# Patient Record
Sex: FEMALE | Race: WHITE | NOT HISPANIC OR LATINO | Employment: OTHER | URBAN - METROPOLITAN AREA
[De-identification: names, ages, dates, MRNs, and addresses within clinical notes are randomized per-mention and may not be internally consistent; named-entity substitution may affect disease eponyms.]

---

## 2017-01-19 ENCOUNTER — HOSPITAL ENCOUNTER (OUTPATIENT)
Dept: FAMILY MEDICINE CLINIC | Facility: CLINIC | Age: 82
Setting detail: SPECIMEN
Discharge: HOME | End: 2017-01-19
Attending: FAMILY MEDICINE | Admitting: FAMILY MEDICINE

## 2017-01-19 LAB
ALBUMIN SERPL-MCNC: 3.3 G/DL (ref 3.5–4.8)
ALBUMIN/GLOB SERPL: 0.9 {RATIO} (ref 1–1.7)
ALP SERPL-CCNC: 151 IU/L (ref 32–91)
ALT SERPL-CCNC: 33 IU/L (ref 14–54)
ANION GAP SERPL CALC-SCNC: 12.3 MMOL/L (ref 10–20)
AST SERPL-CCNC: 31 IU/L (ref 15–41)
BILIRUB SERPL-MCNC: 0.7 MG/DL (ref 0.3–1.2)
BILIRUB UR QL STRIP: NEGATIVE MG/DL
BUN SERPL-MCNC: 11 MG/DL (ref 8–20)
BUN/CREAT SERPL: 13.8 (ref 5.4–26.2)
CALCIUM SERPL-MCNC: 9.5 MG/DL (ref 8.9–10.3)
CASTS URNS QL MICRO: NORMAL /[LPF]
CHLORIDE SERPL-SCNC: 102 MMOL/L (ref 101–111)
COLOR UR: YELLOW
CONV BACTERIA IN URINE MICRO: NEGATIVE
CONV CLARITY OF URINE: CLEAR
CONV CO2: 30 MMOL/L (ref 22–32)
CONV HYALINE CASTS IN URINE MICRO: 0 /[LPF] (ref 0–5)
CONV PROTEIN IN URINE BY AUTOMATED TEST STRIP: NEGATIVE MG/DL
CONV SMALL ROUND CELLS: NORMAL /[HPF]
CONV TOTAL PROTEIN: 6.9 G/DL (ref 6.1–7.9)
CONV UROBILINOGEN IN URINE BY AUTOMATED TEST STRIP: 1 MG/DL
CREAT UR-MCNC: 0.8 MG/DL (ref 0.4–1)
CULTURE INDICATED?: NORMAL
ERYTHROCYTE [DISTWIDTH] IN BLOOD BY AUTOMATED COUNT: 14.6 % (ref 11.5–14.5)
GLOBULIN UR ELPH-MCNC: 3.6 G/DL (ref 2.5–3.8)
GLUCOSE SERPL-MCNC: 95 MG/DL (ref 65–99)
GLUCOSE UR QL: NEGATIVE MG/DL
HCT VFR BLD AUTO: 38.6 % (ref 35–49)
HGB BLD-MCNC: 12.9 G/DL (ref 12–15)
HGB UR QL STRIP: NEGATIVE
KETONES UR QL STRIP: NEGATIVE MG/DL
LEUKOCYTE ESTERASE UR QL STRIP: NEGATIVE
MCH RBC QN AUTO: 30 PG (ref 26–32)
MCHC RBC AUTO-ENTMCNC: 33.4 G/DL (ref 32–36)
MCV RBC AUTO: 89.7 FL (ref 80–94)
NITRITE UR QL STRIP: NEGATIVE
PH UR STRIP.AUTO: 7.5 [PH] (ref 4.5–8)
PLATELET # BLD AUTO: 274 10*3/UL (ref 150–450)
PMV BLD AUTO: 8.4 FL (ref 7.4–10.4)
POTASSIUM SERPL-SCNC: 5.3 MMOL/L (ref 3.6–5.1)
RBC # BLD AUTO: 4.3 10*6/UL (ref 4–5.4)
RBC #/AREA URNS HPF: 1 /[HPF] (ref 0–3)
SODIUM SERPL-SCNC: 139 MMOL/L (ref 136–144)
SP GR UR: 1.01 (ref 1–1.03)
SPERM URNS QL MICRO: NORMAL /[HPF]
SQUAMOUS SPT QL MICRO: 0 /[HPF] (ref 0–5)
UNIDENT CRYS URNS QL MICRO: NORMAL /[HPF]
WBC # BLD AUTO: 9.6 10*3/UL (ref 4.5–11.5)
WBC #/AREA URNS HPF: 0 /[HPF] (ref 0–5)
YEAST SPEC QL WET PREP: NORMAL /[HPF]

## 2017-07-12 ENCOUNTER — HOSPITAL ENCOUNTER (OUTPATIENT)
Dept: FAMILY MEDICINE CLINIC | Facility: CLINIC | Age: 82
Setting detail: SPECIMEN
Discharge: HOME | End: 2017-07-12
Attending: FAMILY MEDICINE | Admitting: FAMILY MEDICINE

## 2017-10-11 ENCOUNTER — HOSPITAL ENCOUNTER (OUTPATIENT)
Dept: FAMILY MEDICINE CLINIC | Facility: CLINIC | Age: 82
Setting detail: SPECIMEN
Discharge: HOME | End: 2017-10-11
Attending: FAMILY MEDICINE | Admitting: FAMILY MEDICINE

## 2017-10-11 LAB
ALBUMIN SERPL-MCNC: 3.7 G/DL (ref 3.5–4.8)
ALBUMIN/GLOB SERPL: 1.1 {RATIO} (ref 1–1.7)
ALP SERPL-CCNC: 91 IU/L (ref 32–91)
ALT SERPL-CCNC: 23 IU/L (ref 14–54)
ANION GAP SERPL CALC-SCNC: 11.6 MMOL/L (ref 10–20)
AST SERPL-CCNC: 30 IU/L (ref 15–41)
BILIRUB SERPL-MCNC: 0.8 MG/DL (ref 0.3–1.2)
BUN SERPL-MCNC: 14 MG/DL (ref 8–20)
BUN/CREAT SERPL: 17.5 (ref 5.4–26.2)
CALCIUM SERPL-MCNC: 9.7 MG/DL (ref 8.9–10.3)
CHLORIDE SERPL-SCNC: 101 MMOL/L (ref 101–111)
CHOLEST SERPL-MCNC: 141 MG/DL
CHOLEST/HDLC SERPL: 1.9 {RATIO}
CONV CO2: 28 MMOL/L (ref 22–32)
CONV LDL CHOLESTEROL DIRECT: 59 MG/DL (ref 0–100)
CONV TOTAL PROTEIN: 7.2 G/DL (ref 6.1–7.9)
CREAT UR-MCNC: 0.8 MG/DL (ref 0.4–1)
ERYTHROCYTE [DISTWIDTH] IN BLOOD BY AUTOMATED COUNT: 14.4 % (ref 11.5–14.5)
GLOBULIN UR ELPH-MCNC: 3.5 G/DL (ref 2.5–3.8)
GLUCOSE SERPL-MCNC: 91 MG/DL (ref 65–99)
HCT VFR BLD AUTO: 40.3 % (ref 35–49)
HDLC SERPL-MCNC: 74 MG/DL
HGB BLD-MCNC: 13.6 G/DL (ref 12–15)
LDLC/HDLC SERPL: 0.8 {RATIO}
LIPID INTERPRETATION: NORMAL
MCH RBC QN AUTO: 30.5 PG (ref 26–32)
MCHC RBC AUTO-ENTMCNC: 33.8 G/DL (ref 32–36)
MCV RBC AUTO: 90.3 FL (ref 80–94)
PLATELET # BLD AUTO: 210 10*3/UL (ref 150–450)
PMV BLD AUTO: 8.2 FL (ref 7.4–10.4)
POTASSIUM SERPL-SCNC: 4.6 MMOL/L (ref 3.6–5.1)
RBC # BLD AUTO: 4.47 10*6/UL (ref 4–5.4)
SODIUM SERPL-SCNC: 136 MMOL/L (ref 136–144)
TRIGL SERPL-MCNC: 58 MG/DL
TSH SERPL-ACNC: 0.62 UIU/ML (ref 0.34–5.6)
VLDLC SERPL CALC-MCNC: 8 MG/DL
WBC # BLD AUTO: 5.9 10*3/UL (ref 4.5–11.5)

## 2017-10-12 ENCOUNTER — HOSPITAL ENCOUNTER (OUTPATIENT)
Dept: LAB | Facility: HOSPITAL | Age: 82
Setting detail: SPECIMEN
Discharge: HOME | End: 2017-10-12
Attending: INTERNAL MEDICINE | Admitting: INTERNAL MEDICINE

## 2017-10-12 LAB
ALBUMIN SERPL-MCNC: 3.4 G/DL (ref 3.5–4.8)
ALBUMIN/GLOB SERPL: 1.1 {RATIO} (ref 1–1.7)
ALP SERPL-CCNC: 93 IU/L (ref 32–91)
ALT SERPL-CCNC: 24 IU/L (ref 14–54)
ANION GAP SERPL CALC-SCNC: 10.5 MMOL/L (ref 10–20)
AST SERPL-CCNC: 30 IU/L (ref 15–41)
BASOPHILS # BLD AUTO: 0 10*3/UL (ref 0–0.2)
BASOPHILS NFR BLD AUTO: 1 % (ref 0–2)
BILIRUB SERPL-MCNC: 0.5 MG/DL (ref 0.3–1.2)
BUN SERPL-MCNC: 19 MG/DL (ref 8–20)
BUN/CREAT SERPL: 23.8 (ref 5.4–26.2)
CALCIUM SERPL-MCNC: 9.2 MG/DL (ref 8.9–10.3)
CHLORIDE SERPL-SCNC: 102 MMOL/L (ref 101–111)
CONV CO2: 27 MMOL/L (ref 22–32)
CONV TOTAL PROTEIN: 6.5 G/DL (ref 6.1–7.9)
CREAT UR-MCNC: 0.8 MG/DL (ref 0.4–1)
DIFFERENTIAL METHOD BLD: (no result)
EOSINOPHIL # BLD AUTO: 0.1 10*3/UL (ref 0–0.3)
EOSINOPHIL # BLD AUTO: 2 % (ref 0–3)
ERYTHROCYTE [DISTWIDTH] IN BLOOD BY AUTOMATED COUNT: 14.2 % (ref 11.5–14.5)
GLOBULIN UR ELPH-MCNC: 3.1 G/DL (ref 2.5–3.8)
GLUCOSE SERPL-MCNC: 90 MG/DL (ref 65–99)
HCT VFR BLD AUTO: 37.1 % (ref 35–49)
HGB BLD-MCNC: 12.5 G/DL (ref 12–15)
LYMPHOCYTES # BLD AUTO: 1.4 10*3/UL (ref 0.8–4.8)
LYMPHOCYTES NFR BLD AUTO: 22 % (ref 18–42)
MCH RBC QN AUTO: 30.6 PG (ref 26–32)
MCHC RBC AUTO-ENTMCNC: 33.7 G/DL (ref 32–36)
MCV RBC AUTO: 90.7 FL (ref 80–94)
MONOCYTES # BLD AUTO: 0.5 10*3/UL (ref 0.1–1.3)
MONOCYTES NFR BLD AUTO: 8 % (ref 2–11)
NEUTROPHILS # BLD AUTO: 4.4 10*3/UL (ref 2.3–8.6)
NEUTROPHILS NFR BLD AUTO: 67 % (ref 50–75)
NRBC BLD AUTO-RTO: 0 /100{WBCS}
NRBC/RBC NFR BLD MANUAL: 0 10*3/UL
PLATELET # BLD AUTO: 196 10*3/UL (ref 150–450)
PMV BLD AUTO: 8.5 FL (ref 7.4–10.4)
POTASSIUM SERPL-SCNC: 4.5 MMOL/L (ref 3.6–5.1)
RBC # BLD AUTO: 4.1 10*6/UL (ref 4–5.4)
SODIUM SERPL-SCNC: 135 MMOL/L (ref 136–144)
T4 FREE SERPL-MCNC: 1.55 NG/DL (ref 0.58–1.64)
TSH SERPL-ACNC: 0.51 UIU/ML (ref 0.34–5.6)
WBC # BLD AUTO: 6.5 10*3/UL (ref 4.5–11.5)

## 2017-11-30 ENCOUNTER — HOSPITAL ENCOUNTER (OUTPATIENT)
Dept: LAB | Facility: HOSPITAL | Age: 82
Setting detail: SPECIMEN
Discharge: HOME | End: 2017-11-30
Attending: INTERNAL MEDICINE | Admitting: INTERNAL MEDICINE

## 2017-11-30 LAB
T4 FREE SERPL-MCNC: 1.45 NG/DL (ref 0.58–1.64)
TSH SERPL-ACNC: 0.79 UIU/ML (ref 0.34–5.6)

## 2018-01-09 ENCOUNTER — HOSPITAL ENCOUNTER (OUTPATIENT)
Dept: CARDIOLOGY | Facility: HOSPITAL | Age: 83
Discharge: HOME | End: 2018-01-09
Attending: INTERNAL MEDICINE | Admitting: INTERNAL MEDICINE

## 2018-10-04 ENCOUNTER — HOSPITAL ENCOUNTER (OUTPATIENT)
Dept: LAB | Facility: HOSPITAL | Age: 83
Setting detail: SPECIMEN
Discharge: HOME | End: 2018-10-04
Attending: INTERNAL MEDICINE | Admitting: INTERNAL MEDICINE

## 2018-10-04 LAB
T4 FREE SERPL-MCNC: 1.02 NG/DL (ref 0.58–1.64)
TSH SERPL-ACNC: 0.61 UIU/ML (ref 0.34–5.6)

## 2019-03-25 ENCOUNTER — OFFICE (OUTPATIENT)
Dept: URBAN - METROPOLITAN AREA CLINIC 64 | Facility: CLINIC | Age: 84
End: 2019-03-25

## 2019-03-25 VITALS
DIASTOLIC BLOOD PRESSURE: 73 MMHG | HEIGHT: 62 IN | SYSTOLIC BLOOD PRESSURE: 147 MMHG | HEART RATE: 70 BPM | WEIGHT: 141 LBS

## 2019-03-25 DIAGNOSIS — R10.11 RIGHT UPPER QUADRANT PAIN: ICD-10-CM

## 2019-03-25 DIAGNOSIS — K59.00 CONSTIPATION, UNSPECIFIED: ICD-10-CM

## 2019-03-25 PROCEDURE — 99213 OFFICE O/P EST LOW 20 MIN: CPT | Performed by: NURSE PRACTITIONER

## 2019-04-16 ENCOUNTER — OFFICE (OUTPATIENT)
Dept: URBAN - METROPOLITAN AREA CLINIC 64 | Facility: CLINIC | Age: 84
End: 2019-04-16

## 2019-04-16 ENCOUNTER — HOSPITAL ENCOUNTER (OUTPATIENT)
Dept: CARDIOLOGY | Facility: HOSPITAL | Age: 84
Discharge: HOME | End: 2019-04-16
Attending: INTERNAL MEDICINE | Admitting: INTERNAL MEDICINE

## 2019-04-16 VITALS
SYSTOLIC BLOOD PRESSURE: 141 MMHG | WEIGHT: 145 LBS | HEIGHT: 62 IN | DIASTOLIC BLOOD PRESSURE: 67 MMHG | HEART RATE: 70 BPM

## 2019-04-16 DIAGNOSIS — M54.9 DORSALGIA, UNSPECIFIED: ICD-10-CM

## 2019-04-16 DIAGNOSIS — R10.11 RIGHT UPPER QUADRANT PAIN: ICD-10-CM

## 2019-04-16 DIAGNOSIS — K59.00 CONSTIPATION, UNSPECIFIED: ICD-10-CM

## 2019-04-16 PROCEDURE — 99214 OFFICE O/P EST MOD 30 MIN: CPT | Performed by: NURSE PRACTITIONER

## 2019-05-14 ENCOUNTER — OFFICE (OUTPATIENT)
Dept: URBAN - METROPOLITAN AREA CLINIC 64 | Facility: CLINIC | Age: 84
End: 2019-05-14

## 2019-05-14 VITALS
WEIGHT: 145 LBS | HEIGHT: 62 IN | HEART RATE: 70 BPM | DIASTOLIC BLOOD PRESSURE: 74 MMHG | SYSTOLIC BLOOD PRESSURE: 145 MMHG

## 2019-05-14 DIAGNOSIS — M54.9 DORSALGIA, UNSPECIFIED: ICD-10-CM

## 2019-05-14 DIAGNOSIS — K59.00 CONSTIPATION, UNSPECIFIED: ICD-10-CM

## 2019-05-14 PROCEDURE — 99213 OFFICE O/P EST LOW 20 MIN: CPT | Performed by: NURSE PRACTITIONER

## 2019-06-18 ENCOUNTER — OFFICE (OUTPATIENT)
Dept: URBAN - METROPOLITAN AREA CLINIC 64 | Facility: CLINIC | Age: 84
End: 2019-06-18

## 2019-06-18 VITALS
HEART RATE: 70 BPM | WEIGHT: 137 LBS | SYSTOLIC BLOOD PRESSURE: 157 MMHG | HEIGHT: 62 IN | DIASTOLIC BLOOD PRESSURE: 77 MMHG

## 2019-06-18 DIAGNOSIS — R19.4 CHANGE IN BOWEL HABIT: ICD-10-CM

## 2019-06-18 PROCEDURE — 99213 OFFICE O/P EST LOW 20 MIN: CPT | Performed by: NURSE PRACTITIONER

## 2019-07-02 ENCOUNTER — ANTICOAGULATION VISIT (OUTPATIENT)
Dept: CARDIOLOGY | Facility: CLINIC | Age: 84
End: 2019-07-02

## 2019-07-02 DIAGNOSIS — I48.91 ATRIAL FIBRILLATION, UNSPECIFIED TYPE (HCC): Primary | ICD-10-CM

## 2019-07-02 LAB — INR PPP: 2.3

## 2019-07-03 RX ORDER — WARFARIN SODIUM 5 MG/1
5 TABLET ORAL
Qty: 60 TABLET | Refills: 0 | Status: SHIPPED | OUTPATIENT
Start: 2019-07-03 | End: 2019-08-29 | Stop reason: SDUPTHER

## 2019-07-16 ENCOUNTER — ANTICOAGULATION VISIT (OUTPATIENT)
Dept: CARDIOLOGY | Facility: CLINIC | Age: 84
End: 2019-07-16

## 2019-07-16 DIAGNOSIS — I48.91 ATRIAL FIBRILLATION, UNSPECIFIED TYPE (HCC): Primary | ICD-10-CM

## 2019-07-16 LAB — INR PPP: 1.6

## 2019-07-30 ENCOUNTER — ANTICOAGULATION VISIT (OUTPATIENT)
Dept: CARDIOLOGY | Facility: CLINIC | Age: 84
End: 2019-07-30

## 2019-07-30 DIAGNOSIS — I48.91 ATRIAL FIBRILLATION, UNSPECIFIED TYPE (HCC): Primary | ICD-10-CM

## 2019-07-30 LAB — INR PPP: 1.8

## 2019-08-09 RX ORDER — ROSUVASTATIN CALCIUM 20 MG/1
20 TABLET, COATED ORAL DAILY
Qty: 90 TABLET | Refills: 3 | Status: SHIPPED | OUTPATIENT
Start: 2019-08-09 | End: 2020-08-06 | Stop reason: SDUPTHER

## 2019-08-13 ENCOUNTER — ANTICOAGULATION VISIT (OUTPATIENT)
Dept: CARDIOLOGY | Facility: CLINIC | Age: 84
End: 2019-08-13

## 2019-08-13 DIAGNOSIS — I48.91 ATRIAL FIBRILLATION, UNSPECIFIED TYPE (HCC): Primary | ICD-10-CM

## 2019-08-13 LAB — INR PPP: 1.7

## 2019-08-21 RX ORDER — DILTIAZEM HYDROCHLORIDE 240 MG/1
CAPSULE, EXTENDED RELEASE ORAL
Qty: 90 CAPSULE | Refills: 2 | Status: SHIPPED | OUTPATIENT
Start: 2019-08-21 | End: 2020-05-26

## 2019-08-27 ENCOUNTER — ANTICOAGULATION VISIT (OUTPATIENT)
Dept: CARDIOLOGY | Facility: CLINIC | Age: 84
End: 2019-08-27

## 2019-08-27 DIAGNOSIS — I48.91 ATRIAL FIBRILLATION, UNSPECIFIED TYPE (HCC): Primary | ICD-10-CM

## 2019-08-27 LAB — INR PPP: 2.4

## 2019-08-29 RX ORDER — WARFARIN SODIUM 5 MG/1
5 TABLET ORAL
Qty: 60 TABLET | Refills: 0 | Status: SHIPPED | OUTPATIENT
Start: 2019-08-29 | End: 2019-10-07 | Stop reason: SDUPTHER

## 2019-09-03 ENCOUNTER — ANTICOAGULATION VISIT (OUTPATIENT)
Dept: CARDIOLOGY | Facility: CLINIC | Age: 84
End: 2019-09-03

## 2019-09-03 DIAGNOSIS — I48.91 ATRIAL FIBRILLATION, UNSPECIFIED TYPE (HCC): Primary | ICD-10-CM

## 2019-09-03 LAB — INR PPP: 2.8

## 2019-09-04 RX ORDER — DOXAZOSIN 2 MG/1
2 TABLET ORAL DAILY
Qty: 30 TABLET | Refills: 6 | Status: SHIPPED | OUTPATIENT
Start: 2019-09-04 | End: 2020-03-18

## 2019-09-17 LAB — INR PPP: 2

## 2019-09-18 ENCOUNTER — ANTICOAGULATION VISIT (OUTPATIENT)
Dept: CARDIOLOGY | Facility: CLINIC | Age: 84
End: 2019-09-18

## 2019-09-18 DIAGNOSIS — I48.91 ATRIAL FIBRILLATION, UNSPECIFIED TYPE (HCC): Primary | ICD-10-CM

## 2019-09-24 ENCOUNTER — ANTICOAGULATION VISIT (OUTPATIENT)
Dept: CARDIOLOGY | Facility: CLINIC | Age: 84
End: 2019-09-24

## 2019-09-24 DIAGNOSIS — I48.91 ATRIAL FIBRILLATION, UNSPECIFIED TYPE (HCC): Primary | ICD-10-CM

## 2019-09-24 LAB — INR PPP: 2.4

## 2019-09-25 ENCOUNTER — TRANSCRIBE ORDERS (OUTPATIENT)
Dept: ADMINISTRATIVE | Facility: HOSPITAL | Age: 84
End: 2019-09-25

## 2019-09-25 ENCOUNTER — LAB (OUTPATIENT)
Dept: LAB | Facility: HOSPITAL | Age: 84
End: 2019-09-25

## 2019-09-25 DIAGNOSIS — E83.50 CALCIUM DISORDER: Primary | ICD-10-CM

## 2019-09-25 DIAGNOSIS — E83.50 CALCIUM DISORDER: ICD-10-CM

## 2019-09-25 LAB
ALBUMIN SERPL-MCNC: 3.6 G/DL (ref 3.5–4.8)
ALBUMIN/GLOB SERPL: 1.1 G/DL (ref 1–1.7)
ALP SERPL-CCNC: 87 U/L (ref 32–91)
ALT SERPL W P-5'-P-CCNC: 21 U/L (ref 14–54)
ANION GAP SERPL CALCULATED.3IONS-SCNC: 12.3 MMOL/L (ref 5–15)
AST SERPL-CCNC: 25 U/L (ref 15–41)
BASOPHILS # BLD AUTO: 0 10*3/MM3 (ref 0–0.2)
BASOPHILS NFR BLD AUTO: 0.7 % (ref 0–1.5)
BILIRUB SERPL-MCNC: 0.9 MG/DL (ref 0.3–1.2)
BUN BLD-MCNC: 18 MG/DL (ref 8–20)
BUN/CREAT SERPL: 22.5 (ref 5.4–26.2)
CALCIUM SPEC-SCNC: 9.6 MG/DL (ref 8.9–10.3)
CHLORIDE SERPL-SCNC: 102 MMOL/L (ref 101–111)
CO2 SERPL-SCNC: 29 MMOL/L (ref 22–32)
CREAT BLD-MCNC: 0.8 MG/DL (ref 0.4–1)
CRP SERPL-MCNC: 0.58 MG/DL (ref 0–0.7)
DEPRECATED RDW RBC AUTO: 45.5 FL (ref 37–54)
EOSINOPHIL # BLD AUTO: 0.1 10*3/MM3 (ref 0–0.4)
EOSINOPHIL NFR BLD AUTO: 2.1 % (ref 0.3–6.2)
ERYTHROCYTE [DISTWIDTH] IN BLOOD BY AUTOMATED COUNT: 14.5 % (ref 12.3–15.4)
GFR SERPL CREATININE-BSD FRML MDRD: 68 ML/MIN/1.73
GLOBULIN UR ELPH-MCNC: 3.4 GM/DL (ref 2.5–3.8)
GLUCOSE BLD-MCNC: 99 MG/DL (ref 65–99)
HCT VFR BLD AUTO: 38 % (ref 34–46.6)
HGB BLD-MCNC: 12.8 G/DL (ref 12–15.9)
LYMPHOCYTES # BLD AUTO: 1.2 10*3/MM3 (ref 0.7–3.1)
LYMPHOCYTES NFR BLD AUTO: 24.5 % (ref 19.6–45.3)
MCH RBC QN AUTO: 30.3 PG (ref 26.6–33)
MCHC RBC AUTO-ENTMCNC: 33.7 G/DL (ref 31.5–35.7)
MCV RBC AUTO: 90 FL (ref 79–97)
MONOCYTES # BLD AUTO: 0.4 10*3/MM3 (ref 0.1–0.9)
MONOCYTES NFR BLD AUTO: 8.2 % (ref 5–12)
NEUTROPHILS # BLD AUTO: 3.3 10*3/MM3 (ref 1.7–7)
NEUTROPHILS NFR BLD AUTO: 64.5 % (ref 42.7–76)
NRBC BLD AUTO-RTO: 0 /100 WBC (ref 0–0.2)
PLATELET # BLD AUTO: 216 10*3/MM3 (ref 140–450)
PMV BLD AUTO: 7.5 FL (ref 6–12)
POTASSIUM BLD-SCNC: 4.3 MMOL/L (ref 3.6–5.1)
PROT SERPL-MCNC: 7 G/DL (ref 6.1–7.9)
RBC # BLD AUTO: 4.23 10*6/MM3 (ref 3.77–5.28)
SODIUM BLD-SCNC: 139 MMOL/L (ref 136–144)
WBC NRBC COR # BLD: 5.1 10*3/MM3 (ref 3.4–10.8)

## 2019-09-25 PROCEDURE — 86140 C-REACTIVE PROTEIN: CPT

## 2019-09-25 PROCEDURE — 80053 COMPREHEN METABOLIC PANEL: CPT

## 2019-09-25 PROCEDURE — 85025 COMPLETE CBC W/AUTO DIFF WBC: CPT

## 2019-09-25 PROCEDURE — 36415 COLL VENOUS BLD VENIPUNCTURE: CPT

## 2019-09-26 DIAGNOSIS — E03.9 HYPOTHYROIDISM, UNSPECIFIED TYPE: Primary | ICD-10-CM

## 2019-09-26 PROBLEM — J02.9 SORE THROAT: Status: ACTIVE | Noted: 2017-02-16

## 2019-09-26 PROBLEM — F41.9 ANXIETY DISORDER: Status: ACTIVE | Noted: 2019-09-26

## 2019-09-26 PROBLEM — K21.9 GASTROESOPHAGEAL REFLUX DISEASE: Status: ACTIVE | Noted: 2019-09-26

## 2019-09-26 PROBLEM — I10 HYPERTENSION: Status: ACTIVE | Noted: 2019-09-26

## 2019-09-26 PROBLEM — R53.83 FATIGUE: Status: ACTIVE | Noted: 2018-04-12

## 2019-09-26 PROBLEM — R60.0 EDEMA OF LOWER EXTREMITY: Status: ACTIVE | Noted: 2017-10-11

## 2019-09-26 PROBLEM — J45.909 ASTHMA: Status: ACTIVE | Noted: 2019-09-26

## 2019-09-26 PROBLEM — Z51.81 ENCOUNTER FOR THERAPEUTIC DRUG LEVEL MONITORING: Status: ACTIVE | Noted: 2017-01-27

## 2019-09-26 PROBLEM — R74.8 HIGH ALKALINE PHOSPHATASE: Status: ACTIVE | Noted: 2017-01-26

## 2019-09-26 PROBLEM — J11.1 INFLUENZA: Status: ACTIVE | Noted: 2017-02-16

## 2019-09-26 PROBLEM — F32.A DEPRESSION: Status: ACTIVE | Noted: 2019-09-26

## 2019-09-26 PROBLEM — R05.9 COUGH: Status: ACTIVE | Noted: 2017-02-16

## 2019-09-26 PROBLEM — R04.2 HEMOPTYSIS: Status: ACTIVE | Noted: 2017-02-16

## 2019-09-26 PROBLEM — IMO0002 EXCESSIVE ANTICOAGULATION: Status: ACTIVE | Noted: 2017-02-21

## 2019-10-07 RX ORDER — WARFARIN SODIUM 5 MG/1
TABLET ORAL
Qty: 60 TABLET | Refills: 0 | Status: SHIPPED | OUTPATIENT
Start: 2019-10-07 | End: 2019-10-10 | Stop reason: DRUGHIGH

## 2019-10-08 ENCOUNTER — LAB (OUTPATIENT)
Dept: LAB | Facility: HOSPITAL | Age: 84
End: 2019-10-08

## 2019-10-08 DIAGNOSIS — E03.9 HYPOTHYROIDISM, UNSPECIFIED TYPE: ICD-10-CM

## 2019-10-08 LAB
INR PPP: 2.5
T4 FREE SERPL-MCNC: 0.85 NG/DL (ref 0.58–1.64)
TSH SERPL DL<=0.05 MIU/L-ACNC: 1.09 UIU/ML (ref 0.34–5.6)

## 2019-10-08 PROCEDURE — 36415 COLL VENOUS BLD VENIPUNCTURE: CPT

## 2019-10-08 PROCEDURE — 84439 ASSAY OF FREE THYROXINE: CPT

## 2019-10-08 PROCEDURE — 84443 ASSAY THYROID STIM HORMONE: CPT

## 2019-10-09 ENCOUNTER — ANTICOAGULATION VISIT (OUTPATIENT)
Dept: CARDIOLOGY | Facility: CLINIC | Age: 84
End: 2019-10-09

## 2019-10-09 DIAGNOSIS — I48.91 ATRIAL FIBRILLATION, UNSPECIFIED TYPE (HCC): Primary | ICD-10-CM

## 2019-10-09 RX ORDER — SENNOSIDES 8.6 MG
650 CAPSULE ORAL EVERY 8 HOURS PRN
COMMUNITY

## 2019-10-09 RX ORDER — SOTALOL HYDROCHLORIDE 80 MG/1
TABLET ORAL
COMMUNITY
Start: 2019-07-25 | End: 2019-10-17

## 2019-10-09 RX ORDER — COLCHICINE 0.6 MG/1
0.6 TABLET ORAL 2 TIMES DAILY
COMMUNITY
Start: 2017-09-07

## 2019-10-09 RX ORDER — TRIAMTERENE AND HYDROCHLOROTHIAZIDE 75; 50 MG/1; MG/1
1 TABLET ORAL DAILY
COMMUNITY
End: 2019-10-17

## 2019-10-09 RX ORDER — FERROUS SULFATE 325(65) MG
TABLET ORAL EVERY 24 HOURS
COMMUNITY
Start: 2015-07-16 | End: 2019-10-17

## 2019-10-09 RX ORDER — FEXOFENADINE HCL 180 MG/1
TABLET ORAL AS NEEDED
COMMUNITY
End: 2020-06-26

## 2019-10-09 RX ORDER — POTASSIUM CHLORIDE 1500 MG/1
TABLET, FILM COATED, EXTENDED RELEASE ORAL
COMMUNITY
Start: 2019-09-19 | End: 2020-05-05

## 2019-10-09 RX ORDER — NITROGLYCERIN 0.4 MG/1
0.4 TABLET SUBLINGUAL
COMMUNITY
End: 2022-05-12 | Stop reason: SDUPTHER

## 2019-10-09 RX ORDER — METOPROLOL TARTRATE 50 MG/1
50 TABLET, FILM COATED ORAL
COMMUNITY
End: 2019-10-17

## 2019-10-09 RX ORDER — PANTOPRAZOLE SODIUM 40 MG/1
GRANULE, DELAYED RELEASE ORAL
COMMUNITY
End: 2019-10-17

## 2019-10-09 RX ORDER — LEVOTHYROXINE SODIUM 0.07 MG/1
875 TABLET ORAL
COMMUNITY
Start: 2019-09-06 | End: 2019-10-17 | Stop reason: SDUPTHER

## 2019-10-09 RX ORDER — HYDROXYCHLOROQUINE SULFATE 200 MG/1
TABLET, FILM COATED ORAL
COMMUNITY
Start: 2016-10-17 | End: 2019-10-17

## 2019-10-09 RX ORDER — IMIQUIMOD 12.5 MG/.25G
CREAM TOPICAL
COMMUNITY
Start: 2019-09-18 | End: 2019-10-17

## 2019-10-09 RX ORDER — ALPRAZOLAM 0.5 MG/1
0.5 TABLET ORAL
COMMUNITY
End: 2020-06-26

## 2019-10-09 RX ORDER — AMLODIPINE BESYLATE 10 MG/1
10 TABLET ORAL DAILY
COMMUNITY
End: 2020-06-26

## 2019-10-09 RX ORDER — LUBIPROSTONE 24 UG/1
CAPSULE ORAL
COMMUNITY
Start: 2019-05-20 | End: 2019-10-17

## 2019-10-09 RX ORDER — CHLORAL HYDRATE 500 MG
CAPSULE ORAL
COMMUNITY
Start: 2014-09-08

## 2019-10-09 RX ORDER — PREDNISONE 1 MG/1
TABLET ORAL
COMMUNITY
Start: 2019-09-09 | End: 2019-10-17

## 2019-10-09 RX ORDER — BUDESONIDE 0.25 MG/2ML
INHALANT ORAL
COMMUNITY
End: 2019-10-17

## 2019-10-09 RX ORDER — ISOSORBIDE MONONITRATE 30 MG/1
TABLET, EXTENDED RELEASE ORAL
COMMUNITY
Start: 2019-10-07 | End: 2019-10-17

## 2019-10-09 RX ORDER — MULTIVIT WITH MINERALS/LUTEIN
1000 TABLET ORAL DAILY
COMMUNITY
Start: 2015-05-27 | End: 2022-05-12

## 2019-10-09 RX ORDER — GLUCOSAM/MSM/CHOND/HYALURON AC 750-60-150
1 TABLET ORAL DAILY
COMMUNITY
Start: 2018-08-20

## 2019-10-09 RX ORDER — POLYETHYLENE GLYCOL 3350 17 G/17G
17 POWDER, FOR SOLUTION ORAL AS NEEDED
COMMUNITY

## 2019-10-09 RX ORDER — LOSARTAN POTASSIUM 50 MG/1
50 TABLET ORAL DAILY
COMMUNITY
End: 2019-10-17

## 2019-10-09 RX ORDER — SOTALOL HYDROCHLORIDE 80 MG/1
80 TABLET ORAL
COMMUNITY
End: 2019-10-17

## 2019-10-09 RX ORDER — ASPIRIN 81 MG/1
81 TABLET ORAL DAILY
COMMUNITY
Start: 2014-06-25

## 2019-10-09 RX ORDER — ISOSORBIDE DINITRATE 20 MG/1
30 TABLET ORAL 2 TIMES DAILY
COMMUNITY
End: 2020-06-26

## 2019-10-09 RX ORDER — FUROSEMIDE 40 MG/1
TABLET ORAL
COMMUNITY
Start: 2019-09-06 | End: 2019-11-19 | Stop reason: SDUPTHER

## 2019-10-09 RX ORDER — CHOLECALCIFEROL (VITAMIN D3) 125 MCG
5 CAPSULE ORAL NIGHTLY
COMMUNITY
Start: 2015-09-17

## 2019-10-09 RX ORDER — TRIAMCINOLONE ACETONIDE 1 MG/G
CREAM TOPICAL
COMMUNITY
Start: 2019-09-18 | End: 2019-10-17

## 2019-10-09 RX ORDER — MULTIVITAMIN
1 TABLET ORAL DAILY
COMMUNITY

## 2019-10-09 RX ORDER — GUAIFENESIN 200 MG/10ML
200 LIQUID ORAL
COMMUNITY
End: 2019-10-17

## 2019-10-09 RX ORDER — INFLUENZA A VIRUS A/MICHIGAN/45/2015 X-275 (H1N1) ANTIGEN (FORMALDEHYDE INACTIVATED), INFLUENZA A VIRUS A/SINGAPORE/INFIMH-16-0019/2016 IVR-186 (H3N2) ANTIGEN (FORMALDEHYDE INACTIVATED), AND INFLUENZA B VIRUS B/MARYLAND/15/2016 BX-69A (A B/COLORADO/6/2017-LIKE VIRUS) ANTIGEN (FORMALDEHYDE INACTIVATED) 60; 60; 60 UG/.5ML; UG/.5ML; UG/.5ML
INJECTION, SUSPENSION INTRAMUSCULAR
Refills: 0 | COMMUNITY
Start: 2019-09-23 | End: 2019-10-17

## 2019-10-10 RX ORDER — WARFARIN SODIUM 5 MG/1
TABLET ORAL
Qty: 120 TABLET | Refills: 1 | Status: SHIPPED | OUTPATIENT
Start: 2019-10-10 | End: 2020-04-17 | Stop reason: SDUPTHER

## 2019-10-15 ENCOUNTER — TELEPHONE (OUTPATIENT)
Dept: ENDOCRINOLOGY | Facility: CLINIC | Age: 84
End: 2019-10-15

## 2019-10-17 ENCOUNTER — OFFICE VISIT (OUTPATIENT)
Dept: ENDOCRINOLOGY | Facility: CLINIC | Age: 84
End: 2019-10-17

## 2019-10-17 VITALS
WEIGHT: 136 LBS | BODY MASS INDEX: 25.03 KG/M2 | OXYGEN SATURATION: 95 % | SYSTOLIC BLOOD PRESSURE: 128 MMHG | DIASTOLIC BLOOD PRESSURE: 68 MMHG | HEIGHT: 62 IN | HEART RATE: 70 BPM

## 2019-10-17 DIAGNOSIS — E03.9 ACQUIRED HYPOTHYROIDISM: Primary | ICD-10-CM

## 2019-10-17 PROCEDURE — 99212 OFFICE O/P EST SF 10 MIN: CPT | Performed by: INTERNAL MEDICINE

## 2019-10-17 RX ORDER — LEVOTHYROXINE SODIUM 0.07 MG/1
TABLET ORAL
Qty: 100 TABLET | Refills: 6 | Status: SHIPPED | OUTPATIENT
Start: 2019-10-17 | End: 2020-10-22 | Stop reason: SDUPTHER

## 2019-10-17 NOTE — PROGRESS NOTES
Endocrine Progress Note Outpatient     Patient Care Team:  Darcy Coates APRN as PCP - General  Paris Bower APRN as PCP - [facility]  Felipe Hansen MD as PCP - Family Medicine    Chief Complaint: Follow-up hypothyroidism    HPI: 84-year-old female with history of hypothyroidism is here for follow-up and she is currently taking levothyroxine 75 mcg 5 days a week.  She does have some fatigue and tiredness but she is taking her medications on regular basis.    Past Medical History:   Diagnosis Date   • Anxiety    • Asthma    • CAD (coronary artery disease)    • GERD (gastroesophageal reflux disease)    • Gout    • Hyperlipidemia    • Hypertension    • Hypothyroidism        Social History     Socioeconomic History   • Marital status:      Spouse name: Not on file   • Number of children: Not on file   • Years of education: Not on file   • Highest education level: Not on file   Tobacco Use   • Smoking status: Former Smoker   • Smokeless tobacco: Former User   Substance and Sexual Activity   • Alcohol use: No     Frequency: Never   • Drug use: No   • Sexual activity: Defer       History reviewed. No pertinent family history.    No Known Allergies    ROS:   Constitutional:  Admit fatigue, tiredness.    Eyes:  Denies change in visual acuity   HENT:  Denies nasal congestion or sore throat   Respiratory: denies cough, shortness of breath.   Cardiovascular:  denies chest pain, edema   GI:  Denies abdominal pain, nausea, vomiting.   Musculoskeletal:  Denies back pain or joint pain   Integument:  Denies dry skin and rash   Neurologic:  Denies headache, focal weakness or sensory changes   Endocrine:  Denies polyuria or polydipsia   Psychiatric:  Denies depression or anxiety      Vitals:    10/17/19 1056   BP: 128/68   Pulse: 70   SpO2: 95%       Physical Exam:  GEN: NAD, conversant  EYES: EOMI, PERRL, no conjunctival erythema  NECK: no thyromegaly, full ROM   CV: RRR, no murmurs/rubs/gallops, no  peripheral edema  LUNG: CTAB, no wheezes/rales/ronchi  SKIN: no rashes, no acanthosis  MSK: no deformities, full ROM of all extremities  NEURO: no tremors, DTR normal  PSYCH: AOX3, appropriate mood, affect normal      Results Review:     I reviewed the patient's new clinical results.      Lab Results   Component Value Date    GLUCOSE 99 09/25/2019    BUN 18 09/25/2019    CREATININE 0.80 09/25/2019    EGFRIFNONA 68 09/25/2019    EGFRIFAFRI >60 mL/min/1.73m2 09/02/2016    BCR 22.5 09/25/2019    K 4.3 09/25/2019    CO2 29.0 09/25/2019    CALCIUM 9.6 09/25/2019    ALBUMIN 3.60 09/25/2019    LABIL2 1.1 10/12/2017    AST 25 09/25/2019    ALT 21 09/25/2019    CHOL 141 10/11/2017    TRIG 58 10/11/2017    LDL 59 10/11/2017    HDL 74 10/11/2017     Lab Results   Component Value Date    TSH 1.090 10/08/2019    FREET4 0.85 10/08/2019         Medication Review: Reviewed.       Current Outpatient Medications:   •  acetaminophen (TYLENOL) 650 MG 8 hr tablet, Take 325 mg by mouth As Needed., Disp: , Rfl:   •  ALPRAZolam (XANAX) 0.5 MG tablet, Take 0.5 mg by mouth. 5 days a week .5mg, .7mg the other two days, Disp: , Rfl:   •  amLODIPine (NORVASC) 10 MG tablet, Take 10 mg by mouth Daily., Disp: , Rfl:   •  ascorbic acid (VITAMIN C) 1000 MG tablet, Take 1,000 mg by mouth Daily., Disp: , Rfl:   •  aspirin 81 MG EC tablet, Take 81 mg by mouth Daily., Disp: , Rfl:   •  Calcium Carb-Cholecalciferol (CALCIUM + D3) 600-200 MG-UNIT tablet, Daily., Disp: , Rfl:   •  CARTIA  MG 24 hr capsule, TAKE ONE CAPSULE BY MOUTH DAILY, Disp: 90 capsule, Rfl: 2  •  colchicine 0.6 MG tablet, daily, Disp: , Rfl:   •  doxazosin (CARDURA) 2 MG tablet, Take 1 tablet by mouth Daily., Disp: 30 tablet, Rfl: 6  •  fexofenadine (ALLEGRA) 180 MG tablet, Take  by mouth As Needed., Disp: , Rfl:   •  furosemide (LASIX) 40 MG tablet, , Disp: , Rfl:   •  Glucos-Chondroit-Hyaluron-MSM (GLUCOSAMINE CHONDROITIN JOINT) tablet, GLUCOSAMINE CHONDROITIN JOINT TABS, Disp:  , Rfl:   •  isosorbide dinitrate (ISORDIL) 20 MG tablet, Take 30 mg by mouth 2 (Two) Times a Day., Disp: , Rfl:   •  levothyroxine (SYNTHROID, LEVOTHROID) 75 MCG tablet, 875 mcg. One on weekdays only, Disp: , Rfl:   •  melatonin ([employer] MELATONIN) 5 MG tablet tablet, Daily., Disp: , Rfl:   •  Multiple Vitamin (MULTIVITAMIN) tablet, Take 1 tablet by mouth Daily., Disp: , Rfl:   •  nitroglycerin (NITROSTAT) 0.4 MG SL tablet, NITROSTAT 0.4 MG SUBL, Disp: , Rfl:   •  Omega-3 Fatty Acids (FISH OIL) 1000 MG capsule capsule, Take  by mouth., Disp: , Rfl:   •  polyethylene glycol (MIRALAX) powder, Take 17 g by mouth As Needed., Disp: , Rfl:   •  potassium chloride ER (K-TAB) 20 MEQ tablet controlled-release ER tablet, , Disp: , Rfl:   •  rosuvastatin (CRESTOR) 20 MG tablet, Take 1 tablet by mouth Daily., Disp: 90 tablet, Rfl: 3  •  warfarin (COUMADIN) 5 MG tablet, Take one & one-half tablets x 3 days/week (Tu/Th/Sat), take one tablet all other days or as directed, Disp: 120 tablet, Rfl: 1      Assessment/Plan   Hypothyroidism: Well-controlled with TSH 1.09 and free T4 0.85.  We will continue current dose of levothyroxine 75 mcg 5 days a week and will follow her back in 1 year with labs.            Chika Paula MD FACE.    Much of the above report is an electronic transcription/translation of the spoken language to printed text using Dragon Software. As such, the subtleties and finesse of the spoken language may permit erroneous, or at times, nonsensical words or phrases to be inadvertently transcribed; thus changes may be made at a later date to rectify these errors.

## 2019-10-25 ENCOUNTER — ANTICOAGULATION VISIT (OUTPATIENT)
Dept: CARDIOLOGY | Facility: CLINIC | Age: 84
End: 2019-10-25

## 2019-10-25 DIAGNOSIS — I48.91 ATRIAL FIBRILLATION, UNSPECIFIED TYPE (HCC): Primary | ICD-10-CM

## 2019-10-25 LAB — INR PPP: 3.2

## 2019-10-29 RX ORDER — SOTALOL HYDROCHLORIDE 80 MG/1
TABLET ORAL
Qty: 180 TABLET | Refills: 2 | Status: SHIPPED | OUTPATIENT
Start: 2019-10-29 | End: 2020-05-18 | Stop reason: SDUPTHER

## 2019-11-07 ENCOUNTER — ANTICOAGULATION VISIT (OUTPATIENT)
Dept: CARDIOLOGY | Facility: CLINIC | Age: 84
End: 2019-11-07

## 2019-11-07 DIAGNOSIS — I48.91 ATRIAL FIBRILLATION, UNSPECIFIED TYPE (HCC): Primary | ICD-10-CM

## 2019-11-07 LAB — INR PPP: 2.2

## 2019-11-17 NOTE — PROGRESS NOTES
Cardiology Office Visit      Encounter Date:  11/18/2019    Patient ID:   Tracy Jane is a 84 y.o. female.    Reason For Followup:  Paroxysmal atrial fibrillation  Coronary artery disease  Valvular heart disease  History of permanent pacemaker    Brief Clinical History:  Dear Darcy Meek APRN    I had the pleasure of seeing Tracy Jane today. As you are well aware, this is a 84 y.o. female with a history of valvular heart disease status post bioprosthetic aortic valve replacement in 2014. She is additional history that includes peripheral vascular disease, coronary artery disease status post coronary arterial bypass grafting, bilateral renal artery stenosis, carotid artery disease, dyslipidemia, and sick sinus syndrome status post permanent pacemaker placement. She presents today for followup on the above conditions.    Interval History:  She denies any chest pain pressure heaviness or tightness. She denies any shortness of breath out of character.  She denies any PND orthopnea.  She denies any syncope or near-syncope.    She is reporting worsening fatigue.    She continues to have breakthrough atrial fibrillation as evidenced by multiple episodes of palpitations.  A. fib episodes can last up to an hour.  Her blood pressure is elevated today however her prior visits have demonstrated it control.  We discussed her echocardiogram findings.  We did have a conversation regarding ablation.  Case was discussed with electrophysiology regarding appropriateness of ablation and EP appears reluctant to proceed given patient's advanced age and risk for complications.  The patient will nonetheless review some literature.    Assessment & Plan    Impressions:  SSS, s/p PPM 2016  paroxysmal atrial fibrillation on sotalol on chronic Coumadin therapy  Chronic stable angina  CAD s/p CABG most recent cath with no disease amenable to PCI 2016  Labile blood pressure  Valvular heart disease, s/p bioprosthetic AVR  CKD,  "renal artery stenosis  PAD , FELICITAS and carotid stenosis  Fatigue   Orthostasis    Recommendations:  Monitor blood pressure and notify if not sufficiently controlled.  Follow-up in 6 months time center should there be difficulties.  Follow-up with EP as indicated  Avoid rapid changes in position.      Objective:    Vitals:  Vitals:    11/18/19 1021   BP: 145/79   BP Location: Left arm   Pulse: 70   SpO2: 98%   Weight: 62.6 kg (138 lb)   Height: 157.5 cm (62\")       Physical Exam:    General: Alert, cooperative, no distress, appears stated age  Head:  Normocephalic, atraumatic, mucous membranes moist  Eyes:  Conjunctiva/corneas clear, EOM's intact     Neck:  Supple,  no adenopathy;      Lungs: Clear to auscultation bilaterally, no wheezes rhonchi rales are noted  Chest wall: No tenderness  Heart::  Regular rate and rhythm, S1 and S2 normal, 2/6 holosystolic murmur.  No rub or gallop  Abdomen: Soft, non-tender, nondistended bowel sounds active  Extremities: No cyanosis, clubbing, trace edema  Pulses: 2+ and symmetric all extremities  Skin:  No rashes or lesions  Neuro/psych: A&O x3. CN II through XII are grossly intact with appropriate affect      Allergies:  No Known Allergies    Medication Review:     Current Outpatient Medications:   •  acetaminophen (TYLENOL) 650 MG 8 hr tablet, Take 325 mg by mouth As Needed., Disp: , Rfl:   •  ALPRAZolam (XANAX) 0.5 MG tablet, Take 0.5 mg by mouth. 5 days a week .5mg, .7mg the other two days, Disp: , Rfl:   •  amLODIPine (NORVASC) 10 MG tablet, Take 10 mg by mouth Daily., Disp: , Rfl:   •  ascorbic acid (VITAMIN C) 1000 MG tablet, Take 1,000 mg by mouth Daily., Disp: , Rfl:   •  aspirin 81 MG EC tablet, Take 81 mg by mouth Daily., Disp: , Rfl:   •  Calcium Carb-Cholecalciferol (CALCIUM + D3) 600-200 MG-UNIT tablet, Daily., Disp: , Rfl:   •  CARTIA  MG 24 hr capsule, TAKE ONE CAPSULE BY MOUTH DAILY, Disp: 90 capsule, Rfl: 2  •  colchicine 0.6 MG tablet, daily, Disp: , Rfl:   • "  doxazosin (CARDURA) 2 MG tablet, Take 1 tablet by mouth Daily., Disp: 30 tablet, Rfl: 6  •  fexofenadine (ALLEGRA) 180 MG tablet, Take  by mouth As Needed., Disp: , Rfl:   •  furosemide (LASIX) 40 MG tablet, , Disp: , Rfl:   •  Glucos-Chondroit-Hyaluron-MSM (GLUCOSAMINE CHONDROITIN JOINT) tablet, GLUCOSAMINE CHONDROITIN JOINT TABS, Disp: , Rfl:   •  isosorbide dinitrate (ISORDIL) 20 MG tablet, Take 30 mg by mouth 2 (Two) Times a Day., Disp: , Rfl:   •  levothyroxine (SYNTHROID, LEVOTHROID) 75 MCG tablet, 1 tablet p.o. daily Monday through Friday., Disp: 100 tablet, Rfl: 6  •  melatonin ([employer] MELATONIN) 5 MG tablet tablet, Daily., Disp: , Rfl:   •  Multiple Vitamin (MULTIVITAMIN) tablet, Take 1 tablet by mouth Daily., Disp: , Rfl:   •  nitroglycerin (NITROSTAT) 0.4 MG SL tablet, NITROSTAT 0.4 MG SUBL, Disp: , Rfl:   •  Omega-3 Fatty Acids (FISH OIL) 1000 MG capsule capsule, Take  by mouth., Disp: , Rfl:   •  polyethylene glycol (MIRALAX) powder, Take 17 g by mouth As Needed., Disp: , Rfl:   •  potassium chloride ER (K-TAB) 20 MEQ tablet controlled-release ER tablet, , Disp: , Rfl:   •  rosuvastatin (CRESTOR) 20 MG tablet, Take 1 tablet by mouth Daily., Disp: 90 tablet, Rfl: 3  •  sotalol (BETAPACE) 80 MG tablet, TAKE ONE TABLET BY MOUTH TWICE A DAY, Disp: 180 tablet, Rfl: 2  •  warfarin (COUMADIN) 5 MG tablet, Take one & one-half tablets x 3 days/week (Tu/Th/Sat), take one tablet all other days or as directed, Disp: 120 tablet, Rfl: 1    Family History:  Family History   Problem Relation Age of Onset   • Hypertension Mother    • Heart disease Father    • Heart disease Sister    • Kidney cancer Sister    • Stroke Sister    • Heart disease Brother        Past Medical History:  Past Medical History:   Diagnosis Date   • Anxiety    • Asthma    • CAD (coronary artery disease)    • Carotid artery disease (CMS/HCC)    • GERD (gastroesophageal reflux disease)    • Gout    • Hyperlipidemia    • Hypertension    •  Hyperthyroidism    • Hypothyroidism        Past surgical History:  Past Surgical History:   Procedure Laterality Date   • CARDIAC CATHETERIZATION     • CAROTID STENT     • CATARACT EXTRACTION     • CHOLECYSTECTOMY     • CORONARY ARTERY BYPASS GRAFT     • CORONARY STENT PLACEMENT     • FINGER SURGERY     • KNEE SURGERY     • PACEMAKER IMPLANTATION     • SHOULDER SURGERY         Social History:  Social History     Socioeconomic History   • Marital status:      Spouse name: Not on file   • Number of children: Not on file   • Years of education: Not on file   • Highest education level: Not on file   Tobacco Use   • Smoking status: Former Smoker   • Smokeless tobacco: Former User   Substance and Sexual Activity   • Alcohol use: No     Frequency: Never   • Drug use: No   • Sexual activity: Defer       Review of Systems:  The following systems were reviewed as they relate to the cardiovascular system: Constitutional, Eyes, ENT, Cardiovascular, Respiratory, Gastrointestinal, Integumentary, Neurological, Psychiatric, Hematologic, Endocrine, Musculoskeletal, and Genitourinary. The pertinent cardiovascular findings are reported above with all other cardiovascular points within those systems being negative.    Diagnostic Study Review:     Current Electrocardiogram:  Procedures no new EKG noted.  EKG from EP visit earlier today demonstrates atrial paced rhythm with a rate of 70 bpm.  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  Normal QT and QTc intervals noted.      NOTE: The following portions of the patient's history were reviewed and updated this visit as appropriate: allergies, current medications, past family history, past medical history, past social history, past surgical history and problem list.

## 2019-11-18 ENCOUNTER — OFFICE VISIT (OUTPATIENT)
Dept: CARDIOLOGY | Facility: CLINIC | Age: 84
End: 2019-11-18

## 2019-11-18 ENCOUNTER — CLINICAL SUPPORT NO REQUIREMENTS (OUTPATIENT)
Dept: CARDIOLOGY | Facility: CLINIC | Age: 84
End: 2019-11-18

## 2019-11-18 VITALS
OXYGEN SATURATION: 98 % | HEIGHT: 62 IN | HEART RATE: 70 BPM | DIASTOLIC BLOOD PRESSURE: 79 MMHG | BODY MASS INDEX: 25.4 KG/M2 | SYSTOLIC BLOOD PRESSURE: 145 MMHG | WEIGHT: 138 LBS

## 2019-11-18 VITALS
DIASTOLIC BLOOD PRESSURE: 72 MMHG | BODY MASS INDEX: 25.24 KG/M2 | WEIGHT: 138 LBS | HEART RATE: 79 BPM | SYSTOLIC BLOOD PRESSURE: 127 MMHG

## 2019-11-18 DIAGNOSIS — I35.1 NONRHEUMATIC AORTIC VALVE INSUFFICIENCY: ICD-10-CM

## 2019-11-18 DIAGNOSIS — Z95.0 PRESENCE OF CARDIAC PACEMAKER: ICD-10-CM

## 2019-11-18 DIAGNOSIS — I48.0 PAROXYSMAL ATRIAL FIBRILLATION (HCC): ICD-10-CM

## 2019-11-18 DIAGNOSIS — I10 ESSENTIAL HYPERTENSION: ICD-10-CM

## 2019-11-18 DIAGNOSIS — I49.5 SICK SINUS SYNDROME (HCC): Primary | ICD-10-CM

## 2019-11-18 DIAGNOSIS — E78.2 MIXED HYPERLIPIDEMIA: ICD-10-CM

## 2019-11-18 DIAGNOSIS — N18.30 CHRONIC KIDNEY DISEASE, STAGE III (MODERATE) (HCC): ICD-10-CM

## 2019-11-18 DIAGNOSIS — I25.10 CHRONIC CORONARY ARTERY DISEASE: ICD-10-CM

## 2019-11-18 DIAGNOSIS — R94.31 ABNORMAL EKG: Primary | ICD-10-CM

## 2019-11-18 DIAGNOSIS — I48.0 PAROXYSMAL ATRIAL FIBRILLATION (HCC): Primary | ICD-10-CM

## 2019-11-18 DIAGNOSIS — I38 VALVULAR HEART DISEASE: ICD-10-CM

## 2019-11-18 PROCEDURE — 99213 OFFICE O/P EST LOW 20 MIN: CPT | Performed by: INTERNAL MEDICINE

## 2019-11-18 PROCEDURE — 93000 ELECTROCARDIOGRAM COMPLETE: CPT | Performed by: INTERNAL MEDICINE

## 2019-11-18 PROCEDURE — 99214 OFFICE O/P EST MOD 30 MIN: CPT | Performed by: INTERNAL MEDICINE

## 2019-11-18 NOTE — PROGRESS NOTES
EKG shows sinus rhythm with atrial pacing heart rate of 70 DC interval of 230 QRS of 115 QT of 425 and indication for EKG includes intermittent atrial arrhythmias and sick sinus syndrome and compared to prior EKG no significant changes noted        CC: Sick sinus syndrome with dual-chamber pacemaker in situ follow up .    Sub-84-year-old female patient with recurrent symptomatic atrial fibrillation was placed on sotalol In the past and comes in for follow-up. she has sick sinus syndrome with a dual-chamber pacemaker in situ which was placed several months ago. she is doing well with recurrent symptoms of arrhythmias-- she has history of valvular heart disease status post bioprosthetic aortic valve replacement in 2014. She is additional history that includes peripheral vascular disease, coronary artery disease status post coronary arterial bypass grafting, bilateral renal artery stenosis, carotid artery disease, dyslipidemia, and sick sinus syndrome status post permanent pacemaker placement.  He complains of intermittent palpitations and also fatigue with current intake of sotalol  Denies any leg edema or syncope or any  angina      Impressions  SSS, s/p PPM --pacemaker interrogated with appropriate function with intermittent atrial fibrillation and one episode of atrial fibrillation lasting up to 1 hour  paroxysmal atrial fibrillation on sotalol on chronic Coumadin therapy  Chronic stable angina  CAD s/p CABG most recent cath with no disease amenable to PCI 2016  Valvular heart disease, s/p bioprosthetic AVR  CKD, renal artery stenosis  PAD , FELICITAS and carotid stenosis  Alternate options for AF therapy to avoid antiarrhythmics also educated however patient is doing fairly well clinically and I would recommend continuing on ongoing medical treatment      Vital Signs: Blood pressure 1 45/79 pulse rate 70 patient afebrile respiration 12 times a minute        Current Allergies (reviewed today):  No known  allergies      Past Medical History:     Reviewed history from 06/13/2016 and no changes required:        AVR 2014-        Coronary artery disease: S/P CABG and PCI with stenting-        Carotid disease;left side 50-74%-Patrick Ocampo        Inferior myocardial infarction 12-06-        Asthma        Anxiety Disorder        Depression        G E R D        Hyperlipidemia        Hypertension-        Hyperthyroidism        fx thoracic ?        shoulder fx        Rotator cuff syndrome         Gout         Paroxysmal atrial fib        Arthritis        Hypothyroid        No Drug Allergies?         Eye exam:2014 &2015 Dr.Kelley Parekh in [place]         Rheumatoid Arthritis    Past Surgical History:     Reviewed history from 10/16/2018 and no changes required:        PCI/stent, LCX, 3-20-06, Cypher stent        PCI/stent x 2, LCX & priximal diagonal, 12-3-06, [employer] stents        cataract r eye 12/07  lt eye 01/08        CABG x 3  07-19-08        thoracentesis l lung 8/08        Right rotator cuff repair - Oct. 15, 2013        Cholecystectomy-2009        Left Knee Arthoplasty-2014        Left Shoulder Replacement 4/2014        Aortic Valve  Replacement 6/11/2014        Heart Catherization:2/18/2015        Pacemaker placed 6/6/16        Colonoscopy 2011         Surgery Finger 2018         Social History:     Reviewed history from 01/10/2018 and no changes required:        Patient is a former smoker.        Passive Smoke: N        Alcohol Use: N        Drug Use: N        HIV/High Risk: N        Regular Exercise: N        Hx Domestic Abuse: N        [employer] Affecting Care: N            Review of Systems   General: Positive for fatigue and tiredness    Eyes: No redness  Cardiovascular: No chest pain, no palpitations  Respiratory: No shortness of breath  Gastrointestinal: No nausea or vomiting, bleeding  Genitourinary: no hematuria or dysuria  Musculoskeletal: No arthralgia or myalgia  Skin:  No rash  Neurologic: No numbness, tingling, syncope  Hematologic/Lymphatic: No abnormal bleeding      Physical Exam    General:      well developed, well nourished, in no acute distress.    Head:      normocephalic and atraumatic.    Eyes:      PERRL/EOM intact, conjunctiva and sclera clear with out nystagmus.    Neck:      no masses, thyromegaly  Lungs:      clear bilaterally to auscultation.    Heart:      regular rhythm, no rubs, no gallops and Grade 2/6 YENI:.    Skin:      intact without lesions or rashes.    Psych:      alert and cooperative; normal mood and affect; normal attention span and concentration.

## 2019-11-18 NOTE — PATIENT INSTRUCTIONS
Cardiac Ablation    Cardiac ablation is a procedure to stop some heart tissue from causing problems. The heart has many electrical connections. Sometimes these connections make the heart beat very fast or irregularly. Removing some problem areas can improve the heart rhythm or make it normal.  What happens before the procedure?  · Follow instructions from your doctor about what you cannot eat or drink.  · Ask your doctor about:  ? Changing or stopping your normal medicines. This is important if you take diabetes medicines or blood thinners.  ? Taking medicines such as aspirin and ibuprofen. These medicines can thin your blood. Do not take these medicines before your procedure if your doctor tells you not to.  · Plan to have someone take you home.  · If you will be going home right after the procedure, plan to have someone with you for 24 hours.  What happens during the procedure?  · To lower your risk of infection:  ? Your health care team will wash or sanitize their hands.  ? Your skin will be washed with soap.  ? Hair may be removed from your neck or groin.  · An IV tube will be put into one of your veins.  · You will be given a medicine to help you relax (sedative).  · Skin on your neck or groin will be numbed.  · A cut (incision) will be made in your neck or groin.  · A needle will be put through your cut and into a vein in your neck or groin.  · A tube (catheter) will be put into the needle. The tube will be moved to your heart. X-rays (fluoroscopy) will be used to help guide the tube.  · Small devices (electrodes) on the tip of the tube will send out electrical currents.  · Dye may be put through the tube. This helps your surgeon see your heart.  · Electrical energy will be used to scar (ablate) some heart tissue. Your surgeon may use:  ? Heat (radiofrequency energy).  ? Laser energy.  ? Extreme cold (cryoablation).  · The tube will be taken out.  · Pressure will be held on your cut. This helps stop  bleeding.  · A bandage (dressing) will be put on your cut.  The procedure may vary.  What happens after the procedure?  · You will be monitored until your medicines have worn off.  · Your cut will be watched for bleeding. You will need to lie still for a few hours.  · Do not drive for 24 hours or as long as your doctor tells you.  Summary  · Cardiac ablation is a procedure to stop some heart tissue from causing problems.  · Electrical energy will be used to scar (ablate) some heart tissue.  This information is not intended to replace advice given to you by your health care provider. Make sure you discuss any questions you have with your health care provider.  Document Released: 08/20/2014 Document Revised: 11/06/2017 Document Reviewed: 11/06/2017  [employer] Interactive Patient Education © 2019 [employer]

## 2019-11-19 ENCOUNTER — ANTICOAGULATION VISIT (OUTPATIENT)
Dept: CARDIOLOGY | Facility: CLINIC | Age: 84
End: 2019-11-19

## 2019-11-19 DIAGNOSIS — I48.91 ATRIAL FIBRILLATION, UNSPECIFIED TYPE (HCC): Primary | ICD-10-CM

## 2019-11-19 LAB — INR PPP: 2.7

## 2019-11-19 RX ORDER — FUROSEMIDE 40 MG/1
TABLET ORAL
Qty: 30 TABLET | Refills: 5 | Status: SHIPPED | OUTPATIENT
Start: 2019-11-19 | End: 2020-06-03

## 2019-11-20 PROCEDURE — 93280 PM DEVICE PROGR EVAL DUAL: CPT | Performed by: INTERNAL MEDICINE

## 2019-12-04 ENCOUNTER — ANTICOAGULATION VISIT (OUTPATIENT)
Dept: CARDIOLOGY | Facility: CLINIC | Age: 84
End: 2019-12-04

## 2019-12-04 DIAGNOSIS — I48.91 ATRIAL FIBRILLATION, UNSPECIFIED TYPE (HCC): Primary | ICD-10-CM

## 2019-12-04 LAB — INR PPP: 2.5

## 2019-12-17 ENCOUNTER — ANTICOAGULATION VISIT (OUTPATIENT)
Dept: CARDIOLOGY | Facility: CLINIC | Age: 84
End: 2019-12-17

## 2019-12-17 DIAGNOSIS — I48.91 ATRIAL FIBRILLATION, UNSPECIFIED TYPE (HCC): Primary | ICD-10-CM

## 2019-12-17 LAB — INR PPP: 2.6

## 2020-01-03 ENCOUNTER — ANTICOAGULATION VISIT (OUTPATIENT)
Dept: CARDIOLOGY | Facility: CLINIC | Age: 85
End: 2020-01-03

## 2020-01-03 DIAGNOSIS — I48.91 ATRIAL FIBRILLATION, UNSPECIFIED TYPE (HCC): Primary | ICD-10-CM

## 2020-01-03 LAB — INR PPP: 2.1

## 2020-01-06 RX ORDER — ISOSORBIDE MONONITRATE 30 MG/1
TABLET, EXTENDED RELEASE ORAL
Qty: 180 TABLET | Refills: 2 | Status: SHIPPED | OUTPATIENT
Start: 2020-01-06 | End: 2020-08-06 | Stop reason: SDUPTHER

## 2020-01-17 ENCOUNTER — ANTICOAGULATION VISIT (OUTPATIENT)
Dept: CARDIOLOGY | Facility: CLINIC | Age: 85
End: 2020-01-17

## 2020-01-17 DIAGNOSIS — I48.91 ATRIAL FIBRILLATION, UNSPECIFIED TYPE (HCC): Primary | ICD-10-CM

## 2020-01-17 LAB — INR PPP: 3.1

## 2020-02-01 ENCOUNTER — ANTICOAGULATION VISIT (OUTPATIENT)
Dept: CARDIOLOGY | Facility: CLINIC | Age: 85
End: 2020-02-01

## 2020-02-01 DIAGNOSIS — I48.0 PAROXYSMAL ATRIAL FIBRILLATION (HCC): Primary | ICD-10-CM

## 2020-02-01 LAB — INR PPP: 4.3

## 2020-02-04 ENCOUNTER — ANTICOAGULATION VISIT (OUTPATIENT)
Dept: CARDIOLOGY | Facility: CLINIC | Age: 85
End: 2020-02-04

## 2020-02-04 LAB — INR PPP: 1.4 (ref 0.9–1.1)

## 2020-02-04 PROCEDURE — 36416 COLLJ CAPILLARY BLOOD SPEC: CPT | Performed by: INTERNAL MEDICINE

## 2020-02-04 PROCEDURE — 85610 PROTHROMBIN TIME: CPT | Performed by: INTERNAL MEDICINE

## 2020-02-11 ENCOUNTER — ANTICOAGULATION VISIT (OUTPATIENT)
Dept: CARDIOLOGY | Facility: CLINIC | Age: 85
End: 2020-02-11

## 2020-02-11 DIAGNOSIS — I48.0 PAROXYSMAL ATRIAL FIBRILLATION (HCC): Primary | ICD-10-CM

## 2020-02-11 LAB — INR PPP: 1.6

## 2020-02-18 ENCOUNTER — ANTICOAGULATION VISIT (OUTPATIENT)
Dept: CARDIOLOGY | Facility: CLINIC | Age: 85
End: 2020-02-18

## 2020-02-18 DIAGNOSIS — I48.0 PAROXYSMAL ATRIAL FIBRILLATION (HCC): Primary | ICD-10-CM

## 2020-02-18 LAB — INR PPP: 2.1

## 2020-02-26 ENCOUNTER — LAB REQUISITION (OUTPATIENT)
Dept: LAB | Facility: HOSPITAL | Age: 85
End: 2020-02-26

## 2020-02-26 DIAGNOSIS — Z00.00 ENCOUNTER FOR GENERAL ADULT MEDICAL EXAMINATION WITHOUT ABNORMAL FINDINGS: ICD-10-CM

## 2020-02-26 PROCEDURE — 88305 TISSUE EXAM BY PATHOLOGIST: CPT | Performed by: RADIOLOGY

## 2020-02-27 LAB
LAB AP CASE REPORT: NORMAL
PATH REPORT.FINAL DX SPEC: NORMAL
PATH REPORT.GROSS SPEC: NORMAL

## 2020-03-03 ENCOUNTER — ANTICOAGULATION VISIT (OUTPATIENT)
Dept: CARDIOLOGY | Facility: CLINIC | Age: 85
End: 2020-03-03

## 2020-03-03 DIAGNOSIS — I48.0 PAROXYSMAL ATRIAL FIBRILLATION (HCC): Primary | ICD-10-CM

## 2020-03-03 LAB — INR PPP: 1.3

## 2020-03-10 ENCOUNTER — ANTICOAGULATION VISIT (OUTPATIENT)
Dept: CARDIOLOGY | Facility: CLINIC | Age: 85
End: 2020-03-10

## 2020-03-10 DIAGNOSIS — I48.0 PAROXYSMAL ATRIAL FIBRILLATION (HCC): Primary | ICD-10-CM

## 2020-03-10 LAB — INR PPP: 2.2

## 2020-03-18 RX ORDER — DOXAZOSIN 2 MG/1
TABLET ORAL
Qty: 30 TABLET | Refills: 5 | Status: SHIPPED | OUTPATIENT
Start: 2020-03-18 | End: 2020-08-06 | Stop reason: SDUPTHER

## 2020-03-23 ENCOUNTER — ANTICOAGULATION VISIT (OUTPATIENT)
Dept: CARDIOLOGY | Facility: CLINIC | Age: 85
End: 2020-03-23

## 2020-03-23 DIAGNOSIS — I48.0 PAROXYSMAL ATRIAL FIBRILLATION (HCC): Primary | ICD-10-CM

## 2020-03-23 LAB — INR PPP: 2.4

## 2020-04-07 ENCOUNTER — ANTICOAGULATION VISIT (OUTPATIENT)
Dept: CARDIOLOGY | Facility: CLINIC | Age: 85
End: 2020-04-07

## 2020-04-07 DIAGNOSIS — I48.0 PAROXYSMAL ATRIAL FIBRILLATION (HCC): Primary | ICD-10-CM

## 2020-04-07 LAB — INR PPP: 3

## 2020-04-17 RX ORDER — WARFARIN SODIUM 5 MG/1
TABLET ORAL
Qty: 120 TABLET | Refills: 1 | Status: SHIPPED | OUTPATIENT
Start: 2020-04-17 | End: 2020-11-24

## 2020-04-21 ENCOUNTER — ANTICOAGULATION VISIT (OUTPATIENT)
Dept: CARDIOLOGY | Facility: CLINIC | Age: 85
End: 2020-04-21

## 2020-04-21 DIAGNOSIS — I48.0 PAROXYSMAL ATRIAL FIBRILLATION (HCC): Primary | ICD-10-CM

## 2020-04-21 LAB
INR PPP: 3
INR PPP: 3

## 2020-05-04 ENCOUNTER — ANTICOAGULATION VISIT (OUTPATIENT)
Dept: CARDIOLOGY | Facility: CLINIC | Age: 85
End: 2020-05-04

## 2020-05-04 DIAGNOSIS — I48.0 PAROXYSMAL ATRIAL FIBRILLATION (HCC): Primary | ICD-10-CM

## 2020-05-04 LAB — INR PPP: 2.2

## 2020-05-05 RX ORDER — POTASSIUM CHLORIDE 1500 MG/1
TABLET, FILM COATED, EXTENDED RELEASE ORAL
Qty: 90 TABLET | Refills: 2 | Status: SHIPPED | OUTPATIENT
Start: 2020-05-05 | End: 2020-08-06 | Stop reason: SDUPTHER

## 2020-05-18 ENCOUNTER — OFFICE VISIT (OUTPATIENT)
Dept: CARDIOLOGY | Facility: CLINIC | Age: 85
End: 2020-05-18

## 2020-05-18 ENCOUNTER — CLINICAL SUPPORT NO REQUIREMENTS (OUTPATIENT)
Dept: CARDIOLOGY | Facility: CLINIC | Age: 85
End: 2020-05-18

## 2020-05-18 VITALS
DIASTOLIC BLOOD PRESSURE: 74 MMHG | OXYGEN SATURATION: 95 % | SYSTOLIC BLOOD PRESSURE: 144 MMHG | BODY MASS INDEX: 24.14 KG/M2 | WEIGHT: 132 LBS | HEART RATE: 75 BPM

## 2020-05-18 DIAGNOSIS — I10 ESSENTIAL HYPERTENSION: ICD-10-CM

## 2020-05-18 DIAGNOSIS — I49.5 SICK SINUS SYNDROME (HCC): ICD-10-CM

## 2020-05-18 DIAGNOSIS — I25.10 CHRONIC CORONARY ARTERY DISEASE: ICD-10-CM

## 2020-05-18 DIAGNOSIS — I48.0 PAROXYSMAL ATRIAL FIBRILLATION (HCC): Primary | ICD-10-CM

## 2020-05-18 DIAGNOSIS — Z95.0 PRESENCE OF CARDIAC PACEMAKER: ICD-10-CM

## 2020-05-18 DIAGNOSIS — I35.1 NONRHEUMATIC AORTIC VALVE INSUFFICIENCY: ICD-10-CM

## 2020-05-18 PROCEDURE — 93280 PM DEVICE PROGR EVAL DUAL: CPT | Performed by: INTERNAL MEDICINE

## 2020-05-18 PROCEDURE — 99214 OFFICE O/P EST MOD 30 MIN: CPT | Performed by: INTERNAL MEDICINE

## 2020-05-18 PROCEDURE — 93000 ELECTROCARDIOGRAM COMPLETE: CPT | Performed by: INTERNAL MEDICINE

## 2020-05-18 RX ORDER — SOTALOL HYDROCHLORIDE 120 MG/1
120 TABLET ORAL 2 TIMES DAILY
Qty: 180 TABLET | Refills: 1 | Status: SHIPPED | OUTPATIENT
Start: 2020-05-18 | End: 2020-12-16

## 2020-05-18 NOTE — PROGRESS NOTES
EKG shows sinus rhythm with atrial pacing heart rate of 70 SC interval of 240 QRS of 115 QT of 415 and indication for EKG includes intermittent atrial arrhythmias and sick sinus syndrome and compared to prior EKG no significant changes noted        CC: Sick sinus syndrome with dual-chamber pacemaker in situ follow up .    Sub-85-year-old female patient with recurrent symptomatic atrial fibrillation was placed on sotalol In the past and comes in for follow-up. she has sick sinus syndrome with a dual-chamber pacemaker in situ which was placed several months ago. she is doing well with recurrent symptoms of arrhythmias-- she has history of valvular heart disease status post bioprosthetic aortic valve replacement in 2014. She is additional history that includes peripheral vascular disease, coronary artery disease status post coronary arterial bypass grafting, bilateral renal artery stenosis, carotid artery disease, dyslipidemia, and sick sinus syndrome status post permanent pacemaker placement.  She complains of intermittent palpitations and also fatigue with current intake of sotalol  Denies any leg edema or syncope or any  Angina  She denies any syncope and compliant with her medications      Impressions  SSS, s/p PPM --pacemaker interrogated with appropriate function with intermittent atrial fibrillation and few prolonged episodes noted --increase sotalol to 120 mg twice daily --check CMP and TSH --follow-up in 1 month --new prescription sent out for increased dose   paroxysmal atrial fibrillation on sotalol on chronic Coumadin therapy  Chronic stable angina  CAD s/p CABG most recent cath with no disease amenable to PCI 2016  Valvular heart disease, s/p bioprosthetic AVR  CKD, renal artery stenosis  PAD , FELICITAS and carotid stenosis        Vital Signs: Blood pressure  144/74  pulse rate 70 patient afebrile respiration 12 times a minute      Past medical history is reviewed below and verified    Past Medical History:      Reviewed history from 06/13/2016 and no changes required:        AVR 2014-        Coronary artery disease: S/P CABG and PCI with stenting-        Carotid disease;left side 50-74%-Patrick Ocampo        Inferior myocardial infarction 12-06-        Asthma        Anxiety Disorder        Depression        G E R D        Hyperlipidemia        Hypertension-        Hyperthyroidism        fx thoracic ?        shoulder fx        Rotator cuff syndrome         Gout         Paroxysmal atrial fib        Arthritis        Hypothyroid        No Drug Allergies?         Eye exam:2014 &2015 Dr.Kelley Parekh in [place]         Rheumatoid Arthritis    Past Surgical History:     Reviewed history from 10/16/2018 and no changes required:        PCI/stent, LCX, 3-20-06, Cypher stent        PCI/stent x 2, LCX & priximal diagonal, 12-3-06, [employer] stents        cataract r eye 12/07  lt eye 01/08        CABG x 3  07-19-08        thoracentesis l lung 8/08        Right rotator cuff repair - Oct. 15, 2013        Cholecystectomy-2009        Left Knee Arthoplasty-2014        Left Shoulder Replacement 4/2014        Aortic Valve  Replacement 6/11/2014        Heart Catherization:2/18/2015        Pacemaker placed 6/6/16        Colonoscopy 2011         Surgery Finger 2018       Review of Systems   General: Positive for fatigue and tiredness    Eyes: No redness  Cardiovascular: No chest pain, no palpitations  Respiratory: No shortness of breath  Gastrointestinal: No nausea or vomiting, bleeding  Genitourinary: no hematuria or dysuria  Musculoskeletal: No arthralgia or myalgia  Skin: No rash  Neurologic: No numbness, tingling, syncope  Hematologic/Lymphatic: No abnormal bleeding      Physical Exam    General:      well developed, well nourished, in no acute distress.    Head:      normocephalic and atraumatic.    Eyes:      PERRL/EOM intact, conjunctiva and sclera clear with out nystagmus.    Neck:      no masses,  thyromegaly  Lungs:      clear bilaterally to auscultation.    Heart:      regular rhythm, no rubs, no gallops and Grade 2/6 YENI:.    Skin:      intact without lesions or rashes.    Psych:      alert and cooperative; normal mood and affect; normal attention span and concentration.      Pacemaker incision is clean    Alert and oriented x3 without any focal deficits      Electronically signed by Pk Crabtree MD, 05/18/20, 12:29 PM.

## 2020-05-18 NOTE — PROGRESS NOTES
In clinic device interrogation. See scanned report. Several episodes of afib recently. 8.9 years left on battery.  Patient on warfarin. Patient was also ordered a home monitor this visit. Charges per Dr. Crabtree's clinic.

## 2020-05-19 ENCOUNTER — ANTICOAGULATION VISIT (OUTPATIENT)
Dept: CARDIOLOGY | Facility: CLINIC | Age: 85
End: 2020-05-19

## 2020-05-19 DIAGNOSIS — I48.0 PAROXYSMAL ATRIAL FIBRILLATION (HCC): Primary | ICD-10-CM

## 2020-05-19 LAB — INR PPP: 2

## 2020-05-21 ENCOUNTER — LAB (OUTPATIENT)
Dept: LAB | Facility: HOSPITAL | Age: 85
End: 2020-05-21

## 2020-05-21 DIAGNOSIS — I35.1 NONRHEUMATIC AORTIC VALVE INSUFFICIENCY: ICD-10-CM

## 2020-05-21 DIAGNOSIS — I49.5 SICK SINUS SYNDROME (HCC): ICD-10-CM

## 2020-05-21 DIAGNOSIS — I10 ESSENTIAL HYPERTENSION: ICD-10-CM

## 2020-05-21 DIAGNOSIS — I48.0 PAROXYSMAL ATRIAL FIBRILLATION (HCC): ICD-10-CM

## 2020-05-21 DIAGNOSIS — Z95.0 PRESENCE OF CARDIAC PACEMAKER: ICD-10-CM

## 2020-05-21 DIAGNOSIS — I25.10 CHRONIC CORONARY ARTERY DISEASE: ICD-10-CM

## 2020-05-21 LAB
ALBUMIN SERPL-MCNC: 4 G/DL (ref 3.5–5.2)
ALBUMIN/GLOB SERPL: 1.4 G/DL
ALP SERPL-CCNC: 75 U/L (ref 39–117)
ALT SERPL W P-5'-P-CCNC: 14 U/L (ref 1–33)
ANION GAP SERPL CALCULATED.3IONS-SCNC: 9.3 MMOL/L (ref 5–15)
AST SERPL-CCNC: 18 U/L (ref 1–32)
BILIRUB SERPL-MCNC: 0.5 MG/DL (ref 0.2–1.2)
BUN BLD-MCNC: 18 MG/DL (ref 8–23)
BUN/CREAT SERPL: 21.4 (ref 7–25)
CALCIUM SPEC-SCNC: 9.5 MG/DL (ref 8.6–10.5)
CHLORIDE SERPL-SCNC: 101 MMOL/L (ref 98–107)
CO2 SERPL-SCNC: 27.7 MMOL/L (ref 22–29)
CREAT BLD-MCNC: 0.84 MG/DL (ref 0.57–1)
GFR SERPL CREATININE-BSD FRML MDRD: 64 ML/MIN/1.73
GLOBULIN UR ELPH-MCNC: 2.8 GM/DL
GLUCOSE BLD-MCNC: 96 MG/DL (ref 65–99)
POTASSIUM BLD-SCNC: 4.4 MMOL/L (ref 3.5–5.2)
PROT SERPL-MCNC: 6.8 G/DL (ref 6–8.5)
SODIUM BLD-SCNC: 138 MMOL/L (ref 136–145)
T4 FREE SERPL-MCNC: 1.33 NG/DL (ref 0.93–1.7)
TSH SERPL DL<=0.05 MIU/L-ACNC: 1.42 UIU/ML (ref 0.27–4.2)

## 2020-05-21 PROCEDURE — 84439 ASSAY OF FREE THYROXINE: CPT

## 2020-05-21 PROCEDURE — 80053 COMPREHEN METABOLIC PANEL: CPT

## 2020-05-21 PROCEDURE — 84443 ASSAY THYROID STIM HORMONE: CPT

## 2020-05-21 PROCEDURE — 36415 COLL VENOUS BLD VENIPUNCTURE: CPT

## 2020-05-26 RX ORDER — DILTIAZEM HYDROCHLORIDE 240 MG/1
CAPSULE, EXTENDED RELEASE ORAL
Qty: 30 CAPSULE | Refills: 2 | Status: SHIPPED | OUTPATIENT
Start: 2020-05-26 | End: 2020-08-06 | Stop reason: SDUPTHER

## 2020-06-02 ENCOUNTER — ANTICOAGULATION VISIT (OUTPATIENT)
Dept: CARDIOLOGY | Facility: CLINIC | Age: 85
End: 2020-06-02

## 2020-06-02 DIAGNOSIS — I48.0 PAROXYSMAL ATRIAL FIBRILLATION (HCC): Primary | ICD-10-CM

## 2020-06-02 LAB — INR PPP: 2.3

## 2020-06-03 RX ORDER — FUROSEMIDE 40 MG/1
TABLET ORAL
Qty: 30 TABLET | Refills: 4 | Status: SHIPPED | OUTPATIENT
Start: 2020-06-03 | End: 2020-06-04 | Stop reason: SDUPTHER

## 2020-06-04 RX ORDER — FUROSEMIDE 40 MG/1
40 TABLET ORAL DAILY
Qty: 30 TABLET | Refills: 4 | Status: SHIPPED | OUTPATIENT
Start: 2020-06-04 | End: 2020-08-06 | Stop reason: SDUPTHER

## 2020-06-16 ENCOUNTER — ANTICOAGULATION VISIT (OUTPATIENT)
Dept: CARDIOLOGY | Facility: CLINIC | Age: 85
End: 2020-06-16

## 2020-06-16 DIAGNOSIS — I48.0 PAROXYSMAL ATRIAL FIBRILLATION (HCC): Primary | ICD-10-CM

## 2020-06-16 LAB — INR PPP: 2.3

## 2020-06-18 DIAGNOSIS — I48.0 PAROXYSMAL ATRIAL FIBRILLATION (HCC): Primary | ICD-10-CM

## 2020-06-22 ENCOUNTER — HOSPITAL ENCOUNTER (OUTPATIENT)
Dept: MAMMOGRAPHY | Facility: HOSPITAL | Age: 85
Discharge: HOME | End: 2020-06-22
Admitting: NURSE PRACTITIONER

## 2020-06-22 ENCOUNTER — LAB (OUTPATIENT)
Dept: LAB | Facility: HOSPITAL | Age: 85
End: 2020-06-22

## 2020-06-22 DIAGNOSIS — R92.1 BREAST CALCIFICATIONS: ICD-10-CM

## 2020-06-22 DIAGNOSIS — I48.0 PAROXYSMAL ATRIAL FIBRILLATION (HCC): ICD-10-CM

## 2020-06-22 LAB
INR PPP: 1.07 (ref 2–3)
PROTHROMBIN TIME: 11 SECONDS (ref 19.4–28.5)

## 2020-06-22 PROCEDURE — 85610 PROTHROMBIN TIME: CPT

## 2020-06-22 PROCEDURE — 36415 COLL VENOUS BLD VENIPUNCTURE: CPT

## 2020-06-22 PROCEDURE — A4648 IMPLANTABLE TISSUE MARKER: HCPCS

## 2020-06-22 PROCEDURE — 88305 TISSUE EXAM BY PATHOLOGIST: CPT | Performed by: NURSE PRACTITIONER

## 2020-06-22 PROCEDURE — 25010000003 LIDOCAINE 1 % SOLUTION: Performed by: NURSE PRACTITIONER

## 2020-06-22 RX ORDER — LIDOCAINE HYDROCHLORIDE 10 MG/ML
3 INJECTION, SOLUTION INFILTRATION; PERINEURAL ONCE
Status: COMPLETED | OUTPATIENT
Start: 2020-06-22 | End: 2020-06-22

## 2020-06-22 RX ORDER — LIDOCAINE HYDROCHLORIDE AND EPINEPHRINE 10; 10 MG/ML; UG/ML
10 INJECTION, SOLUTION INFILTRATION; PERINEURAL ONCE
Status: COMPLETED | OUTPATIENT
Start: 2020-06-22 | End: 2020-06-22

## 2020-06-22 RX ADMIN — LIDOCAINE HYDROCHLORIDE 1 ML: 10 INJECTION, SOLUTION INFILTRATION; PERINEURAL at 13:29

## 2020-06-22 RX ADMIN — LIDOCAINE HYDROCHLORIDE,EPINEPHRINE BITARTRATE 6 ML: 10; .01 INJECTION, SOLUTION INFILTRATION; PERINEURAL at 13:29

## 2020-06-23 LAB
LAB AP CASE REPORT: NORMAL
PATH REPORT.FINAL DX SPEC: NORMAL
PATH REPORT.GROSS SPEC: NORMAL

## 2020-06-26 ENCOUNTER — ANTICOAGULATION VISIT (OUTPATIENT)
Dept: CARDIOLOGY | Facility: CLINIC | Age: 85
End: 2020-06-26

## 2020-06-26 ENCOUNTER — OFFICE VISIT (OUTPATIENT)
Dept: CARDIOLOGY | Facility: CLINIC | Age: 85
End: 2020-06-26

## 2020-06-26 VITALS
DIASTOLIC BLOOD PRESSURE: 72 MMHG | SYSTOLIC BLOOD PRESSURE: 121 MMHG | HEART RATE: 70 BPM | BODY MASS INDEX: 25.06 KG/M2 | WEIGHT: 137 LBS | OXYGEN SATURATION: 96 %

## 2020-06-26 DIAGNOSIS — I10 ESSENTIAL HYPERTENSION: ICD-10-CM

## 2020-06-26 DIAGNOSIS — I48.0 PAROXYSMAL ATRIAL FIBRILLATION (HCC): Primary | ICD-10-CM

## 2020-06-26 DIAGNOSIS — I49.5 SICK SINUS SYNDROME (HCC): ICD-10-CM

## 2020-06-26 DIAGNOSIS — Z95.0 PRESENCE OF CARDIAC PACEMAKER: ICD-10-CM

## 2020-06-26 LAB — INR PPP: 1.3

## 2020-06-26 PROCEDURE — 93000 ELECTROCARDIOGRAM COMPLETE: CPT | Performed by: INTERNAL MEDICINE

## 2020-06-26 PROCEDURE — 99213 OFFICE O/P EST LOW 20 MIN: CPT | Performed by: INTERNAL MEDICINE

## 2020-06-26 NOTE — PROGRESS NOTES
EKG shows sinus rhythm with atrial pacing heart rate of 70 MS interval of 237 QRS of 115 QT of 455 and indication for EKG includes intermittent atrial arrhythmias and sick sinus syndrome and compared to prior EKG no significant changes noted except for mild prolongation of QT compared to previous EKG        CC: Sick sinus syndrome with dual-chamber pacemaker in situ follow up .    Sub-85-year-old female patient with recurrent symptomatic atrial fibrillation was placed on sotalol In the past and comes in for follow-up. she has sick sinus syndrome with a dual-chamber pacemaker in situ which was placed several months ago. she is doing well with recurrent symptoms of arrhythmias-- she has history of valvular heart disease status post bioprosthetic aortic valve replacement in 2014. She is additional history that includes peripheral vascular disease, coronary artery disease status post coronary arterial bypass grafting, bilateral renal artery stenosis, carotid artery disease, dyslipidemia, and sick sinus syndrome status post permanent pacemaker placement.  She complains of intermittent palpitations and also fatigue with current intake of sotalol  Denies any leg edema or syncope or any  Angina  She denies any syncope and compliant with her medications  She had increasing atrial arrhythmia and sotalol dose was increased with last clinic visit and comes in for follow-up and complaints of mild fatigue      Impressions  SSS, s/p PPM --pacemaker interrogated with appropriate function --1 prolonged episode of atrial fibrillation since sotalol was increased and patient was educated and reevaluate this patient in 3 months and continue current sotalol dosing , recent labs reviewed with her  paroxysmal atrial fibrillation on sotalol on chronic Coumadin therapy  Chronic stable angina  CAD s/p CABG most recent cath with no disease amenable to PCI 2016  Valvular heart disease, s/p bioprosthetic AVR  CKD, renal artery stenosis  PAD ,  FELICITAS and carotid stenosis        Vital Signs: Blood pressure  121/72  pulse rate 70 patient afebrile respiration 12 times a minute      Past medical history is reviewed below and verified    Past Medical History:     Reviewed history from 06/13/2016 and no changes required:        AVR 2014-        Coronary artery disease: S/P CABG and PCI with stenting-        Carotid disease;left side 50-74%-Patrick Ocampo        Inferior myocardial infarction 12-06-        Asthma        Anxiety Disorder        Depression        G E R D        Hyperlipidemia        Hypertension-        Hyperthyroidism        fx thoracic ?        shoulder fx        Rotator cuff syndrome         Gout         Paroxysmal atrial fib        Arthritis        Hypothyroid        No Drug Allergies?         Eye exam:2014 &2015 Dr.Kelley Parekh in [place]         Rheumatoid Arthritis    Past Surgical History:     Reviewed history from 10/16/2018 and no changes required:        PCI/stent, LCX, 3-20-06, Cypher stent        PCI/stent x 2, LCX & priximal diagonal, 12-3-06, [employer] stents        cataract r eye 12/07  lt eye 01/08        CABG x 3  07-19-08        thoracentesis l lung 8/08        Right rotator cuff repair - Oct. 15, 2013        Cholecystectomy-2009        Left Knee Arthoplasty-2014        Left Shoulder Replacement 4/2014        Aortic Valve  Replacement 6/11/2014        Heart Catherization:2/18/2015        Pacemaker placed 6/6/16        Colonoscopy 2011         Surgery Finger 2018       Review of Systems   General: Positive for fatigue and tiredness    Eyes: No redness  Cardiovascular: No chest pain, no palpitations  Respiratory: No shortness of breath  Gastrointestinal: No nausea or vomiting, bleeding  Genitourinary: no hematuria or dysuria  Musculoskeletal: No arthralgia or myalgia  Skin: No rash  Neurologic: No numbness, tingling, syncope  Hematologic/Lymphatic: No abnormal bleeding      Physical Exam    General:       well developed, well nourished, in no acute distress.    Head:      normocephalic and atraumatic.    Eyes:      PERRL/EOM intact, conjunctiva and sclera clear with out nystagmus.    Neck:      no masses, thyromegaly  Lungs:      clear bilaterally to auscultation.    Heart:      regular rhythm, no rubs, no gallops and Grade 2/6 YENI:.    Skin:      intact without lesions or rashes.    Psych:      alert and cooperative; normal mood and affect; normal attention span and concentration.      Pacemaker incision is clean    Alert and oriented x3 without any focal deficits      Electronically signed by Pk Crabtree MD, 06/26/20, 1:27 PM.

## 2020-06-30 ENCOUNTER — ANTICOAGULATION VISIT (OUTPATIENT)
Dept: CARDIOLOGY | Facility: CLINIC | Age: 85
End: 2020-06-30

## 2020-06-30 DIAGNOSIS — I48.0 PAROXYSMAL ATRIAL FIBRILLATION (HCC): Primary | ICD-10-CM

## 2020-06-30 LAB — INR PPP: 1.9

## 2020-07-14 ENCOUNTER — ANTICOAGULATION VISIT (OUTPATIENT)
Dept: CARDIOLOGY | Facility: CLINIC | Age: 85
End: 2020-07-14

## 2020-07-14 DIAGNOSIS — I48.0 PAROXYSMAL ATRIAL FIBRILLATION (HCC): Primary | ICD-10-CM

## 2020-07-14 LAB — INR PPP: 2

## 2020-07-28 ENCOUNTER — ANTICOAGULATION VISIT (OUTPATIENT)
Dept: CARDIOLOGY | Facility: CLINIC | Age: 85
End: 2020-07-28

## 2020-07-28 DIAGNOSIS — I48.0 PAROXYSMAL ATRIAL FIBRILLATION (HCC): Primary | ICD-10-CM

## 2020-07-28 LAB — INR PPP: 2.7

## 2020-08-06 ENCOUNTER — OFFICE VISIT (OUTPATIENT)
Dept: CARDIOLOGY | Facility: CLINIC | Age: 85
End: 2020-08-06

## 2020-08-06 VITALS
HEIGHT: 61 IN | DIASTOLIC BLOOD PRESSURE: 79 MMHG | RESPIRATION RATE: 18 BRPM | WEIGHT: 132 LBS | OXYGEN SATURATION: 98 % | SYSTOLIC BLOOD PRESSURE: 147 MMHG | BODY MASS INDEX: 24.92 KG/M2 | HEART RATE: 69 BPM

## 2020-08-06 DIAGNOSIS — I10 ESSENTIAL HYPERTENSION: ICD-10-CM

## 2020-08-06 DIAGNOSIS — I48.0 PAROXYSMAL ATRIAL FIBRILLATION (HCC): ICD-10-CM

## 2020-08-06 DIAGNOSIS — I25.10 CHRONIC CORONARY ARTERY DISEASE: ICD-10-CM

## 2020-08-06 DIAGNOSIS — I35.1 NONRHEUMATIC AORTIC VALVE INSUFFICIENCY: ICD-10-CM

## 2020-08-06 DIAGNOSIS — E78.2 MIXED HYPERLIPIDEMIA: ICD-10-CM

## 2020-08-06 DIAGNOSIS — Z95.0 PRESENCE OF CARDIAC PACEMAKER: ICD-10-CM

## 2020-08-06 DIAGNOSIS — N18.30 CHRONIC KIDNEY DISEASE, STAGE III (MODERATE) (HCC): ICD-10-CM

## 2020-08-06 DIAGNOSIS — R94.31 ABNORMAL EKG: Primary | ICD-10-CM

## 2020-08-06 PROCEDURE — 99214 OFFICE O/P EST MOD 30 MIN: CPT | Performed by: INTERNAL MEDICINE

## 2020-08-06 RX ORDER — DOXAZOSIN 2 MG/1
2 TABLET ORAL DAILY
Qty: 90 TABLET | Refills: 3 | Status: SHIPPED | OUTPATIENT
Start: 2020-08-06 | End: 2021-08-04

## 2020-08-06 RX ORDER — DILTIAZEM HYDROCHLORIDE 240 MG/1
240 CAPSULE, COATED, EXTENDED RELEASE ORAL DAILY
Qty: 90 CAPSULE | Refills: 3 | Status: SHIPPED | OUTPATIENT
Start: 2020-08-06 | End: 2021-08-11

## 2020-08-06 RX ORDER — ISOSORBIDE MONONITRATE 30 MG/1
30 TABLET, EXTENDED RELEASE ORAL 2 TIMES DAILY
Qty: 180 TABLET | Refills: 3 | Status: SHIPPED | OUTPATIENT
Start: 2020-08-06 | End: 2021-10-13

## 2020-08-06 RX ORDER — POTASSIUM CHLORIDE 1500 MG/1
20 TABLET, FILM COATED, EXTENDED RELEASE ORAL DAILY
Qty: 90 TABLET | Refills: 3 | Status: SHIPPED | OUTPATIENT
Start: 2020-08-06 | End: 2021-09-08

## 2020-08-06 RX ORDER — ROSUVASTATIN CALCIUM 20 MG/1
20 TABLET, COATED ORAL DAILY
Qty: 90 TABLET | Refills: 3 | Status: SHIPPED | OUTPATIENT
Start: 2020-08-06 | End: 2021-08-04

## 2020-08-06 RX ORDER — FUROSEMIDE 40 MG/1
40 TABLET ORAL DAILY
Qty: 90 TABLET | Refills: 3 | Status: SHIPPED | OUTPATIENT
Start: 2020-08-06 | End: 2021-09-15

## 2020-08-06 NOTE — PROGRESS NOTES
Cardiology Office Visit      Encounter Date:  08/06/2020    Patient ID:   Tracy Jane is a 85 y.o. female.    Reason For Followup:  Paroxysmal atrial fibrillation  Coronary artery disease  Valvular heart disease  History of permanent pacemaker    Brief Clinical History:  Dear Dr. Sam, YEYO Perez    I had the pleasure of seeing Tracy Jane today. As you are well aware, this is a 85 y.o. female with a history of valvular heart disease status post bioprosthetic aortic valve replacement in 2014. She is additional history that includes peripheral vascular disease, coronary artery disease status post coronary arterial bypass grafting, bilateral renal artery stenosis, carotid artery disease, dyslipidemia, and sick sinus syndrome status post permanent pacemaker placement. She presents today for followup on the above conditions.    Interval History:  She denies any chest pain pressure heaviness or tightness. She denies any shortness of breath out of character.  She denies any PND orthopnea.  She denies any syncope or near-syncope.    She reports being in her usual state of health.    She continues to have breakthrough atrial fibrillation as evidenced by occasional episodes of palpitations.  She saw electrophysiology and had her sotalol dose titrated up.  She reports that this is helped reduce the incidence and duration of her episodes of atrial fibrillation.  She is also reporting some skipped beats from time to time.  She reports using a walker more than usual.  She will follow-up with electrophysiology next month.    Assessment & Plan    Impressions:  SSS, s/p PPM 2016  paroxysmal atrial fibrillation on sotalol on chronic Coumadin therapy  Chronic stable angina  CAD s/p CABG most recent cath with no disease amenable to PCI 2016  Labile blood pressure  Valvular heart disease, s/p bioprosthetic AVR  CKD, renal artery stenosis  PAD , FELICITAS and carotid stenosis  Fatigue  "  Orthostasis    Recommendations:  Monitor blood pressure and notify if not sufficiently controlled.  Follow-up in 6 months time center should there be difficulties.  Follow-up with EP as indicated  Avoid rapid changes in position.    Objective:    Vitals:  Vitals:    08/06/20 1146   BP: 147/79   BP Location: Left arm   Patient Position: Sitting   Cuff Size: Large Adult   Pulse: 69   Resp: 18   SpO2: 98%   Weight: 59.9 kg (132 lb)   Height: 154.9 cm (61\")       Physical Exam:    General: Alert, cooperative, no distress, appears stated age  Head:  Normocephalic, atraumatic, mucous membranes moist  Eyes:  Conjunctiva/corneas clear, EOM's intact     Neck:  Supple,  no bruit  Lungs: Clear to auscultation bilaterally, no wheezes rhonchi rales are noted  Chest wall: No tenderness  Heart::  Regular rate and rhythm, S1 and S2 normal, 1/6 holosystolic murmur.  No rub or gallop  Abdomen: Soft, non-tender, nondistended bowel sounds active  Extremities: No cyanosis, clubbing, or edema  Pulses: 2+ and symmetric all extremities  Skin:  No rashes or lesions  Neuro/psych: A&O x3. CN II through XII are grossly intact with appropriate affect      Allergies:  No Known Allergies    Medication Review:     Current Outpatient Medications:   •  acetaminophen (TYLENOL) 650 MG 8 hr tablet, Take 325 mg by mouth As Needed., Disp: , Rfl:   •  ascorbic acid (VITAMIN C) 1000 MG tablet, Take 1,000 mg by mouth Daily., Disp: , Rfl:   •  aspirin 81 MG EC tablet, Take 81 mg by mouth Daily., Disp: , Rfl:   •  Calcium Carb-Cholecalciferol (CALCIUM + D3) 600-200 MG-UNIT tablet, Daily., Disp: , Rfl:   •  CARTIA  MG 24 hr capsule, TAKE ONE CAPSULE BY MOUTH DAILY, Disp: 30 capsule, Rfl: 2  •  colchicine 0.6 MG tablet, daily, Disp: , Rfl:   •  doxazosin (CARDURA) 2 MG tablet, TAKE ONE TABLET BY MOUTH DAILY, Disp: 30 tablet, Rfl: 5  •  furosemide (LASIX) 40 MG tablet, Take 1 tablet by mouth Daily., Disp: 30 tablet, Rfl: 4  •  " Glucos-Chondroit-Hyaluron-MSM (GLUCOSAMINE CHONDROITIN JOINT) tablet, GLUCOSAMINE CHONDROITIN JOINT TABS, Disp: , Rfl:   •  isosorbide mononitrate (IMDUR) 30 MG 24 hr tablet, TAKE ONE TABLET BY MOUTH TWICE A DAY, Disp: 180 tablet, Rfl: 2  •  levothyroxine (SYNTHROID, LEVOTHROID) 75 MCG tablet, 1 tablet p.o. daily Monday through Friday., Disp: 100 tablet, Rfl: 6  •  melatonin ([facility] MELATONIN) 5 MG tablet tablet, Daily., Disp: , Rfl:   •  Multiple Vitamin (MULTIVITAMIN) tablet, Take 1 tablet by mouth Daily., Disp: , Rfl:   •  nitroglycerin (NITROSTAT) 0.4 MG SL tablet, NITROSTAT 0.4 MG SUBL, Disp: , Rfl:   •  Omega-3 Fatty Acids (FISH OIL) 1000 MG capsule capsule, Take  by mouth., Disp: , Rfl:   •  polyethylene glycol (MIRALAX) powder, Take 17 g by mouth As Needed., Disp: , Rfl:   •  potassium chloride ER (K-TAB) 20 MEQ tablet controlled-release ER tablet, TAKE ONE TABLET BY MOUTH DAILY, Disp: 90 tablet, Rfl: 2  •  rosuvastatin (CRESTOR) 20 MG tablet, Take 1 tablet by mouth Daily., Disp: 90 tablet, Rfl: 3  •  sertraline (ZOLOFT) 50 MG tablet, Take 50 mg by mouth Daily., Disp: , Rfl:   •  sotalol (BETAPACE) 120 MG tablet, Take 1 tablet by mouth 2 (Two) Times a Day., Disp: 180 tablet, Rfl: 1  •  warfarin (Coumadin) 5 MG tablet, Take one & one-half tablets x 3 days/week (Tu/Th/Sat), take one tablet all other days or as directed, Disp: 120 tablet, Rfl: 1    Family History:  Family History   Problem Relation Age of Onset   • Hypertension Mother    • Heart disease Father    • Heart disease Sister    • Kidney cancer Sister    • Stroke Sister    • Heart disease Brother        Past Medical History:  Past Medical History:   Diagnosis Date   • Anxiety    • Asthma    • CAD (coronary artery disease)    • Carotid artery disease (CMS/HCC)    • GERD (gastroesophageal reflux disease)    • Gout    • Hyperlipidemia    • Hypertension    • Hyperthyroidism    • Hypothyroidism        Past surgical History:  Past Surgical History:   Procedure  Laterality Date   • BREAST BIOPSY     • CARDIAC CATHETERIZATION     • CAROTID STENT     • CATARACT EXTRACTION     • CHOLECYSTECTOMY     • CORONARY ARTERY BYPASS GRAFT     • CORONARY STENT PLACEMENT     • FINGER SURGERY     • KNEE SURGERY     • PACEMAKER IMPLANTATION     • SHOULDER SURGERY         Social History:  Social History     Socioeconomic History   • Marital status:      Spouse name: Not on file   • Number of children: Not on file   • Years of education: Not on file   • Highest education level: Not on file   Tobacco Use   • Smoking status: Former Smoker   • Smokeless tobacco: Former User   Substance and Sexual Activity   • Alcohol use: No     Frequency: Never   • Drug use: No   • Sexual activity: Defer       Review of Systems:  The following systems were reviewed as they relate to the cardiovascular system: Constitutional, Eyes, ENT, Cardiovascular, Respiratory, Gastrointestinal, Integumentary, Neurological, Psychiatric, Hematologic, Endocrine, Musculoskeletal, and Genitourinary. The pertinent cardiovascular findings are reported above with all other cardiovascular points within those systems being negative.    Diagnostic Study Review:     Current Electrocardiogram:  Procedures no new EKG.  EKG dated 6/26/2020 demonstrates atrial paced rhythm with a ventricular rate of 70 bpm.  Nonspecific interventricular conduction delay.  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      NOTE: The following portions of the patient's history were reviewed and updated this visit as appropriate: allergies, current medications, past family history, past medical history, past social history, past surgical history and problem list.

## 2020-08-10 ENCOUNTER — APPOINTMENT (OUTPATIENT)
Dept: GENERAL RADIOLOGY | Facility: HOSPITAL | Age: 85
End: 2020-08-10

## 2020-08-10 ENCOUNTER — APPOINTMENT (OUTPATIENT)
Dept: CT IMAGING | Facility: HOSPITAL | Age: 85
End: 2020-08-10

## 2020-08-10 ENCOUNTER — HOSPITAL ENCOUNTER (EMERGENCY)
Facility: HOSPITAL | Age: 85
Discharge: HOME | End: 2020-08-10
Admitting: EMERGENCY MEDICINE

## 2020-08-10 VITALS
HEART RATE: 70 BPM | BODY MASS INDEX: 25.76 KG/M2 | SYSTOLIC BLOOD PRESSURE: 160 MMHG | RESPIRATION RATE: 13 BRPM | OXYGEN SATURATION: 97 % | WEIGHT: 136.47 LBS | DIASTOLIC BLOOD PRESSURE: 132 MMHG | TEMPERATURE: 97.6 F | HEIGHT: 61 IN

## 2020-08-10 DIAGNOSIS — R91.1 PULMONARY NODULE: Primary | ICD-10-CM

## 2020-08-10 DIAGNOSIS — R07.89 CHEST WALL PAIN: ICD-10-CM

## 2020-08-10 DIAGNOSIS — B02.9 HERPES ZOSTER WITHOUT COMPLICATION: ICD-10-CM

## 2020-08-10 LAB
ALBUMIN SERPL-MCNC: 4.2 G/DL (ref 3.5–5.2)
ALBUMIN/GLOB SERPL: 1.4 G/DL
ALP SERPL-CCNC: 77 U/L (ref 39–117)
ALT SERPL W P-5'-P-CCNC: 15 U/L (ref 1–33)
ANION GAP SERPL CALCULATED.3IONS-SCNC: 12 MMOL/L (ref 5–15)
AST SERPL-CCNC: 23 U/L (ref 1–32)
BASOPHILS # BLD AUTO: 0.1 10*3/MM3 (ref 0–0.2)
BASOPHILS NFR BLD AUTO: 1 % (ref 0–1.5)
BILIRUB SERPL-MCNC: 0.4 MG/DL (ref 0–1.2)
BUN SERPL-MCNC: 20 MG/DL (ref 8–23)
BUN SERPL-MCNC: NORMAL MG/DL
BUN/CREAT SERPL: NORMAL
CALCIUM SPEC-SCNC: 9.8 MG/DL (ref 8.6–10.5)
CHLORIDE SERPL-SCNC: 102 MMOL/L (ref 98–107)
CO2 SERPL-SCNC: 27 MMOL/L (ref 22–29)
CREAT SERPL-MCNC: 0.83 MG/DL (ref 0.57–1)
DEPRECATED RDW RBC AUTO: 45.5 FL (ref 37–54)
EOSINOPHIL # BLD AUTO: 0.1 10*3/MM3 (ref 0–0.4)
EOSINOPHIL NFR BLD AUTO: 2.3 % (ref 0.3–6.2)
ERYTHROCYTE [DISTWIDTH] IN BLOOD BY AUTOMATED COUNT: 14.4 % (ref 12.3–15.4)
GFR SERPL CREATININE-BSD FRML MDRD: 65 ML/MIN/1.73
GLOBULIN UR ELPH-MCNC: 2.9 GM/DL
GLUCOSE SERPL-MCNC: 94 MG/DL (ref 65–99)
HCT VFR BLD AUTO: 39.6 % (ref 34–46.6)
HGB BLD-MCNC: 13.3 G/DL (ref 12–15.9)
INR PPP: 2.6 (ref 2–3)
LYMPHOCYTES # BLD AUTO: 1.2 10*3/MM3 (ref 0.7–3.1)
LYMPHOCYTES NFR BLD AUTO: 21.9 % (ref 19.6–45.3)
MCH RBC QN AUTO: 30.3 PG (ref 26.6–33)
MCHC RBC AUTO-ENTMCNC: 33.7 G/DL (ref 31.5–35.7)
MCV RBC AUTO: 90 FL (ref 79–97)
MONOCYTES # BLD AUTO: 0.5 10*3/MM3 (ref 0.1–0.9)
MONOCYTES NFR BLD AUTO: 8.5 % (ref 5–12)
NEUTROPHILS NFR BLD AUTO: 3.6 10*3/MM3 (ref 1.7–7)
NEUTROPHILS NFR BLD AUTO: 66.3 % (ref 42.7–76)
NRBC BLD AUTO-RTO: 0 /100 WBC (ref 0–0.2)
PLATELET # BLD AUTO: 207 10*3/MM3 (ref 140–450)
PMV BLD AUTO: 7.6 FL (ref 6–12)
POTASSIUM SERPL-SCNC: 3.9 MMOL/L (ref 3.5–5.2)
PROT SERPL-MCNC: 7.1 G/DL (ref 6–8.5)
PROTHROMBIN TIME: 26.6 SECONDS (ref 19.4–28.5)
RBC # BLD AUTO: 4.39 10*6/MM3 (ref 3.77–5.28)
SODIUM SERPL-SCNC: 141 MMOL/L (ref 136–145)
TROPONIN T SERPL-MCNC: <0.01 NG/ML (ref 0–0.03)
WBC # BLD AUTO: 5.4 10*3/MM3 (ref 3.4–10.8)

## 2020-08-10 PROCEDURE — 71275 CT ANGIOGRAPHY CHEST: CPT

## 2020-08-10 PROCEDURE — 71045 X-RAY EXAM CHEST 1 VIEW: CPT

## 2020-08-10 PROCEDURE — 0 IOPAMIDOL PER 1 ML: Performed by: PHYSICIAN ASSISTANT

## 2020-08-10 PROCEDURE — 85610 PROTHROMBIN TIME: CPT | Performed by: PHYSICIAN ASSISTANT

## 2020-08-10 PROCEDURE — 85025 COMPLETE CBC W/AUTO DIFF WBC: CPT | Performed by: PHYSICIAN ASSISTANT

## 2020-08-10 PROCEDURE — 80053 COMPREHEN METABOLIC PANEL: CPT | Performed by: PHYSICIAN ASSISTANT

## 2020-08-10 PROCEDURE — 99283 EMERGENCY DEPT VISIT LOW MDM: CPT

## 2020-08-10 PROCEDURE — 93005 ELECTROCARDIOGRAM TRACING: CPT

## 2020-08-10 PROCEDURE — 84484 ASSAY OF TROPONIN QUANT: CPT | Performed by: PHYSICIAN ASSISTANT

## 2020-08-10 PROCEDURE — 93005 ELECTROCARDIOGRAM TRACING: CPT | Performed by: PHYSICIAN ASSISTANT

## 2020-08-10 RX ORDER — ASPIRIN 81 MG/1
324 TABLET, CHEWABLE ORAL ONCE
Status: COMPLETED | OUTPATIENT
Start: 2020-08-10 | End: 2020-08-10

## 2020-08-10 RX ORDER — VALACYCLOVIR HYDROCHLORIDE 1 G/1
1000 TABLET, FILM COATED ORAL 3 TIMES DAILY
Qty: 21 TABLET | Refills: 0 | Status: SHIPPED | OUTPATIENT
Start: 2020-08-10 | End: 2020-08-17

## 2020-08-10 RX ORDER — SODIUM CHLORIDE 0.9 % (FLUSH) 0.9 %
10 SYRINGE (ML) INJECTION AS NEEDED
Status: DISCONTINUED | OUTPATIENT
Start: 2020-08-10 | End: 2020-08-10 | Stop reason: HOSPADM

## 2020-08-10 RX ORDER — HYDROCODONE BITARTRATE AND ACETAMINOPHEN 5; 325 MG/1; MG/1
1 TABLET ORAL EVERY 6 HOURS PRN
Qty: 12 TABLET | Refills: 0 | Status: SHIPPED | OUTPATIENT
Start: 2020-08-10 | End: 2020-09-28

## 2020-08-10 RX ADMIN — SODIUM CHLORIDE 1000 ML: 900 INJECTION, SOLUTION INTRAVENOUS at 16:38

## 2020-08-10 RX ADMIN — ASPIRIN 81 MG 324 MG: 81 TABLET ORAL at 15:21

## 2020-08-10 RX ADMIN — IOPAMIDOL 100 ML: 755 INJECTION, SOLUTION INTRAVENOUS at 16:58

## 2020-08-10 NOTE — DISCHARGE INSTRUCTIONS
Take valacyclovir as prescribed.  Be sure to take full course.  Try not to touch or scratch your rash if you do be sure to wash your hands.     Take Norco as needed for pain.  Do not operate heavy machinery or drink alcohol while taking this medication.    Follow-up with your primary care provider in 3-5 days.  If you do not have a primary care provider call [phone] for help in finding one, or you may follow up with [facility] at [phone].    Follow-up with your primary care provider to have a repeat chest CT or PET scan in regards to your pulmonary nodule in the next 3 months.    Return to ED for any new or worsening symptoms

## 2020-08-10 NOTE — ED PROVIDER NOTES
Subjective   Patient is an 85-year-old female presents with complaints of left-sided chest pain for the past 4 days.  Patient describes as a constant achy type pain with intermittent sharp shooting pains currently rates a 5/10 severity patient denies any radiation of the pain into her arm or jaw.  Patient states she also noticed a rash on the left side of her back today when she was taking a bath.  Patient denies any alleviating or exacerbating factors of her symptoms.  Patient states she did call her primary care provider advised to come to the ED for further evaluation and management.  Patient denies any shortness of breath nausea vomiting abdominal pain urinary symptoms including dysuria hematuria headache or dizziness recent travel or known sick contacts.  Patient states she did see her cardiologist Dr. Henley last week but was asymptomatic at that time.          Review of Systems   Constitutional: Negative.    Respiratory: Negative.    Cardiovascular: Positive for chest pain. Negative for palpitations and leg swelling.   Gastrointestinal: Negative.    Musculoskeletal: Negative for neck pain and neck stiffness.   Skin: Positive for rash. Negative for color change, pallor and wound.   Neurological: Negative.        Past Medical History:   Diagnosis Date   • Anxiety    • Asthma    • CAD (coronary artery disease)    • Carotid artery disease (CMS/HCC)    • GERD (gastroesophageal reflux disease)    • Gout    • Hyperlipidemia    • Hypertension    • Hyperthyroidism    • Hypothyroidism        No Known Allergies    Past Surgical History:   Procedure Laterality Date   • BREAST BIOPSY     • CARDIAC CATHETERIZATION     • CAROTID STENT     • CATARACT EXTRACTION     • CHOLECYSTECTOMY     • CORONARY ARTERY BYPASS GRAFT     • CORONARY STENT PLACEMENT     • FINGER SURGERY     • KNEE SURGERY     • PACEMAKER IMPLANTATION     • SHOULDER SURGERY         Family History   Problem Relation Age of Onset   • Hypertension Mother    •  "Heart disease Father    • Heart disease Sister    • Kidney cancer Sister    • Stroke Sister    • Heart disease Brother        Social History     Socioeconomic History   • Marital status:      Spouse name: Not on file   • Number of children: Not on file   • Years of education: Not on file   • Highest education level: Not on file   Tobacco Use   • Smoking status: Former Smoker   • Smokeless tobacco: Former User   Substance and Sexual Activity   • Alcohol use: No     Frequency: Never   • Drug use: No   • Sexual activity: Defer           Objective   Physical Exam   Constitutional: She is oriented to person, place, and time. She appears well-developed and well-nourished.  Non-toxic appearance. She does not appear ill. No distress.   HENT:   Head: Normocephalic and atraumatic.   Eyes: Pupils are equal, round, and reactive to light. EOM are normal.   Cardiovascular: Normal rate, regular rhythm, normal heart sounds and intact distal pulses. Exam reveals no gallop and no friction rub.   No murmur heard.  Pulmonary/Chest: Effort normal. No accessory muscle usage or stridor. No respiratory distress. She has no decreased breath sounds. She has no wheezes. She has no rhonchi. She has no rales.   Musculoskeletal:        Right lower leg: Normal.        Left lower leg: Normal.   Neurological: She is alert and oriented to person, place, and time. She is not disoriented. No cranial nerve deficit.   Skin: Skin is warm. Capillary refill takes less than 2 seconds. Rash noted. Rash is vesicular. She is not diaphoretic.   Vesicular rash with erythematous base noted along the left thoracic region and extends to her left breast along the T4 dermatome.  Skin is intact.  No excoriations noted   Psychiatric: She has a normal mood and affect. Her behavior is normal.   Nursing note and vitals reviewed.      Procedures           ED Course    /55   Pulse 70   Temp 98.2 °F (36.8 °C) (Oral)   Resp 12   Ht 154.9 cm (61\")   Wt 61.9 " kg (136 lb 7.4 oz)   SpO2 98%   BMI 25.78 kg/m²   Medications   sodium chloride 0.9 % flush 10 mL (has no administration in time range)   aspirin chewable tablet 324 mg (324 mg Oral Given 8/10/20 1521)   sodium chloride 0.9 % bolus 1,000 mL (1,000 mL Intravenous New Bag 8/10/20 1638)   iopamidol (ISOVUE-370) 76 % injection 100 mL (100 mL Intravenous Given 8/10/20 1658)     Labs Reviewed   TROPONIN (IN-HOUSE) - Normal    Narrative:     Troponin T Reference Range:  <= 0.03 ng/mL-   Negative for AMI  >0.03 ng/mL-     Abnormal for myocardial necrosis.  Clinicians would have to utilize clinical acumen, EKG, Troponin and serial changes to determine if it is an Acute Myocardial Infarction or myocardial injury due to an underlying chronic condition.       Results may be falsely decreased if patient taking Biotin.     PROTIME-INR - Normal   CBC WITH AUTO DIFFERENTIAL - Normal   BUN - Normal   COMPREHENSIVE METABOLIC PANEL    Narrative:     GFR Normal >60  Chronic Kidney Disease <60  Kidney Failure <15     RAINBOW DRAW    Narrative:     The following orders were created for panel order [place] Draw.  Procedure                               Abnormality         Status                     ---------                               -----------         ------                     Light Blue Top[[id number]]                                                              Green Top (Gel)[[id number]]                                                             Lavender Top[[id number]]                                                                Gold Top - SST[[id number]]                                                                Please view results for these tests on the individual orders.   TROPONIN (IN-HOUSE)   CBC AND DIFFERENTIAL    Narrative:     The following orders were created for panel order CBC & Differential.  Procedure                               Abnormality         Status                     ---------                                -----------         ------                     CBC Auto Differential[[id number]]        Normal              Final result                 Please view results for these tests on the individual orders.   LIGHT BLUE TOP   GREEN TOP   LAVENDER TOP   GOLD TOP - SST     Xr Chest 1 View    Result Date: 8/10/2020   1. No acute chest findings.  Electronically Signed By-Dr. Yary Deal MD On:8/10/2020 3:37 PM This report was finalized on [id number] by Dr. Yary Deal MD.    Ct Chest Pulmonary Embolism    Result Date: 8/10/2020   1.  No evidence pulmonary bolus. 2.  Noncalcified 9 mm pulmonary nodule within the right middle lobe. PET CT scan is recommended for further evaluation.  PET CT scan is not performed, follow-up noncontrast CT scan of the chest should be performed in 3 months. 3.  Stable right adrenal adenoma.  Electronically Signed By-Fred Mcdonnell On:8/10/2020 5:07 PM This report was finalized on [id number] by  Fred Mcdonnell, .                                           [facility]  Number of Diagnoses or Management Options  Chest wall pain:   Herpes zoster without complication:   Pulmonary nodule:   Diagnosis management comments: Chart Review:  Comorbidity: Anxiety asthma CAD GERD gout hypertension hyperlipidemia hypothyroidism  ECG: Interpreted by myself and Dr. Joy shows atrial paced rhythm incomplete left bundle branch block LVH with secondary repolarization abnormality floods T wave.  Previous EKG on 6/15/2014 LVH with secondary repolarization abnormality anterior Q waves possibly due to LVH  Labs: Troponin within normal limits.  Pro time INR CBC and CMP unremarkable as above  Imaging: Was interpreted by physician and reviewed by myself:  Xr Chest 1 View  Result Date: 8/10/2020   1. No acute chest findings.  Electronically Signed By-Dr. Yary Deal MD On:8/10/2020 3:37 PM This report was finalized on [id number] by Dr. Yary Deal MD.    Ct Chest Pulmonary Embolism  Result Date: 8/10/2020    1.  No evidence pulmonary bolus. 2.  Noncalcified 9 mm pulmonary nodule within the right middle lobe. PET CT scan is recommended for further evaluation.  PET CT scan is not performed, follow-up noncontrast CT scan of the chest should be performed in 3 months. 3.  Stable right adrenal adenoma.  Electronically Signed By-Fred Mcdonnell On:8/10/2020 5:07 PM This report was finalized on [id number] by  Fred Mcdonnell, .    Disposition/Treatment:  Appropriate PPE was worn during exam and throughout all encounters with the patient.  While in the ED IV was placed and labs were obtained patient was given aspirin.  Physical exam was significant for shingles right above where her chest pain is her chest pain is reproducible.  Results were fairly unremarkable as above troponin was within normal limits.  EKG showed no acute ST elevation.. chest x-ray showed no acute abnormalities chest PE protocol showed no signs of a PE did show a pulmonary nodule as above.  Lab results and findings were discussed with the patient who voiced understanding of discharge instructions along with signs and symptoms requiring return to the ED.  Upon discharged patient was in stable condition with followup for a revaluation.  Inspect was queried patient's last fill of Norco was 2/24/2020 for a 2-day supply.  Patient will be given small dose of Norco and valacyclovir for home advised follow-up with primary care provider.  Patient was in agreement with plan       Amount and/or Complexity of Data Reviewed  Clinical lab tests: reviewed  Tests in the radiology section of CPT®: reviewed  Tests in the medicine section of CPT®: reviewed        Final diagnoses:   Pulmonary nodule   Herpes zoster without complication   Chest wall pain            Sosa Ahn PA  08/10/20 1723

## 2020-08-11 ENCOUNTER — ANTICOAGULATION VISIT (OUTPATIENT)
Dept: CARDIOLOGY | Facility: CLINIC | Age: 85
End: 2020-08-11

## 2020-08-11 DIAGNOSIS — I48.0 PAROXYSMAL ATRIAL FIBRILLATION (HCC): Primary | ICD-10-CM

## 2020-08-11 LAB — INR PPP: 2.8

## 2020-08-14 ENCOUNTER — ANTICOAGULATION VISIT (OUTPATIENT)
Dept: CARDIOLOGY | Facility: CLINIC | Age: 85
End: 2020-08-14

## 2020-08-14 DIAGNOSIS — I48.0 PAROXYSMAL ATRIAL FIBRILLATION (HCC): Primary | ICD-10-CM

## 2020-08-14 LAB — INR PPP: 2.1

## 2020-08-21 ENCOUNTER — ANTICOAGULATION VISIT (OUTPATIENT)
Dept: CARDIOLOGY | Facility: CLINIC | Age: 85
End: 2020-08-21

## 2020-08-21 DIAGNOSIS — I48.0 PAROXYSMAL ATRIAL FIBRILLATION (HCC): Primary | ICD-10-CM

## 2020-08-21 LAB — INR PPP: 2.3

## 2020-09-04 ENCOUNTER — ANTICOAGULATION VISIT (OUTPATIENT)
Dept: CARDIOLOGY | Facility: CLINIC | Age: 85
End: 2020-09-04

## 2020-09-04 DIAGNOSIS — I48.0 PAROXYSMAL ATRIAL FIBRILLATION (HCC): Primary | ICD-10-CM

## 2020-09-04 LAB — INR PPP: 2.7

## 2020-09-18 ENCOUNTER — ANTICOAGULATION VISIT (OUTPATIENT)
Dept: CARDIOLOGY | Facility: CLINIC | Age: 85
End: 2020-09-18

## 2020-09-18 DIAGNOSIS — I48.0 PAROXYSMAL ATRIAL FIBRILLATION (HCC): Primary | ICD-10-CM

## 2020-09-18 LAB — INR PPP: 2.2

## 2020-09-28 ENCOUNTER — OFFICE VISIT (OUTPATIENT)
Dept: CARDIOLOGY | Facility: CLINIC | Age: 85
End: 2020-09-28

## 2020-09-28 ENCOUNTER — CLINICAL SUPPORT NO REQUIREMENTS (OUTPATIENT)
Dept: CARDIOLOGY | Facility: CLINIC | Age: 85
End: 2020-09-28

## 2020-09-28 VITALS
DIASTOLIC BLOOD PRESSURE: 76 MMHG | WEIGHT: 135.8 LBS | BODY MASS INDEX: 25.66 KG/M2 | HEART RATE: 75 BPM | SYSTOLIC BLOOD PRESSURE: 136 MMHG | OXYGEN SATURATION: 96 %

## 2020-09-28 DIAGNOSIS — I48.0 PAROXYSMAL ATRIAL FIBRILLATION (HCC): Primary | ICD-10-CM

## 2020-09-28 DIAGNOSIS — I48.0 PAROXYSMAL ATRIAL FIBRILLATION (HCC): ICD-10-CM

## 2020-09-28 DIAGNOSIS — I49.5 SICK SINUS SYNDROME (HCC): ICD-10-CM

## 2020-09-28 DIAGNOSIS — E78.2 MIXED HYPERLIPIDEMIA: ICD-10-CM

## 2020-09-28 DIAGNOSIS — I10 ESSENTIAL HYPERTENSION: ICD-10-CM

## 2020-09-28 DIAGNOSIS — Z95.0 PRESENCE OF CARDIAC PACEMAKER: Primary | ICD-10-CM

## 2020-09-28 PROCEDURE — 99213 OFFICE O/P EST LOW 20 MIN: CPT | Performed by: INTERNAL MEDICINE

## 2020-09-28 PROCEDURE — 93000 ELECTROCARDIOGRAM COMPLETE: CPT | Performed by: INTERNAL MEDICINE

## 2020-09-28 PROCEDURE — 93280 PM DEVICE PROGR EVAL DUAL: CPT | Performed by: INTERNAL MEDICINE

## 2020-09-28 NOTE — PROGRESS NOTES
CC: Sick sinus syndrome with dual-chamber pacemaker in situ follow up .    Sub-85-year-old female patient with recurrent symptomatic atrial fibrillation was placed on sotalol In the past and comes in for follow-up. she has sick sinus syndrome with a dual-chamber pacemaker in situ which was placed several months ago. she is doing well with recurrent symptoms of arrhythmias-- she has history of valvular heart disease status post bioprosthetic aortic valve replacement in 2014. She is additional history that includes peripheral vascular disease, coronary artery disease status post coronary arterial bypass grafting, bilateral renal artery stenosis, carotid artery disease, dyslipidemia, and sick sinus syndrome status post permanent pacemaker placement.  She complains of intermittent palpitations and also fatigue with current intake of sotalol  Denies any leg edema or syncope or any  Angina  She denies any syncope and compliant with her medications  She had increasing atrial arrhythmia and sotalol dose was increased  and comes in for follow-up and complaints of mild fatigue  Patient is recently recovering from herpes zoster      Impressions  SSS, s/p PPM --pacemaker interrogated with appropriate function --1 prolonged episode of atrial fibrillation since sotalol was increased and patient was educated and reevaluate this patient in few months and continue current sotalol dosing   paroxysmal atrial fibrillation on sotalol on chronic Coumadin therapy  Chronic stable angina  CAD s/p CABG most recent cath with no disease amenable to PCI 2016  Valvular heart disease, s/p bioprosthetic AVR  CKD, renal artery stenosis  PAD , FELICITAS and carotid stenosis        Vital Signs: Blood pressure  136/76 pulse rate 75 patient afebrile respiration 12 times a minute      Past medical history is reviewed below and verified    Past Medical History:     Reviewed history from 06/13/2016 and no changes required:        AVR 2014-         Coronary artery disease: S/P CABG and PCI with stenting-        Carotid disease;left side 50-74%-Patrick Ocampo        Inferior myocardial infarction 12-06-        Asthma        Anxiety Disorder        Depression        G E R D        Hyperlipidemia        Hypertension-        Hyperthyroidism        fx thoracic ?        shoulder fx        Rotator cuff syndrome         Gout         Paroxysmal atrial fib        Arthritis        Hypothyroid        No Drug Allergies?         Eye exam:2014 &2015 Dr.Kelley Parekh in [place]         Rheumatoid Arthritis    Past Surgical History:     Reviewed history from 10/16/2018 and no changes required:        PCI/stent, LCX, 3-20-06, Cypher stent        PCI/stent x 2, LCX & priximal diagonal, 12-3-06, [employer] stents        cataract r eye 12/07  lt eye 01/08        CABG x 3  07-19-08        thoracentesis l lung 8/08        Right rotator cuff repair - Oct. 15, 2013        Cholecystectomy-2009        Left Knee Arthoplasty-2014        Left Shoulder Replacement 4/2014        Aortic Valve  Replacement 6/11/2014        Heart Catherization:2/18/2015        Pacemaker placed 6/6/16        Colonoscopy 2011         Surgery Finger 2018       Review of Systems   General: Positive for fatigue and tiredness    Eyes: No redness  Cardiovascular: No chest pain, no palpitations  Respiratory: No shortness of breath  Gastrointestinal: No nausea or vomiting, bleeding  Genitourinary: no hematuria or dysuria  Musculoskeletal: No arthralgia or myalgia  Skin: No rash  Neurologic: No numbness, tingling, syncope  Hematologic/Lymphatic: No abnormal bleeding      Physical Exam    General:      well developed, well nourished, in no acute distress.    Head:      normocephalic and atraumatic.    Eyes:      PERRL/EOM intact, conjunctiva and sclera clear with out nystagmus.    Neck:      no masses, thyromegaly  Lungs:      clear bilaterally to auscultation.    Heart:      regular rhythm, no rubs,  no gallops and Grade 2/6 YENI:.    Skin:      intact without lesions or rashes.    Psych:      alert and cooperative; normal mood and affect; normal attention span and concentration.      Pacemaker incision is clean    Alert and oriented x3 without any focal deficits        ECG 12 Lead    Date/Time: 9/28/2020 6:16 PM  Performed by: Pk Crabtree MD  Authorized by: Pk Crabtree MD   Comparison: compared with previous ECG   Similar to previous ECG  Rhythm: sinus rhythm and paced  Rate: normal  Other findings: non-specific ST-T wave changes  Comments: EKG shows sinus rhythm with atrial pacing QTc interval 428 ms and nonspecific ST-T wave changes and no significant changes compared to prior EKG and EKG indication includes atrial arrhythmias and sotalol therapy            Electronically signed by Pk Crabtree MD, 09/28/20, 6:15 PM EDT.

## 2020-09-28 NOTE — PROGRESS NOTES
In clinic device interrogation. See scanned report. Several episodes of atrial fib the longest around 7 hours on 9/16/2020. Charges per Dr. Crabtree's clinic.

## 2020-10-02 ENCOUNTER — ANTICOAGULATION VISIT (OUTPATIENT)
Dept: CARDIOLOGY | Facility: CLINIC | Age: 85
End: 2020-10-02

## 2020-10-02 DIAGNOSIS — I48.0 PAROXYSMAL ATRIAL FIBRILLATION (HCC): Primary | ICD-10-CM

## 2020-10-02 LAB — INR PPP: 2.3

## 2020-10-13 ENCOUNTER — TELEPHONE (OUTPATIENT)
Dept: ENDOCRINOLOGY | Facility: CLINIC | Age: 85
End: 2020-10-13

## 2020-10-13 DIAGNOSIS — E03.9 ACQUIRED HYPOTHYROIDISM: Primary | ICD-10-CM

## 2020-10-15 ENCOUNTER — LAB (OUTPATIENT)
Dept: LAB | Facility: HOSPITAL | Age: 85
End: 2020-10-15

## 2020-10-15 DIAGNOSIS — E03.9 ACQUIRED HYPOTHYROIDISM: ICD-10-CM

## 2020-10-15 LAB
T4 FREE SERPL-MCNC: 1.49 NG/DL (ref 0.93–1.7)
TSH SERPL DL<=0.05 MIU/L-ACNC: 0.84 UIU/ML (ref 0.27–4.2)

## 2020-10-15 PROCEDURE — 36415 COLL VENOUS BLD VENIPUNCTURE: CPT

## 2020-10-15 PROCEDURE — 84443 ASSAY THYROID STIM HORMONE: CPT

## 2020-10-15 PROCEDURE — 84439 ASSAY OF FREE THYROXINE: CPT

## 2020-10-16 ENCOUNTER — ANTICOAGULATION VISIT (OUTPATIENT)
Dept: CARDIOLOGY | Facility: CLINIC | Age: 85
End: 2020-10-16

## 2020-10-16 DIAGNOSIS — I48.0 PAROXYSMAL ATRIAL FIBRILLATION (HCC): Primary | ICD-10-CM

## 2020-10-16 LAB — INR PPP: 1.9

## 2020-10-22 ENCOUNTER — OFFICE VISIT (OUTPATIENT)
Dept: ENDOCRINOLOGY | Facility: CLINIC | Age: 85
End: 2020-10-22

## 2020-10-22 VITALS
HEART RATE: 69 BPM | SYSTOLIC BLOOD PRESSURE: 136 MMHG | BODY MASS INDEX: 25.03 KG/M2 | OXYGEN SATURATION: 97 % | TEMPERATURE: 97.3 F | DIASTOLIC BLOOD PRESSURE: 68 MMHG | HEIGHT: 62 IN | WEIGHT: 136 LBS

## 2020-10-22 DIAGNOSIS — I10 ESSENTIAL HYPERTENSION: ICD-10-CM

## 2020-10-22 DIAGNOSIS — E03.9 ACQUIRED HYPOTHYROIDISM: Primary | ICD-10-CM

## 2020-10-22 PROCEDURE — 99213 OFFICE O/P EST LOW 20 MIN: CPT | Performed by: INTERNAL MEDICINE

## 2020-10-22 RX ORDER — LEVOTHYROXINE SODIUM 0.07 MG/1
TABLET ORAL
Qty: 100 TABLET | Refills: 6 | Status: ON HOLD | OUTPATIENT
Start: 2020-10-22 | End: 2021-12-22

## 2020-10-22 NOTE — PROGRESS NOTES
Endocrine Progress Note Outpatient     Patient Care Team:  Monae Sam APRN as PCP - General (Family Medicine)  Darcy Coates APRN as PCP - Claims Attributed    Chief Complaint: Follow-up hypothyroidism    HPI: 85 year-old female with history of hypothyroidism is here for follow-up and she is currently taking levothyroxine 75 mcg 5 days a week.  She does have some fatigue and tiredness but she is taking her medications on regular basis.    Past Medical History:   Diagnosis Date   • Anxiety    • Asthma    • CAD (coronary artery disease)    • Carotid artery disease (CMS/HCC)    • GERD (gastroesophageal reflux disease)    • Gout    • Hyperlipidemia    • Hypertension    • Hyperthyroidism    • Hypothyroidism        Social History     Socioeconomic History   • Marital status:      Spouse name: Not on file   • Number of children: Not on file   • Years of education: Not on file   • Highest education level: Not on file   Tobacco Use   • Smoking status: Former Smoker   • Smokeless tobacco: Former User   Substance and Sexual Activity   • Alcohol use: No     Frequency: Never   • Drug use: No   • Sexual activity: Defer       Family History   Problem Relation Age of Onset   • Hypertension Mother    • Heart disease Father    • Heart disease Sister    • Kidney cancer Sister    • Stroke Sister    • Heart disease Brother        No Known Allergies    ROS:   Constitutional:  Admit fatigue, tiredness.    Eyes:  Denies change in visual acuity   HENT:  Denies nasal congestion or sore throat   Respiratory: denies cough, shortness of breath.   Cardiovascular:  denies chest pain, edema   GI:  Denies abdominal pain, nausea, vomiting.   Musculoskeletal:  Denies back pain or joint pain   Integument:  Denies dry skin and rash   Neurologic:  Denies headache, focal weakness or sensory changes   Endocrine:  Denies polyuria or polydipsia   Psychiatric:  Denies depression or anxiety      Vitals:    10/22/20 1055   BP: 136/68    Pulse: 69   Temp: 97.3 °F (36.3 °C)   SpO2: 97%       Physical Exam:  GEN: NAD, conversant  EYES: EOMI, PERRL, no conjunctival erythema  NECK: no thyromegaly, full ROM   CV: RRR, no murmurs/rubs/gallops, no peripheral edema  LUNG: CTAB, no wheezes/rales/ronchi  SKIN: no rashes, no acanthosis  MSK: no deformities, full ROM of all extremities  NEURO: no tremors, DTR normal  PSYCH: AOX3, appropriate mood, affect normal      Results Review:     I reviewed the patient's new clinical results.      Lab Results   Component Value Date    GLUCOSE 94 08/10/2020    BUN  08/10/2020      Comment:      Testing performed by alternate method    BUN 20 08/10/2020    CREATININE 0.83 08/10/2020    EGFRIFNONA 65 08/10/2020    EGFRIFAFRI >60 mL/min/1.73m2 09/02/2016    BCR  08/10/2020      Comment:      Testing not performed    K 3.9 08/10/2020    CO2 27.0 08/10/2020    CALCIUM 9.8 08/10/2020    ALBUMIN 4.20 08/10/2020    LABIL2 1.1 10/12/2017    AST 23 08/10/2020    ALT 15 08/10/2020    CHOL 141 10/11/2017    TRIG 58 10/11/2017    LDL 59 10/11/2017    HDL 74 10/11/2017     Lab Results   Component Value Date    TSH 0.841 10/15/2020    FREET4 1.49 10/15/2020         Medication Review: Reviewed.       Current Outpatient Medications:   •  acetaminophen (TYLENOL) 650 MG 8 hr tablet, Take 325 mg by mouth As Needed., Disp: , Rfl:   •  ascorbic acid (VITAMIN C) 1000 MG tablet, Take 1,000 mg by mouth Daily., Disp: , Rfl:   •  aspirin 81 MG EC tablet, Take 81 mg by mouth Daily., Disp: , Rfl:   •  colchicine 0.6 MG tablet, daily, Disp: , Rfl:   •  dilTIAZem CD (Cartia XT) 240 MG 24 hr capsule, Take 1 capsule by mouth Daily., Disp: 90 capsule, Rfl: 3  •  doxazosin (CARDURA) 2 MG tablet, Take 1 tablet by mouth Daily., Disp: 90 tablet, Rfl: 3  •  furosemide (LASIX) 40 MG tablet, Take 1 tablet by mouth Daily., Disp: 90 tablet, Rfl: 3  •  Glucos-Chondroit-Hyaluron-MSM (GLUCOSAMINE CHONDROITIN JOINT) tablet, GLUCOSAMINE CHONDROITIN JOINT TABS, Disp: ,  Rfl:   •  isosorbide mononitrate (IMDUR) 30 MG 24 hr tablet, Take 1 tablet by mouth 2 (Two) Times a Day., Disp: 180 tablet, Rfl: 3  •  levothyroxine (SYNTHROID, LEVOTHROID) 75 MCG tablet, 1 tablet p.o. daily Monday through Friday., Disp: 100 tablet, Rfl: 6  •  melatonin ([employer] MELATONIN) 5 MG tablet tablet, Daily., Disp: , Rfl:   •  Multiple Vitamin (MULTIVITAMIN) tablet, Take 1 tablet by mouth Daily., Disp: , Rfl:   •  nitroglycerin (NITROSTAT) 0.4 MG SL tablet, NITROSTAT 0.4 MG SUBL, Disp: , Rfl:   •  Omega-3 Fatty Acids (FISH OIL) 1000 MG capsule capsule, Take  by mouth., Disp: , Rfl:   •  polyethylene glycol (MIRALAX) powder, Take 17 g by mouth As Needed., Disp: , Rfl:   •  potassium chloride ER (K-TAB) 20 MEQ tablet controlled-release ER tablet, Take 1 tablet by mouth Daily., Disp: 90 tablet, Rfl: 3  •  rosuvastatin (CRESTOR) 20 MG tablet, Take 1 tablet by mouth Daily., Disp: 90 tablet, Rfl: 3  •  sertraline (ZOLOFT) 50 MG tablet, Take 50 mg by mouth Daily., Disp: , Rfl:   •  sotalol (BETAPACE) 120 MG tablet, Take 1 tablet by mouth 2 (Two) Times a Day., Disp: 180 tablet, Rfl: 1  •  warfarin (Coumadin) 5 MG tablet, Take one & one-half tablets x 3 days/week (Tu/Th/Sat), take one tablet all other days or as directed, Disp: 120 tablet, Rfl: 1      Assessment/Plan   1.  Hypothyroidism: Well-controlled with TSH 3.84 with free T4 1.49.  We will continue current dose of levothyroxine 75 mcg 5 days a week and will follow her back in 1 year with labs.    2.  Hypertension: Well-controlled.            Chika Paula MD FACE.    Much of the above report is an electronic transcription/translation of the spoken language to printed text using Dragon Software. As such, the subtleties and finesse of the spoken language may permit erroneous, or at times, nonsensical words or phrases to be inadvertently transcribed; thus changes may be made at a later date to rectify these errors.

## 2020-11-03 ENCOUNTER — ANTICOAGULATION VISIT (OUTPATIENT)
Dept: CARDIOLOGY | Facility: CLINIC | Age: 85
End: 2020-11-03

## 2020-11-03 DIAGNOSIS — I48.0 PAROXYSMAL ATRIAL FIBRILLATION (HCC): Primary | ICD-10-CM

## 2020-11-03 LAB — INR PPP: 2

## 2020-11-13 ENCOUNTER — ANTICOAGULATION VISIT (OUTPATIENT)
Dept: CARDIOLOGY | Facility: CLINIC | Age: 85
End: 2020-11-13

## 2020-11-13 DIAGNOSIS — I48.0 PAROXYSMAL ATRIAL FIBRILLATION (HCC): Primary | ICD-10-CM

## 2020-11-13 LAB — INR PPP: 1.9

## 2020-11-24 RX ORDER — WARFARIN SODIUM 5 MG/1
TABLET ORAL
Qty: 120 TABLET | Refills: 0 | Status: SHIPPED | OUTPATIENT
Start: 2020-11-24 | End: 2021-04-21

## 2020-12-04 ENCOUNTER — ANTICOAGULATION VISIT (OUTPATIENT)
Dept: CARDIOLOGY | Facility: CLINIC | Age: 85
End: 2020-12-04

## 2020-12-04 DIAGNOSIS — I48.0 PAROXYSMAL ATRIAL FIBRILLATION (HCC): Primary | ICD-10-CM

## 2020-12-04 LAB — INR PPP: 3.5 (ref 0.9–1.1)

## 2020-12-04 PROCEDURE — 36416 COLLJ CAPILLARY BLOOD SPEC: CPT | Performed by: INTERNAL MEDICINE

## 2020-12-04 PROCEDURE — 85610 PROTHROMBIN TIME: CPT | Performed by: INTERNAL MEDICINE

## 2020-12-09 ENCOUNTER — TELEPHONE (OUTPATIENT)
Dept: CARDIOLOGY | Facility: CLINIC | Age: 85
End: 2020-12-09

## 2020-12-09 NOTE — TELEPHONE ENCOUNTER
Patient daughter (Yenny) called states patient tested positive for covid, wants to know if it is ok to start zinc, vit c, and vit d3. Will it be ok to take with her heart.    Per Dr Henley as a cardiologist and from the cardiology stand point there is no reason not to be able to take these vitamins. He is not a covid expert and she can also follow up with her PCP.     Patient daughter (Yenny) verbalized understanding.

## 2020-12-15 ENCOUNTER — ANTICOAGULATION VISIT (OUTPATIENT)
Dept: CARDIOLOGY | Facility: CLINIC | Age: 85
End: 2020-12-15

## 2020-12-15 DIAGNOSIS — I48.0 PAROXYSMAL ATRIAL FIBRILLATION (HCC): Primary | ICD-10-CM

## 2020-12-15 LAB — INR PPP: 5.1

## 2020-12-16 RX ORDER — SOTALOL HYDROCHLORIDE 120 MG/1
TABLET ORAL
Qty: 180 TABLET | Refills: 0 | Status: SHIPPED | OUTPATIENT
Start: 2020-12-16 | End: 2021-03-16

## 2020-12-18 ENCOUNTER — ANTICOAGULATION VISIT (OUTPATIENT)
Dept: CARDIOLOGY | Facility: CLINIC | Age: 85
End: 2020-12-18

## 2020-12-18 DIAGNOSIS — I48.0 PAROXYSMAL ATRIAL FIBRILLATION (HCC): Primary | ICD-10-CM

## 2020-12-18 LAB — INR PPP: 1.6

## 2020-12-22 ENCOUNTER — ANTICOAGULATION VISIT (OUTPATIENT)
Dept: CARDIOLOGY | Facility: CLINIC | Age: 85
End: 2020-12-22

## 2020-12-22 DIAGNOSIS — I48.0 PAROXYSMAL ATRIAL FIBRILLATION (HCC): Primary | ICD-10-CM

## 2020-12-22 LAB — INR PPP: 2.3

## 2020-12-29 ENCOUNTER — ANTICOAGULATION VISIT (OUTPATIENT)
Dept: CARDIOLOGY | Facility: CLINIC | Age: 85
End: 2020-12-29

## 2020-12-29 DIAGNOSIS — I48.0 PAROXYSMAL ATRIAL FIBRILLATION (HCC): Primary | ICD-10-CM

## 2020-12-29 LAB — INR PPP: 3.1

## 2021-01-02 ENCOUNTER — LAB (OUTPATIENT)
Dept: LAB | Facility: HOSPITAL | Age: 86
End: 2021-01-02

## 2021-01-02 ENCOUNTER — TRANSCRIBE ORDERS (OUTPATIENT)
Dept: LAB | Facility: HOSPITAL | Age: 86
End: 2021-01-02

## 2021-01-02 DIAGNOSIS — Z00.00 REGULAR CHECK-UP: Primary | ICD-10-CM

## 2021-01-02 DIAGNOSIS — Z00.00 REGULAR CHECK-UP: ICD-10-CM

## 2021-01-02 LAB
ALBUMIN SERPL-MCNC: 4 G/DL (ref 3.5–5.2)
ALBUMIN/GLOB SERPL: 1.2 G/DL
ALP SERPL-CCNC: 98 U/L (ref 39–117)
ALT SERPL W P-5'-P-CCNC: 16 U/L (ref 1–33)
ANION GAP SERPL CALCULATED.3IONS-SCNC: 8.8 MMOL/L (ref 5–15)
AST SERPL-CCNC: 22 U/L (ref 1–32)
BASOPHILS # BLD AUTO: 0.04 10*3/MM3 (ref 0–0.2)
BASOPHILS NFR BLD AUTO: 0.7 % (ref 0–1.5)
BILIRUB SERPL-MCNC: 0.4 MG/DL (ref 0–1.2)
BUN SERPL-MCNC: 21 MG/DL (ref 8–23)
BUN/CREAT SERPL: 22.8 (ref 7–25)
CALCIUM SPEC-SCNC: 9.6 MG/DL (ref 8.6–10.5)
CHLORIDE SERPL-SCNC: 100 MMOL/L (ref 98–107)
CHROMATIN AB SERPL-ACNC: 22 IU/ML (ref 0–14)
CO2 SERPL-SCNC: 31.2 MMOL/L (ref 22–29)
CREAT SERPL-MCNC: 0.92 MG/DL (ref 0.57–1)
CRP SERPL-MCNC: 2.04 MG/DL (ref 0–0.5)
DEPRECATED RDW RBC AUTO: 43 FL (ref 37–54)
EOSINOPHIL # BLD AUTO: 0.17 10*3/MM3 (ref 0–0.4)
EOSINOPHIL NFR BLD AUTO: 2.9 % (ref 0.3–6.2)
ERYTHROCYTE [DISTWIDTH] IN BLOOD BY AUTOMATED COUNT: 12.9 % (ref 12.3–15.4)
GFR SERPL CREATININE-BSD FRML MDRD: 58 ML/MIN/1.73
GLOBULIN UR ELPH-MCNC: 3.4 GM/DL
GLUCOSE SERPL-MCNC: 107 MG/DL (ref 65–99)
HCT VFR BLD AUTO: 35.3 % (ref 34–46.6)
HGB BLD-MCNC: 11.9 G/DL (ref 12–15.9)
IMM GRANULOCYTES # BLD AUTO: 0.02 10*3/MM3 (ref 0–0.05)
IMM GRANULOCYTES NFR BLD AUTO: 0.3 % (ref 0–0.5)
LYMPHOCYTES # BLD AUTO: 1.57 10*3/MM3 (ref 0.7–3.1)
LYMPHOCYTES NFR BLD AUTO: 26.5 % (ref 19.6–45.3)
MCH RBC QN AUTO: 31.1 PG (ref 26.6–33)
MCHC RBC AUTO-ENTMCNC: 33.7 G/DL (ref 31.5–35.7)
MCV RBC AUTO: 92.2 FL (ref 79–97)
MONOCYTES # BLD AUTO: 0.51 10*3/MM3 (ref 0.1–0.9)
MONOCYTES NFR BLD AUTO: 8.6 % (ref 5–12)
NEUTROPHILS NFR BLD AUTO: 3.62 10*3/MM3 (ref 1.7–7)
NEUTROPHILS NFR BLD AUTO: 61 % (ref 42.7–76)
NRBC BLD AUTO-RTO: 0 /100 WBC (ref 0–0.2)
PLATELET # BLD AUTO: 228 10*3/MM3 (ref 140–450)
PMV BLD AUTO: 9.9 FL (ref 6–12)
POTASSIUM SERPL-SCNC: 4.3 MMOL/L (ref 3.5–5.2)
PROT SERPL-MCNC: 7.4 G/DL (ref 6–8.5)
RBC # BLD AUTO: 3.83 10*6/MM3 (ref 3.77–5.28)
SODIUM SERPL-SCNC: 140 MMOL/L (ref 136–145)
URATE SERPL-MCNC: 6.3 MG/DL (ref 2.4–5.7)
WBC # BLD AUTO: 5.93 10*3/MM3 (ref 3.4–10.8)

## 2021-01-02 PROCEDURE — 84550 ASSAY OF BLOOD/URIC ACID: CPT

## 2021-01-02 PROCEDURE — 86140 C-REACTIVE PROTEIN: CPT

## 2021-01-02 PROCEDURE — 36415 COLL VENOUS BLD VENIPUNCTURE: CPT

## 2021-01-02 PROCEDURE — 80053 COMPREHEN METABOLIC PANEL: CPT

## 2021-01-02 PROCEDURE — 86431 RHEUMATOID FACTOR QUANT: CPT

## 2021-01-02 PROCEDURE — 86200 CCP ANTIBODY: CPT

## 2021-01-02 PROCEDURE — 85025 COMPLETE CBC W/AUTO DIFF WBC: CPT

## 2021-01-05 ENCOUNTER — ANTICOAGULATION VISIT (OUTPATIENT)
Dept: CARDIOLOGY | Facility: CLINIC | Age: 86
End: 2021-01-05

## 2021-01-05 DIAGNOSIS — I48.0 PAROXYSMAL ATRIAL FIBRILLATION (HCC): Primary | ICD-10-CM

## 2021-01-05 LAB
CCP IGA+IGG SERPL IA-ACNC: 7 UNITS (ref 0–19)
INR PPP: 2.6

## 2021-01-12 ENCOUNTER — ANTICOAGULATION VISIT (OUTPATIENT)
Dept: CARDIOLOGY | Facility: CLINIC | Age: 86
End: 2021-01-12

## 2021-01-12 DIAGNOSIS — I48.0 PAROXYSMAL ATRIAL FIBRILLATION (HCC): Primary | ICD-10-CM

## 2021-01-12 LAB — INR PPP: 3.5

## 2021-01-19 ENCOUNTER — ANTICOAGULATION VISIT (OUTPATIENT)
Dept: CARDIOLOGY | Facility: CLINIC | Age: 86
End: 2021-01-19

## 2021-01-19 DIAGNOSIS — I48.0 PAROXYSMAL ATRIAL FIBRILLATION (HCC): Primary | ICD-10-CM

## 2021-01-19 LAB — INR PPP: 3.2

## 2021-01-29 ENCOUNTER — ANTICOAGULATION VISIT (OUTPATIENT)
Dept: CARDIOLOGY | Facility: CLINIC | Age: 86
End: 2021-01-29

## 2021-01-29 DIAGNOSIS — I48.0 PAROXYSMAL ATRIAL FIBRILLATION (HCC): Primary | ICD-10-CM

## 2021-01-29 LAB — INR PPP: 1.8

## 2021-02-09 ENCOUNTER — ANTICOAGULATION VISIT (OUTPATIENT)
Dept: CARDIOLOGY | Facility: CLINIC | Age: 86
End: 2021-02-09

## 2021-02-09 DIAGNOSIS — I48.0 PAROXYSMAL ATRIAL FIBRILLATION (HCC): Primary | ICD-10-CM

## 2021-02-09 LAB — INR PPP: 2.1

## 2021-02-23 ENCOUNTER — ANTICOAGULATION VISIT (OUTPATIENT)
Dept: CARDIOLOGY | Facility: CLINIC | Age: 86
End: 2021-02-23

## 2021-02-23 DIAGNOSIS — I48.0 PAROXYSMAL ATRIAL FIBRILLATION (HCC): Primary | ICD-10-CM

## 2021-02-23 LAB — INR PPP: 2.2

## 2021-02-24 ENCOUNTER — TELEPHONE (OUTPATIENT)
Dept: ENDOCRINOLOGY | Facility: CLINIC | Age: 86
End: 2021-02-24

## 2021-02-24 NOTE — TELEPHONE ENCOUNTER
"Patient's daughter left message stating her mom had her feel around on her throat and felt a small lump in the front of the throat. I spoke with patient and she states sometimes when she eats she \"get strangled\". Her next appt is in October.   "

## 2021-02-25 DIAGNOSIS — E03.9 ACQUIRED HYPOTHYROIDISM: Primary | ICD-10-CM

## 2021-02-25 NOTE — TELEPHONE ENCOUNTER
I spoke with patient and gave her this information. She verbalized understanding. She states he wants to go to [facility] for the ultrasound. She will call her PCP to be seen.

## 2021-03-09 ENCOUNTER — ANTICOAGULATION VISIT (OUTPATIENT)
Dept: CARDIOLOGY | Facility: CLINIC | Age: 86
End: 2021-03-09

## 2021-03-09 DIAGNOSIS — I48.0 PAROXYSMAL ATRIAL FIBRILLATION (HCC): Primary | ICD-10-CM

## 2021-03-09 LAB — INR PPP: 1.9

## 2021-03-10 ENCOUNTER — HOSPITAL ENCOUNTER (OUTPATIENT)
Dept: ULTRASOUND IMAGING | Facility: HOSPITAL | Age: 86
Discharge: HOME | End: 2021-03-10
Admitting: INTERNAL MEDICINE

## 2021-03-10 DIAGNOSIS — E03.9 ACQUIRED HYPOTHYROIDISM: ICD-10-CM

## 2021-03-10 PROCEDURE — 76536 US EXAM OF HEAD AND NECK: CPT

## 2021-03-16 RX ORDER — SOTALOL HYDROCHLORIDE 120 MG/1
TABLET ORAL
Qty: 180 TABLET | Refills: 0 | Status: SHIPPED | OUTPATIENT
Start: 2021-03-16 | End: 2021-06-17

## 2021-03-23 ENCOUNTER — ANTICOAGULATION VISIT (OUTPATIENT)
Dept: CARDIOLOGY | Facility: CLINIC | Age: 86
End: 2021-03-23

## 2021-03-23 DIAGNOSIS — I48.0 PAROXYSMAL ATRIAL FIBRILLATION (HCC): Primary | ICD-10-CM

## 2021-03-23 LAB — INR PPP: 2.1

## 2021-03-24 PROCEDURE — 93296 REM INTERROG EVL PM/IDS: CPT | Performed by: INTERNAL MEDICINE

## 2021-03-24 PROCEDURE — 93294 REM INTERROG EVL PM/LDLS PM: CPT | Performed by: INTERNAL MEDICINE

## 2021-03-29 ENCOUNTER — OFFICE VISIT (OUTPATIENT)
Dept: CARDIOLOGY | Facility: CLINIC | Age: 86
End: 2021-03-29

## 2021-03-29 VITALS
BODY MASS INDEX: 24.69 KG/M2 | DIASTOLIC BLOOD PRESSURE: 70 MMHG | SYSTOLIC BLOOD PRESSURE: 152 MMHG | HEART RATE: 69 BPM | OXYGEN SATURATION: 98 % | WEIGHT: 135 LBS

## 2021-03-29 DIAGNOSIS — I73.9 PERIPHERAL VASCULAR DISEASE (HCC): ICD-10-CM

## 2021-03-29 DIAGNOSIS — I35.0 NONRHEUMATIC AORTIC VALVE STENOSIS: ICD-10-CM

## 2021-03-29 DIAGNOSIS — Z95.0 PRESENCE OF CARDIAC PACEMAKER: ICD-10-CM

## 2021-03-29 DIAGNOSIS — I49.5 SICK SINUS SYNDROME (HCC): Primary | ICD-10-CM

## 2021-03-29 DIAGNOSIS — Z95.2 HX OF AORTIC VALVE REPLACEMENT: ICD-10-CM

## 2021-03-29 DIAGNOSIS — I48.0 PAROXYSMAL ATRIAL FIBRILLATION (HCC): ICD-10-CM

## 2021-03-29 DIAGNOSIS — I10 ESSENTIAL HYPERTENSION: ICD-10-CM

## 2021-03-29 DIAGNOSIS — E78.2 MIXED HYPERLIPIDEMIA: ICD-10-CM

## 2021-03-29 DIAGNOSIS — E03.9 ACQUIRED HYPOTHYROIDISM: ICD-10-CM

## 2021-03-29 DIAGNOSIS — I35.1 NONRHEUMATIC AORTIC VALVE INSUFFICIENCY: ICD-10-CM

## 2021-03-29 DIAGNOSIS — Z95.1 PRESENCE OF AORTOCORONARY BYPASS GRAFT: ICD-10-CM

## 2021-03-29 PROCEDURE — 99213 OFFICE O/P EST LOW 20 MIN: CPT | Performed by: NURSE PRACTITIONER

## 2021-03-29 PROCEDURE — 93000 ELECTROCARDIOGRAM COMPLETE: CPT | Performed by: NURSE PRACTITIONER

## 2021-03-29 RX ORDER — FERROUS SULFATE TAB EC 324 MG (65 MG FE EQUIVALENT) 324 (65 FE) MG
324 TABLET DELAYED RESPONSE ORAL
COMMUNITY

## 2021-03-29 NOTE — PROGRESS NOTES
[facility] CARDIOLOGY      REASON FOR FOLLOW-UP:  Paroxysmal atrial fibrillation  Coronary artery disease  Valvular heart disease  History of permanent pacemaker          Chief Complaint   Patient presents with   • Coronary Artery Disease     f/u pt has some SOA   • Hypertension   • Hyperlipidemia   • Shortness of Breath         Monae Ruvalcaba APRN        History of Present Illness     I had the pleasure of seeing Tracy Jane today. As you are well aware, this is a 86 y.o. female with a history of valvular heart disease status post bioprosthetic aortic valve replacement in 2014. She is additional history that includes peripheral vascular disease, coronary artery disease status post coronary arterial bypass grafting, bilateral renal artery stenosis, carotid artery disease, dyslipidemia, and sick sinus syndrome status post permanent pacemaker placement. She presents today for followup on the above conditions.    Today, patient reports that she feels well.  She specifically denies any complaints of chest pains, pressure, tightness or palpitations.  She denies any shortness of breath at rest, dyspnea with exertion, orthopnea or PND.  She denies any lower extremity edema.  Patient reports she had COVID-19 viral infection December 2020 and has had a little bit of trouble regaining her strength back.  Lipids reviewed from 1/18/2021 were quite good.  Blood pressure in the office today is elevated at 152/70, however she has been taking over-the-counter antihistamines for sinus drainage.    Last device interrogation in clinic shows several episodes of atrial fibrillation with longest episode around 7 hours.    Assessment:  SSS, s/p PPM 2016  paroxysmal atrial fibrillation on sotalol on chronic Coumadin therapy  Chronic stable angina  CAD s/p CABG most recent cath with no disease amenable to PCI 2016  Labile blood pressure  Valvular heart disease, s/p bioprosthetic AVR  CKD, renal  artery stenosis  PAD , FELICITAS and carotid stenosis  Fatigue   Orthostasis    Plan:  Continue current medical plan of care  Caution with antihistamines and effects on high blood pressure  Lipids per your office  Follow-up in 6 months or sooner if needed        The following portions of the patient's history were reviewed and updated as appropriate: allergies, current medications, past family history, past medical history, past social history, past surgical history and problem list.    REVIEW OF SYSTEMS:    Review of Systems   HENT: Positive for congestion.    All other systems reviewed and are negative.      Vitals:    03/29/21 1422   BP: 152/70   Pulse: 69   SpO2: 98%         PHYSICAL EXAM:    General: Alert, cooperative, no distress, appears stated age  Head:  Normocephalic, atraumatic, mucous membranes moist  Eyes:  Conjunctiva/corneas clear, EOM's intact     Neck:  Supple,  no JVD or bruit     Lungs: Clear to auscultation bilaterally, no wheezes rhonchi rales are noted  Chest wall: No tenderness  Musculoskeletal:   Ambulates with assistance of a cane  Heart::  Regular rate and rhythm, S1 and S2 normal, 2/6 holosystolic murmur to the right sternal border  Abdomen: Soft, non-tender, nondistended, bowel sounds active, no abdominal bruit  Extremities: No cyanosis, clubbing, or edema   Pulses: 2+ and symmetric all extremities  Skin:  No rashes or lesions  Neuro/psych: A&O x3. CN II through XII are grossly intact with appropriate affect        Past Medical History:   Diagnosis Date   • Anxiety    • Asthma    • CAD (coronary artery disease)    • Carotid artery disease (CMS/HCC)    • GERD (gastroesophageal reflux disease)    • Gout    • Hyperlipidemia    • Hypertension    • Hyperthyroidism    • Hypothyroidism        Past Surgical History:   Procedure Laterality Date   • BASAL CELL CARCINOMA EXCISION      spine, nose and arm, leg 2020   • BREAST BIOPSY     • CARDIAC CATHETERIZATION     • CAROTID STENT     • CATARACT  EXTRACTION     • CHOLECYSTECTOMY     • CORONARY ARTERY BYPASS GRAFT     • CORONARY STENT PLACEMENT     • FINGER SURGERY     • KNEE SURGERY     • PACEMAKER IMPLANTATION     • SHOULDER SURGERY           Current Outpatient Medications:   •  acetaminophen (TYLENOL) 650 MG 8 hr tablet, Take 325 mg by mouth As Needed., Disp: , Rfl:   •  ascorbic acid (VITAMIN C) 1000 MG tablet, Take 1,000 mg by mouth Daily., Disp: , Rfl:   •  aspirin 81 MG EC tablet, Take 81 mg by mouth Daily., Disp: , Rfl:   •  colchicine 0.6 MG tablet, daily, Disp: , Rfl:   •  dilTIAZem CD (Cartia XT) 240 MG 24 hr capsule, Take 1 capsule by mouth Daily., Disp: 90 capsule, Rfl: 3  •  doxazosin (CARDURA) 2 MG tablet, Take 1 tablet by mouth Daily., Disp: 90 tablet, Rfl: 3  •  ferrous sulfate 324 (65 Fe) MG tablet delayed-release EC tablet, Take 324 mg by mouth Daily With Breakfast., Disp: , Rfl:   •  furosemide (LASIX) 40 MG tablet, Take 1 tablet by mouth Daily., Disp: 90 tablet, Rfl: 3  •  Glucos-Chondroit-Hyaluron-MSM (GLUCOSAMINE CHONDROITIN JOINT) tablet, GLUCOSAMINE CHONDROITIN JOINT TABS, Disp: , Rfl:   •  isosorbide mononitrate (IMDUR) 30 MG 24 hr tablet, Take 1 tablet by mouth 2 (Two) Times a Day., Disp: 180 tablet, Rfl: 3  •  levothyroxine (SYNTHROID, LEVOTHROID) 75 MCG tablet, 1 tablet p.o. daily Monday through Friday., Disp: 100 tablet, Rfl: 6  •  melatonin ([employer] MELATONIN) 5 MG tablet tablet, Daily., Disp: , Rfl:   •  Multiple Vitamin (MULTIVITAMIN) tablet, Take 1 tablet by mouth Daily., Disp: , Rfl:   •  nitroglycerin (NITROSTAT) 0.4 MG SL tablet, NITROSTAT 0.4 MG SUBL, Disp: , Rfl:   •  Omega-3 Fatty Acids (FISH OIL) 1000 MG capsule capsule, Take  by mouth., Disp: , Rfl:   •  polyethylene glycol (MIRALAX) powder, Take 17 g by mouth As Needed., Disp: , Rfl:   •  potassium chloride ER (K-TAB) 20 MEQ tablet controlled-release ER tablet, Take 1 tablet by mouth Daily., Disp: 90 tablet, Rfl: 3  •  rosuvastatin (CRESTOR) 20 MG tablet, Take 1 tablet  "by mouth Daily., Disp: 90 tablet, Rfl: 3  •  sertraline (ZOLOFT) 50 MG tablet, Take 50 mg by mouth Daily., Disp: , Rfl:   •  sotalol (BETAPACE) 120 MG tablet, TAKE ONE TABLET BY MOUTH TWICE A DAY, Disp: 180 tablet, Rfl: 0  •  warfarin (COUMADIN) 5 MG tablet, TAKE ONE AND ONE-HALF (1 & 1/2) TABLET BY MOUTH ON TUESDAY , THURSDAY AND SATURDAY . TAKE ONE TABLET DAILY ON ALL OTHER DAYS, Disp: 120 tablet, Rfl: 0    No Known Allergies    Family History   Problem Relation Age of Onset   • Hypertension Mother    • Heart disease Father    • Heart disease Sister    • Kidney cancer Sister    • Stroke Sister    • Heart disease Brother        Social History     Tobacco Use   • Smoking status: Former Smoker   • Smokeless tobacco: Former User   Substance Use Topics   • Alcohol use: No           Current Electrocardiogram:    ECG 12 Lead    Date/Time: 3/29/2021 4:02 PM  Performed by: Amparo Mckeon APRN  Authorized by: Amparo Mckeon APRN   Comparison: not compared with previous ECG   Rhythm: paced  Rhythm comments: Atrial paced  BPM: 69  Q waves: V1 and V2                    EMR Dragon/Transcription:   \"Dictated utilizing Dragon dictation\".       "

## 2021-04-06 ENCOUNTER — ANTICOAGULATION VISIT (OUTPATIENT)
Dept: CARDIOLOGY | Facility: CLINIC | Age: 86
End: 2021-04-06

## 2021-04-06 DIAGNOSIS — I48.0 PAROXYSMAL ATRIAL FIBRILLATION (HCC): Primary | ICD-10-CM

## 2021-04-06 LAB — INR PPP: 1.8

## 2021-04-20 ENCOUNTER — ANTICOAGULATION VISIT (OUTPATIENT)
Dept: CARDIOLOGY | Facility: CLINIC | Age: 86
End: 2021-04-20

## 2021-04-20 DIAGNOSIS — I48.0 PAROXYSMAL ATRIAL FIBRILLATION (HCC): Primary | ICD-10-CM

## 2021-04-20 LAB — INR PPP: 2.2

## 2021-04-21 ENCOUNTER — TRANSCRIBE ORDERS (OUTPATIENT)
Dept: ADMINISTRATIVE | Facility: HOSPITAL | Age: 86
End: 2021-04-21

## 2021-04-21 DIAGNOSIS — R13.13 PHARYNGEAL DYSPHAGIA: Primary | ICD-10-CM

## 2021-04-21 RX ORDER — WARFARIN SODIUM 5 MG/1
TABLET ORAL
Qty: 100 TABLET | Refills: 1 | Status: SHIPPED | OUTPATIENT
Start: 2021-04-21 | End: 2021-10-06

## 2021-04-27 DIAGNOSIS — R13.13 PHARYNGEAL DYSPHAGIA: Primary | ICD-10-CM

## 2021-05-04 ENCOUNTER — LAB (OUTPATIENT)
Dept: LAB | Facility: HOSPITAL | Age: 86
End: 2021-05-04

## 2021-05-04 ENCOUNTER — ANTICOAGULATION VISIT (OUTPATIENT)
Dept: CARDIOLOGY | Facility: CLINIC | Age: 86
End: 2021-05-04

## 2021-05-04 DIAGNOSIS — I48.0 PAROXYSMAL ATRIAL FIBRILLATION (HCC): Primary | ICD-10-CM

## 2021-05-04 DIAGNOSIS — R13.13 PHARYNGEAL DYSPHAGIA: ICD-10-CM

## 2021-05-04 LAB
INR PPP: 2
SARS-COV-2 ORF1AB RESP QL NAA+PROBE: NOT DETECTED

## 2021-05-04 PROCEDURE — U0005 INFEC AGEN DETEC AMPLI PROBE: HCPCS

## 2021-05-04 PROCEDURE — U0004 COV-19 TEST NON-CDC HGH THRU: HCPCS

## 2021-05-05 ENCOUNTER — HOSPITAL ENCOUNTER (OUTPATIENT)
Dept: GENERAL RADIOLOGY | Facility: HOSPITAL | Age: 86
Discharge: HOME | End: 2021-05-05
Admitting: OTOLARYNGOLOGY

## 2021-05-05 DIAGNOSIS — R13.13 PHARYNGEAL DYSPHAGIA: ICD-10-CM

## 2021-05-05 PROCEDURE — 74230 X-RAY XM SWLNG FUNCJ C+: CPT

## 2021-05-05 PROCEDURE — A9270 NON-COVERED ITEM OR SERVICE: HCPCS | Performed by: OTOLARYNGOLOGY

## 2021-05-05 PROCEDURE — 92611 MOTION FLUOROSCOPY/SWALLOW: CPT

## 2021-05-05 PROCEDURE — 63710000001 BARIUM SULFATE 40 % SUSPENSION: Performed by: OTOLARYNGOLOGY

## 2021-05-05 RX ADMIN — BARIUM SULFATE 50 ML: 400 SUSPENSION ORAL at 10:17

## 2021-05-05 NOTE — THERAPY EVALUATION
Outpatient Speech Language Pathology   Adult Swallow Initial Evaluation   Isacc     Patient Name: rTacy Jane  : 1934  MRN: [id number]  Today's Date: 2021         Visit Date: 2021   Patient Active Problem List   Diagnosis   • Abnormal cardiovascular stress test   • Abnormal EKG   • Abnormal levels of other serum enzymes   • Anemia   • Anxiety disorder   • Aortic insufficiency   • Aortic stenosis   • Arthritis, rheumatoid (CMS/HCC)   • Asthma   • Chronic coronary artery disease   • Chronic kidney disease, stage III (moderate) (CMS/HCC)   • Depression   • Cough   • Edema of lower extremity   • Encounter for therapeutic drug level monitoring   • Excessive anticoagulation   • Fatigue   • Former smoker   • Gastroesophageal reflux disease   • Hemoptysis   • High alkaline phosphatase   • Hx of aortic valve replacement   • Hyperglycemia   • Hyperlipidemia   • Essential hypertension   • Hyponatremia   • Acquired hypothyroidism   • Influenza   • Nonspecific abnormal results of function study of liver   • Osteoarthritis of knee   • Other peripheral vertigo, unspecified ear   • Paroxysmal atrial fibrillation (CMS/HCC)   • Peripheral vascular disease (CMS/HCC)   • Presence of aortocoronary bypass graft   • Presence of cardiac pacemaker   • Renal insufficiency   • Shortness of breath   • Sick sinus syndrome (CMS/HCC)   • Sore throat   • Valvular heart disease        Past Medical History:   Diagnosis Date   • Anxiety    • Asthma    • CAD (coronary artery disease)    • Carotid artery disease (CMS/HCC)    • GERD (gastroesophageal reflux disease)    • Gout    • Hyperlipidemia    • Hypertension    • Hyperthyroidism    • Hypothyroidism         Past Surgical History:   Procedure Laterality Date   • BASAL CELL CARCINOMA EXCISION      spine, nose and arm, leg    • BREAST BIOPSY     • CARDIAC CATHETERIZATION     • CAROTID STENT     • CATARACT EXTRACTION     • CHOLECYSTECTOMY     • CORONARY ARTERY BYPASS GRAFT      • CORONARY STENT PLACEMENT     • FINGER SURGERY     • KNEE SURGERY     • PACEMAKER IMPLANTATION     • SHOULDER SURGERY           Visit Dx:     ICD-10-CM ICD-9-CM   1. Pharyngeal dysphagia  R13.13 787.23           SLP Adult Swallow Evaluation     Row Name 05/05/21 1300       Rehab Evaluation    Document Type  evaluation  -MM    Subjective Information  no complaints  -MM    Patient Observations  alert;cooperative;agree to therapy  -MM    Patient Effort  good  -MM       General Information    Patient Profile Reviewed  yes  -MM    Pertinent History Of Current Problem  Patient presents today for a VFSS to evaluation current complaints of feeling like she has to swallow all the time.  Patient states that she feels there is a knot on the life side of her neck.  She reports certain things scratch her throat such as corn bread and that they will get stuck.  She reports she usually has to take several drinks to get them to go down.  Patient has no hx of esophageal dilation or stroke and no recent dx of pna.  -MM    Current Method of Nutrition  regular textures;thin liquids  -MM    Prior Level of Function-Communication  WFL  -MM    Prior Level of Function-Swallowing  no diet consistency restrictions  -MM    Plans/Goals Discussed with  patient and family daughter  -MM    Barriers to Rehab  none identified  -MM       Oral Motor Structure and Function    Dentition Assessment  natural, present and adequate;upper dentures/partial in place  -MM    Secretion Management  WNL/WFL  -MM    Mucosal Quality  moist, healthy  -MM    Volitional Swallow  WFL  -MM    Volitional Cough  WFL  -MM       Oral Musculature and Cranial Nerve Assessment    Oral Motor General Assessment  WFL  -MM       General Eating/Swallowing Observations    Respiratory Support Currently in Use  room air  -MM    Eating/Swallowing Skills  self-fed  -MM    Positioning During Eating  upright in chair  -MM       Respiratory    Respiratory Status  WFL  -MM    MBS/VFSS     Utensils Used  spoon;cup  -MM    Consistencies Trialed  regular textures;chopped;pureed;thin liquids  -MM       MBS/VFSS Interpretation    Oral Prep Phase  WFL  -MM    Oral Transit Phase  WFL  -MM    Oral Residue  WFL  -MM    VFSS Summary  Patient independent in given herself all trials.  Video fluoroscopic swallow study conducted in the lateral position with pt standing/seated upright. Pt self fed all trials as provided by SLP respectively: thin liquid by cup x 3, puree (applesauce) x 2, mechanical soft/mixed consistency (peaches) x 2, regular solid (cracker) x 1, & esophageal scan.  Bolus formation, cohesion, & transit are WFL for all trials. Laryngeal elevation, epiglottic deflection, and forward hyolaryngeal movement are WFL. There is no laryngeal vestibule penetration, aspiration, or significant hypopharyngeal residue. 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      Pt presents with oropharyngeal swallow which is judged to be within functional limits under fluoroscopy with all trials assessed.  It is recommended that patient follow up with a GI doctor to further assess esophageal component and possible presence of Zenkers.    -MM       Initiation of Pharyngeal Swallow    Initiation of Pharyngeal Swallow  WFL  -MM    Pharyngeal Phase  functional pharyngeal phase of swallowing  -MM       Esophageal Phase    Esophageal Phase  esophageal retention;see radiology report for further details  -MM    Esophageal Phase, Comment  possible Zenkers with barium residue following all consistencies given at the cricopharyngeus muscle.  -MM       Clinical Impression    SLP Swallowing Diagnosis  functional oral phase;functional pharyngeal phase;esophageal dysphagia  -MM    Functional Impact  risk of aspiration/pneumonia  -MM       Recommendations    SLP Diet Recommendation  regular textures;thin liquids  -MM     Recommended Precautions and Strategies  upright posture during/after eating;small bites of food and sips of liquid;multiple swallows per bite of food;multiple swallows per sip of liquid  -MM    Oral Care Recommendations  Oral Care BID/PRN  -MM    SLP Rec. for Method of Medication Administration  meds whole;with thin liquids;with pudding or applesauce  -MM    Demonstrates Need for Referral to Another Service  gastroenterology;dedicated esophageal assessment  -MM      User Key  (r) = Recorded By, (t) = Taken By, (c) = Cosigned By    Initials Name Provider Type    Kasandra Redman SLP Speech and Language Pathologist                Fig. 1 During the swallow of thin liquids        Fig 2. After the swallow of thin liquids.  Small amount of barium present at C6.        Fig 3.  After the swallow of applesauce.  Small amount of barium residue at C6.          VFSS review and education was conducted this date. Individuals educated included:  self and child(aria) Education methods/means included VFSS education means: verbal review face to face and playback of VFSS.  Education barriers: none identified Further follow up warranted with referring MD.    Patient was not wearing a face mask during this therapy encounter. Therapist used appropriate personal protective equipment including mask, eye protection and gloves.  Mask used was standard procedure mask. Appropriate PPE was worn during the entire therapy session. Hand hygiene was completed before and after therapy session. Patient is not in enhanced droplet precautions.                  Time Calculation:                     AZALIA Gomes  5/5/2021

## 2021-05-06 ENCOUNTER — HOSPITAL ENCOUNTER (OUTPATIENT)
Dept: GENERAL RADIOLOGY | Facility: HOSPITAL | Age: 86
Discharge: HOME | End: 2021-05-06
Admitting: OTOLARYNGOLOGY

## 2021-05-06 DIAGNOSIS — R13.13 PHARYNGEAL DYSPHAGIA: ICD-10-CM

## 2021-05-06 PROCEDURE — 63710000001 BARIUM SULFATE 700 MG TABLET: Performed by: OTOLARYNGOLOGY

## 2021-05-06 PROCEDURE — 74220 X-RAY XM ESOPHAGUS 1CNTRST: CPT

## 2021-05-06 PROCEDURE — A9270 NON-COVERED ITEM OR SERVICE: HCPCS | Performed by: OTOLARYNGOLOGY

## 2021-05-06 RX ADMIN — BARIUM SULFATE 700 MG: 700 TABLET ORAL at 09:08

## 2021-05-18 ENCOUNTER — ANTICOAGULATION VISIT (OUTPATIENT)
Dept: CARDIOLOGY | Facility: CLINIC | Age: 86
End: 2021-05-18

## 2021-05-18 DIAGNOSIS — I48.0 PAROXYSMAL ATRIAL FIBRILLATION (HCC): Primary | ICD-10-CM

## 2021-05-18 LAB
INR PPP: 2.1
INR PPP: 2.1

## 2021-06-01 ENCOUNTER — ANTICOAGULATION VISIT (OUTPATIENT)
Dept: CARDIOLOGY | Facility: CLINIC | Age: 86
End: 2021-06-01

## 2021-06-01 DIAGNOSIS — I48.0 PAROXYSMAL ATRIAL FIBRILLATION (HCC): Primary | ICD-10-CM

## 2021-06-01 LAB — INR PPP: 1.9

## 2021-06-03 ENCOUNTER — OFFICE (OUTPATIENT)
Dept: URBAN - METROPOLITAN AREA CLINIC 64 | Facility: CLINIC | Age: 86
End: 2021-06-03

## 2021-06-03 VITALS
HEART RATE: 70 BPM | HEIGHT: 62 IN | WEIGHT: 134 LBS | DIASTOLIC BLOOD PRESSURE: 77 MMHG | SYSTOLIC BLOOD PRESSURE: 148 MMHG

## 2021-06-03 DIAGNOSIS — J02.9 ACUTE PHARYNGITIS, UNSPECIFIED: ICD-10-CM

## 2021-06-03 DIAGNOSIS — R14.2 ERUCTATION: ICD-10-CM

## 2021-06-03 DIAGNOSIS — K22.5 DIVERTICULUM OF ESOPHAGUS, ACQUIRED: ICD-10-CM

## 2021-06-03 DIAGNOSIS — R13.10 DYSPHAGIA, UNSPECIFIED: ICD-10-CM

## 2021-06-03 PROCEDURE — 99213 OFFICE O/P EST LOW 20 MIN: CPT | Performed by: NURSE PRACTITIONER

## 2021-06-03 RX ORDER — ESOMEPRAZOLE MAGNESIUM 40 MG/1
40 CAPSULE, DELAYED RELEASE ORAL
Qty: 30 | Refills: 12 | Status: COMPLETED
Start: 2021-06-03 | End: 2021-11-17

## 2021-06-09 ENCOUNTER — TELEPHONE (OUTPATIENT)
Dept: CARDIOLOGY | Facility: CLINIC | Age: 86
End: 2021-06-09

## 2021-06-09 NOTE — TELEPHONE ENCOUNTER
Mrs. Jane's daughter called regarding her mothers heart rate being elevated on Monday.  Wanted to know if their were any episodes on her home monitor.  Reviewed home monitoring from 6/7 . Pt appeared to be in Afib during this time.  Pt's daughter stated she was instructed by Dr Henley to take an extra Cartia XL that day.  Pt feels better. No further episodes noted on home monitoring. Pt's daughter would like to be called from now on regarding anything with her mother instead of calling the patient.  Dr Crabtree made aware.

## 2021-06-11 ENCOUNTER — ANTICOAGULATION VISIT (OUTPATIENT)
Dept: CARDIOLOGY | Facility: CLINIC | Age: 86
End: 2021-06-11

## 2021-06-11 DIAGNOSIS — I48.0 PAROXYSMAL ATRIAL FIBRILLATION (HCC): Primary | ICD-10-CM

## 2021-06-11 LAB — INR PPP: 2.1

## 2021-06-17 RX ORDER — SOTALOL HYDROCHLORIDE 120 MG/1
TABLET ORAL
Qty: 180 TABLET | Refills: 0 | Status: SHIPPED | OUTPATIENT
Start: 2021-06-17 | End: 2021-09-15

## 2021-06-23 PROCEDURE — 93294 REM INTERROG EVL PM/LDLS PM: CPT | Performed by: INTERNAL MEDICINE

## 2021-06-23 PROCEDURE — 93296 REM INTERROG EVL PM/IDS: CPT | Performed by: INTERNAL MEDICINE

## 2021-06-25 ENCOUNTER — ANTICOAGULATION VISIT (OUTPATIENT)
Dept: CARDIOLOGY | Facility: CLINIC | Age: 86
End: 2021-06-25

## 2021-06-25 DIAGNOSIS — I48.0 PAROXYSMAL ATRIAL FIBRILLATION (HCC): Primary | ICD-10-CM

## 2021-06-25 LAB — INR PPP: 2.8

## 2021-06-28 ENCOUNTER — OFFICE VISIT (OUTPATIENT)
Dept: CARDIOLOGY | Facility: CLINIC | Age: 86
End: 2021-06-28

## 2021-06-28 VITALS
WEIGHT: 133 LBS | HEART RATE: 70 BPM | BODY MASS INDEX: 24.33 KG/M2 | OXYGEN SATURATION: 97 % | DIASTOLIC BLOOD PRESSURE: 69 MMHG | SYSTOLIC BLOOD PRESSURE: 119 MMHG

## 2021-06-28 DIAGNOSIS — I48.0 PAROXYSMAL ATRIAL FIBRILLATION (HCC): Primary | ICD-10-CM

## 2021-06-28 DIAGNOSIS — E78.2 MIXED HYPERLIPIDEMIA: ICD-10-CM

## 2021-06-28 DIAGNOSIS — I49.5 SICK SINUS SYNDROME (HCC): ICD-10-CM

## 2021-06-28 DIAGNOSIS — Z95.2 HX OF AORTIC VALVE REPLACEMENT: ICD-10-CM

## 2021-06-28 DIAGNOSIS — Z95.0 PRESENCE OF CARDIAC PACEMAKER: ICD-10-CM

## 2021-06-28 DIAGNOSIS — I73.9 PERIPHERAL VASCULAR DISEASE (HCC): ICD-10-CM

## 2021-06-28 PROCEDURE — 99214 OFFICE O/P EST MOD 30 MIN: CPT | Performed by: INTERNAL MEDICINE

## 2021-06-28 PROCEDURE — 93000 ELECTROCARDIOGRAM COMPLETE: CPT | Performed by: INTERNAL MEDICINE

## 2021-06-28 NOTE — PROGRESS NOTES
CC: Sick sinus syndrome with dual-chamber pacemaker in situ follow up .    Sub-86-year-old female patient with recurrent symptomatic atrial fibrillation was placed on sotalol In the past and comes in for follow-up. she has sick sinus syndrome with a dual-chamber pacemaker in situ which was placed several months ago. she is doing well with recurrent symptoms of arrhythmias-- she has history of valvular heart disease status post bioprosthetic aortic valve replacement in 2014. She is additional history that includes peripheral vascular disease, coronary artery disease status post coronary arterial bypass grafting, bilateral renal artery stenosis, carotid artery disease, dyslipidemia, and sick sinus syndrome status post permanent pacemaker placement.  She complains of intermittent palpitations and also fatigue with current intake of sotalol  Denies any leg edema or syncope or any  Angina  She denies any syncope and compliant with her medications  She had increasing atrial arrhythmia and sotalol dose was increased  and comes started having breakthrough arrhythmia and started on Cardizem and comes in for evaluation           Past Medical History:     Reviewed history from 06/13/2016 and no changes required:        AVR 2014-        Coronary artery disease: S/P CABG and PCI with stenting-        Carotid disease;left side 50-74%-Patrick Ocampo        Inferior myocardial infarction 12-06-        Asthma        Anxiety Disorder        Depression        G E R D        Hyperlipidemia        Hypertension-        Hyperthyroidism        fx thoracic ?        shoulder fx        Rotator cuff syndrome         Gout         Paroxysmal atrial fib        Arthritis        Hypothyroid        No Drug Allergies?         Eye exam:2014 &2015 Dr.Kelley Parekh in [place]         Rheumatoid Arthritis    Past Surgical History:     Reviewed history from 10/16/2018 and no changes required:        PCI/stent, DARIN, 3-20-06,  Cypher stent        PCI/stent x 2, LCX & priximal diagonal, 12-3-06, [employer] stents        cataract r eye 12/07  lt eye 01/08        CABG x 3  07-19-08        thoracentesis l lung 8/08        Right rotator cuff repair - Oct. 15, 2013        Cholecystectomy-2009        Left Knee Arthoplasty-2014        Left Shoulder Replacement 4/2014        Aortic Valve  Replacement 6/11/2014        Heart Catherization:2/18/2015        Pacemaker placed 6/6/16        Colonoscopy 2011         Surgery Finger 2018       Review of Systems   General: Positive for fatigue and tiredness    Eyes: No redness  Cardiovascular: No chest pain, no palpitations          Physical Exam    General:      well developed, well nourished, in no acute distress.    Head:      normocephalic and atraumatic.    Eyes:      PERRL/EOM intact, conjunctiva and sclera clear with out nystagmus.    Neck:      no masses, thyromegaly  Lungs:      clear bilaterally to auscultation.    Heart:      regular rhythm, no rubs, no gallops and Grade 2/6 YENI:.            Impressions  SSS, s/p PPM --pacemaker interrogated with appropriate function --2 prolonged episode of atrial fibrillation since Cardizem was added to sotalol which was discussed with the patient   Reevaluate this patient in few months and decide on titration of medical treatment depending on burden of arrhythmia  paroxysmal atrial fibrillation on sotalol on chronic Coumadin therapy  Chronic stable angina  CAD s/p CABG most recent cath with no disease amenable to PCI 2016  Valvular heart disease, s/p bioprosthetic AVR  CKD, renal artery stenosis  PAD , FELICITAS and carotid stenosis  Chads vasc score---4        ECG 12 Lead    Date/Time: 6/28/2021 1:36 PM  Performed by: Pk Crabtree MD  Authorized by: Pk Crabtree MD   Comparison: compared with previous ECG   Similar to previous ECG  Rhythm: sinus rhythm and paced  Rate: normal            Electronically signed by Pk Crabtree MD, 06/28/21, 1:36  PM EDT.

## 2021-07-09 ENCOUNTER — ANTICOAGULATION VISIT (OUTPATIENT)
Dept: CARDIOLOGY | Facility: CLINIC | Age: 86
End: 2021-07-09

## 2021-07-09 DIAGNOSIS — I48.0 PAROXYSMAL ATRIAL FIBRILLATION (HCC): Primary | ICD-10-CM

## 2021-07-09 LAB — INR PPP: 2.5

## 2021-07-23 ENCOUNTER — ANTICOAGULATION VISIT (OUTPATIENT)
Dept: CARDIOLOGY | Facility: CLINIC | Age: 86
End: 2021-07-23

## 2021-07-23 DIAGNOSIS — I48.0 PAROXYSMAL ATRIAL FIBRILLATION (HCC): Primary | ICD-10-CM

## 2021-07-23 LAB — INR PPP: 3

## 2021-08-04 RX ORDER — ROSUVASTATIN CALCIUM 20 MG/1
TABLET, COATED ORAL
Qty: 90 TABLET | Refills: 3 | Status: SHIPPED | OUTPATIENT
Start: 2021-08-04 | End: 2022-08-02

## 2021-08-04 RX ORDER — DOXAZOSIN 2 MG/1
TABLET ORAL
Qty: 90 TABLET | Refills: 3 | Status: SHIPPED | OUTPATIENT
Start: 2021-08-04 | End: 2022-08-02

## 2021-08-06 ENCOUNTER — ANTICOAGULATION VISIT (OUTPATIENT)
Dept: CARDIOLOGY | Facility: CLINIC | Age: 86
End: 2021-08-06

## 2021-08-06 DIAGNOSIS — I48.0 PAROXYSMAL ATRIAL FIBRILLATION (HCC): Primary | ICD-10-CM

## 2021-08-06 LAB — INR PPP: 2.5

## 2021-08-11 RX ORDER — DILTIAZEM HYDROCHLORIDE 240 MG/1
CAPSULE, COATED, EXTENDED RELEASE ORAL
Qty: 90 CAPSULE | Refills: 3 | Status: SHIPPED | OUTPATIENT
Start: 2021-08-11 | End: 2022-08-10

## 2021-08-20 ENCOUNTER — ANTICOAGULATION VISIT (OUTPATIENT)
Dept: CARDIOLOGY | Facility: CLINIC | Age: 86
End: 2021-08-20

## 2021-08-20 DIAGNOSIS — I48.0 PAROXYSMAL ATRIAL FIBRILLATION (HCC): Primary | ICD-10-CM

## 2021-08-20 LAB — INR PPP: 2.6

## 2021-09-03 ENCOUNTER — ANTICOAGULATION VISIT (OUTPATIENT)
Dept: CARDIOLOGY | Facility: CLINIC | Age: 86
End: 2021-09-03

## 2021-09-03 DIAGNOSIS — I48.0 PAROXYSMAL ATRIAL FIBRILLATION (HCC): Primary | ICD-10-CM

## 2021-09-03 LAB
INR PPP: 2.5
INR PPP: 2.5

## 2021-09-08 RX ORDER — POTASSIUM CHLORIDE 1500 MG/1
TABLET, FILM COATED, EXTENDED RELEASE ORAL
Qty: 90 TABLET | Refills: 3 | Status: SHIPPED | OUTPATIENT
Start: 2021-09-08 | End: 2022-09-07

## 2021-09-15 ENCOUNTER — HOSPITAL ENCOUNTER (EMERGENCY)
Facility: HOSPITAL | Age: 86
Discharge: HOME | End: 2021-09-15
Attending: EMERGENCY MEDICINE | Admitting: EMERGENCY MEDICINE

## 2021-09-15 ENCOUNTER — APPOINTMENT (OUTPATIENT)
Dept: CT IMAGING | Facility: HOSPITAL | Age: 86
End: 2021-09-15

## 2021-09-15 VITALS
TEMPERATURE: 98.2 F | BODY MASS INDEX: 24.11 KG/M2 | SYSTOLIC BLOOD PRESSURE: 116 MMHG | RESPIRATION RATE: 18 BRPM | WEIGHT: 131 LBS | HEART RATE: 73 BPM | HEIGHT: 62 IN | DIASTOLIC BLOOD PRESSURE: 55 MMHG | OXYGEN SATURATION: 96 %

## 2021-09-15 DIAGNOSIS — R10.84 GENERALIZED ABDOMINAL PAIN: Primary | ICD-10-CM

## 2021-09-15 LAB
ALBUMIN SERPL-MCNC: 3.8 G/DL (ref 3.5–5.2)
ALBUMIN/GLOB SERPL: 1.2 G/DL
ALP SERPL-CCNC: 141 U/L (ref 39–117)
ALT SERPL W P-5'-P-CCNC: 29 U/L (ref 1–33)
ANION GAP SERPL CALCULATED.3IONS-SCNC: 10 MMOL/L (ref 5–15)
AST SERPL-CCNC: 47 U/L (ref 1–32)
BASOPHILS # BLD AUTO: 0 10*3/MM3 (ref 0–0.2)
BASOPHILS NFR BLD AUTO: 0.6 % (ref 0–1.5)
BILIRUB SERPL-MCNC: 0.5 MG/DL (ref 0–1.2)
BILIRUB UR QL STRIP: NEGATIVE
BUN SERPL-MCNC: 21 MG/DL (ref 8–23)
BUN/CREAT SERPL: 21.4 (ref 7–25)
CALCIUM SPEC-SCNC: 9.2 MG/DL (ref 8.6–10.5)
CHLORIDE SERPL-SCNC: 99 MMOL/L (ref 98–107)
CLARITY UR: CLEAR
CO2 SERPL-SCNC: 27 MMOL/L (ref 22–29)
COLOR UR: YELLOW
CREAT SERPL-MCNC: 0.98 MG/DL (ref 0.57–1)
DEPRECATED RDW RBC AUTO: 44.6 FL (ref 37–54)
EOSINOPHIL # BLD AUTO: 0.1 10*3/MM3 (ref 0–0.4)
EOSINOPHIL NFR BLD AUTO: 1.7 % (ref 0.3–6.2)
ERYTHROCYTE [DISTWIDTH] IN BLOOD BY AUTOMATED COUNT: 14 % (ref 12.3–15.4)
GFR SERPL CREATININE-BSD FRML MDRD: 54 ML/MIN/1.73
GLOBULIN UR ELPH-MCNC: 3.3 GM/DL
GLUCOSE SERPL-MCNC: 124 MG/DL (ref 65–99)
GLUCOSE UR STRIP-MCNC: NEGATIVE MG/DL
HCT VFR BLD AUTO: 38.4 % (ref 34–46.6)
HGB BLD-MCNC: 12.9 G/DL (ref 12–15.9)
HGB UR QL STRIP.AUTO: NEGATIVE
INR PPP: 2.76 (ref 0.93–1.1)
KETONES UR QL STRIP: NEGATIVE
LEUKOCYTE ESTERASE UR QL STRIP.AUTO: NEGATIVE
LIPASE SERPL-CCNC: 25 U/L (ref 13–60)
LYMPHOCYTES # BLD AUTO: 1.4 10*3/MM3 (ref 0.7–3.1)
LYMPHOCYTES NFR BLD AUTO: 18.3 % (ref 19.6–45.3)
MCH RBC QN AUTO: 30.5 PG (ref 26.6–33)
MCHC RBC AUTO-ENTMCNC: 33.6 G/DL (ref 31.5–35.7)
MCV RBC AUTO: 90.7 FL (ref 79–97)
MONOCYTES # BLD AUTO: 0.5 10*3/MM3 (ref 0.1–0.9)
MONOCYTES NFR BLD AUTO: 6.8 % (ref 5–12)
NEUTROPHILS NFR BLD AUTO: 5.6 10*3/MM3 (ref 1.7–7)
NEUTROPHILS NFR BLD AUTO: 72.6 % (ref 42.7–76)
NITRITE UR QL STRIP: NEGATIVE
NRBC BLD AUTO-RTO: 0.1 /100 WBC (ref 0–0.2)
PH UR STRIP.AUTO: 6 [PH] (ref 5–8)
PLATELET # BLD AUTO: 230 10*3/MM3 (ref 140–450)
PMV BLD AUTO: 7.5 FL (ref 6–12)
POTASSIUM SERPL-SCNC: 4.4 MMOL/L (ref 3.5–5.2)
PROT SERPL-MCNC: 7.1 G/DL (ref 6–8.5)
PROT UR QL STRIP: NEGATIVE
PROTHROMBIN TIME: 28.4 SECONDS (ref 9.6–11.7)
RBC # BLD AUTO: 4.24 10*6/MM3 (ref 3.77–5.28)
SODIUM SERPL-SCNC: 136 MMOL/L (ref 136–145)
SP GR UR STRIP: 1.01 (ref 1–1.03)
UROBILINOGEN UR QL STRIP: NORMAL
WBC # BLD AUTO: 7.7 10*3/MM3 (ref 3.4–10.8)

## 2021-09-15 PROCEDURE — 81003 URINALYSIS AUTO W/O SCOPE: CPT | Performed by: EMERGENCY MEDICINE

## 2021-09-15 PROCEDURE — 85610 PROTHROMBIN TIME: CPT | Performed by: EMERGENCY MEDICINE

## 2021-09-15 PROCEDURE — 74176 CT ABD & PELVIS W/O CONTRAST: CPT

## 2021-09-15 PROCEDURE — 80053 COMPREHEN METABOLIC PANEL: CPT | Performed by: EMERGENCY MEDICINE

## 2021-09-15 PROCEDURE — 99283 EMERGENCY DEPT VISIT LOW MDM: CPT

## 2021-09-15 PROCEDURE — 85025 COMPLETE CBC W/AUTO DIFF WBC: CPT | Performed by: EMERGENCY MEDICINE

## 2021-09-15 PROCEDURE — 83690 ASSAY OF LIPASE: CPT | Performed by: EMERGENCY MEDICINE

## 2021-09-15 RX ORDER — PANTOPRAZOLE SODIUM 40 MG/1
40 TABLET, DELAYED RELEASE ORAL DAILY
Qty: 14 TABLET | Refills: 0 | Status: SHIPPED | OUTPATIENT
Start: 2021-09-15 | End: 2021-10-21

## 2021-09-15 RX ORDER — SOTALOL HYDROCHLORIDE 120 MG/1
TABLET ORAL
Qty: 180 TABLET | Refills: 0 | Status: SHIPPED | OUTPATIENT
Start: 2021-09-15 | End: 2022-03-23

## 2021-09-15 RX ORDER — FUROSEMIDE 40 MG/1
TABLET ORAL
Qty: 90 TABLET | Refills: 3 | Status: SHIPPED | OUTPATIENT
Start: 2021-09-15 | End: 2022-10-05

## 2021-09-15 RX ORDER — SODIUM CHLORIDE 0.9 % (FLUSH) 0.9 %
10 SYRINGE (ML) INJECTION AS NEEDED
Status: DISCONTINUED | OUTPATIENT
Start: 2021-09-15 | End: 2021-09-15 | Stop reason: HOSPADM

## 2021-09-15 NOTE — ED PROVIDER NOTES
Subjective   Patient is a 86-year-old female complaint abdominal pain earlier today.  States pain lasted approximately 20 minutes.  The pain was moderate in intensity without radiation.  She states he has had this several times in the past lasting short periods of time without resultant abnormalities.  She denies fever balm diarrhea dysuria or other complaint          Review of Systems  Needed for headache ears throat cough fever chest pain shortness of breath bombarded dysuria denies weight loss or other complaint  Past Medical History:   Diagnosis Date   • Anxiety    • Asthma    • CAD (coronary artery disease)    • Carotid artery disease (CMS/HCC)    • GERD (gastroesophageal reflux disease)    • Gout    • Hyperlipidemia    • Hypertension    • Hyperthyroidism    • Hypothyroidism        No Known Allergies    Past Surgical History:   Procedure Laterality Date   • BASAL CELL CARCINOMA EXCISION      spine, nose and arm, leg 2020   • BREAST BIOPSY     • CARDIAC CATHETERIZATION     • CAROTID STENT     • CATARACT EXTRACTION     • CHOLECYSTECTOMY     • CORONARY ARTERY BYPASS GRAFT     • CORONARY STENT PLACEMENT     • FINGER SURGERY     • KNEE SURGERY     • PACEMAKER IMPLANTATION     • SHOULDER SURGERY         Family History   Problem Relation Age of Onset   • Hypertension Mother    • Heart disease Father    • Heart disease Sister    • Kidney cancer Sister    • Stroke Sister    • Heart disease Brother        Social History     Socioeconomic History   • Marital status:      Spouse name: Not on file   • Number of children: Not on file   • Years of education: Not on file   • Highest education level: Not on file   Tobacco Use   • Smoking status: Former Smoker   • Smokeless tobacco: Former User   Vaping Use   • Vaping Use: Never used   Substance and Sexual Activity   • Alcohol use: No   • Drug use: No   • Sexual activity: Defer           Objective   Physical Exam  HEENT exam shows TMs to be clear.  Oropharynx clear moist  but sclera nonicteric.  Neck has no adenopathy JVD or bruits.  Lungs are clear.  Heart has regular rhythm.  Chest is nontender.  Abdomen is soft with minimal diffuse tenderness.  Patient has normal bowel sounds without rebound or guarding.  Back has no CVA tenderness.  Extremity exam is unremarkable.  Procedures           ED Course            Results for orders placed or performed during the hospital encounter of 09/15/21   Comprehensive Metabolic Panel    Specimen: Blood   Result Value Ref Range    Glucose 124 (H) 65 - 99 mg/dL    BUN 21 8 - 23 mg/dL    Creatinine 0.98 0.57 - 1.00 mg/dL    Sodium 136 136 - 145 mmol/L    Potassium 4.4 3.5 - 5.2 mmol/L    Chloride 99 98 - 107 mmol/L    CO2 27.0 22.0 - 29.0 mmol/L    Calcium 9.2 8.6 - 10.5 mg/dL    Total Protein 7.1 6.0 - 8.5 g/dL    Albumin 3.80 3.50 - 5.20 g/dL    ALT (SGPT) 29 1 - 33 U/L    AST (SGOT) 47 (H) 1 - 32 U/L    Alkaline Phosphatase 141 (H) 39 - 117 U/L    Total Bilirubin 0.5 0.0 - 1.2 mg/dL    eGFR Non African Amer 54 (L) >60 mL/min/1.73    Globulin 3.3 gm/dL    A/G Ratio 1.2 g/dL    BUN/Creatinine Ratio 21.4 7.0 - 25.0    Anion Gap 10.0 5.0 - 15.0 mmol/L   Lipase    Specimen: Blood   Result Value Ref Range    Lipase 25 13 - 60 U/L   Urinalysis With Microscopic If Indicated (No Culture) - Urine, Clean Catch    Specimen: Urine, Clean Catch   Result Value Ref Range    Color, UA Yellow Yellow, Straw    Appearance, UA Clear Clear    pH, UA 6.0 5.0 - 8.0    Specific Gravity, UA 1.010 1.005 - 1.030    Glucose, UA Negative Negative    Ketones, UA Negative Negative    Bilirubin, UA Negative Negative    Blood, UA Negative Negative    Protein, UA Negative Negative    Leuk Esterase, UA Negative Negative    Nitrite, UA Negative Negative    Urobilinogen, UA 0.2 E.U./dL 0.2 - 1.0 E.U./dL   CBC Auto Differential    Specimen: Blood   Result Value Ref Range    WBC 7.70 3.40 - 10.80 10*3/mm3    RBC 4.24 3.77 - 5.28 10*6/mm3    Hemoglobin 12.9 12.0 - 15.9 g/dL     Hematocrit 38.4 34.0 - 46.6 %    MCV 90.7 79.0 - 97.0 fL    MCH 30.5 26.6 - 33.0 pg    MCHC 33.6 31.5 - 35.7 g/dL    RDW 14.0 12.3 - 15.4 %    RDW-SD 44.6 37.0 - 54.0 fl    MPV 7.5 6.0 - 12.0 fL    Platelets 230 140 - 450 10*3/mm3    Neutrophil % 72.6 42.7 - 76.0 %    Lymphocyte % 18.3 (L) 19.6 - 45.3 %    Monocyte % 6.8 5.0 - 12.0 %    Eosinophil % 1.7 0.3 - 6.2 %    Basophil % 0.6 0.0 - 1.5 %    Neutrophils, Absolute 5.60 1.70 - 7.00 10*3/mm3    Lymphocytes, Absolute 1.40 0.70 - 3.10 10*3/mm3    Monocytes, Absolute 0.50 0.10 - 0.90 10*3/mm3    Eosinophils, Absolute 0.10 0.00 - 0.40 10*3/mm3    Basophils, Absolute 0.00 0.00 - 0.20 10*3/mm3    nRBC 0.1 0.0 - 0.2 /100 WBC   Protime-INR    Specimen: Blood   Result Value Ref Range    Protime 28.4 (H) 9.6 - 11.7 Seconds    INR 2.76 (C) 0.93 - 1.10     CT Abdomen Pelvis Without Contrast    Result Date: 9/15/2021  1.Tree-in-bud type opacities in the right middle and lower lobe which are nonspecific but could indicate an acute infectious or inflammatory process, including atypical infection such as mycobacterial avium complex. 2.Moderate amount of formed stool in the colon. Diverticulosis. 3.Status post cholecystectomy with mild biliary ductal dilatation. Correlate with bilirubin levels. 4.Calcific atherosclerosis. 5.There are calcifications in the uterus and large coarse irregular calcification in the right pelvis. These may be related to degenerative fibroids. Ovarian teratoma is also included in the differential although no other definite adnexal abnormality seen at this time. Consider follow-up with pelvic ultrasound. 6.Stable right adrenal adenoma.    Electronically Signed By-Juan Antonio Donald MD On:9/15/2021 4:42 PM This report was finalized on [id number] by  Juan Antonio Donald MD.                                      MDM  Number of Diagnoses or Management Options  Diagnosis management comments: Patient is a benign physical exam.  She has no evidence of intra-abdominal  pathology based on CT scan.  There is no evidence acute infectious process or inflammatory process including pancreatitis hepatitis or renal disease.  Patient was pain-free throughout her ED visit.  She will be discharged with a prescription for Protonix.  She will see MD for recheck.       Amount and/or Complexity of Data Reviewed  Clinical lab tests: reviewed  Tests in the radiology section of CPT®: reviewed    Risk of Complications, Morbidity, and/or Mortality  Presenting problems: high  Diagnostic procedures: high  Management options: high    Patient Progress  Patient progress: stable      Final diagnoses:   Generalized abdominal pain       ED Disposition  ED Disposition     ED Disposition Condition Comment    Discharge Stable           No follow-up provider specified.       Medication List      New Prescriptions    pantoprazole 40 MG EC tablet  Commonly known as: PROTONIX  Take 1 tablet by mouth Daily.           Where to Get Your Medications      These medications were sent to [facility], [place] - [address] AT [facility] [address] - [phone]  - [phone]  [address], [place] [address]    Phone: [phone]   · pantoprazole 40 MG EC tablet          Gordo Avilez MD  09/15/21 2766

## 2021-09-15 NOTE — ED NOTES
Pt reports abdominal pain and cold sweats that started around 1200 today.      Solange Hdz, RN  09/15/21 8425

## 2021-09-17 ENCOUNTER — ANTICOAGULATION VISIT (OUTPATIENT)
Dept: CARDIOLOGY | Facility: CLINIC | Age: 86
End: 2021-09-17

## 2021-09-17 DIAGNOSIS — I48.0 PAROXYSMAL ATRIAL FIBRILLATION (HCC): Primary | ICD-10-CM

## 2021-09-17 LAB — INR PPP: 3.2

## 2021-09-22 PROCEDURE — 93296 REM INTERROG EVL PM/IDS: CPT | Performed by: INTERNAL MEDICINE

## 2021-09-22 PROCEDURE — 93294 REM INTERROG EVL PM/LDLS PM: CPT | Performed by: INTERNAL MEDICINE

## 2021-09-23 ENCOUNTER — OFFICE (OUTPATIENT)
Dept: URBAN - METROPOLITAN AREA CLINIC 64 | Facility: CLINIC | Age: 86
End: 2021-09-23

## 2021-09-23 VITALS — HEIGHT: 62 IN

## 2021-09-23 DIAGNOSIS — R14.2 ERUCTATION: ICD-10-CM

## 2021-09-23 DIAGNOSIS — R13.10 DYSPHAGIA, UNSPECIFIED: ICD-10-CM

## 2021-09-23 DIAGNOSIS — R10.9 UNSPECIFIED ABDOMINAL PAIN: ICD-10-CM

## 2021-09-23 PROCEDURE — 99214 OFFICE O/P EST MOD 30 MIN: CPT | Mod: 95 | Performed by: NURSE PRACTITIONER

## 2021-09-23 RX ORDER — FAMOTIDINE 40 MG/1
40 TABLET, FILM COATED ORAL
Qty: 90 | Refills: 3 | Status: COMPLETED
Start: 2021-09-23 | End: 2021-11-17

## 2021-09-24 ENCOUNTER — ANTICOAGULATION VISIT (OUTPATIENT)
Dept: CARDIOLOGY | Facility: CLINIC | Age: 86
End: 2021-09-24

## 2021-09-24 DIAGNOSIS — I48.0 PAROXYSMAL ATRIAL FIBRILLATION (HCC): Primary | ICD-10-CM

## 2021-09-24 LAB — INR PPP: 2.3

## 2021-09-27 ENCOUNTER — OFFICE VISIT (OUTPATIENT)
Dept: CARDIOLOGY | Facility: CLINIC | Age: 86
End: 2021-09-27

## 2021-09-27 VITALS
BODY MASS INDEX: 24.22 KG/M2 | DIASTOLIC BLOOD PRESSURE: 80 MMHG | SYSTOLIC BLOOD PRESSURE: 156 MMHG | WEIGHT: 132.4 LBS | HEART RATE: 70 BPM | OXYGEN SATURATION: 97 %

## 2021-09-27 DIAGNOSIS — Z95.0 PRESENCE OF CARDIAC PACEMAKER: ICD-10-CM

## 2021-09-27 DIAGNOSIS — I49.5 SICK SINUS SYNDROME (HCC): ICD-10-CM

## 2021-09-27 DIAGNOSIS — I10 ESSENTIAL HYPERTENSION: ICD-10-CM

## 2021-09-27 DIAGNOSIS — I48.0 PAROXYSMAL ATRIAL FIBRILLATION (HCC): Primary | ICD-10-CM

## 2021-09-27 DIAGNOSIS — Z95.2 HX OF AORTIC VALVE REPLACEMENT: ICD-10-CM

## 2021-09-27 DIAGNOSIS — E78.2 MIXED HYPERLIPIDEMIA: ICD-10-CM

## 2021-09-27 PROCEDURE — 93000 ELECTROCARDIOGRAM COMPLETE: CPT | Performed by: INTERNAL MEDICINE

## 2021-09-27 PROCEDURE — 99214 OFFICE O/P EST MOD 30 MIN: CPT | Performed by: INTERNAL MEDICINE

## 2021-09-27 NOTE — PROGRESS NOTES
CC: Sick sinus syndrome with dual-chamber pacemaker in situ follow up .    Sub-86-year-old female patient with recurrent symptomatic atrial fibrillation was placed on sotalol In the past and comes in for follow-up. she has sick sinus syndrome with a dual-chamber pacemaker in situ which was placed several months ago. she is doing well with recurrent symptoms of arrhythmias-- she has history of valvular heart disease status post bioprosthetic aortic valve replacement in 2014. She is additional history that includes peripheral vascular disease, coronary artery disease status post coronary arterial bypass grafting, bilateral renal artery stenosis, carotid artery disease, dyslipidemia, and sick sinus syndrome status post permanent pacemaker placement.  She complains of intermittent palpitations and also fatigue with current intake of sotalol  Denies any leg edema or syncope or any  Angina  She denies any syncope and compliant with her medications  She had increasing atrial arrhythmia and sotalol dose was increased  and comes started having breakthrough arrhythmia and started on Cardizem and comes in for evaluation   No new symptoms and is she is doing well          Past Medical History:     Reviewed history from 06/13/2016 and no changes required:        AVR 2014-        Coronary artery disease: S/P CABG and PCI with stenting-        Carotid disease;left side 50-74%-Patrick Ocampo        Inferior myocardial infarction 12-06-        Asthma        Anxiety Disorder        Depression        G E R D        Hyperlipidemia        Hypertension-        Hyperthyroidism        fx thoracic ?        shoulder fx        Rotator cuff syndrome         Gout         Paroxysmal atrial fib        Arthritis        Hypothyroid        No Drug Allergies?         Eye exam:2014 &2015 Dr.Kelley Parekh in [place]         Rheumatoid Arthritis    Past Surgical History:     Reviewed history from 10/16/2018 and no changes  required:        PCI/stent, LCX, 3-20-06, Cypher stent        PCI/stent x 2, LCX & priximal diagonal, 12-3-06, [employer] stents        cataract r eye 12/07  lt eye 01/08        CABG x 3  07-19-08        thoracentesis l lung 8/08        Right rotator cuff repair - Oct. 15, 2013        Cholecystectomy-2009        Left Knee Arthoplasty-2014        Left Shoulder Replacement 4/2014        Aortic Valve  Replacement 6/11/2014        Heart Catherization:2/18/2015        Pacemaker placed 6/6/16        Colonoscopy 2011         Surgery Finger 2018       Review of Systems   General: Positive for fatigue and tiredness    Eyes: No redness  Cardiovascular: No chest pain, no palpitations          Physical Exam    General:      well developed, well nourished, in no acute distress.    Head:      normocephalic and atraumatic.    Eyes:      PERRL/EOM intact, conjunctiva and sclera clear with out nystagmus.    Neck:      no masses, thyromegaly  Lungs:      clear bilaterally to auscultation.    Heart:      regular rhythm, no rubs, no gallops and Grade 2/6 YENI:.            Impressions  SSS, s/p PPM --pacemaker interrogated with appropriate function -- intermittent atrial fibrillation low volume currently on sotalol and Cardizem   Latest INR of 2.3  paroxysmal atrial fibrillation on sotalol on chronic Coumadin therapy  Chronic stable angina  CAD s/p CABG most recent cath with no disease amenable to PCI 2016  Valvular heart disease, s/p bioprosthetic AVR  CKD, renal artery stenosis  PAD , FELICITAS and carotid stenosis  Chads vasc score---4  Medications reviewed and follow-up in 6 months      ECG 12 Lead    Date/Time: 9/27/2021 12:46 PM  Performed by: Pk Crabtree MD  Authorized by: Pk Crabtree MD   Comparison: compared with previous ECG   Similar to previous ECG  Rhythm: sinus rhythm and paced  Rate: normal  Q waves: V1 and V2    QRS axis: normal  Other findings: non-specific ST-T wave changes and left atrial  abnormality          Electronically signed by Pk Crabtree MD, 09/27/21, 12:45 PM EDT.

## 2021-09-29 ENCOUNTER — OFFICE VISIT (OUTPATIENT)
Dept: CARDIOLOGY | Facility: CLINIC | Age: 86
End: 2021-09-29

## 2021-09-29 VITALS
OXYGEN SATURATION: 94 % | HEART RATE: 79 BPM | BODY MASS INDEX: 24.48 KG/M2 | WEIGHT: 133 LBS | DIASTOLIC BLOOD PRESSURE: 60 MMHG | SYSTOLIC BLOOD PRESSURE: 99 MMHG | HEIGHT: 62 IN

## 2021-09-29 DIAGNOSIS — I25.10 CHRONIC CORONARY ARTERY DISEASE: Primary | ICD-10-CM

## 2021-09-29 DIAGNOSIS — Z95.0 PRESENCE OF CARDIAC PACEMAKER: ICD-10-CM

## 2021-09-29 DIAGNOSIS — I35.1 NONRHEUMATIC AORTIC VALVE INSUFFICIENCY: ICD-10-CM

## 2021-09-29 DIAGNOSIS — I35.0 NONRHEUMATIC AORTIC VALVE STENOSIS: ICD-10-CM

## 2021-09-29 PROCEDURE — 99214 OFFICE O/P EST MOD 30 MIN: CPT | Performed by: INTERNAL MEDICINE

## 2021-10-06 RX ORDER — WARFARIN SODIUM 5 MG/1
TABLET ORAL
Qty: 100 TABLET | Refills: 1 | Status: ON HOLD | OUTPATIENT
Start: 2021-10-06 | End: 2021-12-22

## 2021-10-08 ENCOUNTER — ANTICOAGULATION VISIT (OUTPATIENT)
Dept: CARDIOLOGY | Facility: CLINIC | Age: 86
End: 2021-10-08

## 2021-10-08 DIAGNOSIS — I48.0 PAROXYSMAL ATRIAL FIBRILLATION (HCC): Primary | ICD-10-CM

## 2021-10-08 LAB — INR PPP: 3.1

## 2021-10-13 RX ORDER — ISOSORBIDE MONONITRATE 30 MG/1
TABLET, EXTENDED RELEASE ORAL
Qty: 180 TABLET | Refills: 3 | Status: SHIPPED | OUTPATIENT
Start: 2021-10-13 | End: 2022-10-19

## 2021-10-15 ENCOUNTER — LAB (OUTPATIENT)
Dept: LAB | Facility: HOSPITAL | Age: 86
End: 2021-10-15

## 2021-10-15 ENCOUNTER — ANTICOAGULATION VISIT (OUTPATIENT)
Dept: CARDIOLOGY | Facility: CLINIC | Age: 86
End: 2021-10-15

## 2021-10-15 DIAGNOSIS — E03.9 ACQUIRED HYPOTHYROIDISM: ICD-10-CM

## 2021-10-15 DIAGNOSIS — I48.0 PAROXYSMAL ATRIAL FIBRILLATION (HCC): Primary | ICD-10-CM

## 2021-10-15 LAB
INR PPP: 2.4
T4 FREE SERPL-MCNC: 1.34 NG/DL (ref 0.93–1.7)
TSH SERPL DL<=0.05 MIU/L-ACNC: 0.89 UIU/ML (ref 0.27–4.2)

## 2021-10-15 PROCEDURE — 84439 ASSAY OF FREE THYROXINE: CPT

## 2021-10-15 PROCEDURE — 36415 COLL VENOUS BLD VENIPUNCTURE: CPT

## 2021-10-15 PROCEDURE — 84443 ASSAY THYROID STIM HORMONE: CPT

## 2021-10-21 ENCOUNTER — OFFICE VISIT (OUTPATIENT)
Dept: ENDOCRINOLOGY | Facility: CLINIC | Age: 86
End: 2021-10-21

## 2021-10-21 VITALS
OXYGEN SATURATION: 95 % | HEART RATE: 74 BPM | BODY MASS INDEX: 23.92 KG/M2 | DIASTOLIC BLOOD PRESSURE: 68 MMHG | TEMPERATURE: 97.8 F | WEIGHT: 130 LBS | SYSTOLIC BLOOD PRESSURE: 110 MMHG | HEIGHT: 62 IN

## 2021-10-21 DIAGNOSIS — E03.9 ACQUIRED HYPOTHYROIDISM: Primary | ICD-10-CM

## 2021-10-21 DIAGNOSIS — I10 ESSENTIAL HYPERTENSION: ICD-10-CM

## 2021-10-21 PROCEDURE — 99214 OFFICE O/P EST MOD 30 MIN: CPT | Performed by: INTERNAL MEDICINE

## 2021-10-21 NOTE — PROGRESS NOTES
Endocrine Progress Note Outpatient     Patient Care Team:  Monae Sam APRN as PCP - General (Family Medicine)    Chief Complaint: Follow-up hypothyroidism    HPI: 86 year-old female with history of hypothyroidism is here for follow-up and she is currently taking levothyroxine 75 mcg 5 days a week.  She does have some fatigue and tiredness but she is taking her medications on regular basis.    Past Medical History:   Diagnosis Date   • Anxiety    • Asthma    • CAD (coronary artery disease)    • Carotid artery disease (HCC)    • GERD (gastroesophageal reflux disease)    • Gout    • Hyperlipidemia    • Hypertension    • Hyperthyroidism    • Hypothyroidism        Social History     Socioeconomic History   • Marital status:    Tobacco Use   • Smoking status: Former Smoker   • Smokeless tobacco: Never Used   Vaping Use   • Vaping Use: Never used   Substance and Sexual Activity   • Alcohol use: No   • Drug use: No   • Sexual activity: Defer       Family History   Problem Relation Age of Onset   • Hypertension Mother    • Heart disease Father    • Heart disease Sister    • Kidney cancer Sister    • Stroke Sister    • Cancer Sister         breast   • Heart disease Brother        No Known Allergies    ROS:   Constitutional:  Admit fatigue, tiredness.    Eyes:  Denies change in visual acuity   HENT:  Denies nasal congestion or sore throat   Respiratory: denies cough, shortness of breath.   Cardiovascular:  denies chest pain, edema   GI:  Denies abdominal pain, nausea, vomiting.   Musculoskeletal:  Denies back pain or joint pain   Integument:  Denies dry skin and rash   Neurologic:  Denies headache, focal weakness or sensory changes   Endocrine:  Denies polyuria or polydipsia   Psychiatric:  Denies depression or anxiety      Vitals:    10/21/21 1103   BP: 110/68   Pulse: 74   Temp: 97.8 °F (36.6 °C)   SpO2: 95%       Physical Exam:  GEN: NAD, conversant  EYES: EOMI, PERRL, no conjunctival erythema  NECK: no  thyromegaly, full ROM   CV: RRR, no murmurs/rubs/gallops, no peripheral edema  LUNG: CTAB, no wheezes/rales/ronchi  SKIN: no rashes, no acanthosis  MSK: no deformities, full ROM of all extremities  NEURO: no tremors, DTR normal  PSYCH: AOX3, appropriate mood, affect normal      Results Review:     I reviewed the patient's new clinical results.      Lab Results   Component Value Date    GLUCOSE 124 (H) 09/15/2021    BUN 21 09/15/2021    CREATININE 0.98 09/15/2021    EGFRIFNONA 54 (L) 09/15/2021    EGFRIFAFRI >60 mL/min/1.73m2 09/02/2016    BCR 21.4 09/15/2021    K 4.4 09/15/2021    CO2 27.0 09/15/2021    CALCIUM 9.2 09/15/2021    ALBUMIN 3.80 09/15/2021    LABIL2 1.1 10/12/2017    AST 47 (H) 09/15/2021    ALT 29 09/15/2021    CHOL 141 10/11/2017    TRIG 58 10/11/2017    LDL 59 10/11/2017    HDL 74 10/11/2017     Lab Results   Component Value Date    TSH 0.890 10/15/2021    FREET4 1.34 10/15/2021         Medication Review: Reviewed.       Current Outpatient Medications:   •  acetaminophen (TYLENOL) 650 MG 8 hr tablet, Take 325 mg by mouth As Needed., Disp: , Rfl:   •  ascorbic acid (VITAMIN C) 1000 MG tablet, Take 1,000 mg by mouth Daily., Disp: , Rfl:   •  aspirin 81 MG EC tablet, Take 81 mg by mouth Daily., Disp: , Rfl:   •  colchicine 0.6 MG tablet, daily, Disp: , Rfl:   •  dilTIAZem CD (CARDIZEM CD) 240 MG 24 hr capsule, TAKE ONE CAPSULE BY MOUTH DAILY, Disp: 90 capsule, Rfl: 3  •  doxazosin (CARDURA) 2 MG tablet, TAKE ONE TABLET BY MOUTH DAILY, Disp: 90 tablet, Rfl: 3  •  ferrous sulfate 324 (65 Fe) MG tablet delayed-release EC tablet, Take 324 mg by mouth Daily With Breakfast., Disp: , Rfl:   •  furosemide (LASIX) 40 MG tablet, TAKE ONE TABLET BY MOUTH DAILY, Disp: 90 tablet, Rfl: 3  •  Glucos-Chondroit-Hyaluron-MSM (GLUCOSAMINE CHONDROITIN JOINT) tablet, GLUCOSAMINE CHONDROITIN JOINT TABS, Disp: , Rfl:   •  isosorbide mononitrate (IMDUR) 30 MG 24 hr tablet, TAKE ONE TABLET BY MOUTH TWICE A DAY, Disp: 180  tablet, Rfl: 3  •  levothyroxine (SYNTHROID, LEVOTHROID) 75 MCG tablet, 1 tablet p.o. daily Monday through Friday., Disp: 100 tablet, Rfl: 6  •  melatonin ([employer] MELATONIN) 5 MG tablet tablet, Daily., Disp: , Rfl:   •  Multiple Vitamin (MULTIVITAMIN) tablet, Take 1 tablet by mouth Daily., Disp: , Rfl:   •  nitroglycerin (NITROSTAT) 0.4 MG SL tablet, NITROSTAT 0.4 MG SUBL, Disp: , Rfl:   •  Omega-3 Fatty Acids (FISH OIL) 1000 MG capsule capsule, Take  by mouth., Disp: , Rfl:   •  polyethylene glycol (MIRALAX) powder, Take 17 g by mouth As Needed., Disp: , Rfl:   •  potassium chloride ER (K-TAB) 20 MEQ tablet controlled-release ER tablet, TAKE ONE TABLET BY MOUTH DAILY, Disp: 90 tablet, Rfl: 3  •  rosuvastatin (CRESTOR) 20 MG tablet, TAKE ONE TABLET BY MOUTH DAILY, Disp: 90 tablet, Rfl: 3  •  sertraline (ZOLOFT) 50 MG tablet, Take 50 mg by mouth Daily., Disp: , Rfl:   •  sotalol (BETAPACE) 120 MG tablet, TAKE ONE TABLET BY MOUTH TWICE A DAY, Disp: 180 tablet, Rfl: 0  •  warfarin (COUMADIN) 5 MG tablet, Take one & one-half tablets by mouth x 1 day/week (Wed), take one tablet x 6 days/week or as directed., Disp: 100 tablet, Rfl: 1      Assessment/Plan   1.  Hypothyroidism: Well-controlled. We will continue current dose of levothyroxine 75 mcg 5 days a week and will follow her back in 1 year with labs.    2.  Hypertension: Well-controlled.            Chika Paula MD FACE.    Much of the above report is an electronic transcription/translation of the spoken language to printed text using Dragon Software. As such, the subtleties and finesse of the spoken language may permit erroneous, or at times, nonsensical words or phrases to be inadvertently transcribed; thus changes may be made at a later date to rectify these errors.

## 2021-11-02 ENCOUNTER — ANTICOAGULATION VISIT (OUTPATIENT)
Dept: CARDIOLOGY | Facility: CLINIC | Age: 86
End: 2021-11-02

## 2021-11-02 DIAGNOSIS — I48.0 PAROXYSMAL ATRIAL FIBRILLATION (HCC): Primary | ICD-10-CM

## 2021-11-02 LAB — INR PPP: 2

## 2021-11-16 ENCOUNTER — ANTICOAGULATION VISIT (OUTPATIENT)
Dept: CARDIOLOGY | Facility: CLINIC | Age: 86
End: 2021-11-16

## 2021-11-16 DIAGNOSIS — I48.0 PAROXYSMAL ATRIAL FIBRILLATION (HCC): Primary | ICD-10-CM

## 2021-11-16 LAB — INR PPP: 1.1

## 2021-11-17 ENCOUNTER — OFFICE (OUTPATIENT)
Dept: URBAN - METROPOLITAN AREA CLINIC 64 | Facility: CLINIC | Age: 86
End: 2021-11-17

## 2021-11-17 VITALS
WEIGHT: 132 LBS | HEIGHT: 62 IN | SYSTOLIC BLOOD PRESSURE: 137 MMHG | DIASTOLIC BLOOD PRESSURE: 64 MMHG | HEART RATE: 76 BPM

## 2021-11-17 DIAGNOSIS — R13.10 DYSPHAGIA, UNSPECIFIED: ICD-10-CM

## 2021-11-17 DIAGNOSIS — R14.2 ERUCTATION: ICD-10-CM

## 2021-11-17 DIAGNOSIS — K59.00 CONSTIPATION, UNSPECIFIED: ICD-10-CM

## 2021-11-17 PROCEDURE — 99213 OFFICE O/P EST LOW 20 MIN: CPT | Performed by: NURSE PRACTITIONER

## 2021-11-23 ENCOUNTER — ANTICOAGULATION VISIT (OUTPATIENT)
Dept: CARDIOLOGY | Facility: CLINIC | Age: 86
End: 2021-11-23

## 2021-11-23 DIAGNOSIS — I48.0 PAROXYSMAL ATRIAL FIBRILLATION (HCC): Primary | ICD-10-CM

## 2021-11-23 LAB — INR PPP: 2

## 2021-12-07 ENCOUNTER — ANTICOAGULATION VISIT (OUTPATIENT)
Dept: CARDIOLOGY | Facility: CLINIC | Age: 86
End: 2021-12-07

## 2021-12-07 DIAGNOSIS — I48.0 PAROXYSMAL ATRIAL FIBRILLATION (HCC): Primary | ICD-10-CM

## 2021-12-07 LAB — INR PPP: 2.7

## 2021-12-21 ENCOUNTER — HOSPITAL ENCOUNTER (INPATIENT)
Facility: HOSPITAL | Age: 86
LOS: 1 days | Discharge: HOME HEALTH | End: 2021-12-22
Attending: HOSPITALIST | Admitting: INTERNAL MEDICINE

## 2021-12-21 ENCOUNTER — LAB (OUTPATIENT)
Dept: LAB | Facility: HOSPITAL | Age: 86
End: 2021-12-21

## 2021-12-21 ENCOUNTER — APPOINTMENT (OUTPATIENT)
Dept: CT IMAGING | Facility: HOSPITAL | Age: 86
End: 2021-12-21

## 2021-12-21 ENCOUNTER — ANTICOAGULATION VISIT (OUTPATIENT)
Dept: CARDIOLOGY | Facility: CLINIC | Age: 86
End: 2021-12-21

## 2021-12-21 DIAGNOSIS — Z79.01 LONG TERM (CURRENT) USE OF ANTICOAGULANTS: ICD-10-CM

## 2021-12-21 DIAGNOSIS — K52.9 COLITIS: ICD-10-CM

## 2021-12-21 DIAGNOSIS — R79.1 ELEVATED INR: Primary | ICD-10-CM

## 2021-12-21 DIAGNOSIS — I48.0 PAROXYSMAL ATRIAL FIBRILLATION (HCC): ICD-10-CM

## 2021-12-21 DIAGNOSIS — I48.0 PAROXYSMAL ATRIAL FIBRILLATION (HCC): Primary | ICD-10-CM

## 2021-12-21 DIAGNOSIS — R10.30 LOWER ABDOMINAL PAIN: ICD-10-CM

## 2021-12-21 PROBLEM — A49.9 UTI (URINARY TRACT INFECTION), BACTERIAL: Status: ACTIVE | Noted: 2021-12-21

## 2021-12-21 PROBLEM — N39.0 UTI (URINARY TRACT INFECTION), BACTERIAL: Status: ACTIVE | Noted: 2021-12-21

## 2021-12-21 LAB
ALBUMIN SERPL-MCNC: 3.4 G/DL (ref 3.5–5.2)
ALBUMIN/GLOB SERPL: 0.9 G/DL
ALP SERPL-CCNC: 171 U/L (ref 39–117)
ALT SERPL W P-5'-P-CCNC: 25 U/L (ref 1–33)
ANION GAP SERPL CALCULATED.3IONS-SCNC: 12 MMOL/L (ref 5–15)
AST SERPL-CCNC: 30 U/L (ref 1–32)
BACTERIA UR QL AUTO: ABNORMAL /HPF
BASOPHILS # BLD AUTO: 0 10*3/MM3 (ref 0–0.2)
BASOPHILS NFR BLD AUTO: 0.4 % (ref 0–1.5)
BILIRUB SERPL-MCNC: 0.5 MG/DL (ref 0–1.2)
BILIRUB UR QL STRIP: NEGATIVE
BUN SERPL-MCNC: 22 MG/DL (ref 8–23)
BUN/CREAT SERPL: 19.5 (ref 7–25)
CALCIUM SPEC-SCNC: 9.8 MG/DL (ref 8.6–10.5)
CHLORIDE SERPL-SCNC: 99 MMOL/L (ref 98–107)
CHOLEST SERPL-MCNC: 70 MG/DL (ref 0–200)
CLARITY UR: CLEAR
CO2 SERPL-SCNC: 26 MMOL/L (ref 22–29)
COLOR UR: YELLOW
CREAT SERPL-MCNC: 1.13 MG/DL (ref 0.57–1)
CRP SERPL-MCNC: 22.8 MG/DL (ref 0–0.5)
DEPRECATED RDW RBC AUTO: 46.4 FL (ref 37–54)
EOSINOPHIL # BLD AUTO: 0.2 10*3/MM3 (ref 0–0.4)
EOSINOPHIL NFR BLD AUTO: 2 % (ref 0.3–6.2)
ERYTHROCYTE [DISTWIDTH] IN BLOOD BY AUTOMATED COUNT: 14.5 % (ref 12.3–15.4)
GFR SERPL CREATININE-BSD FRML MDRD: 46 ML/MIN/1.73
GLOBULIN UR ELPH-MCNC: 3.9 GM/DL
GLUCOSE SERPL-MCNC: 120 MG/DL (ref 65–99)
GLUCOSE UR STRIP-MCNC: NEGATIVE MG/DL
HCT VFR BLD AUTO: 35.1 % (ref 34–46.6)
HDLC SERPL-MCNC: 28 MG/DL (ref 40–60)
HGB BLD-MCNC: 12 G/DL (ref 12–15.9)
HGB UR QL STRIP.AUTO: ABNORMAL
HOLD SPECIMEN: NORMAL
HYALINE CASTS UR QL AUTO: ABNORMAL /LPF
INR PPP: 11
INR PPP: 11 (ref 0.9–1.1)
INR PPP: >=8 (ref 2–3)
INR PPP: >=8 (ref 2–3)
KETONES UR QL STRIP: NEGATIVE
LDLC SERPL CALC-MCNC: 19 MG/DL (ref 0–100)
LDLC/HDLC SERPL: 0.55 {RATIO}
LEUKOCYTE ESTERASE UR QL STRIP.AUTO: NEGATIVE
LIPASE SERPL-CCNC: 29 U/L (ref 13–60)
LYMPHOCYTES # BLD AUTO: 1.7 10*3/MM3 (ref 0.7–3.1)
LYMPHOCYTES NFR BLD AUTO: 21.6 % (ref 19.6–45.3)
MCH RBC QN AUTO: 31.6 PG (ref 26.6–33)
MCHC RBC AUTO-ENTMCNC: 34.1 G/DL (ref 31.5–35.7)
MCV RBC AUTO: 92.6 FL (ref 79–97)
MONOCYTES # BLD AUTO: 0.9 10*3/MM3 (ref 0.1–0.9)
MONOCYTES NFR BLD AUTO: 11.3 % (ref 5–12)
NEUTROPHILS NFR BLD AUTO: 5 10*3/MM3 (ref 1.7–7)
NEUTROPHILS NFR BLD AUTO: 64.7 % (ref 42.7–76)
NITRITE UR QL STRIP: NEGATIVE
NRBC BLD AUTO-RTO: 0 /100 WBC (ref 0–0.2)
PH UR STRIP.AUTO: 6 [PH] (ref 5–8)
PLATELET # BLD AUTO: 192 10*3/MM3 (ref 140–450)
PMV BLD AUTO: 8 FL (ref 6–12)
POTASSIUM SERPL-SCNC: 3.9 MMOL/L (ref 3.5–5.2)
PROCALCITONIN SERPL-MCNC: 1.94 NG/ML (ref 0–0.25)
PROT SERPL-MCNC: 7.3 G/DL (ref 6–8.5)
PROT UR QL STRIP: NEGATIVE
PROTHROMBIN TIME: >90 SECONDS (ref 19.4–28.5)
PROTHROMBIN TIME: >90 SECONDS (ref 19.4–28.5)
RBC # BLD AUTO: 3.79 10*6/MM3 (ref 3.77–5.28)
RBC # UR STRIP: ABNORMAL /HPF
REF LAB TEST METHOD: ABNORMAL
SARS-COV-2 RNA PNL SPEC NAA+PROBE: NOT DETECTED
SODIUM SERPL-SCNC: 137 MMOL/L (ref 136–145)
SP GR UR STRIP: 1.01 (ref 1–1.03)
SQUAMOUS #/AREA URNS HPF: ABNORMAL /HPF
TRIGL SERPL-MCNC: 133 MG/DL (ref 0–150)
TROPONIN T SERPL-MCNC: <0.01 NG/ML (ref 0–0.03)
UROBILINOGEN UR QL STRIP: ABNORMAL
VLDLC SERPL-MCNC: 23 MG/DL (ref 5–40)
WBC # UR STRIP: ABNORMAL /HPF
WBC NRBC COR # BLD: 7.8 10*3/MM3 (ref 3.4–10.8)

## 2021-12-21 PROCEDURE — 85610 PROTHROMBIN TIME: CPT | Performed by: NURSE PRACTITIONER

## 2021-12-21 PROCEDURE — 93793 ANTICOAG MGMT PT WARFARIN: CPT | Performed by: INTERNAL MEDICINE

## 2021-12-21 PROCEDURE — 84145 PROCALCITONIN (PCT): CPT | Performed by: NURSE PRACTITIONER

## 2021-12-21 PROCEDURE — 84484 ASSAY OF TROPONIN QUANT: CPT | Performed by: NURSE PRACTITIONER

## 2021-12-21 PROCEDURE — 99284 EMERGENCY DEPT VISIT MOD MDM: CPT

## 2021-12-21 PROCEDURE — 93005 ELECTROCARDIOGRAM TRACING: CPT | Performed by: NURSE PRACTITIONER

## 2021-12-21 PROCEDURE — 36416 COLLJ CAPILLARY BLOOD SPEC: CPT | Performed by: INTERNAL MEDICINE

## 2021-12-21 PROCEDURE — 85610 PROTHROMBIN TIME: CPT | Performed by: INTERNAL MEDICINE

## 2021-12-21 PROCEDURE — 80053 COMPREHEN METABOLIC PANEL: CPT | Performed by: NURSE PRACTITIONER

## 2021-12-21 PROCEDURE — 0 PHYTONADIONE 10 MG/ML SOLUTION 1 ML AMPULE: Performed by: NURSE PRACTITIONER

## 2021-12-21 PROCEDURE — 85610 PROTHROMBIN TIME: CPT

## 2021-12-21 PROCEDURE — 99222 1ST HOSP IP/OBS MODERATE 55: CPT | Performed by: NURSE PRACTITIONER

## 2021-12-21 PROCEDURE — 0 IOPAMIDOL PER 1 ML: Performed by: NURSE PRACTITIONER

## 2021-12-21 PROCEDURE — 80061 LIPID PANEL: CPT | Performed by: NURSE PRACTITIONER

## 2021-12-21 PROCEDURE — 74177 CT ABD & PELVIS W/CONTRAST: CPT

## 2021-12-21 PROCEDURE — 85025 COMPLETE CBC W/AUTO DIFF WBC: CPT | Performed by: NURSE PRACTITIONER

## 2021-12-21 PROCEDURE — 86140 C-REACTIVE PROTEIN: CPT | Performed by: NURSE PRACTITIONER

## 2021-12-21 PROCEDURE — 81001 URINALYSIS AUTO W/SCOPE: CPT | Performed by: NURSE PRACTITIONER

## 2021-12-21 PROCEDURE — 87635 SARS-COV-2 COVID-19 AMP PRB: CPT | Performed by: HOSPITALIST

## 2021-12-21 PROCEDURE — 83690 ASSAY OF LIPASE: CPT | Performed by: NURSE PRACTITIONER

## 2021-12-21 RX ORDER — AMOXICILLIN AND CLAVULANATE POTASSIUM 875; 125 MG/1; MG/1
1 TABLET, FILM COATED ORAL ONCE
Status: DISCONTINUED | OUTPATIENT
Start: 2021-12-21 | End: 2021-12-21

## 2021-12-21 RX ORDER — ISOSORBIDE MONONITRATE 30 MG/1
30 TABLET, EXTENDED RELEASE ORAL ONCE
Status: COMPLETED | OUTPATIENT
Start: 2021-12-21 | End: 2021-12-21

## 2021-12-21 RX ORDER — SODIUM CHLORIDE 0.9 % (FLUSH) 0.9 %
10 SYRINGE (ML) INJECTION AS NEEDED
Status: DISCONTINUED | OUTPATIENT
Start: 2021-12-21 | End: 2021-12-22 | Stop reason: HOSPADM

## 2021-12-21 RX ORDER — POTASSIUM CHLORIDE 20 MEQ/1
40 TABLET, EXTENDED RELEASE ORAL AS NEEDED
Status: DISCONTINUED | OUTPATIENT
Start: 2021-12-21 | End: 2021-12-22 | Stop reason: HOSPADM

## 2021-12-21 RX ORDER — ROSUVASTATIN CALCIUM 10 MG/1
20 TABLET, COATED ORAL ONCE
Status: COMPLETED | OUTPATIENT
Start: 2021-12-21 | End: 2021-12-21

## 2021-12-21 RX ORDER — NITROGLYCERIN 0.4 MG/1
0.4 TABLET SUBLINGUAL
Status: DISCONTINUED | OUTPATIENT
Start: 2021-12-21 | End: 2021-12-22 | Stop reason: HOSPADM

## 2021-12-21 RX ORDER — COLCHICINE 0.6 MG/1
0.6 TABLET ORAL ONCE
Status: COMPLETED | OUTPATIENT
Start: 2021-12-21 | End: 2021-12-21

## 2021-12-21 RX ORDER — POTASSIUM CHLORIDE 1.5 G/1.77G
40 POWDER, FOR SOLUTION ORAL AS NEEDED
Status: DISCONTINUED | OUTPATIENT
Start: 2021-12-21 | End: 2021-12-22 | Stop reason: HOSPADM

## 2021-12-21 RX ORDER — SODIUM CHLORIDE 0.9 % (FLUSH) 0.9 %
10 SYRINGE (ML) INJECTION EVERY 12 HOURS SCHEDULED
Status: DISCONTINUED | OUTPATIENT
Start: 2021-12-21 | End: 2021-12-22 | Stop reason: HOSPADM

## 2021-12-21 RX ORDER — METOPROLOL TARTRATE 5 MG/5ML
2.5 INJECTION INTRAVENOUS ONCE
Status: DISCONTINUED | OUTPATIENT
Start: 2021-12-21 | End: 2021-12-21

## 2021-12-21 RX ORDER — CHOLECALCIFEROL (VITAMIN D3) 125 MCG
5 CAPSULE ORAL NIGHTLY PRN
Status: DISCONTINUED | OUTPATIENT
Start: 2021-12-21 | End: 2021-12-22 | Stop reason: HOSPADM

## 2021-12-21 RX ORDER — ACETAMINOPHEN 325 MG/1
650 TABLET ORAL EVERY 4 HOURS PRN
Status: DISCONTINUED | OUTPATIENT
Start: 2021-12-21 | End: 2021-12-22 | Stop reason: HOSPADM

## 2021-12-21 RX ORDER — MAGNESIUM SULFATE HEPTAHYDRATE 40 MG/ML
2 INJECTION, SOLUTION INTRAVENOUS AS NEEDED
Status: DISCONTINUED | OUTPATIENT
Start: 2021-12-21 | End: 2021-12-22 | Stop reason: HOSPADM

## 2021-12-21 RX ORDER — CALCIUM CARBONATE 200(500)MG
2 TABLET,CHEWABLE ORAL 2 TIMES DAILY PRN
Status: DISCONTINUED | OUTPATIENT
Start: 2021-12-21 | End: 2021-12-22 | Stop reason: HOSPADM

## 2021-12-21 RX ORDER — CEFDINIR 300 MG/1
300 CAPSULE ORAL EVERY 12 HOURS SCHEDULED
Status: DISCONTINUED | OUTPATIENT
Start: 2021-12-21 | End: 2021-12-22 | Stop reason: HOSPADM

## 2021-12-21 RX ORDER — ALUMINA, MAGNESIA, AND SIMETHICONE 2400; 2400; 240 MG/30ML; MG/30ML; MG/30ML
15 SUSPENSION ORAL EVERY 6 HOURS PRN
Status: DISCONTINUED | OUTPATIENT
Start: 2021-12-21 | End: 2021-12-22 | Stop reason: HOSPADM

## 2021-12-21 RX ORDER — MAGNESIUM SULFATE 1 G/100ML
1 INJECTION INTRAVENOUS AS NEEDED
Status: DISCONTINUED | OUTPATIENT
Start: 2021-12-21 | End: 2021-12-22 | Stop reason: HOSPADM

## 2021-12-21 RX ORDER — ACETAMINOPHEN 160 MG/5ML
650 SOLUTION ORAL EVERY 4 HOURS PRN
Status: DISCONTINUED | OUTPATIENT
Start: 2021-12-21 | End: 2021-12-22 | Stop reason: HOSPADM

## 2021-12-21 RX ORDER — ACETAMINOPHEN 650 MG/1
650 SUPPOSITORY RECTAL EVERY 4 HOURS PRN
Status: DISCONTINUED | OUTPATIENT
Start: 2021-12-21 | End: 2021-12-22 | Stop reason: HOSPADM

## 2021-12-21 RX ORDER — POLYETHYLENE GLYCOL 3350 17 G/17G
17 POWDER, FOR SOLUTION ORAL ONCE
Status: COMPLETED | OUTPATIENT
Start: 2021-12-21 | End: 2021-12-21

## 2021-12-21 RX ORDER — ONDANSETRON 2 MG/ML
4 INJECTION INTRAMUSCULAR; INTRAVENOUS EVERY 6 HOURS PRN
Status: DISCONTINUED | OUTPATIENT
Start: 2021-12-21 | End: 2021-12-22 | Stop reason: HOSPADM

## 2021-12-21 RX ORDER — ONDANSETRON 4 MG/1
4 TABLET, FILM COATED ORAL EVERY 6 HOURS PRN
Status: DISCONTINUED | OUTPATIENT
Start: 2021-12-21 | End: 2021-12-22 | Stop reason: HOSPADM

## 2021-12-21 RX ORDER — SOTALOL HYDROCHLORIDE 120 MG/1
120 TABLET ORAL ONCE
Status: COMPLETED | OUTPATIENT
Start: 2021-12-21 | End: 2021-12-22

## 2021-12-21 RX ADMIN — ISOSORBIDE MONONITRATE 30 MG: 30 TABLET, EXTENDED RELEASE ORAL at 23:55

## 2021-12-21 RX ADMIN — Medication 10 ML: at 23:55

## 2021-12-21 RX ADMIN — ROSUVASTATIN 20 MG: 10 TABLET, FILM COATED ORAL at 23:55

## 2021-12-21 RX ADMIN — COLCHICINE 0.6 MG: 0.6 TABLET, FILM COATED ORAL at 23:55

## 2021-12-21 RX ADMIN — PHYTONADIONE 5 MG: 10 INJECTION, EMULSION INTRAMUSCULAR; INTRAVENOUS; SUBCUTANEOUS at 21:32

## 2021-12-21 RX ADMIN — POLYETHYLENE GLYCOL 3350 17 G: 17 POWDER, FOR SOLUTION ORAL at 23:57

## 2021-12-21 RX ADMIN — CEFDINIR 300 MG: 300 CAPSULE ORAL at 23:54

## 2021-12-21 RX ADMIN — IOPAMIDOL 75 ML: 755 INJECTION, SOLUTION INTRAVENOUS at 20:48

## 2021-12-22 VITALS
TEMPERATURE: 98 F | HEART RATE: 70 BPM | SYSTOLIC BLOOD PRESSURE: 159 MMHG | DIASTOLIC BLOOD PRESSURE: 75 MMHG | WEIGHT: 135.8 LBS | BODY MASS INDEX: 24.99 KG/M2 | OXYGEN SATURATION: 95 % | RESPIRATION RATE: 18 BRPM | HEIGHT: 62 IN

## 2021-12-22 PROBLEM — N30.00 ACUTE CYSTITIS WITHOUT HEMATURIA: Status: ACTIVE | Noted: 2021-12-22

## 2021-12-22 PROBLEM — N30.00 ACUTE CYSTITIS WITHOUT HEMATURIA: Status: RESOLVED | Noted: 2021-12-22 | Resolved: 2021-12-22

## 2021-12-22 LAB
ANION GAP SERPL CALCULATED.3IONS-SCNC: 10 MMOL/L (ref 5–15)
BASOPHILS # BLD AUTO: 0.1 10*3/MM3 (ref 0–0.2)
BASOPHILS NFR BLD AUTO: 0.7 % (ref 0–1.5)
BUN SERPL-MCNC: 20 MG/DL (ref 8–23)
BUN/CREAT SERPL: 21.7 (ref 7–25)
CALCIUM SPEC-SCNC: 9 MG/DL (ref 8.6–10.5)
CHLORIDE SERPL-SCNC: 100 MMOL/L (ref 98–107)
CO2 SERPL-SCNC: 27 MMOL/L (ref 22–29)
CREAT SERPL-MCNC: 0.92 MG/DL (ref 0.57–1)
DEPRECATED RDW RBC AUTO: 44.6 FL (ref 37–54)
EOSINOPHIL # BLD AUTO: 0.1 10*3/MM3 (ref 0–0.4)
EOSINOPHIL NFR BLD AUTO: 1.7 % (ref 0.3–6.2)
ERYTHROCYTE [DISTWIDTH] IN BLOOD BY AUTOMATED COUNT: 14.1 % (ref 12.3–15.4)
GFR SERPL CREATININE-BSD FRML MDRD: 58 ML/MIN/1.73
GLUCOSE SERPL-MCNC: 111 MG/DL (ref 65–99)
HBA1C MFR BLD: 5.7 % (ref 3.5–5.6)
HCT VFR BLD AUTO: 31.5 % (ref 34–46.6)
HGB BLD-MCNC: 10.9 G/DL (ref 12–15.9)
INR PPP: 1.29 (ref 0.93–1.1)
INR PPP: 1.67 (ref 2–3)
LYMPHOCYTES # BLD AUTO: 2.2 10*3/MM3 (ref 0.7–3.1)
LYMPHOCYTES NFR BLD AUTO: 26 % (ref 19.6–45.3)
MAGNESIUM SERPL-MCNC: 1.7 MG/DL (ref 1.6–2.4)
MCH RBC QN AUTO: 31.3 PG (ref 26.6–33)
MCHC RBC AUTO-ENTMCNC: 34.6 G/DL (ref 31.5–35.7)
MCV RBC AUTO: 90.5 FL (ref 79–97)
MONOCYTES # BLD AUTO: 1 10*3/MM3 (ref 0.1–0.9)
MONOCYTES NFR BLD AUTO: 11.5 % (ref 5–12)
NEUTROPHILS NFR BLD AUTO: 5 10*3/MM3 (ref 1.7–7)
NEUTROPHILS NFR BLD AUTO: 60.1 % (ref 42.7–76)
NRBC BLD AUTO-RTO: 0 /100 WBC (ref 0–0.2)
PLATELET # BLD AUTO: 177 10*3/MM3 (ref 140–450)
PMV BLD AUTO: 7.8 FL (ref 6–12)
POTASSIUM SERPL-SCNC: 4.2 MMOL/L (ref 3.5–5.2)
PROTHROMBIN TIME: 14.1 SECONDS (ref 9.6–11.7)
PROTHROMBIN TIME: 17.8 SECONDS (ref 19.4–28.5)
RBC # BLD AUTO: 3.49 10*6/MM3 (ref 3.77–5.28)
SODIUM SERPL-SCNC: 137 MMOL/L (ref 136–145)
URATE SERPL-MCNC: 6.5 MG/DL (ref 2.4–5.7)
WBC NRBC COR # BLD: 8.4 10*3/MM3 (ref 3.4–10.8)

## 2021-12-22 PROCEDURE — 93296 REM INTERROG EVL PM/IDS: CPT | Performed by: INTERNAL MEDICINE

## 2021-12-22 PROCEDURE — 93294 REM INTERROG EVL PM/LDLS PM: CPT | Performed by: INTERNAL MEDICINE

## 2021-12-22 PROCEDURE — 83735 ASSAY OF MAGNESIUM: CPT | Performed by: NURSE PRACTITIONER

## 2021-12-22 PROCEDURE — 85610 PROTHROMBIN TIME: CPT | Performed by: NURSE PRACTITIONER

## 2021-12-22 PROCEDURE — 36415 COLL VENOUS BLD VENIPUNCTURE: CPT | Performed by: NURSE PRACTITIONER

## 2021-12-22 PROCEDURE — 84550 ASSAY OF BLOOD/URIC ACID: CPT | Performed by: NURSE PRACTITIONER

## 2021-12-22 PROCEDURE — 99239 HOSP IP/OBS DSCHRG MGMT >30: CPT | Performed by: INTERNAL MEDICINE

## 2021-12-22 PROCEDURE — 85025 COMPLETE CBC W/AUTO DIFF WBC: CPT | Performed by: NURSE PRACTITIONER

## 2021-12-22 PROCEDURE — 99222 1ST HOSP IP/OBS MODERATE 55: CPT | Performed by: INTERNAL MEDICINE

## 2021-12-22 PROCEDURE — 85610 PROTHROMBIN TIME: CPT | Performed by: INTERNAL MEDICINE

## 2021-12-22 PROCEDURE — 83036 HEMOGLOBIN GLYCOSYLATED A1C: CPT | Performed by: NURSE PRACTITIONER

## 2021-12-22 PROCEDURE — 80048 BASIC METABOLIC PNL TOTAL CA: CPT | Performed by: NURSE PRACTITIONER

## 2021-12-22 RX ORDER — LEVOTHYROXINE SODIUM 0.07 MG/1
75 TABLET ORAL SEE ADMIN INSTRUCTIONS
COMMUNITY
End: 2022-02-16

## 2021-12-22 RX ORDER — FUROSEMIDE 40 MG/1
40 TABLET ORAL DAILY
Status: CANCELLED | OUTPATIENT
Start: 2021-12-22

## 2021-12-22 RX ORDER — CEFDINIR 300 MG/1
300 CAPSULE ORAL 2 TIMES DAILY
COMMUNITY
Start: 2021-12-18 | End: 2021-12-22

## 2021-12-22 RX ORDER — COLCHICINE 0.6 MG/1
0.6 TABLET ORAL DAILY
Status: CANCELLED | OUTPATIENT
Start: 2021-12-22

## 2021-12-22 RX ORDER — WARFARIN SODIUM 5 MG/1
5 TABLET ORAL
Status: COMPLETED | OUTPATIENT
Start: 2021-12-22 | End: 2021-12-22

## 2021-12-22 RX ORDER — ASCORBIC ACID 500 MG
1000 TABLET ORAL DAILY
Status: CANCELLED | OUTPATIENT
Start: 2021-12-22

## 2021-12-22 RX ORDER — POLYETHYLENE GLYCOL 3350 17 G/17G
17 POWDER, FOR SOLUTION ORAL DAILY PRN
Status: CANCELLED | OUTPATIENT
Start: 2021-12-22

## 2021-12-22 RX ORDER — TERAZOSIN 2 MG/1
2 CAPSULE ORAL NIGHTLY
Refills: 3 | Status: CANCELLED | OUTPATIENT
Start: 2021-12-22

## 2021-12-22 RX ORDER — ROSUVASTATIN CALCIUM 10 MG/1
20 TABLET, COATED ORAL DAILY
Status: CANCELLED | OUTPATIENT
Start: 2021-12-22

## 2021-12-22 RX ORDER — FERROUS SULFATE TAB EC 324 MG (65 MG FE EQUIVALENT) 324 (65 FE) MG
324 TABLET DELAYED RESPONSE ORAL
Status: CANCELLED | OUTPATIENT
Start: 2021-12-22

## 2021-12-22 RX ORDER — ISOSORBIDE MONONITRATE 30 MG/1
30 TABLET, EXTENDED RELEASE ORAL 2 TIMES DAILY
Status: CANCELLED | OUTPATIENT
Start: 2021-12-22

## 2021-12-22 RX ORDER — DILTIAZEM HYDROCHLORIDE 240 MG/1
240 CAPSULE, COATED, EXTENDED RELEASE ORAL DAILY
Status: CANCELLED | OUTPATIENT
Start: 2021-12-22

## 2021-12-22 RX ORDER — LEVOTHYROXINE SODIUM 0.07 MG/1
75 TABLET ORAL
Status: CANCELLED | OUTPATIENT
Start: 2021-12-22

## 2021-12-22 RX ORDER — WARFARIN SODIUM 5 MG/1
5 TABLET ORAL NIGHTLY
COMMUNITY
End: 2022-05-04

## 2021-12-22 RX ORDER — DIPHENOXYLATE HYDROCHLORIDE AND ATROPINE SULFATE 2.5; .025 MG/1; MG/1
1 TABLET ORAL DAILY
Status: CANCELLED | OUTPATIENT
Start: 2021-12-22

## 2021-12-22 RX ADMIN — SOTALOL HYDROCHLORIDE 120 MG: 120 TABLET ORAL at 00:54

## 2021-12-22 RX ADMIN — WARFARIN 5 MG: 5 TABLET ORAL at 17:31

## 2021-12-22 RX ADMIN — CEFDINIR 300 MG: 300 CAPSULE ORAL at 08:51

## 2021-12-22 RX ADMIN — Medication 10 ML: at 08:51

## 2021-12-23 ENCOUNTER — READMISSION MANAGEMENT (OUTPATIENT)
Dept: CALL CENTER | Facility: HOSPITAL | Age: 86
End: 2021-12-23

## 2021-12-23 NOTE — OUTREACH NOTE
Prep Survey      Responses   [facility]? [facility]   Is LACE score < 7 ? No   Emergency Room discharge w/ pulse ox? No   Eligibility Readm Mgmt   Discharge diagnosis Elevated INR    Does the patient have one of the following disease processes/diagnoses(primary or secondary)? Other   Does the patient have Home health ordered? No   Is there a DME ordered? No   Prep survey completed? Yes          Gala Calderon RN

## 2021-12-24 LAB — QT INTERVAL: 429 MS

## 2021-12-28 ENCOUNTER — ANTICOAGULATION VISIT (OUTPATIENT)
Dept: CARDIOLOGY | Facility: CLINIC | Age: 86
End: 2021-12-28

## 2021-12-28 ENCOUNTER — READMISSION MANAGEMENT (OUTPATIENT)
Dept: CALL CENTER | Facility: HOSPITAL | Age: 86
End: 2021-12-28

## 2021-12-28 DIAGNOSIS — I48.0 PAROXYSMAL ATRIAL FIBRILLATION (HCC): Primary | ICD-10-CM

## 2021-12-28 LAB — INR PPP: 2.8

## 2021-12-28 PROCEDURE — G0250 MD INR TEST REVIE INTER MGMT: HCPCS | Performed by: INTERNAL MEDICINE

## 2022-01-04 ENCOUNTER — ANTICOAGULATION VISIT (OUTPATIENT)
Dept: CARDIOLOGY | Facility: CLINIC | Age: 87
End: 2022-01-04

## 2022-01-04 DIAGNOSIS — I48.0 PAROXYSMAL ATRIAL FIBRILLATION: Primary | ICD-10-CM

## 2022-01-04 LAB — INR PPP: 2.1

## 2022-01-05 ENCOUNTER — READMISSION MANAGEMENT (OUTPATIENT)
Dept: CALL CENTER | Facility: HOSPITAL | Age: 87
End: 2022-01-05

## 2022-01-05 NOTE — OUTREACH NOTE
Medical Week 2 Survey      Responses   [facility] patient discharged from? [facility]   Does the patient have one of the following disease processes/diagnoses(primary or secondary)? Other   Week 2 attempt successful? Yes   Call start time 1356   Discharge diagnosis Elevated INR    Call end time 1359   Meds reviewed with patient/caregiver? Yes   Is the patient having any side effects they believe may be caused by any medication additions or changes? No   Does the patient have all medications ordered at discharge? Yes   Is the patient taking all medications as directed (includes completed medication regime)? Yes   Does the patient have a primary care provider?  Yes   Does the patient have an appointment with their PCP within 7 days of discharge? No   Nursing Interventions Educated patient on importance of making appointment,  Advised patient to make appointment   Has the patient kept scheduled appointments due by today? Yes   Has home health visited the patient within 72 hours of discharge? N/A   Psychosocial issues? No   Did the patient receive a copy of their discharge instructions? Yes   Nursing interventions Reviewed instructions with patient   What is the patient's perception of their health status since discharge? Improving   Is the patient/caregiver able to teach back signs and symptoms related to disease process for when to call PCP? Yes   Is the patient/caregiver able to teach back signs and symptoms related to disease process for when to call 911? Yes   Is the patient/caregiver able to teach back the hierarchy of who to call/visit for symptoms/problems? PCP, Specialist, Home health nurse, Urgent Care, ED, 911 Yes   If the patient is a current smoker, are they able to teach back resources for cessation? Not a smoker   Week 2 Call Completed? Yes          Gabby Armstrong LPN

## 2022-01-11 ENCOUNTER — ANTICOAGULATION VISIT (OUTPATIENT)
Dept: CARDIOLOGY | Facility: CLINIC | Age: 87
End: 2022-01-11

## 2022-01-11 ENCOUNTER — READMISSION MANAGEMENT (OUTPATIENT)
Dept: CALL CENTER | Facility: HOSPITAL | Age: 87
End: 2022-01-11

## 2022-01-11 DIAGNOSIS — I48.0 PAROXYSMAL ATRIAL FIBRILLATION: Primary | ICD-10-CM

## 2022-01-11 LAB — INR PPP: 1.4

## 2022-01-12 NOTE — OUTREACH NOTE
Medical Week 3 Survey      Responses   [facility] patient discharged from? [facility]   Does the patient have one of the following disease processes/diagnoses(primary or secondary)? Other   Week 3 attempt successful? Yes   Call start time 1913   Call end time 1916   Discharge diagnosis Elevated INR    Does the patient have a primary care provider?  Yes   Has the patient kept scheduled appointments due by today? Yes   Has home health visited the patient within 72 hours of discharge? N/A   Psychosocial issues? No   What is the patient's perception of their health status since discharge? Improving   Is the patient/caregiver able to teach back signs and symptoms related to disease process for when to call PCP? Yes   Is the patient/caregiver able to teach back signs and symptoms related to disease process for when to call 911? Yes   Is the patient/caregiver able to teach back the hierarchy of who to call/visit for symptoms/problems? PCP, Specialist, Home health nurse, Urgent Care, ED, 911 Yes   If the patient is a current smoker, are they able to teach back resources for cessation? Not a smoker   Additional teach back comments Pt states she had INR checked and it was 1.4 and they increased her warfarin.  She will have it checked again next week.     Week 3 Call Completed? Yes   Wrap up additional comments Denies questions or needs at this time          Reyna Arambula LPN

## 2022-01-18 ENCOUNTER — ANTICOAGULATION VISIT (OUTPATIENT)
Dept: CARDIOLOGY | Facility: CLINIC | Age: 87
End: 2022-01-18

## 2022-01-18 DIAGNOSIS — I48.0 PAROXYSMAL ATRIAL FIBRILLATION: Primary | ICD-10-CM

## 2022-01-18 LAB — INR PPP: 1.4

## 2022-01-19 ENCOUNTER — READMISSION MANAGEMENT (OUTPATIENT)
Dept: CALL CENTER | Facility: HOSPITAL | Age: 87
End: 2022-01-19

## 2022-01-19 NOTE — OUTREACH NOTE
Medical Week 4 Survey      Responses   [facility] patient discharged from? [facility]   Does the patient have one of the following disease processes/diagnoses(primary or secondary)? Other   Week 4 attempt successful? Yes   Call start time 1539   Call end time 1543   Meds reviewed with patient/caregiver? Yes   Is the patient taking all medications as directed (includes completed medication regime)? Yes   Medication comments patient states that she is on macrobid for UTI.    Has the patient kept scheduled appointments due by today? Yes   Is the patient still receiving Home Health Services? N/A   Psychosocial issues? No   What is the patient's perception of their health status since discharge? New symptoms unrelated to diagnosis  [Patient reports that she has another UTI, being treated with macrobid. ]   Is the patient/caregiver able to teach back signs and symptoms related to disease process for when to call PCP? Yes   Is the patient/caregiver able to teach back signs and symptoms related to disease process for when to call 911? Yes   Is the patient/caregiver able to teach back the hierarchy of who to call/visit for symptoms/problems? PCP, Specialist, Home health nurse, Urgent Care, ED, 911 Yes   If the patient is a current smoker, are they able to teach back resources for cessation? Not a smoker   Week 4 Call Completed? Yes   Would the patient like one additional call? No   Graduated Yes   Is the patient interested in additional calls from an ambulatory ?  NOTE:  applies to high risk patients requiring additional follow-up. No   Did the patient feel the follow up calls were helpful during their recovery period? Yes   Was the number of calls appropriate? Yes   Wrap up additional comments Patient denies any questions or needs from nurse today.           Lea Gaines RN

## 2022-01-25 ENCOUNTER — ANTICOAGULATION VISIT (OUTPATIENT)
Dept: CARDIOLOGY | Facility: CLINIC | Age: 87
End: 2022-01-25

## 2022-01-25 DIAGNOSIS — I48.0 PAROXYSMAL ATRIAL FIBRILLATION: Primary | ICD-10-CM

## 2022-01-25 LAB — INR PPP: 1.6

## 2022-01-25 PROCEDURE — G0250 MD INR TEST REVIE INTER MGMT: HCPCS | Performed by: INTERNAL MEDICINE

## 2022-02-01 ENCOUNTER — ANTICOAGULATION VISIT (OUTPATIENT)
Dept: CARDIOLOGY | Facility: CLINIC | Age: 87
End: 2022-02-01

## 2022-02-01 DIAGNOSIS — I48.0 PAROXYSMAL ATRIAL FIBRILLATION: Primary | ICD-10-CM

## 2022-02-01 LAB — INR PPP: 1.9

## 2022-02-15 ENCOUNTER — ANTICOAGULATION VISIT (OUTPATIENT)
Dept: CARDIOLOGY | Facility: CLINIC | Age: 87
End: 2022-02-15

## 2022-02-15 DIAGNOSIS — I48.0 PAROXYSMAL ATRIAL FIBRILLATION: Primary | ICD-10-CM

## 2022-02-15 LAB
INR PPP: 2.4
INR PPP: 2.4

## 2022-02-16 RX ORDER — LEVOTHYROXINE SODIUM 0.07 MG/1
TABLET ORAL
Qty: 60 TABLET | Refills: 3 | Status: SHIPPED | OUTPATIENT
Start: 2022-02-16 | End: 2022-10-21 | Stop reason: SDUPTHER

## 2022-03-01 ENCOUNTER — ANTICOAGULATION VISIT (OUTPATIENT)
Dept: CARDIOLOGY | Facility: CLINIC | Age: 87
End: 2022-03-01

## 2022-03-01 DIAGNOSIS — I48.0 PAROXYSMAL ATRIAL FIBRILLATION: Primary | ICD-10-CM

## 2022-03-01 LAB — INR PPP: 2.3

## 2022-03-15 ENCOUNTER — ANTICOAGULATION VISIT (OUTPATIENT)
Dept: CARDIOLOGY | Facility: CLINIC | Age: 87
End: 2022-03-15

## 2022-03-15 DIAGNOSIS — I48.0 PAROXYSMAL ATRIAL FIBRILLATION: Primary | ICD-10-CM

## 2022-03-15 LAB — INR PPP: 2.5

## 2022-03-15 PROCEDURE — G0250 MD INR TEST REVIE INTER MGMT: HCPCS | Performed by: INTERNAL MEDICINE

## 2022-03-23 PROCEDURE — 93294 REM INTERROG EVL PM/LDLS PM: CPT | Performed by: INTERNAL MEDICINE

## 2022-03-23 PROCEDURE — 93296 REM INTERROG EVL PM/IDS: CPT | Performed by: INTERNAL MEDICINE

## 2022-03-23 RX ORDER — SOTALOL HYDROCHLORIDE 120 MG/1
TABLET ORAL
Qty: 180 TABLET | Refills: 0 | Status: SHIPPED | OUTPATIENT
Start: 2022-03-23 | End: 2022-06-23

## 2022-03-29 ENCOUNTER — ANTICOAGULATION VISIT (OUTPATIENT)
Dept: CARDIOLOGY | Facility: CLINIC | Age: 87
End: 2022-03-29

## 2022-03-29 DIAGNOSIS — I48.0 PAROXYSMAL ATRIAL FIBRILLATION: Primary | ICD-10-CM

## 2022-03-29 LAB — INR PPP: 4.3

## 2022-04-01 ENCOUNTER — ANTICOAGULATION VISIT (OUTPATIENT)
Dept: CARDIOLOGY | Facility: CLINIC | Age: 87
End: 2022-04-01

## 2022-04-01 DIAGNOSIS — I48.0 PAROXYSMAL ATRIAL FIBRILLATION: Primary | ICD-10-CM

## 2022-04-01 LAB — INR PPP: 1.8

## 2022-04-08 ENCOUNTER — ANTICOAGULATION VISIT (OUTPATIENT)
Dept: CARDIOLOGY | Facility: CLINIC | Age: 87
End: 2022-04-08

## 2022-04-08 DIAGNOSIS — I48.0 PAROXYSMAL ATRIAL FIBRILLATION: Primary | ICD-10-CM

## 2022-04-08 LAB — INR PPP: 3

## 2022-04-15 ENCOUNTER — ANTICOAGULATION VISIT (OUTPATIENT)
Dept: CARDIOLOGY | Facility: CLINIC | Age: 87
End: 2022-04-15

## 2022-04-15 DIAGNOSIS — I48.0 PAROXYSMAL ATRIAL FIBRILLATION: Primary | ICD-10-CM

## 2022-04-15 LAB — INR PPP: 3.9

## 2022-04-15 PROCEDURE — G0250 MD INR TEST REVIE INTER MGMT: HCPCS | Performed by: INTERNAL MEDICINE

## 2022-04-18 ENCOUNTER — OFFICE (OUTPATIENT)
Dept: URBAN - METROPOLITAN AREA CLINIC 64 | Facility: CLINIC | Age: 87
End: 2022-04-18

## 2022-04-18 VITALS
WEIGHT: 130 LBS | DIASTOLIC BLOOD PRESSURE: 78 MMHG | HEART RATE: 70 BPM | SYSTOLIC BLOOD PRESSURE: 154 MMHG | HEIGHT: 62 IN

## 2022-04-18 DIAGNOSIS — K59.00 CONSTIPATION, UNSPECIFIED: ICD-10-CM

## 2022-04-18 PROCEDURE — 99213 OFFICE O/P EST LOW 20 MIN: CPT | Performed by: NURSE PRACTITIONER

## 2022-04-22 ENCOUNTER — ANTICOAGULATION VISIT (OUTPATIENT)
Dept: CARDIOLOGY | Facility: CLINIC | Age: 87
End: 2022-04-22

## 2022-04-22 DIAGNOSIS — I48.0 PAROXYSMAL ATRIAL FIBRILLATION: Primary | ICD-10-CM

## 2022-04-22 LAB — INR PPP: 1.6

## 2022-04-29 ENCOUNTER — ANTICOAGULATION VISIT (OUTPATIENT)
Dept: CARDIOLOGY | Facility: CLINIC | Age: 87
End: 2022-04-29

## 2022-04-29 DIAGNOSIS — I48.0 PAROXYSMAL ATRIAL FIBRILLATION: Primary | ICD-10-CM

## 2022-04-29 LAB — INR PPP: 3.9

## 2022-05-04 RX ORDER — WARFARIN SODIUM 5 MG/1
TABLET ORAL
Qty: 100 TABLET | Refills: 0 | Status: SHIPPED | OUTPATIENT
Start: 2022-05-04 | End: 2022-08-24

## 2022-05-06 ENCOUNTER — ANTICOAGULATION VISIT (OUTPATIENT)
Dept: CARDIOLOGY | Facility: CLINIC | Age: 87
End: 2022-05-06

## 2022-05-06 DIAGNOSIS — I48.0 PAROXYSMAL ATRIAL FIBRILLATION: Primary | ICD-10-CM

## 2022-05-06 LAB
INR PPP: 1.8
INR PPP: 1.8 (ref 0.9–1.1)

## 2022-05-06 PROCEDURE — 85610 PROTHROMBIN TIME: CPT | Performed by: INTERNAL MEDICINE

## 2022-05-06 PROCEDURE — 36416 COLLJ CAPILLARY BLOOD SPEC: CPT | Performed by: INTERNAL MEDICINE

## 2022-05-12 ENCOUNTER — APPOINTMENT (OUTPATIENT)
Dept: GENERAL RADIOLOGY | Facility: HOSPITAL | Age: 87
End: 2022-05-12

## 2022-05-12 ENCOUNTER — APPOINTMENT (OUTPATIENT)
Dept: CT IMAGING | Facility: HOSPITAL | Age: 87
End: 2022-05-12

## 2022-05-12 ENCOUNTER — OFFICE VISIT (OUTPATIENT)
Dept: CARDIOLOGY | Facility: CLINIC | Age: 87
End: 2022-05-12

## 2022-05-12 ENCOUNTER — HOSPITAL ENCOUNTER (EMERGENCY)
Facility: HOSPITAL | Age: 87
Discharge: HOME | End: 2022-05-12
Attending: EMERGENCY MEDICINE | Admitting: EMERGENCY MEDICINE

## 2022-05-12 VITALS
HEART RATE: 69 BPM | SYSTOLIC BLOOD PRESSURE: 132 MMHG | RESPIRATION RATE: 18 BRPM | HEIGHT: 62 IN | WEIGHT: 133 LBS | OXYGEN SATURATION: 99 % | DIASTOLIC BLOOD PRESSURE: 74 MMHG | BODY MASS INDEX: 24.48 KG/M2

## 2022-05-12 VITALS
WEIGHT: 133 LBS | SYSTOLIC BLOOD PRESSURE: 164 MMHG | DIASTOLIC BLOOD PRESSURE: 63 MMHG | BODY MASS INDEX: 24.48 KG/M2 | OXYGEN SATURATION: 95 % | HEART RATE: 70 BPM | HEIGHT: 62 IN | RESPIRATION RATE: 18 BRPM | TEMPERATURE: 97.6 F

## 2022-05-12 DIAGNOSIS — Z95.2 HX OF AORTIC VALVE REPLACEMENT: ICD-10-CM
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
## 2022-05-12 DIAGNOSIS — I49.5 SICK SINUS SYNDROME: ICD-10-CM

## 2022-05-12 DIAGNOSIS — S80.02XA CONTUSION OF LEFT KNEE, INITIAL ENCOUNTER: ICD-10-CM

## 2022-05-12 DIAGNOSIS — I48.0 PAROXYSMAL ATRIAL FIBRILLATION: Primary | ICD-10-CM

## 2022-05-12 DIAGNOSIS — I25.10 CHRONIC CORONARY ARTERY DISEASE: ICD-10-CM

## 2022-05-12 DIAGNOSIS — S70.02XA CONTUSION OF LEFT HIP, INITIAL ENCOUNTER: ICD-10-CM

## 2022-05-12 DIAGNOSIS — I10 ESSENTIAL HYPERTENSION: ICD-10-CM

## 2022-05-12 DIAGNOSIS — S01.112A LACERATION OF LEFT EYEBROW, INITIAL ENCOUNTER: ICD-10-CM

## 2022-05-12 DIAGNOSIS — Z95.0 PRESENCE OF CARDIAC PACEMAKER: ICD-10-CM

## 2022-05-12 DIAGNOSIS — S00.83XA CONTUSION OF FACE, INITIAL ENCOUNTER: ICD-10-CM

## 2022-05-12 DIAGNOSIS — I35.1 NONRHEUMATIC AORTIC VALVE INSUFFICIENCY: ICD-10-CM

## 2022-05-12 LAB
INR PPP: 1.98
INR PPP: 1.98 (ref 2–3)
PROTHROMBIN TIME: 19.6 SECONDS (ref 19.4–28.5)

## 2022-05-12 PROCEDURE — 73502 X-RAY EXAM HIP UNI 2-3 VIEWS: CPT

## 2022-05-12 PROCEDURE — 25010000002 TETANUS-DIPHTH-ACELL PERTUSSIS 5-2.5-18.5 LF-MCG/0.5 SUSPENSION PREFILLED SYRINGE: Performed by: NURSE PRACTITIONER

## 2022-05-12 PROCEDURE — 90715 TDAP VACCINE 7 YRS/> IM: CPT | Performed by: NURSE PRACTITIONER

## 2022-05-12 PROCEDURE — 73564 X-RAY EXAM KNEE 4 OR MORE: CPT

## 2022-05-12 PROCEDURE — 70486 CT MAXILLOFACIAL W/O DYE: CPT

## 2022-05-12 PROCEDURE — 99283 EMERGENCY DEPT VISIT LOW MDM: CPT

## 2022-05-12 PROCEDURE — 70450 CT HEAD/BRAIN W/O DYE: CPT

## 2022-05-12 PROCEDURE — 85610 PROTHROMBIN TIME: CPT | Performed by: NURSE PRACTITIONER

## 2022-05-12 PROCEDURE — 93000 ELECTROCARDIOGRAM COMPLETE: CPT | Performed by: INTERNAL MEDICINE

## 2022-05-12 PROCEDURE — 72125 CT NECK SPINE W/O DYE: CPT

## 2022-05-12 PROCEDURE — 90471 IMMUNIZATION ADMIN: CPT | Performed by: NURSE PRACTITIONER

## 2022-05-12 PROCEDURE — 99214 OFFICE O/P EST MOD 30 MIN: CPT | Performed by: INTERNAL MEDICINE

## 2022-05-12 PROCEDURE — 72110 X-RAY EXAM L-2 SPINE 4/>VWS: CPT

## 2022-05-12 RX ORDER — METHYLPREDNISOLONE 4 MG/1
1 TABLET ORAL DAILY
COMMUNITY

## 2022-05-12 RX ORDER — POTASSIUM CHLORIDE 20 MEQ/1
1 TABLET, EXTENDED RELEASE ORAL
COMMUNITY
End: 2022-05-12 | Stop reason: SDUPTHER

## 2022-05-12 RX ORDER — METHYLPREDNISOLONE 4 MG
TABLET, DOSE PACK ORAL
COMMUNITY
End: 2022-07-19 | Stop reason: SDUPTHER

## 2022-05-12 RX ORDER — NITROGLYCERIN 0.4 MG/1
0.4 TABLET SUBLINGUAL
Qty: 100 TABLET | Refills: 1 | Status: SHIPPED | OUTPATIENT
Start: 2022-05-12

## 2022-05-12 RX ORDER — FAMOTIDINE 40 MG/1
1 TABLET, FILM COATED ORAL
COMMUNITY
End: 2022-05-12

## 2022-05-12 RX ADMIN — TETANUS TOXOID, REDUCED DIPHTHERIA TOXOID AND ACELLULAR PERTUSSIS VACCINE, ADSORBED 0.5 ML: 5; 2.5; 8; 8; 2.5 SUSPENSION INTRAMUSCULAR at 18:55

## 2022-05-12 NOTE — PROGRESS NOTES
CC: Sick sinus syndrome with dual-chamber pacemaker in situ follow up .    Sub-87-year-old female patient with recurrent symptomatic atrial fibrillation was placed on sotalol In the past and comes in for follow-up. she has sick sinus syndrome with a dual-chamber pacemaker in situ which was placed several months ago. she is doing well with recurrent symptoms of arrhythmias-- she has history of valvular heart disease status post bioprosthetic aortic valve replacement in 2014. She is additional history that includes peripheral vascular disease, coronary artery disease status post coronary arterial bypass grafting, bilateral renal artery stenosis, carotid artery disease, dyslipidemia, and sick sinus syndrome status post permanent pacemaker placement.  She complains of intermittent palpitations and also fatigue with current intake of sotalol  Denies any leg edema or syncope or any  Angina  She denies any syncope and compliant with her medications  She had increasing atrial arrhythmia and sotalol dose was increased  and comes started having breakthrough arrhythmia and started on Cardizem and comes in for evaluation   Complains of intermittent PAF          Past Medical History:     Reviewed history from 06/13/2016 and no changes required:        AVR 2014-        Coronary artery disease: S/P CABG and PCI with stenting-        Carotid disease;left side 50-74%-Patrick Ocampo        Inferior myocardial infarction 12-06-        Asthma        Anxiety Disorder        Depression        G E R D        Hyperlipidemia        Hypertension-        Hyperthyroidism        fx thoracic ?        shoulder fx        Rotator cuff syndrome         Gout         Paroxysmal atrial fib        Arthritis        Hypothyroid        No Drug Allergies?         Eye exam:2014 &2015 Dr.Kelley Parekh in [place]         Rheumatoid Arthritis    Past Surgical History:     Reviewed history from 10/16/2018 and no changes required:         PCI/stent, LCX, 3-20-06, Cypher stent        PCI/stent x 2, LCX & priximal diagonal, 12-3-06, [employer] stents        cataract r eye 12/07  lt eye 01/08        CABG x 3  07-19-08        thoracentesis l lung 8/08        Right rotator cuff repair - Oct. 15, 2013        Cholecystectomy-2009        Left Knee Arthoplasty-2014        Left Shoulder Replacement 4/2014        Aortic Valve  Replacement 6/11/2014        Heart Catherization:2/18/2015        Pacemaker placed 6/6/16        Colonoscopy 2011         Surgery Finger 2018       Review of Systems   General: Positive for fatigue and tiredness    Eyes: No redness  Cardiovascular: No chest pain, no palpitations          Physical Exam    General:      well developed, well nourished, in no acute distress.    Head:      normocephalic and atraumatic.    Eyes:      PERRL/EOM intact, conjunctiva and sclera clear with out nystagmus.    Neck:      no masses, thyromegaly  Lungs:      clear bilaterally to auscultation.    Heart:      regular rhythm, no rubs, no gallops and Grade 2/6 YENI:.            Impressions  SSS, s/p PPM --pacemaker home monitoring reviewed   Latest INR of 1.8  paroxysmal atrial fibrillation on sotalol on chronic Coumadin therapy--titration of cardizem educated  Chronic stable angina  CAD s/p CABG most recent cath with no disease amenable to PCI 2016  Valvular heart disease, s/p bioprosthetic AVR  CKD, renal artery stenosis  PAD , FELICITAS and carotid stenosis  Chads vasc score---4  Medications reviewed and follow-up in 6 months      ECG 12 Lead    Date/Time: 5/12/2022 3:44 PM  Performed by: Pk Crabtree MD  Authorized by: Pk Crabtree MD   Comparison: compared with previous ECG   Similar to previous ECG  Rhythm: sinus rhythm and paced  Rate: normal  Conduction: non-specific intraventricular conduction delay            Electronically signed by Pk Crabtree MD, 05/12/22, 3:43 PM EDT.

## 2022-05-13 ENCOUNTER — ANTICOAGULATION VISIT (OUTPATIENT)
Dept: CARDIOLOGY | Facility: CLINIC | Age: 87
End: 2022-05-13

## 2022-05-13 DIAGNOSIS — I48.0 PAROXYSMAL ATRIAL FIBRILLATION: Primary | ICD-10-CM

## 2022-05-13 PROCEDURE — 93793 ANTICOAG MGMT PT WARFARIN: CPT | Performed by: INTERNAL MEDICINE

## 2022-05-13 NOTE — DISCHARGE INSTRUCTIONS
Tylenol or ibuprofen as needed for discomfort  Ice to the areas of discomfort 20 minutes at a time several times a day      Follow-up with primary care     Turn to the ER for new or worsening symptoms

## 2022-05-13 NOTE — ED PROVIDER NOTES
Subjective   Patient is an 87-year-old white female with history of hypertension, high cholesterol, CAD on warfarin.  She presents today for from assisted living facility with reports of a fall.  Patient states she was walking when she tripped over her own feet and fell forward.  She struck her face.  She denies LOC.  She does complain of a mild headache pain over the left side of her face.  She denies any dizziness or visual changes.  She denies any neck pain.  States she does have some pain in the middle of her back but states this is chronic for her she denies new pain.  She complains of some pain to the left knee.  She denies any chest pain abdominal pain nausea vomiting numbness tingling weakness in any extremity or other complaint.          Review of Systems   Constitutional: Negative.    Respiratory: Negative.    Cardiovascular: Negative.    Gastrointestinal: Negative.    Genitourinary: Negative.    Musculoskeletal:        Left knee pain, left facial pain, scalp tenderness   Skin: Positive for wound.        Left eyebrow abrasion   Neurological: Positive for headaches. Negative for dizziness, syncope, facial asymmetry, speech difficulty, weakness, light-headedness and numbness.       Past Medical History:   Diagnosis Date   • Anxiety    • Asthma    • CAD (coronary artery disease)    • Carotid artery disease (HCC)    • GERD (gastroesophageal reflux disease)    • Gout    • Hyperlipidemia    • Hypertension    • Hyperthyroidism    • Hypothyroidism        No Known Allergies    Past Surgical History:   Procedure Laterality Date   • BASAL CELL CARCINOMA EXCISION      spine, nose and arm, leg 2020   • BREAST BIOPSY     • CARDIAC CATHETERIZATION     • CAROTID STENT     • CATARACT EXTRACTION     • CHOLECYSTECTOMY     • CORONARY ARTERY BYPASS GRAFT     • CORONARY STENT PLACEMENT     • FINGER SURGERY     • KNEE SURGERY     • PACEMAKER IMPLANTATION     • SHOULDER SURGERY         Family History   Problem Relation Age of  Onset   • Hypertension Mother    • Heart disease Father    • Heart disease Sister    • Kidney cancer Sister    • Stroke Sister    • Cancer Sister         breast   • Heart disease Brother        Social History     Socioeconomic History   • Marital status:    Tobacco Use   • Smoking status: Former Smoker   • Smokeless tobacco: Never Used   Vaping Use   • Vaping Use: Never used   Substance and Sexual Activity   • Alcohol use: No   • Drug use: No   • Sexual activity: Defer           Objective   Physical Exam  Vital signs and triage nurse note reviewed.  Constitutional: Awake, alert; well-developed and well-nourished. No acute distress is noted.  HEENT: Normocephalic; pupils are PERRL with intact EOM; oropharynx is pink and moist without exudate or erythema.  No drooling or pooling of oral secretions.  Mild tenderness over the left eyebrow with ecchymosis.  No edema.  No nuñez sign noted.  No hemotympanum.  Teeth are intact.  No malocclusion.  Neck: Supple, full range of motion without pain; no cervical lymphadenopathy. Normal phonation.  Cardiovascular: Regular rate and rhythm, normal S1-S2.  No murmur noted.  Pulmonary: Respiratory effort regular nonlabored, breath sounds clear to auscultation all fields.  Abdomen: Soft, nontender, nondistended with normoactive bowel sounds; no rebound or guarding.  Musculoskeletal: Independent range of motion of all extremities with no palpable tenderness.  Mild tenderness over the anterior left knee with no erythema or ecchymosis. There is good range of motion.  Neuro: Alert oriented x3, speech is clear and appropriate, GCS 15.    Skin: Flesh tone, warm, dry, intact; no erythematous or petechial rash or lesion.  There is a 0.5 cm laceration noted over the left eyebrow with some mild oozing.  No significant active bleeding.      Laceration Repair    Date/Time: 5/12/2022 8:49 PM  Performed by: Antonia Fontaine APRN  Authorized by: Wade Holliday MD     Consent:     Consent  obtained:  Verbal    Consent given by:  Patient    Risks, benefits, and alternatives were discussed: yes      Risks discussed:  Infection, pain and poor cosmetic result  Universal protocol:     Immediately prior to procedure, a time out was called: yes      Patient identity confirmed:  Verbally with patient and arm band  Anesthesia:     Anesthesia method:  Local infiltration    Local anesthetic:  Lidocaine 1% w/o epi  Laceration details:     Location:  Face    Face location:  L eyebrow    Length (cm):  0.5  Pre-procedure details:     Preparation:  Patient was prepped and draped in usual sterile fashion  Exploration:     Wound exploration: entire depth of wound visualized    Treatment:     Area cleansed with:  Shur-Clens and saline    Amount of cleaning:  Standard  Skin repair:     Repair method:  Sutures    Suture size:  6-0    Suture material:  Nylon    Suture technique:  Simple interrupted    Number of sutures:  2  Approximation:     Approximation:  Close  Repair type:     Repair type:  Simple  Post-procedure details:     Dressing:  Antibiotic ointment    Procedure completion:  Tolerated well, no immediate complications               ED Course  ED Course as of 05/18/22 1819   Thu May 12, 2022   2056 Care obtained from FAUSTINO Fontaine pending x-ray results.   []   2057 Care turned over to CHIDI Aranda pending xray results and disposition. [MD]   2138 Negative x-rays results were discussed with patient and female family member at bedside.  Patient will be discharged. []      ED Course User Index  [] Rosi Joy APRN  [MD] Antonia Fontaine APRN      Labs Reviewed   PROTIME-INR - Abnormal; Notable for the following components:       Result Value    INR 1.98 (*)     All other components within normal limits     XR Knee 4+ View Left    Result Date: 5/12/2022   1. Negative for acute fracture. 2. 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  Electronically Signed By-Demetris Whiteside MD  On:5/12/2022 7:27 PM This report was finalized on [id number] by  Demetris Whiteside MD.    CT Head Without Contrast    Result Date: 5/12/2022  1. No acute intracranial hemorrhage. 2. Left lateral periorbital scalp contusion/hematoma. 3. White matter findings suggesting chronic small vessel disease.  Electronically Signed By-Demetris Whiteside MD On:5/12/2022 7:59 PM This report was finalized on [id number] by  Demetris Whiteside MD.    CT Cervical Spine Without Contrast    Result Date: 5/12/2022  1. Negative for cervical spine fracture. 2. Multilevel degenerative findings in the cervical spine above.  Electronically Signed By-Demetris Whiteside MD On:5/12/2022 8:11 PM This report was finalized on [id number] by  Demetris Whiteside MD.    CT Facial Bones Without Contrast    Result Date: 5/12/2022  1. Negative for facial bone fracture. 2. Left periorbital contusion/hematoma.  Electronically Signed By-Demetris Whiteside MD On:5/12/2022 8:15 PM This report was finalized on [id number] by  Demetris Whiteside MD.    Medications   Tetanus-Diphth-Acell Pertussis (BOOSTRIX) injection 0.5 mL (0.5 mL Intramuscular Given 5/12/22 1855)                                                MDM  Number of Diagnoses or Management Options  Diagnosis management comments: Patient had CT of the head face and C-spine obtained.  She had x-rays obtained of the left knee.    CT of the C-spine shows no acute abnormality.  CT of the head shows no acute intracranial abnormality.  CT of the facial bones shows no fracture but some left periorbital contusion/hematoma.    INR 1.9    Patient had laceration repaired as noted above.  She was given a tetanus booster.  Patient stood up to get on the bedside commode and started complaining of pain in the left posterior hip and lower back.  X-rays were obtained.       Amount and/or Complexity of Data Reviewed  Clinical lab tests: reviewed and ordered  Tests in the radiology section of CPT®: reviewed and ordered    Patient  Progress  Patient progress: stable      Final diagnoses:   Fall, initial encounter   Contusion of face, initial encounter   Laceration of left eyebrow, initial encounter   Contusion of left knee, initial encounter       ED Disposition  ED Disposition     None          No follow-up provider specified.       Medication List      No changes were made to your prescriptions during this visit.          Antonia Fontaine, APRYASIR  05/18/22 0191

## 2022-05-20 ENCOUNTER — ANTICOAGULATION VISIT (OUTPATIENT)
Dept: CARDIOLOGY | Facility: CLINIC | Age: 87
End: 2022-05-20

## 2022-05-20 DIAGNOSIS — I48.0 PAROXYSMAL ATRIAL FIBRILLATION: Primary | ICD-10-CM

## 2022-05-20 LAB — INR PPP: 2.3

## 2022-05-20 PROCEDURE — G0250 MD INR TEST REVIE INTER MGMT: HCPCS | Performed by: INTERNAL MEDICINE

## 2022-05-27 ENCOUNTER — ANTICOAGULATION VISIT (OUTPATIENT)
Dept: CARDIOLOGY | Facility: CLINIC | Age: 87
End: 2022-05-27

## 2022-05-27 DIAGNOSIS — I48.0 PAROXYSMAL ATRIAL FIBRILLATION: Primary | ICD-10-CM

## 2022-05-27 LAB — INR PPP: 2.5

## 2022-06-10 ENCOUNTER — ANTICOAGULATION VISIT (OUTPATIENT)
Dept: CARDIOLOGY | Facility: CLINIC | Age: 87
End: 2022-06-10

## 2022-06-10 DIAGNOSIS — I48.0 PAROXYSMAL ATRIAL FIBRILLATION: Primary | ICD-10-CM

## 2022-06-10 LAB — INR PPP: 2.7

## 2022-06-23 RX ORDER — SOTALOL HYDROCHLORIDE 120 MG/1
TABLET ORAL
Qty: 180 TABLET | Refills: 0 | Status: SHIPPED | OUTPATIENT
Start: 2022-06-23 | End: 2022-09-21

## 2022-06-24 LAB — INR PPP: 3.1

## 2022-06-28 ENCOUNTER — ANTICOAGULATION VISIT (OUTPATIENT)
Dept: CARDIOLOGY | Facility: CLINIC | Age: 87
End: 2022-06-28

## 2022-06-28 DIAGNOSIS — I48.0 PAROXYSMAL ATRIAL FIBRILLATION: Primary | ICD-10-CM

## 2022-07-05 ENCOUNTER — ANTICOAGULATION VISIT (OUTPATIENT)
Dept: CARDIOLOGY | Facility: CLINIC | Age: 87
End: 2022-07-05

## 2022-07-05 DIAGNOSIS — I48.0 PAROXYSMAL ATRIAL FIBRILLATION: Primary | ICD-10-CM

## 2022-07-05 LAB — INR PPP: 2.2

## 2022-07-05 PROCEDURE — G0250 MD INR TEST REVIE INTER MGMT: HCPCS | Performed by: INTERNAL MEDICINE

## 2022-07-06 ENCOUNTER — TELEPHONE (OUTPATIENT)
Dept: CARDIOLOGY | Facility: CLINIC | Age: 87
End: 2022-07-06

## 2022-07-06 NOTE — TELEPHONE ENCOUNTER
Patient has a pacemaker and appears to be having noise on her atrial lead, please look at events labeled shiloh on 7/6/22 [facility] remote report. Thanks.

## 2022-07-18 PROBLEM — D68.9 MEDICATION INDUCED COAGULOPATHY: Status: ACTIVE | Noted: 2022-07-18

## 2022-07-18 PROBLEM — T50.905A MEDICATION INDUCED COAGULOPATHY: Status: ACTIVE | Noted: 2022-07-18

## 2022-07-18 NOTE — PROGRESS NOTES
Cardiology Office Visit      Encounter Date:  07/19/2022    Patient ID:   Tracy Jane is a 87 y.o. female.    Reason For Followup:  Paroxysmal atrial fibrillation  Coronary artery disease  Valvular heart disease  History of permanent pacemaker    Brief Clinical History:  Dear Dr. Sam, YEYO Perez    I had the pleasure of seeing Tracy Jane today. As you are well aware, this is a 87 y.o. female with a history of valvular heart disease status post bioprosthetic aortic valve replacement in 2014. She is additional history that includes peripheral vascular disease, coronary artery disease status post coronary arterial bypass grafting, bilateral renal artery stenosis, carotid artery disease, dyslipidemia, and sick sinus syndrome status post permanent pacemaker placement. She presents today for followup on the above conditions.    Interval History:  She denies any chest pain pressure heaviness or tightness. She denies any shortness of breath out of character.  She denies any PND orthopnea.  She denies any syncope or near-syncope.    She reports being in her usual state of health.    She did suffer a mechanical fall and needed stitches to her head.  She is recovered from this.  She had not been using her walker but is now using it more consistently.  She is having some occasional palpitations.    Her biggest complaint today is regarding the discoloration on her arms secondary to steroid therapy.    She also was hospitalized in December 2021 for an elevated INR.  She had changed medications but had failed to inform the Coumadin clinic that these changes occurred which resulted in a supratherapeutic INR.    Assessment & Plan    Impressions:  Sick sinus syndrome status post permanent pacemaker placement 2016  Paroxysmal atrial fibrillation on sotalol and Coumadin therapy  Coagulopathy secondary to warfarin  Chronic stable angina  CAD s/p CABG most recent cath with no disease amenable to PCI 2016  Labile  "blood pressure  Valvular heart disease, s/p bioprosthetic AVR  CKD, renal artery stenosis  PAD , FELICITAS and carotid stenosis  Fatigue   Orthostasis    Recommendations:  Continuation of her current cardiovascular regimen at the present time.     This includes antihypertensives, diuretic, antianginals, and statin therapy.  Monitor blood pressure and notify if not sufficiently controlled.  Avoid rapid position changes  Consider echocardiogram for surveillance purposes after next visit  Follow-up in 9 months time sooner should there be difficulties.    Diagnoses and all orders for this visit:    1. Chronic coronary artery disease (Primary)    2. Essential hypertension    3. Hx of aortic valve replacement    4. Mixed hyperlipidemia    5. Paroxysmal atrial fibrillation (HCC)    6. Presence of cardiac pacemaker    7. Sick sinus syndrome (HCC)    8. Valvular heart disease    9. Medication induced coagulopathy (HCC)          Objective:    Vitals:  Vitals:    07/19/22 0956   BP: 118/68   Pulse: 70   SpO2: 97%   Weight: 58.5 kg (129 lb)   Height: 157.5 cm (62\")     Body mass index is 23.59 kg/m².      Physical Exam:    General: Alert, cooperative, no distress, appears stated age  Head:  Normocephalic, atraumatic, mucous membranes moist  Eyes:  Conjunctiva/corneas clear, EOM's intact     Neck:  Supple,  no bruit    Lungs: Clear to auscultation bilaterally, no wheezes rhonchi rales are noted  Chest wall: No tenderness  Heart::  Regular rate and rhythm, S1 and S2 normal, 1/6 holosystolic murmur.  No rub or gallop  Abdomen: Soft, non-tender, nondistended bowel sounds active  Extremities: No cyanosis, clubbing, or edema  Pulses: 2+ and symmetric all extremities  Skin:  No rashes or lesions  Neuro/psych: A&O x3. CN II through XII are grossly intact with appropriate affect      Allergies:  No Known Allergies    Medication Review:     Current Outpatient Medications:   •  acetaminophen (TYLENOL) 650 MG 8 hr tablet, Take 650 mg by mouth " Every 8 (Eight) Hours As Needed., Disp: , Rfl:   •  aspirin 81 MG EC tablet, Take 81 mg by mouth Daily., Disp: , Rfl:   •  colchicine 0.6 MG tablet, Take 0.6 mg by mouth Daily., Disp: , Rfl:   •  dilTIAZem CD (CARDIZEM CD) 240 MG 24 hr capsule, TAKE ONE CAPSULE BY MOUTH DAILY, Disp: 90 capsule, Rfl: 3  •  doxazosin (CARDURA) 2 MG tablet, TAKE ONE TABLET BY MOUTH DAILY, Disp: 90 tablet, Rfl: 3  •  ferrous sulfate 324 (65 Fe) MG tablet delayed-release EC tablet, Take 324 mg by mouth Daily With Breakfast., Disp: , Rfl:   •  furosemide (LASIX) 40 MG tablet, TAKE ONE TABLET BY MOUTH DAILY, Disp: 90 tablet, Rfl: 3  •  Glucos-Chondroit-Hyaluron-MSM (GLUCOSAMINE CHONDROITIN JOINT) tablet, Take 1 tablet by mouth Daily., Disp: , Rfl:   •  isosorbide mononitrate (IMDUR) 30 MG 24 hr tablet, TAKE ONE TABLET BY MOUTH TWICE A DAY, Disp: 180 tablet, Rfl: 3  •  levothyroxine (SYNTHROID, LEVOTHROID) 75 MCG tablet, TAKE ONE TABLET BY MOUTH DAILY MONDAY THROUGH FRIDAY, Disp: 60 tablet, Rfl: 3  •  melatonin 5 MG tablet tablet, Take 5 mg by mouth Every Night., Disp: , Rfl:   •  methylPREDNISolone (MEDROL) 4 MG tablet, Take 1 mg by mouth Daily., Disp: , Rfl:   •  Multiple Vitamin (MULTIVITAMIN) tablet, Take 1 tablet by mouth Daily., Disp: , Rfl:   •  nitroglycerin (NITROSTAT) 0.4 MG SL tablet, Place 1 tablet under the tongue Every 5 (Five) Minutes As Needed for Chest Pain., Disp: 100 tablet, Rfl: 1  •  Omega-3 Fatty Acids (FISH OIL) 1000 MG capsule capsule, Take  by mouth., Disp: , Rfl:   •  polyethylene glycol (MIRALAX) powder, Take 17 g by mouth As Needed., Disp: , Rfl:   •  potassium chloride ER (K-TAB) 20 MEQ tablet controlled-release ER tablet, TAKE ONE TABLET BY MOUTH DAILY, Disp: 90 tablet, Rfl: 3  •  rosuvastatin (CRESTOR) 20 MG tablet, TAKE ONE TABLET BY MOUTH DAILY, Disp: 90 tablet, Rfl: 3  •  sertraline (ZOLOFT) 50 MG tablet, Take 50 mg by mouth Daily., Disp: , Rfl:   •  sotalol (BETAPACE) 120 MG tablet, TAKE ONE TABLET BY  MOUTH TWICE A DAY, Disp: 180 tablet, Rfl: 0  •  warfarin (COUMADIN) 5 MG tablet, TAKE 1 AND 1/2 TABLETS BY MOUTH ON WEDNESDAY AND ONE TAKE ONE TABLET ON THE OTHER 6 DAYS OF THE WEEK OR AS DIRECTED, Disp: 100 tablet, Rfl: 0    Family History:  Family History   Problem Relation Age of Onset   • Hypertension Mother    • Heart disease Father    • Heart disease Sister    • Kidney cancer Sister    • Stroke Sister    • Cancer Sister         breast   • Cancer Sister    • Heart disease Brother        Past Medical History:  Past Medical History:   Diagnosis Date   • Anxiety    • Asthma    • Atrial fibrillation (HCC)    • CAD (coronary artery disease)    • Carotid artery disease (HCC)    • GERD (gastroesophageal reflux disease)    • Gout    • Hyperlipidemia    • Hypertension    • Hyperthyroidism    • Hypothyroidism        Past Surgical History:  Past Surgical History:   Procedure Laterality Date   • BASAL CELL CARCINOMA EXCISION      spine, nose and arm, leg 2020   • BREAST BIOPSY     • CARDIAC CATHETERIZATION     • CAROTID STENT     • CATARACT EXTRACTION     • CHOLECYSTECTOMY     • CORONARY ARTERY BYPASS GRAFT     • CORONARY STENT PLACEMENT     • FINGER SURGERY     • KNEE SURGERY     • PACEMAKER IMPLANTATION     • SHOULDER SURGERY         Social History:  Social History     Socioeconomic History   • Marital status:    Tobacco Use   • Smoking status: Former Smoker   • Smokeless tobacco: Never Used   Vaping Use   • Vaping Use: Never used   Substance and Sexual Activity   • Alcohol use: No   • Drug use: No   • Sexual activity: Defer       Review of Systems:  The following systems were reviewed as they relate to the cardiovascular system: Constitutional, Eyes, ENT, Cardiovascular, Respiratory, Gastrointestinal, Integumentary, Neurological, Psychiatric, Hematologic, Endocrine, Musculoskeletal, and Genitourinary. The pertinent cardiovascular findings are reported above with all other cardiovascular points within those  systems being negative.    Diagnostic Study Review:     Current Electrocardiogram:  Procedures no new EKG.  EKG dated 5/12/2022 demonstrates atrial paced rhythm with a ventricular rate of 70 bpm.    Laboratory Data:  Lab Results   Component Value Date    GLUCOSE 111 (H) 12/22/2021    BUN 20 12/22/2021    CREATININE 0.92 12/22/2021    EGFRIFNONA 58 (L) 12/22/2021    EGFRIFAFRI >60 mL/min/1.73m2 09/02/2016    BCR 21.7 12/22/2021    K 4.2 12/22/2021    CO2 27.0 12/22/2021    CALCIUM 9.0 12/22/2021    ALBUMIN 3.40 (L) 12/21/2021    LABIL2 1.1 10/12/2017    AST 30 12/21/2021    ALT 25 12/21/2021     Lab Results   Component Value Date    GLUCOSE 111 (H) 12/22/2021    CALCIUM 9.0 12/22/2021     12/22/2021    K 4.2 12/22/2021    CO2 27.0 12/22/2021     12/22/2021    BUN 20 12/22/2021    CREATININE 0.92 12/22/2021    EGFRIFAFRI >60 mL/min/1.73m2 09/02/2016    EGFRIFNONA 58 (L) 12/22/2021    BCR 21.7 12/22/2021    ANIONGAP 10.0 12/22/2021     Lab Results   Component Value Date    WBC 8.40 12/22/2021    HGB 10.9 (L) 12/22/2021    HCT 31.5 (L) 12/22/2021    MCV 90.5 12/22/2021     12/22/2021     Lab Results   Component Value Date    CHOL 70 12/21/2021    TRIG 133 12/21/2021    HDL 28 (L) 12/21/2021    LDL 19 12/21/2021     Lab Results   Component Value Date    HGBA1C 5.7 (H) 12/22/2021     Lab Results   Component Value Date    INR 2.20 07/05/2022    INR 3.10 06/24/2022    INR 2.70 06/10/2022    PROTIME 19.6 05/12/2022    PROTIME 14.1 (H) 12/22/2021    PROTIME 17.8 (L) 12/22/2021       Most Recent Echo:       Most Recent Stress Test:       Most Recent Cardiac Catheterization:   No results found for this or any previous visit.       NOTE: The following portions of the patient's note were reviewed, confirmed and/or updated this visit as appropriate: History of present illness/Interval history, physical examination, assessment & plan, allergies, current medications, past family history, past medical history, past  social history, past surgical history and problem list.

## 2022-07-19 ENCOUNTER — OFFICE VISIT (OUTPATIENT)
Dept: CARDIOLOGY | Facility: CLINIC | Age: 87
End: 2022-07-19

## 2022-07-19 VITALS
DIASTOLIC BLOOD PRESSURE: 68 MMHG | HEIGHT: 62 IN | WEIGHT: 129 LBS | OXYGEN SATURATION: 97 % | BODY MASS INDEX: 23.74 KG/M2 | SYSTOLIC BLOOD PRESSURE: 118 MMHG | HEART RATE: 70 BPM

## 2022-07-19 DIAGNOSIS — Z95.2 HX OF AORTIC VALVE REPLACEMENT: ICD-10-CM

## 2022-07-19 DIAGNOSIS — I49.5 SICK SINUS SYNDROME: ICD-10-CM

## 2022-07-19 DIAGNOSIS — I25.10 CHRONIC CORONARY ARTERY DISEASE: Primary | ICD-10-CM

## 2022-07-19 DIAGNOSIS — I38 VALVULAR HEART DISEASE: ICD-10-CM

## 2022-07-19 DIAGNOSIS — Z95.0 PRESENCE OF CARDIAC PACEMAKER: ICD-10-CM

## 2022-07-19 DIAGNOSIS — I48.0 PAROXYSMAL ATRIAL FIBRILLATION: ICD-10-CM

## 2022-07-19 DIAGNOSIS — E78.2 MIXED HYPERLIPIDEMIA: ICD-10-CM

## 2022-07-19 DIAGNOSIS — I10 ESSENTIAL HYPERTENSION: ICD-10-CM

## 2022-07-19 DIAGNOSIS — T50.905A MEDICATION INDUCED COAGULOPATHY: ICD-10-CM

## 2022-07-19 DIAGNOSIS — D68.9 MEDICATION INDUCED COAGULOPATHY: ICD-10-CM

## 2022-07-19 PROCEDURE — 99214 OFFICE O/P EST MOD 30 MIN: CPT | Performed by: INTERNAL MEDICINE

## 2022-07-22 ENCOUNTER — ANTICOAGULATION VISIT (OUTPATIENT)
Dept: CARDIOLOGY | Facility: CLINIC | Age: 87
End: 2022-07-22

## 2022-07-22 DIAGNOSIS — I48.0 PAROXYSMAL ATRIAL FIBRILLATION: Primary | ICD-10-CM

## 2022-07-22 LAB — INR PPP: 1.8

## 2022-07-29 ENCOUNTER — ANTICOAGULATION VISIT (OUTPATIENT)
Dept: CARDIOLOGY | Facility: CLINIC | Age: 87
End: 2022-07-29

## 2022-07-29 DIAGNOSIS — I48.0 PAROXYSMAL ATRIAL FIBRILLATION: Primary | ICD-10-CM

## 2022-07-29 LAB — INR PPP: 1.8

## 2022-08-02 RX ORDER — DOXAZOSIN 2 MG/1
TABLET ORAL
Qty: 90 TABLET | Refills: 3 | Status: SHIPPED | OUTPATIENT
Start: 2022-08-02

## 2022-08-02 RX ORDER — ROSUVASTATIN CALCIUM 20 MG/1
TABLET, COATED ORAL
Qty: 90 TABLET | Refills: 3 | Status: SHIPPED | OUTPATIENT
Start: 2022-08-02

## 2022-08-09 ENCOUNTER — ANTICOAGULATION VISIT (OUTPATIENT)
Dept: CARDIOLOGY | Facility: CLINIC | Age: 87
End: 2022-08-09

## 2022-08-09 DIAGNOSIS — I48.0 PAROXYSMAL ATRIAL FIBRILLATION: Primary | ICD-10-CM

## 2022-08-09 LAB — INR PPP: 1.4

## 2022-08-10 RX ORDER — DILTIAZEM HYDROCHLORIDE 240 MG/1
CAPSULE, COATED, EXTENDED RELEASE ORAL
Qty: 90 CAPSULE | Refills: 3 | Status: SHIPPED | OUTPATIENT
Start: 2022-08-10

## 2022-08-16 ENCOUNTER — ANTICOAGULATION VISIT (OUTPATIENT)
Dept: CARDIOLOGY | Facility: CLINIC | Age: 87
End: 2022-08-16

## 2022-08-16 DIAGNOSIS — I48.0 PAROXYSMAL ATRIAL FIBRILLATION: Primary | ICD-10-CM

## 2022-08-16 LAB — INR PPP: 2.1

## 2022-08-16 PROCEDURE — G0250 MD INR TEST REVIE INTER MGMT: HCPCS | Performed by: INTERNAL MEDICINE

## 2022-08-24 RX ORDER — WARFARIN SODIUM 5 MG/1
TABLET ORAL
Qty: 40 TABLET | Refills: 0 | Status: SHIPPED | OUTPATIENT
Start: 2022-08-24 | End: 2022-10-12

## 2022-08-30 ENCOUNTER — ANTICOAGULATION VISIT (OUTPATIENT)
Dept: CARDIOLOGY | Facility: CLINIC | Age: 87
End: 2022-08-30

## 2022-08-30 DIAGNOSIS — I48.0 PAROXYSMAL ATRIAL FIBRILLATION: Primary | ICD-10-CM

## 2022-08-30 LAB — INR PPP: 2.4

## 2022-09-07 RX ORDER — POTASSIUM CHLORIDE 1500 MG/1
TABLET, FILM COATED, EXTENDED RELEASE ORAL
Qty: 90 TABLET | Refills: 3 | Status: SHIPPED | OUTPATIENT
Start: 2022-09-07

## 2022-09-13 ENCOUNTER — ANTICOAGULATION VISIT (OUTPATIENT)
Dept: CARDIOLOGY | Facility: CLINIC | Age: 87
End: 2022-09-13

## 2022-09-13 DIAGNOSIS — I48.0 PAROXYSMAL ATRIAL FIBRILLATION: Primary | ICD-10-CM

## 2022-09-13 LAB — INR PPP: 2.4

## 2022-09-21 PROCEDURE — 93296 REM INTERROG EVL PM/IDS: CPT | Performed by: INTERNAL MEDICINE

## 2022-09-21 PROCEDURE — 93294 REM INTERROG EVL PM/LDLS PM: CPT | Performed by: INTERNAL MEDICINE

## 2022-09-21 RX ORDER — SOTALOL HYDROCHLORIDE 120 MG/1
TABLET ORAL
Qty: 180 TABLET | Refills: 0 | Status: SHIPPED | OUTPATIENT
Start: 2022-09-21 | End: 2022-12-19

## 2022-09-27 ENCOUNTER — ANTICOAGULATION VISIT (OUTPATIENT)
Dept: CARDIOLOGY | Facility: CLINIC | Age: 87
End: 2022-09-27

## 2022-09-27 DIAGNOSIS — I48.0 PAROXYSMAL ATRIAL FIBRILLATION: Primary | ICD-10-CM

## 2022-09-27 LAB — INR PPP: 3.3

## 2022-10-05 RX ORDER — FUROSEMIDE 40 MG/1
TABLET ORAL
Qty: 90 TABLET | Refills: 3 | Status: SHIPPED | OUTPATIENT
Start: 2022-10-05

## 2022-10-11 ENCOUNTER — ANTICOAGULATION VISIT (OUTPATIENT)
Dept: CARDIOLOGY | Facility: CLINIC | Age: 87
End: 2022-10-11

## 2022-10-11 DIAGNOSIS — I48.0 PAROXYSMAL ATRIAL FIBRILLATION: Primary | ICD-10-CM

## 2022-10-11 LAB — INR PPP: 3.3

## 2022-10-11 PROCEDURE — G0250 MD INR TEST REVIE INTER MGMT: HCPCS | Performed by: INTERNAL MEDICINE

## 2022-10-12 RX ORDER — WARFARIN SODIUM 5 MG/1
TABLET ORAL
Qty: 40 TABLET | Refills: 0 | Status: SHIPPED | OUTPATIENT
Start: 2022-10-12 | End: 2022-11-30

## 2022-10-12 RX ORDER — FAMOTIDINE 40 MG/1
TABLET, FILM COATED ORAL
COMMUNITY
Start: 2022-09-07

## 2022-10-14 ENCOUNTER — LAB (OUTPATIENT)
Dept: LAB | Facility: HOSPITAL | Age: 87
End: 2022-10-14

## 2022-10-14 DIAGNOSIS — E03.9 ACQUIRED HYPOTHYROIDISM: ICD-10-CM

## 2022-10-14 LAB
T4 FREE SERPL-MCNC: 1.26 NG/DL (ref 0.93–1.7)
TSH SERPL DL<=0.05 MIU/L-ACNC: 1.18 UIU/ML (ref 0.27–4.2)

## 2022-10-14 PROCEDURE — 84439 ASSAY OF FREE THYROXINE: CPT

## 2022-10-14 PROCEDURE — 36415 COLL VENOUS BLD VENIPUNCTURE: CPT

## 2022-10-14 PROCEDURE — 84443 ASSAY THYROID STIM HORMONE: CPT

## 2022-10-18 ENCOUNTER — ANTICOAGULATION VISIT (OUTPATIENT)
Dept: CARDIOLOGY | Facility: CLINIC | Age: 87
End: 2022-10-18

## 2022-10-18 DIAGNOSIS — I48.0 PAROXYSMAL ATRIAL FIBRILLATION: Primary | ICD-10-CM

## 2022-10-18 LAB — INR PPP: 1.8

## 2022-10-19 RX ORDER — ISOSORBIDE MONONITRATE 30 MG/1
TABLET, EXTENDED RELEASE ORAL
Qty: 180 TABLET | Refills: 3 | Status: SHIPPED | OUTPATIENT
Start: 2022-10-19

## 2022-10-21 ENCOUNTER — OFFICE VISIT (OUTPATIENT)
Dept: ENDOCRINOLOGY | Facility: CLINIC | Age: 87
End: 2022-10-21

## 2022-10-21 VITALS
SYSTOLIC BLOOD PRESSURE: 135 MMHG | WEIGHT: 129 LBS | HEIGHT: 62 IN | DIASTOLIC BLOOD PRESSURE: 75 MMHG | OXYGEN SATURATION: 96 % | HEART RATE: 70 BPM | BODY MASS INDEX: 23.74 KG/M2

## 2022-10-21 DIAGNOSIS — I10 ESSENTIAL HYPERTENSION: ICD-10-CM

## 2022-10-21 DIAGNOSIS — E03.9 ACQUIRED HYPOTHYROIDISM: Primary | ICD-10-CM

## 2022-10-21 PROCEDURE — 99214 OFFICE O/P EST MOD 30 MIN: CPT | Performed by: INTERNAL MEDICINE

## 2022-10-21 RX ORDER — LEVOTHYROXINE SODIUM 0.07 MG/1
TABLET ORAL
Qty: 90 TABLET | Refills: 4 | Status: SHIPPED | OUTPATIENT
Start: 2022-10-21

## 2022-10-21 NOTE — PROGRESS NOTES
Endocrine Progress Note Outpatient     Patient Care Team:  Monae Sam APRN as PCP - General (Family Medicine)    Chief Complaint: Follow-up hypothyroidism    HPI: 87 year-old female with history of hypothyroidism is here for follow-up and she is currently taking levothyroxine 75 mcg 5 days a week.  Overall doing well.  She does have some arthritis.  She is taking her thyroid medications on regular basis.    Past Medical History:   Diagnosis Date   • Anxiety    • Asthma    • Atrial fibrillation (HCC)    • CAD (coronary artery disease)    • Carotid artery disease (HCC)    • GERD (gastroesophageal reflux disease)    • Gout    • Hyperlipidemia    • Hypertension    • Hyperthyroidism    • Hypothyroidism        Social History     Socioeconomic History   • Marital status:    Tobacco Use   • Smoking status: Former   • Smokeless tobacco: Never   Vaping Use   • Vaping Use: Never used   Substance and Sexual Activity   • Alcohol use: No   • Drug use: No   • Sexual activity: Defer       Family History   Problem Relation Age of Onset   • Hypertension Mother    • Heart disease Father    • Heart disease Sister    • Kidney cancer Sister    • Stroke Sister    • Cancer Sister         breast   • Cancer Sister    • Heart disease Brother        No Known Allergies    ROS:   Constitutional:  Admit fatigue, tiredness.    Eyes:  Denies change in visual acuity   HENT:  Denies nasal congestion or sore throat   Respiratory: denies cough, shortness of breath.   Cardiovascular:  denies chest pain, edema   GI:  Denies abdominal pain, nausea, vomiting.   Musculoskeletal:  Denies back pain or joint pain   Integument:  Denies dry skin and rash   Neurologic:  Denies headache, focal weakness or sensory changes   Endocrine:  Denies polyuria or polydipsia   Psychiatric:  Denies depression or anxiety      Vitals:    10/21/22 1054   BP: 135/75   Pulse: 70   SpO2: 96%       Physical Exam:  GEN: NAD, conversant   CV: RRR  LUNG: CTA  PSYCH:  Awake and coherent      Results Review:     I reviewed the patient's new clinical results.      Lab Results   Component Value Date    GLUCOSE 111 (H) 12/22/2021    BUN 20 12/22/2021    CREATININE 0.92 12/22/2021    EGFRIFNONA 58 (L) 12/22/2021    EGFRIFAFRI >60 mL/min/1.73m2 09/02/2016    BCR 21.7 12/22/2021    K 4.2 12/22/2021    CO2 27.0 12/22/2021    CALCIUM 9.0 12/22/2021    ALBUMIN 3.40 (L) 12/21/2021    LABIL2 1.1 10/12/2017    AST 30 12/21/2021    ALT 25 12/21/2021    CHOL 70 12/21/2021    TRIG 133 12/21/2021    LDL 19 12/21/2021    HDL 28 (L) 12/21/2021     Lab Results   Component Value Date    TSH 1.180 10/14/2022    FREET4 1.26 10/14/2022         Medication Review: Reviewed.       Current Outpatient Medications:   •  acetaminophen (TYLENOL) 650 MG 8 hr tablet, Take 650 mg by mouth Every 8 (Eight) Hours As Needed., Disp: , Rfl:   •  aspirin 81 MG EC tablet, Take 81 mg by mouth Daily., Disp: , Rfl:   •  colchicine 0.6 MG tablet, Take 0.6 mg by mouth Daily., Disp: , Rfl:   •  dilTIAZem CD (CARDIZEM CD) 240 MG 24 hr capsule, TAKE ONE CAPSULE BY MOUTH DAILY, Disp: 90 capsule, Rfl: 3  •  doxazosin (CARDURA) 2 MG tablet, TAKE ONE TABLET BY MOUTH DAILY, Disp: 90 tablet, Rfl: 3  •  famotidine (PEPCID) 40 MG tablet, , Disp: , Rfl:   •  ferrous sulfate 324 (65 Fe) MG tablet delayed-release EC tablet, Take 324 mg by mouth Daily With Breakfast., Disp: , Rfl:   •  furosemide (LASIX) 40 MG tablet, TAKE ONE TABLET BY MOUTH DAILY, Disp: 90 tablet, Rfl: 3  •  Glucos-Chondroit-Hyaluron-MSM (GLUCOSAMINE CHONDROITIN JOINT) tablet, Take 1 tablet by mouth Daily., Disp: , Rfl:   •  isosorbide mononitrate (IMDUR) 30 MG 24 hr tablet, TAKE ONE TABLET BY MOUTH TWICE A DAY, Disp: 180 tablet, Rfl: 3  •  levothyroxine (SYNTHROID, LEVOTHROID) 75 MCG tablet, TAKE ONE TABLET BY MOUTH DAILY MONDAY THROUGH FRIDAY, Disp: 60 tablet, Rfl: 3  •  melatonin 5 MG tablet tablet, Take 5 mg by mouth Every Night., Disp: , Rfl:   •  methylPREDNISolone  (MEDROL) 4 MG tablet, Take 1 mg by mouth Daily., Disp: , Rfl:   •  Multiple Vitamin (MULTIVITAMIN) tablet, Take 1 tablet by mouth Daily., Disp: , Rfl:   •  nitroglycerin (NITROSTAT) 0.4 MG SL tablet, Place 1 tablet under the tongue Every 5 (Five) Minutes As Needed for Chest Pain., Disp: 100 tablet, Rfl: 1  •  Omega-3 Fatty Acids (FISH OIL) 1000 MG capsule capsule, Take  by mouth., Disp: , Rfl:   •  polyethylene glycol (MIRALAX) powder, Take 17 g by mouth As Needed., Disp: , Rfl:   •  potassium chloride ER (K-TAB) 20 MEQ tablet controlled-release ER tablet, TAKE ONE TABLET BY MOUTH DAILY, Disp: 90 tablet, Rfl: 3  •  rosuvastatin (CRESTOR) 20 MG tablet, TAKE ONE TABLET BY MOUTH DAILY, Disp: 90 tablet, Rfl: 3  •  sertraline (ZOLOFT) 50 MG tablet, Take 50 mg by mouth Daily., Disp: , Rfl:   •  sotalol (BETAPACE) 120 MG tablet, TAKE ONE TABLET BY MOUTH TWICE A DAY, Disp: 180 tablet, Rfl: 0  •  warfarin (COUMADIN) 5 MG tablet, TAKE ONE TABLET BY MOUTH DAILY EXCEPT TAKE 1 AND 1/2 TABLET BY MOUTH ON WEDNESDAY OR AS DIRECTED, Disp: 40 tablet, Rfl: 0      Assessment & Plan   1.  Hypothyroidism: Well-controlled. We will continue current dose of levothyroxine 75 mcg 5 days a week and will follow her back in 1 year with labs.    2.  Hypertension: Well-controlled.    Assessment and plan from October 21, 2021 reviewed and updated.            Chika Paula MD FACE.    Much of the above report is an electronic transcription/translation of the spoken language to printed text using Dragon Software. As such, the subtleties and finesse of the spoken language may permit erroneous, or at times, nonsensical words or phrases to be inadvertently transcribed; thus changes may be made at a later date to rectify these errors.

## 2022-10-21 NOTE — PATIENT INSTRUCTIONS
Continue levothyroxine 75 mcg p.o. 5 days a week  Follow-up in 1 year with labs  Always take your thyroid medicine by itself with water at least 1 hour before or after the pills and food.

## 2022-10-28 ENCOUNTER — TELEPHONE (OUTPATIENT)
Dept: CARDIOLOGY | Facility: CLINIC | Age: 87
End: 2022-10-28

## 2022-10-28 NOTE — TELEPHONE ENCOUNTER
Caller: Tracy Jane    Relationship to patient: Self    Best call back number: [phone]    Patient is needing: PT CALLING TO SPEAK WITH [facility] ABOUT CARDIAC CLEARANCE - PT IS CURRENTLY AT [facility] AND THEY ARE FAXING ANOTHER REQUEST - PT REPORTS SHE SPOKE WITH [facility] ON Monday - UNABLE TO WT

## 2022-10-28 NOTE — TELEPHONE ENCOUNTER
Pt is aware that we have sent multiple times, and confirmation of receipt. She will notify the surgeon.

## 2022-11-01 ENCOUNTER — ANTICOAGULATION VISIT (OUTPATIENT)
Dept: CARDIOLOGY | Facility: CLINIC | Age: 87
End: 2022-11-01

## 2022-11-01 DIAGNOSIS — I48.0 PAROXYSMAL ATRIAL FIBRILLATION: Primary | ICD-10-CM

## 2022-11-01 LAB — INR PPP: 2.4

## 2022-11-11 ENCOUNTER — ANTICOAGULATION VISIT (OUTPATIENT)
Dept: CARDIOLOGY | Facility: CLINIC | Age: 87
End: 2022-11-11

## 2022-11-11 DIAGNOSIS — I48.0 PAROXYSMAL ATRIAL FIBRILLATION: Primary | ICD-10-CM

## 2022-11-11 LAB — INR PPP: 1.1

## 2022-11-14 ENCOUNTER — OFFICE VISIT (OUTPATIENT)
Dept: CARDIOLOGY | Facility: CLINIC | Age: 87
End: 2022-11-14

## 2022-11-14 VITALS
OXYGEN SATURATION: 98 % | BODY MASS INDEX: 23.37 KG/M2 | HEART RATE: 70 BPM | SYSTOLIC BLOOD PRESSURE: 147 MMHG | HEIGHT: 62 IN | DIASTOLIC BLOOD PRESSURE: 83 MMHG | WEIGHT: 127 LBS

## 2022-11-14 DIAGNOSIS — I38 DIASTOLIC CHF DUE TO VALVULAR DISEASE: ICD-10-CM

## 2022-11-14 DIAGNOSIS — Z95.0 PRESENCE OF CARDIAC PACEMAKER: ICD-10-CM

## 2022-11-14 DIAGNOSIS — I48.0 PAROXYSMAL ATRIAL FIBRILLATION: Primary | ICD-10-CM

## 2022-11-14 DIAGNOSIS — Z95.2 HX OF AORTIC VALVE REPLACEMENT: ICD-10-CM

## 2022-11-14 DIAGNOSIS — I25.10 CHRONIC CORONARY ARTERY DISEASE: ICD-10-CM

## 2022-11-14 DIAGNOSIS — I50.30 DIASTOLIC CHF DUE TO VALVULAR DISEASE: ICD-10-CM

## 2022-11-14 DIAGNOSIS — I10 ESSENTIAL HYPERTENSION: ICD-10-CM

## 2022-11-14 PROCEDURE — 99214 OFFICE O/P EST MOD 30 MIN: CPT | Performed by: INTERNAL MEDICINE

## 2022-11-14 PROCEDURE — 93000 ELECTROCARDIOGRAM COMPLETE: CPT | Performed by: INTERNAL MEDICINE

## 2022-11-14 NOTE — PROGRESS NOTES
CC: Sick sinus syndrome with dual-chamber pacemaker in situ follow up .    Sub  87-year-old pleasant patient has history of atrial fibrillation and has pacemaker in situ for sick sinus syndrome.  Valvular heart disease with bioprosthetic aortic valve replacement 2014.  She also has history of peripheral vascular disease coronary artery disease with prior bypass surgery bilateral renal artery stenosis carotid disease and dyslipidemia and sick sinus syndrome.  She is currently on sotalol for suppression of atrial arrhythmias.      Previous history is attached below for reference only which has been reviewed    87-year-old female patient with recurrent symptomatic atrial fibrillation was placed on sotalol In the past and comes in for follow-up. she has sick sinus syndrome with a dual-chamber pacemaker in situ which was placed several months ago. she is doing well with recurrent symptoms of arrhythmias-- she has history of valvular heart disease status post bioprosthetic aortic valve replacement in 2014. She is additional history that includes peripheral vascular disease, coronary artery disease status post coronary arterial bypass grafting, bilateral renal artery stenosis, carotid artery disease, dyslipidemia, and sick sinus syndrome status post permanent pacemaker placement.  She complains of intermittent palpitations and also fatigue with current intake of sotalol  Denies any leg edema or syncope or any  Angina  She denies any syncope and compliant with her medications  She had increasing atrial arrhythmia and sotalol dose was increased  and comes started having breakthrough arrhythmia and started on Cardizem and comes in for evaluation   Complains of intermittent PAF          Past Medical History:     Reviewed history from 06/13/2016 and no changes required:        AVR 2014-        Coronary artery disease: S/P CABG and PCI with stenting-        Carotid disease;left side 50-74%-Patrick Ocampo         Inferior myocardial infarction 12-06-        Asthma        Anxiety Disorder        Depression        G E R D        Hyperlipidemia        Hypertension-        Hyperthyroidism        fx thoracic ?        shoulder fx        Rotator cuff syndrome         Gout         Paroxysmal atrial fib        Arthritis        Hypothyroid        No Drug Allergies?         Eye exam:2014 &2015 Dr.Kelley Parekh in [place]         Rheumatoid Arthritis    Past Surgical History:     Reviewed history from 10/16/2018 and no changes required:        PCI/stent, LCX, 3-20-06, Cypher stent        PCI/stent x 2, LCX & priximal diagonal, 12-3-06, [employer] stents        cataract r eye 12/07  lt eye 01/08        CABG x 3  07-19-08        thoracentesis l lung 8/08        Right rotator cuff repair - Oct. 15, 2013        Cholecystectomy-2009        Left Knee Arthoplasty-2014        Left Shoulder Replacement 4/2014        Aortic Valve  Replacement 6/11/2014        Heart Catherization:2/18/2015        Pacemaker placed 6/6/16        Colonoscopy 2011         Surgery Finger 2018       Physical Exam    General:      well developed, well nourished, in no acute distress.    Head:      normocephalic and atraumatic.    Eyes:      PERRL/EOM intact, conjunctivae and sclerae clear without nystagmus.    Neck:      no  thyromegaly, trachea central with normal respiratory effort  Lungs:      clear bilaterally to auscultation.--few bibasal rales noted    Heart:       regular rate and rhythm, S1, S2 without murmurs, rubs, or gallops  Skin:      intact without lesions or rashes.    Psych:      alert and cooperative; normal mood and affect; normal attention span and concentration.            Impressions  Sick sinus syndrome  Dual-chamber pacemaker in situ  Home monitoring of the pacemaker reviewed  Paroxysmal atrial fibrillation--- burden is 3% which was discussed with the patient and family  Coronary artery disease with prior bypass surgery and  chronic stable angina  Chronic kidney disease and renal artery stenosis  Carotid disease and peripheral arterial disease  Valvular heart disease with prior history of bioprosthetic aortic valve replacement  Few bibasilar Rales auscultated without any symptoms of fever and recent flu shot.  Check a BNP and x-ray and orders placed  Medications  reviewed and follow-up appointments made  TSH is normal with INR of 1.1 and INR prior to that is 2.4  Check BMP, BNP  Chest xray        ECG 12 Lead    Date/Time: 11/14/2022 3:55 PM  Performed by: Pk Crabtree MD  Authorized by: Pk Crabtree MD   Comparison: compared with previous ECG   Similar to previous ECG  Rhythm: sinus rhythm and paced  Rate: normal  Conduction: conduction normal            Electronically signed by Pk Crabtree MD, 11/14/22, 3:49 PM EST.

## 2022-11-15 ENCOUNTER — ANTICOAGULATION VISIT (OUTPATIENT)
Dept: CARDIOLOGY | Facility: CLINIC | Age: 87
End: 2022-11-15

## 2022-11-15 DIAGNOSIS — I48.0 PAROXYSMAL ATRIAL FIBRILLATION: Primary | ICD-10-CM

## 2022-11-15 LAB — INR PPP: 1.4 (ref 0.9–1.1)

## 2022-11-15 PROCEDURE — G0250 MD INR TEST REVIE INTER MGMT: HCPCS | Performed by: INTERNAL MEDICINE

## 2022-11-15 PROCEDURE — 36416 COLLJ CAPILLARY BLOOD SPEC: CPT | Performed by: INTERNAL MEDICINE

## 2022-11-15 PROCEDURE — 85610 PROTHROMBIN TIME: CPT | Performed by: INTERNAL MEDICINE

## 2022-11-17 ENCOUNTER — HOSPITAL ENCOUNTER (OUTPATIENT)
Dept: GENERAL RADIOLOGY | Facility: HOSPITAL | Age: 87
Discharge: HOME | End: 2022-11-17

## 2022-11-17 ENCOUNTER — LAB (OUTPATIENT)
Dept: LAB | Facility: HOSPITAL | Age: 87
End: 2022-11-17

## 2022-11-17 DIAGNOSIS — I48.0 PAROXYSMAL ATRIAL FIBRILLATION: ICD-10-CM

## 2022-11-17 DIAGNOSIS — Z95.2 HX OF AORTIC VALVE REPLACEMENT: ICD-10-CM

## 2022-11-17 DIAGNOSIS — I25.10 CHRONIC CORONARY ARTERY DISEASE: ICD-10-CM

## 2022-11-17 DIAGNOSIS — I50.30 DIASTOLIC CHF DUE TO VALVULAR DISEASE: ICD-10-CM

## 2022-11-17 DIAGNOSIS — Z95.0 PRESENCE OF CARDIAC PACEMAKER: ICD-10-CM

## 2022-11-17 DIAGNOSIS — I38 DIASTOLIC CHF DUE TO VALVULAR DISEASE: ICD-10-CM

## 2022-11-17 DIAGNOSIS — I10 ESSENTIAL HYPERTENSION: ICD-10-CM

## 2022-11-17 LAB
ANION GAP SERPL CALCULATED.3IONS-SCNC: 9.3 MMOL/L (ref 5–15)
BUN SERPL-MCNC: 16 MG/DL (ref 8–23)
BUN/CREAT SERPL: 21.1 (ref 7–25)
CALCIUM SPEC-SCNC: 10.1 MG/DL (ref 8.6–10.5)
CHLORIDE SERPL-SCNC: 101 MMOL/L (ref 98–107)
CO2 SERPL-SCNC: 27.7 MMOL/L (ref 22–29)
CREAT SERPL-MCNC: 0.76 MG/DL (ref 0.57–1)
EGFRCR SERPLBLD CKD-EPI 2021: 75.9 ML/MIN/1.73
GLUCOSE SERPL-MCNC: 105 MG/DL (ref 65–99)
NT-PROBNP SERPL-MCNC: 1908 PG/ML (ref 0–1800)
POTASSIUM SERPL-SCNC: 4.7 MMOL/L (ref 3.5–5.2)
SODIUM SERPL-SCNC: 138 MMOL/L (ref 136–145)

## 2022-11-17 PROCEDURE — 80048 BASIC METABOLIC PNL TOTAL CA: CPT

## 2022-11-17 PROCEDURE — 83880 ASSAY OF NATRIURETIC PEPTIDE: CPT

## 2022-11-17 PROCEDURE — 71046 X-RAY EXAM CHEST 2 VIEWS: CPT

## 2022-11-17 PROCEDURE — 36415 COLL VENOUS BLD VENIPUNCTURE: CPT

## 2022-11-18 ENCOUNTER — ANTICOAGULATION VISIT (OUTPATIENT)
Dept: CARDIOLOGY | Facility: CLINIC | Age: 87
End: 2022-11-18

## 2022-11-18 DIAGNOSIS — I48.0 PAROXYSMAL ATRIAL FIBRILLATION: Primary | ICD-10-CM

## 2022-11-18 LAB — INR PPP: 2.5

## 2022-11-30 RX ORDER — WARFARIN SODIUM 5 MG/1
TABLET ORAL
Qty: 40 TABLET | Refills: 0 | Status: SHIPPED | OUTPATIENT
Start: 2022-11-30 | End: 2023-01-24 | Stop reason: SDUPTHER

## 2022-12-06 ENCOUNTER — ANTICOAGULATION VISIT (OUTPATIENT)
Dept: CARDIOLOGY | Facility: CLINIC | Age: 87
End: 2022-12-06

## 2022-12-06 DIAGNOSIS — I48.0 PAROXYSMAL ATRIAL FIBRILLATION: Primary | ICD-10-CM

## 2022-12-06 LAB — INR PPP: 2

## 2022-12-19 RX ORDER — SOTALOL HYDROCHLORIDE 120 MG/1
TABLET ORAL
Qty: 180 TABLET | Refills: 0 | Status: SHIPPED | OUTPATIENT
Start: 2022-12-19 | End: 2023-03-16

## 2022-12-20 ENCOUNTER — ANTICOAGULATION VISIT (OUTPATIENT)
Dept: CARDIOLOGY | Facility: CLINIC | Age: 87
End: 2022-12-20

## 2022-12-20 DIAGNOSIS — I48.0 PAROXYSMAL ATRIAL FIBRILLATION: Primary | ICD-10-CM

## 2022-12-20 LAB — INR PPP: 1.8

## 2022-12-21 PROCEDURE — 93294 REM INTERROG EVL PM/LDLS PM: CPT | Performed by: INTERNAL MEDICINE

## 2022-12-21 PROCEDURE — 93296 REM INTERROG EVL PM/IDS: CPT | Performed by: INTERNAL MEDICINE

## 2022-12-27 ENCOUNTER — ANTICOAGULATION VISIT (OUTPATIENT)
Dept: CARDIOLOGY | Facility: CLINIC | Age: 87
End: 2022-12-27

## 2022-12-27 DIAGNOSIS — I48.0 PAROXYSMAL ATRIAL FIBRILLATION: Primary | ICD-10-CM

## 2022-12-27 LAB — INR PPP: 2.4

## 2022-12-27 PROCEDURE — G0250 MD INR TEST REVIE INTER MGMT: HCPCS | Performed by: INTERNAL MEDICINE

## 2023-01-10 ENCOUNTER — ANTICOAGULATION VISIT (OUTPATIENT)
Dept: CARDIOLOGY | Facility: CLINIC | Age: 88
End: 2023-01-10
Payer: MEDICARE

## 2023-01-10 DIAGNOSIS — I48.0 PAROXYSMAL ATRIAL FIBRILLATION: Primary | ICD-10-CM

## 2023-01-10 LAB — INR PPP: 2.4

## 2023-01-17 ENCOUNTER — ANTICOAGULATION VISIT (OUTPATIENT)
Dept: CARDIOLOGY | Facility: CLINIC | Age: 88
End: 2023-01-17
Payer: MEDICARE

## 2023-01-17 DIAGNOSIS — I48.0 PAROXYSMAL ATRIAL FIBRILLATION: Primary | ICD-10-CM

## 2023-01-17 LAB — INR PPP: 2.3

## 2023-01-24 RX ORDER — WARFARIN SODIUM 5 MG/1
TABLET ORAL
Qty: 90 TABLET | Refills: 2 | Status: SHIPPED | OUTPATIENT
Start: 2023-01-24

## 2023-02-03 ENCOUNTER — ANTICOAGULATION VISIT (OUTPATIENT)
Dept: CARDIOLOGY | Facility: CLINIC | Age: 88
End: 2023-02-03
Payer: MEDICARE

## 2023-02-03 DIAGNOSIS — I48.0 PAROXYSMAL ATRIAL FIBRILLATION: Primary | ICD-10-CM

## 2023-02-03 LAB — INR PPP: 1.1

## 2023-02-10 ENCOUNTER — ANTICOAGULATION VISIT (OUTPATIENT)
Dept: CARDIOLOGY | Facility: CLINIC | Age: 88
End: 2023-02-10
Payer: MEDICARE

## 2023-02-10 DIAGNOSIS — I48.0 PAROXYSMAL ATRIAL FIBRILLATION: Primary | ICD-10-CM

## 2023-02-10 LAB — INR PPP: 1.9

## 2023-02-10 PROCEDURE — G0250 MD INR TEST REVIE INTER MGMT: HCPCS | Performed by: INTERNAL MEDICINE

## 2023-02-20 ENCOUNTER — TELEPHONE (OUTPATIENT)
Dept: CARDIOLOGY | Facility: CLINIC | Age: 88
End: 2023-02-20
Payer: MEDICARE

## 2023-02-20 NOTE — TELEPHONE ENCOUNTER
Patient is having noise on atrial lead, 21 out of 25 AMS events. She is on warfarin,  AP 95% time. See report in [facility].

## 2023-02-24 ENCOUNTER — ANTICOAGULATION VISIT (OUTPATIENT)
Dept: CARDIOLOGY | Facility: CLINIC | Age: 88
End: 2023-02-24
Payer: MEDICARE

## 2023-02-24 DIAGNOSIS — I48.0 PAROXYSMAL ATRIAL FIBRILLATION: Primary | ICD-10-CM

## 2023-02-24 LAB — INR PPP: 2.4

## 2023-03-10 ENCOUNTER — ANTICOAGULATION VISIT (OUTPATIENT)
Dept: CARDIOLOGY | Facility: CLINIC | Age: 88
End: 2023-03-10
Payer: MEDICARE

## 2023-03-10 DIAGNOSIS — I48.0 PAROXYSMAL ATRIAL FIBRILLATION: Primary | ICD-10-CM

## 2023-03-10 LAB — INR PPP: 2

## 2023-03-15 ENCOUNTER — OFFICE (OUTPATIENT)
Dept: URBAN - METROPOLITAN AREA CLINIC 64 | Facility: CLINIC | Age: 88
End: 2023-03-15

## 2023-03-15 VITALS
WEIGHT: 121 LBS | HEART RATE: 71 BPM | SYSTOLIC BLOOD PRESSURE: 122 MMHG | HEIGHT: 62 IN | DIASTOLIC BLOOD PRESSURE: 63 MMHG

## 2023-03-15 DIAGNOSIS — I48.91 UNSPECIFIED ATRIAL FIBRILLATION: ICD-10-CM

## 2023-03-15 DIAGNOSIS — Z79.01 LONG TERM (CURRENT) USE OF ANTICOAGULANTS: ICD-10-CM

## 2023-03-15 DIAGNOSIS — I25.10 ATHEROSCLEROTIC HEART DISEASE OF NATIVE CORONARY ARTERY WITH: ICD-10-CM

## 2023-03-15 DIAGNOSIS — Z95.2 PRESENCE OF PROSTHETIC HEART VALVE: ICD-10-CM

## 2023-03-15 DIAGNOSIS — Z90.49 ACQUIRED ABSENCE OF OTHER SPECIFIED PARTS OF DIGESTIVE TRACT: ICD-10-CM

## 2023-03-15 DIAGNOSIS — K21.00 GASTRO-ESOPHAGEAL REFLUX DISEASE WITH ESOPHAGITIS, WITHOUT B: ICD-10-CM

## 2023-03-15 DIAGNOSIS — K59.00 CONSTIPATION, UNSPECIFIED: ICD-10-CM

## 2023-03-15 DIAGNOSIS — R11.10 VOMITING, UNSPECIFIED: ICD-10-CM

## 2023-03-15 PROCEDURE — 99214 OFFICE O/P EST MOD 30 MIN: CPT

## 2023-03-15 RX ORDER — PANTOPRAZOLE 40 MG/1
40 TABLET, DELAYED RELEASE ORAL
Qty: 90 | Refills: 0 | Status: ACTIVE
Start: 2023-03-15

## 2023-03-16 RX ORDER — SOTALOL HYDROCHLORIDE 120 MG/1
TABLET ORAL
Qty: 180 TABLET | Refills: 0 | Status: SHIPPED | OUTPATIENT
Start: 2023-03-16

## 2023-03-22 PROCEDURE — 93296 REM INTERROG EVL PM/IDS: CPT | Performed by: INTERNAL MEDICINE

## 2023-03-22 PROCEDURE — 93294 REM INTERROG EVL PM/LDLS PM: CPT | Performed by: INTERNAL MEDICINE

## 2023-03-24 ENCOUNTER — ANTICOAGULATION VISIT (OUTPATIENT)
Dept: CARDIOLOGY | Facility: CLINIC | Age: 88
End: 2023-03-24
Payer: MEDICARE

## 2023-03-24 DIAGNOSIS — I48.0 PAROXYSMAL ATRIAL FIBRILLATION: Primary | ICD-10-CM

## 2023-03-24 LAB — INR PPP: 2.6

## 2023-03-31 ENCOUNTER — ANTICOAGULATION VISIT (OUTPATIENT)
Dept: CARDIOLOGY | Facility: CLINIC | Age: 88
End: 2023-03-31
Payer: MEDICARE

## 2023-03-31 DIAGNOSIS — I48.0 PAROXYSMAL ATRIAL FIBRILLATION: Primary | ICD-10-CM

## 2023-03-31 LAB — INR PPP: 2.4

## 2023-03-31 PROCEDURE — G0250 MD INR TEST REVIE INTER MGMT: HCPCS | Performed by: INTERNAL MEDICINE

## 2023-04-12 ENCOUNTER — OFFICE (OUTPATIENT)
Dept: URBAN - METROPOLITAN AREA CLINIC 64 | Facility: CLINIC | Age: 88
End: 2023-04-12

## 2023-04-12 VITALS
HEART RATE: 70 BPM | HEIGHT: 62 IN | WEIGHT: 124 LBS | DIASTOLIC BLOOD PRESSURE: 71 MMHG | SYSTOLIC BLOOD PRESSURE: 147 MMHG

## 2023-04-12 DIAGNOSIS — Z87.891 PERSONAL HISTORY OF NICOTINE DEPENDENCE: ICD-10-CM

## 2023-04-12 DIAGNOSIS — K21.00 GASTRO-ESOPHAGEAL REFLUX DISEASE WITH ESOPHAGITIS, WITHOUT B: ICD-10-CM

## 2023-04-12 DIAGNOSIS — Z79.01 LONG TERM (CURRENT) USE OF ANTICOAGULANTS: ICD-10-CM

## 2023-04-12 DIAGNOSIS — Z79.82 LONG TERM (CURRENT) USE OF ASPIRIN: ICD-10-CM

## 2023-04-12 DIAGNOSIS — R14.1 GAS PAIN: ICD-10-CM

## 2023-04-12 DIAGNOSIS — I25.10 ATHEROSCLEROTIC HEART DISEASE OF NATIVE CORONARY ARTERY WITH: ICD-10-CM

## 2023-04-12 DIAGNOSIS — R15.9 FULL INCONTINENCE OF FECES: ICD-10-CM

## 2023-04-12 DIAGNOSIS — R19.4 CHANGE IN BOWEL HABIT: ICD-10-CM

## 2023-04-12 DIAGNOSIS — R14.2 ERUCTATION: ICD-10-CM

## 2023-04-12 DIAGNOSIS — I48.91 UNSPECIFIED ATRIAL FIBRILLATION: ICD-10-CM

## 2023-04-12 PROCEDURE — 99214 OFFICE O/P EST MOD 30 MIN: CPT

## 2023-04-14 ENCOUNTER — ANTICOAGULATION VISIT (OUTPATIENT)
Dept: CARDIOLOGY | Facility: CLINIC | Age: 88
End: 2023-04-14
Payer: MEDICARE

## 2023-04-14 DIAGNOSIS — I48.0 PAROXYSMAL ATRIAL FIBRILLATION: Primary | ICD-10-CM

## 2023-04-14 LAB — INR PPP: 2.9

## 2023-04-17 RX ORDER — PANTOPRAZOLE SODIUM 40 MG/1
40 TABLET, DELAYED RELEASE ORAL NIGHTLY
COMMUNITY

## 2023-04-17 NOTE — PRE-PROCEDURE INSTRUCTIONS
Pt dtr Yenny is contacting Dr Dodson office Re: Warfarin stop date and pt Hgb 8.  Pt may need Lovenox bridge.

## 2023-04-22 ENCOUNTER — TRANSCRIBE ORDERS (OUTPATIENT)
Dept: ADMINISTRATIVE | Facility: HOSPITAL | Age: 88
End: 2023-04-22
Payer: MEDICARE

## 2023-04-22 ENCOUNTER — LAB (OUTPATIENT)
Dept: LAB | Facility: HOSPITAL | Age: 88
End: 2023-04-22
Payer: MEDICARE

## 2023-04-22 DIAGNOSIS — Z79.01 LONG TERM (CURRENT) USE OF ANTICOAGULANTS: ICD-10-CM

## 2023-04-22 DIAGNOSIS — Z79.01 LONG TERM (CURRENT) USE OF ANTICOAGULANTS: Primary | ICD-10-CM

## 2023-04-22 LAB
INR PPP: 1.28 (ref 0.93–1.1)
PROTHROMBIN TIME: 13.5 SECONDS (ref 9.6–11.7)

## 2023-04-22 PROCEDURE — 85610 PROTHROMBIN TIME: CPT

## 2023-04-22 PROCEDURE — 36415 COLL VENOUS BLD VENIPUNCTURE: CPT

## 2023-04-24 ENCOUNTER — ON CAMPUS - OUTPATIENT (OUTPATIENT)
Dept: URBAN - METROPOLITAN AREA HOSPITAL 85 | Facility: HOSPITAL | Age: 88
End: 2023-04-24

## 2023-04-24 ENCOUNTER — ANESTHESIA (OUTPATIENT)
Dept: GASTROENTEROLOGY | Facility: HOSPITAL | Age: 88
End: 2023-04-24
Payer: MEDICARE

## 2023-04-24 ENCOUNTER — ANESTHESIA EVENT (OUTPATIENT)
Dept: GASTROENTEROLOGY | Facility: HOSPITAL | Age: 88
End: 2023-04-24
Payer: MEDICARE

## 2023-04-24 ENCOUNTER — HOSPITAL ENCOUNTER (OUTPATIENT)
Facility: HOSPITAL | Age: 88
Setting detail: HOSPITAL OUTPATIENT SURGERY
Discharge: HOME | End: 2023-04-24
Attending: INTERNAL MEDICINE | Admitting: INTERNAL MEDICINE
Payer: MEDICARE

## 2023-04-24 VITALS
OXYGEN SATURATION: 96 % | RESPIRATION RATE: 12 BRPM | HEART RATE: 70 BPM | BODY MASS INDEX: 24.66 KG/M2 | HEIGHT: 62 IN | SYSTOLIC BLOOD PRESSURE: 129 MMHG | DIASTOLIC BLOOD PRESSURE: 63 MMHG | WEIGHT: 134 LBS

## 2023-04-24 DIAGNOSIS — K44.9 DIAPHRAGMATIC HERNIA WITHOUT OBSTRUCTION OR GANGRENE: ICD-10-CM

## 2023-04-24 DIAGNOSIS — D64.9 ANEMIA: ICD-10-CM

## 2023-04-24 DIAGNOSIS — D64.9 ANEMIA, UNSPECIFIED: ICD-10-CM

## 2023-04-24 PROCEDURE — 88305 TISSUE EXAM BY PATHOLOGIST: CPT | Performed by: INTERNAL MEDICINE

## 2023-04-24 PROCEDURE — C1769 GUIDE WIRE: HCPCS | Performed by: INTERNAL MEDICINE

## 2023-04-24 PROCEDURE — 25010000002 PROPOFOL 200 MG/20ML EMULSION: Performed by: NURSE ANESTHETIST, CERTIFIED REGISTERED

## 2023-04-24 PROCEDURE — 43239 EGD BIOPSY SINGLE/MULTIPLE: CPT | Performed by: INTERNAL MEDICINE

## 2023-04-24 RX ORDER — LIDOCAINE HYDROCHLORIDE 20 MG/ML
INJECTION, SOLUTION INFILTRATION; PERINEURAL AS NEEDED
Status: DISCONTINUED | OUTPATIENT
Start: 2023-04-24 | End: 2023-04-24 | Stop reason: SURG

## 2023-04-24 RX ORDER — PROPOFOL 10 MG/ML
INJECTION, EMULSION INTRAVENOUS AS NEEDED
Status: DISCONTINUED | OUTPATIENT
Start: 2023-04-24 | End: 2023-04-24 | Stop reason: SURG

## 2023-04-24 RX ORDER — ONDANSETRON 2 MG/ML
4 INJECTION INTRAMUSCULAR; INTRAVENOUS ONCE AS NEEDED
Status: DISCONTINUED | OUTPATIENT
Start: 2023-04-24 | End: 2023-04-24 | Stop reason: HOSPADM

## 2023-04-24 RX ORDER — SODIUM CHLORIDE 9 MG/ML
9 INJECTION, SOLUTION INTRAVENOUS CONTINUOUS
Status: DISCONTINUED | OUTPATIENT
Start: 2023-04-24 | End: 2023-04-24 | Stop reason: HOSPADM

## 2023-04-24 RX ADMIN — PROPOFOL 20 MG: 10 INJECTION, EMULSION INTRAVENOUS at 12:49

## 2023-04-24 RX ADMIN — LIDOCAINE HYDROCHLORIDE 40 MG: 20 INJECTION, SOLUTION INFILTRATION; PERINEURAL at 12:41

## 2023-04-24 RX ADMIN — SODIUM CHLORIDE: 9 INJECTION, SOLUTION INTRAVENOUS at 12:39

## 2023-04-24 RX ADMIN — SODIUM CHLORIDE 9 ML/HR: 9 INJECTION, SOLUTION INTRAVENOUS at 12:06

## 2023-04-24 RX ADMIN — PROPOFOL 30 MG: 10 INJECTION, EMULSION INTRAVENOUS at 12:41

## 2023-04-24 RX ADMIN — PROPOFOL 20 MG: 10 INJECTION, EMULSION INTRAVENOUS at 12:43

## 2023-04-24 RX ADMIN — PROPOFOL 20 MG: 10 INJECTION, EMULSION INTRAVENOUS at 12:46

## 2023-04-24 NOTE — OP NOTE
ESOPHAGOGASTRODUODENOSCOPY Procedure Report    Patient Name:  Tracy Jane  YOB: 1934    Date of Surgery:  4/24/2023     Pre-Op Diagnosis:  Anemia [D64.9]       Post-Op Diagnosis Codes:     * Anemia [D64.9]     Postop diagnosis:  1.  Hiatal hernia  2.  Otherwise normal EGD with biopsies      Procedure/CPT® Codes:      Procedure(s):  ESOPHAGOGASTRODUODENOSCOPY with antrum body biopsies    Staff:  Surgeon(s):  REGGIE Ace MD      Anesthesia: Monitored Anesthesia Care    Description of Procedure:  A description of the procedure as well as risks, benefits and alternative methods were explained to the patient who voiced understanding and signed the corresponding consent form. A physical exam was performed and vital signs were monitored throughout the procedure.    An upper GI endoscope was placed into the mouth and proceeded through the esophagus, stomach and second portion of the duodenum without difficulty. The scope was then retroflexed and the fundus was visualized. The procedure was not difficult and there were no immediate complications.  There was no blood loss.    Impression:  1.  Normal mucosa of the whole esophagus  2.  Small sliding hiatal hernia  3.  Normal mucosa of the whole stomach.  Biopsies obtained with cold forceps from stomach antrum and body to rule out H. pylori  4.  Normal mucosa in the duodenal bulb and second portion of the duodenum    Recommendations:  -Hiatal hernia education  -belching likely related to hiatal hernia, otherwise unremarkable EGD  -If persistent bloating consider trial of antibiotics for SIBO  -Follow-up in the office as scheduled for further assessment  -Recommend switching PPI prior to meals and Pepcid at bedtime.      ERNIE Ace MD     Date: 4/24/2023    Time: 12:52 EDT

## 2023-04-24 NOTE — ANESTHESIA POSTPROCEDURE EVALUATION
Patient: Tracy Jane    Procedure Summary     Date: 04/24/23 Room / Location: [facility] ENDOSCOPY 1 / [facility] ENDOSCOPY    Anesthesia Start: 1238 Anesthesia Stop: 1259    Procedure: ESOPHAGOGASTRODUODENOSCOPY with antrum body biopsies Diagnosis:       Anemia      (Anemia [D64.9])    Surgeons: REGGIE Ace MD Provider: Carloz Medrano MD    Anesthesia Type: general ASA Status: 4          Anesthesia Type: general    Vitals  Vitals Value Taken Time   /63 04/24/23 1320   Temp     Pulse 70 04/24/23 1321   Resp 12 04/24/23 1320   SpO2 96 % 04/24/23 1320   Vitals shown include unvalidated device data.        Post Anesthesia Care and Evaluation    Patient location during evaluation: PACU  Patient participation: complete - patient participated  Level of consciousness: awake  Pain scale: See nurse's notes for pain score.  Pain management: adequate    Airway patency: patent  Anesthetic complications: No anesthetic complications  PONV Status: none  Cardiovascular status: acceptable  Respiratory status: acceptable and spontaneous ventilation  Hydration status: acceptable    Comments: Patient seen and examined postoperatively; vital signs stable; SpO2 greater than or equal to 90%; cardiopulmonary status stable; nausea/vomiting adequately controlled; pain adequately controlled; no apparent anesthesia complications; patient discharged from anesthesia care when discharge criteria were met

## 2023-04-24 NOTE — H&P
GI CONSULT  NOTE:    Referring Provider:    Kacie Liriano APRN  [unfilled]    Chief complaint: <principal problem not specified>    Subjective .       Pre op diagnosis  Anemia [D64.9]      History of present illness:      Tracy Jane is a 88 y.o. female who presents today for Procedure(s):  ESOPHAGOGASTRODUODENOSCOPY for the indications listed below.     The updated Patient Profile was reviewed prior to the procedure, in conjunction with the Physical Exam, including medical conditions, surgical procedures, medications, allergies, family history and social history.     Pre-operatively, I reviewed the indication(s) for the procedure, the risks of the procedure [including but not limited to: unexpected bleeding possibly requiring hospitalization and/or unplanned repeat procedures, perforation possibly requiring surgical treatment, missed lesions and complications of sedation/MAC (also explained by anesthesia staff)].     I have evaluated the patient for risks associated with the planned anesthesia and the procedure to be performed and find the patient an acceptable candidate for IV sedation.    Multiple opportunities were provided for any questions or concerns, and all questions were answered satisfactorily before any anesthesia was administered. We will proceed with the planned procedure.    Past Medical History:  Past Medical History:   Diagnosis Date   • Anxiety    • Asthma    • Atrial fibrillation    • CAD (coronary artery disease)    • Carotid artery disease    • GERD (gastroesophageal reflux disease)    • Gout    • Hyperlipidemia    • Hypertension    • Hyperthyroidism    • Hypothyroidism        Past Surgical History:  Past Surgical History:   Procedure Laterality Date   • BASAL CELL CARCINOMA EXCISION      spine, nose and arm, leg 2020   • BREAST BIOPSY     • CARDIAC CATHETERIZATION     • CAROTID STENT     • CATARACT EXTRACTION     • CHOLECYSTECTOMY     • CORONARY ARTERY BYPASS GRAFT     • CORONARY  STENT PLACEMENT     • FINGER SURGERY     • KNEE SURGERY     • PACEMAKER IMPLANTATION     • SHOULDER SURGERY         Social History:  Social History     Tobacco Use   • Smoking status: Former     Packs/day: 1.00     Years: 4.00     Pack years: 4.00     Types: Cigarettes   • Smokeless tobacco: Never   Vaping Use   • Vaping Use: Never used   Substance Use Topics   • Alcohol use: No   • Drug use: No       Family History:  Family History   Problem Relation Age of Onset   • Hypertension Mother    • Heart disease Father    • Heart disease Sister    • Kidney cancer Sister    • Stroke Sister    • Cancer Sister         breast   • Cancer Sister    • Heart disease Brother        Medications:  Medications Prior to Admission   Medication Sig Dispense Refill Last Dose   • acetaminophen (TYLENOL) 650 MG 8 hr tablet Take 1 tablet by mouth Every 8 (Eight) Hours As Needed.   Past Week   • aspirin 81 MG EC tablet Take 1 tablet by mouth Daily.   4/22/2023   • colchicine 0.6 MG tablet Take 1 tablet by mouth 2 (Two) Times a Day.   4/17/2023   • dilTIAZem CD (CARDIZEM CD) 240 MG 24 hr capsule TAKE ONE CAPSULE BY MOUTH DAILY (Patient taking differently: Take 1 capsule by mouth Daily.) 90 capsule 3 4/24/2023   • doxazosin (CARDURA) 2 MG tablet TAKE ONE TABLET BY MOUTH DAILY (Patient taking differently: Take 1 tablet by mouth Every Morning.) 90 tablet 3 4/23/2023   • famotidine (PEPCID) 40 MG tablet Take 1 tablet by mouth Daily.   4/23/2023   • ferrous sulfate 324 (65 Fe) MG tablet delayed-release EC tablet Take 1 tablet by mouth Daily With Breakfast.   Past Week   • furosemide (LASIX) 40 MG tablet TAKE ONE TABLET BY MOUTH DAILY (Patient taking differently: Take 1 tablet by mouth Daily.) 90 tablet 3 4/23/2023   • Glucos-Chondroit-Hyaluron-MSM (GLUCOSAMINE CHONDROITIN JOINT) tablet Take 1 tablet by mouth Daily.   Past Week   • isosorbide mononitrate (IMDUR) 30 MG 24 hr tablet TAKE ONE TABLET BY MOUTH TWICE A DAY (Patient taking differently:  Take 1 tablet by mouth 2 (Two) Times a Day.) 180 tablet 3 4/24/2023   • levothyroxine (SYNTHROID, LEVOTHROID) 75 MCG tablet Take 1 tablet p.o. daily 5 days a week. (Patient taking differently: Take 1 tablet by mouth Daily. Take 1 tablet p.o. daily 5 days a week.) 90 tablet 4 4/24/2023   • melatonin 5 MG tablet tablet Take 1 tablet by mouth Every Night.   4/24/2023   • METHOTREXATE SODIUM PO Take  by mouth 1 (One) Time Per Week. Once a week  Sun  5 pills   4/23/2023   • Multiple Vitamin (MULTIVITAMIN) tablet Take 1 tablet by mouth Daily.   Past Week   • Omega-3 Fatty Acids (FISH OIL) 1000 MG capsule capsule Take  by mouth.   4/17/2023   • pantoprazole (PROTONIX) 40 MG EC tablet Take 1 tablet by mouth Every Night.   4/23/2023   • polyethylene glycol (MIRALAX) powder Take 17 g by mouth As Needed.      • potassium chloride ER (K-TAB) 20 MEQ tablet controlled-release ER tablet TAKE ONE TABLET BY MOUTH DAILY 90 tablet 3 Past Week   • rosuvastatin (CRESTOR) 20 MG tablet TAKE ONE TABLET BY MOUTH DAILY (Patient taking differently: Take 1 tablet by mouth Every Night.) 90 tablet 3 4/23/2023   • sertraline (ZOLOFT) 50 MG tablet Take 1 tablet by mouth 2 (Two) Times a Day.   4/23/2023   • sotalol (BETAPACE) 120 MG tablet TAKE ONE TABLET BY MOUTH TWICE A DAY (Patient taking differently: Take 1 tablet by mouth Daily.) 180 tablet 0 4/24/2023   • warfarin (COUMADIN) 5 MG tablet Take 1 tablet 5 days a week, take 1/2 tablet 2 days a week or as directed 90 tablet 2 Past Week   • methylPREDNISolone (MEDROL) 4 MG tablet Take 1 mg by mouth Daily. (Patient not taking: Reported on 4/17/2023)   Not Taking   • nitroglycerin (NITROSTAT) 0.4 MG SL tablet Place 1 tablet under the tongue Every 5 (Five) Minutes As Needed for Chest Pain. 100 tablet 1 Unknown       Scheduled Meds:  Continuous Infusions:No current facility-administered medications for this encounter.    PRN Meds:.    ALLERGIES:  Patient has no known allergies.    ROS:  The following  "systems were reviewed and negative;  Constitution:  No fevers, chills, no unintentional weight loss  Skin: no rash, no jaundice  Eyes:  No blurry vision, no eye pain  HENT:  No change in hearing or smell  Resp:  No dyspnea or cough  CV:  No chest pain or palpitations  :  No dysuria, hematuria  Musculoskeletal:  No leg cramps or arthralgias  Neuro:  No tremor, no numbness  Psych:  No depression or confsusion    Objective     Vital Signs:   Vitals:    04/17/23 0912   Weight: 60.8 kg (134 lb)   Height: 157.5 cm (62\")       Physical Exam:       General Appearance:    Awake and alert, in no acute distress   Head:    Normocephalic, without obvious abnormality, atraumatic   Throat:   No oral lesions, no thrush, oral mucosa moist   Lungs:     respirations regular, even and unlabored   Skin:   No rash, no jaundice       Results Review:  Lab Results (last 24 hours)     ** No results found for the last 24 hours. **          Imaging Results (Last 24 Hours)     ** No results found for the last 24 hours. **           I reviewed the patient's labs and imaging.    ASSESSMENT AND PLAN:      Active Problems:    * No active hospital problems. *       Procedure(s):  ESOPHAGOGASTRODUODENOSCOPY      I discussed the patient's findings and my recommendations with the patient.    ERNIE Ace MD  04/24/23  11:58 EDT                "

## 2023-04-24 NOTE — ANESTHESIA PREPROCEDURE EVALUATION
Anesthesia Evaluation     Patient summary reviewed and Nursing notes reviewed   NPO Solid Status: > 8 hours  NPO Liquid Status: > 8 hours           Airway   Mallampati: II  TM distance: >3 FB  Neck ROM: full  No difficulty expected  Dental - normal exam     Pulmonary - normal exam   (+) asthma,shortness of breath,   Cardiovascular - normal exam    ECG reviewed  PT is on anticoagulation therapy    (+) pacemaker pacemaker, hypertension, valvular problems/murmurs AS and AI, CAD, CABG, dysrhythmias Atrial Fib, PVD, hyperlipidemia,  carotid artery disease      Neuro/Psych  (+) psychiatric history,    GI/Hepatic/Renal/Endo    (+)  GERD,  renal disease, thyroid problem     Musculoskeletal     Abdominal  - normal exam    Bowel sounds: normal.   Substance History      OB/GYN          Other        ROS/Med Hx Other: AVR 2014-        Coronary artery disease: S/P CABG and PCI with stenting-        Carotid disease;left side 50-74%-Patrick Ocampo        Inferior myocardial infarction 12-06-      Cards cleared                   Anesthesia Plan    ASA 4     general     intravenous induction     Anesthetic plan, risks, benefits, and alternatives have been provided, discussed and informed consent has been obtained with: patient.    Plan discussed with CRNA.        CODE STATUS:

## 2023-04-25 ENCOUNTER — OFFICE VISIT (OUTPATIENT)
Dept: CARDIOLOGY | Facility: CLINIC | Age: 88
End: 2023-04-25
Payer: MEDICARE

## 2023-04-25 VITALS
HEIGHT: 62 IN | HEART RATE: 70 BPM | DIASTOLIC BLOOD PRESSURE: 64 MMHG | BODY MASS INDEX: 22.45 KG/M2 | WEIGHT: 122 LBS | SYSTOLIC BLOOD PRESSURE: 158 MMHG | OXYGEN SATURATION: 97 %

## 2023-04-25 DIAGNOSIS — D68.9 MEDICATION INDUCED COAGULOPATHY: ICD-10-CM

## 2023-04-25 DIAGNOSIS — Z95.2 HX OF AORTIC VALVE REPLACEMENT: ICD-10-CM

## 2023-04-25 DIAGNOSIS — I49.5 SICK SINUS SYNDROME: ICD-10-CM

## 2023-04-25 DIAGNOSIS — Z95.0 PRESENCE OF CARDIAC PACEMAKER: ICD-10-CM

## 2023-04-25 DIAGNOSIS — I25.10 CHRONIC CORONARY ARTERY DISEASE: Primary | ICD-10-CM

## 2023-04-25 DIAGNOSIS — E78.2 MIXED HYPERLIPIDEMIA: ICD-10-CM

## 2023-04-25 DIAGNOSIS — T50.905A MEDICATION INDUCED COAGULOPATHY: ICD-10-CM

## 2023-04-25 DIAGNOSIS — I10 ESSENTIAL HYPERTENSION: ICD-10-CM

## 2023-04-25 DIAGNOSIS — I48.0 PAROXYSMAL ATRIAL FIBRILLATION: ICD-10-CM

## 2023-04-25 LAB
LAB AP CASE REPORT: NORMAL
PATH REPORT.FINAL DX SPEC: NORMAL
PATH REPORT.GROSS SPEC: NORMAL

## 2023-04-25 NOTE — PROGRESS NOTES
Cardiology Office Visit      Encounter Date:  04/25/2023    Patient ID:   Tracy Jane is a 88 y.o. female.    Reason For Followup:  Paroxysmal atrial fibrillation  Coronary artery disease  Valvular heart disease  History of permanent pacemaker    Brief Clinical History:  Dear Kacie Ulrich APRN    I had the pleasure of seeing Tracy Jane today. As you are well aware, this is a 88 y.o. female with a history of valvular heart disease status post bioprosthetic aortic valve replacement in 2014. She is additional history that includes peripheral vascular disease, coronary artery disease status post coronary arterial bypass grafting, bilateral renal artery stenosis, carotid artery disease, dyslipidemia, and sick sinus syndrome status post permanent pacemaker placement. She presents today for followup on the above conditions.    Interval History:  She denies any chest pain pressure heaviness or tightness. She denies any shortness of breath out of character.  She denies any PND orthopnea.  She denies any syncope or near-syncope.    She reports being stable from a cardiac perspective.    Her blood pressure is elevated today but she reports that her blood pressure yesterday was actually low.  Traditionally her blood pressures have been well controlled.  We will hold off on making any changes based on today's isolated reading.    She does report that she has been having some issues with bleeding.  She reports that she will wake up in the mornings with blood in her mouth.  She has seen ear nose and throat as well as gastroenterology and they have failed to find any identifiable causes.  Her hemoglobin has been quite variable and it has been as low as 8.  It is likely that her anticoagulation and antiplatelet therapy is contributing to this mysterious intermittent bleeding and anemia.  She additionally has problems with unsteady gait.  She has suffered a mechanical fall in the past requiring stitches.  She  utilizes a walker for ambulatory assistance.    We did discuss watchman today.  I do feel like she would be a good candidate.  Her watchman evaluation is below.    She also was hospitalized in December 2021 for an elevated INR.  She had changed medications but had failed to inform the Coumadin clinic that these changes occurred which resulted in a supratherapeutic INR.    Assessment & Plan    Impressions:  Sick sinus syndrome status post permanent pacemaker placement 2016  Paroxysmal atrial fibrillation on sotalol and Coumadin therapy  Coagulopathy secondary to warfarin  Chronic stable angina  Anemia  CAD s/p CABG most recent cath with no disease amenable to PCI 2016  Labile blood pressure  Valvular heart disease, s/p bioprosthetic AVR  CKD, renal artery stenosis  PAD , FELICITAS and carotid stenosis  Fatigue   Orthostasis    Recommendations:  Continuation of her current cardiovascular regimen at the present time.     This includes antihypertensives, diuretic, antianginals, and statin therapy.  Monitor blood pressure and notify if not sufficiently controlled.  Avoid rapid position changes  Consider echocardiogram for surveillance purposes after next visit  Follow-up in 6 months time sooner should there be difficulties.    Marta Shared Decision Making:    This is a patient who carries a diagnosis of atrial fibrillation and has an elevated risk of stroke as evidenced by their [id number] score of at least 5 (age, female gender, hypertension, and coronary artery disease).  Additionally, this patient carries an elevated bleeding risk as evidenced by their HAS-BLED score of at least 3 (predisposition to bleeding, age, and antiplatelet therapy).  It is reasonable to proceed with evaluation and potential placement of a Watchman Left Atrial Occlusion Device pending further work-up.    Diagnoses and all orders for this visit:    1. Chronic coronary artery disease (Primary)  -     CBC & Differential; Future  -     Comprehensive  Metabolic Panel; Future  -     Lipid Panel; Future  -     Adult Transthoracic Echo Complete W/ Cont if Necessary Per Protocol; Future  -     ECG 12 Lead    2. Essential hypertension  -     CBC & Differential; Future  -     Comprehensive Metabolic Panel; Future  -     Lipid Panel; Future  -     Adult Transthoracic Echo Complete W/ Cont if Necessary Per Protocol; Future  -     ECG 12 Lead    3. Hx of aortic valve replacement  -     CBC & Differential; Future  -     Comprehensive Metabolic Panel; Future  -     Lipid Panel; Future  -     Adult Transthoracic Echo Complete W/ Cont if Necessary Per Protocol; Future  -     ECG 12 Lead    4. Mixed hyperlipidemia  -     CBC & Differential; Future  -     Comprehensive Metabolic Panel; Future  -     Lipid Panel; Future  -     Adult Transthoracic Echo Complete W/ Cont if Necessary Per Protocol; Future  -     ECG 12 Lead    5. Paroxysmal atrial fibrillation (HCC)  -     CBC & Differential; Future  -     Comprehensive Metabolic Panel; Future  -     Lipid Panel; Future  -     Adult Transthoracic Echo Complete W/ Cont if Necessary Per Protocol; Future  -     ECG 12 Lead    6. Presence of cardiac pacemaker  -     CBC & Differential; Future  -     Comprehensive Metabolic Panel; Future  -     Lipid Panel; Future  -     Adult Transthoracic Echo Complete W/ Cont if Necessary Per Protocol; Future  -     ECG 12 Lead    7. Sick sinus syndrome  -     CBC & Differential; Future  -     Comprehensive Metabolic Panel; Future  -     Lipid Panel; Future  -     Adult Transthoracic Echo Complete W/ Cont if Necessary Per Protocol; Future  -     ECG 12 Lead    8. Medication induced coagulopathy  -     CBC & Differential; Future  -     Comprehensive Metabolic Panel; Future  -     Lipid Panel; Future  -     Adult Transthoracic Echo Complete W/ Cont if Necessary Per Protocol; Future  -     ECG 12 Lead          Objective:    Vitals:  Vitals:    04/25/23 1111   BP: 158/64   Pulse: 70   SpO2: 97%   Weight:  "55.3 kg (122 lb)   Height: 157.5 cm (62\")     Body mass index is 22.31 kg/m².      Physical Exam:    General: Alert, cooperative, no distress, appears stated age  Head:  Normocephalic, atraumatic, mucous membranes moist  Eyes:  Conjunctiva/corneas clear, EOM's intact     Neck:  Supple,  no bruit    Lungs: Clear to auscultation bilaterally, no wheezes rhonchi rales are noted  Chest wall: No tenderness  Heart::  Regular rate and rhythm, S1 and S2 normal, 1/6 holosystolic murmur.  No rub or gallop  Abdomen: Soft, non-tender, nondistended bowel sounds active  Extremities: No cyanosis, clubbing, or edema  Pulses: 2+ and symmetric all extremities  Skin:  No rashes or lesions  Neuro/psych: A&O x3. CN II through XII are grossly intact with appropriate affect      Allergies:  No Known Allergies    Medication Review:     Current Outpatient Medications:   •  acetaminophen (TYLENOL) 650 MG 8 hr tablet, Take 1 tablet by mouth Every 8 (Eight) Hours As Needed., Disp: , Rfl:   •  aspirin 81 MG EC tablet, Take 1 tablet by mouth Daily., Disp: , Rfl:   •  colchicine 0.6 MG tablet, Take 1 tablet by mouth 2 (Two) Times a Day., Disp: , Rfl:   •  dilTIAZem CD (CARDIZEM CD) 240 MG 24 hr capsule, TAKE ONE CAPSULE BY MOUTH DAILY, Disp: 90 capsule, Rfl: 3  •  doxazosin (CARDURA) 2 MG tablet, TAKE ONE TABLET BY MOUTH DAILY, Disp: 90 tablet, Rfl: 3  •  famotidine (PEPCID) 40 MG tablet, Take 1 tablet by mouth Every Night., Disp: , Rfl:   •  ferrous sulfate 324 (65 Fe) MG tablet delayed-release EC tablet, Take 1 tablet by mouth Daily With Breakfast., Disp: , Rfl:   •  furosemide (LASIX) 40 MG tablet, TAKE ONE TABLET BY MOUTH DAILY, Disp: 90 tablet, Rfl: 3  •  Glucos-Chondroit-Hyaluron-MSM (GLUCOSAMINE CHONDROITIN JOINT) tablet, Take 1 tablet by mouth Daily., Disp: , Rfl:   •  isosorbide mononitrate (IMDUR) 30 MG 24 hr tablet, TAKE ONE TABLET BY MOUTH TWICE A DAY, Disp: 180 tablet, Rfl: 3  •  levothyroxine (SYNTHROID, LEVOTHROID) 75 MCG tablet, " Take 1 tablet p.o. daily 5 days a week., Disp: 90 tablet, Rfl: 4  •  melatonin 5 MG tablet tablet, Take 1 tablet by mouth Every Night., Disp: , Rfl:   •  methotrexate 2.5 MG tablet, , Disp: , Rfl:   •  Multiple Vitamin (MULTIVITAMIN) tablet, Take 1 tablet by mouth Daily., Disp: , Rfl:   •  nitroglycerin (NITROSTAT) 0.4 MG SL tablet, Place 1 tablet under the tongue Every 5 (Five) Minutes As Needed for Chest Pain., Disp: 100 tablet, Rfl: 1  •  Omega-3 Fatty Acids (FISH OIL) 1000 MG capsule capsule, Take  by mouth., Disp: , Rfl:   •  pantoprazole (PROTONIX) 40 MG EC tablet, Take 1 tablet by mouth Every Night., Disp: , Rfl:   •  polyethylene glycol (MIRALAX) powder, Take 17 g by mouth As Needed., Disp: , Rfl:   •  potassium chloride ER (K-TAB) 20 MEQ tablet controlled-release ER tablet, TAKE ONE TABLET BY MOUTH DAILY, Disp: 90 tablet, Rfl: 3  •  rosuvastatin (CRESTOR) 20 MG tablet, TAKE ONE TABLET BY MOUTH DAILY, Disp: 90 tablet, Rfl: 3  •  sertraline (ZOLOFT) 50 MG tablet, Take 1 tablet by mouth 2 (Two) Times a Day., Disp: , Rfl:   •  sotalol (BETAPACE) 120 MG tablet, TAKE ONE TABLET BY MOUTH TWICE A DAY, Disp: 180 tablet, Rfl: 0  •  warfarin (COUMADIN) 5 MG tablet, Take 1 tablet 5 days a week, take 1/2 tablet 2 days a week or as directed, Disp: 90 tablet, Rfl: 2  No current facility-administered medications for this visit.    Family History:  Family History   Problem Relation Age of Onset   • Hypertension Mother    • Heart disease Father    • Heart disease Sister    • Kidney cancer Sister    • Stroke Sister    • Cancer Sister         breast   • Cancer Sister    • Heart disease Brother        Past Medical History:  Past Medical History:   Diagnosis Date   • Anxiety    • Asthma    • Atrial fibrillation    • CAD (coronary artery disease)    • Carotid artery disease    • GERD (gastroesophageal reflux disease)    • Gout    • Hyperlipidemia    • Hypertension    • Hyperthyroidism    • Hypothyroidism        Past Surgical  History:  Past Surgical History:   Procedure Laterality Date   • BASAL CELL CARCINOMA EXCISION      spine, nose and arm, leg 2020   • BREAST BIOPSY     • CARDIAC CATHETERIZATION     • CAROTID STENT     • CATARACT EXTRACTION     • CHOLECYSTECTOMY     • CORONARY ARTERY BYPASS GRAFT     • CORONARY STENT PLACEMENT     • FINGER SURGERY     • KNEE SURGERY     • PACEMAKER IMPLANTATION     • SHOULDER SURGERY         Social History:  Social History     Socioeconomic History   • Marital status:    • Number of children: 4   • Years of education: GED   Tobacco Use   • Smoking status: Former     Packs/day: 1.00     Years: 4.00     Pack years: 4.00     Types: Cigarettes   • Smokeless tobacco: Never   Vaping Use   • Vaping Use: Never used   Substance and Sexual Activity   • Alcohol use: No   • Drug use: No   • Sexual activity: Defer       Review of Systems:  The following systems were reviewed as they relate to the cardiovascular system: Constitutional, Eyes, ENT, Cardiovascular, Respiratory, Gastrointestinal, Integumentary, Neurological, Psychiatric, Hematologic, Endocrine, Musculoskeletal, and Genitourinary. The pertinent cardiovascular findings are reported above with all other cardiovascular points within those systems being negative.    Diagnostic Study Review:     Current Electrocardiogram:    ECG 12 Lead    Date/Time: 4/25/2023 1:16 PM  Performed by: Bandar Henley DO  Authorized by: Bandar Henley DO   Comparison: not compared with previous ECG   Previous ECG: no previous ECG available  Comments: Atrial paced rhythm with a rate of 70 bpm.  Left ventricular hypertrophy.  Consider old septal MI.  Normal QT and prolonged QTc intervals of 436 and 471 ms respectively.  Nonspecific interventricular conduction delay.          Laboratory Data:  Lab Results   Component Value Date    GLUCOSE 105 (H) 11/17/2022    BUN 16 11/17/2022    CREATININE 0.76 11/17/2022    EGFRIFNONA 58 (L) 12/22/2021     EGFRIFAFRI >60 mL/min/1.73m2 09/02/2016    BCR 21.1 11/17/2022    K 4.7 11/17/2022    CO2 27.7 11/17/2022    CALCIUM 10.1 11/17/2022    ALBUMIN 3.40 (L) 12/21/2021    LABIL2 1.1 10/12/2017    AST 30 12/21/2021    ALT 25 12/21/2021     Lab Results   Component Value Date    GLUCOSE 105 (H) 11/17/2022    CALCIUM 10.1 11/17/2022     11/17/2022    K 4.7 11/17/2022    CO2 27.7 11/17/2022     11/17/2022    BUN 16 11/17/2022    CREATININE 0.76 11/17/2022    EGFRIFAFRI >60 mL/min/1.73m2 09/02/2016    EGFRIFNONA 58 (L) 12/22/2021    BCR 21.1 11/17/2022    ANIONGAP 9.3 11/17/2022     Lab Results   Component Value Date    WBC 8.40 12/22/2021    HGB 10.9 (L) 12/22/2021    HCT 31.5 (L) 12/22/2021    MCV 90.5 12/22/2021     12/22/2021     Lab Results   Component Value Date    CHOL 70 12/21/2021    TRIG 133 12/21/2021    HDL 28 (L) 12/21/2021    LDL 19 12/21/2021     Lab Results   Component Value Date    HGBA1C 5.7 (H) 12/22/2021     Lab Results   Component Value Date    INR 1.28 (H) 04/22/2023    INR 2.90 04/14/2023    INR 2.40 03/31/2023    PROTIME 13.5 (H) 04/22/2023    PROTIME 19.6 05/12/2022    PROTIME 14.1 (H) 12/22/2021       Most Recent Echo:       Most Recent Stress Test:       Most Recent Cardiac Catheterization:   No results found for this or any previous visit.       NOTE: The following portions of the patient's note were reviewed, confirmed and/or updated this visit as appropriate: History of present illness/Interval history, physical examination, assessment & plan, allergies, current medications, past family history, past medical history, past social history, past surgical history and problem list.

## 2023-04-25 NOTE — LETTER
April 25, 2023     YEYO Montiel  [address] [facility] [place] [address]    Patient: Tracy Jane   YOB: 1934   Date of Visit: 4/25/2023       Dear YEYO Ulrich:    Thank you for referring Tracy Jane to me for evaluation. Below are the relevant portions of my assessment and plan of care.    If you have questions, please do not hesitate to call me. I look forward to following Tracy along with you.         Sincerely,        Bandar Henley DO        CC: No Recipients    Bandar Henley DO  04/25/23 1317  Signed  Cardiology Office Visit      Encounter Date:  04/25/2023    Patient ID:   Tracy Jane is a 88 y.o. female.    Reason For Followup:  Paroxysmal atrial fibrillation  Coronary artery disease  Valvular heart disease  History of permanent pacemaker    Brief Clinical History:  Dear Kacie Ulrich APRN    I had the pleasure of seeing Tracy Jane today. As you are well aware, this is a 88 y.o. female with a history of valvular heart disease status post bioprosthetic aortic valve replacement in 2014. She is additional history that includes peripheral vascular disease, coronary artery disease status post coronary arterial bypass grafting, bilateral renal artery stenosis, carotid artery disease, dyslipidemia, and sick sinus syndrome status post permanent pacemaker placement. She presents today for followup on the above conditions.    Interval History:  She denies any chest pain pressure heaviness or tightness. She denies any shortness of breath out of character.  She denies any PND orthopnea.  She denies any syncope or near-syncope.    She reports being stable from a cardiac perspective.    Her blood pressure is elevated today but she reports that her blood pressure yesterday was actually low.  Traditionally her blood pressures have been well controlled.  We will hold off on making any changes based on today's isolated reading.    She does  report that she has been having some issues with bleeding.  She reports that she will wake up in the mornings with blood in her mouth.  She has seen ear nose and throat as well as gastroenterology and they have failed to find any identifiable causes.  Her hemoglobin has been quite variable and it has been as low as 8.  It is likely that her anticoagulation and antiplatelet therapy is contributing to this mysterious intermittent bleeding and anemia.  She additionally has problems with unsteady gait.  She has suffered a mechanical fall in the past requiring stitches.  She utilizes a walker for ambulatory assistance.    We did discuss marta today.  I do feel like she would be a good candidate.  Her jerodman evaluation is below.    She also was hospitalized in December 2021 for an elevated INR.  She had changed medications but had failed to inform the Coumadin clinic that these changes occurred which resulted in a supratherapeutic INR.    Assessment & Plan    Impressions:  Sick sinus syndrome status post permanent pacemaker placement 2016  Paroxysmal atrial fibrillation on sotalol and Coumadin therapy  Coagulopathy secondary to warfarin  Chronic stable angina  Anemia  CAD s/p CABG most recent cath with no disease amenable to PCI 2016  Labile blood pressure  Valvular heart disease, s/p bioprosthetic AVR  CKD, renal artery stenosis  PAD , FELICITAS and carotid stenosis  Fatigue   Orthostasis    Recommendations:  Continuation of her current cardiovascular regimen at the present time.     This includes antihypertensives, diuretic, antianginals, and statin therapy.  Monitor blood pressure and notify if not sufficiently controlled.  Avoid rapid position changes  Consider echocardiogram for surveillance purposes after next visit  Follow-up in 6 months time sooner should there be difficulties.    Marta Shared Decision Making:    This is a patient who carries a diagnosis of atrial fibrillation and has an elevated risk of stroke  as evidenced by their [id number] score of at least 5 (age, female gender, hypertension, and coronary artery disease).  Additionally, this patient carries an elevated bleeding risk as evidenced by their HAS-BLED score of at least 3 (predisposition to bleeding, age, and antiplatelet therapy).  It is reasonable to proceed with evaluation and potential placement of a Watchman Left Atrial Occlusion Device pending further work-up.    Diagnoses and all orders for this visit:    1. Chronic coronary artery disease (Primary)  -     CBC & Differential; Future  -     Comprehensive Metabolic Panel; Future  -     Lipid Panel; Future  -     Adult Transthoracic Echo Complete W/ Cont if Necessary Per Protocol; Future  -     ECG 12 Lead    2. Essential hypertension  -     CBC & Differential; Future  -     Comprehensive Metabolic Panel; Future  -     Lipid Panel; Future  -     Adult Transthoracic Echo Complete W/ Cont if Necessary Per Protocol; Future  -     ECG 12 Lead    3. Hx of aortic valve replacement  -     CBC & Differential; Future  -     Comprehensive Metabolic Panel; Future  -     Lipid Panel; Future  -     Adult Transthoracic Echo Complete W/ Cont if Necessary Per Protocol; Future  -     ECG 12 Lead    4. Mixed hyperlipidemia  -     CBC & Differential; Future  -     Comprehensive Metabolic Panel; Future  -     Lipid Panel; Future  -     Adult Transthoracic Echo Complete W/ Cont if Necessary Per Protocol; Future  -     ECG 12 Lead    5. Paroxysmal atrial fibrillation (HCC)  -     CBC & Differential; Future  -     Comprehensive Metabolic Panel; Future  -     Lipid Panel; Future  -     Adult Transthoracic Echo Complete W/ Cont if Necessary Per Protocol; Future  -     ECG 12 Lead    6. Presence of cardiac pacemaker  -     CBC & Differential; Future  -     Comprehensive Metabolic Panel; Future  -     Lipid Panel; Future  -     Adult Transthoracic Echo Complete W/ Cont if Necessary Per Protocol; Future  -     ECG 12 Lead    7.  "Sick sinus syndrome  -     CBC & Differential; Future  -     Comprehensive Metabolic Panel; Future  -     Lipid Panel; Future  -     Adult Transthoracic Echo Complete W/ Cont if Necessary Per Protocol; Future  -     ECG 12 Lead    8. Medication induced coagulopathy  -     CBC & Differential; Future  -     Comprehensive Metabolic Panel; Future  -     Lipid Panel; Future  -     Adult Transthoracic Echo Complete W/ Cont if Necessary Per Protocol; Future  -     ECG 12 Lead          Objective:    Vitals:  Vitals:    04/25/23 1111   BP: 158/64   Pulse: 70   SpO2: 97%   Weight: 55.3 kg (122 lb)   Height: 157.5 cm (62\")     Body mass index is 22.31 kg/m².      Physical Exam:    General: Alert, cooperative, no distress, appears stated age  Head:  Normocephalic, atraumatic, mucous membranes moist  Eyes:  Conjunctiva/corneas clear, EOM's intact     Neck:  Supple,  no bruit    Lungs: Clear to auscultation bilaterally, no wheezes rhonchi rales are noted  Chest wall: No tenderness  Heart::  Regular rate and rhythm, S1 and S2 normal, 1/6 holosystolic murmur.  No rub or gallop  Abdomen: Soft, non-tender, nondistended bowel sounds active  Extremities: No cyanosis, clubbing, or edema  Pulses: 2+ and symmetric all extremities  Skin:  No rashes or lesions  Neuro/psych: A&O x3. CN II through XII are grossly intact with appropriate affect      Allergies:  No Known Allergies    Medication Review:     Current Outpatient Medications:   •  acetaminophen (TYLENOL) 650 MG 8 hr tablet, Take 1 tablet by mouth Every 8 (Eight) Hours As Needed., Disp: , Rfl:   •  aspirin 81 MG EC tablet, Take 1 tablet by mouth Daily., Disp: , Rfl:   •  colchicine 0.6 MG tablet, Take 1 tablet by mouth 2 (Two) Times a Day., Disp: , Rfl:   •  dilTIAZem CD (CARDIZEM CD) 240 MG 24 hr capsule, TAKE ONE CAPSULE BY MOUTH DAILY, Disp: 90 capsule, Rfl: 3  •  doxazosin (CARDURA) 2 MG tablet, TAKE ONE TABLET BY MOUTH DAILY, Disp: 90 tablet, Rfl: 3  •  famotidine (PEPCID) 40 MG " tablet, Take 1 tablet by mouth Every Night., Disp: , Rfl:   •  ferrous sulfate 324 (65 Fe) MG tablet delayed-release EC tablet, Take 1 tablet by mouth Daily With Breakfast., Disp: , Rfl:   •  furosemide (LASIX) 40 MG tablet, TAKE ONE TABLET BY MOUTH DAILY, Disp: 90 tablet, Rfl: 3  •  Glucos-Chondroit-Hyaluron-MSM (GLUCOSAMINE CHONDROITIN JOINT) tablet, Take 1 tablet by mouth Daily., Disp: , Rfl:   •  isosorbide mononitrate (IMDUR) 30 MG 24 hr tablet, TAKE ONE TABLET BY MOUTH TWICE A DAY, Disp: 180 tablet, Rfl: 3  •  levothyroxine (SYNTHROID, LEVOTHROID) 75 MCG tablet, Take 1 tablet p.o. daily 5 days a week., Disp: 90 tablet, Rfl: 4  •  melatonin 5 MG tablet tablet, Take 1 tablet by mouth Every Night., Disp: , Rfl:   •  methotrexate 2.5 MG tablet, , Disp: , Rfl:   •  Multiple Vitamin (MULTIVITAMIN) tablet, Take 1 tablet by mouth Daily., Disp: , Rfl:   •  nitroglycerin (NITROSTAT) 0.4 MG SL tablet, Place 1 tablet under the tongue Every 5 (Five) Minutes As Needed for Chest Pain., Disp: 100 tablet, Rfl: 1  •  Omega-3 Fatty Acids (FISH OIL) 1000 MG capsule capsule, Take  by mouth., Disp: , Rfl:   •  pantoprazole (PROTONIX) 40 MG EC tablet, Take 1 tablet by mouth Every Night., Disp: , Rfl:   •  polyethylene glycol (MIRALAX) powder, Take 17 g by mouth As Needed., Disp: , Rfl:   •  potassium chloride ER (K-TAB) 20 MEQ tablet controlled-release ER tablet, TAKE ONE TABLET BY MOUTH DAILY, Disp: 90 tablet, Rfl: 3  •  rosuvastatin (CRESTOR) 20 MG tablet, TAKE ONE TABLET BY MOUTH DAILY, Disp: 90 tablet, Rfl: 3  •  sertraline (ZOLOFT) 50 MG tablet, Take 1 tablet by mouth 2 (Two) Times a Day., Disp: , Rfl:   •  sotalol (BETAPACE) 120 MG tablet, TAKE ONE TABLET BY MOUTH TWICE A DAY, Disp: 180 tablet, Rfl: 0  •  warfarin (COUMADIN) 5 MG tablet, Take 1 tablet 5 days a week, take 1/2 tablet 2 days a week or as directed, Disp: 90 tablet, Rfl: 2  No current facility-administered medications for this visit.    Family History:  Family  History   Problem Relation Age of Onset   • Hypertension Mother    • Heart disease Father    • Heart disease Sister    • Kidney cancer Sister    • Stroke Sister    • Cancer Sister         breast   • Cancer Sister    • Heart disease Brother        Past Medical History:  Past Medical History:   Diagnosis Date   • Anxiety    • Asthma    • Atrial fibrillation    • CAD (coronary artery disease)    • Carotid artery disease    • GERD (gastroesophageal reflux disease)    • Gout    • Hyperlipidemia    • Hypertension    • Hyperthyroidism    • Hypothyroidism        Past Surgical History:  Past Surgical History:   Procedure Laterality Date   • BASAL CELL CARCINOMA EXCISION      spine, nose and arm, leg 2020   • BREAST BIOPSY     • CARDIAC CATHETERIZATION     • CAROTID STENT     • CATARACT EXTRACTION     • CHOLECYSTECTOMY     • CORONARY ARTERY BYPASS GRAFT     • CORONARY STENT PLACEMENT     • FINGER SURGERY     • KNEE SURGERY     • PACEMAKER IMPLANTATION     • SHOULDER SURGERY         Social History:  Social History     Socioeconomic History   • Marital status:    • Number of children: 4   • Years of education: GED   Tobacco Use   • Smoking status: Former     Packs/day: 1.00     Years: 4.00     Pack years: 4.00     Types: Cigarettes   • Smokeless tobacco: Never   Vaping Use   • Vaping Use: Never used   Substance and Sexual Activity   • Alcohol use: No   • Drug use: No   • Sexual activity: Defer       Review of Systems:  The following systems were reviewed as they relate to the cardiovascular system: Constitutional, Eyes, ENT, Cardiovascular, Respiratory, Gastrointestinal, Integumentary, Neurological, Psychiatric, Hematologic, Endocrine, Musculoskeletal, and Genitourinary. The pertinent cardiovascular findings are reported above with all other cardiovascular points within those systems being negative.    Diagnostic Study Review:     Current Electrocardiogram:    ECG 12 Lead    Date/Time: 4/25/2023 1:16 PM  Performed by:  Bandar Henley DO  Authorized by: Bandar Henley DO   Comparison: not compared with previous ECG   Previous ECG: no previous ECG available  Comments: Atrial paced rhythm with a rate of 70 bpm.  Left ventricular hypertrophy.  Consider old septal MI.  Normal QT and prolonged QTc intervals of 436 and 471 ms respectively.  Nonspecific interventricular conduction delay.          Laboratory Data:  Lab Results   Component Value Date    GLUCOSE 105 (H) 11/17/2022    BUN 16 11/17/2022    CREATININE 0.76 11/17/2022    EGFRIFNONA 58 (L) 12/22/2021    EGFRIFAFRI >60 mL/min/1.73m2 09/02/2016    BCR 21.1 11/17/2022    K 4.7 11/17/2022    CO2 27.7 11/17/2022    CALCIUM 10.1 11/17/2022    ALBUMIN 3.40 (L) 12/21/2021    LABIL2 1.1 10/12/2017    AST 30 12/21/2021    ALT 25 12/21/2021     Lab Results   Component Value Date    GLUCOSE 105 (H) 11/17/2022    CALCIUM 10.1 11/17/2022     11/17/2022    K 4.7 11/17/2022    CO2 27.7 11/17/2022     11/17/2022    BUN 16 11/17/2022    CREATININE 0.76 11/17/2022    EGFRIFAFRI >60 mL/min/1.73m2 09/02/2016    EGFRIFNONA 58 (L) 12/22/2021    BCR 21.1 11/17/2022    ANIONGAP 9.3 11/17/2022     Lab Results   Component Value Date    WBC 8.40 12/22/2021    HGB 10.9 (L) 12/22/2021    HCT 31.5 (L) 12/22/2021    MCV 90.5 12/22/2021     12/22/2021     Lab Results   Component Value Date    CHOL 70 12/21/2021    TRIG 133 12/21/2021    HDL 28 (L) 12/21/2021    LDL 19 12/21/2021     Lab Results   Component Value Date    HGBA1C 5.7 (H) 12/22/2021     Lab Results   Component Value Date    INR 1.28 (H) 04/22/2023    INR 2.90 04/14/2023    INR 2.40 03/31/2023    PROTIME 13.5 (H) 04/22/2023    PROTIME 19.6 05/12/2022    PROTIME 14.1 (H) 12/22/2021       Most Recent Echo:       Most Recent Stress Test:       Most Recent Cardiac Catheterization:   No results found for this or any previous visit.       NOTE: The following portions of the patient's note were reviewed,  confirmed and/or updated this visit as appropriate: History of present illness/Interval history, physical examination, assessment & plan, allergies, current medications, past family history, past medical history, past social history, past surgical history and problem list.

## 2023-04-28 ENCOUNTER — ANTICOAGULATION VISIT (OUTPATIENT)
Dept: CARDIOLOGY | Facility: CLINIC | Age: 88
End: 2023-04-28
Payer: MEDICARE

## 2023-04-28 ENCOUNTER — LAB (OUTPATIENT)
Dept: LAB | Facility: HOSPITAL | Age: 88
End: 2023-04-28
Payer: MEDICARE

## 2023-04-28 DIAGNOSIS — I48.0 PAROXYSMAL ATRIAL FIBRILLATION: Primary | ICD-10-CM

## 2023-04-28 DIAGNOSIS — I48.0 PAROXYSMAL ATRIAL FIBRILLATION: ICD-10-CM

## 2023-04-28 DIAGNOSIS — I25.10 CHRONIC CORONARY ARTERY DISEASE: ICD-10-CM

## 2023-04-28 DIAGNOSIS — E78.2 MIXED HYPERLIPIDEMIA: ICD-10-CM

## 2023-04-28 DIAGNOSIS — D68.9 MEDICATION INDUCED COAGULOPATHY: ICD-10-CM

## 2023-04-28 DIAGNOSIS — I10 ESSENTIAL HYPERTENSION: ICD-10-CM

## 2023-04-28 DIAGNOSIS — I49.5 SICK SINUS SYNDROME: ICD-10-CM

## 2023-04-28 DIAGNOSIS — T50.905A MEDICATION INDUCED COAGULOPATHY: ICD-10-CM

## 2023-04-28 DIAGNOSIS — Z95.2 HX OF AORTIC VALVE REPLACEMENT: ICD-10-CM

## 2023-04-28 DIAGNOSIS — Z95.0 PRESENCE OF CARDIAC PACEMAKER: ICD-10-CM

## 2023-04-28 LAB
ALBUMIN SERPL-MCNC: 3.9 G/DL (ref 3.5–5.2)
ALBUMIN/GLOB SERPL: 1.3 G/DL
ALP SERPL-CCNC: 97 U/L (ref 39–117)
ALT SERPL W P-5'-P-CCNC: 9 U/L (ref 1–33)
ANION GAP SERPL CALCULATED.3IONS-SCNC: 8 MMOL/L (ref 5–15)
AST SERPL-CCNC: 15 U/L (ref 1–32)
BASOPHILS # BLD AUTO: 0.03 10*3/MM3 (ref 0–0.2)
BASOPHILS NFR BLD AUTO: 0.5 % (ref 0–1.5)
BILIRUB SERPL-MCNC: 0.4 MG/DL (ref 0–1.2)
BUN SERPL-MCNC: 23 MG/DL (ref 8–23)
BUN/CREAT SERPL: 24.2 (ref 7–25)
CALCIUM SPEC-SCNC: 9.6 MG/DL (ref 8.6–10.5)
CHLORIDE SERPL-SCNC: 101 MMOL/L (ref 98–107)
CHOLEST SERPL-MCNC: 108 MG/DL (ref 0–200)
CO2 SERPL-SCNC: 30 MMOL/L (ref 22–29)
CREAT SERPL-MCNC: 0.95 MG/DL (ref 0.57–1)
DEPRECATED RDW RBC AUTO: 50.4 FL (ref 37–54)
EGFRCR SERPLBLD CKD-EPI 2021: 57.7 ML/MIN/1.73
EOSINOPHIL # BLD AUTO: 0.11 10*3/MM3 (ref 0–0.4)
EOSINOPHIL NFR BLD AUTO: 1.8 % (ref 0.3–6.2)
ERYTHROCYTE [DISTWIDTH] IN BLOOD BY AUTOMATED COUNT: 14.8 % (ref 12.3–15.4)
GLOBULIN UR ELPH-MCNC: 2.9 GM/DL
GLUCOSE SERPL-MCNC: 101 MG/DL (ref 65–99)
HCT VFR BLD AUTO: 28.9 % (ref 34–46.6)
HDLC SERPL-MCNC: 50 MG/DL (ref 40–60)
HGB BLD-MCNC: 9.4 G/DL (ref 12–15.9)
IMM GRANULOCYTES # BLD AUTO: 0.05 10*3/MM3 (ref 0–0.05)
IMM GRANULOCYTES NFR BLD AUTO: 0.8 % (ref 0–0.5)
INR PPP: 1.2
LDLC SERPL CALC-MCNC: 39 MG/DL (ref 0–100)
LDLC/HDLC SERPL: 0.74 {RATIO}
LYMPHOCYTES # BLD AUTO: 1.1 10*3/MM3 (ref 0.7–3.1)
LYMPHOCYTES NFR BLD AUTO: 18.5 % (ref 19.6–45.3)
MCH RBC QN AUTO: 30.8 PG (ref 26.6–33)
MCHC RBC AUTO-ENTMCNC: 32.5 G/DL (ref 31.5–35.7)
MCV RBC AUTO: 94.8 FL (ref 79–97)
MONOCYTES # BLD AUTO: 0.44 10*3/MM3 (ref 0.1–0.9)
MONOCYTES NFR BLD AUTO: 7.4 % (ref 5–12)
NEUTROPHILS NFR BLD AUTO: 4.23 10*3/MM3 (ref 1.7–7)
NEUTROPHILS NFR BLD AUTO: 71 % (ref 42.7–76)
NRBC BLD AUTO-RTO: 0 /100 WBC (ref 0–0.2)
PLATELET # BLD AUTO: 237 10*3/MM3 (ref 140–450)
PMV BLD AUTO: 9.9 FL (ref 6–12)
POTASSIUM SERPL-SCNC: 4 MMOL/L (ref 3.5–5.2)
PROT SERPL-MCNC: 6.8 G/DL (ref 6–8.5)
RBC # BLD AUTO: 3.05 10*6/MM3 (ref 3.77–5.28)
SODIUM SERPL-SCNC: 139 MMOL/L (ref 136–145)
TRIGL SERPL-MCNC: 104 MG/DL (ref 0–150)
VLDLC SERPL-MCNC: 19 MG/DL (ref 5–40)
WBC NRBC COR # BLD: 5.96 10*3/MM3 (ref 3.4–10.8)

## 2023-04-28 PROCEDURE — 80061 LIPID PANEL: CPT

## 2023-04-28 PROCEDURE — 80053 COMPREHEN METABOLIC PANEL: CPT

## 2023-04-28 PROCEDURE — 85025 COMPLETE CBC W/AUTO DIFF WBC: CPT

## 2023-04-28 PROCEDURE — 36415 COLL VENOUS BLD VENIPUNCTURE: CPT

## 2023-05-02 ENCOUNTER — ANTICOAGULATION VISIT (OUTPATIENT)
Dept: CARDIOLOGY | Facility: CLINIC | Age: 88
End: 2023-05-02
Payer: MEDICARE

## 2023-05-02 DIAGNOSIS — I48.0 PAROXYSMAL ATRIAL FIBRILLATION: Primary | ICD-10-CM

## 2023-05-02 LAB — INR PPP: 2.2

## 2023-05-09 ENCOUNTER — HOSPITAL ENCOUNTER (OUTPATIENT)
Dept: CARDIOLOGY | Facility: HOSPITAL | Age: 88
Discharge: HOME | End: 2023-05-09
Payer: MEDICARE

## 2023-05-09 ENCOUNTER — ANTICOAGULATION VISIT (OUTPATIENT)
Dept: CARDIOLOGY | Facility: CLINIC | Age: 88
End: 2023-05-09
Payer: MEDICARE

## 2023-05-09 VITALS
HEART RATE: 69 BPM | BODY MASS INDEX: 22.45 KG/M2 | HEIGHT: 62 IN | WEIGHT: 122 LBS | DIASTOLIC BLOOD PRESSURE: 53 MMHG | SYSTOLIC BLOOD PRESSURE: 141 MMHG

## 2023-05-09 DIAGNOSIS — Z95.2 HX OF AORTIC VALVE REPLACEMENT: ICD-10-CM

## 2023-05-09 DIAGNOSIS — I49.5 SICK SINUS SYNDROME: ICD-10-CM

## 2023-05-09 DIAGNOSIS — I25.10 CHRONIC CORONARY ARTERY DISEASE: ICD-10-CM

## 2023-05-09 DIAGNOSIS — I48.0 PAROXYSMAL ATRIAL FIBRILLATION: ICD-10-CM

## 2023-05-09 DIAGNOSIS — I48.0 PAROXYSMAL ATRIAL FIBRILLATION: Primary | ICD-10-CM

## 2023-05-09 DIAGNOSIS — E78.2 MIXED HYPERLIPIDEMIA: ICD-10-CM

## 2023-05-09 DIAGNOSIS — Z95.0 PRESENCE OF CARDIAC PACEMAKER: ICD-10-CM

## 2023-05-09 DIAGNOSIS — D68.9 MEDICATION INDUCED COAGULOPATHY: ICD-10-CM

## 2023-05-09 DIAGNOSIS — T50.905A MEDICATION INDUCED COAGULOPATHY: ICD-10-CM

## 2023-05-09 DIAGNOSIS — I10 ESSENTIAL HYPERTENSION: ICD-10-CM

## 2023-05-09 LAB — INR PPP: 2.8

## 2023-05-09 PROCEDURE — G0250 MD INR TEST REVIE INTER MGMT: HCPCS | Performed by: INTERNAL MEDICINE

## 2023-05-09 PROCEDURE — 93306 TTE W/DOPPLER COMPLETE: CPT

## 2023-05-12 LAB
BH CV ECHO MEAS - ACS: 1.08 CM
BH CV ECHO MEAS - AI P1/2T: 344.2 MSEC
BH CV ECHO MEAS - AO MAX PG: 37.8 MMHG
BH CV ECHO MEAS - AO MEAN PG: 19.8 MMHG
BH CV ECHO MEAS - AO ROOT DIAM: 2.6 CM
BH CV ECHO MEAS - AO V2 MAX: 306.7 CM/SEC
BH CV ECHO MEAS - AO V2 VTI: 66.7 CM
BH CV ECHO MEAS - AVA(I,D): 0.65 CM2
BH CV ECHO MEAS - EDV(CUBED): 101.5 ML
BH CV ECHO MEAS - EDV(MOD-SP4): 78.7 ML
BH CV ECHO MEAS - EF(MOD-BP): 62.8 %
BH CV ECHO MEAS - EF(MOD-SP4): 62.8 %
BH CV ECHO MEAS - ESV(CUBED): 25.3 ML
BH CV ECHO MEAS - ESV(MOD-SP4): 29.2 ML
BH CV ECHO MEAS - FS: 37.1 %
BH CV ECHO MEAS - IVS/LVPW: 1.03 CM
BH CV ECHO MEAS - IVSD: 1.19 CM
BH CV ECHO MEAS - LA DIMENSION: 4.1 CM
BH CV ECHO MEAS - LV MASS(C)D: 203.6 GRAMS
BH CV ECHO MEAS - LV MAX PG: 4.2 MMHG
BH CV ECHO MEAS - LV MEAN PG: 2.03 MMHG
BH CV ECHO MEAS - LV V1 MAX: 102.7 CM/SEC
BH CV ECHO MEAS - LV V1 VTI: 22.6 CM
BH CV ECHO MEAS - LVIDD: 4.7 CM
BH CV ECHO MEAS - LVIDS: 2.9 CM
BH CV ECHO MEAS - LVOT AREA: 1.92 CM2
BH CV ECHO MEAS - LVOT DIAM: 1.57 CM
BH CV ECHO MEAS - LVPWD: 1.16 CM
BH CV ECHO MEAS - MR MAX PG: 134.1 MMHG
BH CV ECHO MEAS - MR MAX VEL: 578.9 CM/SEC
BH CV ECHO MEAS - MV A MAX VEL: 107.5 CM/SEC
BH CV ECHO MEAS - MV DEC SLOPE: 530.1 CM/SEC2
BH CV ECHO MEAS - MV DEC TIME: 0.2 MSEC
BH CV ECHO MEAS - MV E MAX VEL: 105.9 CM/SEC
BH CV ECHO MEAS - MV E/A: 0.99
BH CV ECHO MEAS - MV MAX PG: 6.1 MMHG
BH CV ECHO MEAS - MV MEAN PG: 2.8 MMHG
BH CV ECHO MEAS - MV V2 VTI: 30.2 CM
BH CV ECHO MEAS - MVA(VTI): 1.44 CM2
BH CV ECHO MEAS - PA ACC TIME: 0.08 SEC
BH CV ECHO MEAS - PA PR(ACCEL): 41.8 MMHG
BH CV ECHO MEAS - PA V2 MAX: 99.6 CM/SEC
BH CV ECHO MEAS - PI END-D VEL: 106.1 CM/SEC
BH CV ECHO MEAS - PULM A REVS DUR: 0.12 SEC
BH CV ECHO MEAS - PULM A REVS VEL: 25 CM/SEC
BH CV ECHO MEAS - PULM DIAS VEL: 44.6 CM/SEC
BH CV ECHO MEAS - PULM S/D: 1.05
BH CV ECHO MEAS - PULM SYS VEL: 46.9 CM/SEC
BH CV ECHO MEAS - RV MAX PG: 2.22 MMHG
BH CV ECHO MEAS - RV V1 MAX: 74.6 CM/SEC
BH CV ECHO MEAS - RV V1 VTI: 17.4 CM
BH CV ECHO MEAS - RVDD: 2.34 CM
BH CV ECHO MEAS - SV(LVOT): 43.5 ML
BH CV ECHO MEAS - SV(MOD-SP4): 49.4 ML
BH CV ECHO MEAS - TR MAX PG: 25.8 MMHG
BH CV ECHO MEAS - TR MAX VEL: 253.2 CM/SEC
MAXIMAL PREDICTED HEART RATE: 132 BPM
STRESS TARGET HR: 112 BPM

## 2023-05-23 ENCOUNTER — ANTICOAGULATION VISIT (OUTPATIENT)
Dept: CARDIOLOGY | Facility: CLINIC | Age: 88
End: 2023-05-23
Payer: MEDICARE

## 2023-05-23 DIAGNOSIS — I48.0 PAROXYSMAL ATRIAL FIBRILLATION: Primary | ICD-10-CM

## 2023-05-23 LAB — INR PPP: 2.5

## 2023-06-06 ENCOUNTER — TELEPHONE (OUTPATIENT)
Dept: CARDIOLOGY | Facility: CLINIC | Age: 88
End: 2023-06-06

## 2023-06-06 NOTE — TELEPHONE ENCOUNTER
DELETE AFTER REVIEWING: Telephone encounter to be sent to the clinical pool   Hub staff attempted to follow warm transfer process and was unsuccessful     Caller: DAYANA    Relationship to patient: DAUGHTER    Best call back number: [phone]     Patient is needing: DAUGHTER CALLED IN PATIENT IN THE HOSPITAL AT [facility] WITH A BRAIN BLEED SHE WANTED TO LET THE OFFICE KNOW SHE HAD A FALL. THEY CURRENTLY HAVE HER OFF THE BLOOD THINNER AND MONITORING IT FOR THEM.

## 2023-06-12 RX ORDER — SOTALOL HYDROCHLORIDE 120 MG/1
TABLET ORAL
Qty: 180 TABLET | Refills: 0 | Status: SHIPPED | OUTPATIENT
Start: 2023-06-12

## 2023-06-14 ENCOUNTER — TELEPHONE (OUTPATIENT)
Dept: CARDIOLOGY | Facility: CLINIC | Age: 88
End: 2023-06-14
Payer: MEDICARE

## 2023-06-14 NOTE — TELEPHONE ENCOUNTER
"Per Misti, patient & daughter came to the [facility] office today anxious about wanting to know when patient can restart warfarin. INR done today was 1.0., They had contacted our office a few days ago about a \"fall\" she had had, was taken to [facility] where she was diagnosed with a \"brain bleed\" & warfarin was DCd at that time. She had f/u CT at [facility] on Monday but results are not known to us at this time. I called the daughter, informed her that neuro at [facility] would decide if patient can resume warfarin pending CT result. I contacted neuro at [facility] & was told that the plan was for patient to have a phone visit this Friday with one of their physicians after review of the CT & a decision would be made at that time. Returned call to daughter with this information & told her if she is okay to restart warfarin Friday to resume her maintenance schedule & check INR on Tuesday with home monitor & call in result unless neuro advises differently. Daughter verbalized understanding of instructions given & will hold off on warfarin until neuro okays to restart.  "

## 2023-06-15 NOTE — TELEPHONE ENCOUNTER
I spoke with her daughter- per Dr Henley the patient should schedule an appt with him or Yahaira or Siria to follow up from the hospital and re-consider a watchman. Follow up with [facility] to determine when it will be safe to resume coumadin and call to let Mignon know what is decided Friday.

## 2023-07-31 RX ORDER — DOXAZOSIN 2 MG/1
TABLET ORAL
Qty: 90 TABLET | Refills: 3 | Status: SHIPPED | OUTPATIENT
Start: 2023-07-31

## 2023-07-31 RX ORDER — ROSUVASTATIN CALCIUM 20 MG/1
TABLET, COATED ORAL
Qty: 90 TABLET | Refills: 3 | Status: SHIPPED | OUTPATIENT
Start: 2023-07-31

## 2023-08-02 ENCOUNTER — ANTICOAGULATION VISIT (OUTPATIENT)
Dept: CARDIOLOGY | Facility: CLINIC | Age: 88
End: 2023-08-02
Payer: MEDICARE

## 2023-08-02 DIAGNOSIS — I48.0 PAROXYSMAL ATRIAL FIBRILLATION: Primary | ICD-10-CM

## 2023-08-02 LAB — INR PPP: 2.8

## 2023-08-07 RX ORDER — DILTIAZEM HYDROCHLORIDE 240 MG/1
CAPSULE, COATED, EXTENDED RELEASE ORAL
Qty: 90 CAPSULE | Refills: 3 | Status: SHIPPED | OUTPATIENT
Start: 2023-08-07

## 2023-08-15 ENCOUNTER — ANTICOAGULATION VISIT (OUTPATIENT)
Dept: CARDIOLOGY | Facility: CLINIC | Age: 88
End: 2023-08-15
Payer: MEDICARE

## 2023-08-15 DIAGNOSIS — I48.0 PAROXYSMAL ATRIAL FIBRILLATION: Primary | ICD-10-CM

## 2023-08-15 LAB — INR PPP: 1.5

## 2023-08-15 PROCEDURE — G0250 MD INR TEST REVIE INTER MGMT: HCPCS | Performed by: INTERNAL MEDICINE

## 2023-08-22 ENCOUNTER — ANTICOAGULATION VISIT (OUTPATIENT)
Dept: CARDIOLOGY | Facility: CLINIC | Age: 88
End: 2023-08-22
Payer: MEDICARE

## 2023-08-22 DIAGNOSIS — I48.0 PAROXYSMAL ATRIAL FIBRILLATION: Primary | ICD-10-CM

## 2023-08-22 LAB — INR PPP: 2.5

## 2023-09-01 RX ORDER — POTASSIUM CHLORIDE 1500 MG/1
TABLET, EXTENDED RELEASE ORAL
Qty: 90 TABLET | Refills: 3 | Status: SHIPPED | OUTPATIENT
Start: 2023-09-01

## 2023-09-05 ENCOUNTER — ANTICOAGULATION VISIT (OUTPATIENT)
Dept: CARDIOLOGY | Facility: CLINIC | Age: 88
End: 2023-09-05
Payer: MEDICARE

## 2023-09-05 ENCOUNTER — TELEPHONE (OUTPATIENT)
Dept: ENDOCRINOLOGY | Facility: CLINIC | Age: 88
End: 2023-09-05

## 2023-09-05 DIAGNOSIS — I48.0 PAROXYSMAL ATRIAL FIBRILLATION: Primary | ICD-10-CM

## 2023-09-05 LAB — INR PPP: 2.2

## 2023-09-05 NOTE — TELEPHONE ENCOUNTER
Caller: DAYANA ROSADO    Relationship to patient: Emergency Contact    Best call back number: [phone]    Patient is needing: PT HAS TO RESCHEDULE OCT APT, NEXT AVAILABLE IS APRIL 2024, PT DOESN'T WANT TO BE SEEN THAT LATE, PLEASE ADVISE

## 2023-09-12 ENCOUNTER — ANTICOAGULATION VISIT (OUTPATIENT)
Dept: CARDIOLOGY | Facility: CLINIC | Age: 88
End: 2023-09-12
Payer: MEDICARE

## 2023-09-12 DIAGNOSIS — I48.0 PAROXYSMAL ATRIAL FIBRILLATION: Primary | ICD-10-CM

## 2023-09-12 LAB — INR PPP: 2.3

## 2023-09-12 RX ORDER — SOTALOL HYDROCHLORIDE 120 MG/1
TABLET ORAL
Qty: 180 TABLET | Refills: 0 | Status: SHIPPED | OUTPATIENT
Start: 2023-09-12

## 2023-09-26 ENCOUNTER — ANTICOAGULATION VISIT (OUTPATIENT)
Dept: CARDIOLOGY | Facility: CLINIC | Age: 88
End: 2023-09-26
Payer: MEDICARE

## 2023-09-26 DIAGNOSIS — I48.0 PAROXYSMAL ATRIAL FIBRILLATION: Primary | ICD-10-CM

## 2023-09-26 LAB — INR PPP: 2.2

## 2023-09-26 PROCEDURE — G0250 MD INR TEST REVIE INTER MGMT: HCPCS | Performed by: INTERNAL MEDICINE

## 2023-10-02 RX ORDER — FUROSEMIDE 40 MG/1
TABLET ORAL
Qty: 90 TABLET | Refills: 3 | Status: SHIPPED | OUTPATIENT
Start: 2023-10-02

## 2023-10-09 RX ORDER — WARFARIN SODIUM 5 MG/1
TABLET ORAL
Qty: 72 TABLET | Refills: 0 | Status: SHIPPED | OUTPATIENT
Start: 2023-10-09

## 2023-10-10 ENCOUNTER — ANTICOAGULATION VISIT (OUTPATIENT)
Dept: CARDIOLOGY | Facility: CLINIC | Age: 88
End: 2023-10-10
Payer: MEDICARE

## 2023-10-10 DIAGNOSIS — I48.0 PAROXYSMAL ATRIAL FIBRILLATION: Primary | ICD-10-CM

## 2023-10-10 LAB — INR PPP: 2.2

## 2023-10-12 ENCOUNTER — OFFICE (OUTPATIENT)
Dept: URBAN - METROPOLITAN AREA CLINIC 64 | Facility: CLINIC | Age: 88
End: 2023-10-12

## 2023-10-12 VITALS
DIASTOLIC BLOOD PRESSURE: 74 MMHG | HEART RATE: 70 BPM | HEIGHT: 62 IN | WEIGHT: 123 LBS | SYSTOLIC BLOOD PRESSURE: 142 MMHG

## 2023-10-12 DIAGNOSIS — E03.9 ACQUIRED HYPOTHYROIDISM: Primary | ICD-10-CM

## 2023-10-12 DIAGNOSIS — K59.00 CONSTIPATION, UNSPECIFIED: ICD-10-CM

## 2023-10-12 DIAGNOSIS — R14.2 ERUCTATION: ICD-10-CM

## 2023-10-12 DIAGNOSIS — K21.9 GASTRO-ESOPHAGEAL REFLUX DISEASE WITHOUT ESOPHAGITIS: ICD-10-CM

## 2023-10-12 DIAGNOSIS — R04.0 EPISTAXIS: ICD-10-CM

## 2023-10-12 PROCEDURE — 99214 OFFICE O/P EST MOD 30 MIN: CPT

## 2023-10-12 RX ORDER — ALUMINA, MAGNESIA, SIMETHICONE 200; 200; 24; 1 MG/1; MG/1; MG/1; MG/1
TABLET, CHEWABLE ORAL
Qty: 100 | Refills: 3 | Status: ACTIVE
Start: 2023-10-12

## 2023-10-13 ENCOUNTER — LAB (OUTPATIENT)
Dept: LAB | Facility: HOSPITAL | Age: 88
End: 2023-10-13
Payer: MEDICARE

## 2023-10-13 DIAGNOSIS — E03.9 ACQUIRED HYPOTHYROIDISM: ICD-10-CM

## 2023-10-13 LAB
ALBUMIN SERPL-MCNC: 3.8 G/DL (ref 3.5–5.2)
ALBUMIN/GLOB SERPL: 1.6 G/DL
ALP SERPL-CCNC: 105 U/L (ref 39–117)
ALT SERPL W P-5'-P-CCNC: 33 U/L (ref 1–33)
ANION GAP SERPL CALCULATED.3IONS-SCNC: 7.5 MMOL/L (ref 5–15)
AST SERPL-CCNC: 31 U/L (ref 1–32)
BILIRUB SERPL-MCNC: 0.4 MG/DL (ref 0–1.2)
BUN SERPL-MCNC: 21 MG/DL (ref 8–23)
BUN/CREAT SERPL: 24.7 (ref 7–25)
CALCIUM SPEC-SCNC: 9.5 MG/DL (ref 8.6–10.5)
CHLORIDE SERPL-SCNC: 104 MMOL/L (ref 98–107)
CO2 SERPL-SCNC: 28.5 MMOL/L (ref 22–29)
CREAT SERPL-MCNC: 0.85 MG/DL (ref 0.57–1)
EGFRCR SERPLBLD CKD-EPI 2021: 66 ML/MIN/1.73
GLOBULIN UR ELPH-MCNC: 2.4 GM/DL
GLUCOSE SERPL-MCNC: 95 MG/DL (ref 65–99)
POTASSIUM SERPL-SCNC: 4.7 MMOL/L (ref 3.5–5.2)
PROT SERPL-MCNC: 6.2 G/DL (ref 6–8.5)
SODIUM SERPL-SCNC: 140 MMOL/L (ref 136–145)
T4 FREE SERPL-MCNC: 1.19 NG/DL (ref 0.93–1.7)
TSH SERPL DL<=0.05 MIU/L-ACNC: 1.54 UIU/ML (ref 0.27–4.2)

## 2023-10-13 PROCEDURE — 84439 ASSAY OF FREE THYROXINE: CPT

## 2023-10-13 PROCEDURE — 80053 COMPREHEN METABOLIC PANEL: CPT

## 2023-10-13 PROCEDURE — 36415 COLL VENOUS BLD VENIPUNCTURE: CPT

## 2023-10-13 PROCEDURE — 84443 ASSAY THYROID STIM HORMONE: CPT

## 2023-10-16 RX ORDER — ISOSORBIDE MONONITRATE 30 MG/1
TABLET, EXTENDED RELEASE ORAL
Qty: 180 TABLET | Refills: 2 | Status: SHIPPED | OUTPATIENT
Start: 2023-10-16

## 2023-10-17 ENCOUNTER — OFFICE VISIT (OUTPATIENT)
Dept: ENDOCRINOLOGY | Facility: CLINIC | Age: 88
End: 2023-10-17
Payer: MEDICARE

## 2023-10-17 VITALS
HEIGHT: 62 IN | BODY MASS INDEX: 23.04 KG/M2 | WEIGHT: 125.2 LBS | SYSTOLIC BLOOD PRESSURE: 140 MMHG | DIASTOLIC BLOOD PRESSURE: 60 MMHG

## 2023-10-17 DIAGNOSIS — I10 ESSENTIAL HYPERTENSION: ICD-10-CM

## 2023-10-17 DIAGNOSIS — E03.9 ACQUIRED HYPOTHYROIDISM: Primary | ICD-10-CM

## 2023-10-17 PROCEDURE — 1159F MED LIST DOCD IN RCRD: CPT | Performed by: INTERNAL MEDICINE

## 2023-10-17 PROCEDURE — 1160F RVW MEDS BY RX/DR IN RCRD: CPT | Performed by: INTERNAL MEDICINE

## 2023-10-17 PROCEDURE — 99214 OFFICE O/P EST MOD 30 MIN: CPT | Performed by: INTERNAL MEDICINE

## 2023-10-17 RX ORDER — MELATONIN
1000 DAILY
COMMUNITY

## 2023-10-17 RX ORDER — NITROFURANTOIN 25; 75 MG/1; MG/1
CAPSULE ORAL
COMMUNITY
Start: 2023-08-02

## 2023-10-17 RX ORDER — SERTRALINE HYDROCHLORIDE 100 MG/1
TABLET, FILM COATED ORAL
COMMUNITY
Start: 2023-09-25

## 2023-10-17 NOTE — PROGRESS NOTES
Endocrine Progress Note Outpatient     Patient Care Team:  Kacie Liriano APRN as PCP - General (Nurse Practitioner)    Chief Complaint: Follow-up hypothyroidism: She is accompanied with her daughter today.    HPI: 88 year-old female with history of hypothyroidism is here for follow-up and she is currently taking levothyroxine 75 mcg 5 days a week.  Overall doing well.  She does have some arthritis.  She is taking her thyroid medications on regular basis.  She is having some occasional palpitations.    Hypertension: Fair control.    Past Medical History:   Diagnosis Date    Anxiety     Asthma     Atrial fibrillation     CAD (coronary artery disease)     Carotid artery disease     GERD (gastroesophageal reflux disease)     Gout     Hyperlipidemia     Hypertension     Hyperthyroidism     Hypothyroidism        Social History     Socioeconomic History    Marital status:     Number of children: 4    Years of education: GED   Tobacco Use    Smoking status: Former     Packs/day: 1.00     Years: 4.00     Additional pack years: 0.00     Total pack years: 4.00     Types: Cigarettes    Smokeless tobacco: Never   Vaping Use    Vaping Use: Never used   Substance and Sexual Activity    Alcohol use: No    Drug use: No    Sexual activity: Defer       Family History   Problem Relation Age of Onset    Hypertension Mother     Heart disease Father     Heart disease Sister     Kidney cancer Sister     Stroke Sister     Cancer Sister         breast    Cancer Sister     Heart disease Brother        No Known Allergies    ROS:   Constitutional:  Admit fatigue, tiredness.    Eyes:  Denies change in visual acuity   HENT:  Denies nasal congestion or sore throat   Respiratory: denies cough, shortness of breath.   Cardiovascular:  denies chest pain, edema   GI:  Denies abdominal pain, nausea, vomiting.   Musculoskeletal:  Denies back pain or joint pain   Integument:  Denies dry skin and rash   Neurologic:  Denies headache, focal  weakness or sensory changes   Endocrine:  Denies polyuria or polydipsia   Psychiatric:  Denies depression or anxiety      Vitals:    10/17/23 1123   BP: 140/60         Physical Exam:  GEN: NAD, conversant   CV: RRR  LUNG: CTA  PSYCH: Awake and coherent      Results Review:     I reviewed the patient's new clinical results.      Lab Results   Component Value Date    GLUCOSE 95 10/13/2023    BUN 21 10/13/2023    CREATININE 0.85 10/13/2023    EGFRIFNONA 58 (L) 12/22/2021    EGFRIFAFRI >60 mL/min/1.73m2 09/02/2016    BCR 24.7 10/13/2023    K 4.7 10/13/2023    CO2 28.5 10/13/2023    CALCIUM 9.5 10/13/2023    ALBUMIN 3.8 10/13/2023    LABIL2 1.1 10/12/2017    AST 31 10/13/2023    ALT 33 10/13/2023    CHOL 108 04/28/2023    TRIG 104 04/28/2023    LDL 39 04/28/2023    HDL 50 04/28/2023     Lab Results   Component Value Date    TSH 1.540 10/13/2023    FREET4 1.19 10/13/2023         Medication Review: Reviewed.       Current Outpatient Medications:     acetaminophen (TYLENOL) 650 MG 8 hr tablet, Take 1 tablet by mouth Every 8 (Eight) Hours As Needed., Disp: , Rfl:     aspirin 81 MG EC tablet, Take 1 tablet by mouth Daily., Disp: , Rfl:     Cholecalciferol 25 MCG (1000 UT) tablet, Take 1 tablet by mouth Daily., Disp: , Rfl:     colchicine 0.6 MG tablet, Take 1 tablet by mouth 2 (Two) Times a Day., Disp: , Rfl:     dilTIAZem CD (CARDIZEM CD) 240 MG 24 hr capsule, TAKE ONE CAPSULE BY MOUTH DAILY, Disp: 90 capsule, Rfl: 3    doxazosin (CARDURA) 2 MG tablet, TAKE ONE TABLET BY MOUTH DAILY, Disp: 90 tablet, Rfl: 3    ferrous sulfate 324 (65 Fe) MG tablet delayed-release EC tablet, Take 1 tablet by mouth Daily With Breakfast., Disp: , Rfl:     folic acid (FOLVITE) 1 MG tablet, Take 1 tablet by mouth Daily., Disp: , Rfl:     furosemide (LASIX) 40 MG tablet, TAKE ONE TABLET BY MOUTH DAILY, Disp: 90 tablet, Rfl: 3    Glucos-Chondroit-Hyaluron-MSM (GLUCOSAMINE CHONDROITIN JOINT) tablet, Take 1 tablet by mouth Daily., Disp: , Rfl:      isosorbide mononitrate (IMDUR) 30 MG 24 hr tablet, TAKE ONE TABLET BY MOUTH TWICE A DAY, Disp: 180 tablet, Rfl: 2    levothyroxine (SYNTHROID, LEVOTHROID) 75 MCG tablet, Take 1 tablet p.o. daily 5 days a week., Disp: 90 tablet, Rfl: 4    melatonin 5 MG tablet tablet, Take 1 tablet by mouth Every Night., Disp: , Rfl:     METAMUCIL FIBER PO, Take  by mouth., Disp: , Rfl:     methotrexate 2.5 MG tablet, , Disp: , Rfl:     Multiple Vitamin (MULTIVITAMIN) tablet, Take 1 tablet by mouth Daily., Disp: , Rfl:     nitrofurantoin, macrocrystal-monohydrate, (MACROBID) 100 MG capsule, , Disp: , Rfl:     nitroglycerin (NITROSTAT) 0.4 MG SL tablet, Place 1 tablet under the tongue Every 5 (Five) Minutes As Needed for Chest Pain., Disp: 100 tablet, Rfl: 1    Omega-3 Fatty Acids (FISH OIL) 1000 MG capsule capsule, Take  by mouth., Disp: , Rfl:     potassium chloride ER (K-TAB) 20 MEQ tablet controlled-release ER tablet, TAKE ONE TABLET BY MOUTH DAILY, Disp: 90 tablet, Rfl: 3    rosuvastatin (CRESTOR) 20 MG tablet, TAKE ONE TABLET BY MOUTH DAILY, Disp: 90 tablet, Rfl: 3    sertraline (ZOLOFT) 100 MG tablet, , Disp: , Rfl:     sotalol (BETAPACE) 120 MG tablet, TAKE 1 TABLET BY MOUTH TWICE A DAY, Disp: 180 tablet, Rfl: 0    warfarin (COUMADIN) 5 MG tablet, TAKE ONE TABLET BY MOUTH 5 DAYS A WEEK, TAKE 1/2 TABLET BY MOUTH 2 DAYS A WEEK OR AS DIRECTED, Disp: 72 tablet, Rfl: 0      Assessment & Plan   1.  Hypothyroidism: Well-controlled, she is currently on levothyroxine 75 mcg 5 days a week which we will continue for now.    2.  Hypertension: Well-controlled.    Assessment and plan from October 21, 2022 reviewed and updated.            Chika Paula MD FACE.    Much of the above report is an electronic transcription/translation of the spoken language to printed text using Dragon Software. As such, the subtleties and finesse of the spoken language may permit erroneous, or at times, nonsensical words or phrases to be inadvertently  transcribed; thus changes may be made at a later date to rectify these errors.

## 2023-10-20 NOTE — DISCHARGE INSTRUCTIONS
A responsible adult should stay with you and you should rest quietly for the rest of the day.    Do not drink alcohol, drive, operate any heavy machinery or power tools or make any legal/important decisions for the next 24 hours.     Progress your diet as tolerated.  If you begin to experience severe pain, increased shortness of breath, racing heartbeat or a fever above 101 F, seek immediate medical attention.     Follow up with MD as instructed. Call office for results in 3 to 5 days if needed.     DR ORTA  [phone]  Impression:  1.  Normal mucosa of the whole esophagus  2.  Small sliding hiatal hernia  3.  Normal mucosa of the whole stomach.  Biopsies obtained with cold forceps from stomach antrum and body to rule out H. pylori  4.  Normal mucosa in the duodenal bulb and second portion of the duodenum     Recommendations:  -Hiatal hernia education  -belching likely related to hiatal hernia, otherwise unremarkable EGD  -If persistent bloating consider trial of antibiotics for SIBO  -Follow-up in the office as scheduled for further assessment        
yes

## 2023-10-23 ENCOUNTER — OFFICE VISIT (OUTPATIENT)
Dept: CARDIOLOGY | Facility: CLINIC | Age: 88
End: 2023-10-23
Payer: MEDICARE

## 2023-10-23 VITALS
WEIGHT: 125 LBS | BODY MASS INDEX: 23 KG/M2 | HEIGHT: 62 IN | OXYGEN SATURATION: 97 % | SYSTOLIC BLOOD PRESSURE: 130 MMHG | HEART RATE: 70 BPM | DIASTOLIC BLOOD PRESSURE: 73 MMHG

## 2023-10-23 DIAGNOSIS — I10 ESSENTIAL HYPERTENSION: ICD-10-CM

## 2023-10-23 DIAGNOSIS — I25.10 CHRONIC CORONARY ARTERY DISEASE: ICD-10-CM

## 2023-10-23 DIAGNOSIS — I48.0 PAROXYSMAL ATRIAL FIBRILLATION: Primary | ICD-10-CM

## 2023-10-23 DIAGNOSIS — E78.2 MIXED HYPERLIPIDEMIA: ICD-10-CM

## 2023-10-23 DIAGNOSIS — I49.5 SICK SINUS SYNDROME: ICD-10-CM

## 2023-10-23 DIAGNOSIS — Z95.0 PRESENCE OF CARDIAC PACEMAKER: ICD-10-CM

## 2023-10-23 DIAGNOSIS — Z95.2 HX OF AORTIC VALVE REPLACEMENT: ICD-10-CM

## 2023-10-23 PROCEDURE — 93000 ELECTROCARDIOGRAM COMPLETE: CPT | Performed by: INTERNAL MEDICINE

## 2023-10-23 PROCEDURE — 1159F MED LIST DOCD IN RCRD: CPT | Performed by: INTERNAL MEDICINE

## 2023-10-23 PROCEDURE — 1160F RVW MEDS BY RX/DR IN RCRD: CPT | Performed by: INTERNAL MEDICINE

## 2023-10-23 PROCEDURE — 99214 OFFICE O/P EST MOD 30 MIN: CPT | Performed by: INTERNAL MEDICINE

## 2023-10-23 PROCEDURE — 93280 PM DEVICE PROGR EVAL DUAL: CPT | Performed by: INTERNAL MEDICINE

## 2023-10-23 NOTE — PROGRESS NOTES
CC: Sick sinus syndrome with dual-chamber pacemaker in situ follow up .    Sub  88-year-old pleasant patient has history of paroxysmal atrial fibrillation and sick sinus syndrome with a dual-chamber pacemaker in situ.  She had bioprosthetic aortic valve which was placed in 2014.  She additionally has history of peripheral vascular disease, coronary artery disease with prior bypass surgery and bilateral renal artery stenosis carotid disease and dyslipidemia.  He is currently on sotalol for suppression of atrial arrhythmias and a prior CT head on her revealed a right small dural hematoma  Patient had  1 prolonged episode of AF since last visit      Past Medical History:     Reviewed history from 06/13/2016 and no changes required:        AVR 2014-        Coronary artery disease: S/P CABG and PCI with stenting-        Carotid disease;left side 50-74%-Patrick Ocampo        Inferior myocardial infarction 12-06-        Asthma        Anxiety Disorder        Depression        G E R D        Hyperlipidemia        Hypertension-        Hyperthyroidism        fx thoracic ?        shoulder fx        Rotator cuff syndrome         Gout         Paroxysmal atrial fib        Arthritis        Hypothyroid        No Drug Allergies?         Eye exam:2014 &2015 Dr.Kelley Parekh in [place]         Rheumatoid Arthritis    Past Surgical History:     Reviewed history from 10/16/2018 and no changes required:        PCI/stent, LCX, 3-20-06, Cypher stent        PCI/stent x 2, LCX & priximal diagonal, 12-3-06, [employer] stents        cataract r eye 12/07  lt eye 01/08        CABG x 3  07-19-08        thoracentesis l lung 8/08        Right rotator cuff repair - Oct. 15, 2013        Cholecystectomy-2009        Left Knee Arthoplasty-2014        Left Shoulder Replacement 4/2014        Aortic Valve  Replacement 6/11/2014        Heart Catherization:2/18/2015        Pacemaker placed 6/6/16        Colonoscopy 2011          Surgery Finger 2018         Physical Exam    General:      well developed, well nourished, in no acute distress.    Head:      normocephalic and atraumatic.    Eyes:      PERRL/EOM intact, conjunctivae and sclerae clear without nystagmus.    Neck:      no  thyromegaly, trachea central with normal respiratory effort  Lungs:      clear bilaterally to auscultation.    Heart:       regular rate and rhythm, S1, S2 without murmurs, rubs, or gallops  Skin:      intact without lesions or rashes.    Psych:      alert and cooperative; normal mood and affect; normal attention span and concentration.            Impressions    Recent INR is 2.2.  Potassium and creatinine and TSH are normal  Dual-chamber pacemaker in situ and home monitoring revealed 3.5% AF burden  Bioprosthetic aortic valve placed in 2014 and prior echocardiography revealed mean gradient of 20 with moderate to severe AI followed by interventional cardiology  Chronic kidney disease with recent creatinine being normal  History of renal artery stenosis and peripheral arterial disease stable  Iron deficiency anemia on iron supplementation  Essential hypertension controlled with intake of Cardizem  Hypothyroidism supplemented with levothyroxine    Pacemaker personally interrogated which revealed 1 episode of prolonged AF lasting over 6 hours approximately 10 days ago.  Titration of therapy with additional diltiazem educated during episodes.  Most of the episodes are transient  Continue current medical therapy with sotalol.    Pacemaker interrogation attached to chart      ECG 12 Lead    Date/Time: 10/23/2023 2:06 PM  Performed by: Pk Crabtree MD    Authorized by: Pk Crabtree MD  Comparison: compared with previous ECG   Similar to previous ECG  Rhythm: sinus rhythm and paced  Rate: normal  Conduction: non-specific intraventricular conduction delay        Electronically signed by Pk Crabtree MD, 10/23/23, 2:08 PM EDT.

## 2023-10-25 ENCOUNTER — OFFICE VISIT (OUTPATIENT)
Dept: CARDIOLOGY | Facility: CLINIC | Age: 88
End: 2023-10-25
Payer: MEDICARE

## 2023-10-25 VITALS
BODY MASS INDEX: 23 KG/M2 | HEART RATE: 69 BPM | OXYGEN SATURATION: 98 % | SYSTOLIC BLOOD PRESSURE: 131 MMHG | HEIGHT: 62 IN | DIASTOLIC BLOOD PRESSURE: 72 MMHG | WEIGHT: 125 LBS

## 2023-10-25 DIAGNOSIS — Z95.2 HX OF AORTIC VALVE REPLACEMENT: ICD-10-CM

## 2023-10-25 DIAGNOSIS — I48.0 PAROXYSMAL ATRIAL FIBRILLATION: Primary | ICD-10-CM

## 2023-10-25 DIAGNOSIS — I25.10 CHRONIC CORONARY ARTERY DISEASE: ICD-10-CM

## 2023-10-25 DIAGNOSIS — E78.2 MIXED HYPERLIPIDEMIA: ICD-10-CM

## 2023-10-25 DIAGNOSIS — R79.1 ELEVATED INR: ICD-10-CM

## 2023-10-25 DIAGNOSIS — Z95.0 PRESENCE OF CARDIAC PACEMAKER: ICD-10-CM

## 2023-10-25 DIAGNOSIS — I10 ESSENTIAL HYPERTENSION: ICD-10-CM

## 2023-10-25 PROCEDURE — 99214 OFFICE O/P EST MOD 30 MIN: CPT | Performed by: INTERNAL MEDICINE

## 2023-10-25 PROCEDURE — 1160F RVW MEDS BY RX/DR IN RCRD: CPT | Performed by: INTERNAL MEDICINE

## 2023-10-25 PROCEDURE — 1159F MED LIST DOCD IN RCRD: CPT | Performed by: INTERNAL MEDICINE

## 2023-10-25 NOTE — PROGRESS NOTES
Cardiology Office Visit      Encounter Date:  10/25/2023    Patient ID:   Tracy Jane is a 88 y.o. female.    Reason For Followup:  Paroxysmal atrial fibrillation  Coronary artery disease  Valvular heart disease  History of permanent pacemaker    Brief Clinical History:  Dear Kacie Ulrich APRN    I had the pleasure of seeing Tracy Jane today. As you are well aware, this is a 88 y.o. female with a history of valvular heart disease status post bioprosthetic aortic valve replacement in 2014. She is additional history that includes peripheral vascular disease, coronary artery disease status post coronary arterial bypass grafting, bilateral renal artery stenosis, carotid artery disease, dyslipidemia, and sick sinus syndrome status post permanent pacemaker placement. She presents today for followup on the above conditions.    Interval History:  She denies any chest pain pressure heaviness or tightness. She denies any shortness of breath out of character.  She denies any PND orthopnea.  She denies any syncope or near-syncope.    She reports being stable from a cardiac perspective.    We again discussed watchman today.  She has been hesitant to move forward in the past however she continues to have some issues with bleeding.  Her hemoglobin has been as low as 8 and she has had labile INR's in the past.  In fact her nose is bleeding today in the office.  She also suffered a mechanical fall in the summer 2023 resulting in a subdural hematoma.  After discussion, she has decided to move forward with Watchman evaluation.  I did discuss the case with cardiac electrophysiology.  We will get her set up for a watchman RACHEL.    We did discuss watchman today.  I do feel like she would be a good candidate.  Her watchman evaluation is below.    Assessment & Plan    Impressions:  Sick sinus syndrome status post permanent pacemaker placement 2016  Paroxysmal atrial fibrillation on sotalol and Coumadin  "therapy  Coagulopathy secondary to warfarin  Chronic stable angina  Anemia  CAD s/p CABG most recent cath with no disease amenable to PCI 2016  Labile blood pressure  Valvular heart disease, s/p bioprosthetic AVR  CKD, renal artery stenosis  PAD , FELICITAS and carotid stenosis  Fatigue   Orthostasis    Recommendations:  Continuation of her current cardiovascular regimen at the present time.     This includes antihypertensives, diuretic, antianginals, and statin therapy.  Monitor blood pressure and notify if not sufficiently controlled.  Avoid rapid position changes  Transesophageal echocardiogram  Follow-up in 6 months time sooner should there be difficulties.    Watchman Shared Decision Making:    This is a patient who carries a diagnosis of atrial fibrillation and has an elevated risk of stroke as evidenced by their [id number] score of at least 5 (age, female gender, hypertension, and coronary artery disease).  Additionally, this patient carries an elevated bleeding risk as evidenced by their HAS-BLED score of at least 3 (predisposition to bleeding, age, and antiplatelet therapy).  It is reasonable to proceed with evaluation and potential placement of a Watchman Left Atrial Occlusion Device pending further work-up.    Diagnoses and all orders for this visit:    1. Paroxysmal atrial fibrillation (Primary)  -     Adult Transesophageal Echo (RACHEL) W/ Cont if Necessary Per Protocol; Future    2. Chronic coronary artery disease    3. Essential hypertension    4. Hx of aortic valve replacement    5. Mixed hyperlipidemia    6. Presence of cardiac pacemaker    7. Elevated INR            Objective:    Vitals:  Vitals:    10/25/23 1031   BP: 131/72   Pulse: 69   SpO2: 98%   Weight: 56.7 kg (125 lb)   Height: 157.5 cm (62\")     Body mass index is 22.86 kg/m².      Physical Exam:    General: Alert, cooperative, no distress, appears stated age  Head:  Normocephalic, atraumatic, mucous membranes moist  Eyes:  Conjunctiva/corneas " clear, EOM's intact     Neck:  Supple,  no bruit    Lungs: Clear to auscultation bilaterally, no wheezes rhonchi rales are noted  Chest wall: No tenderness  Heart::  Regular rate and rhythm, S1 and S2 normal, 1/6 holosystolic murmur.  No rub or gallop  Abdomen: Soft, non-tender, nondistended bowel sounds active  Extremities: No cyanosis, clubbing, or edema  Pulses: 2+ and symmetric all extremities  Skin:  No rashes or lesions  Neuro/psych: A&O x3. CN II through XII are grossly intact with appropriate affect      Allergies:  No Known Allergies    Medication Review:     Current Outpatient Medications:     acetaminophen (TYLENOL) 650 MG 8 hr tablet, Take 1 tablet by mouth Every 8 (Eight) Hours As Needed., Disp: , Rfl:     aspirin 81 MG EC tablet, Take 1 tablet by mouth Daily., Disp: , Rfl:     Cholecalciferol 25 MCG (1000 UT) tablet, Take 1 tablet by mouth Daily., Disp: , Rfl:     colchicine 0.6 MG tablet, Take 1 tablet by mouth 2 (Two) Times a Day., Disp: , Rfl:     dilTIAZem CD (CARDIZEM CD) 240 MG 24 hr capsule, TAKE ONE CAPSULE BY MOUTH DAILY, Disp: 90 capsule, Rfl: 3    doxazosin (CARDURA) 2 MG tablet, TAKE ONE TABLET BY MOUTH DAILY, Disp: 90 tablet, Rfl: 3    ferrous sulfate 324 (65 Fe) MG tablet delayed-release EC tablet, Take 1 tablet by mouth Daily With Breakfast., Disp: , Rfl:     folic acid (FOLVITE) 1 MG tablet, Take 1 tablet by mouth Daily., Disp: , Rfl:     furosemide (LASIX) 40 MG tablet, TAKE ONE TABLET BY MOUTH DAILY, Disp: 90 tablet, Rfl: 3    Glucos-Chondroit-Hyaluron-MSM (GLUCOSAMINE CHONDROITIN JOINT) tablet, Take 1 tablet by mouth Daily., Disp: , Rfl:     isosorbide mononitrate (IMDUR) 30 MG 24 hr tablet, TAKE ONE TABLET BY MOUTH TWICE A DAY, Disp: 180 tablet, Rfl: 2    levothyroxine (SYNTHROID, LEVOTHROID) 75 MCG tablet, Take 1 tablet p.o. daily 5 days a week., Disp: 90 tablet, Rfl: 4    melatonin 5 MG tablet tablet, Take 1 tablet by mouth Every Night., Disp: , Rfl:     METAMUCIL FIBER PO, Take   by mouth., Disp: , Rfl:     methotrexate 2.5 MG tablet, , Disp: , Rfl:     Multiple Vitamin (MULTIVITAMIN) tablet, Take 1 tablet by mouth Daily., Disp: , Rfl:     nitrofurantoin, macrocrystal-monohydrate, (MACROBID) 100 MG capsule, , Disp: , Rfl:     nitroglycerin (NITROSTAT) 0.4 MG SL tablet, Place 1 tablet under the tongue Every 5 (Five) Minutes As Needed for Chest Pain., Disp: 100 tablet, Rfl: 1    Omega-3 Fatty Acids (FISH OIL) 1000 MG capsule capsule, Take  by mouth., Disp: , Rfl:     potassium chloride ER (K-TAB) 20 MEQ tablet controlled-release ER tablet, TAKE ONE TABLET BY MOUTH DAILY, Disp: 90 tablet, Rfl: 3    rosuvastatin (CRESTOR) 20 MG tablet, TAKE ONE TABLET BY MOUTH DAILY, Disp: 90 tablet, Rfl: 3    sertraline (ZOLOFT) 100 MG tablet, , Disp: , Rfl:     sotalol (BETAPACE) 120 MG tablet, TAKE 1 TABLET BY MOUTH TWICE A DAY, Disp: 180 tablet, Rfl: 0    warfarin (COUMADIN) 5 MG tablet, TAKE ONE TABLET BY MOUTH 5 DAYS A WEEK, TAKE 1/2 TABLET BY MOUTH 2 DAYS A WEEK OR AS DIRECTED, Disp: 72 tablet, Rfl: 0    Family History:  Family History   Problem Relation Age of Onset    Hypertension Mother     Heart disease Father     Heart disease Sister     Kidney cancer Sister     Stroke Sister     Cancer Sister         breast    Cancer Sister     Heart disease Brother        Past Medical History:  Past Medical History:   Diagnosis Date    Anxiety     Asthma     Atrial fibrillation     CAD (coronary artery disease)     Carotid artery disease     GERD (gastroesophageal reflux disease)     Gout     Hyperlipidemia     Hypertension     Hyperthyroidism     Hypothyroidism        Past Surgical History:  Past Surgical History:   Procedure Laterality Date    BASAL CELL CARCINOMA EXCISION      spine, nose and arm, leg 2020    BREAST BIOPSY      CARDIAC CATHETERIZATION      CAROTID STENT      CATARACT EXTRACTION      CHOLECYSTECTOMY      CORONARY ARTERY BYPASS GRAFT      CORONARY STENT PLACEMENT      ENDOSCOPY N/A 4/24/2023     Procedure: ESOPHAGOGASTRODUODENOSCOPY with antrum body biopsies;  Surgeon: REGGIE Ace MD;  Location: [facility] ENDOSCOPY;  Service: Gastroenterology;  Laterality: N/A;  hiatal hernia    FINGER SURGERY      KNEE SURGERY      PACEMAKER IMPLANTATION      SHOULDER SURGERY         Social History:  Social History     Socioeconomic History    Marital status:     Number of children: 4    Years of education: GED   Tobacco Use    Smoking status: Former     Packs/day: 1.00     Years: 4.00     Additional pack years: 0.00     Total pack years: 4.00     Types: Cigarettes    Smokeless tobacco: Never   Vaping Use    Vaping Use: Never used   Substance and Sexual Activity    Alcohol use: No    Drug use: No    Sexual activity: Defer       Review of Systems:  The following systems were reviewed as they relate to the cardiovascular system: Constitutional, Eyes, ENT, Cardiovascular, Respiratory, Gastrointestinal, Integumentary, Neurological, Psychiatric, Hematologic, Endocrine, Musculoskeletal, and Genitourinary. The pertinent cardiovascular findings are reported above with all other cardiovascular points within those systems being negative.    Diagnostic Study Review:     Current Electrocardiogram:  Procedures no new EKG.  EKG dated 7/17/2023 demonstrates atrial paced rhythm with a rate of 70 bpm.    Laboratory Data:  Lab Results   Component Value Date    GLUCOSE 95 10/13/2023    BUN 21 10/13/2023    CREATININE 0.85 10/13/2023    EGFRIFNONA 58 (L) 12/22/2021    EGFRIFAFRI >60 mL/min/1.73m2 09/02/2016    BCR 24.7 10/13/2023    K 4.7 10/13/2023    CO2 28.5 10/13/2023    CALCIUM 9.5 10/13/2023    ALBUMIN 3.8 10/13/2023    LABIL2 1.1 10/12/2017    AST 31 10/13/2023    ALT 33 10/13/2023     Lab Results   Component Value Date    GLUCOSE 95 10/13/2023    CALCIUM 9.5 10/13/2023     10/13/2023    K 4.7 10/13/2023    CO2 28.5 10/13/2023     10/13/2023    BUN 21 10/13/2023    CREATININE 0.85 10/13/2023    EGFRIFAFRI >60  mL/min/1.73m2 09/02/2016    EGFRIFNONA 58 (L) 12/22/2021    BCR 24.7 10/13/2023    ANIONGAP 7.5 10/13/2023     Lab Results   Component Value Date    WBC 6.21 06/23/2023    HGB 10.0 (L) 06/23/2023    HCT 30.8 (L) 06/23/2023    MCV 94.2 06/23/2023     06/23/2023     Lab Results   Component Value Date    CHOL 108 04/28/2023    TRIG 104 04/28/2023    HDL 50 04/28/2023    LDL 39 04/28/2023     Lab Results   Component Value Date    HGBA1C 5.7 (H) 12/22/2021     Lab Results   Component Value Date    INR 2.20 10/10/2023    INR 2.20 09/26/2023    INR 2.30 09/12/2023    PROTIME 26.6 (H) 06/05/2023    PROTIME 13.5 (H) 04/22/2023    PROTIME 19.6 05/12/2022       Most Recent Echo:  Results for orders placed during the hospital encounter of 05/09/23    Adult Transthoracic Echo Complete W/ Cont if Necessary Per Protocol    Interpretation Summary    Left ventricular systolic function is normal. Left ventricular ejection fraction appears to be 61 - 65%.    Left ventricular wall thickness is consistent with mild concentric hypertrophy.    Left ventricular diastolic function is consistent with (grade I) impaired relaxation.    The left atrial cavity is mild to moderately dilated.    The right atrial cavity is mildly  dilated.    There is a bioprosthetic aortic valve present. The prosthetic aortic valve peak and mean gradients are elevated.    Aortic valve maximum pressure gradient is 38 mmHg. Aortic valve mean pressure gradient is 20 mmHg.    Moderate to severe aortic valve regurgitation is present.    Moderate mitral valve regurgitation is present.       Most Recent Stress Test:       Most Recent Cardiac Catheterization:   No results found for this or any previous visit.       NOTE: The following portions of the patient's note were reviewed, confirmed and/or updated this visit as appropriate: History of present illness/Interval history, physical examination, assessment & plan, allergies, current medications, past family  history, past medical history, past social history, past surgical history and problem list.

## 2023-10-30 ENCOUNTER — TELEPHONE (OUTPATIENT)
Dept: CARDIOLOGY | Facility: CLINIC | Age: 88
End: 2023-10-30

## 2023-10-30 ENCOUNTER — ANESTHESIA EVENT (OUTPATIENT)
Dept: CARDIOLOGY | Facility: HOSPITAL | Age: 88
End: 2023-10-30
Payer: MEDICARE

## 2023-10-30 NOTE — TELEPHONE ENCOUNTER
Caller: DAYANA ROSADO    Relationship: Emergency Contact    Best call back number: [phone]    What is the best time to reach you: ANYTIME    Who are you requesting to speak with (clinical staff, provider,  specific staff member): CLINICAL      What was the call regarding: PT HAS RACHEL TOMORROW, IS SHE SUPPOSE TO BE OFF WARFRIN FOR RACHEL? PLEASE LET DAUGHTER KNOW    Is it okay if the provider responds through MyChart: NO

## 2023-10-31 ENCOUNTER — ANTICOAGULATION VISIT (OUTPATIENT)
Dept: CARDIOLOGY | Facility: CLINIC | Age: 88
End: 2023-10-31
Payer: MEDICARE

## 2023-10-31 ENCOUNTER — HOSPITAL ENCOUNTER (OUTPATIENT)
Dept: CARDIOLOGY | Facility: HOSPITAL | Age: 88
Discharge: HOME | End: 2023-10-31
Payer: MEDICARE

## 2023-10-31 ENCOUNTER — TELEPHONE (OUTPATIENT)
Dept: CARDIOLOGY | Facility: CLINIC | Age: 88
End: 2023-10-31

## 2023-10-31 ENCOUNTER — ANESTHESIA (OUTPATIENT)
Dept: CARDIOLOGY | Facility: HOSPITAL | Age: 88
End: 2023-10-31
Payer: MEDICARE

## 2023-10-31 VITALS
WEIGHT: 125.22 LBS | HEIGHT: 60 IN | BODY MASS INDEX: 24.58 KG/M2 | OXYGEN SATURATION: 94 % | HEART RATE: 70 BPM | RESPIRATION RATE: 18 BRPM | TEMPERATURE: 97.7 F | DIASTOLIC BLOOD PRESSURE: 46 MMHG | SYSTOLIC BLOOD PRESSURE: 143 MMHG

## 2023-10-31 DIAGNOSIS — I48.0 PAROXYSMAL ATRIAL FIBRILLATION: Primary | ICD-10-CM

## 2023-10-31 DIAGNOSIS — I48.0 PAROXYSMAL ATRIAL FIBRILLATION: ICD-10-CM

## 2023-10-31 LAB
BH CV ECHO MEAS - AI P1/2T: 490.5 MSEC
BH CV ECHO MEAS - AO MAX PG: 55.1 MMHG
BH CV ECHO MEAS - AO MEAN PG: 29 MMHG
BH CV ECHO MEAS - AO V2 MAX: 371 CM/SEC
BH CV ECHO MEAS - AO V2 VTI: 88.6 CM
BH CV ECHO MEAS - LAA 0 DEGREE LENGTH: 2.3 CM
BH CV ECHO MEAS - LAA 0 DEGREE WIDTH: 1.8 CM
BH CV ECHO MEAS - LAA 135 DEGREE LENGTH: 2.4 CM
BH CV ECHO MEAS - LAA 135 DEGREE WIDTH: 1.8 CM
BH CV ECHO MEAS - LAA 45 DEGREE LENGTH: 2.3 CM
BH CV ECHO MEAS - LAA 45 DEGREE WIDTH: 1.8 CM
BH CV ECHO MEAS - LAA 90 DEGREE LENGTH: 2.2 CM
BH CV ECHO MEAS - LAA 90 DEGREE WIDTH: 1.9 CM
BH CV ECHO MEAS - MR MAX PG: 152.8 MMHG
BH CV ECHO MEAS - MR MAX VEL: 618 CM/SEC
BH CV ECHO MEAS - MR MEAN PG: 94 MMHG
BH CV ECHO MEAS - MR MEAN VEL: 453 CM/SEC
BH CV ECHO MEAS - MR VTI: 237 CM
BH CV ECHO MEAS - TR MAX PG: 19 MMHG
BH CV ECHO MEAS - TR MAX VEL: 218 CM/SEC
BH CV ECHO SHUNT ASSESSMENT PERFORMED (HIDDEN SCRIPTING): 1
INR PPP: 2.3

## 2023-10-31 PROCEDURE — 93320 DOPPLER ECHO COMPLETE: CPT | Performed by: INTERNAL MEDICINE

## 2023-10-31 PROCEDURE — 25810000003 SODIUM CHLORIDE 0.9 % SOLUTION: Performed by: NURSE ANESTHETIST, CERTIFIED REGISTERED

## 2023-10-31 PROCEDURE — 93321 DOPPLER ECHO F-UP/LMTD STD: CPT

## 2023-10-31 PROCEDURE — 93312 ECHO TRANSESOPHAGEAL: CPT

## 2023-10-31 PROCEDURE — 25010000002 PROPOFOL 1000 MG/100ML EMULSION: Performed by: NURSE ANESTHETIST, CERTIFIED REGISTERED

## 2023-10-31 PROCEDURE — 93325 DOPPLER ECHO COLOR FLOW MAPG: CPT | Performed by: INTERNAL MEDICINE

## 2023-10-31 PROCEDURE — 93325 DOPPLER ECHO COLOR FLOW MAPG: CPT

## 2023-10-31 PROCEDURE — 93320 DOPPLER ECHO COMPLETE: CPT

## 2023-10-31 PROCEDURE — 93312 ECHO TRANSESOPHAGEAL: CPT | Performed by: INTERNAL MEDICINE

## 2023-10-31 PROCEDURE — 25010000002 PROPOFOL 200 MG/20ML EMULSION: Performed by: NURSE ANESTHETIST, CERTIFIED REGISTERED

## 2023-10-31 RX ORDER — PROPOFOL 10 MG/ML
INJECTION, EMULSION INTRAVENOUS CONTINUOUS PRN
Status: DISCONTINUED | OUTPATIENT
Start: 2023-10-31 | End: 2023-10-31 | Stop reason: SURG

## 2023-10-31 RX ORDER — SODIUM CHLORIDE 9 MG/ML
INJECTION, SOLUTION INTRAVENOUS CONTINUOUS PRN
Status: DISCONTINUED | OUTPATIENT
Start: 2023-10-31 | End: 2023-10-31 | Stop reason: SURG

## 2023-10-31 RX ORDER — LIDOCAINE HYDROCHLORIDE 20 MG/ML
INJECTION, SOLUTION INTRAVENOUS AS NEEDED
Status: DISCONTINUED | OUTPATIENT
Start: 2023-10-31 | End: 2023-10-31 | Stop reason: SURG

## 2023-10-31 RX ORDER — PROPOFOL 10 MG/ML
INJECTION, EMULSION INTRAVENOUS AS NEEDED
Status: DISCONTINUED | OUTPATIENT
Start: 2023-10-31 | End: 2023-10-31 | Stop reason: SURG

## 2023-10-31 RX ADMIN — PROPOFOL 20 MG: 10 INJECTION, EMULSION INTRAVENOUS at 10:06

## 2023-10-31 RX ADMIN — PROPOFOL 20 MG: 10 INJECTION, EMULSION INTRAVENOUS at 10:02

## 2023-10-31 RX ADMIN — PROPOFOL 20 MG: 10 INJECTION, EMULSION INTRAVENOUS at 10:03

## 2023-10-31 RX ADMIN — PROPOFOL 20 MG: 10 INJECTION, EMULSION INTRAVENOUS at 10:08

## 2023-10-31 RX ADMIN — LIDOCAINE HYDROCHLORIDE 40 MG: 20 INJECTION, SOLUTION INTRAVENOUS at 10:01

## 2023-10-31 RX ADMIN — PROPOFOL INJECTABLE EMULSION 100 MCG/KG/MIN: 10 INJECTION, EMULSION INTRAVENOUS at 10:01

## 2023-10-31 RX ADMIN — SODIUM CHLORIDE: 9 INJECTION, SOLUTION INTRAVENOUS at 09:56

## 2023-10-31 RX ADMIN — PROPOFOL 20 MG: 10 INJECTION, EMULSION INTRAVENOUS at 10:01

## 2023-10-31 NOTE — ANESTHESIA POSTPROCEDURE EVALUATION
Patient: Tracy Jane    Procedure Summary       Date: 10/31/23 Room / Location: [facility]    Anesthesia Start: 0956 Anesthesia Stop: 1033    Procedure: ADULT TRANSESOPHAGEAL ECHO (RACHEL) W/ CONT IF NECESSARY PER PROTOCOL Diagnosis:       Paroxysmal atrial fibrillation      (Pre-op or Device)      (Intracardiac Device)    Scheduled Providers: Bandar Henley DO Provider: Fred Jane MD    Anesthesia Type: general ASA Status: 3            Anesthesia Type: general    Vitals  Vitals Value Taken Time   /46 10/31/23 1131   Temp     Pulse 70 10/31/23 1134   Resp     SpO2 94 % 10/31/23 1133   Vitals shown include unfiled device data.        Post Anesthesia Care and Evaluation    Patient location during evaluation: PACU  Patient participation: complete - patient participated  Level of consciousness: awake  Pain scale: See nurse's notes for pain score.  Pain management: adequate    Airway patency: patent  Anesthetic complications: No anesthetic complications  PONV Status: none  Cardiovascular status: acceptable  Respiratory status: acceptable and spontaneous ventilation  Hydration status: acceptable    Comments: Patient seen and examined postoperatively; vital signs stable; SpO2 greater than or equal to 90%; cardiopulmonary status stable; nausea/vomiting adequately controlled; pain adequately controlled; no apparent anesthesia complications; patient discharged from anesthesia care when discharge criteria were met

## 2023-10-31 NOTE — TELEPHONE ENCOUNTER
Caller: OVI MCDERMOTT    Phone: [phone]    INR Number Being Reported: 2.3      Day of the Week  Monday Tuesday Wednesday Thursday Friday Saturday Sunday     Dose Taken 5 MG 5 MG 2.5 MG 5 MG 5 MG 5 MG 2.5 MG

## 2023-10-31 NOTE — ANESTHESIA PREPROCEDURE EVALUATION
Anesthesia Evaluation     NPO Solid Status: > 8 hours  NPO Liquid Status: > 8 hours           Airway   Mallampati: I  TM distance: >3 FB  Neck ROM: full  No difficulty expected  Dental - normal exam     Pulmonary - normal exam   (+) asthma,shortness of breath  Cardiovascular - normal exam    (+) hypertension, valvular problems/murmurs, CAD, CABG, dysrhythmias, PVD, hyperlipidemia,  carotid artery disease    ROS comment: ·  Left ventricular systolic function is normal. Left ventricular ejection fraction appears to be 61 - 65%.  ·  Left ventricular wall thickness is consistent with mild concentric hypertrophy.  ·  Left ventricular diastolic function is consistent with (grade I) impaired relaxation.  ·  The left atrial cavity is mild to moderately dilated.  ·  The right atrial cavity is mildly  dilated.  ·  There is a bioprosthetic aortic valve present. The prosthetic aortic valve peak and mean gradients are elevated.  ·  Aortic valve maximum pressure gradient is 38 mmHg. Aortic valve mean pressure gradient is 20 mmHg.  ·  Moderate to severe aortic valve regurgitation is present.  ·  Moderate mitral valve regurgi  4/2023    Neuro/Psych  (+) psychiatric history  GI/Hepatic/Renal/Endo    (+) GERD, renal disease-, thyroid problem hypothyroidism and hyperthyroidism    Musculoskeletal     Abdominal  - normal exam    Bowel sounds: normal.   Substance History      OB/GYN          Other   arthritis,     ROS/Med Hx Other: EGD 4/2023 90MG PROPOFOL                Anesthesia Plan    ASA 3     general   total IV anesthesia  intravenous induction     Anesthetic plan, risks, benefits, and alternatives have been provided, discussed and informed consent has been obtained with: patient.    Plan discussed with CRNA.    CODE STATUS:

## 2023-10-31 NOTE — DISCHARGE INSTRUCTIONS
RACHEL DC Instructions    The medication, which was used to put the patient to sleep, will be acting in your body for the next twenty-four (24) hours, so you might feel a little sleepy; this feeling will slowly wear off.  Because the medicine is still in your system for the next twenty-four (24) hours, the adult patient SHOULD NOT:    Drive a car, operate machinery, or power tools  Drink any alcoholic drinks (not even beer)  Make any important decisions such as to sign important papers      We strongly suggest that a responsible adult be with the patient the rest of the day.    Any problems with:    EXCESSIVE MUCOUS  SPITTING UP BLOOD  SORE THROAT AT MORE THAN 72 HOURS    NOTIFY DR. Henley  OR CALL THE [facility] [phone].

## 2023-11-04 ENCOUNTER — HOSPITAL ENCOUNTER (INPATIENT)
Facility: HOSPITAL | Age: 88
LOS: 1 days | Discharge: HOME | DRG: 177 | End: 2023-11-06
Attending: EMERGENCY MEDICINE | Admitting: HOSPITALIST
Payer: MEDICARE

## 2023-11-04 ENCOUNTER — APPOINTMENT (OUTPATIENT)
Dept: GENERAL RADIOLOGY | Facility: HOSPITAL | Age: 88
DRG: 177 | End: 2023-11-04
Payer: MEDICARE

## 2023-11-04 DIAGNOSIS — J96.01 ACUTE HYPOXEMIC RESPIRATORY FAILURE: ICD-10-CM

## 2023-11-04 DIAGNOSIS — U07.1 COVID: Primary | ICD-10-CM

## 2023-11-04 LAB
ALBUMIN SERPL-MCNC: 3.4 G/DL (ref 3.5–5.2)
ALBUMIN SERPL-MCNC: 3.5 G/DL (ref 3.5–5.2)
ALBUMIN/GLOB SERPL: 1 G/DL
ALBUMIN/GLOB SERPL: 1.1 G/DL
ALP SERPL-CCNC: 106 U/L (ref 39–117)
ALP SERPL-CCNC: 115 U/L (ref 39–117)
ALT SERPL W P-5'-P-CCNC: 40 U/L (ref 1–33)
ALT SERPL W P-5'-P-CCNC: 41 U/L (ref 1–33)
ANION GAP SERPL CALCULATED.3IONS-SCNC: 13 MMOL/L (ref 5–15)
ANION GAP SERPL CALCULATED.3IONS-SCNC: 8 MMOL/L (ref 5–15)
APTT PPP: 43.6 SECONDS (ref 61–76.5)
AST SERPL-CCNC: 40 U/L (ref 1–32)
AST SERPL-CCNC: 42 U/L (ref 1–32)
BASOPHILS # BLD AUTO: 0.1 10*3/MM3 (ref 0–0.2)
BASOPHILS NFR BLD AUTO: 1.4 % (ref 0–1.5)
BILIRUB SERPL-MCNC: 0.6 MG/DL (ref 0–1.2)
BILIRUB SERPL-MCNC: 0.7 MG/DL (ref 0–1.2)
BUN SERPL-MCNC: 16 MG/DL (ref 8–23)
BUN SERPL-MCNC: 17 MG/DL (ref 8–23)
BUN/CREAT SERPL: 20.8 (ref 7–25)
BUN/CREAT SERPL: 26.6 (ref 7–25)
CALCIUM SPEC-SCNC: 9.4 MG/DL (ref 8.6–10.5)
CALCIUM SPEC-SCNC: 9.5 MG/DL (ref 8.6–10.5)
CHLORIDE SERPL-SCNC: 101 MMOL/L (ref 98–107)
CHLORIDE SERPL-SCNC: 102 MMOL/L (ref 98–107)
CO2 SERPL-SCNC: 24 MMOL/L (ref 22–29)
CO2 SERPL-SCNC: 28 MMOL/L (ref 22–29)
CREAT SERPL-MCNC: 0.64 MG/DL (ref 0.57–1)
CREAT SERPL-MCNC: 0.77 MG/DL (ref 0.57–1)
CRP SERPL-MCNC: 5.72 MG/DL (ref 0–0.5)
D-LACTATE SERPL-SCNC: 0.6 MMOL/L (ref 0.3–2)
DEPRECATED RDW RBC AUTO: 61.7 FL (ref 37–54)
EGFRCR SERPLBLD CKD-EPI 2021: 74.3 ML/MIN/1.73
EGFRCR SERPLBLD CKD-EPI 2021: 85.1 ML/MIN/1.73
EOSINOPHIL # BLD AUTO: 0 10*3/MM3 (ref 0–0.4)
EOSINOPHIL NFR BLD AUTO: 0.5 % (ref 0.3–6.2)
ERYTHROCYTE [DISTWIDTH] IN BLOOD BY AUTOMATED COUNT: 17.3 % (ref 12.3–15.4)
FERRITIN SERPL-MCNC: 290.4 NG/ML (ref 13–150)
GLOBULIN UR ELPH-MCNC: 3.1 GM/DL
GLOBULIN UR ELPH-MCNC: 3.4 GM/DL
GLUCOSE SERPL-MCNC: 119 MG/DL (ref 65–99)
GLUCOSE SERPL-MCNC: 213 MG/DL (ref 65–99)
HCT VFR BLD AUTO: 30.6 % (ref 34–46.6)
HGB BLD-MCNC: 10.1 G/DL (ref 12–15.9)
HOLD SPECIMEN: NORMAL
HOLD SPECIMEN: NORMAL
INR PPP: 2.43 (ref 2–3)
LYMPHOCYTES # BLD AUTO: 1 10*3/MM3 (ref 0.7–3.1)
LYMPHOCYTES NFR BLD AUTO: 17.7 % (ref 19.6–45.3)
MAGNESIUM SERPL-MCNC: 1.6 MG/DL (ref 1.6–2.4)
MCH RBC QN AUTO: 31.8 PG (ref 26.6–33)
MCHC RBC AUTO-ENTMCNC: 33.1 G/DL (ref 31.5–35.7)
MCV RBC AUTO: 96.2 FL (ref 79–97)
MONOCYTES # BLD AUTO: 0.5 10*3/MM3 (ref 0.1–0.9)
MONOCYTES NFR BLD AUTO: 9.5 % (ref 5–12)
NEUTROPHILS NFR BLD AUTO: 3.9 10*3/MM3 (ref 1.7–7)
NEUTROPHILS NFR BLD AUTO: 70.9 % (ref 42.7–76)
NRBC BLD AUTO-RTO: 0.1 /100 WBC (ref 0–0.2)
NT-PROBNP SERPL-MCNC: 4544 PG/ML (ref 0–1800)
PHOSPHATE SERPL-MCNC: 2.9 MG/DL (ref 2.5–4.5)
PLATELET # BLD AUTO: 165 10*3/MM3 (ref 140–450)
PMV BLD AUTO: 7.7 FL (ref 6–12)
POTASSIUM SERPL-SCNC: 4.6 MMOL/L (ref 3.5–5.2)
POTASSIUM SERPL-SCNC: 4.9 MMOL/L (ref 3.5–5.2)
PROCALCITONIN SERPL-MCNC: 0.09 NG/ML (ref 0–0.25)
PROT SERPL-MCNC: 6.6 G/DL (ref 6–8.5)
PROT SERPL-MCNC: 6.8 G/DL (ref 6–8.5)
PROTHROMBIN TIME: 24.8 SECONDS (ref 19.4–28.5)
RBC # BLD AUTO: 3.19 10*6/MM3 (ref 3.77–5.28)
SARS-COV-2 RNA RESP QL NAA+PROBE: DETECTED
SODIUM SERPL-SCNC: 138 MMOL/L (ref 136–145)
SODIUM SERPL-SCNC: 138 MMOL/L (ref 136–145)
TROPONIN T SERPL HS-MCNC: 11 NG/L
WBC NRBC COR # BLD: 5.5 10*3/MM3 (ref 3.4–10.8)
WHOLE BLOOD HOLD COAG: NORMAL
WHOLE BLOOD HOLD SPECIMEN: NORMAL

## 2023-11-04 PROCEDURE — 84100 ASSAY OF PHOSPHORUS: CPT | Performed by: NURSE PRACTITIONER

## 2023-11-04 PROCEDURE — 85730 THROMBOPLASTIN TIME PARTIAL: CPT | Performed by: EMERGENCY MEDICINE

## 2023-11-04 PROCEDURE — 25010000002 ONDANSETRON PER 1 MG: Performed by: NURSE PRACTITIONER

## 2023-11-04 PROCEDURE — 87040 BLOOD CULTURE FOR BACTERIA: CPT | Performed by: EMERGENCY MEDICINE

## 2023-11-04 PROCEDURE — 93005 ELECTROCARDIOGRAM TRACING: CPT | Performed by: INTERNAL MEDICINE

## 2023-11-04 PROCEDURE — 85007 BL SMEAR W/DIFF WBC COUNT: CPT | Performed by: NURSE PRACTITIONER

## 2023-11-04 PROCEDURE — 83880 ASSAY OF NATRIURETIC PEPTIDE: CPT | Performed by: EMERGENCY MEDICINE

## 2023-11-04 PROCEDURE — 93005 ELECTROCARDIOGRAM TRACING: CPT

## 2023-11-04 PROCEDURE — 85025 COMPLETE CBC W/AUTO DIFF WBC: CPT | Performed by: EMERGENCY MEDICINE

## 2023-11-04 PROCEDURE — 84145 PROCALCITONIN (PCT): CPT | Performed by: NURSE PRACTITIONER

## 2023-11-04 PROCEDURE — G0378 HOSPITAL OBSERVATION PER HR: HCPCS

## 2023-11-04 PROCEDURE — 85025 COMPLETE CBC W/AUTO DIFF WBC: CPT | Performed by: NURSE PRACTITIONER

## 2023-11-04 PROCEDURE — 94618 PULMONARY STRESS TESTING: CPT

## 2023-11-04 PROCEDURE — 86140 C-REACTIVE PROTEIN: CPT | Performed by: NURSE PRACTITIONER

## 2023-11-04 PROCEDURE — 87635 SARS-COV-2 COVID-19 AMP PRB: CPT | Performed by: EMERGENCY MEDICINE

## 2023-11-04 PROCEDURE — 82728 ASSAY OF FERRITIN: CPT | Performed by: NURSE PRACTITIONER

## 2023-11-04 PROCEDURE — 83605 ASSAY OF LACTIC ACID: CPT

## 2023-11-04 PROCEDURE — 71045 X-RAY EXAM CHEST 1 VIEW: CPT

## 2023-11-04 PROCEDURE — 25010000002 REMDESIVIR 100 MG RECONSTITUTED SOLUTION: Performed by: NURSE PRACTITIONER

## 2023-11-04 PROCEDURE — 94799 UNLISTED PULMONARY SVC/PX: CPT

## 2023-11-04 PROCEDURE — 93005 ELECTROCARDIOGRAM TRACING: CPT | Performed by: EMERGENCY MEDICINE

## 2023-11-04 PROCEDURE — 36415 COLL VENOUS BLD VENIPUNCTURE: CPT | Performed by: NURSE PRACTITIONER

## 2023-11-04 PROCEDURE — 25810000003 SODIUM CHLORIDE 0.9 % SOLUTION: Performed by: NURSE PRACTITIONER

## 2023-11-04 PROCEDURE — 80053 COMPREHEN METABOLIC PANEL: CPT | Performed by: EMERGENCY MEDICINE

## 2023-11-04 PROCEDURE — 83735 ASSAY OF MAGNESIUM: CPT | Performed by: NURSE PRACTITIONER

## 2023-11-04 PROCEDURE — 80053 COMPREHEN METABOLIC PANEL: CPT | Performed by: NURSE PRACTITIONER

## 2023-11-04 PROCEDURE — 85610 PROTHROMBIN TIME: CPT | Performed by: EMERGENCY MEDICINE

## 2023-11-04 PROCEDURE — 94761 N-INVAS EAR/PLS OXIMETRY MLT: CPT

## 2023-11-04 PROCEDURE — 99285 EMERGENCY DEPT VISIT HI MDM: CPT

## 2023-11-04 PROCEDURE — XW033E5 INTRODUCTION OF REMDESIVIR ANTI-INFECTIVE INTO PERIPHERAL VEIN, PERCUTANEOUS APPROACH, NEW TECHNOLOGY GROUP 5: ICD-10-PCS | Performed by: INTERNAL MEDICINE

## 2023-11-04 PROCEDURE — 25010000002 DEXAMETHASONE PER 1 MG: Performed by: NURSE PRACTITIONER

## 2023-11-04 PROCEDURE — 84484 ASSAY OF TROPONIN QUANT: CPT | Performed by: EMERGENCY MEDICINE

## 2023-11-04 PROCEDURE — 93010 ELECTROCARDIOGRAM REPORT: CPT | Performed by: INTERNAL MEDICINE

## 2023-11-04 RX ORDER — AMOXICILLIN 250 MG
2 CAPSULE ORAL 2 TIMES DAILY
Status: DISCONTINUED | OUTPATIENT
Start: 2023-11-04 | End: 2023-11-06 | Stop reason: HOSPADM

## 2023-11-04 RX ORDER — WARFARIN SODIUM 5 MG/1
5 TABLET ORAL
COMMUNITY

## 2023-11-04 RX ORDER — BISACODYL 10 MG
10 SUPPOSITORY, RECTAL RECTAL DAILY PRN
Status: DISCONTINUED | OUTPATIENT
Start: 2023-11-04 | End: 2023-11-06 | Stop reason: HOSPADM

## 2023-11-04 RX ORDER — DEXAMETHASONE 6 MG/1
6 TABLET ORAL DAILY
Qty: 20 TABLET | Refills: 0 | Status: DISCONTINUED | OUTPATIENT
Start: 2023-11-04 | End: 2023-11-06 | Stop reason: HOSPADM

## 2023-11-04 RX ORDER — SODIUM CHLORIDE 0.9 % (FLUSH) 0.9 %
10 SYRINGE (ML) INJECTION AS NEEDED
Status: DISCONTINUED | OUTPATIENT
Start: 2023-11-04 | End: 2023-11-06 | Stop reason: HOSPADM

## 2023-11-04 RX ORDER — ASCORBIC ACID 500 MG
500 TABLET ORAL DAILY
Status: DISCONTINUED | OUTPATIENT
Start: 2023-11-04 | End: 2023-11-06 | Stop reason: HOSPADM

## 2023-11-04 RX ORDER — ZINC SULFATE 50(220)MG
220 CAPSULE ORAL DAILY
Status: DISCONTINUED | OUTPATIENT
Start: 2023-11-04 | End: 2023-11-06 | Stop reason: HOSPADM

## 2023-11-04 RX ORDER — ACETAMINOPHEN 650 MG/1
650 SUPPOSITORY RECTAL EVERY 4 HOURS PRN
Status: DISCONTINUED | OUTPATIENT
Start: 2023-11-04 | End: 2023-11-06 | Stop reason: HOSPADM

## 2023-11-04 RX ORDER — ONDANSETRON 2 MG/ML
4 INJECTION INTRAMUSCULAR; INTRAVENOUS EVERY 6 HOURS PRN
Status: DISCONTINUED | OUTPATIENT
Start: 2023-11-04 | End: 2023-11-06 | Stop reason: HOSPADM

## 2023-11-04 RX ORDER — WARFARIN SODIUM 2.5 MG/1
2.5 TABLET ORAL
COMMUNITY

## 2023-11-04 RX ORDER — CHOLECALCIFEROL (VITAMIN D3) 125 MCG
5 CAPSULE ORAL NIGHTLY PRN
Status: DISCONTINUED | OUTPATIENT
Start: 2023-11-04 | End: 2023-11-06 | Stop reason: HOSPADM

## 2023-11-04 RX ORDER — BISACODYL 5 MG/1
5 TABLET, DELAYED RELEASE ORAL DAILY PRN
Status: DISCONTINUED | OUTPATIENT
Start: 2023-11-04 | End: 2023-11-06 | Stop reason: HOSPADM

## 2023-11-04 RX ORDER — FAMOTIDINE 40 MG/1
40 TABLET, FILM COATED ORAL DAILY
COMMUNITY

## 2023-11-04 RX ORDER — ACETAMINOPHEN 325 MG/1
650 TABLET ORAL EVERY 4 HOURS PRN
Status: DISCONTINUED | OUTPATIENT
Start: 2023-11-04 | End: 2023-11-06 | Stop reason: HOSPADM

## 2023-11-04 RX ORDER — DEXAMETHASONE SODIUM PHOSPHATE 4 MG/ML
6 INJECTION, SOLUTION INTRA-ARTICULAR; INTRALESIONAL; INTRAMUSCULAR; INTRAVENOUS; SOFT TISSUE DAILY
Qty: 20 ML | Refills: 0 | Status: DISCONTINUED | OUTPATIENT
Start: 2023-11-04 | End: 2023-11-06 | Stop reason: HOSPADM

## 2023-11-04 RX ORDER — ONDANSETRON 4 MG/1
4 TABLET, FILM COATED ORAL EVERY 6 HOURS PRN
Status: DISCONTINUED | OUTPATIENT
Start: 2023-11-04 | End: 2023-11-06 | Stop reason: HOSPADM

## 2023-11-04 RX ORDER — ACETAMINOPHEN 160 MG/5ML
650 SOLUTION ORAL EVERY 4 HOURS PRN
Status: DISCONTINUED | OUTPATIENT
Start: 2023-11-04 | End: 2023-11-06 | Stop reason: HOSPADM

## 2023-11-04 RX ORDER — POLYETHYLENE GLYCOL 3350 17 G/17G
17 POWDER, FOR SOLUTION ORAL DAILY PRN
Status: DISCONTINUED | OUTPATIENT
Start: 2023-11-04 | End: 2023-11-06 | Stop reason: HOSPADM

## 2023-11-04 RX ORDER — LEVOTHYROXINE SODIUM 0.07 MG/1
75 TABLET ORAL DAILY
COMMUNITY

## 2023-11-04 RX ORDER — ALUMINA, MAGNESIA, AND SIMETHICONE 2400; 2400; 240 MG/30ML; MG/30ML; MG/30ML
15 SUSPENSION ORAL EVERY 6 HOURS PRN
Status: DISCONTINUED | OUTPATIENT
Start: 2023-11-04 | End: 2023-11-06 | Stop reason: HOSPADM

## 2023-11-04 RX ORDER — HYDROXYZINE HYDROCHLORIDE 25 MG/1
12.5 TABLET, FILM COATED ORAL 3 TIMES DAILY PRN
Status: DISCONTINUED | OUTPATIENT
Start: 2023-11-04 | End: 2023-11-06 | Stop reason: HOSPADM

## 2023-11-04 RX ADMIN — Medication 5 MG: at 21:04

## 2023-11-04 RX ADMIN — Medication 220 MG: at 19:44

## 2023-11-04 RX ADMIN — OXYCODONE HYDROCHLORIDE AND ACETAMINOPHEN 500 MG: 500 TABLET ORAL at 19:44

## 2023-11-04 RX ADMIN — DEXAMETHASONE SODIUM PHOSPHATE 6 MG: 4 INJECTION, SOLUTION INTRAMUSCULAR; INTRAVENOUS at 15:00

## 2023-11-04 RX ADMIN — ONDANSETRON 4 MG: 2 INJECTION INTRAMUSCULAR; INTRAVENOUS at 21:20

## 2023-11-04 RX ADMIN — REMDESIVIR 200 MG: 100 INJECTION, POWDER, LYOPHILIZED, FOR SOLUTION INTRAVENOUS at 18:01

## 2023-11-04 RX ADMIN — HYDROXYZINE HYDROCHLORIDE 12.5 MG: 25 TABLET, FILM COATED ORAL at 19:44

## 2023-11-04 RX ADMIN — DOCUSATE SODIUM 50MG AND SENNOSIDES 8.6MG 2 TABLET: 8.6; 5 TABLET, FILM COATED ORAL at 19:45

## 2023-11-04 NOTE — Clinical Note
Level of Care: Med/Surg [1]   Diagnosis: COVID [[id number]]   Admitting Physician: GEETA NAGEL [[id number]]   Attending Physician: GEETA NAGEL [[id number]]

## 2023-11-04 NOTE — ED PROVIDER NOTES
Subjective   History of Present Illness  88-year-old female presents for shortness of breath.  Started feeling bad yesterday.  Took a home COVID presents that was positive.  Took a second 1 today that was positive.  States she feels like she cannot catch her breath.  Takes warfarin for A-fib.  Just had a RACHEL as she is currently being evaluated for a watchman.  History of aortic valve replacement.  No chest pain.  No vomiting.      Review of Systems  See HPI.  Past Medical History:   Diagnosis Date    Anxiety     Asthma     Atrial fibrillation     CAD (coronary artery disease)     Carotid artery disease     GERD (gastroesophageal reflux disease)     Gout     Hyperlipidemia     Hypertension     Hyperthyroidism     Hypothyroidism        No Known Allergies    Past Surgical History:   Procedure Laterality Date    BASAL CELL CARCINOMA EXCISION      spine, nose and arm, leg 2020    BREAST BIOPSY      CARDIAC CATHETERIZATION      CAROTID STENT      CATARACT EXTRACTION      CHOLECYSTECTOMY      CORONARY ARTERY BYPASS GRAFT      CORONARY STENT PLACEMENT      ENDOSCOPY N/A 04/24/2023    Procedure: ESOPHAGOGASTRODUODENOSCOPY with antrum body biopsies;  Surgeon: REGGIE Ace MD;  Location: [facility];  Service: Gastroenterology;  Laterality: N/A;  hiatal hernia    FINGER SURGERY      KNEE SURGERY      PACEMAKER IMPLANTATION      SHOULDER SURGERY      RACHEL Bilateral 11/03/2023       Family History   Problem Relation Age of Onset    Hypertension Mother     Heart disease Father     Heart disease Sister     Kidney cancer Sister     Stroke Sister     Cancer Sister         breast    Cancer Sister     Heart disease Brother        Social History     Socioeconomic History    Marital status:     Number of children: 4    Years of education: GED   Tobacco Use    Smoking status: Former     Packs/day: 1.00     Years: 4.00     Additional pack years: 0.00     Total pack years: 4.00     Types: Cigarettes     Quit date: 1958      "Years since quittin.8    Smokeless tobacco: Never   Vaping Use    Vaping Use: Never used   Substance and Sexual Activity    Alcohol use: No    Drug use: No    Sexual activity: Defer           Objective   Physical Exam  Constitutional:  No acute distress.  Head:  Atraumatic.  Normocephalic.   Eyes:  No scleral icterus. Normal conjunctivae  ENT:  Moist mucosa.  No nasal discharge present.  Cardiovascular:  Well perfused.  Equal pulses.  Regular rate.  Normal capillary refill.    Pulmonary/Chest:  No respiratory distress.  Airway patent.  No tachypnea.  No accessory muscle usage.  Clear to auscultation bilaterally no rebound or guarding  Abdominal:  Nondistended. Nontender.   Extremities:  No Deformity  Skin:  Warm, dry  Neurological:  Alert, awake, and appropriate.  Normal speech.      Procedures           ED Course      /77 (BP Location: Right arm, Patient Position: Lying)   Pulse 102   Temp 98.4 °F (36.9 °C) (Oral)   Resp 20   Ht 152.4 cm (60\")   Wt 55.8 kg (123 lb)   SpO2 97%   BMI 24.02 kg/m²   Labs Reviewed   COVID-19,CEPHEID/COMFORT,COR/DEL/LAG/PAD/ELZBIETA/MAD IN-HOUSE,NP SWAB IN TRANSPORT MEDIA 3-4 HR TAT, RT-PCR - Abnormal; Notable for the following components:       Result Value    COVID19 Detected (*)     All other components within normal limits    Narrative:     Fact sheet for providers: https://www.fda.gov/media/360338/download     Fact sheet for patients: https://www.fda.gov/media/890025/download  Fact sheet for providers: https://www.fda.gov/media/035919/download    Fact sheet for patients: https://www.fda.gov/media/916507/download    Test performed by PCR.   COMPREHENSIVE METABOLIC PANEL - Abnormal; Notable for the following components:    Glucose 119 (*)     ALT (SGPT) 41 (*)     AST (SGOT) 42 (*)     All other components within normal limits    Narrative:     GFR Normal >60  Chronic Kidney Disease <60  Kidney Failure <15    The GFR formula is only valid for adults with stable renal " function between ages 18 and 70.   CBC WITH AUTO DIFFERENTIAL - Abnormal; Notable for the following components:    RBC 3.19 (*)     Hemoglobin 10.1 (*)     Hematocrit 30.6 (*)     RDW 17.3 (*)     RDW-SD 61.7 (*)     Lymphocyte % 17.7 (*)     All other components within normal limits   BNP (IN-HOUSE) - Abnormal; Notable for the following components:    proBNP 4,544.0 (*)     All other components within normal limits    Narrative:     This assay is used as an aid in the diagnosis of individuals suspected of having heart failure. It can be used as an aid in the diagnosis of acute decompensated heart failure (ADHF) in patients presenting with signs and symptoms of ADHF to the emergency department (ED). In addition, NT-proBNP of <300 pg/mL indicates ADHF is not likely.    Age Range Result Interpretation  NT-proBNP Concentration (pg/mL:      <50             Positive            >450                   Gray                 300-450                    Negative             <300    50-75           Positive            >900                  Gray                300-900                  Negative            <300      >75             Positive            >1800                  Gray                300-1800                  Negative            <300   APTT - Abnormal; Notable for the following components:    PTT 43.6 (*)     All other components within normal limits   SINGLE HSTROPONIN T - Abnormal; Notable for the following components:    HS Troponin T 11 (*)     All other components within normal limits    Narrative:     High Sensitive Troponin T Reference Range:  <10.0 ng/L- Negative Female for AMI  <15.0 ng/L- Negative Male for AMI  >=10 - Abnormal Female indicating possible myocardial injury.  >=15 - Abnormal Male indicating possible myocardial injury.   Clinicians would have to utilize clinical acumen, EKG, Troponin, and serial changes to determine if it is an Acute Myocardial Infarction or myocardial injury due to an underlying  "chronic condition.        C-REACTIVE PROTEIN - Abnormal; Notable for the following components:    C-Reactive Protein 5.72 (*)     All other components within normal limits   FERRITIN - Abnormal; Notable for the following components:    Ferritin 290.40 (*)     All other components within normal limits    Narrative:     Results may be falsely decreased if patient taking Biotin.     PROTIME-INR - Normal   PROCALCITONIN - Normal    Narrative:     As a Marker for Sepsis (Non-Neonates):    1. <0.5 ng/mL represents a low risk of severe sepsis and/or septic shock.  2. >2 ng/mL represents a high risk of severe sepsis and/or septic shock.    As a Marker for Lower Respiratory Tract Infections that require antibiotic therapy:    PCT on Admission    Antibiotic Therapy       6-12 Hrs later    >0.5                Strongly Recommended  >0.25 - <0.5        Recommended   0.1 - 0.25          Discouraged              Remeasure/reassess PCT  <0.1                Strongly Discouraged     Remeasure/reassess PCT    As 28 day mortality risk marker: \"Change in Procalcitonin Result\" (>80% or <=80%) if Day 0 (or Day 1) and Day 4 values are available. Refer to http://www.[employer]-pct-calculator.com    Change in PCT <=80%  A decrease of PCT levels below or equal to 80% defines a positive change in PCT test result representing a higher risk for 28-day all-cause mortality of patients diagnosed with severe sepsis for septic shock.    Change in PCT >80%  A decrease of PCT levels of more than 80% defines a negative change in PCT result representing a lower risk for 28-day all-cause mortality of patients diagnosed with severe sepsis or septic shock.      MAGNESIUM - Normal   PHOSPHORUS - Normal   POC LACTATE - Normal   COVID PRE-OP / PRE-PROCEDURE SCREENING ORDER (NO ISOLATION)    Narrative:     The following orders were created for panel order COVID PRE-OP / PRE-PROCEDURE SCREENING ORDER (NO ISOLATION) - Swab, Nasopharynx.  Procedure                     "           Abnormality         Status                     ---------                               -----------         ------                     COVID-19,CEPHEID/COMFORT,CO...[[id number]]  Abnormal            Final result                 Please view results for these tests on the individual orders.   BLOOD CULTURE   BLOOD CULTURE   RAINBOW DRAW    Narrative:     The following orders were created for panel order Hansen Draw.  Procedure                               Abnormality         Status                     ---------                               -----------         ------                     Green Top (Gel)[[id number]]                                  Final result               Lavender Top[[id number]]                                     Final result               Gold Top - SST[[id number]]                                   Final result               Light Blue Top[[id number]]                                   Final result                 Please view results for these tests on the individual orders.   POC LACTATE   CBC AND DIFFERENTIAL    Narrative:     The following orders were created for panel order CBC & Differential.  Procedure                               Abnormality         Status                     ---------                               -----------         ------                     CBC Auto Differential[[id number]]        Abnormal            Final result                 Please view results for these tests on the individual orders.   GREEN TOP   LAVENDER TOP   GOLD TOP - SST   LIGHT BLUE TOP     Medications   sodium chloride 0.9 % flush 10 mL (has no administration in time range)   dexAMETHasone (DECADRON) tablet 6 mg ( Oral Not Given:  See Alt 11/4/23 1500)     Or   dexAMETHasone (DECADRON) injection 6 mg (6 mg Intravenous Given 11/4/23 1500)   Pharmacy Consult - Remdesivir for Severe COVID-19 (Within 7 days of symptom onset) (has no administration in time range)   remdesivir 200 mg in 290 mL NS (200 mg  Intravenous New Bag 11/4/23 1801)     Followed by   remdesivir 100 mg in sodium chloride 0.9 % 250 mL IVPB (powder vial) (has no administration in time range)   acetaminophen (TYLENOL) tablet 650 mg (has no administration in time range)     Or   acetaminophen (TYLENOL) 160 MG/5ML oral solution 650 mg (has no administration in time range)     Or   acetaminophen (TYLENOL) suppository 650 mg (has no administration in time range)   aluminum-magnesium hydroxide-simethicone (MAALOX MAX) 400-400-40 MG/5ML suspension 15 mL (has no administration in time range)   sennosides-docusate (PERICOLACE) 8.6-50 MG per tablet 2 tablet (2 tablets Oral Given 11/4/23 1945)     And   polyethylene glycol (MIRALAX) packet 17 g (has no administration in time range)     And   bisacodyl (DULCOLAX) EC tablet 5 mg (has no administration in time range)     And   bisacodyl (DULCOLAX) suppository 10 mg (has no administration in time range)   ondansetron (ZOFRAN) tablet 4 mg (has no administration in time range)     Or   ondansetron (ZOFRAN) injection 4 mg (has no administration in time range)   melatonin tablet 5 mg (has no administration in time range)   Potassium Replacement - Follow Nurse / BPA Driven Protocol (has no administration in time range)   Magnesium Standard Dose Replacement - Follow Nurse / BPA Driven Protocol (has no administration in time range)   Phosphorus Replacement - Follow Nurse / BPA Driven Protocol (has no administration in time range)   Calcium Replacement - Follow Nurse / BPA Driven Protocol (has no administration in time range)   phenol (CHLORASEPTIC) 1.4 % liquid 1 spray (has no administration in time range)   zinc sulfate (ZINCATE) capsule 220 mg (220 mg Oral Given 11/4/23 1944)   ascorbic acid (VITAMIN C) tablet 500 mg (500 mg Oral Given 11/4/23 1944)   hydrOXYzine (ATARAX) tablet 12.5 mg (12.5 mg Oral Given 11/4/23 1944)     XR Chest 1 View    Result Date: 11/4/2023  Impression: No acute cardiopulmonary process.  Electronically Signed: Rory Gomez MD  11/4/2023 10:49 AM EDT  Workstation ID: [id number]                                        Medical Decision Making  Problems Addressed:  Acute hypoxemic respiratory failure: complicated acute illness or injury  COVID: complicated acute illness or injury    Amount and/or Complexity of Data Reviewed  Labs: ordered.  Radiology: ordered.  ECG/medicine tests: ordered.    Risk  Prescription drug management.  Decision regarding hospitalization.    EKG # 1  Signed and interpreted by the EP.  Time Interpreted: 9:55 AM  Rate: 96  Rhythm: A-fib with intermittent paced beats  Intervals: Left bundle branch block  ST Segments: Does not meet Sgarbossa criteria.     Comparison: Comparison EKG from December 22, 2021 with significant higher pacer burden.    Patient reportedly had oxygen saturation that dipped in the 80s when she was ambulated.  Did not place safe for discharge home.  COVID-positive.  Elevated BNP but does not look volume overloaded on exam.  Could be secondary to aortic stenosis.  Will admit for hypoxemia on exertion.      Final diagnoses:   COVID   Acute hypoxemic respiratory failure       ED Disposition  ED Disposition       ED Disposition   Decision to Admit    Condition   --    Comment   Level of Care: Med/Surg [1]   Admitting Physician: GEETA NAGEL [[id number]]   Attending Physician: GEETA NAGEL [[id number]]                 No follow-up provider specified.       Medication List        ASK your doctor about these medications      levothyroxine 75 MCG tablet  Commonly known as: SYNTHROID, LEVOTHROID  Ask about: Which instructions should I use?     * warfarin 5 MG tablet  Commonly known as: COUMADIN  Ask about: Which instructions should I use?     * warfarin 2.5 MG tablet  Commonly known as: COUMADIN  Ask about: Which instructions should I use?           * This list has 2 medication(s) that are the same as other medications prescribed for you. Read the directions carefully, and ask  your doctor or other care provider to review them with you.                     Yash Fried MD  11/04/23 2034

## 2023-11-04 NOTE — H&P
[facility] Medicine Services  History & Physical    Patient Name: Tracy Jane  : 1934  MRN: [id number]  Primary Care Physician:  Kacie Liriano APRN  Date of admission: 2023    Subjective      Chief Complaint: shortness of breath    History of Present Illness: Tracy Jane is a 88 y.o. female who presented to [facility] on 2023 complaining of cough that started last night and significant shortness of breath that started this morning.  Patient took two COVID tests at home and they were both positive for COVID-19.  She and her daughter decided to come to the hospital for evaluation and treatment, as patient had a sister who  of COVID-19.  In the ER she was found to desat into the 80s on room air with exertion.  She is not on supplemental oxygen at home.  History includes A-fib, CAD, aortic stenosis, bioprosthetic aortic valve, hypertension, hyperlipidemia, hypothyroidism.  She has an appointment Monday with Dr. Crabtree to review the results of her recent echo and discuss possible Watchman procedure.      Review of Systems   Respiratory:  Positive for cough and shortness of breath.    All other systems reviewed and are negative.      Personal History     Past Medical History:   Diagnosis Date    Anxiety     Asthma     Atrial fibrillation     CAD (coronary artery disease)     Carotid artery disease     GERD (gastroesophageal reflux disease)     Gout     Hyperlipidemia     Hypertension     Hyperthyroidism     Hypothyroidism        Past Surgical History:   Procedure Laterality Date    BASAL CELL CARCINOMA EXCISION      spine, nose and arm, leg     BREAST BIOPSY      CARDIAC CATHETERIZATION      CAROTID STENT      CATARACT EXTRACTION      CHOLECYSTECTOMY      CORONARY ARTERY BYPASS GRAFT      CORONARY STENT PLACEMENT      ENDOSCOPY N/A 2023    Procedure: ESOPHAGOGASTRODUODENOSCOPY with antrum body biopsies;  Surgeon: REGGIE Ace MD;  Location:   [facility];  Service: Gastroenterology;  Laterality: N/A;  hiatal hernia    FINGER SURGERY      KNEE SURGERY      PACEMAKER IMPLANTATION      SHOULDER SURGERY      RACHEL Bilateral 11/03/2023       Family History: family history includes Cancer in her sister and sister; Heart disease in her brother, father, and sister; Hypertension in her mother; Kidney cancer in her sister; Stroke in her sister. Otherwise pertinent FHx was reviewed and not pertinent to current issue.    Social History:  reports that she quit smoking about 65 years ago. Her smoking use included cigarettes. She has a 4.00 pack-year smoking history. She has never used smokeless tobacco. She reports that she does not drink alcohol and does not use drugs.    Home Medications:  Prior to Admission Medications       Prescriptions Last Dose Informant Patient Reported? Taking?    aspirin 81 MG EC tablet   Yes Yes    Take 1 tablet by mouth Daily.    Cholecalciferol 25 MCG (1000 UT) tablet   Yes Yes    Take 1 tablet by mouth Daily.    doxazosin (CARDURA) 2 MG tablet   No Yes    TAKE ONE TABLET BY MOUTH DAILY    famotidine (PEPCID) 40 MG tablet   Yes Yes    Take 1 tablet by mouth Daily.    furosemide (LASIX) 40 MG tablet   No Yes    TAKE ONE TABLET BY MOUTH DAILY    isosorbide mononitrate (IMDUR) 30 MG 24 hr tablet   No Yes    TAKE ONE TABLET BY MOUTH TWICE A DAY    methotrexate 2.5 MG tablet   Yes Yes    Take 1 tablet by mouth 1 (One) Time Per Week. On Sunday    potassium chloride ER (K-TAB) 20 MEQ tablet controlled-release ER tablet   No Yes    TAKE ONE TABLET BY MOUTH DAILY    rosuvastatin (CRESTOR) 20 MG tablet   No Yes    TAKE ONE TABLET BY MOUTH DAILY    sertraline (ZOLOFT) 100 MG tablet   Yes Yes    Take 1.5 tablets by mouth Daily.    sotalol (BETAPACE) 120 MG tablet   No Yes    TAKE 1 TABLET BY MOUTH TWICE A DAY    warfarin (COUMADIN) 2.5 MG tablet   Yes Yes    Take 1 tablet by mouth. On Wednesday and Sunday    warfarin (COUMADIN) 5 MG tablet   Yes Yes     Take 1 tablet by mouth. On Monday, Tuesday, Thursday, Friday, Saturday    acetaminophen (TYLENOL) 650 MG 8 hr tablet   Yes No    Take 1 tablet by mouth Every 8 (Eight) Hours As Needed.    dilTIAZem CD (CARDIZEM CD) 240 MG 24 hr capsule   No No    TAKE ONE CAPSULE BY MOUTH DAILY    ferrous sulfate 324 (65 Fe) MG tablet delayed-release EC tablet   Yes No    Take 1 tablet by mouth Daily With Breakfast.    Glucos-Chondroit-Hyaluron-MSM (GLUCOSAMINE CHONDROITIN JOINT) tablet   Yes No    Take 1 tablet by mouth Daily.    levothyroxine (SYNTHROID, LEVOTHROID) 75 MCG tablet   Yes No    Take 1 tablet by mouth Daily.    melatonin 5 MG tablet tablet   Yes No    Take 1 tablet by mouth Every Night.    Multiple Vitamin (MULTIVITAMIN) tablet   Yes No    Take 1 tablet by mouth Daily.    nitroglycerin (NITROSTAT) 0.4 MG SL tablet   No No    Place 1 tablet under the tongue Every 5 (Five) Minutes As Needed for Chest Pain.    Omega-3 Fatty Acids (FISH OIL) 1000 MG capsule capsule   Yes No    Take  by mouth 2 (Two) Times a Day With Meals.              Allergies:  No Known Allergies    Objective      Vitals:   Temp:  [97.8 °F (36.6 °C)-98.4 °F (36.9 °C)] 98.4 °F (36.9 °C)  Heart Rate:  [] 98  Resp:  [22-27] 22  BP: (115-170)/(58-80) 148/76    Physical Exam  Constitutional:       Appearance: Normal appearance.   HENT:      Head: Normocephalic and atraumatic.      Mouth/Throat:      Mouth: Mucous membranes are moist.   Eyes:      Pupils: Pupils are equal, round, and reactive to light.   Cardiovascular:      Rate and Rhythm: Normal rate. Rhythm irregular.      Pulses: Normal pulses.   Pulmonary:      Effort: Pulmonary effort is normal.      Breath sounds: Normal breath sounds.   Abdominal:      Palpations: Abdomen is soft.   Musculoskeletal:         General: Normal range of motion.      Cervical back: Normal range of motion.   Skin:     General: Skin is warm and dry.      Capillary Refill: Capillary refill takes less than 2 seconds.    Neurological:      General: No focal deficit present.      Mental Status: She is alert and oriented to person, place, and time.   Psychiatric:         Mood and Affect: Mood normal.         Behavior: Behavior normal.          Result Review    Result Review:  I have personally reviewed the results from the time of this admission to 11/4/2023 17:35 EDT and agree with these findings:  [x]  Laboratory  [x]  Microbiology  [x]  Radiology  [x]  EKG/Telemetry   []  Cardiology/Vascular   []  Pathology  []  Old records  []  Other:      Assessment & Plan        Active Hospital Problems:  Active Hospital Problems    Diagnosis     **COVID      Plan:     Dyspnea, hypoxia related to COVID-19  COVID-19 treatment panel ordered which includes remdesivir and Decadron.  DuoNebs as needed  Incentive spirometry  Supplemental O2 to keep sats above 92%.  Wean O2 as tolerated.    Cardiology consulted (Dr. Crabtree's group) due to BNP of 4544 and also to discuss findings of recent echo.      DVT prophylaxis:  Medical DVT prophylaxis orders are present.    CODE STATUS:    Code Status (Patient has no pulse and is not breathing): CPR (Attempt to Resuscitate)  Medical Interventions (Patient has pulse or is breathing): Full Support    Admission Status:  I believe this patient meets inpatient status.    I discussed the patient's findings and my recommendations with patient.      Signature: Electronically signed by YEYO Sevilla, 11/04/23, 17:35 EDT.  [facility] Hospitalist Team

## 2023-11-04 NOTE — PLAN OF CARE
Continue to monitor and assess pain r/t to cough.  Patient is short air of when up.  Patient is other wise just tired but no other pain.   Problem: Adult Inpatient Plan of Care  Goal: Plan of Care Review  Outcome: Ongoing, Progressing  Flowsheets (Taken 11/4/2023 1725)  Progress: no change  Plan of Care Reviewed With: patient  Goal: Patient-Specific Goal (Individualized)  Outcome: Ongoing, Progressing  Goal: Absence of Hospital-Acquired Illness or Injury  Outcome: Ongoing, Progressing  Intervention: Identify and Manage Fall Risk  Flowsheets (Taken 11/4/2023 1725)  Safety Promotion/Fall Prevention:   clutter free environment maintained   assistive device/personal items within reach   fall prevention program maintained   safety round/check completed  Intervention: Prevent Skin Injury  Flowsheets (Taken 11/4/2023 1725)  Body Position: position changed independently  Skin Protection:   adhesive use limited   skin-to-device areas padded  Intervention: Prevent and Manage VTE (Venous Thromboembolism) Risk  Flowsheets (Taken 11/4/2023 1725)  Activity Management: (falls risk) other (see comments)  VTE Prevention/Management: (see MAR) other (see comments)  Intervention: Prevent Infection  Flowsheets (Taken 11/4/2023 1725)  Infection Prevention:   hand hygiene promoted   personal protective equipment utilized   rest/sleep promoted   single patient room provided  Goal: Optimal Comfort and Wellbeing  Outcome: Ongoing, Progressing  Intervention: Monitor Pain and Promote Comfort  Flowsheets (Taken 11/4/2023 1725)  Pain Management Interventions: relaxation techniques promoted  Intervention: Provide Person-Centered Care  Flowsheets (Taken 11/4/2023 1725)  Trust Relationship/Rapport:   care explained   choices provided   questions encouraged  Goal: Readiness for Transition of Care  Outcome: Ongoing, Progressing  Intervention: Mutually Develop Transition Plan  Flowsheets (Taken 11/4/2023 1725)  Equipment Needed After Discharge:  none  Equipment Currently Used at Home:   rollator   grab bar   cane, straight   bath bench   commode  Anticipated Changes Related to Illness: none  Transportation Anticipated: family or friend will provide  Transportation Concerns: none  Concerns to be Addressed: no discharge needs identified  Readmission Within the Last 30 Days: no previous admission in last 30 days  Patient/Family Anticipated Services at Transition: none  Patient/Family Anticipates Transition to: (independent living)   home   other (see comments)  Current Outpatient/Agency/Support Group: other (see comments)     Problem: Pain Acute  Goal: Acceptable Pain Control and Functional Ability  Outcome: Ongoing, Progressing  Intervention: Prevent or Manage Pain  Flowsheets (Taken 11/4/2023 1725)  Sensory Stimulation Regulation: quiet environment promoted  Bowel Elimination Promotion: adequate fluid intake promoted  Sleep/Rest Enhancement: relaxation techniques promoted  Medication Review/Management: medications reviewed  Intervention: Develop Pain Management Plan  Flowsheets (Taken 11/4/2023 1725)  Pain Management Interventions: relaxation techniques promoted  Intervention: Optimize Psychosocial Wellbeing  Flowsheets (Taken 11/4/2023 1725)  Supportive Measures: active listening utilized  Diversional Activities: television     Problem: Fall Injury Risk  Goal: Absence of Fall and Fall-Related Injury  Outcome: Ongoing, Progressing  Intervention: Identify and Manage Contributors  Flowsheets (Taken 11/4/2023 1725)  Medication Review/Management: medications reviewed  Intervention: Promote Injury-Free Environment  Flowsheets (Taken 11/4/2023 1725)  Safety Promotion/Fall Prevention:   clutter free environment maintained   assistive device/personal items within reach   fall prevention program maintained   safety round/check completed     Problem: Breathing Pattern Ineffective  Goal: Effective Breathing Pattern  Outcome: Ongoing, Progressing  Intervention: Promote  Improved Breathing Pattern  Flowsheets (Taken 11/4/2023 2354)  Supportive Measures: active listening utilized  Head of Bed (HOB) Positioning: HOB at 30-45 degrees  Airway/Ventilation Management:   airway patency maintained   calming measures promoted   Goal Outcome Evaluation:  Plan of Care Reviewed With: patient        Progress: no change

## 2023-11-04 NOTE — PROGRESS NOTES
Exercise Oximetry    Patient Name:Tracy Jane   MRN: [id number]   Date: 11/04/23             ROOM AIR BASELINE   SpO2% 94   Heart Rate 80   Blood Pressure      EXERCISE ON ROOM AIR SpO2% EXERCISE ON O2 @  LPM SpO2%   1 MINUTE  94 1 MINUTE    2 MINUTES  91 2 MINUTES    3 MINUTES  91 3 MINUTES    4 MINUTES  89 4 MINUTES    5 MINUTES  90 5 MINUTES    6 MINUTES  92 6 MINUTES               Distance Walked  6min Distance Walked   Dyspnea (Kierra Scale)   Dyspnea (Kierra Scale)   Fatigue (Kierra Scale)   Fatigue (Kierra Scale)   SpO2% Post Exercise  93% SpO2% Post Exercise   HR Post Exercise  89 HR Post Exercise   Time to Recovery   Time to Recovery     Comments:     Pt will not need O2 to maintain sats >88% with exertion.  RN made aware.

## 2023-11-05 LAB
ANISOCYTOSIS BLD QL: ABNORMAL
BASOPHILS # BLD MANUAL: 0.07 10*3/MM3 (ref 0–0.2)
BASOPHILS NFR BLD MANUAL: 1 % (ref 0–1.5)
DEPRECATED RDW RBC AUTO: 61.7 FL (ref 37–54)
ERYTHROCYTE [DISTWIDTH] IN BLOOD BY AUTOMATED COUNT: 18 % (ref 12.3–15.4)
HCT VFR BLD AUTO: 35.4 % (ref 34–46.6)
HGB BLD-MCNC: 11.2 G/DL (ref 12–15.9)
INR PPP: 2.29 (ref 2–3)
LYMPHOCYTES # BLD MANUAL: 0.73 10*3/MM3 (ref 0.7–3.1)
LYMPHOCYTES NFR BLD MANUAL: 2 % (ref 5–12)
MCH RBC QN AUTO: 31.3 PG (ref 26.6–33)
MCHC RBC AUTO-ENTMCNC: 31.5 G/DL (ref 31.5–35.7)
MCV RBC AUTO: 99.5 FL (ref 79–97)
MONOCYTES # BLD: 0.15 10*3/MM3 (ref 0.1–0.9)
NEUTROPHILS # BLD AUTO: 6.35 10*3/MM3 (ref 1.7–7)
NEUTROPHILS NFR BLD MANUAL: 75 % (ref 42.7–76)
NEUTS BAND NFR BLD MANUAL: 12 % (ref 0–5)
PLAT MORPH BLD: NORMAL
PLATELET # BLD AUTO: 188 10*3/MM3 (ref 140–450)
PMV BLD AUTO: 8.1 FL (ref 6–12)
PROTHROMBIN TIME: 23.5 SECONDS (ref 19.4–28.5)
QT INTERVAL: 417 MS
QTC INTERVAL: 527 MS
RBC # BLD AUTO: 3.56 10*6/MM3 (ref 3.77–5.28)
SCAN SLIDE: NORMAL
VARIANT LYMPHS NFR BLD MANUAL: 10 % (ref 19.6–45.3)
WBC MORPH BLD: NORMAL
WBC NRBC COR # BLD: 7.3 10*3/MM3 (ref 3.4–10.8)

## 2023-11-05 PROCEDURE — 85610 PROTHROMBIN TIME: CPT | Performed by: HOSPITALIST

## 2023-11-05 PROCEDURE — 25010000002 REMDESIVIR 100 MG/20ML SOLUTION 1 EACH VIAL: Performed by: NURSE PRACTITIONER

## 2023-11-05 PROCEDURE — 25810000003 SODIUM CHLORIDE 0.9 % SOLUTION 250 ML FLEX CONT: Performed by: NURSE PRACTITIONER

## 2023-11-05 PROCEDURE — 93010 ELECTROCARDIOGRAM REPORT: CPT | Performed by: INTERNAL MEDICINE

## 2023-11-05 PROCEDURE — 93005 ELECTROCARDIOGRAM TRACING: CPT | Performed by: HOSPITALIST

## 2023-11-05 RX ORDER — POTASSIUM CHLORIDE 20 MEQ/1
20 TABLET, EXTENDED RELEASE ORAL DAILY
Status: DISCONTINUED | OUTPATIENT
Start: 2023-11-05 | End: 2023-11-06 | Stop reason: HOSPADM

## 2023-11-05 RX ORDER — CHOLECALCIFEROL (VITAMIN D3) 125 MCG
5 CAPSULE ORAL NIGHTLY
Status: DISCONTINUED | OUTPATIENT
Start: 2023-11-05 | End: 2023-11-06 | Stop reason: HOSPADM

## 2023-11-05 RX ORDER — FAMOTIDINE 20 MG/1
40 TABLET, FILM COATED ORAL DAILY
Status: DISCONTINUED | OUTPATIENT
Start: 2023-11-05 | End: 2023-11-06 | Stop reason: HOSPADM

## 2023-11-05 RX ORDER — DILTIAZEM HYDROCHLORIDE 240 MG/1
240 CAPSULE, COATED, EXTENDED RELEASE ORAL DAILY
Status: DISCONTINUED | OUTPATIENT
Start: 2023-11-05 | End: 2023-11-06 | Stop reason: HOSPADM

## 2023-11-05 RX ORDER — SOTALOL HYDROCHLORIDE 80 MG/1
120 TABLET ORAL 2 TIMES DAILY
Status: DISCONTINUED | OUTPATIENT
Start: 2023-11-05 | End: 2023-11-06 | Stop reason: HOSPADM

## 2023-11-05 RX ORDER — WARFARIN SODIUM 2.5 MG/1
2.5 TABLET ORAL
Status: DISCONTINUED | OUTPATIENT
Start: 2023-11-08 | End: 2023-11-06 | Stop reason: HOSPADM

## 2023-11-05 RX ORDER — WARFARIN SODIUM 5 MG/1
5 TABLET ORAL
Status: DISCONTINUED | OUTPATIENT
Start: 2023-11-05 | End: 2023-11-06 | Stop reason: HOSPADM

## 2023-11-05 RX ORDER — FUROSEMIDE 40 MG/1
40 TABLET ORAL DAILY
Status: DISCONTINUED | OUTPATIENT
Start: 2023-11-05 | End: 2023-11-06 | Stop reason: HOSPADM

## 2023-11-05 RX ORDER — LEVOTHYROXINE SODIUM 0.07 MG/1
75 TABLET ORAL
Status: DISCONTINUED | OUTPATIENT
Start: 2023-11-05 | End: 2023-11-06 | Stop reason: HOSPADM

## 2023-11-05 RX ORDER — NITROGLYCERIN 0.4 MG/1
0.4 TABLET SUBLINGUAL
Status: DISCONTINUED | OUTPATIENT
Start: 2023-11-05 | End: 2023-11-06 | Stop reason: HOSPADM

## 2023-11-05 RX ORDER — ASPIRIN 81 MG/1
81 TABLET ORAL DAILY
Status: DISCONTINUED | OUTPATIENT
Start: 2023-11-05 | End: 2023-11-06 | Stop reason: HOSPADM

## 2023-11-05 RX ORDER — ROSUVASTATIN CALCIUM 10 MG/1
20 TABLET, COATED ORAL NIGHTLY
Status: DISCONTINUED | OUTPATIENT
Start: 2023-11-05 | End: 2023-11-06 | Stop reason: HOSPADM

## 2023-11-05 RX ORDER — TERAZOSIN 2 MG/1
2 CAPSULE ORAL NIGHTLY
Status: DISCONTINUED | OUTPATIENT
Start: 2023-11-05 | End: 2023-11-06 | Stop reason: HOSPADM

## 2023-11-05 RX ADMIN — POTASSIUM CHLORIDE 20 MEQ: 1500 TABLET, EXTENDED RELEASE ORAL at 15:55

## 2023-11-05 RX ADMIN — Medication 10 ML: at 08:09

## 2023-11-05 RX ADMIN — TERAZOSIN HYDROCHLORIDE 2 MG: 2 CAPSULE ORAL at 20:27

## 2023-11-05 RX ADMIN — SERTRALINE 150 MG: 100 TABLET, FILM COATED ORAL at 15:55

## 2023-11-05 RX ADMIN — FAMOTIDINE 40 MG: 20 TABLET, FILM COATED ORAL at 15:55

## 2023-11-05 RX ADMIN — ASPIRIN 81 MG: 81 TABLET, COATED ORAL at 15:55

## 2023-11-05 RX ADMIN — Medication 220 MG: at 08:10

## 2023-11-05 RX ADMIN — Medication 5 MG: at 20:27

## 2023-11-05 RX ADMIN — FUROSEMIDE 40 MG: 40 TABLET ORAL at 15:55

## 2023-11-05 RX ADMIN — LEVOTHYROXINE SODIUM 75 MCG: 0.07 TABLET ORAL at 15:55

## 2023-11-05 RX ADMIN — OXYCODONE HYDROCHLORIDE AND ACETAMINOPHEN 500 MG: 500 TABLET ORAL at 08:10

## 2023-11-05 RX ADMIN — DEXAMETHASONE 6 MG: 6 TABLET ORAL at 08:10

## 2023-11-05 RX ADMIN — WARFARIN SODIUM 5 MG: 5 TABLET ORAL at 17:14

## 2023-11-05 RX ADMIN — REMDESIVIR 100 MG: 100 INJECTION, POWDER, LYOPHILIZED, FOR SOLUTION INTRAVENOUS at 15:55

## 2023-11-05 RX ADMIN — DOCUSATE SODIUM 50MG AND SENNOSIDES 8.6MG 2 TABLET: 8.6; 5 TABLET, FILM COATED ORAL at 20:27

## 2023-11-05 RX ADMIN — DILTIAZEM HYDROCHLORIDE 240 MG: 240 CAPSULE, COATED, EXTENDED RELEASE ORAL at 15:55

## 2023-11-05 RX ADMIN — SOTALOL HYDROCHLORIDE 120 MG: 80 TABLET ORAL at 20:27

## 2023-11-05 RX ADMIN — DOCUSATE SODIUM 50MG AND SENNOSIDES 8.6MG 2 TABLET: 8.6; 5 TABLET, FILM COATED ORAL at 10:42

## 2023-11-05 RX ADMIN — ROSUVASTATIN 20 MG: 10 TABLET, FILM COATED ORAL at 20:27

## 2023-11-05 NOTE — PLAN OF CARE
Continue to monitor and assess patient.  Patient is resting in her bed staying calm.    Problem: Adult Inpatient Plan of Care  Goal: Plan of Care Review  11/5/2023 0716 by Margaret Sanches RN  Outcome: Ongoing, Progressing  Flowsheets  Taken 11/5/2023 0708  Outcome Evaluation: Patient is having eposides of anxiety but all over her vs are ok and believe r/t to being sick.  Taken 11/4/2023 1725  Progress: no change  Plan of Care Reviewed With: patient  11/5/2023 0708 by Margaret Sanches RN  Outcome: Ongoing, Progressing  Flowsheets  Taken 11/5/2023 0708  Outcome Evaluation: Patient is having eposides of anxiety but all over her vs are ok and believe r/t to being sick.  Taken 11/4/2023 1725  Plan of Care Reviewed With: patient  Goal: Patient-Specific Goal (Individualized)  11/5/2023 0716 by Margaret Sanches RN  Outcome: Ongoing, Progressing  11/5/2023 0708 by Margaret Sanches RN  Outcome: Ongoing, Progressing  Goal: Absence of Hospital-Acquired Illness or Injury  11/5/2023 0716 by Margaret Sanches RN  Outcome: Ongoing, Progressing  11/5/2023 0708 by Margaret Sanches RN  Outcome: Ongoing, Progressing  Intervention: Identify and Manage Fall Risk  11/5/2023 0716 by Margaret Sanches RN  Flowsheets (Taken 11/5/2023 0708)  Safety Promotion/Fall Prevention:   clutter free environment maintained   assistive device/personal items within reach   fall prevention program maintained  11/5/2023 0708 by Margaret Sanches RN  Flowsheets (Taken 11/5/2023 0708)  Safety Promotion/Fall Prevention:   clutter free environment maintained   assistive device/personal items within reach   fall prevention program maintained  Intervention: Prevent Skin Injury  11/5/2023 0716 by Margaret Sanches RN  Flowsheets (Taken 11/4/2023 1725)  Body Position: position changed independently  Skin Protection:   adhesive use limited   skin-to-device areas padded  11/5/2023 0708 by Margaret Sanches RN  Flowsheets (Taken 11/4/2023 1725)  Body Position:  position changed independently  Skin Protection:   adhesive use limited   skin-to-device areas padded  Intervention: Prevent and Manage VTE (Venous Thromboembolism) Risk  11/5/2023 0716 by Margaret Sanches RN  Flowsheets  Taken 11/5/2023 0708  Activity Management: (patient using rollator)   activity minimized   other (see comments)  Taken 11/4/2023 1725  VTE Prevention/Management: (see MAR) other (see comments)  11/5/2023 0708 by Margaret Sanches RN  Flowsheets  Taken 11/5/2023 0708  Activity Management: (patient using rollator)   activity minimized   other (see comments)  Taken 11/4/2023 1725  VTE Prevention/Management: (see MAR) other (see comments)  Intervention: Prevent Infection  11/5/2023 0716 by Margaret Sanches RN  Flowsheets (Taken 11/4/2023 2000 by Damaris Desai, RN)  Infection Prevention:   rest/sleep promoted   personal protective equipment utilized  11/5/2023 0708 by Margaret Sanches RN  Flowsheets (Taken 11/4/2023 2000 by Damaris Desai, RN)  Infection Prevention:   rest/sleep promoted   personal protective equipment utilized  Goal: Optimal Comfort and Wellbeing  11/5/2023 0716 by Margaret Sanches RN  Outcome: Ongoing, Progressing  11/5/2023 0708 by Margaret Sanches RN  Outcome: Ongoing, Progressing  Intervention: Monitor Pain and Promote Comfort  11/5/2023 0716 by Margaret Sanches RN  Flowsheets (Taken 11/4/2023 1725)  Pain Management Interventions: relaxation techniques promoted  11/5/2023 0708 by Margaret Sanches RN  Flowsheets (Taken 11/4/2023 1725)  Pain Management Interventions: relaxation techniques promoted  Intervention: Provide Person-Centered Care  11/5/2023 0716 by Margaret Sanches RN  Flowsheets (Taken 11/4/2023 2000 by Damaris Desai, RN)  Trust Relationship/Rapport:   care explained   choices provided  11/5/2023 0708 by Margaret Sanches RN  Flowsheets (Taken 11/4/2023 2000 by Damaris Desai, RN)  Trust Relationship/Rapport:   care explained   choices provided  Goal:  Readiness for Transition of Care  11/5/2023 0716 by Margaret Sanches RN  Outcome: Ongoing, Progressing  11/5/2023 0708 by Margaret Sanches RN  Outcome: Ongoing, Progressing  Intervention: Mutually Develop Transition Plan  11/5/2023 0716 by Margaret Sanches RN  Flowsheets  Taken 11/4/2023 1741  Transportation Anticipated: family or friend will provide  Transportation Concerns: none  Patient/Family Anticipated Services at Transition: none  Patient/Family Anticipates Transition to:   other (see comments)   home  Taken 11/4/2023 1736  Equipment Currently Used at Home:   rollator   grab bar   cane, straight   bath bench   commode  Taken 11/4/2023 1725  Equipment Needed After Discharge: none  Anticipated Changes Related to Illness: none  Concerns to be Addressed: no discharge needs identified  Readmission Within the Last 30 Days: no previous admission in last 30 days  Current Outpatient/Agency/Support Group: other (see comments)  11/5/2023 0708 by Margaret Sanches RN  Flowsheets  Taken 11/4/2023 1741  Transportation Anticipated: family or friend will provide  Transportation Concerns: none  Patient/Family Anticipated Services at Transition: none  Patient/Family Anticipates Transition to:   other (see comments)   home  Taken 11/4/2023 1736  Equipment Currently Used at Home:   rollator   grab bar   cane, straight   bath bench   commode  Taken 11/4/2023 1725  Equipment Needed After Discharge: none  Anticipated Changes Related to Illness: none  Concerns to be Addressed: no discharge needs identified  Readmission Within the Last 30 Days: no previous admission in last 30 days  Current Outpatient/Agency/Support Group: other (see comments)     Problem: Pain Acute  Goal: Acceptable Pain Control and Functional Ability  11/5/2023 0716 by Margaret Sanches RN  Outcome: Ongoing, Progressing  11/5/2023 0708 by Margaret Sanches RN  Outcome: Ongoing, Progressing  Intervention: Prevent or Manage Pain  11/5/2023 0716 by Margaret Sanches  A, RN  Flowsheets  Taken 11/5/2023 0708  Sleep/Rest Enhancement:   relaxation techniques promoted   natural light exposure provided   noise level reduced  Taken 11/4/2023 1725  Sensory Stimulation Regulation: quiet environment promoted  Bowel Elimination Promotion: adequate fluid intake promoted  Medication Review/Management: medications reviewed  11/5/2023 0708 by Margaret Sanches RN  Flowsheets  Taken 11/5/2023 0708  Sleep/Rest Enhancement:   relaxation techniques promoted   natural light exposure provided   noise level reduced  Taken 11/4/2023 1725  Sensory Stimulation Regulation: quiet environment promoted  Bowel Elimination Promotion: adequate fluid intake promoted  Medication Review/Management: medications reviewed  Intervention: Develop Pain Management Plan  11/5/2023 0716 by Margaret Sanches RN  Flowsheets (Taken 11/4/2023 1725)  Pain Management Interventions: relaxation techniques promoted  11/5/2023 0708 by Margaret Sanches RN  Flowsheets (Taken 11/4/2023 1725)  Pain Management Interventions: relaxation techniques promoted  Intervention: Optimize Psychosocial Wellbeing  11/5/2023 0716 by Margaret Sanches RN  Flowsheets (Taken 11/4/2023 1725)  Supportive Measures: active listening utilized  Diversional Activities: television  11/5/2023 0708 by Margaret Sanches RN  Flowsheets (Taken 11/4/2023 1725)  Supportive Measures: active listening utilized  Diversional Activities: television     Problem: Fall Injury Risk  Goal: Absence of Fall and Fall-Related Injury  11/5/2023 0716 by Margaret Sanches RN  Outcome: Ongoing, Progressing  11/5/2023 0708 by Margaret Sanches RN  Outcome: Ongoing, Progressing  Intervention: Identify and Manage Contributors  11/5/2023 0716 by Margaret Sanches RN  Flowsheets (Taken 11/4/2023 1725)  Medication Review/Management: medications reviewed  11/5/2023 0708 by Margaret Sanches RN  Flowsheets (Taken 11/4/2023 1725)  Medication Review/Management: medications  reviewed  Intervention: Promote Injury-Free Environment  11/5/2023 0716 by Margaret Sanches RN  Flowsheets (Taken 11/5/2023 0708)  Safety Promotion/Fall Prevention:   clutter free environment maintained   assistive device/personal items within [facility]   fall prevention program maintained  11/5/2023 0708 by Margaret Sanches RN  Flowsheets (Taken 11/5/2023 0708)  Safety Promotion/Fall Prevention:   clutter free environment maintained   assistive device/personal items within [facility]   fall prevention program maintained     Problem: Breathing Pattern Ineffective  Goal: Effective Breathing Pattern  11/5/2023 0716 by Margaret Sanches RN  Outcome: Ongoing, Progressing  11/5/2023 0708 by Margaret Sanches RN  Outcome: Ongoing, Progressing  Intervention: Promote Improved Breathing Pattern  11/5/2023 0716 by Margaret Sanches RN  Flowsheets  Taken 11/4/2023 1832  Airway/Ventilation Management:   airway patency maintained   calming measures promoted  Taken 11/4/2023 1725  Supportive Measures: active listening utilized  Head of Bed (HOB) Positioning: HOB at 30-45 degrees  Breathing Techniques/Airway Clearance: deep/controlled cough encouraged  11/5/2023 0708 by Margaret Sanches RN  Flowsheets  Taken 11/4/2023 1832  Airway/Ventilation Management:   airway patency maintained   calming measures promoted  Taken 11/4/2023 1725  Supportive Measures: active listening utilized  Head of Bed (HOB) Positioning: HOB at 30-45 degrees  Breathing Techniques/Airway Clearance: deep/controlled cough encouraged     Problem: Adjustment to Illness (Sepsis/Septic Shock)  Goal: Optimal Coping  11/5/2023 0716 by Margaret Sanches RN  Outcome: Ongoing, Progressing  11/5/2023 0708 by Margaret Sanches RN  Outcome: Ongoing, Progressing  Intervention: Optimize Psychosocial Adjustment to Illness  11/5/2023 0716 by Margaret Sanches RN  Flowsheets (Taken 11/4/2023 1725)  Supportive Measures: active listening utilized  11/5/2023 0708 by Margaret Sanches  RN  Flowsheets (Taken 11/4/2023 1725)  Supportive Measures: active listening utilized     Problem: Bleeding (Sepsis/Septic Shock)  Goal: Absence of Bleeding  11/5/2023 0716 by Margaret Sanches RN  Outcome: Ongoing, Progressing  11/5/2023 0708 by Margaret Sanches RN  Outcome: Ongoing, Progressing     Problem: Glycemic Control Impaired (Sepsis/Septic Shock)  Goal: Blood Glucose Level Within Desired Range  11/5/2023 0716 by Margaret Sanches RN  Outcome: Ongoing, Progressing  11/5/2023 0708 by Margaret Sanches RN  Outcome: Ongoing, Progressing     Problem: Infection Progression (Sepsis/Septic Shock)  Goal: Absence of Infection Signs and Symptoms  11/5/2023 0716 by Margaret Sanches RN  Outcome: Ongoing, Progressing  11/5/2023 0708 by Margaret Sanches RN  Outcome: Ongoing, Progressing  Intervention: Initiate Sepsis Management  11/5/2023 0716 by Margaret Sanches RN  Flowsheets (Taken 11/4/2023 2000 by Damaris Desai RN)  Infection Prevention:   rest/sleep promoted   personal protective equipment utilized  Isolation Precautions: precautions maintained  11/5/2023 0708 by Margaret Sanches RN  Flowsheets (Taken 11/4/2023 2000 by Damaris Desai RN)  Infection Prevention:   rest/sleep promoted   personal protective equipment utilized  Isolation Precautions: precautions maintained  Intervention: Promote Recovery  11/5/2023 0716 by Margaret Sanches RN  Flowsheets (Taken 11/5/2023 0708)  Activity Management: (patient using rollator)   activity minimized   other (see comments)  Airway/Ventilation Support: comfort measures provided  Sleep/Rest Enhancement:   relaxation techniques promoted   natural light exposure provided   noise level reduced  11/5/2023 0708 by Margaret Sanches RN  Flowsheets (Taken 11/5/2023 0708)  Activity Management: (patient using rollator)   activity minimized   other (see comments)  Airway/Ventilation Support: comfort measures provided  Sleep/Rest Enhancement:   relaxation techniques promoted    natural light exposure provided   noise level reduced  Intervention: Promote Stabilization  11/5/2023 0716 by Margaret Sanches RN  Flowsheets (Taken 11/5/2023 0708)  Fluid/Electrolyte Management: intravenous fluids adjusted  11/5/2023 0708 by Margaret Sanches RN  Flowsheets (Taken 11/5/2023 0708)  Fluid/Electrolyte Management: intravenous fluids adjusted     Problem: Nutrition Impaired (Sepsis/Septic Shock)  Goal: Optimal Nutrition Intake  11/5/2023 0716 by Margaret Sanches RN  Outcome: Ongoing, Progressing  11/5/2023 0708 by Margaret Sanches RN  Outcome: Ongoing, Progressing   Goal Outcome Evaluation:  Plan of Care Reviewed With: patient        Progress: no change  Outcome Evaluation: Patient is having eposides of anxiety but all over her vs are ok and believe r/t to being sick.

## 2023-11-05 NOTE — PLAN OF CARE
Continue to monitor and assess pain.  Patient is resting in her bed and relaxing.   Problem: Adult Inpatient Plan of Care  Goal: Plan of Care Review  Outcome: Ongoing, Progressing  Flowsheets  Taken 11/5/2023 0708  Outcome Evaluation: Patient is having eposides of anxiety but all over her vs are ok and believe r/t to being sick.  Taken 11/4/2023 1725  Plan of Care Reviewed With: patient  Goal: Patient-Specific Goal (Individualized)  Outcome: Ongoing, Progressing  Goal: Absence of Hospital-Acquired Illness or Injury  Outcome: Ongoing, Progressing  Intervention: Identify and Manage Fall Risk  Flowsheets (Taken 11/5/2023 0708)  Safety Promotion/Fall Prevention:   clutter free environment maintained   assistive device/personal items within reach   fall prevention program maintained  Intervention: Prevent Skin Injury  Flowsheets (Taken 11/4/2023 1725)  Body Position: position changed independently  Skin Protection:   adhesive use limited   skin-to-device areas padded  Intervention: Prevent and Manage VTE (Venous Thromboembolism) Risk  Flowsheets  Taken 11/5/2023 0708  Activity Management: (patient using rollator)   activity minimized   other (see comments)  Taken 11/4/2023 1725  VTE Prevention/Management: (see MAR) other (see comments)  Intervention: Prevent Infection  Flowsheets (Taken 11/4/2023 2000 by Damaris Desai, RN)  Infection Prevention:   rest/sleep promoted   personal protective equipment utilized  Goal: Optimal Comfort and Wellbeing  Outcome: Ongoing, Progressing  Intervention: Monitor Pain and Promote Comfort  Flowsheets (Taken 11/4/2023 1725)  Pain Management Interventions: relaxation techniques promoted  Intervention: Provide Person-Centered Care  Flowsheets (Taken 11/4/2023 2000 by Damaris Desai, RN)  Trust Relationship/Rapport:   care explained   choices provided  Goal: Readiness for Transition of Care  Outcome: Ongoing, Progressing  Intervention: Mutually Develop Transition Plan  Flowsheets  Taken  11/4/2023 1741  Transportation Anticipated: family or friend will provide  Transportation Concerns: none  Patient/Family Anticipated Services at Transition: none  Patient/Family Anticipates Transition to:   other (see comments)   home  Taken 11/4/2023 1736  Equipment Currently Used at Home:   rollator   grab bar   cane, straight   bath bench   commode  Taken 11/4/2023 1725  Equipment Needed After Discharge: none  Anticipated Changes Related to Illness: none  Concerns to be Addressed: no discharge needs identified  Readmission Within the Last 30 Days: no previous admission in last 30 days  Current Outpatient/Agency/Support Group: other (see comments)     Problem: Pain Acute  Goal: Acceptable Pain Control and Functional Ability  Outcome: Ongoing, Progressing  Intervention: Prevent or Manage Pain  Flowsheets  Taken 11/5/2023 0708  Sleep/Rest Enhancement:   relaxation techniques promoted   natural light exposure provided   noise level reduced  Taken 11/4/2023 1725  Sensory Stimulation Regulation: quiet environment promoted  Bowel Elimination Promotion: adequate fluid intake promoted  Medication Review/Management: medications reviewed  Intervention: Develop Pain Management Plan  Flowsheets (Taken 11/4/2023 1725)  Pain Management Interventions: relaxation techniques promoted  Intervention: Optimize Psychosocial Wellbeing  Flowsheets (Taken 11/4/2023 1725)  Supportive Measures: active listening utilized  Diversional Activities: television     Problem: Fall Injury Risk  Goal: Absence of Fall and Fall-Related Injury  Outcome: Ongoing, Progressing  Intervention: Identify and Manage Contributors  Flowsheets (Taken 11/4/2023 1725)  Medication Review/Management: medications reviewed  Intervention: Promote Injury-Free Environment  Flowsheets (Taken 11/5/2023 0708)  Safety Promotion/Fall Prevention:   clutter free environment maintained   assistive device/personal items within reach   fall prevention program maintained      Problem: Breathing Pattern Ineffective  Goal: Effective Breathing Pattern  Outcome: Ongoing, Progressing  Intervention: Promote Improved Breathing Pattern  Flowsheets  Taken 11/4/2023 1832  Airway/Ventilation Management:   airway patency maintained   calming measures promoted  Taken 11/4/2023 1725  Supportive Measures: active listening utilized  Head of Bed (HOB) Positioning: HOB at 30-45 degrees  Breathing Techniques/Airway Clearance: deep/controlled cough encouraged     Problem: Adjustment to Illness (Sepsis/Septic Shock)  Goal: Optimal Coping  Outcome: Ongoing, Progressing  Intervention: Optimize Psychosocial Adjustment to Illness  Flowsheets (Taken 11/4/2023 1725)  Supportive Measures: active listening utilized     Problem: Bleeding (Sepsis/Septic Shock)  Goal: Absence of Bleeding  Outcome: Ongoing, Progressing     Problem: Glycemic Control Impaired (Sepsis/Septic Shock)  Goal: Blood Glucose Level Within Desired Range  Outcome: Ongoing, Progressing     Problem: Infection Progression (Sepsis/Septic Shock)  Goal: Absence of Infection Signs and Symptoms  Outcome: Ongoing, Progressing  Intervention: Initiate Sepsis Management  Flowsheets (Taken 11/4/2023 2000 by Damaris Desai RN)  Infection Prevention:   rest/sleep promoted   personal protective equipment utilized  Isolation Precautions: precautions maintained  Intervention: Promote Recovery  Flowsheets (Taken 11/5/2023 0708)  Activity Management: (patient using rollator)   activity minimized   other (see comments)  Airway/Ventilation Support: comfort measures provided  Sleep/Rest Enhancement:   relaxation techniques promoted   natural light exposure provided   noise level reduced  Intervention: Promote Stabilization  Flowsheets (Taken 11/5/2023 0708)  Fluid/Electrolyte Management: intravenous fluids adjusted     Problem: Nutrition Impaired (Sepsis/Septic Shock)  Goal: Optimal Nutrition Intake  Outcome: Ongoing, Progressing   Goal Outcome Evaluation:  Plan of  Care Reviewed With: patient        Progress: no change  Outcome Evaluation: Patient is having eposides of anxiety but all over her vs are ok and believe r/t to being sick.

## 2023-11-05 NOTE — PLAN OF CARE
Goal Outcome Evaluation:               Patient had increased shortness of breath and N/V at beginning of shift. Patient on 2L NC. Patient reports improvement in SOB this morning.

## 2023-11-05 NOTE — PROGRESS NOTES
"DAILY PROGRESS NOTE  [facility]    Patient Identification:  Name: Tracy Jane  Age: 88 y.o.  Sex: female  :  1934  MRN: [id number]         Primary Care Physician: Kacie Liriano APRN    Subjective:  Interval History: She is very weak and coughing.  She is short of air but off oxygen now.    Objective:    Scheduled Meds:ascorbic acid, 500 mg, Oral, Daily  dexAMETHasone, 6 mg, Oral, Daily   Or  dexAMETHasone, 6 mg, Intravenous, Daily  remdesivir, 100 mg, Intravenous, Q24H  senna-docusate sodium, 2 tablet, Oral, BID  zinc sulfate, 220 mg, Oral, Daily      Continuous Infusions:Pharmacy Consult - Remdesivir,         Vital signs in last 24 hours:  Temp:  [96.8 °F (36 °C)-98.4 °F (36.9 °C)] 97.4 °F (36.3 °C)  Heart Rate:  [] 73  Resp:  [16-27] 16  BP: (115-169)/(58-80) 169/62    Intake/Output:  No intake or output data in the 24 hours ending 23 1041    Exam:  /62 (BP Location: Left arm, Patient Position: Lying)   Pulse 73   Temp 97.4 °F (36.3 °C) (Oral)   Resp 16   Ht 152.4 cm (60\")   Wt 55.8 kg (123 lb)   SpO2 95%   BMI 24.02 kg/m²     General Appearance:    Alert, cooperative, no distress   Head:    Normocephalic, without obvious abnormality, atraumatic   Eyes:       Throat:   Lips, tongue, gums normal   Neck:   Supple, symmetrical, trachea midline, no JVD   Lungs:     Clear to auscultation bilaterally, respirations unlabored   Chest Wall:    No tenderness or deformity    Heart:    Regular rate and rhythm, S1 and S2 normal, no murmur,no  rub or gallop   Abdomen:     Soft, nontender, bowel sounds active, no masses, no organomegaly    Extremities:   Extremities normal, atraumatic, no cyanosis or edema   Pulses:      Skin:   Skin is warm and dry,  no rashes or palpable lesions   Neurologic:   no focal deficits noted      Lab Results (last 72 hours)       Procedure Component Value Units Date/Time    Blood Culture - Blood, Arm, Left [[id number]]  (Normal) Collected: 23 " 1027    Specimen: Blood from Arm, Left Updated: 11/05/23 0945     Blood Culture No growth at 24 hours    Blood Culture - Blood, Arm, Left [[id number]]  (Normal) Collected: 11/04/23 1010    Specimen: Blood from Arm, Left Updated: 11/05/23 0915     Blood Culture No growth at 24 hours    Narrative:      Less than seven (7) mL's of blood was collected.  Insufficient quantity may yield false negative results.    CBC Auto Differential [[id number]]  (Abnormal) Collected: 11/04/23 2226    Specimen: Blood Updated: 11/05/23 0058     WBC 7.30 10*3/mm3      RBC 3.56 10*6/mm3      Hemoglobin 11.2 g/dL      Hematocrit 35.4 %      MCV 99.5 fL      MCH 31.3 pg      MCHC 31.5 g/dL      RDW 18.0 %      RDW-SD 61.7 fl      MPV 8.1 fL      Platelets 188 10*3/mm3     Narrative:      The previously reported component NRBC is no longer being reported. Previous result was 0.1 /100 WBC (Reference Range: 0.0-0.2 /100 WBC) on 11/4/2023 at 2335 EDT.    Scan Slide [[id number]] Collected: 11/04/23 2226    Specimen: Blood Updated: 11/05/23 0058     Scan Slide --     Comment: See Manual Differential Results       Manual Differential [[id number]]  (Abnormal) Collected: 11/04/23 2226    Specimen: Blood Updated: 11/05/23 0058     Neutrophil % 75.0 %      Lymphocyte % 10.0 %      Monocyte % 2.0 %      Basophil % 1.0 %      Bands %  12.0 %      Neutrophils Absolute 6.35 10*3/mm3      Lymphocytes Absolute 0.73 10*3/mm3      Monocytes Absolute 0.15 10*3/mm3      Basophils Absolute 0.07 10*3/mm3      Anisocytosis Slight/1+     WBC Morphology Normal     Platelet Morphology Normal    Comprehensive Metabolic Panel [[id number]]  (Abnormal) Collected: 11/04/23 2226    Specimen: Blood Updated: 11/04/23 2351     Glucose 213 mg/dL      BUN 17 mg/dL      Creatinine 0.64 mg/dL      Sodium 138 mmol/L      Potassium 4.6 mmol/L      Chloride 101 mmol/L      CO2 24.0 mmol/L      Calcium 9.4 mg/dL      Total Protein 6.8 g/dL      Albumin 3.4 g/dL      ALT (SGPT) 40 U/L       "AST (SGOT) 40 U/L      Alkaline Phosphatase 115 U/L      Total Bilirubin 0.7 mg/dL      Globulin 3.4 gm/dL      A/G Ratio 1.0 g/dL      BUN/Creatinine Ratio 26.6     Anion Gap 13.0 mmol/L      eGFR 85.1 mL/min/1.73     Narrative:      GFR Normal >60  Chronic Kidney Disease <60  Kidney Failure <15    The GFR formula is only valid for adults with stable renal function between ages 18 and 70.    Magnesium [[id number]]  (Normal) Collected: 11/04/23 1301    Specimen: Blood from Arm, Right Updated: 11/04/23 1726     Magnesium 1.6 mg/dL     Phosphorus [[id number]]  (Normal) Collected: 11/04/23 1301    Specimen: Blood from Arm, Right Updated: 11/04/23 1726     Phosphorus 2.9 mg/dL     Procalcitonin [[id number]]  (Normal) Collected: 11/04/23 1301    Specimen: Blood from Arm, Right Updated: 11/04/23 1509     Procalcitonin 0.09 ng/mL     Narrative:      As a Marker for Sepsis (Non-Neonates):    1. <0.5 ng/mL represents a low risk of severe sepsis and/or septic shock.  2. >2 ng/mL represents a high risk of severe sepsis and/or septic shock.    As a Marker for Lower Respiratory Tract Infections that require antibiotic therapy:    PCT on Admission    Antibiotic Therapy       6-12 Hrs later    >0.5                Strongly Recommended  >0.25 - <0.5        Recommended   0.1 - 0.25          Discouraged              Remeasure/reassess PCT  <0.1                Strongly Discouraged     Remeasure/reassess PCT    As 28 day mortality risk marker: \"Change in Procalcitonin Result\" (>80% or <=80%) if Day 0 (or Day 1) and Day 4 values are available. Refer to http://www.[email or web address]-pct-calculator.com    Change in PCT <=80%  A decrease of PCT levels below or equal to 80% defines a positive change in PCT test result representing a higher risk for 28-day all-cause mortality of patients diagnosed with severe sepsis for septic shock.    Change in PCT >80%  A decrease of PCT levels of more than 80% defines a negative change in PCT result representing a lower " risk for 28-day all-cause mortality of patients diagnosed with severe sepsis or septic shock.       Ferritin [[id number]]  (Abnormal) Collected: 11/04/23 1301    Specimen: Blood from Arm, Right Updated: 11/04/23 1508     Ferritin 290.40 ng/mL     Narrative:      Results may be falsely decreased if patient taking Biotin.      C-reactive Protein [[id number]]  (Abnormal) Collected: 11/04/23 1301    Specimen: Blood from Arm, Right Updated: 11/04/23 1502     C-Reactive Protein 5.72 mg/dL     Comprehensive Metabolic Panel [[id number]]  (Abnormal) Collected: 11/04/23 1301    Specimen: Blood from Arm, Right Updated: 11/04/23 1333     Glucose 119 mg/dL      BUN 16 mg/dL      Creatinine 0.77 mg/dL      Sodium 138 mmol/L      Potassium 4.9 mmol/L      Chloride 102 mmol/L      CO2 28.0 mmol/L      Calcium 9.5 mg/dL      Total Protein 6.6 g/dL      Albumin 3.5 g/dL      ALT (SGPT) 41 U/L      AST (SGOT) 42 U/L      Alkaline Phosphatase 106 U/L      Total Bilirubin 0.6 mg/dL      Globulin 3.1 gm/dL      A/G Ratio 1.1 g/dL      BUN/Creatinine Ratio 20.8     Anion Gap 8.0 mmol/L      eGFR 74.3 mL/min/1.73     Narrative:      GFR Normal >60  Chronic Kidney Disease <60  Kidney Failure <15    The GFR formula is only valid for adults with stable renal function between ages 18 and 70.    Single High Sensitivity Troponin T [[id number]]  (Abnormal) Collected: 11/04/23 1301    Specimen: Blood from Arm, Right Updated: 11/04/23 1333     HS Troponin T 11 ng/L     Narrative:      High Sensitive Troponin T Reference Range:  <10.0 ng/L- Negative Female for AMI  <15.0 ng/L- Negative Male for AMI  >=10 - Abnormal Female indicating possible myocardial injury.  >=15 - Abnormal Male indicating possible myocardial injury.   Clinicians would have to utilize clinical acumen, EKG, Troponin, and serial changes to determine if it is an Acute Myocardial Infarction or myocardial injury due to an underlying chronic condition.         [place] Draw [[id number]]  Collected: 11/04/23 1027    Specimen: Blood Updated: 11/04/23 1130    Narrative:      The following orders were created for panel order [place] Draw.  Procedure                               Abnormality         Status                     ---------                               -----------         ------                     Green Top (Gel)[[id number]]                                  Final result               Lavender Top[[id number]]                                     Final result               Gold Top - SST[[id number]]                                   Final result               Light Blue Top[[id number]]                                   Final result                 Please view results for these tests on the individual orders.    Green Top (Gel) [[id number]] Collected: 11/04/23 1027    Specimen: Blood Updated: 11/04/23 1130     Extra Tube Hold for add-ons.     Comment: Auto resulted.       Lavender Top [[id number]] Collected: 11/04/23 1027    Specimen: Blood Updated: 11/04/23 1130     Extra Tube hold for add-on     Comment: Auto resulted       Gold Top - SST [[id number]] Collected: 11/04/23 1027    Specimen: Blood Updated: 11/04/23 1130     Extra Tube Hold for add-ons.     Comment: Auto resulted.       Light Blue Top [[id number]] Collected: 11/04/23 1027    Specimen: Blood Updated: 11/04/23 1130     Extra Tube Hold for add-ons.     Comment: Auto resulted       BNP [[id number]]  (Abnormal) Collected: 11/04/23 1027    Specimen: Blood Updated: 11/04/23 1115     proBNP 4,544.0 pg/mL     Narrative:      This assay is used as an aid in the diagnosis of individuals suspected of having heart failure. It can be used as an aid in the diagnosis of acute decompensated heart failure (ADHF) in patients presenting with signs and symptoms of ADHF to the emergency department (ED). In addition, NT-proBNP of <300 pg/mL indicates ADHF is not likely.    Age Range Result Interpretation  NT-proBNP Concentration (pg/mL:      <50              Positive            >450                   Gray                 300-450                    Negative             <300    50-75           Positive            >900                  Gray                300-900                  Negative            <300      >75             Positive            >1800                  Gray                300-1800                  Negative            <300    COVID PRE-OP / PRE-PROCEDURE SCREENING ORDER (NO ISOLATION) - Swab, Nasopharynx [[id number]]  (Abnormal) Collected: 11/04/23 1026    Specimen: Swab from Nasopharynx Updated: 11/04/23 1106    Narrative:      The following orders were created for panel order COVID PRE-OP / PRE-PROCEDURE SCREENING ORDER (NO ISOLATION) - Swab, Nasopharynx.  Procedure                               Abnormality         Status                     ---------                               -----------         ------                     COVID-19,CEPHEID/COMFORT,CO...[[id number]]  Abnormal            Final result                 Please view results for these tests on the individual orders.    COVID-19,CEPHEID/COMFORT,COR/DEL/PAD/ELZBIETA/LAG/MAD IN-HOUSE(OR EMERGENT/ADD-ON),NP SWAB IN TRANSPORT MEDIA 3-4 HR TAT, RT-PCR - Swab, Nasopharynx [[id number]]  (Abnormal) Collected: 11/04/23 1026    Specimen: Swab from Nasopharynx Updated: 11/04/23 1106     COVID19 Detected    Narrative:      Fact sheet for providers: https://www.fda.gov/media/026875/download     Fact sheet for patients: https://www.fda.gov/media/187427/download  Fact sheet for providers: https://www.fda.gov/media/133794/download    Fact sheet for patients: https://www.fda.gov/media/318573/download    Test performed by PCR.    Protime-INR [[id number]]  (Normal) Collected: 11/04/23 1027    Specimen: Blood Updated: 11/04/23 1051     Protime 24.8 Seconds      INR 2.43    aPTT [[id number]]  (Abnormal) Collected: 11/04/23 1027    Specimen: Blood Updated: 11/04/23 1051     PTT 43.6 seconds     CBC & Differential [[id number]]   (Abnormal) Collected: 11/04/23 1027    Specimen: Blood Updated: 11/04/23 1041    Narrative:      The following orders were created for panel order CBC & Differential.  Procedure                               Abnormality         Status                     ---------                               -----------         ------                     CBC Auto Differential[[id number]]        Abnormal            Final result                 Please view results for these tests on the individual orders.    CBC Auto Differential [[id number]]  (Abnormal) Collected: 11/04/23 1027    Specimen: Blood Updated: 11/04/23 1041     WBC 5.50 10*3/mm3      RBC 3.19 10*6/mm3      Hemoglobin 10.1 g/dL      Hematocrit 30.6 %      MCV 96.2 fL      MCH 31.8 pg      MCHC 33.1 g/dL      RDW 17.3 %      RDW-SD 61.7 fl      MPV 7.7 fL      Platelets 165 10*3/mm3      Neutrophil % 70.9 %      Lymphocyte % 17.7 %      Monocyte % 9.5 %      Eosinophil % 0.5 %      Basophil % 1.4 %      Neutrophils, Absolute 3.90 10*3/mm3      Lymphocytes, Absolute 1.00 10*3/mm3      Monocytes, Absolute 0.50 10*3/mm3      Eosinophils, Absolute 0.00 10*3/mm3      Basophils, Absolute 0.10 10*3/mm3      nRBC 0.1 /100 WBC     POC Lactate [[id number]]  (Normal) Collected: 11/04/23 1032    Specimen: Blood Updated: 11/04/23 1034     Lactate 0.6 mmol/L      Comment: Serial Number: [id number]:  [id number]             Data Review:  Results from last 7 days   Lab Units 11/04/23 2226 11/04/23  1301   SODIUM mmol/L 138 138   POTASSIUM mmol/L 4.6 4.9   CHLORIDE mmol/L 101 102   CO2 mmol/L 24.0 28.0   BUN mg/dL 17 16   CREATININE mg/dL 0.64 0.77   GLUCOSE mg/dL 213* 119*   CALCIUM mg/dL 9.4 9.5     Results from last 7 days   Lab Units 11/04/23 2226 11/04/23  1027   WBC 10*3/mm3 7.30 5.50   HEMOGLOBIN g/dL 11.2* 10.1*   HEMATOCRIT % 35.4 30.6*   PLATELETS 10*3/mm3 188 165             Lab Results   Lab Value Date/Time    TROPONINT 11 (H) 11/04/2023 1301    TROPONINT <0.010 12/21/2021  "1606    TROPONINT <0.010 08/10/2020 1522         Results from last 7 days   Lab Units 11/04/23  2226 11/04/23  1301   ALK PHOS U/L 115 106   BILIRUBIN mg/dL 0.7 0.6   ALT (SGPT) U/L 40* 41*   AST (SGOT) U/L 40* 42*             No results found for: \"POCGLU\"  Results from last 7 days   Lab Units 11/04/23  1027 10/31/23  0000   INR  2.43 2.30         Assessment:  Active Hospital Problems    Diagnosis  POA    **COVID [U07.1]  Yes    Essential hypertension [I10]  Yes    Presence of cardiac pacemaker [Z95.0]  Yes    Acquired hypothyroidism [E03.9]  Yes    Paroxysmal atrial fibrillation [I48.0]  Yes    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       Plan:  Continue with remdesivir and Decadron.  Follow-up labs.  Await cardiology consult.    Shin Lopez MD  11/5/2023  10:41 EST     "

## 2023-11-06 ENCOUNTER — APPOINTMENT (OUTPATIENT)
Dept: GENERAL RADIOLOGY | Facility: HOSPITAL | Age: 88
DRG: 177 | End: 2023-11-06
Payer: MEDICARE

## 2023-11-06 ENCOUNTER — READMISSION MANAGEMENT (OUTPATIENT)
Dept: CALL CENTER | Facility: HOSPITAL | Age: 88
End: 2023-11-06
Payer: MEDICARE

## 2023-11-06 VITALS
OXYGEN SATURATION: 95 % | WEIGHT: 123 LBS | DIASTOLIC BLOOD PRESSURE: 64 MMHG | HEIGHT: 60 IN | SYSTOLIC BLOOD PRESSURE: 131 MMHG | BODY MASS INDEX: 24.15 KG/M2 | RESPIRATION RATE: 17 BRPM | HEART RATE: 96 BPM | TEMPERATURE: 98.7 F

## 2023-11-06 LAB
ALBUMIN SERPL-MCNC: 3.2 G/DL (ref 3.5–5.2)
ALBUMIN/GLOB SERPL: 1.1 G/DL
ALP SERPL-CCNC: 103 U/L (ref 39–117)
ALT SERPL W P-5'-P-CCNC: 41 U/L (ref 1–33)
ANION GAP SERPL CALCULATED.3IONS-SCNC: 11 MMOL/L (ref 5–15)
AST SERPL-CCNC: 41 U/L (ref 1–32)
BASOPHILS # BLD AUTO: 0 10*3/MM3 (ref 0–0.2)
BASOPHILS NFR BLD AUTO: 0.3 % (ref 0–1.5)
BILIRUB SERPL-MCNC: 0.5 MG/DL (ref 0–1.2)
BUN SERPL-MCNC: 28 MG/DL (ref 8–23)
BUN/CREAT SERPL: 36.8 (ref 7–25)
CALCIUM SPEC-SCNC: 9.3 MG/DL (ref 8.6–10.5)
CHLORIDE SERPL-SCNC: 102 MMOL/L (ref 98–107)
CO2 SERPL-SCNC: 25 MMOL/L (ref 22–29)
CREAT SERPL-MCNC: 0.76 MG/DL (ref 0.57–1)
DEPRECATED RDW RBC AUTO: 61.3 FL (ref 37–54)
EGFRCR SERPLBLD CKD-EPI 2021: 75.5 ML/MIN/1.73
EOSINOPHIL # BLD AUTO: 0 10*3/MM3 (ref 0–0.4)
EOSINOPHIL NFR BLD AUTO: 0 % (ref 0.3–6.2)
ERYTHROCYTE [DISTWIDTH] IN BLOOD BY AUTOMATED COUNT: 17.6 % (ref 12.3–15.4)
GLOBULIN UR ELPH-MCNC: 2.9 GM/DL
GLUCOSE SERPL-MCNC: 129 MG/DL (ref 65–99)
HCT VFR BLD AUTO: 30.8 % (ref 34–46.6)
HGB BLD-MCNC: 10.3 G/DL (ref 12–15.9)
INR PPP: 2.23 (ref 2–3)
LYMPHOCYTES # BLD AUTO: 0.7 10*3/MM3 (ref 0.7–3.1)
LYMPHOCYTES NFR BLD AUTO: 7.4 % (ref 19.6–45.3)
MCH RBC QN AUTO: 32.1 PG (ref 26.6–33)
MCHC RBC AUTO-ENTMCNC: 33.3 G/DL (ref 31.5–35.7)
MCV RBC AUTO: 96.5 FL (ref 79–97)
MONOCYTES # BLD AUTO: 0.5 10*3/MM3 (ref 0.1–0.9)
MONOCYTES NFR BLD AUTO: 5 % (ref 5–12)
NEUTROPHILS NFR BLD AUTO: 8.1 10*3/MM3 (ref 1.7–7)
NEUTROPHILS NFR BLD AUTO: 87.3 % (ref 42.7–76)
NRBC BLD AUTO-RTO: 0 /100 WBC (ref 0–0.2)
NT-PROBNP SERPL-MCNC: ABNORMAL PG/ML (ref 0–1800)
PLATELET # BLD AUTO: 194 10*3/MM3 (ref 140–450)
PMV BLD AUTO: 8.1 FL (ref 6–12)
POTASSIUM SERPL-SCNC: 4.5 MMOL/L (ref 3.5–5.2)
PROCALCITONIN SERPL-MCNC: 0.3 NG/ML (ref 0–0.25)
PROT SERPL-MCNC: 6.1 G/DL (ref 6–8.5)
PROTHROMBIN TIME: 22.9 SECONDS (ref 19.4–28.5)
QT INTERVAL: 401 MS
QTC INTERVAL: 524 MS
RBC # BLD AUTO: 3.2 10*6/MM3 (ref 3.77–5.28)
SODIUM SERPL-SCNC: 138 MMOL/L (ref 136–145)
WBC NRBC COR # BLD: 9.3 10*3/MM3 (ref 3.4–10.8)

## 2023-11-06 PROCEDURE — 36415 COLL VENOUS BLD VENIPUNCTURE: CPT | Performed by: NURSE PRACTITIONER

## 2023-11-06 PROCEDURE — 80053 COMPREHEN METABOLIC PANEL: CPT | Performed by: NURSE PRACTITIONER

## 2023-11-06 PROCEDURE — 85025 COMPLETE CBC W/AUTO DIFF WBC: CPT | Performed by: NURSE PRACTITIONER

## 2023-11-06 PROCEDURE — 85610 PROTHROMBIN TIME: CPT | Performed by: HOSPITALIST

## 2023-11-06 PROCEDURE — 84145 PROCALCITONIN (PCT): CPT | Performed by: INTERNAL MEDICINE

## 2023-11-06 PROCEDURE — 83880 ASSAY OF NATRIURETIC PEPTIDE: CPT | Performed by: INTERNAL MEDICINE

## 2023-11-06 PROCEDURE — 93005 ELECTROCARDIOGRAM TRACING: CPT | Performed by: HOSPITALIST

## 2023-11-06 PROCEDURE — 93010 ELECTROCARDIOGRAM REPORT: CPT | Performed by: INTERNAL MEDICINE

## 2023-11-06 PROCEDURE — 71045 X-RAY EXAM CHEST 1 VIEW: CPT

## 2023-11-06 RX ORDER — FUROSEMIDE 10 MG/ML
40 INJECTION INTRAMUSCULAR; INTRAVENOUS ONCE
Status: DISCONTINUED | OUTPATIENT
Start: 2023-11-06 | End: 2023-11-06 | Stop reason: HOSPADM

## 2023-11-06 RX ADMIN — FUROSEMIDE 40 MG: 40 TABLET ORAL at 08:16

## 2023-11-06 RX ADMIN — LEVOTHYROXINE SODIUM 75 MCG: 0.07 TABLET ORAL at 05:13

## 2023-11-06 RX ADMIN — ASPIRIN 81 MG: 81 TABLET, COATED ORAL at 08:16

## 2023-11-06 RX ADMIN — OXYCODONE HYDROCHLORIDE AND ACETAMINOPHEN 500 MG: 500 TABLET ORAL at 08:16

## 2023-11-06 RX ADMIN — SERTRALINE 150 MG: 100 TABLET, FILM COATED ORAL at 08:16

## 2023-11-06 RX ADMIN — Medication 220 MG: at 08:16

## 2023-11-06 RX ADMIN — DILTIAZEM HYDROCHLORIDE 240 MG: 240 CAPSULE, COATED, EXTENDED RELEASE ORAL at 08:15

## 2023-11-06 RX ADMIN — SOTALOL HYDROCHLORIDE 120 MG: 80 TABLET ORAL at 08:15

## 2023-11-06 RX ADMIN — DEXAMETHASONE 6 MG: 6 TABLET ORAL at 08:16

## 2023-11-06 RX ADMIN — POTASSIUM CHLORIDE 20 MEQ: 1500 TABLET, EXTENDED RELEASE ORAL at 08:15

## 2023-11-06 RX ADMIN — FAMOTIDINE 40 MG: 20 TABLET, FILM COATED ORAL at 08:16

## 2023-11-06 NOTE — DISCHARGE SUMMARY
Discharge Note   Tracy Jane 1934 [id number]     Date of Admission:2023     Date of Discharge: 23     Admission Diagnosis: Acute hypoxemic respiratory failure [J96.01]  COVID [U07.1]     Discharge Diagnosis:     COVID    Chronic coronary artery disease    Mixed hyperlipidemia    Essential hypertension    Acquired hypothyroidism    Paroxysmal atrial fibrillation    Presence of aortocoronary bypass graft    Presence of cardiac pacemaker    Valvular heart disease       Consults: cardiology    Hospital Course:     Tracy Jane is a 88 y.o. female who presented to [facility] on 2023 complaining of cough that started last night and significant shortness of breath that started this morning.  Patient took two COVID tests at home and they were both positive for COVID-19.  She and her daughter decided to come to the hospital for evaluation and treatment, as patient had a sister who  of COVID-19.  In the ER she was found to desat into the 80s on room air with exertion.  She is not on supplemental oxygen at home.    History includes A-fib, CAD, aortic stenosis, bioprosthetic aortic valve, hypertension, hyperlipidemia, hypothyroidism. She has an appointment Monday with Dr. Crabtree to review the results of her recent echo and discuss possible Watchman procedure.    Pt was treated with remdesivir, steroids, and diuretics.  On date of d/c, pt is on RA and has O2 sat of 95%.  CXR shows improvement.  She is stable to discharge at this time.  She can f/u with her PCP within the next week and can keep her appt with Dr. Presley.  Daughter and pt in agreement with this plan.    Vitals:    23 1215   BP: 131/64   Pulse: 96   Resp: 17   Temp: 98.7 °F (37.1 °C)   SpO2: 95%        Physical Exam     GEN:  Pleasant elderly woman.  Appears appropriate for stated age.  WD/WN/WH.  Sitting up in bed on RA.  NAD.  NEURO:  Brainstem reflexes intact.  No obvious focal deficit.  Moves all 4  ext.  HEENT:  N/AT.  PERRL.  MMM.  Oropharynx non-erythematous.  No drainage from the eyes/ears/nose.  No conjunctival petechiae.  No oral thrush.  Auditory and visual acuity grossly wnl.  Good dentition.  Voice normal.  NECK:  Supple, NT, trachea midline.  No meningismus.  No ROM limitation.     CHEST/LUNGS:  Breath sounds are clear and equal bilaterally.  No w/r/r.  Chest excursion equal bilaterally.    CARDIOVASCULAR:  RRR w/ holosystolic murmur noted.  GI:  Abdomen soft, NT, ND, +BS.   :  Deferred.  EXTREMITIES:  No deformity or amputation.  No cyanosis, edema, or asymmetry.  Pulses 2+ and equal in BLE's.    SKIN:  Warm, dry, and pink.  No rash, breakdown, or track marks noted.  LYMPHATICS/HEME:  No overt LAD or abnormal bruising.  No lymphedema.  MSK:  Normal ROM.  No joint abnormalities noted.  Strength is 5/5 and equal in BUE and BLE's.  PSYCH:  Pleasant.  A&Ox 3.  Normal mood and affect.  Responds appropriately to commands and appears to comprehend instructions.              Disposition: Home (assisted living facility) with assistance of her daughter.     Discharged Condition: good    Activity: activity as tolerated    Diet:  Diet Order   Procedures    Diet: Regular/House Diet; Texture: Regular Texture (IDDSI 7); Fluid Consistency: Thin (IDDSI 0)        Labs:    Results from last 7 days   Lab Units 11/06/23  0033 11/04/23 2226 11/04/23  1027   WBC 10*3/mm3 9.30 7.30 5.50   HEMOGLOBIN g/dL 10.3* 11.2* 10.1*   HEMATOCRIT % 30.8* 35.4 30.6*   PLATELETS 10*3/mm3 194 188 165       Results from last 7 days   Lab Units 11/06/23  0033 11/04/23 2226 11/04/23  1301   SODIUM mmol/L 138 138 138   POTASSIUM mmol/L 4.5 4.6 4.9   CHLORIDE mmol/L 102 101 102   CO2 mmol/L 25.0 24.0 28.0   BUN mg/dL 28* 17 16   CREATININE mg/dL 0.76 0.64 0.77                  Discharge Medications        Continue These Medications        Instructions Start Date   acetaminophen 650 MG 8 hr tablet  Commonly known as: TYLENOL   650 mg, Oral,  Every 8 Hours PRN      aspirin 81 MG EC tablet   81 mg, Oral, Daily      cholecalciferol 25 MCG (1000 UT) tablet  Commonly known as: VITAMIN D3   1,000 Units, Oral, Daily      dilTIAZem  MG 24 hr capsule  Commonly known as: CARDIZEM CD   TAKE ONE CAPSULE BY MOUTH DAILY      doxazosin 2 MG tablet  Commonly known as: CARDURA   TAKE ONE TABLET BY MOUTH DAILY      famotidine 40 MG tablet  Commonly known as: PEPCID   40 mg, Oral, Daily      ferrous sulfate 324 (65 Fe) MG tablet delayed-release EC tablet   324 mg, Oral, Daily With Breakfast      fish oil 1000 MG capsule capsule   Oral, 2 Times Daily With Meals      furosemide 40 MG tablet  Commonly known as: LASIX   TAKE ONE TABLET BY MOUTH DAILY      Glucosamine Chondroitin Joint tablet   1 tablet, Oral, Daily      isosorbide mononitrate 30 MG 24 hr tablet  Commonly known as: IMDUR   TAKE ONE TABLET BY MOUTH TWICE A DAY      levothyroxine 75 MCG tablet  Commonly known as: SYNTHROID, LEVOTHROID   75 mcg, Oral, Daily      melatonin 5 MG tablet tablet   5 mg, Oral, Nightly      methotrexate 2.5 MG tablet   2.5 mg, Oral, Weekly, On Sunday      multivitamin tablet  Generic drug: multivitamin   1 tablet, Oral, Daily      nitroglycerin 0.4 MG SL tablet  Commonly known as: NITROSTAT   0.4 mg, Sublingual, Every 5 Minutes PRN      potassium chloride ER 20 MEQ tablet controlled-release ER tablet  Commonly known as: K-TAB   TAKE ONE TABLET BY MOUTH DAILY      rosuvastatin 20 MG tablet  Commonly known as: CRESTOR   TAKE ONE TABLET BY MOUTH DAILY      sertraline 100 MG tablet  Commonly known as: ZOLOFT   Take 1.5 tablets by mouth Daily.      sotalol 120 MG tablet  Commonly known as: BETAPACE   TAKE 1 TABLET BY MOUTH TWICE A DAY      warfarin 5 MG tablet  Commonly known as: COUMADIN   5 mg, Oral, On Monday, Tuesday, Thursday, Friday, Saturday      warfarin 2.5 MG tablet  Commonly known as: COUMADIN   2.5 mg, Oral, On Wednesday and Sunday                Spent at least 37 minutes  in the management of patient's care including but not limited to physical exam, review of vital signs, labs, cultures and imaging studies, discussing the hospital stay along with plan of care at home, preparation and coordinating of discharge, arranging follow up care and referrals as indicated. Plan also discussed with RN.    Bandar Whatley,   Critical Care  11/06/23   12:17 EST

## 2023-11-06 NOTE — CONSULTS
[facility] CARDIOLOGY CONSULT  [facility]        Subjective:     Encounter Date:11/04/2023      Patient ID: Tracy Jane is a 88 y.o. female.    Chief Complaint: cough, SOA      HPI:  Tracy Jane is a 88 y.o. female known to Dr. Henley in [facility] with a past cardiac history of valvular heart disease status post tissue AVR in 2014, CAD status post CABG and PCI, sick sinus syndrome status post [employer]aya permanent pacemaker in 2016, and paroxysmal A-fib (on sotalol for suppression and Coumadin for anticoagulation).  Patient has had recent issues with epistaxis and subdural hematoma status post fall in the summer 2023.  She is in progress with watchman evaluation.  RACHEL 10/2023 showed an EF of 56 to 60% with moderate to severe aortic stenosis, moderate MR, mild AI, and demonstrated elevated bioprosthetic AV gradients.  She had an appointment today to follow-up on this.  Last cath in 2016 showed no vessels amenable to PCI.  PMH includes HTN, HLD, CKD, and PVD.  Patient recently tested positive for COVID and presented to the hospital with complaints of cough and shortness of breath.  She was found with hypoxia.  Patient was also noted with an elevated BNP and cardiology was consulted for further evaluation of possible congestive heart failure as well as continued discussion of watchman implant/RACHEL results.    Patient is currently on room air and reports significant improvement in her breathing and her cough.  She denies any PND orthopnea and is able to lie supine.  She further denies any unusual weight gain or lower extremity edema.  She denies chest pain or dizziness.  She reports that at home she is typically able to perform ADLs with frequent rest breaks.  She reports that she can tell when she is in atrial fibrillation due to palpitations and states that she believes that she has been in this more frequently recently.      Past Medical History:   Diagnosis Date     Anxiety     Asthma     Atrial fibrillation     CAD (coronary artery disease)     Carotid artery disease     GERD (gastroesophageal reflux disease)     Gout     Hyperlipidemia     Hypertension     Hyperthyroidism     Hypothyroidism          Past Surgical History:   Procedure Laterality Date    BASAL CELL CARCINOMA EXCISION      spine, nose and arm, leg     BREAST BIOPSY      CARDIAC CATHETERIZATION      CAROTID STENT      CATARACT EXTRACTION      CHOLECYSTECTOMY      CORONARY ARTERY BYPASS GRAFT      CORONARY STENT PLACEMENT      ENDOSCOPY N/A 2023    Procedure: ESOPHAGOGASTRODUODENOSCOPY with antrum body biopsies;  Surgeon: REGGIE Ace MD;  Location: [facility] ENDOSCOPY;  Service: Gastroenterology;  Laterality: N/A;  hiatal hernia    FINGER SURGERY      KNEE SURGERY      PACEMAKER IMPLANTATION      SHOULDER SURGERY      RACHEL Bilateral 2023         Social History     Socioeconomic History    Marital status:     Number of children: 4    Years of education: GED   Tobacco Use    Smoking status: Former     Packs/day: 1.00     Years: 4.00     Additional pack years: 0.00     Total pack years: 4.00     Types: Cigarettes     Quit date:      Years since quittin.8    Smokeless tobacco: Never   Vaping Use    Vaping Use: Never used   Substance and Sexual Activity    Alcohol use: No    Drug use: No    Sexual activity: Defer       Family History   Problem Relation Age of Onset    Hypertension Mother     Heart disease Father     Heart disease Sister     Kidney cancer Sister     Stroke Sister     Cancer Sister         breast    Cancer Sister     Heart disease Brother          No Known Allergies    Current Medications:   Scheduled Meds:ascorbic acid, 500 mg, Oral, Daily  aspirin, 81 mg, Oral, Daily  dexAMETHasone, 6 mg, Oral, Daily   Or  dexAMETHasone, 6 mg, Intravenous, Daily  dilTIAZem CD, 240 mg, Oral, Daily  famotidine, 40 mg, Oral, Daily  furosemide, 40 mg, Oral, Daily  levothyroxine, 75 mcg,  "Oral, Q AM  melatonin, 5 mg, Oral, Nightly  potassium chloride, 20 mEq, Oral, Daily  remdesivir, 100 mg, Intravenous, Q24H  rosuvastatin, 20 mg, Oral, Nightly  senna-docusate sodium, 2 tablet, Oral, BID  sertraline, 150 mg, Oral, Daily  sotalol, 120 mg, Oral, BID  terazosin, 2 mg, Oral, Nightly  [START ON 11/8/2023] warfarin, 2.5 mg, Oral, Once per day on Wed Sat  warfarin, 5 mg, Oral, Once per day on Sun Mon Tue Thu Fri  zinc sulfate, 220 mg, Oral, Daily      Continuous Infusions:Pharmacy Consult - Remdesivir,   Pharmacy to dose warfarin,         ROS  All other systems reviewed and are negative.       Objective:         /86   Pulse 96   Temp 96 °F (35.6 °C) (Oral)   Resp 14   Ht 152.4 cm (60\")   Wt 55.8 kg (123 lb)   SpO2 97%   BMI 24.02 kg/m²       General: Elderly, in NAD.  Neuro: AAOx3. No gross deficits.  HEENT: Sclera clear, no xanthelasmas.  CV: irregular S1 with obliterated S2 and pansystolic murmur. No JVD.  Resp: Breathing is unlabored. Lungs faint wheezing throughout.  GI: BS+. Abdomen soft and NTTP.  Ext: Pedal pulses are palpable. Extremities are nonedematous.  MS: moves all extremities, no weakness.  Skin: warm, dry.  Psych: calm and cooperative.            Lab Review:     Results from last 7 days   Lab Units 11/06/23  0033 11/04/23  2226   SODIUM mmol/L 138 138   POTASSIUM mmol/L 4.5 4.6   CHLORIDE mmol/L 102 101   CO2 mmol/L 25.0 24.0   BUN mg/dL 28* 17   CREATININE mg/dL 0.76 0.64   GLUCOSE mg/dL 129* 213*   CALCIUM mg/dL 9.3 9.4   AST (SGOT) U/L 41* 40*   ALT (SGPT) U/L 41* 40*     Results from last 7 days   Lab Units 11/04/23  1301   HSTROP T ng/L 11*     Results from last 7 days   Lab Units 11/06/23  0033 11/04/23  2226   WBC 10*3/mm3 9.30 7.30   HEMOGLOBIN g/dL 10.3* 11.2*   HEMATOCRIT % 30.8* 35.4   PLATELETS 10*3/mm3 194 188     Results from last 7 days   Lab Units 11/06/23  0033 11/05/23  1430 11/04/23  1027   INR  2.23 2.29 2.43   APTT seconds  --   --  43.6*     Results from " "last 7 days   Lab Units 11/04/23  1301   MAGNESIUM mg/dL 1.6           Invalid input(s): \"LDLCALC\"  Results from last 7 days   Lab Units 11/06/23  0033 11/04/23  1027   PROBNP pg/mL 12,022.0* 4,544.0*           Recent Radiology:  Imaging Results (Most Recent)       Procedure Component Value Units Date/Time    XR Chest 1 View [[id number]] Collected: 11/04/23 1046     Updated: 11/04/23 1051    Narrative:      XR CHEST 1 VW    Date of Exam: 11/4/2023 10:38 AM EDT    Indication: cough, fever    Comparison: November 17, 2022    Findings:  Median sternotomy wires appear intact. A left subclavian pacemaker is in place. The lungs are clear. The heart and mediastinal contours appear stable. The pulmonary vasculature appears normal. A left shoulder arthroplasty is noted.      Impression:      Impression:  No acute cardiopulmonary process.      Electronically Signed: Rory Gomez MD    11/4/2023 10:49 AM EDT    Workstation ID: [id number]              ECHOCARDIOGRAM:    Results for orders placed during the hospital encounter of 10/31/23    Adult Transesophageal Echo (RACHEL) W/ Cont if Necessary Per Protocol    Interpretation Summary    Left ventricular systolic function is normal. Left ventricular ejection fraction appears to be 56 - 60%.    The left atrial cavity is moderately dilated.    Saline test results are negative.    Moderate to severe aortic valve stenosis is present.    Aortic valve maximum pressure gradient is 55 mmHg. Aortic valve mean pressure gradient is 29 mmHg.    There is a bioprosthetic aortic valve present. The prosthetic aortic valve peak and mean gradients are elevated.    There is mild, bileaflet mitral valve thickening present.    Moderate mitral valve regurgitation is present.            Assessment:         Active Hospital Problems    Diagnosis  POA    **COVID [U07.1]  Yes    Essential hypertension [I10]  Yes    Presence of cardiac pacemaker [Z95.0]  Yes    Acquired hypothyroidism [E03.9]  Yes    " Paroxysmal atrial fibrillation [I48.0]  Yes    Presence of aortocoronary bypass graft [Z95.1]  Not Applicable    Valvular heart disease [I38]  Yes    Chronic coronary artery disease [I25.10]  Yes    Mixed hyperlipidemia [E78.2]  Yes     1) Hypoxia / COVID positive  - now on room air    2) valvular heart disease s/p tissue AVR in 2014  - BNP 4544 --> 80476  - CXR shows no vascular congestion  - RACHEL 10/2023 showed an EF of 56 to 60% with moderate to severe aortic stenosis, moderate MR, mild AI, and demonstrated elevated bioprosthetic AV gradients.   - patient appears compensated on exam on PO lasix    3) paroxysmal A-fib --> in recurrent afib with CVR  - on sotalol for suppression and diltiazem for rate control  - device interrogation 10/2023 showed 3.5% AF burden --> diltiazem just titrated last visit  - on Coumadin for anticoagulation  - Patient has had recent issues with epistaxis and subdural hematoma status post fall in the summer 2023.  She is in progress with watchman evaluation.      4) CAD status post CABG 2008 and PCI 2006  - Last cath in 2016 showed no vessels amenable to PCI.    5) sick sinus syndrome status post [employer]aya permanent pacemaker in 2016       6) HTN    7) HLD    8) CKD    9) PVD           Plan:   Significant increase in BNP disproportionate to exam findings.  Patient appears compensated.  Will repeat CXR.  If BP drops, consider limited 2D echo to rule out pericardial effusion.  Recent RACHEL performed as above.  Tele shows recurrent afib with a high AF burden on recent device interrogation despite sotalol.  Will d/w attending cardiologist for further recommendations.         Electronically signed by YEYO Anderson, 11/06/23, 9:45 AM EST.

## 2023-11-06 NOTE — CASE MANAGEMENT/SOCIAL WORK
Discharge Planning Assessment  [facility]     Patient Name: Tracy Jane  MRN: [id number]  Today's Date: 11/6/2023    Admit Date: 11/4/2023    Plan: Routine home ([facility]).   Discharge Needs Assessment       Row Name 11/06/23 1233       Living Environment    People in Home alone    Current Living Arrangements independent living facility    Potentially Unsafe Housing Conditions none    Primary Care Provided by self    Provides Primary Care For no one    Family Caregiver if Needed child(aria), adult    Family Caregiver Names Daughter-Yenny    Quality of Family Relationships helpful;involved;supportive    Able to Return to Prior Arrangements yes       Resource/Environmental Concerns    Resource/Environmental Concerns none    Transportation Concerns none       Transition Planning    Patient/Family Anticipates Transition to home    Patient/Family Anticipated Services at Transition none    Transportation Anticipated family or friend will provide       Discharge Needs Assessment    Readmission Within the Last 30 Days no previous admission in last 30 days    Equipment Currently Used at Home bath bench;grab bar;rollator    Concerns to be Addressed denies needs/concerns at this time;no discharge needs identified    Anticipated Changes Related to Illness none    Equipment Needed After Discharge none    Provided Post Acute Provider List? N/A    Provided Post Acute Provider Quality & Resource List? N/A                   Discharge Plan       Row Name 11/06/23 1234       Plan    Plan Routine home ([facility]).    Patient/Family in Agreement with Plan yes    Plan Comments CM contacted patient's daughter Yenny by phone to complete assessment d/t patient's Covid+ isolation. Pt lives in independent living at [place]. She does not drive but daughter provides her transportation. PCP and pharmacy confirmed. DME includes rollator, grab bars, and shower chair. Discussed possible HHC or  rehab services needed at discharge and daughter does not feel patient needs. Daughter will provide transportation. Reviewed IMM letter/rights and daughter verbalized understanding. Plans to dc today if CXR wnl.             Expected Discharge Date and Time       Expected Discharge Date Expected Discharge Time    Nov 6, 2023            Demographic Summary       Row Name 11/06/23 1233       General Information    Admission Type inpatient    Arrived From emergency department    Required Notices Provided Important Message from Medicare    Referral Source admission list    Reason for Consult care coordination/care conference;discharge planning    Preferred Language English       Contact Information    Permission Granted to Share Info With                    Functional Status       Row Name 11/06/23 1233       Functional Status    Usual Activity Tolerance moderate    Current Activity Tolerance moderate       Functional Status, IADL    Medications independent    Meal Preparation independent    Housekeeping independent    Laundry independent    Shopping independent              Patient Forms       Row Name 11/06/23 1233       Patient Forms    Important Message from Medicare ([facility]) Delivered  IMM 11/6 per CM    Delivered to Support person  Daughter-Yenny    Method of delivery Telephone                Megan Naegele, RN     Office Phone: [phone]  Office Cell: [phone]

## 2023-11-06 NOTE — PLAN OF CARE
Goal Outcome Evaluation:   Patient is discharging to [facility] today.

## 2023-11-06 NOTE — CASE MANAGEMENT/SOCIAL WORK
Case Management Discharge Note      Final Note: Routine home.    Selected Continued Care - Discharged on 11/6/2023 Admission date: 11/4/2023 - Discharge disposition: Home or Self Care     Transportation Services  Private: Car    Final Discharge Disposition Code: 01 - home or self-care

## 2023-11-06 NOTE — PROGRESS NOTES
"Pharmacy dosing service  Anticoagulant  Warfarin     Subjective:    Tracy Jane is a 88 y.o.female being continued on warfarin for Atrial Fibrillation.    INR Goal: 2 - 3  Home medication?: warfarin 5 mg PO daily, except warfarin 2.5 mg PO on Wed and Sat.   Bridge Therapy Present?:  No  Interacting Medications Evaluation (New/Present/Discontinued): none  Additional Contributing Factors: none      Assessment/Plan:    INR therapeutic.  Continue current home dose.    Continue to monitor and adjust based on INR.         Date 11/4 11/5 11/6         INR 2.43 2.29 2.23         Dose -- 5 mg 5 mg              Objective:  [Ht: 152.4 cm (60\"); Wt: 55.8 kg (123 lb); BMI: Body mass index is 24.02 kg/m².]    Lab Results   Component Value Date    ALBUMIN 3.2 (L) 11/06/2023     Lab Results   Component Value Date    INR 2.23 11/06/2023    INR 2.29 11/05/2023    INR 2.43 11/04/2023    PROTIME 22.9 11/06/2023    PROTIME 23.5 11/05/2023    PROTIME 24.8 11/04/2023     Lab Results   Component Value Date    HGB 10.3 (L) 11/06/2023    HGB 11.2 (L) 11/04/2023    HGB 10.1 (L) 11/04/2023     Lab Results   Component Value Date    HCT 30.8 (L) 11/06/2023    HCT 35.4 11/04/2023    HCT 30.6 (L) 11/04/2023       Khloe Horton, PharmD  11/06/23 09:24 EST       "

## 2023-11-07 NOTE — OUTREACH NOTE
Prep Survey      Flowsheet Row Responses   [facility] facility patient discharged from? [facility]   Is LACE score < 7 ? No   Eligibility Readm Mgmt   Discharge diagnosis Covid   Does the patient have one of the following disease processes/diagnoses(primary or secondary)? Other   Does the patient have Home health ordered? No   Is there a DME ordered? No   Prep survey completed? Yes            SHELBY POON - Registered Nurse

## 2023-11-09 ENCOUNTER — READMISSION MANAGEMENT (OUTPATIENT)
Dept: CALL CENTER | Facility: HOSPITAL | Age: 88
End: 2023-11-09
Payer: MEDICARE

## 2023-11-09 LAB
BACTERIA SPEC AEROBE CULT: NORMAL
BACTERIA SPEC AEROBE CULT: NORMAL

## 2023-11-09 NOTE — OUTREACH NOTE
Medical Week 1 Survey      Flowsheet Row Responses   [facility] patient discharged from? [facility]   Does the patient have one of the following disease processes/diagnoses(primary or secondary)? Other   Week 1 attempt successful? Yes   Call start time 1228   Call end time 1231   Discharge diagnosis Covid   Does the patient have all medications ordered at discharge? Yes   Is the patient taking all medications as directed (includes completed medication regime)? Yes   Does the patient have a primary care provider?  Yes   Does the patient have an appointment with their PCP within 7 days of discharge? No   Comments regarding PCP states she is still testing positive for covid but she will call to make a f/u appt with PCP   Nursing Interventions Advised patient to make appointment   Has the patient kept scheduled appointments due by today? N/A   Has home health visited the patient within 72 hours of discharge? N/A   Psychosocial issues? No   Did the patient receive a copy of their discharge instructions? Yes   Nursing interventions Reviewed instructions with patient   What is the patient's perception of their health status since discharge? Improving   Is the patient/caregiver able to teach back signs and symptoms related to disease process for when to call PCP? Yes   Is the patient/caregiver able to teach back signs and symptoms related to disease process for when to call 911? Yes   Is the patient/caregiver able to teach back the hierarchy of who to call/visit for symptoms/problems? PCP, Specialist, Home health nurse, Urgent Care, ED, 911 Yes   Week 1 call completed? Yes   Graduated Yes   Did the patient feel the follow up calls were helpful during their recovery period? Yes   Was the number of calls appropriate? Yes   Would this patient benefit from a Referral to Amb Social Work? No   Is the patient interested in additional calls from an ambulatory ? No   Graduated/Revoked comments Doing well at her assisted  [facility], [facility] she will call her doctor to make a f/u appt.   Call end time 1231            Aylin LORENZANA - Registered Nurse

## 2023-11-10 ENCOUNTER — ANTICOAGULATION VISIT (OUTPATIENT)
Dept: CARDIOLOGY | Facility: CLINIC | Age: 88
End: 2023-11-10
Payer: MEDICARE

## 2023-11-10 ENCOUNTER — TELEPHONE (OUTPATIENT)
Dept: CARDIOLOGY | Facility: CLINIC | Age: 88
End: 2023-11-10

## 2023-11-10 LAB — INR PPP: 2.3

## 2023-11-10 NOTE — TELEPHONE ENCOUNTER
Caller: OVI MCDERMOTT    Phone: [phone]    INR Number Being Reported: 2.3      Day of the Week  Monday Tuesday Wednesday Thursday Friday Saturday Sunday     Dose Taken 5 5 2.5 5 5 5 2.5

## 2023-11-11 LAB
QT INTERVAL: 404 MS
QT INTERVAL: 490 MS
QTC INTERVAL: 518 MS
QTC INTERVAL: 529 MS

## 2023-11-15 ENCOUNTER — TELEPHONE (OUTPATIENT)
Dept: CARDIOLOGY | Facility: CLINIC | Age: 88
End: 2023-11-15

## 2023-11-15 LAB
QT INTERVAL: 401 MS
QTC INTERVAL: 524 MS

## 2023-11-15 NOTE — TELEPHONE ENCOUNTER
Caller: DAYANA ROSADO    Relationship to patient: Emergency Contact    Best call back number: [phone]    Patient is needing: PATIENT'S DAUGHTER STATED THEY RECEIVED THE PATIENT RECEIVED A CALL YESTERDAY 11.14.23 TO SCHEDULE THE WATCHMEN PROCEDURE BY THE END OF THE YEAR BUT PATIENT DISCUSSED THE POTENTIAL OF THE WATCHMEN PROCEDURE BUT DID NOT KNOW THAT WAS GOING TO BE HAPPENING UNTIL PATIENT SEEN DR CELESTIN. PATIENT'S DAUGHTER REQUESTING A CALL BACK TO PATIENT [phone] . PATIENT'S DAUGHTER REQUESTING TO BE SEEN IN OFFICE, FIRST DECEMBER 18TH.

## 2023-11-17 ENCOUNTER — ANTICOAGULATION VISIT (OUTPATIENT)
Dept: CARDIOLOGY | Facility: CLINIC | Age: 88
End: 2023-11-17
Payer: MEDICARE

## 2023-11-17 DIAGNOSIS — I48.0 PAROXYSMAL ATRIAL FIBRILLATION: Primary | ICD-10-CM

## 2023-11-17 LAB — INR PPP: 3.3

## 2023-11-17 PROCEDURE — G0250 MD INR TEST REVIE INTER MGMT: HCPCS | Performed by: INTERNAL MEDICINE

## 2023-11-27 ENCOUNTER — PREP FOR SURGERY (OUTPATIENT)
Dept: OTHER | Facility: HOSPITAL | Age: 88
End: 2023-11-27
Payer: MEDICARE

## 2023-11-27 ENCOUNTER — OFFICE VISIT (OUTPATIENT)
Dept: CARDIOLOGY | Facility: CLINIC | Age: 88
End: 2023-11-27
Payer: MEDICARE

## 2023-11-27 VITALS
HEART RATE: 70 BPM | HEIGHT: 60 IN | WEIGHT: 125 LBS | BODY MASS INDEX: 24.54 KG/M2 | DIASTOLIC BLOOD PRESSURE: 69 MMHG | OXYGEN SATURATION: 95 % | SYSTOLIC BLOOD PRESSURE: 132 MMHG

## 2023-11-27 DIAGNOSIS — Z95.2 HX OF AORTIC VALVE REPLACEMENT: ICD-10-CM

## 2023-11-27 DIAGNOSIS — I48.0 PAROXYSMAL ATRIAL FIBRILLATION: Primary | ICD-10-CM

## 2023-11-27 DIAGNOSIS — I25.10 CHRONIC CORONARY ARTERY DISEASE: ICD-10-CM

## 2023-11-27 DIAGNOSIS — I10 ESSENTIAL HYPERTENSION: ICD-10-CM

## 2023-11-27 DIAGNOSIS — Z95.0 PRESENCE OF CARDIAC PACEMAKER: ICD-10-CM

## 2023-11-27 DIAGNOSIS — D50.0 IRON DEFICIENCY ANEMIA DUE TO CHRONIC BLOOD LOSS: ICD-10-CM

## 2023-11-27 DIAGNOSIS — E78.2 MIXED HYPERLIPIDEMIA: ICD-10-CM

## 2023-11-27 PROCEDURE — 1160F RVW MEDS BY RX/DR IN RCRD: CPT | Performed by: INTERNAL MEDICINE

## 2023-11-27 PROCEDURE — 99214 OFFICE O/P EST MOD 30 MIN: CPT | Performed by: INTERNAL MEDICINE

## 2023-11-27 PROCEDURE — 1159F MED LIST DOCD IN RCRD: CPT | Performed by: INTERNAL MEDICINE

## 2023-11-27 RX ORDER — SODIUM CHLORIDE 0.9 % (FLUSH) 0.9 %
3 SYRINGE (ML) INJECTION EVERY 12 HOURS SCHEDULED
OUTPATIENT
Start: 2023-11-27

## 2023-11-27 RX ORDER — SODIUM CHLORIDE 9 MG/ML
80 INJECTION, SOLUTION INTRAVENOUS CONTINUOUS
OUTPATIENT
Start: 2023-11-27

## 2023-11-27 RX ORDER — SODIUM CHLORIDE 0.9 % (FLUSH) 0.9 %
3-10 SYRINGE (ML) INJECTION AS NEEDED
OUTPATIENT
Start: 2023-11-27

## 2023-11-27 RX ORDER — SODIUM CHLORIDE 9 MG/ML
40 INJECTION, SOLUTION INTRAVENOUS AS NEEDED
OUTPATIENT
Start: 2023-11-27

## 2023-11-27 NOTE — PROGRESS NOTES
CC: Sick sinus syndrome with dual-chamber pacemaker in situ follow up .    Sub  88-year-old pleasant patient has sick sinus syndrome, paroxysmal atrial fibrillation and pacemaker in situ and comes in for follow-up  Patient has bioprosthetic aortic valve placed in 2014 with elevated gradients with mean gradient up to 29.  She additionally has history of coronary artery disease with prior bypass surgery and bilateral renal artery stenosis, carotid disease and dyslipidemia.  She is currently on sotalol for suppression of atrial arrhythmias and a prior CT head revealing a right small subdural hematoma.  Patient recently had COVID infection breakthrough AF despite sotalol and comes in for follow-up  He also underwent a RACHEL and shared decision making for Watchman device implantation because of prior history of falls and subdural hematoma        Past Medical History:     Reviewed history from 06/13/2016 and no changes required:        AVR 2014-        Coronary artery disease: S/P CABG and PCI with stenting-        Carotid disease;left side 50-74%-Patrick Ocampo        Inferior myocardial infarction 12-06-        Asthma        Anxiety Disorder        Depression        G E R D        Hyperlipidemia        Hypertension-        Hyperthyroidism        fx thoracic ?        shoulder fx        Rotator cuff syndrome         Gout         Paroxysmal atrial fib        Arthritis        Hypothyroid        No Drug Allergies?         Eye exam:2014 &2015 Dr.Kelley Parekh in [place]         Rheumatoid Arthritis    Past Surgical History:     Reviewed history from 10/16/2018 and no changes required:        PCI/stent, LCX, 3-20-06, Cypher stent        PCI/stent x 2, LCX & priximal diagonal, 12-3-06, Micro Vision stents        cataract r eye 12/07  lt eye 01/08        CABG x 3  07-19-08        thoracentesis l lung 8/08        Right rotator cuff repair - Oct. 15, 2013        Cholecystectomy-2009        Left Knee  Arthoplasty-2014        Left Shoulder Replacement 4/2014        Aortic Valve  Replacement 6/11/2014        Heart Catherization:2/18/2015        Pacemaker placed 6/6/16        Colonoscopy 2011         Surgery Finger 2018               Physical Exam    General:      well developed, well nourished, in no acute distress.    Head:      normocephalic and atraumatic.    Eyes:      PERRL/EOM intact, conjunctivae and sclerae clear without nystagmus.    Neck:      no  thyromegaly, trachea central with normal respiratory effort  Lungs:      clear bilaterally to auscultation.    Heart:       regular rate and rhythm, S1, S2 without murmurs, rubs, or gallops  Skin:      intact without lesions or rashes.    Psych:      alert and cooperative; normal mood and affect; normal attention span and concentration.                    Impressions    Recent hemoglobin 10.3.  INR is therapeutic creatinine and platelets and potassium normal  Recent COVID illness and patient has recovered in room air  Dual-chamber pacemaker in situ and home monitoring reviewed  Bioprosthetic aortic valve placed in 2014 with elevated mean gradient up to 29  History of renal artery stenosis and peripheral arterial disease  History of small subdural hematoma and iron deficiency anemia  Hypothyroidism supplemented on levothyroxine  Hypertension controlled with diltiazem  Hyperlipidemia on rosuvastatin  Watchman device procedure DW patient and orders placed        Electronically signed by Pk Crabtree MD, 11/27/23, 1:44 PM EST.

## 2023-12-05 ENCOUNTER — TELEPHONE (OUTPATIENT)
Dept: CARDIOLOGY | Facility: CLINIC | Age: 88
End: 2023-12-05

## 2023-12-05 ENCOUNTER — ANTICOAGULATION VISIT (OUTPATIENT)
Dept: CARDIOLOGY | Facility: CLINIC | Age: 88
End: 2023-12-05
Payer: MEDICARE

## 2023-12-05 DIAGNOSIS — I48.0 PAROXYSMAL ATRIAL FIBRILLATION: Primary | ICD-10-CM

## 2023-12-05 LAB — INR PPP: 2.4

## 2023-12-05 NOTE — TELEPHONE ENCOUNTER
Caller: PATIENT    Phone: [phone]     INR Number Being Reported: 2.4      Day of the Week  Monday Tuesday Wednesday Thursday Friday Saturday Sunday     Dose Taken 5 5 2.5 5 5 5 2.5

## 2023-12-07 RX ORDER — SOTALOL HYDROCHLORIDE 120 MG/1
TABLET ORAL
Qty: 180 TABLET | Refills: 0 | Status: SHIPPED | OUTPATIENT
Start: 2023-12-07

## 2023-12-15 ENCOUNTER — TELEPHONE (OUTPATIENT)
Dept: CARDIOLOGY | Facility: CLINIC | Age: 88
End: 2023-12-15
Payer: MEDICARE

## 2023-12-15 ENCOUNTER — TELEPHONE (OUTPATIENT)
Dept: CARDIOLOGY | Facility: CLINIC | Age: 88
End: 2023-12-15

## 2023-12-15 ENCOUNTER — ANTICOAGULATION VISIT (OUTPATIENT)
Dept: CARDIOLOGY | Facility: CLINIC | Age: 88
End: 2023-12-15
Payer: MEDICARE

## 2023-12-15 DIAGNOSIS — I48.0 PAROXYSMAL ATRIAL FIBRILLATION: Primary | ICD-10-CM

## 2023-12-15 LAB — INR PPP: 3.2

## 2023-12-15 NOTE — TELEPHONE ENCOUNTER
Hub staff attempted to follow warm transfer process and was unsuccessful     Caller: Tracy Jane    Relationship to patient: Self    Best call back number: [phone]    Patient is needing: PATIENT IS CALLING IN TO REPORT HER INR - HUB ATTEMPTED TO WT AS PATIENT STATED THAT HER INR IS A LITTLE HIGH BUT THE WT WAS UNSUCCESSFUL.    12.15.23: INR: 3.2        
Contacted patient-see anticoag encounter for info.  
CXR negative - No tracheal deviation, No pneumothorax, No subdiaphragmatic

## 2023-12-15 NOTE — TELEPHONE ENCOUNTER
Caller:     Relationship:     Best call back number: [phone]    What is the best time to reach you: ANY      What was the call regarding: LAST SUNDAY ON 12/10/23 SHE HAD A FALL THAT REQUIRED STICHES AND BRUISING ON BOTH EYES.  SHE JUST FOLLOWED UP WITH HER PCP AND THERE ARE NO SIGNS OF INFECTION.    SHE WANTED TO INFORM DR. FAWAD SOLANO THIS SINCE HER WATCHMAN IS SCHEDULED ON 12/26/23

## 2023-12-22 ENCOUNTER — ANTICOAGULATION VISIT (OUTPATIENT)
Dept: CARDIOLOGY | Facility: CLINIC | Age: 88
End: 2023-12-22
Payer: MEDICARE

## 2023-12-22 DIAGNOSIS — I48.0 PAROXYSMAL ATRIAL FIBRILLATION: Primary | ICD-10-CM

## 2023-12-22 LAB — INR PPP: 3.2

## 2023-12-23 ENCOUNTER — LAB (OUTPATIENT)
Dept: LAB | Facility: HOSPITAL | Age: 88
End: 2023-12-23
Payer: MEDICARE

## 2023-12-23 DIAGNOSIS — D50.0 IRON DEFICIENCY ANEMIA DUE TO CHRONIC BLOOD LOSS: ICD-10-CM

## 2023-12-23 DIAGNOSIS — I48.0 PAROXYSMAL ATRIAL FIBRILLATION: ICD-10-CM

## 2023-12-23 DIAGNOSIS — Z95.2 HX OF AORTIC VALVE REPLACEMENT: ICD-10-CM

## 2023-12-23 LAB
ALBUMIN SERPL-MCNC: 4 G/DL (ref 3.5–5.2)
ALBUMIN/GLOB SERPL: 1.5 G/DL
ALP SERPL-CCNC: 115 U/L (ref 39–117)
ALT SERPL W P-5'-P-CCNC: 29 U/L (ref 1–33)
ANION GAP SERPL CALCULATED.3IONS-SCNC: 8 MMOL/L (ref 5–15)
AST SERPL-CCNC: 24 U/L (ref 1–32)
BASOPHILS # BLD AUTO: 0 10*3/MM3 (ref 0–0.2)
BASOPHILS NFR BLD AUTO: 0.9 % (ref 0–1.5)
BILIRUB SERPL-MCNC: 0.4 MG/DL (ref 0–1.2)
BUN SERPL-MCNC: 19 MG/DL (ref 8–23)
BUN/CREAT SERPL: 24.1 (ref 7–25)
CALCIUM SPEC-SCNC: 9.6 MG/DL (ref 8.6–10.5)
CHLORIDE SERPL-SCNC: 102 MMOL/L (ref 98–107)
CO2 SERPL-SCNC: 30 MMOL/L (ref 22–29)
CREAT SERPL-MCNC: 0.79 MG/DL (ref 0.57–1)
DEPRECATED RDW RBC AUTO: 65.2 FL (ref 37–54)
EGFRCR SERPLBLD CKD-EPI 2021: 71.6 ML/MIN/1.73
EOSINOPHIL # BLD AUTO: 0.1 10*3/MM3 (ref 0–0.4)
EOSINOPHIL NFR BLD AUTO: 3.2 % (ref 0.3–6.2)
ERYTHROCYTE [DISTWIDTH] IN BLOOD BY AUTOMATED COUNT: 18.8 % (ref 12.3–15.4)
GLOBULIN UR ELPH-MCNC: 2.6 GM/DL
GLUCOSE SERPL-MCNC: 92 MG/DL (ref 65–99)
HCT VFR BLD AUTO: 30.3 % (ref 34–46.6)
HGB BLD-MCNC: 9.9 G/DL (ref 12–15.9)
INR PPP: 2.39 (ref 2–3)
LYMPHOCYTES # BLD AUTO: 1.3 10*3/MM3 (ref 0.7–3.1)
LYMPHOCYTES NFR BLD AUTO: 30.8 % (ref 19.6–45.3)
MAGNESIUM SERPL-MCNC: 1.8 MG/DL (ref 1.6–2.4)
MCH RBC QN AUTO: 32.4 PG (ref 26.6–33)
MCHC RBC AUTO-ENTMCNC: 32.7 G/DL (ref 31.5–35.7)
MCV RBC AUTO: 99 FL (ref 79–97)
MONOCYTES # BLD AUTO: 0.5 10*3/MM3 (ref 0.1–0.9)
MONOCYTES NFR BLD AUTO: 10.5 % (ref 5–12)
NEUTROPHILS NFR BLD AUTO: 2.4 10*3/MM3 (ref 1.7–7)
NEUTROPHILS NFR BLD AUTO: 54.6 % (ref 42.7–76)
NRBC BLD AUTO-RTO: 0.1 /100 WBC (ref 0–0.2)
PLATELET # BLD AUTO: 227 10*3/MM3 (ref 140–450)
PMV BLD AUTO: 7.2 FL (ref 6–12)
POTASSIUM SERPL-SCNC: 4.2 MMOL/L (ref 3.5–5.2)
PROT SERPL-MCNC: 6.6 G/DL (ref 6–8.5)
PROTHROMBIN TIME: 24.4 SECONDS (ref 19.4–28.5)
RBC # BLD AUTO: 3.06 10*6/MM3 (ref 3.77–5.28)
SODIUM SERPL-SCNC: 140 MMOL/L (ref 136–145)
WBC NRBC COR # BLD AUTO: 4.3 10*3/MM3 (ref 3.4–10.8)

## 2023-12-23 PROCEDURE — 83735 ASSAY OF MAGNESIUM: CPT

## 2023-12-23 PROCEDURE — 80053 COMPREHEN METABOLIC PANEL: CPT

## 2023-12-23 PROCEDURE — 85025 COMPLETE CBC W/AUTO DIFF WBC: CPT

## 2023-12-23 PROCEDURE — 36415 COLL VENOUS BLD VENIPUNCTURE: CPT

## 2023-12-23 PROCEDURE — 85610 PROTHROMBIN TIME: CPT

## 2023-12-26 ENCOUNTER — HOSPITAL ENCOUNTER (OUTPATIENT)
Dept: CARDIOLOGY | Facility: HOSPITAL | Age: 88
Discharge: HOME | End: 2023-12-26
Payer: MEDICARE

## 2023-12-26 ENCOUNTER — ANESTHESIA (OUTPATIENT)
Dept: CARDIOLOGY | Facility: HOSPITAL | Age: 88
End: 2023-12-26
Payer: MEDICARE

## 2023-12-26 ENCOUNTER — HOSPITAL ENCOUNTER (INPATIENT)
Facility: HOSPITAL | Age: 88
LOS: 1 days | Discharge: HOME | DRG: 274 | End: 2023-12-27
Attending: INTERNAL MEDICINE | Admitting: INTERNAL MEDICINE
Payer: MEDICARE

## 2023-12-26 ENCOUNTER — ANESTHESIA EVENT (OUTPATIENT)
Dept: CARDIOLOGY | Facility: HOSPITAL | Age: 88
End: 2023-12-26
Payer: MEDICARE

## 2023-12-26 VITALS
DIASTOLIC BLOOD PRESSURE: 56 MMHG | SYSTOLIC BLOOD PRESSURE: 142 MMHG | WEIGHT: 128 LBS | HEIGHT: 60 IN | BODY MASS INDEX: 25.13 KG/M2

## 2023-12-26 DIAGNOSIS — Z95.2 HX OF AORTIC VALVE REPLACEMENT: ICD-10-CM

## 2023-12-26 DIAGNOSIS — I48.0 PAROXYSMAL ATRIAL FIBRILLATION: ICD-10-CM

## 2023-12-26 DIAGNOSIS — D50.0 IRON DEFICIENCY ANEMIA DUE TO CHRONIC BLOOD LOSS: ICD-10-CM

## 2023-12-26 LAB
ACT BLD: 141 SECONDS (ref 89–137)
ACT BLD: 168 SECONDS (ref 89–137)
ACT BLD: >1000 SECONDS (ref 89–137)

## 2023-12-26 PROCEDURE — 25010000002 HEPARIN (PORCINE) PER 1000 UNITS: Performed by: NURSE ANESTHETIST, CERTIFIED REGISTERED

## 2023-12-26 PROCEDURE — 25510000001 IOPAMIDOL PER 1 ML: Performed by: INTERNAL MEDICINE

## 2023-12-26 PROCEDURE — C1894 INTRO/SHEATH, NON-LASER: HCPCS | Performed by: INTERNAL MEDICINE

## 2023-12-26 PROCEDURE — 25010000002 SUGAMMADEX 200 MG/2ML SOLUTION: Performed by: NURSE ANESTHETIST, CERTIFIED REGISTERED

## 2023-12-26 PROCEDURE — 33340 PERQ CLSR TCAT L ATR APNDGE: CPT | Performed by: INTERNAL MEDICINE

## 2023-12-26 PROCEDURE — 25010000002 PROPOFOL 10 MG/ML EMULSION: Performed by: NURSE ANESTHETIST, CERTIFIED REGISTERED

## 2023-12-26 PROCEDURE — 25010000002 PROTAMINE SULFATE PER 10 MG: Performed by: NURSE ANESTHETIST, CERTIFIED REGISTERED

## 2023-12-26 PROCEDURE — 25810000003 LACTATED RINGERS PER 1000 ML: Performed by: NURSE ANESTHETIST, CERTIFIED REGISTERED

## 2023-12-26 PROCEDURE — C1889 IMPLANT/INSERT DEVICE, NOC: HCPCS | Performed by: INTERNAL MEDICINE

## 2023-12-26 PROCEDURE — 93325 DOPPLER ECHO COLOR FLOW MAPG: CPT

## 2023-12-26 PROCEDURE — 93355 ECHO TRANSESOPHAGEAL (TEE): CPT

## 2023-12-26 PROCEDURE — C1893 INTRO/SHEATH, FIXED,NON-PEEL: HCPCS | Performed by: INTERNAL MEDICINE

## 2023-12-26 PROCEDURE — 93320 DOPPLER ECHO COMPLETE: CPT

## 2023-12-26 PROCEDURE — C1769 GUIDE WIRE: HCPCS | Performed by: INTERNAL MEDICINE

## 2023-12-26 PROCEDURE — 25010000002 PROPOFOL 1000 MG/100ML EMULSION: Performed by: NURSE ANESTHETIST, CERTIFIED REGISTERED

## 2023-12-26 PROCEDURE — 93312 ECHO TRANSESOPHAGEAL: CPT

## 2023-12-26 PROCEDURE — 25010000002 ONDANSETRON PER 1 MG: Performed by: NURSE ANESTHETIST, CERTIFIED REGISTERED

## 2023-12-26 PROCEDURE — 25010000002 DEXAMETHASONE PER 1 MG: Performed by: NURSE ANESTHETIST, CERTIFIED REGISTERED

## 2023-12-26 PROCEDURE — 85347 COAGULATION TIME ACTIVATED: CPT

## 2023-12-26 PROCEDURE — 02L73DK OCCLUSION OF LEFT ATRIAL APPENDAGE WITH INTRALUMINAL DEVICE, PERCUTANEOUS APPROACH: ICD-10-PCS | Performed by: INTERNAL MEDICINE

## 2023-12-26 PROCEDURE — 25010000002 CEFAZOLIN PER 500 MG: Performed by: NURSE ANESTHETIST, CERTIFIED REGISTERED

## 2023-12-26 DEVICE — LEFT ATRIAL APPENDAGE CLOSURE DEVICE WITH DELIVERY SYSTEM
Type: IMPLANTABLE DEVICE | Status: FUNCTIONAL
Brand: WATCHMAN FLX™

## 2023-12-26 DEVICE — CAP SYS WATCHMAN TRUSEAL ACC PROC: Type: IMPLANTABLE DEVICE | Status: FUNCTIONAL

## 2023-12-26 DEVICE — CAP WATCHMAN FLX PROC: Type: IMPLANTABLE DEVICE | Status: FUNCTIONAL

## 2023-12-26 RX ORDER — DROPERIDOL 2.5 MG/ML
0.62 INJECTION, SOLUTION INTRAMUSCULAR; INTRAVENOUS
Status: DISCONTINUED | OUTPATIENT
Start: 2023-12-26 | End: 2023-12-26 | Stop reason: HOSPADM

## 2023-12-26 RX ORDER — POLYETHYLENE GLYCOL 3350 17 G/17G
17 POWDER, FOR SOLUTION ORAL NIGHTLY
Status: DISCONTINUED | OUTPATIENT
Start: 2023-12-26 | End: 2023-12-27 | Stop reason: HOSPADM

## 2023-12-26 RX ORDER — CEFAZOLIN SODIUM 1 G/3ML
INJECTION, POWDER, FOR SOLUTION INTRAMUSCULAR; INTRAVENOUS AS NEEDED
Status: DISCONTINUED | OUTPATIENT
Start: 2023-12-26 | End: 2023-12-26 | Stop reason: SURG

## 2023-12-26 RX ORDER — PROMETHAZINE HYDROCHLORIDE 25 MG/1
25 TABLET ORAL ONCE AS NEEDED
Status: DISCONTINUED | OUTPATIENT
Start: 2023-12-26 | End: 2023-12-26 | Stop reason: HOSPADM

## 2023-12-26 RX ORDER — DILTIAZEM HYDROCHLORIDE 240 MG/1
240 CAPSULE, COATED, EXTENDED RELEASE ORAL DAILY
Status: DISCONTINUED | OUTPATIENT
Start: 2023-12-27 | End: 2023-12-27 | Stop reason: HOSPADM

## 2023-12-26 RX ORDER — DOXAZOSIN 2 MG/1
2 TABLET ORAL DAILY
COMMUNITY

## 2023-12-26 RX ORDER — ASPIRIN 81 MG/1
81 TABLET ORAL NIGHTLY
Status: DISCONTINUED | OUTPATIENT
Start: 2023-12-26 | End: 2023-12-27 | Stop reason: HOSPADM

## 2023-12-26 RX ORDER — LEVOTHYROXINE SODIUM 0.07 MG/1
75 TABLET ORAL
Status: DISCONTINUED | OUTPATIENT
Start: 2023-12-27 | End: 2023-12-27 | Stop reason: HOSPADM

## 2023-12-26 RX ORDER — FERROUS SULFATE 324(65)MG
324 TABLET, DELAYED RELEASE (ENTERIC COATED) ORAL
Status: DISCONTINUED | OUTPATIENT
Start: 2023-12-27 | End: 2023-12-27 | Stop reason: HOSPADM

## 2023-12-26 RX ORDER — POTASSIUM CHLORIDE 20 MEQ/1
20 TABLET, EXTENDED RELEASE ORAL 2 TIMES DAILY
Status: DISCONTINUED | OUTPATIENT
Start: 2023-12-26 | End: 2023-12-27 | Stop reason: HOSPADM

## 2023-12-26 RX ORDER — GLUCOSAMINE SULFATE DIPOT CHLR 1000 MG
1 TABLET ORAL 2 TIMES DAILY
COMMUNITY

## 2023-12-26 RX ORDER — COLCHICINE 0.6 MG/1
0.6 CAPSULE ORAL NIGHTLY
COMMUNITY

## 2023-12-26 RX ORDER — NALOXONE HCL 0.4 MG/ML
0.4 VIAL (ML) INJECTION
Status: DISCONTINUED | OUTPATIENT
Start: 2023-12-26 | End: 2023-12-27 | Stop reason: HOSPADM

## 2023-12-26 RX ORDER — SERTRALINE HYDROCHLORIDE 100 MG/1
100 TABLET, FILM COATED ORAL DAILY
Status: DISCONTINUED | OUTPATIENT
Start: 2023-12-27 | End: 2023-12-27 | Stop reason: HOSPADM

## 2023-12-26 RX ORDER — FOLIC ACID 1 MG/1
1 TABLET ORAL DAILY
Status: DISCONTINUED | OUTPATIENT
Start: 2023-12-27 | End: 2023-12-27 | Stop reason: HOSPADM

## 2023-12-26 RX ORDER — AMOXICILLIN 500 MG
1200 CAPSULE ORAL 2 TIMES DAILY WITH MEALS
COMMUNITY

## 2023-12-26 RX ORDER — HYDROCODONE BITARTRATE AND ACETAMINOPHEN 5; 325 MG/1; MG/1
1 TABLET ORAL EVERY 4 HOURS PRN
Status: DISCONTINUED | OUTPATIENT
Start: 2023-12-26 | End: 2023-12-27 | Stop reason: HOSPADM

## 2023-12-26 RX ORDER — LIDOCAINE HYDROCHLORIDE 20 MG/ML
INJECTION, SOLUTION INFILTRATION; PERINEURAL
Status: DISCONTINUED | OUTPATIENT
Start: 2023-12-26 | End: 2023-12-26 | Stop reason: HOSPADM

## 2023-12-26 RX ORDER — POTASSIUM CHLORIDE 20 MEQ/1
20 TABLET, EXTENDED RELEASE ORAL 2 TIMES DAILY
COMMUNITY

## 2023-12-26 RX ORDER — ONDANSETRON 2 MG/ML
INJECTION INTRAMUSCULAR; INTRAVENOUS AS NEEDED
Status: DISCONTINUED | OUTPATIENT
Start: 2023-12-26 | End: 2023-12-26 | Stop reason: SURG

## 2023-12-26 RX ORDER — MORPHINE SULFATE 1 MG/ML
1 INJECTION, SOLUTION EPIDURAL; INTRATHECAL; INTRAVENOUS EVERY 4 HOURS PRN
Status: DISCONTINUED | OUTPATIENT
Start: 2023-12-26 | End: 2023-12-27 | Stop reason: HOSPADM

## 2023-12-26 RX ORDER — ACETAMINOPHEN 325 MG/1
650 TABLET ORAL EVERY 4 HOURS PRN
Status: DISCONTINUED | OUTPATIENT
Start: 2023-12-26 | End: 2023-12-27 | Stop reason: HOSPADM

## 2023-12-26 RX ORDER — PROMETHAZINE HYDROCHLORIDE 25 MG/1
25 SUPPOSITORY RECTAL ONCE AS NEEDED
Status: DISCONTINUED | OUTPATIENT
Start: 2023-12-26 | End: 2023-12-26 | Stop reason: HOSPADM

## 2023-12-26 RX ORDER — PROTAMINE SULFATE 10 MG/ML
INJECTION, SOLUTION INTRAVENOUS AS NEEDED
Status: DISCONTINUED | OUTPATIENT
Start: 2023-12-26 | End: 2023-12-26 | Stop reason: SURG

## 2023-12-26 RX ORDER — DILTIAZEM HYDROCHLORIDE 240 MG/1
240 CAPSULE, COATED, EXTENDED RELEASE ORAL DAILY
COMMUNITY

## 2023-12-26 RX ORDER — FLUMAZENIL 0.1 MG/ML
0.2 INJECTION INTRAVENOUS AS NEEDED
Status: DISCONTINUED | OUTPATIENT
Start: 2023-12-26 | End: 2023-12-26 | Stop reason: HOSPADM

## 2023-12-26 RX ORDER — FOLIC ACID 1 MG/1
1 TABLET ORAL DAILY
COMMUNITY

## 2023-12-26 RX ORDER — PANTOPRAZOLE SODIUM 40 MG/1
40 TABLET, DELAYED RELEASE ORAL
Status: DISCONTINUED | OUTPATIENT
Start: 2023-12-27 | End: 2023-12-27 | Stop reason: HOSPADM

## 2023-12-26 RX ORDER — FUROSEMIDE 40 MG/1
40 TABLET ORAL DAILY
Status: DISCONTINUED | OUTPATIENT
Start: 2023-12-26 | End: 2023-12-27 | Stop reason: HOSPADM

## 2023-12-26 RX ORDER — DEXAMETHASONE SODIUM PHOSPHATE 4 MG/ML
INJECTION, SOLUTION INTRA-ARTICULAR; INTRALESIONAL; INTRAMUSCULAR; INTRAVENOUS; SOFT TISSUE AS NEEDED
Status: DISCONTINUED | OUTPATIENT
Start: 2023-12-26 | End: 2023-12-26 | Stop reason: SURG

## 2023-12-26 RX ORDER — PYRIDOXINE HCL (VITAMIN B6) 100 MG
500 TABLET ORAL NIGHTLY
COMMUNITY

## 2023-12-26 RX ORDER — ZINC GLUCONATE 50 MG
1 TABLET ORAL NIGHTLY
COMMUNITY

## 2023-12-26 RX ORDER — ROSUVASTATIN CALCIUM 20 MG/1
20 TABLET, COATED ORAL NIGHTLY
COMMUNITY

## 2023-12-26 RX ORDER — ISOSORBIDE MONONITRATE 30 MG/1
30 TABLET, EXTENDED RELEASE ORAL
Status: DISCONTINUED | OUTPATIENT
Start: 2023-12-27 | End: 2023-12-27 | Stop reason: HOSPADM

## 2023-12-26 RX ORDER — MULTIVIT WITH MINERALS/LUTEIN
1000 TABLET ORAL DAILY
COMMUNITY

## 2023-12-26 RX ORDER — PROPOFOL 10 MG/ML
INJECTION, EMULSION INTRAVENOUS AS NEEDED
Status: DISCONTINUED | OUTPATIENT
Start: 2023-12-26 | End: 2023-12-26 | Stop reason: SURG

## 2023-12-26 RX ORDER — SOTALOL HYDROCHLORIDE 120 MG/1
120 TABLET ORAL 2 TIMES DAILY
COMMUNITY

## 2023-12-26 RX ORDER — DIPHENHYDRAMINE HYDROCHLORIDE 50 MG/ML
12.5 INJECTION INTRAMUSCULAR; INTRAVENOUS
Status: DISCONTINUED | OUTPATIENT
Start: 2023-12-26 | End: 2023-12-26 | Stop reason: HOSPADM

## 2023-12-26 RX ORDER — HYDROCORTISONE ACETATE 0.5 %
1500 CREAM (GRAM) TOPICAL DAILY
COMMUNITY

## 2023-12-26 RX ORDER — ISOSORBIDE MONONITRATE 30 MG/1
30 TABLET, EXTENDED RELEASE ORAL 2 TIMES DAILY
COMMUNITY

## 2023-12-26 RX ORDER — ROSUVASTATIN CALCIUM 10 MG/1
20 TABLET, COATED ORAL NIGHTLY
Status: DISCONTINUED | OUTPATIENT
Start: 2023-12-26 | End: 2023-12-27 | Stop reason: HOSPADM

## 2023-12-26 RX ORDER — ACETAMINOPHEN 650 MG/1
650 SUPPOSITORY RECTAL EVERY 4 HOURS PRN
Status: DISCONTINUED | OUTPATIENT
Start: 2023-12-26 | End: 2023-12-27 | Stop reason: HOSPADM

## 2023-12-26 RX ORDER — COLCHICINE 0.6 MG/1
0.6 TABLET ORAL NIGHTLY
Status: DISCONTINUED | OUTPATIENT
Start: 2023-12-26 | End: 2023-12-27 | Stop reason: HOSPADM

## 2023-12-26 RX ORDER — CHOLECALCIFEROL (VITAMIN D3) 125 MCG
5 CAPSULE ORAL NIGHTLY PRN
Status: DISCONTINUED | OUTPATIENT
Start: 2023-12-26 | End: 2023-12-27 | Stop reason: HOSPADM

## 2023-12-26 RX ORDER — SODIUM CHLORIDE, SODIUM LACTATE, POTASSIUM CHLORIDE, CALCIUM CHLORIDE 600; 310; 30; 20 MG/100ML; MG/100ML; MG/100ML; MG/100ML
INJECTION, SOLUTION INTRAVENOUS CONTINUOUS PRN
Status: DISCONTINUED | OUTPATIENT
Start: 2023-12-26 | End: 2023-12-26 | Stop reason: SURG

## 2023-12-26 RX ORDER — FUROSEMIDE 40 MG/1
40 TABLET ORAL DAILY
COMMUNITY

## 2023-12-26 RX ORDER — PANTOPRAZOLE SODIUM 40 MG/1
40 TABLET, DELAYED RELEASE ORAL DAILY
COMMUNITY

## 2023-12-26 RX ORDER — FENTANYL CITRATE 50 UG/ML
50 INJECTION, SOLUTION INTRAMUSCULAR; INTRAVENOUS
Status: DISCONTINUED | OUTPATIENT
Start: 2023-12-26 | End: 2023-12-26 | Stop reason: HOSPADM

## 2023-12-26 RX ORDER — HEPARIN SODIUM 1000 [USP'U]/ML
INJECTION, SOLUTION INTRAVENOUS; SUBCUTANEOUS AS NEEDED
Status: DISCONTINUED | OUTPATIENT
Start: 2023-12-26 | End: 2023-12-26 | Stop reason: SURG

## 2023-12-26 RX ORDER — POLYETHYLENE GLYCOL 3350 17 G/17G
17 POWDER, FOR SOLUTION ORAL NIGHTLY
COMMUNITY

## 2023-12-26 RX ORDER — ONDANSETRON 2 MG/ML
4 INJECTION INTRAMUSCULAR; INTRAVENOUS EVERY 6 HOURS PRN
Status: DISCONTINUED | OUTPATIENT
Start: 2023-12-26 | End: 2023-12-27 | Stop reason: HOSPADM

## 2023-12-26 RX ORDER — NALOXONE HCL 0.4 MG/ML
0.2 VIAL (ML) INJECTION AS NEEDED
Status: DISCONTINUED | OUTPATIENT
Start: 2023-12-26 | End: 2023-12-26 | Stop reason: HOSPADM

## 2023-12-26 RX ORDER — IPRATROPIUM BROMIDE AND ALBUTEROL SULFATE 2.5; .5 MG/3ML; MG/3ML
3 SOLUTION RESPIRATORY (INHALATION) ONCE AS NEEDED
Status: DISCONTINUED | OUTPATIENT
Start: 2023-12-26 | End: 2023-12-26 | Stop reason: HOSPADM

## 2023-12-26 RX ORDER — SOTALOL HYDROCHLORIDE 120 MG/1
120 TABLET ORAL 2 TIMES DAILY
Status: DISCONTINUED | OUTPATIENT
Start: 2023-12-26 | End: 2023-12-27 | Stop reason: HOSPADM

## 2023-12-26 RX ORDER — ONDANSETRON 2 MG/ML
4 INJECTION INTRAMUSCULAR; INTRAVENOUS ONCE AS NEEDED
Status: DISCONTINUED | OUTPATIENT
Start: 2023-12-26 | End: 2023-12-26 | Stop reason: HOSPADM

## 2023-12-26 RX ORDER — TERAZOSIN 2 MG/1
2 CAPSULE ORAL NIGHTLY
Status: DISCONTINUED | OUTPATIENT
Start: 2023-12-26 | End: 2023-12-27 | Stop reason: HOSPADM

## 2023-12-26 RX ORDER — ROCURONIUM BROMIDE 10 MG/ML
INJECTION, SOLUTION INTRAVENOUS AS NEEDED
Status: DISCONTINUED | OUTPATIENT
Start: 2023-12-26 | End: 2023-12-26 | Stop reason: SURG

## 2023-12-26 RX ADMIN — POTASSIUM CHLORIDE 20 MEQ: 1500 TABLET, EXTENDED RELEASE ORAL at 20:22

## 2023-12-26 RX ADMIN — CEFAZOLIN 1 G: 1 INJECTION, POWDER, FOR SOLUTION INTRAMUSCULAR; INTRAVENOUS at 13:36

## 2023-12-26 RX ADMIN — ASPIRIN 81 MG: 81 TABLET, COATED ORAL at 20:23

## 2023-12-26 RX ADMIN — COLCHICINE 0.6 MG: 0.6 TABLET ORAL at 20:24

## 2023-12-26 RX ADMIN — ROCURONIUM BROMIDE 50 MG: 10 INJECTION, SOLUTION INTRAVENOUS at 13:27

## 2023-12-26 RX ADMIN — PROTAMINE SULFATE 50 MG: 10 INJECTION, SOLUTION INTRAVENOUS at 13:56

## 2023-12-26 RX ADMIN — HEPARIN SODIUM 1000 UNITS: 1000 INJECTION, SOLUTION INTRAVENOUS; SUBCUTANEOUS at 13:50

## 2023-12-26 RX ADMIN — PROPOFOL INJECTABLE EMULSION 120 MG: 10 INJECTION, EMULSION INTRAVENOUS at 13:27

## 2023-12-26 RX ADMIN — LIDOCAINE HYDROCHLORIDE 50 MG: 10 INJECTION, SOLUTION INFILTRATION; PERINEURAL at 13:27

## 2023-12-26 RX ADMIN — HEPARIN SODIUM 5000 UNITS: 1000 INJECTION, SOLUTION INTRAVENOUS; SUBCUTANEOUS at 13:46

## 2023-12-26 RX ADMIN — Medication 5 MG: at 20:22

## 2023-12-26 RX ADMIN — PROPOFOL 100 MCG/KG/MIN: 10 INJECTION, EMULSION INTRAVENOUS at 13:31

## 2023-12-26 RX ADMIN — SOTALOL HYDROCHLORIDE 120 MG: 120 TABLET ORAL at 20:23

## 2023-12-26 RX ADMIN — TERAZOSIN HYDROCHLORIDE 2 MG: 2 CAPSULE ORAL at 20:23

## 2023-12-26 RX ADMIN — DEXAMETHASONE SODIUM PHOSPHATE 4 MG: 4 INJECTION, SOLUTION INTRAMUSCULAR; INTRAVENOUS at 13:51

## 2023-12-26 RX ADMIN — SUGAMMADEX 200 MG: 100 INJECTION, SOLUTION INTRAVENOUS at 14:06

## 2023-12-26 RX ADMIN — SODIUM CHLORIDE, SODIUM LACTATE, POTASSIUM CHLORIDE, AND CALCIUM CHLORIDE: .6; .31; .03; .02 INJECTION, SOLUTION INTRAVENOUS at 13:18

## 2023-12-26 RX ADMIN — LIDOCAINE HYDROCHLORIDE 50 MG: 20 INJECTION, SOLUTION EPIDURAL; INFILTRATION; INTRACAUDAL; PERINEURAL at 13:27

## 2023-12-26 RX ADMIN — ROSUVASTATIN 20 MG: 10 TABLET, FILM COATED ORAL at 20:22

## 2023-12-26 RX ADMIN — ONDANSETRON 4 MG: 2 INJECTION INTRAMUSCULAR; INTRAVENOUS at 14:04

## 2023-12-26 NOTE — ANESTHESIA PROCEDURE NOTES
Airway  Urgency: elective    Date/Time: 12/26/2023 1:29 PM  Airway not difficult    General Information and Staff    Patient location during procedure: OR  CRNA/CAA: Kayden Ruiz CRNA    Indications and Patient Condition  Indications for airway management: airway protection    Preoxygenated: yes  MILS maintained throughout  Mask difficulty assessment: 1 - vent by mask    Final Airway Details  Final airway type: endotracheal airway      Successful airway: ETT  Cuffed: yes   Successful intubation technique: direct laryngoscopy  Endotracheal tube insertion site: oral  Blade: Rhoda  Blade size: 3  ETT size (mm): 7.0  Cormack-Lehane Classification: grade I - full view of glottis  Placement verified by: chest auscultation   Measured from: teeth  ETT/EBT  to teeth (cm): 22  Number of attempts at approach: 1  Assessment: lips, teeth, and gum same as pre-op and atraumatic intubation

## 2023-12-26 NOTE — ANESTHESIA PREPROCEDURE EVALUATION
Anesthesia Evaluation     Patient summary reviewed and Nursing notes reviewed   no history of anesthetic complications:   NPO Solid Status: > 8 hours  NPO Liquid Status: > 8 hours           Airway   Mallampati: I  TM distance: >3 FB  Neck ROM: full  No difficulty expected  Dental    (+) implants    Comment: Caps bridge and implants    Pulmonary - normal exam   (+) a smoker Former, asthma,shortness of breath  Cardiovascular - normal exam  Exercise tolerance: poor (<4 METS)    ECG reviewed  PT is on anticoagulation therapy    (+) hypertension, valvular problems/murmurs, CAD, CABG, dysrhythmias, PVD, hyperlipidemia,  carotid artery disease    ROS comment: ·  Left ventricular systolic function is normal. Left ventricular ejection fraction appears to be 61 - 65%.  ·  Left ventricular wall thickness is consistent with mild concentric hypertrophy.  ·  Left ventricular diastolic function is consistent with (grade I) impaired relaxation.  ·  The left atrial cavity is mild to moderately dilated.  ·  The right atrial cavity is mildly  dilated.  ·  There is a bioprosthetic aortic valve present. The prosthetic aortic valve peak and mean gradients are elevated.  ·  Aortic valve maximum pressure gradient is 38 mmHg. Aortic valve mean pressure gradient is 20 mmHg.  Mod to severe AS  ·  Moderate mitral valve regurgi  10/2023      Neuro/Psych  (+) psychiatric history  GI/Hepatic/Renal/Endo    (+) GERD, renal disease-, thyroid problem hypothyroidism and hyperthyroidism    Musculoskeletal (-) negative ROS    Abdominal  - normal exam    Bowel sounds: normal.   Substance History      OB/GYN          Other   arthritis, blood dyscrasia anemia,     ROS/Med Hx Other: EGD 4/2023 90MG PROPOFOL                    Anesthesia Plan    ASA 3     general   total IV anesthesia  intravenous induction     Anesthetic plan, risks, benefits, and alternatives have been provided, discussed and informed consent has been obtained with: patient.    Plan  discussed with CRNA.      CODE STATUS:

## 2023-12-26 NOTE — H&P
CC: Sick sinus syndrome with dual-chamber pacemaker in situ follow up .     Sub  88-year-old pleasant patient has sick sinus syndrome, paroxysmal atrial fibrillation and pacemaker in situ and comes in for follow-up  Patient has bioprosthetic aortic valve placed in 2014 with elevated gradients with mean gradient up to 29.  She additionally has history of coronary artery disease with prior bypass surgery and bilateral renal artery stenosis, carotid disease and dyslipidemia.  She is currently on sotalol for suppression of atrial arrhythmias and a prior CT head revealing a right small subdural hematoma.  Patient recently had COVID infection breakthrough AF despite sotalol and comes in for follow-up  He also underwent a RACHEL and shared decision making for Watchman device implantation because of prior history of falls and subdural hematoma           Past Medical History:     Reviewed history from 06/13/2016 and no changes required:        AVR 2014-        Coronary artery disease: S/P CABG and PCI with stenting-        Carotid disease;left side 50-74%-Patrick Ocampo        Inferior myocardial infarction 12-06-        Asthma        Anxiety Disorder        Depression        G E R D        Hyperlipidemia        Hypertension-        Hyperthyroidism        fx thoracic ?        shoulder fx        Rotator cuff syndrome         Gout         Paroxysmal atrial fib        Arthritis        Hypothyroid        No Drug Allergies?         Eye exam:2014 &2015 Dr.Kelley Parekh in [place]         Rheumatoid Arthritis     Past Surgical History:     Reviewed history from 10/16/2018 and no changes required:        PCI/stent, LCX, 3-20-06, Cypher stent        PCI/stent x 2, LCX & priximal diagonal, 12-3-06, [employer] stents        cataract r eye 12/07  lt eye 01/08        CABG x 3  07-19-08        thoracentesis l lung 8/08        Right rotator cuff repair - Oct. 15, 2013        Cholecystectomy-2009        Left Knee  Arthoplasty-2014        Left Shoulder Replacement 4/2014        Aortic Valve  Replacement 6/11/2014        Heart Catherization:2/18/2015        Pacemaker placed 6/6/16        Colonoscopy 2011         Surgery Finger 2018                     Physical Exam     General:      well developed, well nourished, in no acute distress.    Head:      normocephalic and atraumatic.    Eyes:      PERRL/EOM intact, conjunctivae and sclerae clear without nystagmus.    Neck:      no  thyromegaly, trachea central with normal respiratory effort  Lungs:      clear bilaterally to auscultation.    Heart:       regular rate and rhythm, S1, S2 without murmurs, rubs, or gallops  Skin:      intact without lesions or rashes.    Psych:      alert and cooperative; normal mood and affect; normal attention span and concentration.                            Impressions     Recent hemoglobin 10.3.  INR is therapeutic creatinine and platelets and potassium normal  Recent COVID illness and patient has recovered in room air  Dual-chamber pacemaker in situ and home monitoring reviewed  Bioprosthetic aortic valve placed in 2014 with elevated mean gradient up to 29  History of renal artery stenosis and peripheral arterial disease  History of small subdural hematoma and iron deficiency anemia  Hypothyroidism supplemented on levothyroxine  Hypertension controlled with diltiazem  Hyperlipidemia on rosuvastatin  Watchman device procedure DW patient and orders placed    Electronically signed by Pk Crabtree MD, 12/26/23, 2:26 PM EST.

## 2023-12-27 ENCOUNTER — READMISSION MANAGEMENT (OUTPATIENT)
Dept: CALL CENTER | Facility: HOSPITAL | Age: 88
End: 2023-12-27
Payer: MEDICARE

## 2023-12-27 VITALS
DIASTOLIC BLOOD PRESSURE: 48 MMHG | OXYGEN SATURATION: 96 % | WEIGHT: 142.64 LBS | RESPIRATION RATE: 14 BRPM | HEIGHT: 60 IN | TEMPERATURE: 97.8 F | HEART RATE: 71 BPM | BODY MASS INDEX: 28 KG/M2 | SYSTOLIC BLOOD PRESSURE: 146 MMHG

## 2023-12-27 PROBLEM — Z95.818 PRESENCE OF WATCHMAN LEFT ATRIAL APPENDAGE CLOSURE DEVICE: Status: ACTIVE | Noted: 2023-12-27

## 2023-12-27 LAB
ANION GAP SERPL CALCULATED.3IONS-SCNC: 7 MMOL/L (ref 5–15)
BUN SERPL-MCNC: 18 MG/DL (ref 8–23)
BUN/CREAT SERPL: 28.1 (ref 7–25)
CALCIUM SPEC-SCNC: 9.2 MG/DL (ref 8.6–10.5)
CHLORIDE SERPL-SCNC: 106 MMOL/L (ref 98–107)
CO2 SERPL-SCNC: 28 MMOL/L (ref 22–29)
CREAT SERPL-MCNC: 0.64 MG/DL (ref 0.57–1)
DEPRECATED RDW RBC AUTO: 66.5 FL (ref 37–54)
EGFRCR SERPLBLD CKD-EPI 2021: 84.6 ML/MIN/1.73
ERYTHROCYTE [DISTWIDTH] IN BLOOD BY AUTOMATED COUNT: 18.4 % (ref 12.3–15.4)
GLUCOSE SERPL-MCNC: 139 MG/DL (ref 65–99)
HCT VFR BLD AUTO: 28.4 % (ref 34–46.6)
HGB BLD-MCNC: 9.2 G/DL (ref 12–15.9)
MCH RBC QN AUTO: 31.7 PG (ref 26.6–33)
MCHC RBC AUTO-ENTMCNC: 32.5 G/DL (ref 31.5–35.7)
MCV RBC AUTO: 97.4 FL (ref 79–97)
PLATELET # BLD AUTO: 205 10*3/MM3 (ref 140–450)
PMV BLD AUTO: 7.6 FL (ref 6–12)
POTASSIUM SERPL-SCNC: 4.7 MMOL/L (ref 3.5–5.2)
RBC # BLD AUTO: 2.91 10*6/MM3 (ref 3.77–5.28)
SODIUM SERPL-SCNC: 141 MMOL/L (ref 136–145)
WBC NRBC COR # BLD AUTO: 4.1 10*3/MM3 (ref 3.4–10.8)

## 2023-12-27 PROCEDURE — 80048 BASIC METABOLIC PNL TOTAL CA: CPT | Performed by: INTERNAL MEDICINE

## 2023-12-27 PROCEDURE — 99238 HOSP IP/OBS DSCHRG MGMT 30/<: CPT | Performed by: INTERNAL MEDICINE

## 2023-12-27 PROCEDURE — 85027 COMPLETE CBC AUTOMATED: CPT | Performed by: INTERNAL MEDICINE

## 2023-12-27 RX ORDER — CLOPIDOGREL BISULFATE 75 MG/1
75 TABLET ORAL DAILY
Qty: 90 TABLET | Refills: 3 | Status: SHIPPED | OUTPATIENT
Start: 2023-12-27

## 2023-12-27 RX ADMIN — SERTRALINE 100 MG: 100 TABLET, FILM COATED ORAL at 09:03

## 2023-12-27 RX ADMIN — FUROSEMIDE 40 MG: 40 TABLET ORAL at 09:03

## 2023-12-27 RX ADMIN — ISOSORBIDE MONONITRATE 30 MG: 30 TABLET, EXTENDED RELEASE ORAL at 09:04

## 2023-12-27 RX ADMIN — PANTOPRAZOLE SODIUM 40 MG: 40 TABLET, DELAYED RELEASE ORAL at 05:55

## 2023-12-27 RX ADMIN — FERROUS SULFATE TAB EC 324 MG (65 MG FE EQUIVALENT) 324 MG: 324 (65 FE) TABLET DELAYED RESPONSE at 09:03

## 2023-12-27 RX ADMIN — DILTIAZEM HYDROCHLORIDE 240 MG: 240 CAPSULE, EXTENDED RELEASE ORAL at 09:03

## 2023-12-27 RX ADMIN — POTASSIUM CHLORIDE 20 MEQ: 1500 TABLET, EXTENDED RELEASE ORAL at 09:03

## 2023-12-27 RX ADMIN — FOLIC ACID 1 MG: 1 TABLET ORAL at 09:03

## 2023-12-27 RX ADMIN — LEVOTHYROXINE SODIUM 75 MCG: 0.07 TABLET ORAL at 05:55

## 2023-12-27 RX ADMIN — SOTALOL HYDROCHLORIDE 120 MG: 120 TABLET ORAL at 09:03

## 2023-12-27 NOTE — CASE MANAGEMENT/SOCIAL WORK
Continued Stay Note   [facility]     Patient Name: Tracy Jane  MRN: [id number]  Today's Date: 12/27/2023    Admit Date: 12/26/2023    Plan: CM IMM needed.   Discharge Plan       Row Name 12/27/23 1638       Plan    Plan CM IMM needed.    Plan Comments CM attempted to give patient IMM but the patient had already discharged.    Final Discharge Disposition Code 01 - home or self-care                    Expected Discharge Date and Time       Expected Discharge Date Expected Discharge Time    Dec 27, 2023           Met with patient in room wearing PPE: mask.    Maintained distance greater than six feet and spent less than 15 minutes in the room.       Dariela Shannon RN

## 2023-12-27 NOTE — ANESTHESIA POSTPROCEDURE EVALUATION
Patient: Tracy Jane    Procedure Summary       Date: 12/26/23 Room / Location: [place] CATH LAB 3 / [facility] CATH INVASIVE LOCATION    Anesthesia Start: 1318 Anesthesia Stop: 1420    Procedures:       Atrial Appendage Occlusion (Right: Groin)      Atrial Appendage Occlusion Diagnosis:       Paroxysmal atrial fibrillation      Hx of aortic valve replacement      Iron deficiency anemia due to chronic blood loss      (af)    Providers: kP Crabtree MD; Gen Caballero MD Provider: Carola Baird MD    Anesthesia Type: general ASA Status: 3            Anesthesia Type: general    Vitals  Vitals Value Taken Time   /46 12/26/23 1525   Temp 97.8 °F (36.6 °C) 12/26/23 1424   Pulse 71 12/26/23 1525   Resp 19 12/26/23 1525   SpO2 94 % 12/26/23 1525           Post Anesthesia Care and Evaluation    Patient location during evaluation: PACU  Patient participation: complete - patient participated  Level of consciousness: awake and alert  Pain management: satisfactory to patient    Airway patency: patent  Anesthetic complications: No anesthetic complications  PONV Status: none  Cardiovascular status: acceptable  Respiratory status: acceptable  Hydration status: acceptable

## 2023-12-27 NOTE — CASE MANAGEMENT/SOCIAL WORK
Case Management Discharge Note             Transportation Services  Private: Car    Final Discharge Disposition Code: 01 - home or self-care

## 2023-12-27 NOTE — DISCHARGE SUMMARY
"  Date of Discharge:  12/27/2023    Discharge Diagnosis:   Active Hospital Problems    Diagnosis  POA    **Presence of Watchman left atrial appendage closure device [Z95.818]  Unknown    PAF (paroxysmal atrial fibrillation) [I48.0]  Yes    Paroxysmal atrial fibrillation [I48.0]  Unknown    Hx of aortic valve replacement [Z95.2]  Not Applicable    Anemia [D64.9]  Unknown      Resolved Hospital Problems   No resolved problems to display.       Presenting Problem/History of Present Illness  Paroxysmal atrial fibrillation [I48.0]  Hx of aortic valve replacement [Z95.2]  Iron deficiency anemia due to chronic blood loss [D50.0]  PAF (paroxysmal atrial fibrillation) [I48.0]      Hospital Course  Patient is a 89 y.o. female presented with atrial fibrillation and high CHADS2 Vascor but poor candidate for long-term anticoagulation because of fall subdural hematoma and blood loss anemia was brought in for watchman.  Did well and is being discharged home on aspirin and Plavix.  Will discontinue warfarin.  Is to follow-up with  in 1 week.  Will schedule outpatient 45-day RACHEL at that time.  [id number] SCORE   [id number] Score: 5 (12/27/2023  9:53 AM)   .         Procedures Performed  Procedure(s):  Atrial Appendage Occlusion  Atrial Appendage Occlusion       Consults:   Consults       No orders found for last 30 day(s).                Ejection Fraction  No results found for: \"EF\"    Echo EF Estimated  No results found for: \"ECHOEFEST\"    Nuclear Stress Ejection Fraction  No components found for: \"NUCEF\"    Cath Ejection Fraction Quantitative  No results found for: \"CATHEF\"    Condition on Discharge: Stable    Physical Exam at Discharge :    Vital Signs  Temp:  [97.6 °F (36.4 °C)-98.5 °F (36.9 °C)] 97.8 °F (36.6 °C)  Heart Rate:  [] 71  Resp:  [11-20] 14  BP: (112-169)/() 146/48  General:  Appears in no acute distress  Eyes: Sclera is anicteric,  conjunctiva is clear   HEENT:  No JVD. Thyroid not " visibly enlarged. No mucosal pallor or cyanosis  Respiratory: Respirations regular and unlabored at rest.  CTA  Cardiovascular: S1,S2 Regular rate and rhythm. No murmur, rub or gallop auscultated.  . No pretibial pitting edema  Gastrointestinal: Abdomen nondistended, soft  Musculoskeletal:  No abnormal movements  Extremities: No digital clubbing or cyanosis  Skin: Color pink. Skin warm and dry to touch. No rashes  No xanthoma  Neuro: Alert and awake, no lateralizing deficits appreciated        Discharge Disposition Home  Home or Self Care    Discharge Medications     Discharge Medications        New Medications        Instructions Start Date   clopidogrel 75 MG tablet  Commonly known as: PLAVIX   75 mg, Oral, Daily             Continue These Medications        Instructions Start Date   acetaminophen 650 MG 8 hr tablet  Commonly known as: TYLENOL   650 mg, Oral, Every 8 Hours PRN      aspirin 81 MG EC tablet   81 mg, Oral, Nightly      CALCIUM 1200 PO   1 tablet, Oral, Daily      Calcium 200 MG tablet   1,200 mg, Oral, Daily      cholecalciferol 25 MCG (1000 UT) tablet  Commonly known as: VITAMIN D3   1,000 Units, Oral, Daily      colchicine 0.6 MG capsule capsule   0.6 mg, Oral, Nightly      COLLAGEN PO   20 g, Oral, Daily      Cranberry 500 MG capsule   500 mg, Oral, Nightly      dilTIAZem  MG 24 hr capsule  Commonly known as: CARDIZEM CD   240 mg, Oral, Daily      doxazosin 2 MG tablet  Commonly known as: CARDURA   2 mg, Oral, Daily      ferrous sulfate 324 (65 Fe) MG tablet delayed-release EC tablet   324 mg, Oral, Daily With Breakfast      fish oil 1200 MG capsule capsule   1,200 mg, Oral, 2 Times Daily With Meals      folic acid 1 MG tablet  Commonly known as: FOLVITE   1 mg, Oral, Daily      furosemide 40 MG tablet  Commonly known as: LASIX   40 mg, Oral, Daily      Glucosamine 1500 Complex capsule   1,500 mg, Oral, Daily      isosorbide mononitrate 30 MG 24 hr tablet  Commonly known as: IMDUR   30 mg,  Oral, 2 Times Daily      levothyroxine 75 MCG tablet  Commonly known as: SYNTHROID, LEVOTHROID   75 mcg, Oral, Take As Directed, Monday - Friday      melatonin 5 MG sublingual tablet sublingual tablet   Sublingual      methotrexate 2.5 MG tablet   12.5 mg, Oral, Take As Directed, 5 tablets on Sunday Morning AND 5 tablets Sunday Evening = 10 Total Tablets on Sunday      MiraLax 17 g packet  Generic drug: polyethylene glycol   17 g, Oral, Nightly      MSM 1000 MG tablet   1 tablet, Oral, 2 Times Daily      multivitamin tablet  Generic drug: multivitamin   1 tablet, Oral, Daily      pantoprazole 40 MG EC tablet  Commonly known as: PROTONIX   40 mg, Oral, Daily      potassium chloride 20 MEQ CR tablet  Commonly known as: K-DUR,KLOR-CON   20 mEq, Oral, 2 Times Daily      rosuvastatin 20 MG tablet  Commonly known as: CRESTOR   20 mg, Oral, Nightly      sertraline 100 MG tablet  Commonly known as: ZOLOFT   Take 1 tablet by mouth Daily.      sotalol 120 MG tablet  Commonly known as: BETAPACE   120 mg, Oral, 2 Times Daily      vitamin C 250 MG tablet  Commonly known as: ASCORBIC ACID   1,000 mg, Oral, Daily      Zinc 50 MG tablet   1 tablet, Oral, Nightly             Stop These Medications      warfarin 5 MG tablet  Commonly known as: COUMADIN              Discharge Diet: Cardiac diet    Activity at Discharge: Activity as tolerated    Follow-up Appointments: Follow-up with me in 1 month  Future Appointments   Date Time Provider Department Center   4/25/2024 11:00 AM Bandar Henley DO [facility] FLO   10/8/2024 11:00 AM LAB  [facility] None   10/15/2024 11:15 AM Chika Paula MD [facility] END NA DEL              Gen Caballero MD  12/27/23  09:53 EST    Time: Discharge Time Spent:30

## 2023-12-27 NOTE — PLAN OF CARE
Goal Outcome Evaluation:  Plan of Care Reviewed With: patient, daughter        Progress: improving  Outcome Evaluation: Pt resting comfortably in bed overnight on RA, denies pain. Dtr at bedside. R groin site remain C/D/I/soft. VSS. Education provided on post-watchman restrictions. Plan for discharge home today.

## 2023-12-28 NOTE — OUTREACH NOTE
Prep Survey      Flowsheet Row Responses   [facility] facility patient discharged from? [facility]   Is LACE score < 7 ? No   Eligibility Readm Mgmt   Discharge diagnosis Watchman placement   Does the patient have one of the following disease processes/diagnoses(primary or secondary)? Other   Does the patient have Home health ordered? No   Is there a DME ordered? No   Prep survey completed? Yes            SHELBY POON - Registered Nurse

## 2023-12-29 RX ORDER — LEVOTHYROXINE SODIUM 0.07 MG/1
TABLET ORAL
Qty: 60 TABLET | Refills: 6 | Status: SHIPPED | OUTPATIENT
Start: 2023-12-29

## 2024-01-01 ENCOUNTER — APPOINTMENT (OUTPATIENT)
Dept: GENERAL RADIOLOGY | Facility: HOSPITAL | Age: 89
DRG: 689 | End: 2024-01-01
Payer: MEDICARE

## 2024-01-01 ENCOUNTER — APPOINTMENT (OUTPATIENT)
Dept: CARDIOLOGY | Facility: HOSPITAL | Age: 89
DRG: 689 | End: 2024-01-01
Payer: MEDICARE

## 2024-01-01 ENCOUNTER — HOSPITAL ENCOUNTER (INPATIENT)
Facility: HOSPITAL | Age: 89
LOS: 10 days | Discharge: HOSPICE | DRG: 689 | End: 2024-08-25
Attending: EMERGENCY MEDICINE | Admitting: STUDENT IN AN ORGANIZED HEALTH CARE EDUCATION/TRAINING PROGRAM
Payer: MEDICARE

## 2024-01-01 ENCOUNTER — TELEPHONE (OUTPATIENT)
Dept: CARDIOLOGY | Facility: CLINIC | Age: 89
End: 2024-01-01

## 2024-01-01 ENCOUNTER — APPOINTMENT (OUTPATIENT)
Dept: CT IMAGING | Facility: HOSPITAL | Age: 89
DRG: 689 | End: 2024-01-01
Payer: MEDICARE

## 2024-01-01 ENCOUNTER — HOSPITAL ENCOUNTER (INPATIENT)
Facility: HOSPITAL | Age: 89
LOS: 2 days | End: 2024-08-27
Attending: INTERNAL MEDICINE | Admitting: INTERNAL MEDICINE
Payer: OTHER MISCELLANEOUS

## 2024-01-01 VITALS
HEIGHT: 60 IN | OXYGEN SATURATION: 96 % | RESPIRATION RATE: 16 BRPM | SYSTOLIC BLOOD PRESSURE: 141 MMHG | WEIGHT: 122.8 LBS | TEMPERATURE: 97.8 F | DIASTOLIC BLOOD PRESSURE: 53 MMHG | HEART RATE: 114 BPM | BODY MASS INDEX: 24.11 KG/M2

## 2024-01-01 VITALS — DIASTOLIC BLOOD PRESSURE: 50 MMHG | HEART RATE: 103 BPM | SYSTOLIC BLOOD PRESSURE: 123 MMHG

## 2024-01-01 DIAGNOSIS — D64.9 ANEMIA, UNSPECIFIED TYPE: ICD-10-CM

## 2024-01-01 DIAGNOSIS — N30.01 ACUTE CYSTITIS WITH HEMATURIA: Primary | ICD-10-CM

## 2024-01-01 DIAGNOSIS — T17.800A MULTIPLE TRACHEOBRONCHIAL MUCUS PLUGS: ICD-10-CM

## 2024-01-01 DIAGNOSIS — R11.2 NAUSEA AND VOMITING, UNSPECIFIED VOMITING TYPE: ICD-10-CM

## 2024-01-01 DIAGNOSIS — J18.9 MULTIFOCAL PNEUMONIA: ICD-10-CM

## 2024-01-01 LAB
ALBUMIN SERPL-MCNC: 2.8 G/DL (ref 3.5–5.2)
ALBUMIN SERPL-MCNC: 2.8 G/DL (ref 3.5–5.2)
ALBUMIN SERPL-MCNC: 3 G/DL (ref 3.5–5.2)
ALBUMIN SERPL-MCNC: 3.4 G/DL (ref 3.5–5.2)
ALBUMIN/GLOB SERPL: 0.8 G/DL
ALBUMIN/GLOB SERPL: 1.1 G/DL
ALP SERPL-CCNC: 101 U/L (ref 39–117)
ALP SERPL-CCNC: 105 U/L (ref 39–117)
ALP SERPL-CCNC: 113 U/L (ref 39–117)
ALP SERPL-CCNC: 94 U/L (ref 39–117)
ALT SERPL W P-5'-P-CCNC: 12 U/L (ref 1–33)
ALT SERPL W P-5'-P-CCNC: 15 U/L (ref 1–33)
ALT SERPL W P-5'-P-CCNC: 16 U/L (ref 1–33)
ALT SERPL W P-5'-P-CCNC: 47 U/L (ref 1–33)
ANION GAP SERPL CALCULATED.3IONS-SCNC: 10.8 MMOL/L (ref 5–15)
ANION GAP SERPL CALCULATED.3IONS-SCNC: 11.2 MMOL/L (ref 5–15)
ANION GAP SERPL CALCULATED.3IONS-SCNC: 11.3 MMOL/L (ref 5–15)
ANION GAP SERPL CALCULATED.3IONS-SCNC: 13.9 MMOL/L (ref 5–15)
ANION GAP SERPL CALCULATED.3IONS-SCNC: 7.2 MMOL/L (ref 5–15)
ANION GAP SERPL CALCULATED.3IONS-SCNC: 7.9 MMOL/L (ref 5–15)
ANION GAP SERPL CALCULATED.3IONS-SCNC: 8.6 MMOL/L (ref 5–15)
ANION GAP SERPL CALCULATED.3IONS-SCNC: 9 MMOL/L (ref 5–15)
ANION GAP SERPL CALCULATED.3IONS-SCNC: 9.3 MMOL/L (ref 5–15)
ANION GAP SERPL CALCULATED.3IONS-SCNC: 9.9 MMOL/L (ref 5–15)
ANISOCYTOSIS BLD QL: NORMAL
AST SERPL-CCNC: 21 U/L (ref 1–32)
AST SERPL-CCNC: 22 U/L (ref 1–32)
AST SERPL-CCNC: 30 U/L (ref 1–32)
AST SERPL-CCNC: 83 U/L (ref 1–32)
B PARAPERT DNA SPEC QL NAA+PROBE: NOT DETECTED
B PARAPERT DNA SPEC QL NAA+PROBE: NOT DETECTED
B PERT DNA SPEC QL NAA+PROBE: NOT DETECTED
B PERT DNA SPEC QL NAA+PROBE: NOT DETECTED
BACTERIA BLD CULT: ABNORMAL
BACTERIA SPEC AEROBE CULT: ABNORMAL
BACTERIA SPEC AEROBE CULT: ABNORMAL
BACTERIA SPEC AEROBE CULT: NORMAL
BACTERIA UR QL AUTO: ABNORMAL /HPF
BASOPHILS # BLD AUTO: 0.01 10*3/MM3 (ref 0–0.2)
BASOPHILS # BLD AUTO: 0.01 10*3/MM3 (ref 0–0.2)
BASOPHILS # BLD AUTO: 0.02 10*3/MM3 (ref 0–0.2)
BASOPHILS # BLD AUTO: 0.03 10*3/MM3 (ref 0–0.2)
BASOPHILS NFR BLD AUTO: 0.1 % (ref 0–1.5)
BASOPHILS NFR BLD AUTO: 0.2 % (ref 0–1.5)
BASOPHILS NFR BLD AUTO: 0.3 % (ref 0–1.5)
BH CV ECHO MEAS - AI P1/2T: 271.5 MSEC
BH CV ECHO MEAS - AO MAX PG: 30 MMHG
BH CV ECHO MEAS - AO MEAN PG: 16 MMHG
BH CV ECHO MEAS - AO V2 MAX: 274 CM/SEC
BH CV ECHO MEAS - AO V2 VTI: 45.4 CM
BH CV ECHO MEAS - MR MAX PG: 134.6 MMHG
BH CV ECHO MEAS - MR MAX VEL: 580 CM/SEC
BH CV ECHO MEAS - TR MAX PG: 24 MMHG
BH CV ECHO MEAS - TR MAX VEL: 245 CM/SEC
BILIRUB SERPL-MCNC: 0.4 MG/DL (ref 0–1.2)
BILIRUB SERPL-MCNC: 0.6 MG/DL (ref 0–1.2)
BILIRUB UR QL STRIP: NEGATIVE
BOTTLE TYPE: ABNORMAL
BUN SERPL-MCNC: 10 MG/DL (ref 8–23)
BUN SERPL-MCNC: 10 MG/DL (ref 8–23)
BUN SERPL-MCNC: 15 MG/DL (ref 8–23)
BUN SERPL-MCNC: 25 MG/DL (ref 8–23)
BUN SERPL-MCNC: 30 MG/DL (ref 8–23)
BUN SERPL-MCNC: 31 MG/DL (ref 8–23)
BUN SERPL-MCNC: 33 MG/DL (ref 8–23)
BUN SERPL-MCNC: 39 MG/DL (ref 8–23)
BUN SERPL-MCNC: 41 MG/DL (ref 8–23)
BUN SERPL-MCNC: 46 MG/DL (ref 8–23)
BUN/CREAT SERPL: 12.3 (ref 7–25)
BUN/CREAT SERPL: 12.7 (ref 7–25)
BUN/CREAT SERPL: 17.6 (ref 7–25)
BUN/CREAT SERPL: 26.6 (ref 7–25)
BUN/CREAT SERPL: 34.5 (ref 7–25)
BUN/CREAT SERPL: 34.8 (ref 7–25)
BUN/CREAT SERPL: 38.8 (ref 7–25)
BUN/CREAT SERPL: 40.2 (ref 7–25)
BUN/CREAT SERPL: 44.1 (ref 7–25)
BUN/CREAT SERPL: 47.4 (ref 7–25)
C PNEUM DNA NPH QL NAA+NON-PROBE: NOT DETECTED
C PNEUM DNA NPH QL NAA+NON-PROBE: NOT DETECTED
CALCIUM SPEC-SCNC: 10.1 MG/DL (ref 8.6–10.5)
CALCIUM SPEC-SCNC: 10.1 MG/DL (ref 8.6–10.5)
CALCIUM SPEC-SCNC: 9.3 MG/DL (ref 8.6–10.5)
CALCIUM SPEC-SCNC: 9.4 MG/DL (ref 8.6–10.5)
CALCIUM SPEC-SCNC: 9.5 MG/DL (ref 8.6–10.5)
CALCIUM SPEC-SCNC: 9.5 MG/DL (ref 8.6–10.5)
CALCIUM SPEC-SCNC: 9.6 MG/DL (ref 8.6–10.5)
CALCIUM SPEC-SCNC: 9.8 MG/DL (ref 8.6–10.5)
CHLORIDE SERPL-SCNC: 101 MMOL/L (ref 98–107)
CHLORIDE SERPL-SCNC: 101 MMOL/L (ref 98–107)
CHLORIDE SERPL-SCNC: 102 MMOL/L (ref 98–107)
CHLORIDE SERPL-SCNC: 102 MMOL/L (ref 98–107)
CHLORIDE SERPL-SCNC: 104 MMOL/L (ref 98–107)
CHLORIDE SERPL-SCNC: 105 MMOL/L (ref 98–107)
CHLORIDE SERPL-SCNC: 95 MMOL/L (ref 98–107)
CHLORIDE SERPL-SCNC: 97 MMOL/L (ref 98–107)
CHLORIDE SERPL-SCNC: 99 MMOL/L (ref 98–107)
CHLORIDE SERPL-SCNC: 99 MMOL/L (ref 98–107)
CLARITY UR: ABNORMAL
CO2 SERPL-SCNC: 24.7 MMOL/L (ref 22–29)
CO2 SERPL-SCNC: 26.4 MMOL/L (ref 22–29)
CO2 SERPL-SCNC: 27.1 MMOL/L (ref 22–29)
CO2 SERPL-SCNC: 28.8 MMOL/L (ref 22–29)
CO2 SERPL-SCNC: 29.1 MMOL/L (ref 22–29)
CO2 SERPL-SCNC: 29.2 MMOL/L (ref 22–29)
CO2 SERPL-SCNC: 30 MMOL/L (ref 22–29)
CO2 SERPL-SCNC: 34.1 MMOL/L (ref 22–29)
CO2 SERPL-SCNC: 34.7 MMOL/L (ref 22–29)
CO2 SERPL-SCNC: 36.8 MMOL/L (ref 22–29)
COLOR UR: ABNORMAL
CREAT SERPL-MCNC: 0.79 MG/DL (ref 0.57–1)
CREAT SERPL-MCNC: 0.8 MG/DL (ref 0.57–1)
CREAT SERPL-MCNC: 0.81 MG/DL (ref 0.57–1)
CREAT SERPL-MCNC: 0.82 MG/DL (ref 0.57–1)
CREAT SERPL-MCNC: 0.85 MG/DL (ref 0.57–1)
CREAT SERPL-MCNC: 0.87 MG/DL (ref 0.57–1)
CREAT SERPL-MCNC: 0.93 MG/DL (ref 0.57–1)
CREAT SERPL-MCNC: 0.94 MG/DL (ref 0.57–1)
CREAT SERPL-MCNC: 0.97 MG/DL (ref 0.57–1)
CREAT SERPL-MCNC: 1.12 MG/DL (ref 0.57–1)
CRP SERPL-MCNC: 9.35 MG/DL (ref 0–0.5)
D-LACTATE SERPL-SCNC: 1.2 MMOL/L (ref 0.5–2)
DEPRECATED RDW RBC AUTO: 62.7 FL (ref 37–54)
DEPRECATED RDW RBC AUTO: 62.7 FL (ref 37–54)
DEPRECATED RDW RBC AUTO: 63.6 FL (ref 37–54)
DEPRECATED RDW RBC AUTO: 63.8 FL (ref 37–54)
DEPRECATED RDW RBC AUTO: 64.8 FL (ref 37–54)
DEPRECATED RDW RBC AUTO: 66.3 FL (ref 37–54)
DEPRECATED RDW RBC AUTO: 66.6 FL (ref 37–54)
DEPRECATED RDW RBC AUTO: 70.4 FL (ref 37–54)
EGFRCR SERPLBLD CKD-EPI 2021: 47.1 ML/MIN/1.73
EGFRCR SERPLBLD CKD-EPI 2021: 56 ML/MIN/1.73
EGFRCR SERPLBLD CKD-EPI 2021: 58.1 ML/MIN/1.73
EGFRCR SERPLBLD CKD-EPI 2021: 58.9 ML/MIN/1.73
EGFRCR SERPLBLD CKD-EPI 2021: 63.8 ML/MIN/1.73
EGFRCR SERPLBLD CKD-EPI 2021: 65.6 ML/MIN/1.73
EGFRCR SERPLBLD CKD-EPI 2021: 68.5 ML/MIN/1.73
EGFRCR SERPLBLD CKD-EPI 2021: 69.5 ML/MIN/1.73
EGFRCR SERPLBLD CKD-EPI 2021: 70.5 ML/MIN/1.73
EGFRCR SERPLBLD CKD-EPI 2021: 71.6 ML/MIN/1.73
EOSINOPHIL # BLD AUTO: 0 10*3/MM3 (ref 0–0.4)
EOSINOPHIL # BLD AUTO: 0.02 10*3/MM3 (ref 0–0.4)
EOSINOPHIL # BLD AUTO: 0.02 10*3/MM3 (ref 0–0.4)
EOSINOPHIL # BLD AUTO: 0.04 10*3/MM3 (ref 0–0.4)
EOSINOPHIL # BLD AUTO: 0.05 10*3/MM3 (ref 0–0.4)
EOSINOPHIL # BLD AUTO: 0.24 10*3/MM3 (ref 0–0.4)
EOSINOPHIL NFR BLD AUTO: 0 % (ref 0.3–6.2)
EOSINOPHIL NFR BLD AUTO: 0.1 % (ref 0.3–6.2)
EOSINOPHIL NFR BLD AUTO: 0.2 % (ref 0.3–6.2)
EOSINOPHIL NFR BLD AUTO: 0.3 % (ref 0.3–6.2)
EOSINOPHIL NFR BLD AUTO: 0.4 % (ref 0.3–6.2)
EOSINOPHIL NFR BLD AUTO: 1.5 % (ref 0.3–6.2)
ERYTHROCYTE [DISTWIDTH] IN BLOOD BY AUTOMATED COUNT: 17.4 % (ref 12.3–15.4)
ERYTHROCYTE [DISTWIDTH] IN BLOOD BY AUTOMATED COUNT: 17.5 % (ref 12.3–15.4)
ERYTHROCYTE [DISTWIDTH] IN BLOOD BY AUTOMATED COUNT: 18 % (ref 12.3–15.4)
ERYTHROCYTE [DISTWIDTH] IN BLOOD BY AUTOMATED COUNT: 18.1 % (ref 12.3–15.4)
ERYTHROCYTE [DISTWIDTH] IN BLOOD BY AUTOMATED COUNT: 18.4 % (ref 12.3–15.4)
ERYTHROCYTE [DISTWIDTH] IN BLOOD BY AUTOMATED COUNT: 18.6 % (ref 12.3–15.4)
ERYTHROCYTE [DISTWIDTH] IN BLOOD BY AUTOMATED COUNT: 18.6 % (ref 12.3–15.4)
ERYTHROCYTE [DISTWIDTH] IN BLOOD BY AUTOMATED COUNT: 18.7 % (ref 12.3–15.4)
FLUAV SUBTYP SPEC NAA+PROBE: NOT DETECTED
FLUAV SUBTYP SPEC NAA+PROBE: NOT DETECTED
FLUBV RNA ISLT QL NAA+PROBE: NOT DETECTED
FLUBV RNA ISLT QL NAA+PROBE: NOT DETECTED
FOLATE SERPL-MCNC: 12.3 NG/ML (ref 4.78–24.2)
FUNGUS WND CULT: ABNORMAL
GLOBULIN UR ELPH-MCNC: 3.2 GM/DL
GLOBULIN UR ELPH-MCNC: 3.4 GM/DL
GLOBULIN UR ELPH-MCNC: 3.5 GM/DL
GLOBULIN UR ELPH-MCNC: 3.6 GM/DL
GLUCOSE SERPL-MCNC: 100 MG/DL (ref 65–99)
GLUCOSE SERPL-MCNC: 101 MG/DL (ref 65–99)
GLUCOSE SERPL-MCNC: 107 MG/DL (ref 65–99)
GLUCOSE SERPL-MCNC: 110 MG/DL (ref 65–99)
GLUCOSE SERPL-MCNC: 113 MG/DL (ref 65–99)
GLUCOSE SERPL-MCNC: 123 MG/DL (ref 65–99)
GLUCOSE SERPL-MCNC: 151 MG/DL (ref 65–99)
GLUCOSE SERPL-MCNC: 154 MG/DL (ref 65–99)
GLUCOSE SERPL-MCNC: 88 MG/DL (ref 65–99)
GLUCOSE SERPL-MCNC: 93 MG/DL (ref 65–99)
GLUCOSE UR STRIP-MCNC: NEGATIVE MG/DL
GRAM STN SPEC: ABNORMAL
GRAM STN SPEC: NORMAL
HADV DNA SPEC NAA+PROBE: NOT DETECTED
HADV DNA SPEC NAA+PROBE: NOT DETECTED
HCOV 229E RNA SPEC QL NAA+PROBE: NOT DETECTED
HCOV 229E RNA SPEC QL NAA+PROBE: NOT DETECTED
HCOV HKU1 RNA SPEC QL NAA+PROBE: NOT DETECTED
HCOV HKU1 RNA SPEC QL NAA+PROBE: NOT DETECTED
HCOV NL63 RNA SPEC QL NAA+PROBE: NOT DETECTED
HCOV NL63 RNA SPEC QL NAA+PROBE: NOT DETECTED
HCOV OC43 RNA SPEC QL NAA+PROBE: NOT DETECTED
HCOV OC43 RNA SPEC QL NAA+PROBE: NOT DETECTED
HCT VFR BLD AUTO: 28.3 % (ref 34–46.6)
HCT VFR BLD AUTO: 29.4 % (ref 34–46.6)
HCT VFR BLD AUTO: 29.8 % (ref 34–46.6)
HCT VFR BLD AUTO: 30.3 % (ref 34–46.6)
HCT VFR BLD AUTO: 30.4 % (ref 34–46.6)
HCT VFR BLD AUTO: 32.7 % (ref 34–46.6)
HCT VFR BLD AUTO: 33.1 % (ref 34–46.6)
HCT VFR BLD AUTO: 33.5 % (ref 34–46.6)
HGB BLD-MCNC: 8.5 G/DL (ref 12–15.9)
HGB BLD-MCNC: 8.6 G/DL (ref 12–15.9)
HGB BLD-MCNC: 8.8 G/DL (ref 12–15.9)
HGB BLD-MCNC: 9 G/DL (ref 12–15.9)
HGB BLD-MCNC: 9.2 G/DL (ref 12–15.9)
HGB BLD-MCNC: 9.3 G/DL (ref 12–15.9)
HGB BLD-MCNC: 9.4 G/DL (ref 12–15.9)
HGB BLD-MCNC: 9.5 G/DL (ref 12–15.9)
HGB UR QL STRIP.AUTO: ABNORMAL
HMPV RNA NPH QL NAA+NON-PROBE: NOT DETECTED
HMPV RNA NPH QL NAA+NON-PROBE: NOT DETECTED
HPIV1 RNA ISLT QL NAA+PROBE: NOT DETECTED
HPIV1 RNA ISLT QL NAA+PROBE: NOT DETECTED
HPIV2 RNA SPEC QL NAA+PROBE: NOT DETECTED
HPIV2 RNA SPEC QL NAA+PROBE: NOT DETECTED
HPIV3 RNA NPH QL NAA+PROBE: NOT DETECTED
HPIV3 RNA NPH QL NAA+PROBE: NOT DETECTED
HPIV4 P GENE NPH QL NAA+PROBE: NOT DETECTED
HPIV4 P GENE NPH QL NAA+PROBE: NOT DETECTED
HYALINE CASTS UR QL AUTO: ABNORMAL /LPF
IMM GRANULOCYTES # BLD AUTO: 0.06 10*3/MM3 (ref 0–0.05)
IMM GRANULOCYTES # BLD AUTO: 0.09 10*3/MM3 (ref 0–0.05)
IMM GRANULOCYTES # BLD AUTO: 0.1 10*3/MM3 (ref 0–0.05)
IMM GRANULOCYTES # BLD AUTO: 0.15 10*3/MM3 (ref 0–0.05)
IMM GRANULOCYTES # BLD AUTO: 0.15 10*3/MM3 (ref 0–0.05)
IMM GRANULOCYTES # BLD AUTO: 0.17 10*3/MM3 (ref 0–0.05)
IMM GRANULOCYTES # BLD AUTO: 0.35 10*3/MM3 (ref 0–0.05)
IMM GRANULOCYTES # BLD AUTO: 0.46 10*3/MM3 (ref 0–0.05)
IMM GRANULOCYTES NFR BLD AUTO: 0.7 % (ref 0–0.5)
IMM GRANULOCYTES NFR BLD AUTO: 0.7 % (ref 0–0.5)
IMM GRANULOCYTES NFR BLD AUTO: 0.8 % (ref 0–0.5)
IMM GRANULOCYTES NFR BLD AUTO: 1 % (ref 0–0.5)
IMM GRANULOCYTES NFR BLD AUTO: 1.1 % (ref 0–0.5)
IMM GRANULOCYTES NFR BLD AUTO: 1.3 % (ref 0–0.5)
IMM GRANULOCYTES NFR BLD AUTO: 2.2 % (ref 0–0.5)
IMM GRANULOCYTES NFR BLD AUTO: 2.9 % (ref 0–0.5)
ISOLATED FROM: ABNORMAL
KETONES UR QL STRIP: ABNORMAL
L PNEUMO1 AG UR QL IA: NEGATIVE
LAB AP CASE REPORT: NORMAL
LARGE PLATELETS: NORMAL
LEUKOCYTE ESTERASE UR QL STRIP.AUTO: ABNORMAL
LIPASE SERPL-CCNC: 14 U/L (ref 13–60)
LYMPHOCYTES # BLD AUTO: 0.62 10*3/MM3 (ref 0.7–3.1)
LYMPHOCYTES # BLD AUTO: 0.7 10*3/MM3 (ref 0.7–3.1)
LYMPHOCYTES # BLD AUTO: 0.77 10*3/MM3 (ref 0.7–3.1)
LYMPHOCYTES # BLD AUTO: 0.95 10*3/MM3 (ref 0.7–3.1)
LYMPHOCYTES # BLD AUTO: 1 10*3/MM3 (ref 0.7–3.1)
LYMPHOCYTES # BLD AUTO: 1.07 10*3/MM3 (ref 0.7–3.1)
LYMPHOCYTES # BLD AUTO: 1.46 10*3/MM3 (ref 0.7–3.1)
LYMPHOCYTES # BLD AUTO: 1.93 10*3/MM3 (ref 0.7–3.1)
LYMPHOCYTES NFR BLD AUTO: 11.9 % (ref 19.6–45.3)
LYMPHOCYTES NFR BLD AUTO: 12.2 % (ref 19.6–45.3)
LYMPHOCYTES NFR BLD AUTO: 4.2 % (ref 19.6–45.3)
LYMPHOCYTES NFR BLD AUTO: 5 % (ref 19.6–45.3)
LYMPHOCYTES NFR BLD AUTO: 6.2 % (ref 19.6–45.3)
LYMPHOCYTES NFR BLD AUTO: 7.5 % (ref 19.6–45.3)
LYMPHOCYTES NFR BLD AUTO: 8 % (ref 19.6–45.3)
LYMPHOCYTES NFR BLD AUTO: 9 % (ref 19.6–45.3)
M PNEUMO IGG SER IA-ACNC: NOT DETECTED
M PNEUMO IGG SER IA-ACNC: NOT DETECTED
MAGNESIUM SERPL-MCNC: 1.5 MG/DL (ref 1.6–2.4)
MAGNESIUM SERPL-MCNC: 1.7 MG/DL (ref 1.6–2.4)
MAGNESIUM SERPL-MCNC: 1.7 MG/DL (ref 1.6–2.4)
MAGNESIUM SERPL-MCNC: 2.4 MG/DL (ref 1.6–2.4)
MAGNESIUM SERPL-MCNC: 2.5 MG/DL (ref 1.6–2.4)
MCH RBC QN AUTO: 28.1 PG (ref 26.6–33)
MCH RBC QN AUTO: 28.5 PG (ref 26.6–33)
MCH RBC QN AUTO: 29 PG (ref 26.6–33)
MCH RBC QN AUTO: 29.3 PG (ref 26.6–33)
MCH RBC QN AUTO: 29.8 PG (ref 26.6–33)
MCH RBC QN AUTO: 30.2 PG (ref 26.6–33)
MCHC RBC AUTO-ENTMCNC: 27.8 G/DL (ref 31.5–35.7)
MCHC RBC AUTO-ENTMCNC: 28.4 G/DL (ref 31.5–35.7)
MCHC RBC AUTO-ENTMCNC: 28.9 G/DL (ref 31.5–35.7)
MCHC RBC AUTO-ENTMCNC: 28.9 G/DL (ref 31.5–35.7)
MCHC RBC AUTO-ENTMCNC: 29.1 G/DL (ref 31.5–35.7)
MCHC RBC AUTO-ENTMCNC: 29.7 G/DL (ref 31.5–35.7)
MCHC RBC AUTO-ENTMCNC: 30.3 G/DL (ref 31.5–35.7)
MCHC RBC AUTO-ENTMCNC: 31.1 G/DL (ref 31.5–35.7)
MCV RBC AUTO: 100.3 FL (ref 79–97)
MCV RBC AUTO: 101.4 FL (ref 79–97)
MCV RBC AUTO: 104.4 FL (ref 79–97)
MCV RBC AUTO: 97.3 FL (ref 79–97)
MCV RBC AUTO: 97.7 FL (ref 79–97)
MCV RBC AUTO: 98.2 FL (ref 79–97)
MCV RBC AUTO: 98.4 FL (ref 79–97)
MCV RBC AUTO: 99.1 FL (ref 79–97)
MONOCYTES # BLD AUTO: 0.38 10*3/MM3 (ref 0.1–0.9)
MONOCYTES # BLD AUTO: 0.39 10*3/MM3 (ref 0.1–0.9)
MONOCYTES # BLD AUTO: 0.54 10*3/MM3 (ref 0.1–0.9)
MONOCYTES # BLD AUTO: 0.7 10*3/MM3 (ref 0.1–0.9)
MONOCYTES # BLD AUTO: 0.95 10*3/MM3 (ref 0.1–0.9)
MONOCYTES # BLD AUTO: 1.03 10*3/MM3 (ref 0.1–0.9)
MONOCYTES # BLD AUTO: 1.19 10*3/MM3 (ref 0.1–0.9)
MONOCYTES # BLD AUTO: 1.21 10*3/MM3 (ref 0.1–0.9)
MONOCYTES NFR BLD AUTO: 3.2 % (ref 5–12)
MONOCYTES NFR BLD AUTO: 4.2 % (ref 5–12)
MONOCYTES NFR BLD AUTO: 4.4 % (ref 5–12)
MONOCYTES NFR BLD AUTO: 5.9 % (ref 5–12)
MONOCYTES NFR BLD AUTO: 5.9 % (ref 5–12)
MONOCYTES NFR BLD AUTO: 6.5 % (ref 5–12)
MONOCYTES NFR BLD AUTO: 7.1 % (ref 5–12)
MONOCYTES NFR BLD AUTO: 9 % (ref 5–12)
MRSA DNA SPEC QL NAA+PROBE: NORMAL
NEUTROPHILS NFR BLD AUTO: 10.86 10*3/MM3 (ref 1.7–7)
NEUTROPHILS NFR BLD AUTO: 11.01 10*3/MM3 (ref 1.7–7)
NEUTROPHILS NFR BLD AUTO: 11.25 10*3/MM3 (ref 1.7–7)
NEUTROPHILS NFR BLD AUTO: 12.18 10*3/MM3 (ref 1.7–7)
NEUTROPHILS NFR BLD AUTO: 13.41 10*3/MM3 (ref 1.7–7)
NEUTROPHILS NFR BLD AUTO: 14.67 10*3/MM3 (ref 1.7–7)
NEUTROPHILS NFR BLD AUTO: 7.45 10*3/MM3 (ref 1.7–7)
NEUTROPHILS NFR BLD AUTO: 76.7 % (ref 42.7–76)
NEUTROPHILS NFR BLD AUTO: 82.3 % (ref 42.7–76)
NEUTROPHILS NFR BLD AUTO: 82.6 % (ref 42.7–76)
NEUTROPHILS NFR BLD AUTO: 82.7 % (ref 42.7–76)
NEUTROPHILS NFR BLD AUTO: 84.3 % (ref 42.7–76)
NEUTROPHILS NFR BLD AUTO: 87.6 % (ref 42.7–76)
NEUTROPHILS NFR BLD AUTO: 88 % (ref 42.7–76)
NEUTROPHILS NFR BLD AUTO: 9.95 10*3/MM3 (ref 1.7–7)
NEUTROPHILS NFR BLD AUTO: 91 % (ref 42.7–76)
NEUTS VAC BLD QL SMEAR: NORMAL
NITRITE UR QL STRIP: POSITIVE
NRBC BLD AUTO-RTO: 0 /100 WBC (ref 0–0.2)
NRBC BLD AUTO-RTO: 0.1 /100 WBC (ref 0–0.2)
NT-PROBNP SERPL-MCNC: ABNORMAL PG/ML (ref 0–1800)
PATH REPORT.FINAL DX SPEC: NORMAL
PATH REPORT.GROSS SPEC: NORMAL
PH UR STRIP.AUTO: 6 [PH] (ref 5–8)
PHOSPHATE SERPL-MCNC: 2.7 MG/DL (ref 2.5–4.5)
PHOSPHATE SERPL-MCNC: 2.9 MG/DL (ref 2.5–4.5)
PHOSPHATE SERPL-MCNC: 3.6 MG/DL (ref 2.5–4.5)
PLATELET # BLD AUTO: 204 10*3/MM3 (ref 140–450)
PLATELET # BLD AUTO: 264 10*3/MM3 (ref 140–450)
PLATELET # BLD AUTO: 268 10*3/MM3 (ref 140–450)
PLATELET # BLD AUTO: 269 10*3/MM3 (ref 140–450)
PLATELET # BLD AUTO: 310 10*3/MM3 (ref 140–450)
PLATELET # BLD AUTO: 315 10*3/MM3 (ref 140–450)
PLATELET # BLD AUTO: 324 10*3/MM3 (ref 140–450)
PLATELET # BLD AUTO: 352 10*3/MM3 (ref 140–450)
PMV BLD AUTO: 10.2 FL (ref 6–12)
PMV BLD AUTO: 10.5 FL (ref 6–12)
PMV BLD AUTO: 10.5 FL (ref 6–12)
PMV BLD AUTO: 10.8 FL (ref 6–12)
PMV BLD AUTO: 11.3 FL (ref 6–12)
PMV BLD AUTO: 11.8 FL (ref 6–12)
PMV BLD AUTO: 9.6 FL (ref 6–12)
PMV BLD AUTO: 9.8 FL (ref 6–12)
POTASSIUM SERPL-SCNC: 3 MMOL/L (ref 3.5–5.2)
POTASSIUM SERPL-SCNC: 3.1 MMOL/L (ref 3.5–5.2)
POTASSIUM SERPL-SCNC: 3.3 MMOL/L (ref 3.5–5.2)
POTASSIUM SERPL-SCNC: 3.7 MMOL/L (ref 3.5–5.2)
POTASSIUM SERPL-SCNC: 3.9 MMOL/L (ref 3.5–5.2)
POTASSIUM SERPL-SCNC: 3.9 MMOL/L (ref 3.5–5.2)
POTASSIUM SERPL-SCNC: 4.3 MMOL/L (ref 3.5–5.2)
POTASSIUM SERPL-SCNC: 4.5 MMOL/L (ref 3.5–5.2)
POTASSIUM SERPL-SCNC: 4.6 MMOL/L (ref 3.5–5.2)
POTASSIUM SERPL-SCNC: 4.7 MMOL/L (ref 3.5–5.2)
POTASSIUM SERPL-SCNC: 4.8 MMOL/L (ref 3.5–5.2)
POTASSIUM SERPL-SCNC: 5.3 MMOL/L (ref 3.5–5.2)
PROCALCITONIN SERPL-MCNC: 0.09 NG/ML (ref 0–0.25)
PROT SERPL-MCNC: 6.2 G/DL (ref 6–8.5)
PROT SERPL-MCNC: 6.3 G/DL (ref 6–8.5)
PROT SERPL-MCNC: 6.6 G/DL (ref 6–8.5)
PROT SERPL-MCNC: 6.6 G/DL (ref 6–8.5)
PROT UR QL STRIP: ABNORMAL
QT INTERVAL: 337 MS
QT INTERVAL: 400 MS
QT INTERVAL: 451 MS
QTC INTERVAL: 495 MS
QTC INTERVAL: 509 MS
QTC INTERVAL: 550 MS
RBC # BLD AUTO: 2.9 10*6/MM3 (ref 3.77–5.28)
RBC # BLD AUTO: 2.91 10*6/MM3 (ref 3.77–5.28)
RBC # BLD AUTO: 2.97 10*6/MM3 (ref 3.77–5.28)
RBC # BLD AUTO: 3.09 10*6/MM3 (ref 3.77–5.28)
RBC # BLD AUTO: 3.1 10*6/MM3 (ref 3.77–5.28)
RBC # BLD AUTO: 3.21 10*6/MM3 (ref 3.77–5.28)
RBC # BLD AUTO: 3.33 10*6/MM3 (ref 3.77–5.28)
RBC # BLD AUTO: 3.34 10*6/MM3 (ref 3.77–5.28)
RBC # UR STRIP: ABNORMAL /HPF
REF LAB TEST METHOD: ABNORMAL
RHINOVIRUS RNA SPEC NAA+PROBE: NOT DETECTED
RHINOVIRUS RNA SPEC NAA+PROBE: NOT DETECTED
RSV RNA NPH QL NAA+NON-PROBE: NOT DETECTED
RSV RNA NPH QL NAA+NON-PROBE: NOT DETECTED
S PNEUM AG SPEC QL LA: NEGATIVE
SARS-COV-2 RNA NPH QL NAA+NON-PROBE: NOT DETECTED
SARS-COV-2 RNA NPH QL NAA+NON-PROBE: NOT DETECTED
SODIUM SERPL-SCNC: 137 MMOL/L (ref 136–145)
SODIUM SERPL-SCNC: 138 MMOL/L (ref 136–145)
SODIUM SERPL-SCNC: 139 MMOL/L (ref 136–145)
SODIUM SERPL-SCNC: 140 MMOL/L (ref 136–145)
SODIUM SERPL-SCNC: 140 MMOL/L (ref 136–145)
SODIUM SERPL-SCNC: 141 MMOL/L (ref 136–145)
SODIUM SERPL-SCNC: 141 MMOL/L (ref 136–145)
SODIUM SERPL-SCNC: 142 MMOL/L (ref 136–145)
SODIUM SERPL-SCNC: 143 MMOL/L (ref 136–145)
SODIUM SERPL-SCNC: 144 MMOL/L (ref 136–145)
SP GR UR STRIP: 1.02 (ref 1–1.03)
SQUAMOUS #/AREA URNS HPF: ABNORMAL /HPF
UROBILINOGEN UR QL STRIP: ABNORMAL
VIT B12 BLD-MCNC: 1693 PG/ML (ref 211–946)
WBC # UR STRIP: ABNORMAL /HPF
WBC NRBC COR # BLD AUTO: 11.81 10*3/MM3 (ref 3.4–10.8)
WBC NRBC COR # BLD AUTO: 12.34 10*3/MM3 (ref 3.4–10.8)
WBC NRBC COR # BLD AUTO: 12.36 10*3/MM3 (ref 3.4–10.8)
WBC NRBC COR # BLD AUTO: 13.39 10*3/MM3 (ref 3.4–10.8)
WBC NRBC COR # BLD AUTO: 15.87 10*3/MM3 (ref 3.4–10.8)
WBC NRBC COR # BLD AUTO: 16.22 10*3/MM3 (ref 3.4–10.8)
WBC NRBC COR # BLD AUTO: 16.74 10*3/MM3 (ref 3.4–10.8)
WBC NRBC COR # BLD AUTO: 9.01 10*3/MM3 (ref 3.4–10.8)
WHOLE BLOOD HOLD SPECIMEN: NORMAL

## 2024-01-01 PROCEDURE — 94799 UNLISTED PULMONARY SVC/PX: CPT

## 2024-01-01 PROCEDURE — 94761 N-INVAS EAR/PLS OXIMETRY MLT: CPT

## 2024-01-01 PROCEDURE — 94664 DEMO&/EVAL PT USE INHALER: CPT

## 2024-01-01 PROCEDURE — 97530 THERAPEUTIC ACTIVITIES: CPT

## 2024-01-01 PROCEDURE — G0378 HOSPITAL OBSERVATION PER HR: HCPCS

## 2024-01-01 PROCEDURE — 25010000002 ONDANSETRON PER 1 MG: Performed by: PHYSICIAN ASSISTANT

## 2024-01-01 PROCEDURE — 25010000002 MAGNESIUM SULFATE 4 GM/100ML SOLUTION: Performed by: STUDENT IN AN ORGANIZED HEALTH CARE EDUCATION/TRAINING PROGRAM

## 2024-01-01 PROCEDURE — 85025 COMPLETE CBC W/AUTO DIFF WBC: CPT | Performed by: NURSE PRACTITIONER

## 2024-01-01 PROCEDURE — 86140 C-REACTIVE PROTEIN: CPT | Performed by: STUDENT IN AN ORGANIZED HEALTH CARE EDUCATION/TRAINING PROGRAM

## 2024-01-01 PROCEDURE — 84145 PROCALCITONIN (PCT): CPT | Performed by: STUDENT IN AN ORGANIZED HEALTH CARE EDUCATION/TRAINING PROGRAM

## 2024-01-01 PROCEDURE — 81001 URINALYSIS AUTO W/SCOPE: CPT | Performed by: PHYSICIAN ASSISTANT

## 2024-01-01 PROCEDURE — 25010000002 FUROSEMIDE PER 20 MG

## 2024-01-01 PROCEDURE — 25010000002 LORAZEPAM PER 2 MG: Performed by: INTERNAL MEDICINE

## 2024-01-01 PROCEDURE — 25010000002 HYDROMORPHONE 1 MG/ML SOLUTION: Performed by: INTERNAL MEDICINE

## 2024-01-01 PROCEDURE — 36415 COLL VENOUS BLD VENIPUNCTURE: CPT

## 2024-01-01 PROCEDURE — 84100 ASSAY OF PHOSPHORUS: CPT | Performed by: STUDENT IN AN ORGANIZED HEALTH CARE EDUCATION/TRAINING PROGRAM

## 2024-01-01 PROCEDURE — 94660 CPAP INITIATION&MGMT: CPT

## 2024-01-01 PROCEDURE — 25010000002 MEROPENEM PER 100 MG: Performed by: NURSE PRACTITIONER

## 2024-01-01 PROCEDURE — 25010000002 GLYCOPYRROLATE 0.2 MG/ML SOLUTION: Performed by: INTERNAL MEDICINE

## 2024-01-01 PROCEDURE — 80048 BASIC METABOLIC PNL TOTAL CA: CPT | Performed by: PHYSICIAN ASSISTANT

## 2024-01-01 PROCEDURE — 85025 COMPLETE CBC W/AUTO DIFF WBC: CPT | Performed by: PHYSICIAN ASSISTANT

## 2024-01-01 PROCEDURE — 25010000002 METHYLPREDNISOLONE PER 40 MG: Performed by: INTERNAL MEDICINE

## 2024-01-01 PROCEDURE — 97530 THERAPEUTIC ACTIVITIES: CPT | Performed by: PHYSICAL THERAPIST

## 2024-01-01 PROCEDURE — 97162 PT EVAL MOD COMPLEX 30 MIN: CPT

## 2024-01-01 PROCEDURE — 87449 NOS EACH ORGANISM AG IA: CPT | Performed by: STUDENT IN AN ORGANIZED HEALTH CARE EDUCATION/TRAINING PROGRAM

## 2024-01-01 PROCEDURE — 85007 BL SMEAR W/DIFF WBC COUNT: CPT | Performed by: NURSE PRACTITIONER

## 2024-01-01 PROCEDURE — 87116 MYCOBACTERIA CULTURE: CPT | Performed by: INTERNAL MEDICINE

## 2024-01-01 PROCEDURE — 99497 ADVNCD CARE PLAN 30 MIN: CPT | Performed by: NURSE PRACTITIONER

## 2024-01-01 PROCEDURE — 97110 THERAPEUTIC EXERCISES: CPT

## 2024-01-01 PROCEDURE — 25010000002 MEROPENEM PER 100 MG: Performed by: INTERNAL MEDICINE

## 2024-01-01 PROCEDURE — 25010000002 METHYLPREDNISOLONE PER 40 MG: Performed by: STUDENT IN AN ORGANIZED HEALTH CARE EDUCATION/TRAINING PROGRAM

## 2024-01-01 PROCEDURE — 92610 EVALUATE SWALLOWING FUNCTION: CPT

## 2024-01-01 PROCEDURE — 93010 ELECTROCARDIOGRAM REPORT: CPT | Performed by: INTERNAL MEDICINE

## 2024-01-01 PROCEDURE — 80048 BASIC METABOLIC PNL TOTAL CA: CPT | Performed by: INTERNAL MEDICINE

## 2024-01-01 PROCEDURE — 25010000002 FUROSEMIDE PER 20 MG: Performed by: STUDENT IN AN ORGANIZED HEALTH CARE EDUCATION/TRAINING PROGRAM

## 2024-01-01 PROCEDURE — 87899 AGENT NOS ASSAY W/OPTIC: CPT | Performed by: STUDENT IN AN ORGANIZED HEALTH CARE EDUCATION/TRAINING PROGRAM

## 2024-01-01 PROCEDURE — 0B9F8ZX DRAINAGE OF RIGHT LOWER LUNG LOBE, VIA NATURAL OR ARTIFICIAL OPENING ENDOSCOPIC, DIAGNOSTIC: ICD-10-PCS | Performed by: INTERNAL MEDICINE

## 2024-01-01 PROCEDURE — 71045 X-RAY EXAM CHEST 1 VIEW: CPT

## 2024-01-01 PROCEDURE — 82746 ASSAY OF FOLIC ACID SERUM: CPT | Performed by: STUDENT IN AN ORGANIZED HEALTH CARE EDUCATION/TRAINING PROGRAM

## 2024-01-01 PROCEDURE — 93308 TTE F-UP OR LMTD: CPT

## 2024-01-01 PROCEDURE — 93321 DOPPLER ECHO F-UP/LMTD STD: CPT

## 2024-01-01 PROCEDURE — 71250 CT THORAX DX C-: CPT

## 2024-01-01 PROCEDURE — 87206 SMEAR FLUORESCENT/ACID STAI: CPT | Performed by: INTERNAL MEDICINE

## 2024-01-01 PROCEDURE — 93005 ELECTROCARDIOGRAM TRACING: CPT | Performed by: PHYSICIAN ASSISTANT

## 2024-01-01 PROCEDURE — 87040 BLOOD CULTURE FOR BACTERIA: CPT | Performed by: PHYSICIAN ASSISTANT

## 2024-01-01 PROCEDURE — 97535 SELF CARE MNGMENT TRAINING: CPT

## 2024-01-01 PROCEDURE — 74176 CT ABD & PELVIS W/O CONTRAST: CPT

## 2024-01-01 PROCEDURE — 83605 ASSAY OF LACTIC ACID: CPT | Performed by: STUDENT IN AN ORGANIZED HEALTH CARE EDUCATION/TRAINING PROGRAM

## 2024-01-01 PROCEDURE — 87205 SMEAR GRAM STAIN: CPT | Performed by: INTERNAL MEDICINE

## 2024-01-01 PROCEDURE — 87186 SC STD MICRODIL/AGAR DIL: CPT | Performed by: PHYSICIAN ASSISTANT

## 2024-01-01 PROCEDURE — 87077 CULTURE AEROBIC IDENTIFY: CPT | Performed by: PHYSICIAN ASSISTANT

## 2024-01-01 PROCEDURE — 93005 ELECTROCARDIOGRAM TRACING: CPT | Performed by: STUDENT IN AN ORGANIZED HEALTH CARE EDUCATION/TRAINING PROGRAM

## 2024-01-01 PROCEDURE — 36415 COLL VENOUS BLD VENIPUNCTURE: CPT | Performed by: STUDENT IN AN ORGANIZED HEALTH CARE EDUCATION/TRAINING PROGRAM

## 2024-01-01 PROCEDURE — 84132 ASSAY OF SERUM POTASSIUM: CPT | Performed by: STUDENT IN AN ORGANIZED HEALTH CARE EDUCATION/TRAINING PROGRAM

## 2024-01-01 PROCEDURE — 83735 ASSAY OF MAGNESIUM: CPT | Performed by: PHYSICIAN ASSISTANT

## 2024-01-01 PROCEDURE — 80053 COMPREHEN METABOLIC PANEL: CPT | Performed by: STUDENT IN AN ORGANIZED HEALTH CARE EDUCATION/TRAINING PROGRAM

## 2024-01-01 PROCEDURE — 88108 CYTOPATH CONCENTRATE TECH: CPT | Performed by: INTERNAL MEDICINE

## 2024-01-01 PROCEDURE — 80053 COMPREHEN METABOLIC PANEL: CPT | Performed by: PHYSICIAN ASSISTANT

## 2024-01-01 PROCEDURE — 94640 AIRWAY INHALATION TREATMENT: CPT

## 2024-01-01 PROCEDURE — 93325 DOPPLER ECHO COLOR FLOW MAPG: CPT

## 2024-01-01 PROCEDURE — 99222 1ST HOSP IP/OBS MODERATE 55: CPT | Performed by: NURSE PRACTITIONER

## 2024-01-01 PROCEDURE — 25810000003 SODIUM CHLORIDE 0.9 % SOLUTION: Performed by: ANESTHESIOLOGY

## 2024-01-01 PROCEDURE — 82607 VITAMIN B-12: CPT | Performed by: STUDENT IN AN ORGANIZED HEALTH CARE EDUCATION/TRAINING PROGRAM

## 2024-01-01 PROCEDURE — 87071 CULTURE AEROBIC QUANT OTHER: CPT | Performed by: INTERNAL MEDICINE

## 2024-01-01 PROCEDURE — 99285 EMERGENCY DEPT VISIT HI MDM: CPT

## 2024-01-01 PROCEDURE — 84132 ASSAY OF SERUM POTASSIUM: CPT | Performed by: INTERNAL MEDICINE

## 2024-01-01 PROCEDURE — 93005 ELECTROCARDIOGRAM TRACING: CPT | Performed by: INTERNAL MEDICINE

## 2024-01-01 PROCEDURE — 83880 ASSAY OF NATRIURETIC PEPTIDE: CPT | Performed by: INTERNAL MEDICINE

## 2024-01-01 PROCEDURE — 25010000002 CEFTRIAXONE PER 250 MG: Performed by: PHYSICIAN ASSISTANT

## 2024-01-01 PROCEDURE — 3E1F88Z IRRIGATION OF RESPIRATORY TRACT USING IRRIGATING SUBSTANCE, VIA NATURAL OR ARTIFICIAL OPENING ENDOSCOPIC: ICD-10-PCS | Performed by: INTERNAL MEDICINE

## 2024-01-01 PROCEDURE — 87102 FUNGUS ISOLATION CULTURE: CPT | Performed by: INTERNAL MEDICINE

## 2024-01-01 PROCEDURE — 83690 ASSAY OF LIPASE: CPT | Performed by: PHYSICIAN ASSISTANT

## 2024-01-01 PROCEDURE — 93325 DOPPLER ECHO COLOR FLOW MAPG: CPT | Performed by: INTERNAL MEDICINE

## 2024-01-01 PROCEDURE — 92526 ORAL FUNCTION THERAPY: CPT

## 2024-01-01 PROCEDURE — 87040 BLOOD CULTURE FOR BACTERIA: CPT | Performed by: STUDENT IN AN ORGANIZED HEALTH CARE EDUCATION/TRAINING PROGRAM

## 2024-01-01 PROCEDURE — 25010000002 ONDANSETRON PER 1 MG: Performed by: INTERNAL MEDICINE

## 2024-01-01 PROCEDURE — 25010000002 CEFTRIAXONE PER 250 MG: Performed by: STUDENT IN AN ORGANIZED HEALTH CARE EDUCATION/TRAINING PROGRAM

## 2024-01-01 PROCEDURE — 97116 GAIT TRAINING THERAPY: CPT

## 2024-01-01 PROCEDURE — 25010000002 FUROSEMIDE PER 20 MG: Performed by: INTERNAL MEDICINE

## 2024-01-01 PROCEDURE — 93321 DOPPLER ECHO F-UP/LMTD STD: CPT | Performed by: INTERNAL MEDICINE

## 2024-01-01 PROCEDURE — 80048 BASIC METABOLIC PNL TOTAL CA: CPT | Performed by: NURSE PRACTITIONER

## 2024-01-01 PROCEDURE — C1751 CATH, INF, PER/CENT/MIDLINE: HCPCS

## 2024-01-01 PROCEDURE — 0202U NFCT DS 22 TRGT SARS-COV-2: CPT | Performed by: INTERNAL MEDICINE

## 2024-01-01 PROCEDURE — 97166 OT EVAL MOD COMPLEX 45 MIN: CPT

## 2024-01-01 PROCEDURE — 87252 VIRUS INOCULATION TISSUE: CPT | Performed by: INTERNAL MEDICINE

## 2024-01-01 PROCEDURE — 87641 MR-STAPH DNA AMP PROBE: CPT | Performed by: NURSE PRACTITIONER

## 2024-01-01 PROCEDURE — 83735 ASSAY OF MAGNESIUM: CPT | Performed by: STUDENT IN AN ORGANIZED HEALTH CARE EDUCATION/TRAINING PROGRAM

## 2024-01-01 PROCEDURE — 25810000003 SODIUM CHLORIDE 0.9 % SOLUTION: Performed by: PHYSICIAN ASSISTANT

## 2024-01-01 PROCEDURE — 0202U NFCT DS 22 TRGT SARS-COV-2: CPT | Performed by: STUDENT IN AN ORGANIZED HEALTH CARE EDUCATION/TRAINING PROGRAM

## 2024-01-01 PROCEDURE — 87086 URINE CULTURE/COLONY COUNT: CPT | Performed by: PHYSICIAN ASSISTANT

## 2024-01-01 PROCEDURE — 93308 TTE F-UP OR LMTD: CPT | Performed by: INTERNAL MEDICINE

## 2024-01-01 PROCEDURE — 97112 NEUROMUSCULAR REEDUCATION: CPT | Performed by: PHYSICAL THERAPIST

## 2024-01-01 PROCEDURE — 87154 CUL TYP ID BLD PTHGN 6+ TRGT: CPT | Performed by: PHYSICIAN ASSISTANT

## 2024-01-01 RX ORDER — FUROSEMIDE 40 MG
40 TABLET ORAL DAILY
Status: DISCONTINUED | OUTPATIENT
Start: 2024-01-01 | End: 2024-01-01

## 2024-01-01 RX ORDER — SODIUM CHLORIDE 9 MG/ML
9 INJECTION, SOLUTION INTRAVENOUS CONTINUOUS
Status: DISCONTINUED | OUTPATIENT
Start: 2024-01-01 | End: 2024-01-01

## 2024-01-01 RX ORDER — LORAZEPAM 2 MG/ML
1 INJECTION INTRAMUSCULAR
Status: DISCONTINUED | OUTPATIENT
Start: 2024-01-01 | End: 2024-01-01 | Stop reason: HOSPADM

## 2024-01-01 RX ORDER — FUROSEMIDE 10 MG/ML
60 INJECTION INTRAMUSCULAR; INTRAVENOUS ONCE
Status: DISCONTINUED | OUTPATIENT
Start: 2024-01-01 | End: 2024-01-01

## 2024-01-01 RX ORDER — PANTOPRAZOLE SODIUM 40 MG/1
40 TABLET, DELAYED RELEASE ORAL DAILY
Status: DISCONTINUED | OUTPATIENT
Start: 2024-01-01 | End: 2024-01-01

## 2024-01-01 RX ORDER — LORAZEPAM 2 MG/ML
2 INJECTION INTRAMUSCULAR
Status: CANCELLED | OUTPATIENT
Start: 2024-01-01 | End: 2024-08-30

## 2024-01-01 RX ORDER — LORAZEPAM 2 MG/ML
1 CONCENTRATE ORAL
Status: DISCONTINUED | OUTPATIENT
Start: 2024-01-01 | End: 2024-01-01 | Stop reason: HOSPADM

## 2024-01-01 RX ORDER — LORAZEPAM 2 MG/ML
2 CONCENTRATE ORAL
Status: DISCONTINUED | OUTPATIENT
Start: 2024-01-01 | End: 2024-01-01 | Stop reason: HOSPADM

## 2024-01-01 RX ORDER — SCOLOPAMINE TRANSDERMAL SYSTEM 1 MG/1
1 PATCH, EXTENDED RELEASE TRANSDERMAL
Status: DISCONTINUED | OUTPATIENT
Start: 2024-01-01 | End: 2024-01-01 | Stop reason: HOSPADM

## 2024-01-01 RX ORDER — DILTIAZEM HYDROCHLORIDE 120 MG/1
120 CAPSULE, COATED, EXTENDED RELEASE ORAL
Status: DISCONTINUED | OUTPATIENT
Start: 2024-01-01 | End: 2024-01-01

## 2024-01-01 RX ORDER — LORAZEPAM 2 MG/ML
0.5 CONCENTRATE ORAL
Status: CANCELLED | OUTPATIENT
Start: 2024-01-01 | End: 2024-08-30

## 2024-01-01 RX ORDER — LORAZEPAM 0.5 MG/1
0.5 TABLET ORAL
Status: DISCONTINUED | OUTPATIENT
Start: 2024-01-01 | End: 2024-01-01 | Stop reason: HOSPADM

## 2024-01-01 RX ORDER — POTASSIUM CHLORIDE 1500 MG/1
40 TABLET, EXTENDED RELEASE ORAL EVERY 4 HOURS
Status: COMPLETED | OUTPATIENT
Start: 2024-01-01 | End: 2024-01-01

## 2024-01-01 RX ORDER — LIDOCAINE HYDROCHLORIDE 20 MG/ML
INJECTION, SOLUTION INFILTRATION; PERINEURAL AS NEEDED
Status: DISCONTINUED | OUTPATIENT
Start: 2024-01-01 | End: 2024-01-01 | Stop reason: HOSPADM

## 2024-01-01 RX ORDER — LORAZEPAM 2 MG/ML
2 INJECTION INTRAMUSCULAR
Status: DISCONTINUED | OUTPATIENT
Start: 2024-01-01 | End: 2024-01-01 | Stop reason: HOSPADM

## 2024-01-01 RX ORDER — GLYCOPYRROLATE 0.2 MG/ML
0.2 INJECTION INTRAMUSCULAR; INTRAVENOUS
Status: CANCELLED | OUTPATIENT
Start: 2024-01-01

## 2024-01-01 RX ORDER — LORAZEPAM 2 MG/ML
0.5 INJECTION INTRAMUSCULAR
Status: DISCONTINUED | OUTPATIENT
Start: 2024-01-01 | End: 2024-01-01 | Stop reason: HOSPADM

## 2024-01-01 RX ORDER — LORAZEPAM 2 MG/ML
1 INJECTION INTRAMUSCULAR
Status: CANCELLED | OUTPATIENT
Start: 2024-01-01 | End: 2024-08-30

## 2024-01-01 RX ORDER — GLYCOPYRROLATE 0.2 MG/ML
0.4 INJECTION INTRAMUSCULAR; INTRAVENOUS
Status: CANCELLED | OUTPATIENT
Start: 2024-01-01

## 2024-01-01 RX ORDER — AMOXICILLIN 250 MG
2 CAPSULE ORAL 2 TIMES DAILY PRN
Status: CANCELLED | OUTPATIENT
Start: 2024-01-01

## 2024-01-01 RX ORDER — FUROSEMIDE 10 MG/ML
60 INJECTION INTRAMUSCULAR; INTRAVENOUS ONCE
Status: COMPLETED | OUTPATIENT
Start: 2024-01-01 | End: 2024-01-01

## 2024-01-01 RX ORDER — GUAIFENESIN 600 MG/1
1200 TABLET, EXTENDED RELEASE ORAL EVERY 12 HOURS SCHEDULED
Status: DISCONTINUED | OUTPATIENT
Start: 2024-01-01 | End: 2024-01-01

## 2024-01-01 RX ORDER — FUROSEMIDE 10 MG/ML
40 INJECTION INTRAMUSCULAR; INTRAVENOUS ONCE
Status: COMPLETED | OUTPATIENT
Start: 2024-01-01 | End: 2024-01-01

## 2024-01-01 RX ORDER — ROSUVASTATIN CALCIUM 10 MG/1
20 TABLET, COATED ORAL DAILY
Status: DISCONTINUED | OUTPATIENT
Start: 2024-01-01 | End: 2024-01-01

## 2024-01-01 RX ORDER — LORAZEPAM 1 MG/1
2 TABLET ORAL
Status: DISCONTINUED | OUTPATIENT
Start: 2024-01-01 | End: 2024-01-01 | Stop reason: HOSPADM

## 2024-01-01 RX ORDER — POLYETHYLENE GLYCOL 3350 17 G/17G
17 POWDER, FOR SOLUTION ORAL DAILY PRN
Status: CANCELLED | OUTPATIENT
Start: 2024-01-01

## 2024-01-01 RX ORDER — LORAZEPAM 2 MG/ML
0.5 CONCENTRATE ORAL
Status: DISCONTINUED | OUTPATIENT
Start: 2024-01-01 | End: 2024-01-01 | Stop reason: HOSPADM

## 2024-01-01 RX ORDER — GLYCOPYRROLATE 0.2 MG/ML
0.2 INJECTION INTRAMUSCULAR; INTRAVENOUS
Status: DISCONTINUED | OUTPATIENT
Start: 2024-01-01 | End: 2024-01-01 | Stop reason: HOSPADM

## 2024-01-01 RX ORDER — GLYCOPYRROLATE 0.2 MG/ML
0.4 INJECTION INTRAMUSCULAR; INTRAVENOUS
Status: DISCONTINUED | OUTPATIENT
Start: 2024-01-01 | End: 2024-01-01 | Stop reason: HOSPADM

## 2024-01-01 RX ORDER — ONDANSETRON 4 MG/1
4 TABLET, ORALLY DISINTEGRATING ORAL EVERY 6 HOURS PRN
Status: DISCONTINUED | OUTPATIENT
Start: 2024-01-01 | End: 2024-01-01 | Stop reason: HOSPADM

## 2024-01-01 RX ORDER — BISACODYL 5 MG/1
5 TABLET, DELAYED RELEASE ORAL DAILY PRN
Status: DISCONTINUED | OUTPATIENT
Start: 2024-01-01 | End: 2024-01-01 | Stop reason: HOSPADM

## 2024-01-01 RX ORDER — AMIODARONE HYDROCHLORIDE 200 MG/1
200 TABLET ORAL EVERY 12 HOURS SCHEDULED
Status: DISCONTINUED | OUTPATIENT
Start: 2024-01-01 | End: 2024-01-01

## 2024-01-01 RX ORDER — LORAZEPAM 2 MG/ML
0.5 INJECTION INTRAMUSCULAR
Status: CANCELLED | OUTPATIENT
Start: 2024-01-01 | End: 2024-08-30

## 2024-01-01 RX ORDER — PANTOPRAZOLE SODIUM 40 MG/10ML
40 INJECTION, POWDER, LYOPHILIZED, FOR SOLUTION INTRAVENOUS
Status: DISCONTINUED | OUTPATIENT
Start: 2024-01-01 | End: 2024-01-01

## 2024-01-01 RX ORDER — METHYLPREDNISOLONE SODIUM SUCCINATE 40 MG/ML
40 INJECTION, POWDER, LYOPHILIZED, FOR SOLUTION INTRAMUSCULAR; INTRAVENOUS EVERY 12 HOURS
Status: DISCONTINUED | OUTPATIENT
Start: 2024-01-01 | End: 2024-01-01

## 2024-01-01 RX ORDER — MAGNESIUM SULFATE HEPTAHYDRATE 40 MG/ML
4 INJECTION, SOLUTION INTRAVENOUS ONCE
Status: COMPLETED | OUTPATIENT
Start: 2024-01-01 | End: 2024-01-01

## 2024-01-01 RX ORDER — LORAZEPAM 2 MG/ML
2 CONCENTRATE ORAL
Status: CANCELLED | OUTPATIENT
Start: 2024-01-01 | End: 2024-08-30

## 2024-01-01 RX ORDER — BISACODYL 5 MG/1
5 TABLET, DELAYED RELEASE ORAL DAILY PRN
Status: CANCELLED | OUTPATIENT
Start: 2024-01-01

## 2024-01-01 RX ORDER — LORAZEPAM 2 MG/ML
1 CONCENTRATE ORAL
Status: CANCELLED | OUTPATIENT
Start: 2024-01-01 | End: 2024-08-30

## 2024-01-01 RX ORDER — ONDANSETRON 2 MG/ML
4 INJECTION INTRAMUSCULAR; INTRAVENOUS EVERY 6 HOURS PRN
Status: DISCONTINUED | OUTPATIENT
Start: 2024-01-01 | End: 2024-01-01 | Stop reason: HOSPADM

## 2024-01-01 RX ORDER — ACETYLCYSTEINE 200 MG/ML
3 SOLUTION ORAL; RESPIRATORY (INHALATION)
Status: DISCONTINUED | OUTPATIENT
Start: 2024-01-01 | End: 2024-01-01

## 2024-01-01 RX ORDER — NITROGLYCERIN 0.4 MG/1
0.4 TABLET SUBLINGUAL
Status: DISCONTINUED | OUTPATIENT
Start: 2024-01-01 | End: 2024-01-01

## 2024-01-01 RX ORDER — IPRATROPIUM BROMIDE AND ALBUTEROL SULFATE 2.5; .5 MG/3ML; MG/3ML
3 SOLUTION RESPIRATORY (INHALATION)
Status: DISCONTINUED | OUTPATIENT
Start: 2024-01-01 | End: 2024-01-01

## 2024-01-01 RX ORDER — POLYETHYLENE GLYCOL 3350 17 G/17G
17 POWDER, FOR SOLUTION ORAL DAILY PRN
Status: DISCONTINUED | OUTPATIENT
Start: 2024-01-01 | End: 2024-01-01 | Stop reason: HOSPADM

## 2024-01-01 RX ORDER — METHYLPREDNISOLONE SODIUM SUCCINATE 40 MG/ML
40 INJECTION, POWDER, LYOPHILIZED, FOR SOLUTION INTRAMUSCULAR; INTRAVENOUS EVERY 24 HOURS
Status: DISCONTINUED | OUTPATIENT
Start: 2024-01-01 | End: 2024-01-01

## 2024-01-01 RX ORDER — SCOLOPAMINE TRANSDERMAL SYSTEM 1 MG/1
1 PATCH, EXTENDED RELEASE TRANSDERMAL
Status: CANCELLED | OUTPATIENT
Start: 2024-01-01

## 2024-01-01 RX ORDER — SODIUM CHLORIDE 9 MG/ML
40 INJECTION, SOLUTION INTRAVENOUS AS NEEDED
Status: DISCONTINUED | OUTPATIENT
Start: 2024-01-01 | End: 2024-01-01

## 2024-01-01 RX ORDER — IPRATROPIUM BROMIDE AND ALBUTEROL SULFATE 2.5; .5 MG/3ML; MG/3ML
1.5 SOLUTION RESPIRATORY (INHALATION)
Status: DISCONTINUED | OUTPATIENT
Start: 2024-01-01 | End: 2024-01-01

## 2024-01-01 RX ORDER — METOPROLOL SUCCINATE 50 MG/1
50 TABLET, EXTENDED RELEASE ORAL
Status: DISCONTINUED | OUTPATIENT
Start: 2024-01-01 | End: 2024-01-01

## 2024-01-01 RX ORDER — ALBUTEROL SULFATE 0.83 MG/ML
2.5 SOLUTION RESPIRATORY (INHALATION) EVERY 6 HOURS PRN
Status: DISCONTINUED | OUTPATIENT
Start: 2024-01-01 | End: 2024-01-01

## 2024-01-01 RX ORDER — ACETAMINOPHEN 325 MG/1
650 TABLET ORAL EVERY 8 HOURS PRN
Status: DISCONTINUED | OUTPATIENT
Start: 2024-01-01 | End: 2024-01-01

## 2024-01-01 RX ORDER — FUROSEMIDE 40 MG
40 TABLET ORAL
Status: DISCONTINUED | OUTPATIENT
Start: 2024-01-01 | End: 2024-01-01

## 2024-01-01 RX ORDER — FUROSEMIDE 10 MG/ML
INJECTION INTRAMUSCULAR; INTRAVENOUS
Status: COMPLETED
Start: 2024-01-01 | End: 2024-01-01

## 2024-01-01 RX ORDER — LORAZEPAM 0.5 MG/1
0.5 TABLET ORAL
Status: CANCELLED | OUTPATIENT
Start: 2024-01-01 | End: 2024-08-30

## 2024-01-01 RX ORDER — HYDROXYZINE HYDROCHLORIDE 25 MG/1
25 TABLET, FILM COATED ORAL 3 TIMES DAILY PRN
Status: DISCONTINUED | OUTPATIENT
Start: 2024-01-01 | End: 2024-01-01

## 2024-01-01 RX ORDER — ONDANSETRON 2 MG/ML
4 INJECTION INTRAMUSCULAR; INTRAVENOUS ONCE
Status: COMPLETED | OUTPATIENT
Start: 2024-01-01 | End: 2024-01-01

## 2024-01-01 RX ORDER — ONDANSETRON 2 MG/ML
4 INJECTION INTRAMUSCULAR; INTRAVENOUS EVERY 6 HOURS PRN
Status: CANCELLED | OUTPATIENT
Start: 2024-01-01

## 2024-01-01 RX ORDER — SODIUM CHLORIDE 0.9 % (FLUSH) 0.9 %
10 SYRINGE (ML) INJECTION EVERY 12 HOURS SCHEDULED
Status: DISCONTINUED | OUTPATIENT
Start: 2024-01-01 | End: 2024-01-01

## 2024-01-01 RX ORDER — LORAZEPAM 1 MG/1
1 TABLET ORAL
Status: DISCONTINUED | OUTPATIENT
Start: 2024-01-01 | End: 2024-01-01 | Stop reason: HOSPADM

## 2024-01-01 RX ORDER — LORAZEPAM 1 MG/1
1 TABLET ORAL
Status: CANCELLED | OUTPATIENT
Start: 2024-01-01 | End: 2024-08-30

## 2024-01-01 RX ORDER — BISACODYL 10 MG
10 SUPPOSITORY, RECTAL RECTAL DAILY PRN
Status: CANCELLED | OUTPATIENT
Start: 2024-01-01

## 2024-01-01 RX ORDER — IPRATROPIUM BROMIDE AND ALBUTEROL SULFATE 2.5; .5 MG/3ML; MG/3ML
3 SOLUTION RESPIRATORY (INHALATION) EVERY 4 HOURS PRN
Status: DISCONTINUED | OUTPATIENT
Start: 2024-01-01 | End: 2024-01-01

## 2024-01-01 RX ORDER — CLOPIDOGREL BISULFATE 75 MG/1
75 TABLET ORAL DAILY
Status: DISCONTINUED | OUTPATIENT
Start: 2024-01-01 | End: 2024-01-01

## 2024-01-01 RX ORDER — ALUMINA, MAGNESIA, AND SIMETHICONE 2400; 2400; 240 MG/30ML; MG/30ML; MG/30ML
15 SUSPENSION ORAL EVERY 6 HOURS PRN
Status: DISCONTINUED | OUTPATIENT
Start: 2024-01-01 | End: 2024-01-01

## 2024-01-01 RX ORDER — LORAZEPAM 1 MG/1
2 TABLET ORAL
Status: CANCELLED | OUTPATIENT
Start: 2024-01-01 | End: 2024-08-30

## 2024-01-01 RX ORDER — SERTRALINE HYDROCHLORIDE 100 MG/1
100 TABLET, FILM COATED ORAL DAILY
Status: DISCONTINUED | OUTPATIENT
Start: 2024-01-01 | End: 2024-01-01

## 2024-01-01 RX ORDER — BISACODYL 10 MG
10 SUPPOSITORY, RECTAL RECTAL DAILY PRN
Status: DISCONTINUED | OUTPATIENT
Start: 2024-01-01 | End: 2024-01-01 | Stop reason: HOSPADM

## 2024-01-01 RX ORDER — METHYLPREDNISOLONE SODIUM SUCCINATE 40 MG/ML
20 INJECTION, POWDER, LYOPHILIZED, FOR SOLUTION INTRAMUSCULAR; INTRAVENOUS EVERY 24 HOURS
Status: DISCONTINUED | OUTPATIENT
Start: 2024-01-01 | End: 2024-01-01

## 2024-01-01 RX ORDER — ONDANSETRON 4 MG/1
4 TABLET, ORALLY DISINTEGRATING ORAL EVERY 6 HOURS PRN
Status: CANCELLED | OUTPATIENT
Start: 2024-01-01

## 2024-01-01 RX ORDER — BUDESONIDE 0.5 MG/2ML
0.5 INHALANT ORAL
Status: DISCONTINUED | OUTPATIENT
Start: 2024-01-01 | End: 2024-01-01

## 2024-01-01 RX ORDER — FUROSEMIDE 10 MG/ML
40 INJECTION INTRAMUSCULAR; INTRAVENOUS EVERY 12 HOURS
Status: DISCONTINUED | OUTPATIENT
Start: 2024-01-01 | End: 2024-01-01

## 2024-01-01 RX ORDER — AMOXICILLIN 250 MG
2 CAPSULE ORAL 2 TIMES DAILY PRN
Status: DISCONTINUED | OUTPATIENT
Start: 2024-01-01 | End: 2024-01-01 | Stop reason: HOSPADM

## 2024-01-01 RX ORDER — SODIUM CHLORIDE 0.9 % (FLUSH) 0.9 %
10 SYRINGE (ML) INJECTION AS NEEDED
Status: DISCONTINUED | OUTPATIENT
Start: 2024-01-01 | End: 2024-01-01

## 2024-01-01 RX ORDER — FUROSEMIDE 10 MG/ML
60 INJECTION INTRAMUSCULAR; INTRAVENOUS EVERY 12 HOURS
Status: DISCONTINUED | OUTPATIENT
Start: 2024-01-01 | End: 2024-01-01

## 2024-01-01 RX ORDER — PHENAZOPYRIDINE HYDROCHLORIDE 100 MG/1
100 TABLET, FILM COATED ORAL ONCE
Status: COMPLETED | OUTPATIENT
Start: 2024-01-01 | End: 2024-01-01

## 2024-01-01 RX ORDER — LEVOTHYROXINE SODIUM 75 UG/1
75 TABLET ORAL
Status: DISCONTINUED | OUTPATIENT
Start: 2024-01-01 | End: 2024-01-01

## 2024-01-01 RX ADMIN — AMIODARONE HYDROCHLORIDE 200 MG: 200 TABLET ORAL at 08:04

## 2024-01-01 RX ADMIN — IPRATROPIUM BROMIDE AND ALBUTEROL SULFATE 3 ML: .5; 3 SOLUTION RESPIRATORY (INHALATION) at 22:40

## 2024-01-01 RX ADMIN — FUROSEMIDE 60 MG: 10 INJECTION, SOLUTION INTRAMUSCULAR; INTRAVENOUS at 20:53

## 2024-01-01 RX ADMIN — IPRATROPIUM BROMIDE AND ALBUTEROL SULFATE 3 ML: .5; 3 SOLUTION RESPIRATORY (INHALATION) at 12:05

## 2024-01-01 RX ADMIN — AMIODARONE HYDROCHLORIDE 200 MG: 200 TABLET ORAL at 20:07

## 2024-01-01 RX ADMIN — SERTRALINE 100 MG: 100 TABLET, FILM COATED ORAL at 08:04

## 2024-01-01 RX ADMIN — AMIODARONE HYDROCHLORIDE 200 MG: 200 TABLET ORAL at 20:30

## 2024-01-01 RX ADMIN — Medication 10 ML: at 09:40

## 2024-01-01 RX ADMIN — PANTOPRAZOLE SODIUM 40 MG: 40 TABLET, DELAYED RELEASE ORAL at 10:29

## 2024-01-01 RX ADMIN — AMIODARONE HYDROCHLORIDE 200 MG: 200 TABLET ORAL at 19:53

## 2024-01-01 RX ADMIN — CLOPIDOGREL BISULFATE 75 MG: 75 TABLET ORAL at 10:41

## 2024-01-01 RX ADMIN — FUROSEMIDE 60 MG: 10 INJECTION, SOLUTION INTRAMUSCULAR; INTRAVENOUS at 09:41

## 2024-01-01 RX ADMIN — PANTOPRAZOLE SODIUM 40 MG: 40 TABLET, DELAYED RELEASE ORAL at 09:28

## 2024-01-01 RX ADMIN — AMIODARONE HYDROCHLORIDE 200 MG: 200 TABLET ORAL at 10:41

## 2024-01-01 RX ADMIN — DOXYCYCLINE 100 MG: 100 INJECTION, POWDER, LYOPHILIZED, FOR SOLUTION INTRAVENOUS at 14:52

## 2024-01-01 RX ADMIN — ROSUVASTATIN 20 MG: 10 TABLET, FILM COATED ORAL at 08:19

## 2024-01-01 RX ADMIN — FUROSEMIDE 40 MG: 40 TABLET ORAL at 17:22

## 2024-01-01 RX ADMIN — MEROPENEM 1000 MG: 1 INJECTION INTRAVENOUS at 05:23

## 2024-01-01 RX ADMIN — AMIODARONE HYDROCHLORIDE 200 MG: 200 TABLET ORAL at 22:29

## 2024-01-01 RX ADMIN — GUAIFENESIN 1200 MG: 600 TABLET, EXTENDED RELEASE ORAL at 22:29

## 2024-01-01 RX ADMIN — POTASSIUM CHLORIDE 40 MEQ: 1500 TABLET, EXTENDED RELEASE ORAL at 18:09

## 2024-01-01 RX ADMIN — BUDESONIDE 0.5 MG: 0.5 INHALANT RESPIRATORY (INHALATION) at 18:08

## 2024-01-01 RX ADMIN — IPRATROPIUM BROMIDE AND ALBUTEROL SULFATE 3 ML: .5; 3 SOLUTION RESPIRATORY (INHALATION) at 18:39

## 2024-01-01 RX ADMIN — LEVOTHYROXINE SODIUM 75 MCG: 0.07 TABLET ORAL at 05:23

## 2024-01-01 RX ADMIN — Medication 10 ML: at 20:38

## 2024-01-01 RX ADMIN — HYDROMORPHONE HYDROCHLORIDE 1 MG: 1 INJECTION, SOLUTION INTRAMUSCULAR; INTRAVENOUS; SUBCUTANEOUS at 22:22

## 2024-01-01 RX ADMIN — IPRATROPIUM BROMIDE AND ALBUTEROL SULFATE 3 ML: .5; 3 SOLUTION RESPIRATORY (INHALATION) at 08:30

## 2024-01-01 RX ADMIN — POTASSIUM CHLORIDE 40 MEQ: 1500 TABLET, EXTENDED RELEASE ORAL at 13:23

## 2024-01-01 RX ADMIN — GUAIFENESIN 1200 MG: 600 TABLET, EXTENDED RELEASE ORAL at 13:23

## 2024-01-01 RX ADMIN — ROSUVASTATIN 20 MG: 10 TABLET, FILM COATED ORAL at 08:48

## 2024-01-01 RX ADMIN — DILTIAZEM HYDROCHLORIDE 120 MG: 120 CAPSULE, EXTENDED RELEASE ORAL at 09:40

## 2024-01-01 RX ADMIN — BUDESONIDE 0.5 MG: 0.5 INHALANT RESPIRATORY (INHALATION) at 20:45

## 2024-01-01 RX ADMIN — METOPROLOL SUCCINATE 50 MG: 50 TABLET, EXTENDED RELEASE ORAL at 09:42

## 2024-01-01 RX ADMIN — FUROSEMIDE 40 MG: 40 TABLET ORAL at 10:41

## 2024-01-01 RX ADMIN — SERTRALINE 100 MG: 100 TABLET, FILM COATED ORAL at 09:28

## 2024-01-01 RX ADMIN — AMIODARONE HYDROCHLORIDE 200 MG: 200 TABLET ORAL at 10:29

## 2024-01-01 RX ADMIN — POTASSIUM CHLORIDE 40 MEQ: 1500 TABLET, EXTENDED RELEASE ORAL at 14:28

## 2024-01-01 RX ADMIN — IPRATROPIUM BROMIDE AND ALBUTEROL SULFATE 3 ML: .5; 3 SOLUTION RESPIRATORY (INHALATION) at 18:58

## 2024-01-01 RX ADMIN — GUAIFENESIN 1200 MG: 600 TABLET, EXTENDED RELEASE ORAL at 22:00

## 2024-01-01 RX ADMIN — ROSUVASTATIN 20 MG: 10 TABLET, FILM COATED ORAL at 09:41

## 2024-01-01 RX ADMIN — BUDESONIDE 0.5 MG: 0.5 INHALANT RESPIRATORY (INHALATION) at 06:51

## 2024-01-01 RX ADMIN — IPRATROPIUM BROMIDE AND ALBUTEROL SULFATE 3 ML: .5; 3 SOLUTION RESPIRATORY (INHALATION) at 14:42

## 2024-01-01 RX ADMIN — SENNOSIDES AND DOCUSATE SODIUM 2 TABLET: 50; 8.6 TABLET ORAL at 06:36

## 2024-01-01 RX ADMIN — GUAIFENESIN 1200 MG: 600 TABLET, EXTENDED RELEASE ORAL at 20:28

## 2024-01-01 RX ADMIN — LEVOTHYROXINE SODIUM 75 MCG: 0.07 TABLET ORAL at 05:30

## 2024-01-01 RX ADMIN — GLYCOPYRROLATE 0.4 MG: 0.2 INJECTION INTRAMUSCULAR; INTRAVENOUS at 22:23

## 2024-01-01 RX ADMIN — IPRATROPIUM BROMIDE AND ALBUTEROL SULFATE 3 ML: .5; 3 SOLUTION RESPIRATORY (INHALATION) at 12:00

## 2024-01-01 RX ADMIN — AMIODARONE HYDROCHLORIDE 200 MG: 200 TABLET ORAL at 09:28

## 2024-01-01 RX ADMIN — CEFTRIAXONE 2000 MG: 2 INJECTION, POWDER, FOR SOLUTION INTRAMUSCULAR; INTRAVENOUS at 16:18

## 2024-01-01 RX ADMIN — SODIUM CHLORIDE 1000 ML: 9 INJECTION, SOLUTION INTRAVENOUS at 16:26

## 2024-01-01 RX ADMIN — PANTOPRAZOLE SODIUM 40 MG: 40 TABLET, DELAYED RELEASE ORAL at 08:19

## 2024-01-01 RX ADMIN — CLOPIDOGREL BISULFATE 75 MG: 75 TABLET ORAL at 08:04

## 2024-01-01 RX ADMIN — BUDESONIDE 0.5 MG: 0.5 INHALANT RESPIRATORY (INHALATION) at 19:03

## 2024-01-01 RX ADMIN — DOXYCYCLINE 100 MG: 100 INJECTION, POWDER, LYOPHILIZED, FOR SOLUTION INTRAVENOUS at 15:25

## 2024-01-01 RX ADMIN — SERTRALINE 100 MG: 100 TABLET, FILM COATED ORAL at 08:20

## 2024-01-01 RX ADMIN — DOXYCYCLINE 100 MG: 100 INJECTION, POWDER, LYOPHILIZED, FOR SOLUTION INTRAVENOUS at 15:21

## 2024-01-01 RX ADMIN — IPRATROPIUM BROMIDE AND ALBUTEROL SULFATE 3 ML: .5; 3 SOLUTION RESPIRATORY (INHALATION) at 11:02

## 2024-01-01 RX ADMIN — MEROPENEM 1000 MG: 1 INJECTION INTRAVENOUS at 17:57

## 2024-01-01 RX ADMIN — CLOPIDOGREL BISULFATE 75 MG: 75 TABLET ORAL at 08:39

## 2024-01-01 RX ADMIN — LEVOTHYROXINE SODIUM 75 MCG: 0.07 TABLET ORAL at 04:21

## 2024-01-01 RX ADMIN — POTASSIUM CHLORIDE 40 MEQ: 1500 TABLET, EXTENDED RELEASE ORAL at 21:19

## 2024-01-01 RX ADMIN — PANTOPRAZOLE SODIUM 40 MG: 40 TABLET, DELAYED RELEASE ORAL at 08:48

## 2024-01-01 RX ADMIN — DILTIAZEM HYDROCHLORIDE 120 MG: 120 CAPSULE, EXTENDED RELEASE ORAL at 09:42

## 2024-01-01 RX ADMIN — HYDROMORPHONE HYDROCHLORIDE 1 MG: 1 INJECTION, SOLUTION INTRAMUSCULAR; INTRAVENOUS; SUBCUTANEOUS at 17:09

## 2024-01-01 RX ADMIN — MEROPENEM 1000 MG: 1 INJECTION INTRAVENOUS at 18:41

## 2024-01-01 RX ADMIN — Medication 10 ML: at 08:25

## 2024-01-01 RX ADMIN — Medication 10 ML: at 20:08

## 2024-01-01 RX ADMIN — ACETYLCYSTEINE 3 ML: 200 SOLUTION ORAL; RESPIRATORY (INHALATION) at 07:16

## 2024-01-01 RX ADMIN — IPRATROPIUM BROMIDE AND ALBUTEROL SULFATE 3 ML: .5; 3 SOLUTION RESPIRATORY (INHALATION) at 20:08

## 2024-01-01 RX ADMIN — FUROSEMIDE 40 MG: 40 TABLET ORAL at 08:08

## 2024-01-01 RX ADMIN — IPRATROPIUM BROMIDE AND ALBUTEROL SULFATE 3 ML: .5; 3 SOLUTION RESPIRATORY (INHALATION) at 11:37

## 2024-01-01 RX ADMIN — LEVOTHYROXINE SODIUM 75 MCG: 0.07 TABLET ORAL at 05:22

## 2024-01-01 RX ADMIN — METOPROLOL SUCCINATE 50 MG: 50 TABLET, EXTENDED RELEASE ORAL at 10:30

## 2024-01-01 RX ADMIN — ONDANSETRON 4 MG: 2 INJECTION INTRAMUSCULAR; INTRAVENOUS at 16:27

## 2024-01-01 RX ADMIN — MEROPENEM 1000 MG: 1 INJECTION INTRAVENOUS at 08:57

## 2024-01-01 RX ADMIN — LORAZEPAM 2 MG: 2 INJECTION INTRAMUSCULAR; INTRAVENOUS at 17:09

## 2024-01-01 RX ADMIN — FUROSEMIDE 40 MG: 10 INJECTION INTRAMUSCULAR; INTRAVENOUS at 12:21

## 2024-01-01 RX ADMIN — ONDANSETRON 4 MG: 2 INJECTION INTRAMUSCULAR; INTRAVENOUS at 16:45

## 2024-01-01 RX ADMIN — IPRATROPIUM BROMIDE AND ALBUTEROL SULFATE 3 ML: .5; 3 SOLUTION RESPIRATORY (INHALATION) at 06:47

## 2024-01-01 RX ADMIN — GUAIFENESIN 1200 MG: 600 TABLET, EXTENDED RELEASE ORAL at 10:29

## 2024-01-01 RX ADMIN — Medication 10 ML: at 08:07

## 2024-01-01 RX ADMIN — Medication 10 ML: at 20:49

## 2024-01-01 RX ADMIN — IPRATROPIUM BROMIDE AND ALBUTEROL SULFATE 3 ML: .5; 3 SOLUTION RESPIRATORY (INHALATION) at 20:40

## 2024-01-01 RX ADMIN — POTASSIUM CHLORIDE 40 MEQ: 1500 TABLET, EXTENDED RELEASE ORAL at 08:40

## 2024-01-01 RX ADMIN — AMIODARONE HYDROCHLORIDE 200 MG: 200 TABLET ORAL at 09:40

## 2024-01-01 RX ADMIN — LORAZEPAM 2 MG: 2 INJECTION INTRAMUSCULAR; INTRAVENOUS at 22:32

## 2024-01-01 RX ADMIN — ROSUVASTATIN 20 MG: 10 TABLET, FILM COATED ORAL at 08:08

## 2024-01-01 RX ADMIN — BUDESONIDE 0.5 MG: 0.5 INHALANT RESPIRATORY (INHALATION) at 06:56

## 2024-01-01 RX ADMIN — LEVOTHYROXINE SODIUM 75 MCG: 0.07 TABLET ORAL at 06:19

## 2024-01-01 RX ADMIN — BUDESONIDE 0.5 MG: 0.5 INHALANT RESPIRATORY (INHALATION) at 22:07

## 2024-01-01 RX ADMIN — DILTIAZEM HYDROCHLORIDE 120 MG: 120 CAPSULE, EXTENDED RELEASE ORAL at 10:41

## 2024-01-01 RX ADMIN — Medication 10 ML: at 12:01

## 2024-01-01 RX ADMIN — AMIODARONE HYDROCHLORIDE 200 MG: 200 TABLET ORAL at 20:28

## 2024-01-01 RX ADMIN — FUROSEMIDE 60 MG: 10 INJECTION, SOLUTION INTRAMUSCULAR; INTRAVENOUS at 09:47

## 2024-01-01 RX ADMIN — PANTOPRAZOLE SODIUM 40 MG: 40 TABLET, DELAYED RELEASE ORAL at 08:04

## 2024-01-01 RX ADMIN — Medication 10 ML: at 22:00

## 2024-01-01 RX ADMIN — METOPROLOL SUCCINATE 50 MG: 50 TABLET, EXTENDED RELEASE ORAL at 09:40

## 2024-01-01 RX ADMIN — GUAIFENESIN 1200 MG: 600 TABLET, EXTENDED RELEASE ORAL at 08:08

## 2024-01-01 RX ADMIN — LORAZEPAM 0.5 MG: 2 INJECTION INTRAMUSCULAR; INTRAVENOUS at 03:27

## 2024-01-01 RX ADMIN — IPRATROPIUM BROMIDE AND ALBUTEROL SULFATE 3 ML: .5; 3 SOLUTION RESPIRATORY (INHALATION) at 03:21

## 2024-01-01 RX ADMIN — IPRATROPIUM BROMIDE AND ALBUTEROL SULFATE 3 ML: .5; 3 SOLUTION RESPIRATORY (INHALATION) at 11:20

## 2024-01-01 RX ADMIN — BUDESONIDE 0.5 MG: 0.5 INHALANT RESPIRATORY (INHALATION) at 07:22

## 2024-01-01 RX ADMIN — IPRATROPIUM BROMIDE AND ALBUTEROL SULFATE 3 ML: .5; 3 SOLUTION RESPIRATORY (INHALATION) at 08:44

## 2024-01-01 RX ADMIN — CLOPIDOGREL BISULFATE 75 MG: 75 TABLET ORAL at 08:48

## 2024-01-01 RX ADMIN — Medication 5 MG: at 20:49

## 2024-01-01 RX ADMIN — IPRATROPIUM BROMIDE AND ALBUTEROL SULFATE 3 ML: .5; 3 SOLUTION RESPIRATORY (INHALATION) at 11:52

## 2024-01-01 RX ADMIN — Medication 10 ML: at 20:54

## 2024-01-01 RX ADMIN — ROSUVASTATIN 20 MG: 10 TABLET, FILM COATED ORAL at 09:28

## 2024-01-01 RX ADMIN — ACETYLCYSTEINE 3 ML: 200 SOLUTION ORAL; RESPIRATORY (INHALATION) at 06:46

## 2024-01-01 RX ADMIN — GLYCOPYRROLATE 0.4 MG: 0.2 INJECTION INTRAMUSCULAR; INTRAVENOUS at 17:08

## 2024-01-01 RX ADMIN — IPRATROPIUM BROMIDE AND ALBUTEROL SULFATE 1.5 ML: .5; 3 SOLUTION RESPIRATORY (INHALATION) at 21:15

## 2024-01-01 RX ADMIN — ROSUVASTATIN 20 MG: 10 TABLET, FILM COATED ORAL at 10:29

## 2024-01-01 RX ADMIN — Medication 5 MG: at 22:20

## 2024-01-01 RX ADMIN — PANTOPRAZOLE SODIUM 40 MG: 40 TABLET, DELAYED RELEASE ORAL at 09:41

## 2024-01-01 RX ADMIN — LEVOTHYROXINE SODIUM 75 MCG: 0.07 TABLET ORAL at 05:14

## 2024-01-01 RX ADMIN — POTASSIUM CHLORIDE 40 MEQ: 1500 TABLET, EXTENDED RELEASE ORAL at 18:02

## 2024-01-01 RX ADMIN — AMIODARONE HYDROCHLORIDE 200 MG: 200 TABLET ORAL at 09:41

## 2024-01-01 RX ADMIN — ROSUVASTATIN 20 MG: 10 TABLET, FILM COATED ORAL at 08:40

## 2024-01-01 RX ADMIN — IPRATROPIUM BROMIDE AND ALBUTEROL SULFATE 3 ML: .5; 3 SOLUTION RESPIRATORY (INHALATION) at 06:46

## 2024-01-01 RX ADMIN — IPRATROPIUM BROMIDE AND ALBUTEROL SULFATE 3 ML: .5; 3 SOLUTION RESPIRATORY (INHALATION) at 23:17

## 2024-01-01 RX ADMIN — FUROSEMIDE 40 MG: 40 TABLET ORAL at 17:42

## 2024-01-01 RX ADMIN — MEROPENEM 1000 MG: 1 INJECTION INTRAVENOUS at 18:09

## 2024-01-01 RX ADMIN — IPRATROPIUM BROMIDE AND ALBUTEROL SULFATE 3 ML: .5; 3 SOLUTION RESPIRATORY (INHALATION) at 07:35

## 2024-01-01 RX ADMIN — Medication 10 ML: at 08:47

## 2024-01-01 RX ADMIN — DOXYCYCLINE 100 MG: 100 INJECTION, POWDER, LYOPHILIZED, FOR SOLUTION INTRAVENOUS at 04:20

## 2024-01-01 RX ADMIN — FUROSEMIDE 40 MG: 40 TABLET ORAL at 09:28

## 2024-01-01 RX ADMIN — FUROSEMIDE 40 MG: 10 INJECTION, SOLUTION INTRAMUSCULAR; INTRAVENOUS at 12:21

## 2024-01-01 RX ADMIN — Medication 10 ML: at 04:28

## 2024-01-01 RX ADMIN — AMIODARONE HYDROCHLORIDE 200 MG: 200 TABLET ORAL at 08:08

## 2024-01-01 RX ADMIN — POTASSIUM CHLORIDE 40 MEQ: 1500 TABLET, EXTENDED RELEASE ORAL at 08:47

## 2024-01-01 RX ADMIN — AMIODARONE HYDROCHLORIDE 200 MG: 200 TABLET ORAL at 22:00

## 2024-01-01 RX ADMIN — LORAZEPAM 2 MG: 2 INJECTION INTRAMUSCULAR; INTRAVENOUS at 20:54

## 2024-01-01 RX ADMIN — ACETAMINOPHEN 650 MG: 325 TABLET, FILM COATED ORAL at 14:51

## 2024-01-01 RX ADMIN — IPRATROPIUM BROMIDE AND ALBUTEROL SULFATE 3 ML: .5; 3 SOLUTION RESPIRATORY (INHALATION) at 11:00

## 2024-01-01 RX ADMIN — IPRATROPIUM BROMIDE AND ALBUTEROL SULFATE 3 ML: .5; 3 SOLUTION RESPIRATORY (INHALATION) at 15:23

## 2024-01-01 RX ADMIN — IPRATROPIUM BROMIDE AND ALBUTEROL SULFATE 3 ML: .5; 3 SOLUTION RESPIRATORY (INHALATION) at 07:16

## 2024-01-01 RX ADMIN — METHYLPREDNISOLONE SODIUM SUCCINATE 40 MG: 40 INJECTION, POWDER, FOR SOLUTION INTRAMUSCULAR; INTRAVENOUS at 10:11

## 2024-01-01 RX ADMIN — FUROSEMIDE 40 MG: 40 TABLET ORAL at 09:42

## 2024-01-01 RX ADMIN — AMIODARONE HYDROCHLORIDE 200 MG: 200 TABLET ORAL at 08:19

## 2024-01-01 RX ADMIN — PANTOPRAZOLE SODIUM 40 MG: 40 TABLET, DELAYED RELEASE ORAL at 08:40

## 2024-01-01 RX ADMIN — MEROPENEM 1000 MG: 1 INJECTION INTRAVENOUS at 19:45

## 2024-01-01 RX ADMIN — HYDROXYZINE HYDROCHLORIDE 25 MG: 25 TABLET, FILM COATED ORAL at 11:51

## 2024-01-01 RX ADMIN — METHYLPREDNISOLONE SODIUM SUCCINATE 40 MG: 40 INJECTION, POWDER, FOR SOLUTION INTRAMUSCULAR; INTRAVENOUS at 08:08

## 2024-01-01 RX ADMIN — IPRATROPIUM BROMIDE AND ALBUTEROL SULFATE 3 ML: .5; 3 SOLUTION RESPIRATORY (INHALATION) at 00:00

## 2024-01-01 RX ADMIN — Medication 10 ML: at 08:57

## 2024-01-01 RX ADMIN — BUDESONIDE 0.5 MG: 0.5 INHALANT RESPIRATORY (INHALATION) at 21:20

## 2024-01-01 RX ADMIN — IPRATROPIUM BROMIDE AND ALBUTEROL SULFATE 3 ML: .5; 3 SOLUTION RESPIRATORY (INHALATION) at 16:32

## 2024-01-01 RX ADMIN — Medication 5 MG: at 19:53

## 2024-01-01 RX ADMIN — Medication 5 MG: at 22:29

## 2024-01-01 RX ADMIN — CLOPIDOGREL BISULFATE 75 MG: 75 TABLET ORAL at 09:41

## 2024-01-01 RX ADMIN — DOXYCYCLINE 100 MG: 100 INJECTION, POWDER, LYOPHILIZED, FOR SOLUTION INTRAVENOUS at 04:28

## 2024-01-01 RX ADMIN — GUAIFENESIN 1200 MG: 600 TABLET, EXTENDED RELEASE ORAL at 20:53

## 2024-01-01 RX ADMIN — BUDESONIDE 0.5 MG: 0.5 INHALANT RESPIRATORY (INHALATION) at 08:44

## 2024-01-01 RX ADMIN — CLOPIDOGREL BISULFATE 75 MG: 75 TABLET ORAL at 09:40

## 2024-01-01 RX ADMIN — METHYLPREDNISOLONE SODIUM SUCCINATE 20 MG: 40 INJECTION, POWDER, FOR SOLUTION INTRAMUSCULAR; INTRAVENOUS at 08:47

## 2024-01-01 RX ADMIN — CEFTRIAXONE 1000 MG: 1 INJECTION, POWDER, FOR SOLUTION INTRAMUSCULAR; INTRAVENOUS at 16:57

## 2024-01-01 RX ADMIN — Medication 10 ML: at 09:41

## 2024-01-01 RX ADMIN — METOPROLOL SUCCINATE 50 MG: 50 TABLET, EXTENDED RELEASE ORAL at 08:08

## 2024-01-01 RX ADMIN — FUROSEMIDE 40 MG: 10 INJECTION, SOLUTION INTRAMUSCULAR; INTRAVENOUS at 12:05

## 2024-01-01 RX ADMIN — MEROPENEM 1000 MG: 1 INJECTION INTRAVENOUS at 06:40

## 2024-01-01 RX ADMIN — HYDROMORPHONE HYDROCHLORIDE 1 MG: 1 INJECTION, SOLUTION INTRAMUSCULAR; INTRAVENOUS; SUBCUTANEOUS at 20:53

## 2024-01-01 RX ADMIN — HYDROXYZINE HYDROCHLORIDE 25 MG: 25 TABLET, FILM COATED ORAL at 10:39

## 2024-01-01 RX ADMIN — IPRATROPIUM BROMIDE AND ALBUTEROL SULFATE 3 ML: .5; 3 SOLUTION RESPIRATORY (INHALATION) at 04:37

## 2024-01-01 RX ADMIN — Medication 10 ML: at 20:20

## 2024-01-01 RX ADMIN — FUROSEMIDE 60 MG: 10 INJECTION, SOLUTION INTRAMUSCULAR; INTRAVENOUS at 12:01

## 2024-01-01 RX ADMIN — IPRATROPIUM BROMIDE AND ALBUTEROL SULFATE 3 ML: .5; 3 SOLUTION RESPIRATORY (INHALATION) at 18:04

## 2024-01-01 RX ADMIN — IPRATROPIUM BROMIDE AND ALBUTEROL SULFATE 3 ML: .5; 3 SOLUTION RESPIRATORY (INHALATION) at 06:52

## 2024-01-01 RX ADMIN — MEROPENEM 1000 MG: 1 INJECTION INTRAVENOUS at 05:13

## 2024-01-01 RX ADMIN — SERTRALINE 100 MG: 100 TABLET, FILM COATED ORAL at 09:41

## 2024-01-01 RX ADMIN — MEROPENEM 1000 MG: 1 INJECTION INTRAVENOUS at 05:31

## 2024-01-01 RX ADMIN — MEROPENEM 1000 MG: 1 INJECTION INTRAVENOUS at 17:42

## 2024-01-01 RX ADMIN — LEVOTHYROXINE SODIUM 75 MCG: 0.07 TABLET ORAL at 05:12

## 2024-01-01 RX ADMIN — METOPROLOL SUCCINATE 50 MG: 50 TABLET, EXTENDED RELEASE ORAL at 09:28

## 2024-01-01 RX ADMIN — SENNOSIDES AND DOCUSATE SODIUM 2 TABLET: 50; 8.6 TABLET ORAL at 10:39

## 2024-01-01 RX ADMIN — AMIODARONE HYDROCHLORIDE 200 MG: 200 TABLET ORAL at 20:20

## 2024-01-01 RX ADMIN — Medication 10 ML: at 08:45

## 2024-01-01 RX ADMIN — PANTOPRAZOLE SODIUM 40 MG: 40 TABLET, DELAYED RELEASE ORAL at 08:08

## 2024-01-01 RX ADMIN — IPRATROPIUM BROMIDE AND ALBUTEROL SULFATE 3 ML: .5; 3 SOLUTION RESPIRATORY (INHALATION) at 07:22

## 2024-01-01 RX ADMIN — MAGNESIUM SULFATE HEPTAHYDRATE 4 G: 40 INJECTION, SOLUTION INTRAVENOUS at 15:47

## 2024-01-01 RX ADMIN — ROSUVASTATIN 20 MG: 10 TABLET, FILM COATED ORAL at 09:40

## 2024-01-01 RX ADMIN — Medication 5 MG: at 23:16

## 2024-01-01 RX ADMIN — METHYLPREDNISOLONE SODIUM SUCCINATE 40 MG: 40 INJECTION, POWDER, FOR SOLUTION INTRAMUSCULAR; INTRAVENOUS at 09:40

## 2024-01-01 RX ADMIN — SENNOSIDES AND DOCUSATE SODIUM 2 TABLET: 50; 8.6 TABLET ORAL at 10:34

## 2024-01-01 RX ADMIN — POTASSIUM CHLORIDE 40 MEQ: 1500 TABLET, EXTENDED RELEASE ORAL at 18:07

## 2024-01-01 RX ADMIN — IPRATROPIUM BROMIDE AND ALBUTEROL SULFATE 3 ML: .5; 3 SOLUTION RESPIRATORY (INHALATION) at 17:40

## 2024-01-01 RX ADMIN — IPRATROPIUM BROMIDE AND ALBUTEROL SULFATE 3 ML: .5; 3 SOLUTION RESPIRATORY (INHALATION) at 11:38

## 2024-01-01 RX ADMIN — IPRATROPIUM BROMIDE AND ALBUTEROL SULFATE 3 ML: .5; 3 SOLUTION RESPIRATORY (INHALATION) at 23:24

## 2024-01-01 RX ADMIN — SERTRALINE 100 MG: 100 TABLET, FILM COATED ORAL at 10:29

## 2024-01-01 RX ADMIN — CLOPIDOGREL BISULFATE 75 MG: 75 TABLET ORAL at 08:19

## 2024-01-01 RX ADMIN — BUDESONIDE 0.5 MG: 0.5 INHALANT RESPIRATORY (INHALATION) at 18:39

## 2024-01-01 RX ADMIN — METHYLPREDNISOLONE SODIUM SUCCINATE 20 MG: 40 INJECTION, POWDER, FOR SOLUTION INTRAMUSCULAR; INTRAVENOUS at 10:24

## 2024-01-01 RX ADMIN — LEVOTHYROXINE SODIUM 75 MCG: 0.07 TABLET ORAL at 05:32

## 2024-01-01 RX ADMIN — FUROSEMIDE 40 MG: 40 TABLET ORAL at 10:29

## 2024-01-01 RX ADMIN — Medication 10 ML: at 22:29

## 2024-01-01 RX ADMIN — ACETYLCYSTEINE 3 ML: 200 SOLUTION ORAL; RESPIRATORY (INHALATION) at 20:31

## 2024-01-01 RX ADMIN — LEVOTHYROXINE SODIUM 75 MCG: 0.07 TABLET ORAL at 06:27

## 2024-01-01 RX ADMIN — IPRATROPIUM BROMIDE AND ALBUTEROL SULFATE 3 ML: .5; 3 SOLUTION RESPIRATORY (INHALATION) at 00:43

## 2024-01-01 RX ADMIN — IPRATROPIUM BROMIDE AND ALBUTEROL SULFATE 3 ML: .5; 3 SOLUTION RESPIRATORY (INHALATION) at 16:26

## 2024-01-01 RX ADMIN — SERTRALINE 100 MG: 100 TABLET, FILM COATED ORAL at 08:40

## 2024-01-01 RX ADMIN — HYDROMORPHONE HYDROCHLORIDE 0.5 MG: 1 INJECTION, SOLUTION INTRAMUSCULAR; INTRAVENOUS; SUBCUTANEOUS at 16:45

## 2024-01-01 RX ADMIN — GUAIFENESIN 1200 MG: 600 TABLET, EXTENDED RELEASE ORAL at 09:28

## 2024-01-01 RX ADMIN — METOPROLOL SUCCINATE 50 MG: 50 TABLET, EXTENDED RELEASE ORAL at 08:04

## 2024-01-01 RX ADMIN — GUAIFENESIN 1200 MG: 600 TABLET, EXTENDED RELEASE ORAL at 19:52

## 2024-01-01 RX ADMIN — METHYLPREDNISOLONE SODIUM SUCCINATE 40 MG: 40 INJECTION, POWDER, FOR SOLUTION INTRAMUSCULAR; INTRAVENOUS at 08:39

## 2024-01-01 RX ADMIN — BUDESONIDE 0.5 MG: 0.5 INHALANT RESPIRATORY (INHALATION) at 09:14

## 2024-01-01 RX ADMIN — AMIODARONE HYDROCHLORIDE 200 MG: 200 TABLET ORAL at 20:37

## 2024-01-01 RX ADMIN — GUAIFENESIN 1200 MG: 600 TABLET, EXTENDED RELEASE ORAL at 20:54

## 2024-01-01 RX ADMIN — BUDESONIDE 0.5 MG: 0.5 INHALANT RESPIRATORY (INHALATION) at 20:15

## 2024-01-01 RX ADMIN — ROSUVASTATIN 20 MG: 10 TABLET, FILM COATED ORAL at 08:04

## 2024-01-01 RX ADMIN — Medication 10 ML: at 20:19

## 2024-01-01 RX ADMIN — BUDESONIDE 0.5 MG: 0.5 INHALANT RESPIRATORY (INHALATION) at 18:44

## 2024-01-01 RX ADMIN — DILTIAZEM HYDROCHLORIDE 120 MG: 120 CAPSULE, EXTENDED RELEASE ORAL at 10:29

## 2024-01-01 RX ADMIN — Medication 5 MG: at 23:40

## 2024-01-01 RX ADMIN — ACETYLCYSTEINE 3 ML: 200 SOLUTION ORAL; RESPIRATORY (INHALATION) at 22:12

## 2024-01-01 RX ADMIN — CLOPIDOGREL BISULFATE 75 MG: 75 TABLET ORAL at 09:28

## 2024-01-01 RX ADMIN — AMIODARONE HYDROCHLORIDE 200 MG: 200 TABLET ORAL at 20:52

## 2024-01-01 RX ADMIN — BUDESONIDE 0.5 MG: 0.5 INHALANT RESPIRATORY (INHALATION) at 07:26

## 2024-01-01 RX ADMIN — SODIUM CHLORIDE 9 ML/HR: 9 INJECTION, SOLUTION INTRAVENOUS at 14:10

## 2024-01-01 RX ADMIN — DOXYCYCLINE 100 MG: 100 INJECTION, POWDER, LYOPHILIZED, FOR SOLUTION INTRAVENOUS at 03:52

## 2024-01-01 RX ADMIN — SERTRALINE 100 MG: 100 TABLET, FILM COATED ORAL at 08:48

## 2024-01-01 RX ADMIN — IPRATROPIUM BROMIDE AND ALBUTEROL SULFATE 3 ML: .5; 3 SOLUTION RESPIRATORY (INHALATION) at 08:17

## 2024-01-01 RX ADMIN — IPRATROPIUM BROMIDE AND ALBUTEROL SULFATE 3 ML: .5; 3 SOLUTION RESPIRATORY (INHALATION) at 09:14

## 2024-01-01 RX ADMIN — Medication 10 ML: at 10:25

## 2024-01-01 RX ADMIN — ACETYLCYSTEINE 3 ML: 200 SOLUTION ORAL; RESPIRATORY (INHALATION) at 08:17

## 2024-01-01 RX ADMIN — BUDESONIDE 0.5 MG: 0.5 INHALANT RESPIRATORY (INHALATION) at 20:31

## 2024-01-01 RX ADMIN — IPRATROPIUM BROMIDE AND ALBUTEROL SULFATE 3 ML: .5; 3 SOLUTION RESPIRATORY (INHALATION) at 22:12

## 2024-01-01 RX ADMIN — Medication 10 ML: at 09:29

## 2024-01-01 RX ADMIN — DILTIAZEM HYDROCHLORIDE 120 MG: 120 CAPSULE, EXTENDED RELEASE ORAL at 09:28

## 2024-01-01 RX ADMIN — MEROPENEM 1000 MG: 1 INJECTION INTRAVENOUS at 19:48

## 2024-01-01 RX ADMIN — AMIODARONE HYDROCHLORIDE 200 MG: 200 TABLET ORAL at 20:54

## 2024-01-01 RX ADMIN — HYDROXYZINE HYDROCHLORIDE 25 MG: 25 TABLET, FILM COATED ORAL at 23:16

## 2024-01-01 RX ADMIN — MEROPENEM 1000 MG: 1 INJECTION INTRAVENOUS at 05:21

## 2024-01-01 RX ADMIN — ACETYLCYSTEINE 3 ML: 200 SOLUTION ORAL; RESPIRATORY (INHALATION) at 20:08

## 2024-01-01 RX ADMIN — FUROSEMIDE 40 MG: 40 TABLET ORAL at 08:19

## 2024-01-01 RX ADMIN — METOPROLOL SUCCINATE 50 MG: 50 TABLET, EXTENDED RELEASE ORAL at 10:42

## 2024-01-01 RX ADMIN — AMIODARONE HYDROCHLORIDE 200 MG: 200 TABLET ORAL at 08:47

## 2024-01-01 RX ADMIN — ACETAMINOPHEN 650 MG: 325 TABLET, FILM COATED ORAL at 12:00

## 2024-01-01 RX ADMIN — CLOPIDOGREL BISULFATE 75 MG: 75 TABLET ORAL at 10:29

## 2024-01-01 RX ADMIN — FUROSEMIDE 60 MG: 10 INJECTION, SOLUTION INTRAMUSCULAR; INTRAVENOUS at 20:30

## 2024-01-01 RX ADMIN — DILTIAZEM HYDROCHLORIDE 120 MG: 120 CAPSULE, EXTENDED RELEASE ORAL at 08:04

## 2024-01-01 RX ADMIN — BUDESONIDE 0.5 MG: 0.5 INHALANT RESPIRATORY (INHALATION) at 07:39

## 2024-01-01 RX ADMIN — DILTIAZEM HYDROCHLORIDE 120 MG: 120 CAPSULE, EXTENDED RELEASE ORAL at 08:08

## 2024-01-01 RX ADMIN — SERTRALINE 100 MG: 100 TABLET, FILM COATED ORAL at 08:08

## 2024-01-01 RX ADMIN — ACETYLCYSTEINE 3 ML: 200 SOLUTION ORAL; RESPIRATORY (INHALATION) at 18:04

## 2024-01-01 RX ADMIN — IPRATROPIUM BROMIDE AND ALBUTEROL SULFATE 3 ML: .5; 3 SOLUTION RESPIRATORY (INHALATION) at 15:02

## 2024-01-01 RX ADMIN — PHENAZOPYRIDINE HYDROCHLORIDE 100 MG: 100 TABLET ORAL at 22:49

## 2024-01-01 RX ADMIN — MEROPENEM 1000 MG: 1 INJECTION INTRAVENOUS at 18:06

## 2024-01-01 RX ADMIN — IPRATROPIUM BROMIDE AND ALBUTEROL SULFATE 3 ML: .5; 3 SOLUTION RESPIRATORY (INHALATION) at 22:52

## 2024-01-01 RX ADMIN — IPRATROPIUM BROMIDE AND ALBUTEROL SULFATE 3 ML: .5; 3 SOLUTION RESPIRATORY (INHALATION) at 20:31

## 2024-01-01 RX ADMIN — Medication 5 MG: at 22:00

## 2024-01-01 RX ADMIN — ACETYLCYSTEINE 3 ML: 200 SOLUTION ORAL; RESPIRATORY (INHALATION) at 06:52

## 2024-01-01 RX ADMIN — GUAIFENESIN 1200 MG: 600 TABLET, EXTENDED RELEASE ORAL at 08:48

## 2024-01-01 RX ADMIN — AMIODARONE HYDROCHLORIDE 200 MG: 200 TABLET ORAL at 08:40

## 2024-01-01 RX ADMIN — CEFTRIAXONE 2000 MG: 2 INJECTION, POWDER, FOR SOLUTION INTRAMUSCULAR; INTRAVENOUS at 16:28

## 2024-01-01 RX ADMIN — METHYLPREDNISOLONE SODIUM SUCCINATE 40 MG: 40 INJECTION, POWDER, FOR SOLUTION INTRAMUSCULAR; INTRAVENOUS at 20:30

## 2024-01-01 RX ADMIN — FUROSEMIDE 40 MG: 40 TABLET ORAL at 18:41

## 2024-01-01 RX ADMIN — MEROPENEM 1000 MG: 1 INJECTION INTRAVENOUS at 05:30

## 2024-01-01 RX ADMIN — GUAIFENESIN 1200 MG: 600 TABLET, EXTENDED RELEASE ORAL at 20:20

## 2024-01-01 RX ADMIN — CLOPIDOGREL BISULFATE 75 MG: 75 TABLET ORAL at 08:08

## 2024-01-01 RX ADMIN — IPRATROPIUM BROMIDE AND ALBUTEROL SULFATE 3 ML: .5; 3 SOLUTION RESPIRATORY (INHALATION) at 15:59

## 2024-01-01 RX ADMIN — BUDESONIDE 0.5 MG: 0.5 INHALANT RESPIRATORY (INHALATION) at 08:17

## 2024-01-01 RX ADMIN — GUAIFENESIN 1200 MG: 600 TABLET, EXTENDED RELEASE ORAL at 09:40

## 2024-01-02 ENCOUNTER — READMISSION MANAGEMENT (OUTPATIENT)
Dept: CALL CENTER | Facility: HOSPITAL | Age: 89
End: 2024-01-02
Payer: MEDICARE

## 2024-01-02 ENCOUNTER — OFFICE VISIT (OUTPATIENT)
Dept: CARDIOLOGY | Facility: CLINIC | Age: 89
End: 2024-01-02
Payer: MEDICARE

## 2024-01-02 VITALS
WEIGHT: 129 LBS | OXYGEN SATURATION: 96 % | HEART RATE: 94 BPM | DIASTOLIC BLOOD PRESSURE: 73 MMHG | BODY MASS INDEX: 25.32 KG/M2 | SYSTOLIC BLOOD PRESSURE: 120 MMHG | HEIGHT: 60 IN

## 2024-01-02 DIAGNOSIS — Z95.818 PRESENCE OF WATCHMAN LEFT ATRIAL APPENDAGE CLOSURE DEVICE: ICD-10-CM

## 2024-01-02 DIAGNOSIS — I48.0 PAROXYSMAL ATRIAL FIBRILLATION: Primary | ICD-10-CM

## 2024-01-02 NOTE — PROGRESS NOTES
[facility] CARDIOLOGY      REASON FOR FOLLOW-UP:  Follow-up watchman implant          Chief Complaint   Patient presents with    Heart Problem         Dear Kacie Liriano APRN        History of Present Illness   It was my pleasure to see Tracy Jane in the office today.  She is an 89-year-old female with past medical history that includes sick sinus syndrome status post permanent dual-chamber pacemaker implant, bioprosthetic aortic valve placement 2014, coronary artery disease with prior bypass surgery, bilateral renal artery stenosis, carotid artery disease, dyslipidemia, paroxysmal atrial fibrillation, subdural hematoma, iron deficiency anemia, hypothyroidism.  Given her past medical history of subdural hematoma and history of falls, shared decision making was performed for Watchman device implantation.  The patient did undergo Watchman implant 12/26/2023.  Her anticoagulation was discontinued and she was started on dual antiplatelet therapy.  She presents today with her daughter in follow-up from that procedure.    Today, the patient reports no complaints of chest discomfort, shortness of breath, lower extremity edema.  She does report intermittent episodes of palpitations and irregular heart rhythm for which she has been taking extra isosorbide.  The patient states she was instructed to do so on a as needed basis for breakthrough A-fib.  She denies any abnormal bleeding.      ASSESSMENT:  History of subdural hematoma and falls deemed high risk for severe bleeding with elevated [id number] score and has bled score  Status post watchman/left atrial appendage device implantation  Paroxysmal atrial fibrillation  Coronary artery disease with prior CABG    PLAN:  I explained to the patient and her daughter that there must have been a misunderstanding about taking extra isosorbide for her breakthrough A-fib/palpitations.  I asked her to not take extra isosorbide as this will not improve  or abort A-fib.  I will ask Dr. Henley if he recalls this conversation.  Continue dual antiplatelet therapy, diltiazem, sotalol  We will schedule follow-up with EP  Order 45-day RACHEL    Diagnoses and all orders for this visit:    1. Paroxysmal atrial fibrillation (Primary)  -     Adult Transesophageal Echo (RACHEL) W/ Cont if Necessary Per Protocol; Future    2. Presence of Watchman left atrial appendage closure device  -     Adult Transesophageal Echo (RACHEL) W/ Cont if Necessary Per Protocol; Future          The following portions of the patient's history were reviewed and updated as appropriate: allergies, current medications, past family history, past medical history, past social history, past surgical history, and problem list.    REVIEW OF SYSTEMS:    Review of Systems   Cardiovascular:  Positive for palpitations.   All other systems reviewed and are negative.      Vitals:    01/02/24 1454   BP: 120/73   Pulse: 94   SpO2: 96%         PHYSICAL EXAM:    General: Alert, cooperative, no distress, appears stated age  Head:  Normocephalic, atraumatic, mucous membranes moist  Eyes:  Conjunctiva/corneas clear, EOM's intact     Neck:  Supple,  no JVD or bruit     Lungs: Clear to auscultation bilaterally, no wheezes rhonchi rales are noted  Chest wall: No tenderness  Musculoskeletal:   Ambulates freely without assistance  Heart::  Regular rate and rhythm, S1 and S2 normal, no murmur, rub or gallop  Abdomen: Soft, non-tender, nondistended, bowel sounds active, no abdominal bruit  Extremities: No cyanosis, clubbing, or edema   Pulses: 2+ and symmetric all extremities  Skin:  No rashes or lesions  Neuro/psych: A&O x3. CN II through XII are grossly intact with appropriate affect        Past Medical History:   Diagnosis Date    Anxiety     Asthma     Atrial fibrillation     CAD (coronary artery disease)     Carotid artery disease     GERD (gastroesophageal reflux disease)     Gout     Hyperlipidemia     Hypertension      Hyperthyroidism     Hypothyroidism        Past Surgical History:   Procedure Laterality Date    ATRIAL APPENDAGE EXCLUSION LEFT WITH TRANSESOPHAGEAL ECHOCARDIOGRAM Right 12/26/2023    Procedure: Atrial Appendage Occlusion;  Surgeon: Pk Crabtree MD;  Location: [facility] CATH INVASIVE LOCATION;  Service: Cardiovascular;  Laterality: Right;    ATRIAL APPENDAGE EXCLUSION LEFT WITH TRANSESOPHAGEAL ECHOCARDIOGRAM N/A 12/26/2023    Procedure: Atrial Appendage Occlusion;  Surgeon: Gen Caballero MD;  Location: [facility] CATH INVASIVE LOCATION;  Service: Cardiovascular;  Laterality: N/A;    BASAL CELL CARCINOMA EXCISION      spine, nose and arm, leg 2020    BREAST BIOPSY      CARDIAC CATHETERIZATION      CAROTID STENT      CATARACT EXTRACTION      CHOLECYSTECTOMY      CORONARY ARTERY BYPASS GRAFT      CORONARY STENT PLACEMENT      ENDOSCOPY N/A 04/24/2023    Procedure: ESOPHAGOGASTRODUODENOSCOPY with antrum body biopsies;  Surgeon: REGGIE Ace MD;  Location: [facility] ENDOSCOPY;  Service: Gastroenterology;  Laterality: N/A;  hiatal hernia    FINGER SURGERY      KNEE SURGERY      PACEMAKER IMPLANTATION      SHOULDER SURGERY      RACHEL Bilateral 11/03/2023         Current Outpatient Medications:     acetaminophen (TYLENOL) 650 MG 8 hr tablet, Take 1 tablet by mouth Every 8 (Eight) Hours As Needed., Disp: , Rfl:     aspirin 81 MG EC tablet, Take 1 tablet by mouth Every Night., Disp: , Rfl:     Calcium 200 MG tablet, Take 1,200 mg by mouth Daily., Disp: , Rfl:     Calcium Carbonate-Vit D-Min (CALCIUM 1200 PO), Take 1 tablet by mouth Daily., Disp: , Rfl:     Cholecalciferol 25 MCG (1000 UT) tablet, Take 1 tablet by mouth Daily., Disp: , Rfl:     clopidogrel (PLAVIX) 75 MG tablet, Take 1 tablet by mouth Daily., Disp: 90 tablet, Rfl: 3    colchicine 0.6 MG capsule capsule, Take 1 capsule by mouth Every Night., Disp: , Rfl:     COLLAGEN PO, Take 20 g by mouth Daily., Disp: , Rfl:     Cranberry 500 MG capsule, Take 500  mg by mouth Every Night., Disp: , Rfl:     dilTIAZem CD (CARDIZEM CD) 240 MG 24 hr capsule, Take 1 capsule by mouth Daily., Disp: , Rfl:     doxazosin (CARDURA) 2 MG tablet, Take 1 tablet by mouth Daily., Disp: , Rfl:     ferrous sulfate 324 (65 Fe) MG tablet delayed-release EC tablet, Take 1 tablet by mouth Daily With Breakfast., Disp: , Rfl:     folic acid (FOLVITE) 1 MG tablet, Take 1 tablet by mouth Daily., Disp: , Rfl:     furosemide (LASIX) 40 MG tablet, Take 1 tablet by mouth Daily., Disp: , Rfl:     Glucosamine-Chondroit-Vit C-Mn (Glucosamine 1500 Complex) capsule, Take 1,500 mg by mouth Daily., Disp: , Rfl:     isosorbide mononitrate (IMDUR) 30 MG 24 hr tablet, Take 1 tablet by mouth 2 (Two) Times a Day., Disp: , Rfl:     levothyroxine (SYNTHROID, LEVOTHROID) 75 MCG tablet, TAKE ONE TABLET BY MOUTH DAILY 5 DAYS A WEEK, Disp: 60 tablet, Rfl: 6    melatonin 5 MG sublingual tablet sublingual tablet, Place  under the tongue., Disp: , Rfl:     methotrexate 2.5 MG tablet, Take 5 tablets by mouth Take As Directed. 5 tablets on Sunday Morning AND 5 tablets Sunday Evening = 10 Total Tablets on Sunday, Disp: , Rfl:     Methylsulfonylmethane (MSM) 1000 MG tablet, Take 1 tablet by mouth 2 (Two) Times a Day., Disp: , Rfl:     Multiple Vitamin (MULTIVITAMIN) tablet, Take 1 tablet by mouth Daily., Disp: , Rfl:     Omega-3 Fatty Acids (fish oil) 1200 MG capsule capsule, Take 1 capsule by mouth 2 (Two) Times a Day With Meals., Disp: , Rfl:     pantoprazole (PROTONIX) 40 MG EC tablet, Take 1 tablet by mouth Daily., Disp: , Rfl:     polyethylene glycol (MiraLax) 17 g packet, Take 17 g by mouth Every Night., Disp: , Rfl:     potassium chloride (K-DUR,KLOR-CON) 20 MEQ CR tablet, Take 1 tablet by mouth 2 (Two) Times a Day., Disp: , Rfl:     rosuvastatin (CRESTOR) 20 MG tablet, Take 1 tablet by mouth Every Night., Disp: , Rfl:     sertraline (ZOLOFT) 100 MG tablet, Take 1 tablet by mouth Daily., Disp: , Rfl:     sotalol  "(BETAPACE) 120 MG tablet, Take 1 tablet by mouth 2 (Two) Times a Day., Disp: , Rfl:     vitamin C (ASCORBIC ACID) 250 MG tablet, Take 4 tablets by mouth Daily., Disp: , Rfl:     Zinc 50 MG tablet, Take 1 tablet by mouth Every Night., Disp: , Rfl:     No Known Allergies    Family History   Problem Relation Age of Onset    Hypertension Mother     Heart disease Father     Heart disease Sister     Kidney cancer Sister     Stroke Sister     Cancer Sister         breast    Cancer Sister     Heart disease Brother        Social History     Tobacco Use    Smoking status: Former     Packs/day: 1.00     Years: 4.00     Additional pack years: 0.00     Total pack years: 4.00     Types: Cigarettes     Quit date:      Years since quittin.0    Smokeless tobacco: Never   Substance Use Topics    Alcohol use: No           Current Electrocardiogram:    ECG 12 Lead    Date/Time: 2024 8:12 AM  Performed by: Amparo Mckeon APRN    Authorized by: Amparo Mckeon APRN  Comparison: compared with previous ECG from 11/15/2023  Similar to previous ECG  Rhythm: atrial fibrillation  BPM: 94  Conduction: left bundle branch block              EMR Dragon/Transcription:   \"Dictated utilizing Dragon dictation\".     Copied text in this note has been reviewed by me and is accurate as of 24.    "

## 2024-01-02 NOTE — OUTREACH NOTE
Medical Week 1 Survey      Flowsheet Row Responses   [facility], [place] patient discharged from? [facility]   Does the patient have one of the following disease processes/diagnoses(primary or secondary)? Other   Week 1 attempt successful? Yes   Call start time 1304   Call end time 1306   Discharge diagnosis Watchman left atrial appendage closure device   Person spoke with today (if not patient) and relationship Patient   Medication alerts for this patient New: Plavix.   Stopped: warfarin   Meds reviewed with patient/caregiver? Yes   Does the patient have all medications ordered at discharge? Yes   Is the patient taking all medications as directed (includes completed medication regime)? Yes   Does the patient have a primary care provider?  Yes   Does the patient have an appointment with their PCP within 7 days of discharge? No   Comments regarding PCP Follow up with Kacie Deandra   What is preventing the patient from scheduling follow up appointments within 7 days of discharge? --  [Patient following up with Cardiology NP today 3pm]   Nursing Interventions Verified appointment date/time/provider   Has the patient kept scheduled appointments due by today? N/A   Has home health visited the patient within 72 hours of discharge? N/A   Psychosocial issues? No   Did the patient receive a copy of their discharge instructions? Yes   Nursing interventions Reviewed instructions with patient   What is the patient's perception of their health status since discharge? Improving   Is the patient/caregiver able to teach back signs and symptoms related to disease process for when to call PCP? Yes   Is the patient/caregiver able to teach back signs and symptoms related to disease process for when to call 911? Yes   Is the patient/caregiver able to teach back the hierarchy of who to call/visit for symptoms/problems? PCP, Specialist, Home health nurse, Urgent Care, ED, 911 Yes   If the patient is a current smoker, are they able to teach back  resources for cessation? Not a smoker   Week 1 call completed? Yes   Would this patient benefit from a Referral to [facility] Social Work? No   Is the patient interested in additional calls from an ambulatory ? No   Call end time [id number]            DORIS MARTINEZ - Registered Nurse

## 2024-01-11 ENCOUNTER — HOSPITAL ENCOUNTER (INPATIENT)
Facility: HOSPITAL | Age: 89
LOS: 2 days | Discharge: HOME | DRG: 291 | End: 2024-01-13
Attending: EMERGENCY MEDICINE | Admitting: STUDENT IN AN ORGANIZED HEALTH CARE EDUCATION/TRAINING PROGRAM
Payer: MEDICARE

## 2024-01-11 ENCOUNTER — APPOINTMENT (OUTPATIENT)
Dept: GENERAL RADIOLOGY | Facility: HOSPITAL | Age: 89
DRG: 291 | End: 2024-01-11
Payer: MEDICARE

## 2024-01-11 ENCOUNTER — TELEPHONE (OUTPATIENT)
Dept: CARDIOLOGY | Facility: CLINIC | Age: 89
End: 2024-01-11

## 2024-01-11 DIAGNOSIS — R50.9 FEVER AND CHILLS: ICD-10-CM

## 2024-01-11 DIAGNOSIS — R06.89 HYPERCAPNIA: ICD-10-CM

## 2024-01-11 DIAGNOSIS — R06.00 DYSPNEA, UNSPECIFIED TYPE: ICD-10-CM

## 2024-01-11 DIAGNOSIS — I50.9 CONGESTIVE HEART FAILURE, UNSPECIFIED HF CHRONICITY, UNSPECIFIED HEART FAILURE TYPE: Primary | ICD-10-CM

## 2024-01-11 DIAGNOSIS — I1A.0 RESISTANT HYPERTENSION: ICD-10-CM

## 2024-01-11 PROBLEM — J96.91 HYPOXIC RESPIRATORY FAILURE: Status: ACTIVE | Noted: 2024-01-11

## 2024-01-11 LAB
ALBUMIN SERPL-MCNC: 3.9 G/DL (ref 3.5–5.2)
ALBUMIN/GLOB SERPL: 1.1 G/DL
ALP SERPL-CCNC: 118 U/L (ref 39–117)
ALT SERPL W P-5'-P-CCNC: 26 U/L (ref 1–33)
ANION GAP SERPL CALCULATED.3IONS-SCNC: 13 MMOL/L (ref 5–15)
ARTERIAL PATENCY WRIST A: POSITIVE
AST SERPL-CCNC: 46 U/L (ref 1–32)
ATMOSPHERIC PRESS: ABNORMAL MM[HG]
B PARAPERT DNA SPEC QL NAA+PROBE: NOT DETECTED
B PERT DNA SPEC QL NAA+PROBE: NOT DETECTED
BASE EXCESS BLDA CALC-SCNC: -0.1 MMOL/L (ref 0–3)
BASOPHILS # BLD AUTO: 0.1 10*3/MM3 (ref 0–0.2)
BASOPHILS NFR BLD AUTO: 1.1 % (ref 0–1.5)
BDY SITE: ABNORMAL
BILIRUB SERPL-MCNC: 0.8 MG/DL (ref 0–1.2)
BUN SERPL-MCNC: 21 MG/DL (ref 8–23)
BUN/CREAT SERPL: 27.6 (ref 7–25)
C PNEUM DNA NPH QL NAA+NON-PROBE: NOT DETECTED
CALCIUM SPEC-SCNC: 9.7 MG/DL (ref 8.6–10.5)
CHLORIDE SERPL-SCNC: 100 MMOL/L (ref 98–107)
CO2 BLDA-SCNC: 28.5 MMOL/L (ref 22–29)
CO2 SERPL-SCNC: 27 MMOL/L (ref 22–29)
CREAT SERPL-MCNC: 0.76 MG/DL (ref 0.57–1)
D-LACTATE SERPL-SCNC: 1.4 MMOL/L (ref 0.3–2)
DEPRECATED RDW RBC AUTO: 62.1 FL (ref 37–54)
EGFRCR SERPLBLD CKD-EPI 2021: 75 ML/MIN/1.73
EOSINOPHIL # BLD AUTO: 0.1 10*3/MM3 (ref 0–0.4)
EOSINOPHIL NFR BLD AUTO: 0.8 % (ref 0.3–6.2)
ERYTHROCYTE [DISTWIDTH] IN BLOOD BY AUTOMATED COUNT: 18.1 % (ref 12.3–15.4)
FLUAV SUBTYP SPEC NAA+PROBE: NOT DETECTED
FLUBV RNA ISLT QL NAA+PROBE: NOT DETECTED
GLOBULIN UR ELPH-MCNC: 3.4 GM/DL
GLUCOSE SERPL-MCNC: 177 MG/DL (ref 65–99)
HADV DNA SPEC NAA+PROBE: NOT DETECTED
HCO3 BLDA-SCNC: 26.8 MMOL/L (ref 21–28)
HCOV 229E RNA SPEC QL NAA+PROBE: NOT DETECTED
HCOV HKU1 RNA SPEC QL NAA+PROBE: NOT DETECTED
HCOV NL63 RNA SPEC QL NAA+PROBE: NOT DETECTED
HCOV OC43 RNA SPEC QL NAA+PROBE: NOT DETECTED
HCT VFR BLD AUTO: 31.3 % (ref 34–46.6)
HEMODILUTION: NO
HGB BLD-MCNC: 10 G/DL (ref 12–15.9)
HMPV RNA NPH QL NAA+NON-PROBE: NOT DETECTED
HOLD SPECIMEN: NORMAL
HOLD SPECIMEN: NORMAL
HPIV1 RNA ISLT QL NAA+PROBE: NOT DETECTED
HPIV2 RNA SPEC QL NAA+PROBE: NOT DETECTED
HPIV3 RNA NPH QL NAA+PROBE: NOT DETECTED
HPIV4 P GENE NPH QL NAA+PROBE: NOT DETECTED
INHALED O2 CONCENTRATION: 36 %
LYMPHOCYTES # BLD AUTO: 1.5 10*3/MM3 (ref 0.7–3.1)
LYMPHOCYTES NFR BLD AUTO: 12.9 % (ref 19.6–45.3)
M PNEUMO IGG SER IA-ACNC: NOT DETECTED
MCH RBC QN AUTO: 31.7 PG (ref 26.6–33)
MCHC RBC AUTO-ENTMCNC: 31.8 G/DL (ref 31.5–35.7)
MCV RBC AUTO: 99.8 FL (ref 79–97)
MODALITY: ABNORMAL
MONOCYTES # BLD AUTO: 0.5 10*3/MM3 (ref 0.1–0.9)
MONOCYTES NFR BLD AUTO: 4.6 % (ref 5–12)
NEUTROPHILS NFR BLD AUTO: 80.6 % (ref 42.7–76)
NEUTROPHILS NFR BLD AUTO: 9.1 10*3/MM3 (ref 1.7–7)
NRBC BLD AUTO-RTO: 0 /100 WBC (ref 0–0.2)
NT-PROBNP SERPL-MCNC: 5057 PG/ML (ref 0–1800)
PCO2 BLDA: 54.5 MM HG (ref 35–48)
PH BLDA: 7.3 PH UNITS (ref 7.35–7.45)
PLATELET # BLD AUTO: 384 10*3/MM3 (ref 140–450)
PMV BLD AUTO: 7.4 FL (ref 6–12)
PO2 BLDA: 94.3 MM HG (ref 83–108)
POTASSIUM SERPL-SCNC: 4.3 MMOL/L (ref 3.5–5.2)
PROT SERPL-MCNC: 7.3 G/DL (ref 6–8.5)
RBC # BLD AUTO: 3.14 10*6/MM3 (ref 3.77–5.28)
RHINOVIRUS RNA SPEC NAA+PROBE: NOT DETECTED
RSV RNA NPH QL NAA+NON-PROBE: NOT DETECTED
SAO2 % BLDCOA: 96.3 % (ref 94–98)
SARS-COV-2 RNA NPH QL NAA+NON-PROBE: NOT DETECTED
SODIUM SERPL-SCNC: 140 MMOL/L (ref 136–145)
TROPONIN T SERPL HS-MCNC: 17 NG/L
WBC NRBC COR # BLD AUTO: 11.2 10*3/MM3 (ref 3.4–10.8)
WHOLE BLOOD HOLD COAG: NORMAL
WHOLE BLOOD HOLD SPECIMEN: NORMAL

## 2024-01-11 PROCEDURE — 83880 ASSAY OF NATRIURETIC PEPTIDE: CPT | Performed by: NURSE PRACTITIONER

## 2024-01-11 PROCEDURE — 94640 AIRWAY INHALATION TREATMENT: CPT

## 2024-01-11 PROCEDURE — 94799 UNLISTED PULMONARY SVC/PX: CPT

## 2024-01-11 PROCEDURE — 84484 ASSAY OF TROPONIN QUANT: CPT | Performed by: NURSE PRACTITIONER

## 2024-01-11 PROCEDURE — 93005 ELECTROCARDIOGRAM TRACING: CPT | Performed by: EMERGENCY MEDICINE

## 2024-01-11 PROCEDURE — 36600 WITHDRAWAL OF ARTERIAL BLOOD: CPT

## 2024-01-11 PROCEDURE — 25810000003 SODIUM CHLORIDE 0.9 % SOLUTION 250 ML FLEX CONT: Performed by: NURSE PRACTITIONER

## 2024-01-11 PROCEDURE — 82803 BLOOD GASES ANY COMBINATION: CPT

## 2024-01-11 PROCEDURE — 93005 ELECTROCARDIOGRAM TRACING: CPT

## 2024-01-11 PROCEDURE — 0202U NFCT DS 22 TRGT SARS-COV-2: CPT | Performed by: NURSE PRACTITIONER

## 2024-01-11 PROCEDURE — 25010000002 NITROGLYCERIN 200 MCG/ML SOLUTION: Performed by: NURSE PRACTITIONER

## 2024-01-11 PROCEDURE — 85025 COMPLETE CBC W/AUTO DIFF WBC: CPT | Performed by: NURSE PRACTITIONER

## 2024-01-11 PROCEDURE — 25010000002 CEFEPIME PER 500 MG: Performed by: NURSE PRACTITIONER

## 2024-01-11 PROCEDURE — 25010000002 FUROSEMIDE PER 20 MG: Performed by: NURSE PRACTITIONER

## 2024-01-11 PROCEDURE — 99285 EMERGENCY DEPT VISIT HI MDM: CPT

## 2024-01-11 PROCEDURE — 83605 ASSAY OF LACTIC ACID: CPT

## 2024-01-11 PROCEDURE — 25010000002 ONDANSETRON PER 1 MG

## 2024-01-11 PROCEDURE — 25010000002 VANCOMYCIN HCL 1.25 G RECONSTITUTED SOLUTION 1 EACH VIAL: Performed by: NURSE PRACTITIONER

## 2024-01-11 PROCEDURE — 36415 COLL VENOUS BLD VENIPUNCTURE: CPT

## 2024-01-11 PROCEDURE — 80053 COMPREHEN METABOLIC PANEL: CPT | Performed by: NURSE PRACTITIONER

## 2024-01-11 PROCEDURE — 71045 X-RAY EXAM CHEST 1 VIEW: CPT

## 2024-01-11 PROCEDURE — 25010000002 METHYLPREDNISOLONE PER 125 MG: Performed by: NURSE PRACTITIONER

## 2024-01-11 PROCEDURE — 87040 BLOOD CULTURE FOR BACTERIA: CPT | Performed by: NURSE PRACTITIONER

## 2024-01-11 RX ORDER — FUROSEMIDE 10 MG/ML
80 INJECTION INTRAMUSCULAR; INTRAVENOUS ONCE
Status: COMPLETED | OUTPATIENT
Start: 2024-01-11 | End: 2024-01-11

## 2024-01-11 RX ORDER — NITROGLYCERIN 0.4 MG/1
0.4 TABLET SUBLINGUAL
Status: DISCONTINUED | OUTPATIENT
Start: 2024-01-11 | End: 2024-01-13 | Stop reason: HOSPADM

## 2024-01-11 RX ORDER — ONDANSETRON 2 MG/ML
INJECTION INTRAMUSCULAR; INTRAVENOUS
Status: COMPLETED
Start: 2024-01-11 | End: 2024-01-11

## 2024-01-11 RX ORDER — ENOXAPARIN SODIUM 100 MG/ML
40 INJECTION SUBCUTANEOUS DAILY
Status: DISCONTINUED | OUTPATIENT
Start: 2024-01-12 | End: 2024-01-12

## 2024-01-11 RX ORDER — BISACODYL 5 MG/1
5 TABLET, DELAYED RELEASE ORAL DAILY PRN
Status: DISCONTINUED | OUTPATIENT
Start: 2024-01-11 | End: 2024-01-13 | Stop reason: HOSPADM

## 2024-01-11 RX ORDER — NITROGLYCERIN 20 MG/100ML
5-200 INJECTION INTRAVENOUS
Status: DISCONTINUED | OUTPATIENT
Start: 2024-01-11 | End: 2024-01-12

## 2024-01-11 RX ORDER — ACETAMINOPHEN 325 MG/1
650 TABLET ORAL EVERY 4 HOURS PRN
Status: DISCONTINUED | OUTPATIENT
Start: 2024-01-11 | End: 2024-01-13 | Stop reason: HOSPADM

## 2024-01-11 RX ORDER — ONDANSETRON 4 MG/1
4 TABLET, ORALLY DISINTEGRATING ORAL EVERY 6 HOURS PRN
Status: DISCONTINUED | OUTPATIENT
Start: 2024-01-11 | End: 2024-01-13 | Stop reason: HOSPADM

## 2024-01-11 RX ORDER — METHYLPREDNISOLONE SODIUM SUCCINATE 125 MG/2ML
125 INJECTION, POWDER, LYOPHILIZED, FOR SOLUTION INTRAMUSCULAR; INTRAVENOUS ONCE
Status: COMPLETED | OUTPATIENT
Start: 2024-01-11 | End: 2024-01-11

## 2024-01-11 RX ORDER — SODIUM CHLORIDE 0.9 % (FLUSH) 0.9 %
10 SYRINGE (ML) INJECTION AS NEEDED
Status: DISCONTINUED | OUTPATIENT
Start: 2024-01-11 | End: 2024-01-13 | Stop reason: HOSPADM

## 2024-01-11 RX ORDER — IPRATROPIUM BROMIDE AND ALBUTEROL SULFATE 2.5; .5 MG/3ML; MG/3ML
3 SOLUTION RESPIRATORY (INHALATION) ONCE
Status: COMPLETED | OUTPATIENT
Start: 2024-01-11 | End: 2024-01-11

## 2024-01-11 RX ORDER — SODIUM CHLORIDE 9 MG/ML
40 INJECTION, SOLUTION INTRAVENOUS AS NEEDED
Status: DISCONTINUED | OUTPATIENT
Start: 2024-01-11 | End: 2024-01-13 | Stop reason: HOSPADM

## 2024-01-11 RX ORDER — ONDANSETRON 2 MG/ML
4 INJECTION INTRAMUSCULAR; INTRAVENOUS ONCE
Status: COMPLETED | OUTPATIENT
Start: 2024-01-11 | End: 2024-01-11

## 2024-01-11 RX ORDER — ACETAMINOPHEN 500 MG
1000 TABLET ORAL ONCE
Status: COMPLETED | OUTPATIENT
Start: 2024-01-11 | End: 2024-01-11

## 2024-01-11 RX ORDER — POLYETHYLENE GLYCOL 3350 17 G/17G
17 POWDER, FOR SOLUTION ORAL DAILY PRN
Status: DISCONTINUED | OUTPATIENT
Start: 2024-01-11 | End: 2024-01-13 | Stop reason: HOSPADM

## 2024-01-11 RX ORDER — BISACODYL 10 MG
10 SUPPOSITORY, RECTAL RECTAL DAILY PRN
Status: DISCONTINUED | OUTPATIENT
Start: 2024-01-11 | End: 2024-01-13 | Stop reason: HOSPADM

## 2024-01-11 RX ORDER — SODIUM CHLORIDE 0.9 % (FLUSH) 0.9 %
10 SYRINGE (ML) INJECTION EVERY 12 HOURS SCHEDULED
Status: DISCONTINUED | OUTPATIENT
Start: 2024-01-12 | End: 2024-01-13 | Stop reason: HOSPADM

## 2024-01-11 RX ORDER — AMOXICILLIN 250 MG
2 CAPSULE ORAL 2 TIMES DAILY
Status: DISCONTINUED | OUTPATIENT
Start: 2024-01-12 | End: 2024-01-13 | Stop reason: HOSPADM

## 2024-01-11 RX ADMIN — ACETAMINOPHEN 1000 MG: 500 TABLET ORAL at 23:44

## 2024-01-11 RX ADMIN — ONDANSETRON 4 MG: 2 INJECTION INTRAMUSCULAR; INTRAVENOUS at 22:36

## 2024-01-11 RX ADMIN — FUROSEMIDE 80 MG: 10 INJECTION, SOLUTION INTRAMUSCULAR; INTRAVENOUS at 23:45

## 2024-01-11 RX ADMIN — IPRATROPIUM BROMIDE AND ALBUTEROL SULFATE 3 ML: .5; 3 SOLUTION RESPIRATORY (INHALATION) at 22:40

## 2024-01-11 RX ADMIN — NITROGLYCERIN 15 MCG/MIN: 20 INJECTION INTRAVENOUS at 22:49

## 2024-01-11 RX ADMIN — IPRATROPIUM BROMIDE AND ALBUTEROL SULFATE 3 ML: .5; 3 SOLUTION RESPIRATORY (INHALATION) at 22:52

## 2024-01-11 RX ADMIN — METHYLPREDNISOLONE SODIUM SUCCINATE 125 MG: 125 INJECTION, POWDER, FOR SOLUTION INTRAMUSCULAR; INTRAVENOUS at 23:08

## 2024-01-11 RX ADMIN — CEFEPIME 2000 MG: 2 INJECTION, POWDER, FOR SOLUTION INTRAVENOUS at 23:42

## 2024-01-11 RX ADMIN — VANCOMYCIN HYDROCHLORIDE 1250 MG: 1.25 INJECTION, POWDER, LYOPHILIZED, FOR SOLUTION INTRAVENOUS at 23:42

## 2024-01-11 NOTE — Clinical Note
Level of Care: Telemetry [5]   Admitting Physician: RAMSEY MOREL [[id number]]   Attending Physician: RAMSEY MOREL [[id number]]

## 2024-01-11 NOTE — TELEPHONE ENCOUNTER
Caller: Tracy Jane    Relationship: Self    Best call back number:[phone]    What is the best time to reach you: ANYTIME    Who are you requesting to speak with (clinical staff, provider,  specific staff member): CLINICAL        What was the call regarding: PT HAS PNEUMONIA AND FLUID ON LUNGS.  SHE WAS GIVEN  AMOXICILLIN CLAVULANATE POTASSIUM 875 MG 2 TIMES DAILY.  IS IT OK TO TAKE  WITH HEART MEDICATION? CAN SHE TAKE SOMETHING FOR HER ANXIETY? PLEASE CALL TO ADVISE      Is it okay if the provider responds through MyChart: NO

## 2024-01-12 ENCOUNTER — ANTICOAGULATION VISIT (OUTPATIENT)
Dept: CARDIOLOGY | Facility: CLINIC | Age: 89
End: 2024-01-12
Payer: MEDICARE

## 2024-01-12 LAB
ANION GAP SERPL CALCULATED.3IONS-SCNC: 13 MMOL/L (ref 5–15)
BASOPHILS # BLD AUTO: 0 10*3/MM3 (ref 0–0.2)
BASOPHILS # BLD AUTO: 0 10*3/MM3 (ref 0–0.2)
BASOPHILS NFR BLD AUTO: 0.3 % (ref 0–1.5)
BASOPHILS NFR BLD AUTO: 0.5 % (ref 0–1.5)
BUN SERPL-MCNC: 21 MG/DL (ref 8–23)
BUN/CREAT SERPL: 29.6 (ref 7–25)
CALCIUM SPEC-SCNC: 9.1 MG/DL (ref 8.6–10.5)
CHLORIDE SERPL-SCNC: 102 MMOL/L (ref 98–107)
CO2 SERPL-SCNC: 27 MMOL/L (ref 22–29)
CREAT SERPL-MCNC: 0.71 MG/DL (ref 0.57–1)
DEPRECATED RDW RBC AUTO: 60.8 FL (ref 37–54)
DEPRECATED RDW RBC AUTO: 70 FL (ref 37–54)
EGFRCR SERPLBLD CKD-EPI 2021: 81.4 ML/MIN/1.73
EOSINOPHIL # BLD AUTO: 0 10*3/MM3 (ref 0–0.4)
EOSINOPHIL # BLD AUTO: 0 10*3/MM3 (ref 0–0.4)
EOSINOPHIL NFR BLD AUTO: 0 % (ref 0.3–6.2)
EOSINOPHIL NFR BLD AUTO: 0.2 % (ref 0.3–6.2)
ERYTHROCYTE [DISTWIDTH] IN BLOOD BY AUTOMATED COUNT: 17.6 % (ref 12.3–15.4)
ERYTHROCYTE [DISTWIDTH] IN BLOOD BY AUTOMATED COUNT: 18.7 % (ref 12.3–15.4)
GEN 5 2HR TROPONIN T REFLEX: 53 NG/L
GLUCOSE SERPL-MCNC: 142 MG/DL (ref 65–99)
HCT VFR BLD AUTO: 24.4 % (ref 34–46.6)
HCT VFR BLD AUTO: 28.4 % (ref 34–46.6)
HGB BLD-MCNC: 7.7 G/DL (ref 12–15.9)
HGB BLD-MCNC: 9.2 G/DL (ref 12–15.9)
LYMPHOCYTES # BLD AUTO: 0.5 10*3/MM3 (ref 0.7–3.1)
LYMPHOCYTES # BLD AUTO: 0.7 10*3/MM3 (ref 0.7–3.1)
LYMPHOCYTES NFR BLD AUTO: 6.5 % (ref 19.6–45.3)
LYMPHOCYTES NFR BLD AUTO: 7.7 % (ref 19.6–45.3)
MCH RBC QN AUTO: 31.6 PG (ref 26.6–33)
MCH RBC QN AUTO: 32.3 PG (ref 26.6–33)
MCHC RBC AUTO-ENTMCNC: 31.4 G/DL (ref 31.5–35.7)
MCHC RBC AUTO-ENTMCNC: 32.4 G/DL (ref 31.5–35.7)
MCV RBC AUTO: 100.9 FL (ref 79–97)
MCV RBC AUTO: 99.6 FL (ref 79–97)
MONOCYTES # BLD AUTO: 0.1 10*3/MM3 (ref 0.1–0.9)
MONOCYTES # BLD AUTO: 0.6 10*3/MM3 (ref 0.1–0.9)
MONOCYTES NFR BLD AUTO: 1.1 % (ref 5–12)
MONOCYTES NFR BLD AUTO: 6.3 % (ref 5–12)
NEUTROPHILS NFR BLD AUTO: 7 10*3/MM3 (ref 1.7–7)
NEUTROPHILS NFR BLD AUTO: 7.8 10*3/MM3 (ref 1.7–7)
NEUTROPHILS NFR BLD AUTO: 85.5 % (ref 42.7–76)
NEUTROPHILS NFR BLD AUTO: 91.9 % (ref 42.7–76)
NRBC BLD AUTO-RTO: 0 /100 WBC (ref 0–0.2)
NRBC BLD AUTO-RTO: 0.1 /100 WBC (ref 0–0.2)
PLATELET # BLD AUTO: 227 10*3/MM3 (ref 140–450)
PLATELET # BLD AUTO: 252 10*3/MM3 (ref 140–450)
PMV BLD AUTO: 7.3 FL (ref 6–12)
PMV BLD AUTO: 7.3 FL (ref 6–12)
POTASSIUM SERPL-SCNC: 3.5 MMOL/L (ref 3.5–5.2)
QT INTERVAL: 420 MS
QTC INTERVAL: 528 MS
RBC # BLD AUTO: 2.42 10*6/MM3 (ref 3.77–5.28)
RBC # BLD AUTO: 2.85 10*6/MM3 (ref 3.77–5.28)
SODIUM SERPL-SCNC: 142 MMOL/L (ref 136–145)
TROPONIN T DELTA: 36 NG/L
WBC NRBC COR # BLD AUTO: 7.7 10*3/MM3 (ref 3.4–10.8)
WBC NRBC COR # BLD AUTO: 9.2 10*3/MM3 (ref 3.4–10.8)

## 2024-01-12 PROCEDURE — 25010000002 CEFEPIME PER 500 MG: Performed by: NURSE PRACTITIONER

## 2024-01-12 PROCEDURE — 85025 COMPLETE CBC W/AUTO DIFF WBC: CPT | Performed by: NURSE PRACTITIONER

## 2024-01-12 PROCEDURE — 97162 PT EVAL MOD COMPLEX 30 MIN: CPT

## 2024-01-12 PROCEDURE — 25010000002 METHYLPREDNISOLONE PER 125 MG: Performed by: NURSE PRACTITIONER

## 2024-01-12 PROCEDURE — 99223 1ST HOSP IP/OBS HIGH 75: CPT | Performed by: INTERNAL MEDICINE

## 2024-01-12 PROCEDURE — 84484 ASSAY OF TROPONIN QUANT: CPT | Performed by: NURSE PRACTITIONER

## 2024-01-12 PROCEDURE — 80048 BASIC METABOLIC PNL TOTAL CA: CPT | Performed by: NURSE PRACTITIONER

## 2024-01-12 PROCEDURE — 25010000002 FUROSEMIDE PER 20 MG: Performed by: NURSE PRACTITIONER

## 2024-01-12 PROCEDURE — 97166 OT EVAL MOD COMPLEX 45 MIN: CPT

## 2024-01-12 RX ORDER — PANTOPRAZOLE SODIUM 40 MG/1
40 TABLET, DELAYED RELEASE ORAL
Status: DISCONTINUED | OUTPATIENT
Start: 2024-01-12 | End: 2024-01-13 | Stop reason: HOSPADM

## 2024-01-12 RX ORDER — IPRATROPIUM BROMIDE AND ALBUTEROL SULFATE 2.5; .5 MG/3ML; MG/3ML
3 SOLUTION RESPIRATORY (INHALATION) EVERY 4 HOURS PRN
Status: DISCONTINUED | OUTPATIENT
Start: 2024-01-12 | End: 2024-01-13 | Stop reason: HOSPADM

## 2024-01-12 RX ORDER — POTASSIUM CHLORIDE 20 MEQ/1
20 TABLET, EXTENDED RELEASE ORAL 2 TIMES DAILY WITH MEALS
Status: DISCONTINUED | OUTPATIENT
Start: 2024-01-12 | End: 2024-01-13 | Stop reason: HOSPADM

## 2024-01-12 RX ORDER — DILTIAZEM HYDROCHLORIDE 240 MG/1
240 CAPSULE, COATED, EXTENDED RELEASE ORAL DAILY
Status: DISCONTINUED | OUTPATIENT
Start: 2024-01-12 | End: 2024-01-13 | Stop reason: HOSPADM

## 2024-01-12 RX ORDER — TERAZOSIN 2 MG/1
2 CAPSULE ORAL NIGHTLY
Status: DISCONTINUED | OUTPATIENT
Start: 2024-01-12 | End: 2024-01-13 | Stop reason: HOSPADM

## 2024-01-12 RX ORDER — ISOSORBIDE MONONITRATE 30 MG/1
30 TABLET, EXTENDED RELEASE ORAL
Status: DISCONTINUED | OUTPATIENT
Start: 2024-01-12 | End: 2024-01-13 | Stop reason: HOSPADM

## 2024-01-12 RX ORDER — LEVOTHYROXINE SODIUM 0.07 MG/1
75 TABLET ORAL
Status: DISCONTINUED | OUTPATIENT
Start: 2024-01-12 | End: 2024-01-13 | Stop reason: HOSPADM

## 2024-01-12 RX ORDER — SOTALOL HYDROCHLORIDE 120 MG/1
120 TABLET ORAL 2 TIMES DAILY
Status: DISCONTINUED | OUTPATIENT
Start: 2024-01-12 | End: 2024-01-13

## 2024-01-12 RX ORDER — FOLIC ACID 1 MG/1
1 TABLET ORAL DAILY
Status: DISCONTINUED | OUTPATIENT
Start: 2024-01-12 | End: 2024-01-13 | Stop reason: HOSPADM

## 2024-01-12 RX ORDER — CLOPIDOGREL BISULFATE 75 MG/1
75 TABLET ORAL DAILY
Status: DISCONTINUED | OUTPATIENT
Start: 2024-01-12 | End: 2024-01-13 | Stop reason: HOSPADM

## 2024-01-12 RX ORDER — ROSUVASTATIN CALCIUM 10 MG/1
20 TABLET, COATED ORAL NIGHTLY
Status: DISCONTINUED | OUTPATIENT
Start: 2024-01-12 | End: 2024-01-13 | Stop reason: HOSPADM

## 2024-01-12 RX ORDER — FERROUS SULFATE 324(65)MG
324 TABLET, DELAYED RELEASE (ENTERIC COATED) ORAL
Status: DISCONTINUED | OUTPATIENT
Start: 2024-01-12 | End: 2024-01-13 | Stop reason: HOSPADM

## 2024-01-12 RX ORDER — ASPIRIN 81 MG/1
81 TABLET ORAL NIGHTLY
Status: DISCONTINUED | OUTPATIENT
Start: 2024-01-12 | End: 2024-01-13 | Stop reason: HOSPADM

## 2024-01-12 RX ORDER — CHOLECALCIFEROL (VITAMIN D3) 125 MCG
5 CAPSULE ORAL NIGHTLY PRN
Status: DISCONTINUED | OUTPATIENT
Start: 2024-01-12 | End: 2024-01-13 | Stop reason: HOSPADM

## 2024-01-12 RX ORDER — FUROSEMIDE 10 MG/ML
40 INJECTION INTRAMUSCULAR; INTRAVENOUS DAILY
Status: DISCONTINUED | OUTPATIENT
Start: 2024-01-12 | End: 2024-01-13 | Stop reason: HOSPADM

## 2024-01-12 RX ORDER — METHYLPREDNISOLONE SODIUM SUCCINATE 125 MG/2ML
60 INJECTION, POWDER, LYOPHILIZED, FOR SOLUTION INTRAMUSCULAR; INTRAVENOUS EVERY 8 HOURS
Status: DISCONTINUED | OUTPATIENT
Start: 2024-01-12 | End: 2024-01-13

## 2024-01-12 RX ORDER — BUDESONIDE 0.5 MG/2ML
0.5 INHALANT ORAL
Status: DISCONTINUED | OUTPATIENT
Start: 2024-01-12 | End: 2024-01-13 | Stop reason: HOSPADM

## 2024-01-12 RX ORDER — SERTRALINE HYDROCHLORIDE 100 MG/1
100 TABLET, FILM COATED ORAL DAILY
Status: DISCONTINUED | OUTPATIENT
Start: 2024-01-12 | End: 2024-01-13 | Stop reason: HOSPADM

## 2024-01-12 RX ADMIN — DILTIAZEM HYDROCHLORIDE 240 MG: 240 CAPSULE, EXTENDED RELEASE ORAL at 08:33

## 2024-01-12 RX ADMIN — DOCUSATE SODIUM 50 MG AND SENNOSIDES 8.6 MG 2 TABLET: 8.6; 5 TABLET, FILM COATED ORAL at 08:33

## 2024-01-12 RX ADMIN — CLOPIDOGREL BISULFATE 75 MG: 75 TABLET ORAL at 08:34

## 2024-01-12 RX ADMIN — LEVOTHYROXINE SODIUM 75 MCG: 0.07 TABLET ORAL at 05:51

## 2024-01-12 RX ADMIN — ISOSORBIDE MONONITRATE 30 MG: 30 TABLET, EXTENDED RELEASE ORAL at 18:07

## 2024-01-12 RX ADMIN — Medication 10 ML: at 20:10

## 2024-01-12 RX ADMIN — ISOSORBIDE MONONITRATE 30 MG: 30 TABLET, EXTENDED RELEASE ORAL at 08:33

## 2024-01-12 RX ADMIN — TERAZOSIN HYDROCHLORIDE 2 MG: 2 CAPSULE ORAL at 20:10

## 2024-01-12 RX ADMIN — PANTOPRAZOLE SODIUM 40 MG: 40 TABLET, DELAYED RELEASE ORAL at 08:33

## 2024-01-12 RX ADMIN — CEFEPIME 2000 MG: 2 INJECTION, POWDER, FOR SOLUTION INTRAVENOUS at 12:12

## 2024-01-12 RX ADMIN — POTASSIUM CHLORIDE 20 MEQ: 1500 TABLET, EXTENDED RELEASE ORAL at 18:07

## 2024-01-12 RX ADMIN — SERTRALINE 100 MG: 100 TABLET, FILM COATED ORAL at 08:33

## 2024-01-12 RX ADMIN — ROSUVASTATIN 20 MG: 10 TABLET, FILM COATED ORAL at 20:10

## 2024-01-12 RX ADMIN — FOLIC ACID 1 MG: 1 TABLET ORAL at 08:34

## 2024-01-12 RX ADMIN — Medication 5 MG: at 20:10

## 2024-01-12 RX ADMIN — DOCUSATE SODIUM 50 MG AND SENNOSIDES 8.6 MG 2 TABLET: 8.6; 5 TABLET, FILM COATED ORAL at 20:10

## 2024-01-12 RX ADMIN — FUROSEMIDE 40 MG: 10 INJECTION, SOLUTION INTRAMUSCULAR; INTRAVENOUS at 08:34

## 2024-01-12 RX ADMIN — FERROUS SULFATE TAB EC 324 MG (65 MG FE EQUIVALENT) 324 MG: 324 (65 FE) TABLET DELAYED RESPONSE at 08:33

## 2024-01-12 RX ADMIN — POTASSIUM CHLORIDE 20 MEQ: 1500 TABLET, EXTENDED RELEASE ORAL at 08:34

## 2024-01-12 RX ADMIN — Medication 10 ML: at 08:32

## 2024-01-12 RX ADMIN — METHYLPREDNISOLONE SODIUM SUCCINATE 60 MG: 125 INJECTION, POWDER, FOR SOLUTION INTRAMUSCULAR; INTRAVENOUS at 07:19

## 2024-01-12 RX ADMIN — ASPIRIN 81 MG: 81 TABLET, COATED ORAL at 20:10

## 2024-01-12 NOTE — PROGRESS NOTES
[facility] MEDICINE SERVICE  DAILY PROGRESS NOTE    NAME: Tracy Jane  : 1934  MRN: [id number]      LOS: 1 day     PROVIDER OF SERVICE: Judah Ortega MD    Chief Complaint: Hypoxic respiratory failure    Subjective:     Interval History:  History taken from: patient  Patient states improvement in respiratory status overnight.  No acute issues.    Review of Systems:   Review of Systems   Constitutional:  Negative for chills and fever.   HENT:  Negative for congestion and rhinorrhea.    Eyes:  Negative for visual disturbance.   Respiratory:  Positive for cough and shortness of breath.    Cardiovascular:  Negative for chest pain.   Gastrointestinal:  Negative for abdominal pain, diarrhea, nausea and vomiting.   Endocrine: Negative for polyuria.   Genitourinary:  Negative for difficulty urinating and dyspareunia.   Musculoskeletal:  Negative for back pain and myalgias.   Skin:  Negative for rash and wound.   Neurological:  Negative for weakness, numbness and headaches.   Psychiatric/Behavioral:  Negative for agitation, behavioral problems and confusion.        Objective:     Vital Signs  Temp:  [97.4 °F (36.3 °C)-100.2 °F (37.9 °C)] 97.4 °F (36.3 °C)  Heart Rate:  [] 113  Resp:  [20-29] 20  BP: (100-217)/(49-94) 116/68  Flow (L/min):  [4] 4   Body mass index is 25.19 kg/m².    Physical Exam  Physical Exam  Constitutional:       General: She is not in acute distress.  HENT:      Head: Normocephalic and atraumatic.   Cardiovascular:      Rate and Rhythm: Normal rate. Rhythm irregular.      Heart sounds: Normal heart sounds.   Pulmonary:      Effort: Pulmonary effort is normal. No respiratory distress.      Comments: Bibasilar crackles  Abdominal:      Palpations: Abdomen is soft.      Tenderness: There is no abdominal tenderness.   Musculoskeletal:      Right lower leg: No edema.      Left lower leg: No edema.   Skin:     General: Skin is warm and dry.   Neurological:      Mental Status: She is  alert.   Psychiatric:         Mood and Affect: Mood normal.         Behavior: Behavior normal.         Scheduled Meds   aspirin, 81 mg, Oral, Nightly  cefepime, 2,000 mg, Intravenous, Q12H  clopidogrel, 75 mg, Oral, Daily  dilTIAZem CD, 240 mg, Oral, Daily  ferrous sulfate, 324 mg, Oral, Daily With Breakfast  folic acid, 1 mg, Oral, Daily  furosemide, 40 mg, Intravenous, Daily  isosorbide mononitrate, 30 mg, Oral, BID - Nitrates  levothyroxine, 75 mcg, Oral, Q AM  methylPREDNISolone sodium succinate, 60 mg, Intravenous, Q8H  pantoprazole, 40 mg, Oral, QAM AC  potassium chloride, 20 mEq, Oral, BID With Meals  rosuvastatin, 20 mg, Oral, Nightly  senna-docusate sodium, 2 tablet, Oral, BID  sertraline, 100 mg, Oral, Daily  sodium chloride, 10 mL, Intravenous, Q12H  [Held by provider] sotalol, 120 mg, Oral, BID  terazosin, 2 mg, Oral, Nightly       PRN Meds     acetaminophen    senna-docusate sodium **AND** polyethylene glycol **AND** bisacodyl **AND** bisacodyl    ipratropium-albuterol    melatonin    nitroglycerin    ondansetron ODT    [COMPLETED] Insert Peripheral IV **AND** sodium chloride    sodium chloride    sodium chloride   Infusions         Diagnostic Data    Results from last 7 days   Lab Units 01/12/24  0551 01/11/24  2241   WBC 10*3/mm3 7.70 11.20*   HEMOGLOBIN g/dL 9.2* 10.0*   HEMATOCRIT % 28.4* 31.3*   PLATELETS 10*3/mm3 252 384   GLUCOSE mg/dL 142* 177*   CREATININE mg/dL 0.71 0.76   BUN mg/dL 21 21   SODIUM mmol/L 142 140   POTASSIUM mmol/L 3.5 4.3   AST (SGOT) U/L  --  46*   ALT (SGPT) U/L  --  26   ALK PHOS U/L  --  118*   BILIRUBIN mg/dL  --  0.8   ANION GAP mmol/L 13.0 13.0       XR Chest 1 View    Result Date: 1/11/2024  Impression: Mild pulmonary edema pattern with small bilateral pleural effusions, new as compared to the previous study. Mild bibasilar atelectatic changes are present. Viral illness cannot be excluded. Electronically Signed: Radha Ponce MD  1/11/2024 11:25 PM EST  Workstation ID:  [id number]       I reviewed the patient's new clinical results.    Assessment/Plan:     Active and Resolved Problems  Active Hospital Problems    Diagnosis  POA    **Hypoxic respiratory failure [J96.91]  Yes      Resolved Hospital Problems   No resolved problems to display.       Acute hypoxic respiratory failure  -Consider  secondary to pneumonia and pulmonary edema  -Diagnosed via chest x-ray at outlying facility with pneumonia on 1/10/2024  -Follow-up chest x-ray on 1/11/2024 in ED per radiology mild pulmonary edema pattern with small bilateral pleural effusions new when compared to previous study.  Mild bibasilar atelectatic changes are present.  Viral illness cannot be excluded  -Viral panel negative   - O2 requirements inproving to 4L NC  - Respiratory status improved with solumedrol and lasix  - duo nebs / pulmicort  - IS  - continue empiric antibiotics   -Will continue Lasix.  Patient previously on 40 of Lasix daily.  -proBNP elevated on admission  -Echo on 12/26/2023; left ventricular systolic function normal.  Normal right ventricular cavity size and systolic function noted.  Left atrial cavity dilated.  Successful implantation of [employer] Watchman Flex 31 mm  -IS  -Will consult cardiology to continue to follow as patient recently had watchman's device placed last month patient was seen in office for follow-up on 1/2/2024  - Cefepime (1/11-)        Paroxysmal A-fib  -Status post watchman's device  - Per cards recs: Continue diltiazem, sotalol for PAF     Coronary artery disease status post CABG  -Dual antiplatelet therapy, diltiazem and sotalol  -On Lasix and isosorbide  -Continue isosorbide, terazosin per cardioilogy      History of subdural hematoma/anemia  -Ferrous sulfate  - appears stable     Hypothyroidism  -synthroid      Dyslipidemia  -statin     RA  - on methotrexate      Depression  - on Zoloft     Weakness   - PT/OT eval     DVT prophylaxis:  Mechanical DVT prophylaxis orders are present.      Code status is   Code Status and Medical Interventions:   Ordered at: 01/11/24 2349     Code Status (Patient has no pulse and is not breathing):    CPR (Attempt to Resuscitate)     Medical Interventions (Patient has pulse or is breathing):    Full Support       Plan for disposition: Discharge pending PT/OT eval in 2 nights    Time: 30 minutes    Signature: Electronically signed by Judah Ortega MD, 01/12/24, 12:40 EST.  [facility] Hospitalist Team

## 2024-01-12 NOTE — CONSULTS
Cardiology Consult Note      REQUESTING PHYSICIAN    Citlali Ashraf MD    PATIENT IDENTIFICATION  Name: Tracy Jane  Age: 89 y.o.  Sex: female  :  1934  MRN: [id number]             REASON FOR CONSULTATION:  89-year-old female with past medical history that includes sick sinus syndrome status post permanent dual-chamber pacemaker implant, bioprosthetic aortic valve replacement , coronary artery disease with prior bypass surgery, bilateral renal artery stenosis, carotid artery disease, dyslipidemia, paroxysmal atrial fibrillation, subdural hematoma, iron deficiency anemia, hypothyroidism.  Given her past medical history of subdural hematoma and falls, shared decision making was performed for Watchman device implantation.  The patient did undergo Watchman implant 2023.  Her anticoagulation was discontinued and she was started on dual antiplatelet therapy.         CC:  Shortness of breath  Weakness    HISTORY OF PRESENT ILLNESS:   Patient presented to the emergency department at [facility] 2024 from assisted living facility via EMS with shortness of breath, orthopnea, SaO2 in the 80s, weakness.  She denies any actual chest pain.  She was noted to have wheezing on presentation.  She was treated with Solu-Medrol and Lasix as well as empiric cefepime and vancomycin.  Upon evaluation today, the patient is lying in bed and does appear mildly dyspneic.  Rhythm shows atrial fibrillation 110-126 bpm.  Pressure stable at 129/74.  She is on 2 LNC with 99% SaO2.  She denies any GI complaints, lower extremity edema, abnormal bleeding..  Blood pressure was elevated at 217/94 on presentation, improved to 116/49 after receiving Lasix.      REVIEW OF SYSTEMS:  Pertinent items are noted in HPI, all other systems reviewed and negative    OBJECTIVE   Chest x-ray noted with pneumonia, bilateral pleural effusions  proBNP 5057  High-sensitivity troponin 17, 53    ASSESSMENT  History of subdural  "hematoma and falls deemed high risk for severe bleeding with elevated [id number] score and has bled score  Status post watchman/left atrial appendage device implantation  Paroxysmal atrial fibrillation  Coronary artery disease with prior CABG  Acute on chronic heart failure with preserved ejection fraction  Acute hypoxic respiratory failure secondary to underlying pneumonia and chronic heart failure with preserved ejection fraction  Bilateral pleural effusions  Sick sinus syndrome status post dual-chamber permanent pacemaker implant  Aortic valve insufficiency status post bioprosthetic aortic valve replacement    PLAN  Antimicrobials per attending service  Continue diltiazem, sotalol for PAF  Patient is on dual antiplatelet therapy as opposed to anticoagulation following watchman implant.  Continue statin for HLD  Slight, nontrending high-sensitivity troponin in the setting of community-acquired pneumonia and pleural effusions with CHF.  No ST segment deviations noted on EKG to suggest acute coronary syndrome.  Continue diuresis while monitoring renal function.  Patient receiving Lasix 40 mg daily  Continue isosorbide, terazosin  Patient had recent echocardiogram.  No additional workup planned.  Further recommendations as patient's hospital course progresses            Vital Signs  Visit Vitals  /64   Pulse 102   Temp 100.2 °F (37.9 °C) (Rectal)   Resp 25   Ht 152.4 cm (60\")   Wt 58.5 kg (129 lb)   SpO2 97%   BMI 25.19 kg/m²     Oxygen Therapy  SpO2: 97 %  Pulse Oximetry Type: Continuous  Device (Oxygen Therapy): nasal cannula  Flow (L/min): 4  Flowsheet Rows      Flowsheet Row First Filed Value   Admission Height 152.4 cm (60\") Documented at 01/11/2024 2203   Admission Weight 58.5 kg (129 lb) Documented at 01/11/2024 2203          Intake & Output (last 3 days)         01/09 0701  01/10 0700 01/10 0701  01/11 0700 01/11 0701  01/12 0700 01/12 0701 01/13 0700    I.V. (mL/kg)   5.6 (0.1)     Total " Intake(mL/kg)   5.6 (0.1)     Net   +5.6                   Lines, Drains & Airways       Active LDAs       Name Placement date Placement time Site Days    Peripheral IV 01/11/24 2235 Left;Posterior Hand 01/11/24 2235  Hand  less than 1    Peripheral IV 01/11/24 2305 Right Antecubital 01/11/24 2305  Antecubital  less than 1                    MEDICAL HISTORY    Past Medical History:   Diagnosis Date    Anxiety     Asthma     Atrial fibrillation     CAD (coronary artery disease)     Carotid artery disease     GERD (gastroesophageal reflux disease)     Gout     Hyperlipidemia     Hypertension     Hyperthyroidism     Hypothyroidism         SURGICAL HISTORY    Past Surgical History:   Procedure Laterality Date    ATRIAL APPENDAGE EXCLUSION LEFT WITH TRANSESOPHAGEAL ECHOCARDIOGRAM Right 12/26/2023    Procedure: Atrial Appendage Occlusion;  Surgeon: Pk Crabtree MD;  Location: [facility] CATH INVASIVE LOCATION;  Service: Cardiovascular;  Laterality: Right;    ATRIAL APPENDAGE EXCLUSION LEFT WITH TRANSESOPHAGEAL ECHOCARDIOGRAM N/A 12/26/2023    Procedure: Atrial Appendage Occlusion;  Surgeon: Gen Caballero MD;  Location: [facility] CATH INVASIVE LOCATION;  Service: Cardiovascular;  Laterality: N/A;    BASAL CELL CARCINOMA EXCISION      spine, nose and arm, leg 2020    BREAST BIOPSY      CARDIAC CATHETERIZATION      CAROTID STENT      CATARACT EXTRACTION      CHOLECYSTECTOMY      CORONARY ARTERY BYPASS GRAFT      CORONARY STENT PLACEMENT      ENDOSCOPY N/A 04/24/2023    Procedure: ESOPHAGOGASTRODUODENOSCOPY with antrum body biopsies;  Surgeon: REGGIE Ace MD;  Location: [facility] ENDOSCOPY;  Service: Gastroenterology;  Laterality: N/A;  hiatal hernia    FINGER SURGERY      KNEE SURGERY      PACEMAKER IMPLANTATION      SHOULDER SURGERY      RACHEL Bilateral 11/03/2023        FAMILY HISTORY    Family History   Problem Relation Age of Onset    Hypertension Mother     Heart disease Father     Heart disease Sister   "   Kidney cancer Sister     Stroke Sister     Cancer Sister         breast    Cancer Sister     Heart disease Brother        SOCIAL HISTORY    Social History     Tobacco Use    Smoking status: Former     Packs/day: 1.00     Years: 4.00     Additional pack years: 0.00     Total pack years: 4.00     Types: Cigarettes     Quit date:      Years since quittin.0    Smokeless tobacco: Never   Substance Use Topics    Alcohol use: No        ALLERGIES    No Known Allergies           /64   Pulse 102   Temp 100.2 °F (37.9 °C) (Rectal)   Resp 25   Ht 152.4 cm (60\")   Wt 58.5 kg (129 lb)   SpO2 97%   BMI 25.19 kg/m²   Intake/Output last 3 shifts:  I/O last 3 completed shifts:  In: 5.6 [I.V.:5.6]  Out: -   Intake/Output this shift:  No intake/output data recorded.    PHYSICAL EXAM:    General: Well-developed, 89-year-old female who is alert, cooperative, no distress, appears stated age.  Appears frail  Head:  Normocephalic, atraumatic, mucous membranes moist  Eyes:  Conjunctivae/corneas clear, EOM's intact     Neck:  Supple,  no adenopathy; no JVD or bruit  Lungs: Clear to auscultation bilaterally, no wheezes, rhonchi or rales are noted  Chest wall: No tenderness  Heart::  Regular rate and rhythm, S1 and S2 normal, 1/6 murmur  Abdomen: Soft, nontender, nondistended, bowel sounds active  Extremities: No cyanosis, clubbing, or edema   Pulses: 2+ and symmetric all extremities  Skin:  No rashes or lesions  Neuro/psych: A&O x3. CN II through XII are grossly intact with appropriate affect      Scheduled Meds:      aspirin, 81 mg, Oral, Nightly  cefepime, 2,000 mg, Intravenous, Q12H  clopidogrel, 75 mg, Oral, Daily  dilTIAZem CD, 240 mg, Oral, Daily  ferrous sulfate, 324 mg, Oral, Daily With Breakfast  folic acid, 1 mg, Oral, Daily  furosemide, 40 mg, Intravenous, Daily  isosorbide mononitrate, 30 mg, Oral, BID - Nitrates  levothyroxine, 75 mcg, Oral, Q AM  methylPREDNISolone sodium succinate, 60 mg, Intravenous, " "Q8H  pantoprazole, 40 mg, Oral, QAM AC  potassium chloride, 20 mEq, Oral, BID With Meals  rosuvastatin, 20 mg, Oral, Nightly  senna-docusate sodium, 2 tablet, Oral, BID  sertraline, 100 mg, Oral, Daily  sodium chloride, 10 mL, Intravenous, Q12H  [Held by provider] sotalol, 120 mg, Oral, BID  terazosin, 2 mg, Oral, Nightly        Continuous Infusions:         PRN Meds:      acetaminophen    senna-docusate sodium **AND** polyethylene glycol **AND** bisacodyl **AND** bisacodyl    ipratropium-albuterol    melatonin    nitroglycerin    ondansetron ODT    [COMPLETED] Insert Peripheral IV **AND** sodium chloride    sodium chloride    sodium chloride        Results Review:     I reviewed the patient's new clinical results.    CBC    Results from last 7 days   Lab Units 01/12/24  0551 01/11/24  2241   WBC 10*3/mm3 7.70 11.20*   HEMOGLOBIN g/dL 9.2* 10.0*   PLATELETS 10*3/mm3 252 384     Cr Clearance Estimated Creatinine Clearance: 43 mL/min (by C-G formula based on SCr of 0.71 mg/dL).  Coag     HbA1C   Lab Results   Component Value Date    HGBA1C 5.7 (H) 12/22/2021     Blood Glucose No results found for: \"POCGLU\"  Infection     CMP   Results from last 7 days   Lab Units 01/12/24  0551 01/11/24  2241   SODIUM mmol/L 142 140   POTASSIUM mmol/L 3.5 4.3   CHLORIDE mmol/L 102 100   CO2 mmol/L 27.0 27.0   BUN mg/dL 21 21   CREATININE mg/dL 0.71 0.76   GLUCOSE mg/dL 142* 177*   ALBUMIN g/dL  --  3.9   BILIRUBIN mg/dL  --  0.8   ALK PHOS U/L  --  118*   AST (SGOT) U/L  --  46*   ALT (SGPT) U/L  --  26     ABG    Results from last 7 days   Lab Units 01/11/24  2241   PH, ARTERIAL pH units 7.300*   PCO2, ARTERIAL mm Hg 54.5*   PO2 ART mm Hg 94.3   O2 SATURATION ART % 96.3   BASE EXCESS ART mmol/L -0.1*     UA      MIGUEL  No results found for: \"POCMETH\", \"POCAMPHET\", \"POCBARBITUR\", \"POCBENZO\", \"POCCOCAINE\", \"POCOPIATES\", \"POCOXYCODO\", \"POCPHENCYC\", \"POCPROPOXY\", \"POCTHC\", \"POCTRICYC\"  Lysis Labs   Results from last 7 days   Lab Units " 01/12/24  0551 01/11/24  2241   HEMOGLOBIN g/dL 9.2* 10.0*   PLATELETS 10*3/mm3 252 384   CREATININE mg/dL 0.71 0.76     Radiology(recent) XR Chest 1 View    Result Date: 1/11/2024  Impression: Mild pulmonary edema pattern with small bilateral pleural effusions, new as compared to the previous study. Mild bibasilar atelectatic changes are present. Viral illness cannot be excluded. Electronically Signed: Radha Ponce MD  1/11/2024 11:25 PM EST  Workstation ID: [id number]       Results from last 7 days   Lab Units 01/12/24  0551   HSTROP T ng/L 53*       X-rays, labs reviewed personally by physician.    ECG/EMG Results (most recent)       Procedure Component Value Units Date/Time    ECG 12 Lead Dyspnea [[id number]] Collected: 01/11/24 2212     Updated: 01/12/24 0612     QT Interval 420 ms      QTC Interval 528 ms     Narrative:      HEART RATE= 95  bpm  RR Interval= 632  ms  NE Interval= 198  ms  P Horizontal Axis= 30  deg  P Front Axis= 206  deg  QRSD Interval= 152  ms  QT Interval= 420  ms  QTcB= 528  ms  QRS Axis= -27  deg  T Wave Axis= 181  deg  - ABNORMAL ECG -  Sinus or ectopic atrial rhythm  Left bundle branch block  ST elevation secondary to IVCD  When compared with ECG of 06-Nov-2023 11:30:07,  Significant change in rhythm: previously atrial fibrillation  Electronically Signed By: Ari Saenz (Zaid) 12-Jan-2024 06:12:18  Date and Time of Study: 2024-01-11 22:12:01              Medication Review:   I have reviewed the patient's current medication list  Scheduled Meds:aspirin, 81 mg, Oral, Nightly  cefepime, 2,000 mg, Intravenous, Q12H  clopidogrel, 75 mg, Oral, Daily  dilTIAZem CD, 240 mg, Oral, Daily  ferrous sulfate, 324 mg, Oral, Daily With Breakfast  folic acid, 1 mg, Oral, Daily  furosemide, 40 mg, Intravenous, Daily  isosorbide mononitrate, 30 mg, Oral, BID - Nitrates  levothyroxine, 75 mcg, Oral, Q AM  methylPREDNISolone sodium succinate, 60 mg, Intravenous, Q8H  pantoprazole, 40 mg, Oral, QAM  "AC  potassium chloride, 20 mEq, Oral, BID With Meals  rosuvastatin, 20 mg, Oral, Nightly  senna-docusate sodium, 2 tablet, Oral, BID  sertraline, 100 mg, Oral, Daily  sodium chloride, 10 mL, Intravenous, Q12H  [Held by provider] sotalol, 120 mg, Oral, BID  terazosin, 2 mg, Oral, Nightly      Continuous Infusions:   PRN Meds:.  acetaminophen    senna-docusate sodium **AND** polyethylene glycol **AND** bisacodyl **AND** bisacodyl    ipratropium-albuterol    melatonin    nitroglycerin    ondansetron ODT    [COMPLETED] Insert Peripheral IV **AND** sodium chloride    sodium chloride    sodium chloride    Imaging:  Imaging Results (Last 72 Hours)       Procedure Component Value Units Date/Time    XR Chest 1 View [[id number]] Collected: 01/11/24 2324     Updated: 01/11/24 2327    Narrative:      XR CHEST 1 VW    Date of Exam: 1/11/2024 11:18 PM EST    Indication: soa    Comparison: 1/10/2024.    Findings:  The heart appears mildly enlarged. Median sternotomy wires are present. There is a left subclavian ICD/pacemaker device. There is indistinctness of the pulmonary vasculature, new as compared to the previous study. Spine bilateral pleural effusions are   present, new/progressed as compared to the previous study. No significant consolidation. Mild atelectatic changes are present within the lung bases. No pneumothorax.      Impression:      Impression:  Mild pulmonary edema pattern with small bilateral pleural effusions, new as compared to the previous study. Mild bibasilar atelectatic changes are present. Viral illness cannot be excluded.        Electronically Signed: Radha Ponce MD    1/11/2024 11:25 PM EST    Workstation ID: [id number]              YEYO Hood  01/12/24  07:19 EST       EMR Dragon/Transcription:   \"Dictated utilizing Dragon dictation\".                 Electronically signed by YEYO Hood, 01/12/24, 7:19 AM EST.  Copied text in this note has been reviewed by me and is accurate as of " 01/12/24.    Cardiology attending:  Seen and examined.  Chart and labs reviewed. Independant interpretations of cardiac testing was performed. History and exam findings are verified with above changes noted.  Assessment and plan notated by APC after being formulated by attending consultant.  Note that greater than 50% of the time spent in care of the patient was provided by attending consultant.    Patient reports feeling better after receiving some IV diuretics.  Has been placed on antibiotics as well for pneumonia.  She reports that she has been feeling bad for the past several days.  Reports significant weakness.  She reports that she is going to be moving to assisted living at discharge from here.  Recommend continued IV diuresis as her renal function allows.  Monitor renal function and electrolytes for toxicities with IV diuresis.  Antimicrobials as per admitting service.    Physical Exam:    General: Alert, cooperative, no distress, appears stated age  Head:  Normocephalic, atraumatic, mucous membranes moist  Eyes:  Conjunctiva/corneas clear, EOM's intact     Neck:  Supple,  no bruit  Lungs:           Coarse and diminished bilaterally  Chest wall: No tenderness  Heart::  Regular rate and rhythm, S1 and S2 normal, 2/6 systolic ejection murmur.  No rub or gallop  Abdomen: Soft, non-tender, nondistended bowel sounds active  Extremities: No cyanosis, clubbing, or edema  Pulses: Diminished pedal pulses  Skin:  No rashes or lesions  Neuro/psych: A&O x3. CN II through XII are grossly intact with appropriate affect

## 2024-01-12 NOTE — THERAPY EVALUATION
Patient Name: Tracy Jane  : 1934    MRN: [id number]                              Today's Date: 2024       Admit Date: 2024    Visit Dx:     ICD-10-CM ICD-9-CM   1. Congestive heart failure, unspecified HF chronicity, unspecified heart failure type  I50.9 428.0   2. Dyspnea, unspecified type  R06.00 786.09   3. Hypercapnia  R06.89 786.09   4. Fever and chills  R50.9 780.60   5. Resistant hypertension  I1A.0 401.9     Patient Active Problem List   Diagnosis    Abnormal cardiovascular stress test    Abnormal EKG    Abnormal levels of other serum enzymes    Anemia    Anxiety disorder    Aortic insufficiency    Aortic stenosis    Arthritis, rheumatoid    Asthma    Chronic coronary artery disease    Chronic kidney disease, stage III (moderate)    Depression    Cough    Edema of lower extremity    Encounter for therapeutic drug level monitoring    Excessive anticoagulation    Fatigue    Former smoker    GERD without esophagitis    Hemoptysis    High alkaline phosphatase    Hx of aortic valve replacement    Hyperglycemia    Mixed hyperlipidemia    Essential hypertension    Hyponatremia    Acquired hypothyroidism    Influenza    Nonspecific abnormal results of function study of liver    Osteoarthritis of knee    Other peripheral vertigo, unspecified ear    Paroxysmal atrial fibrillation    Peripheral vascular disease    Presence of aortocoronary bypass graft    Presence of cardiac pacemaker    Renal insufficiency    Shortness of breath    Sick sinus syndrome    Sore throat    Valvular heart disease    Elevated INR    Medication induced coagulopathy    COVID    PAF (paroxysmal atrial fibrillation)    Presence of Watchman left atrial appendage closure device    Hypoxic respiratory failure     Past Medical History:   Diagnosis Date    Anxiety     Asthma     Atrial fibrillation     CAD (coronary artery disease)     Carotid artery disease     GERD (gastroesophageal reflux disease)     Gout      Hyperlipidemia     Hypertension     Hyperthyroidism     Hypothyroidism      Past Surgical History:   Procedure Laterality Date    ATRIAL APPENDAGE EXCLUSION LEFT WITH TRANSESOPHAGEAL ECHOCARDIOGRAM Right 12/26/2023    Procedure: Atrial Appendage Occlusion;  Surgeon: Pk Crabtree MD;  Location: [facility] CATH INVASIVE LOCATION;  Service: Cardiovascular;  Laterality: Right;    ATRIAL APPENDAGE EXCLUSION LEFT WITH TRANSESOPHAGEAL ECHOCARDIOGRAM N/A 12/26/2023    Procedure: Atrial Appendage Occlusion;  Surgeon: Gen Caballero MD;  Location: [facility] CATH INVASIVE LOCATION;  Service: Cardiovascular;  Laterality: N/A;    BASAL CELL CARCINOMA EXCISION      spine, nose and arm, leg 2020    BREAST BIOPSY      CARDIAC CATHETERIZATION      CAROTID STENT      CATARACT EXTRACTION      CHOLECYSTECTOMY      CORONARY ARTERY BYPASS GRAFT      CORONARY STENT PLACEMENT      ENDOSCOPY N/A 04/24/2023    Procedure: ESOPHAGOGASTRODUODENOSCOPY with antrum body biopsies;  Surgeon: REGGIE Ace MD;  Location: [facility] ENDOSCOPY;  Service: Gastroenterology;  Laterality: N/A;  hiatal hernia    FINGER SURGERY      KNEE SURGERY      PACEMAKER IMPLANTATION      SHOULDER SURGERY      RACHEL Bilateral 11/03/2023      General Information       Row Name 01/12/24 1528          OT Time and Intention    Document Type evaluation  -LS     Mode of Treatment occupational therapy  -       Row Name 01/12/24 1528          General Information    Patient Profile Reviewed yes  -LS     Prior Level of Function independent:;ADL's;all household mobility  uses rollator, shower chair, grab bars  -LS     Existing Precautions/Restrictions oxygen therapy device and L/min  2 liters  -LS     Barriers to Rehab none identified  -LS       Row Name 01/12/24 1528          Living Environment    People in Home facility resident  [facility]  -       Row Name 01/12/24 1528          Home Main Entrance    Number of Stairs, Main Entrance none  -LS        Row Name 01/12/24 1528          Stairs Within Home, Primary    Number of Stairs, Within Home, Primary none  -LS       Row Name 01/12/24 1528          Cognition    Orientation Status (Cognition) oriented x 4  -LS       Row Name 01/12/24 1528          Safety Issues, Functional Mobility    Impairments Affecting Function (Mobility) shortness of breath;endurance/activity tolerance;strength;balance  -               User Key  (r) = Recorded By, (t) = Taken By, (c) = Cosigned By      Initials Name Provider Type     Israel Segura OT Occupational Therapist                     Mobility/ADL's       Row Name 01/12/24 1536          Bed Mobility    Bed Mobility bed mobility (all) activities  -     All Activities, [place] (Bed Mobility) modified independence  -       Row Name 01/12/24 1536          Transfers    Transfers sit-stand transfer  -       Row Name 01/12/24 1536          Sit-Stand Transfer    Sit-Stand [place] (Transfers) contact guard  -       Row Name 01/12/24 1536          Functional Mobility    Functional Mobility- Ind. Level contact guard assist  -     Patient was able to Ambulate yes  -       Row Name 01/12/24 1536          Activities of Daily Living    BADL Assessment/Intervention lower body dressing  -       Row Name 01/12/24 1536          Lower Body Dressing Assessment/Training    [place] Level (Lower Body Dressing) doff;don;pants/bottoms;socks;standby assist  -     Position (Lower Body Dressing) edge of bed sitting  -               User Key  (r) = Recorded By, (t) = Taken By, (c) = Cosigned By      Initials Name Provider Type    LS Israel Segura OT Occupational Therapist                   Obj/Interventions       Row Name 01/12/24 1537          Sensory Assessment (Somatosensory)    Sensory Assessment (Somatosensory) sensation intact  -       Row Name 01/12/24 1537          Range of Motion Comprehensive    General Range of Motion no range of motion deficits identified   -       Row Name 01/12/24 1537          Strength Comprehensive (MMT)    Comment, General Manual Muscle Testing (MMT) Assessment BUE 3+/5  -       Row Name 01/12/24 1537          Balance    Balance Assessment sitting static balance;sitting dynamic balance;standing static balance;standing dynamic balance  -LS     Static Sitting Balance independent  -LS     Dynamic Sitting Balance standby assist  -LS     Position, Sitting Balance unsupported;sitting edge of bed  -LS     Static Standing Balance contact guard  -LS     Dynamic Standing Balance contact guard  -LS               User Key  (r) = Recorded By, (t) = Taken By, (c) = Cosigned By      Initials Name Provider Type    LS Israel Segura, OT Occupational Therapist                   Goals/Plan       Row Name 01/12/24 1548          Transfer Goal 1 (OT)    Activity/Assistive Device (Transfer Goal 1, OT) transfers, all  -LS     [place] Level/Cues Needed (Transfer Goal 1, OT) modified independence  -LS     Time Frame (Transfer Goal 1, OT) long term goal (LTG);2 weeks  -       Row Name 01/12/24 1548          Bathing Goal 1 (OT)    Activity/Device (Bathing Goal 1, OT) bathing skills, all  -LS     [place] Level/Cues Needed (Bathing Goal 1, OT) modified independence  -LS     Time Frame (Bathing Goal 1, OT) long term goal (LTG);2 weeks  -       Row Name 01/12/24 1548          Dressing Goal 1 (OT)    Activity/Device (Dressing Goal 1, OT) dressing skills, all  -LS     [place]/Cues Needed (Dressing Goal 1, OT) modified independence  -LS     Time Frame (Dressing Goal 1, OT) long term goal (LTG);2 weeks  -       Row Name 01/12/24 1548          Toileting Goal 1 (OT)    Activity/Device (Toileting Goal 1, OT) toileting skills, all  -LS     [place] Level/Cues Needed (Toileting Goal 1, OT) modified independence  -LS     Time Frame (Toileting Goal 1, OT) long term goal (LTG);2 weeks  -       Row Name 01/12/24 1548          Therapy Assessment/Plan (OT)     Planned Therapy Interventions (OT) activity tolerance training;BADL retraining;functional balance retraining;IADL retraining;occupation/activity based interventions;ROM/therapeutic exercise;strengthening exercise;transfer/mobility retraining;patient/caregiver education/training  -               User Key  (r) = Recorded By, (t) = Taken By, (c) = Cosigned By      Initials Name Provider Type    LS Israel Segura OT Occupational Therapist                   Clinical Impression       Row Name 01/12/24 1537          Pain Assessment    Pretreatment Pain Rating 0/10 - no pain  -LS     Posttreatment Pain Rating 0/10 - no pain  -LS       Row Name 01/12/24 1537          Plan of Care Review    Plan of Care Reviewed With patient;daughter  -     Outcome Evaluation 89 y.o. female who resides at [facility] with PMH of sick sinus syndrome status post permanent dual-chamber pacemaker, bioprosthetic aortic valve placement in 2014, coronary artery disease with prior bypass surgery, bilateral renal artery stenosis, lipidemia, paroxysmal A-fib, subdural hematoma, iron deficiency anemia, hypothyroidism.  Status post watchman implant 12/26/2023.  Presented to the hospital for 1/11/24, and was admitted with a principal diagnosis of Hypoxic respiratory failure. At baseline, pt performs ADLs without assistance, uses a rollator when walking distances. However when in apartment, she typically walks w/o AD. She goes to the dining room for all meals. This date pt Mod I for bed mobility and CGA to come to stand and ambulate in room. Pt on 2 liters O2 at 97%, but desatted to 91% ambulating in room. Recovery back to 97% within 60 seconds of resting with 2 liters replaced. CGA needed for lower body dressing and toileting. Pt appears slightly below baseline, thus OT will follow and recommends home health therapy upon dc.  -       Row Name 01/12/24 1537          Therapy Assessment/Plan (OT)    Rehab Potential (OT)  good, to achieve stated therapy goals  -     Criteria for Skilled Therapeutic Interventions Met (OT) yes;skilled treatment is necessary  -LS     Therapy Frequency (OT) 3 times/wk  -LS     Predicted Duration of Therapy Intervention (OT) until dc  -LS       Row Name 01/12/24 1537          Therapy Plan Review/Discharge Plan (OT)    Anticipated Discharge Disposition (OT) home with home health  -       Row Name 01/12/24 1537          Vital Signs    Pre SpO2 (%) 97  -LS     O2 Delivery Pre Treatment nasal cannula  -LS     Intra SpO2 (%) 91  -LS     O2 Delivery Intra Treatment room air  -LS     Post SpO2 (%) 97  -LS     O2 Delivery Post Treatment nasal cannula  -LS     Pre Patient Position Supine  -LS     Intra Patient Position Standing  -LS     Post Patient Position Supine  -LS       Row Name 01/12/24 1537          Positioning and Restraints    Pre-Treatment Position in bed  -LS     Post Treatment Position bed  -LS     In Bed notified nsg;fowlers;call light within reach;encouraged to call for assist;exit alarm on  -LS               User Key  (r) = Recorded By, (t) = Taken By, (c) = Cosigned By      Initials Name Provider Type    Israel Roy, SANDEEP Occupational Therapist                   Outcome Measures       Row Name 01/12/24 1549          How much help from another is currently needed...    Putting on and taking off regular lower body clothing? 3  -LS     Bathing (including washing, rinsing, and drying) 3  -LS     Toileting (which includes using toilet bed pan or urinal) 3  -LS     Putting on and taking off regular upper body clothing 4  -LS     Taking care of personal grooming (such as brushing teeth) 4  -LS     Eating meals 4  -LS     AM-PAC 6 Clicks Score (OT) 21  -LS       Row Name 01/12/24 1401 01/12/24 0828       How much help from another person do you currently need...    Turning from your back to your side while in flat bed without using bedrails? 4  -MB 4  -TL    Moving from lying on back to sitting on  the side of a flat bed without bedrails? 4  -MB 4  -TL    Moving to and from a bed to a chair (including a wheelchair)? 3  -MB 3  -TL    Standing up from a chair using your arms (e.g., wheelchair, bedside chair)? 3  -MB 3  -TL    Climbing 3-5 steps with a railing? 3  -MB 2  -TL    To walk in hospital room? 3  -MB 3  -TL    AM-PAC 6 Clicks Score (PT) 20  -MB 19  -TL    Highest Level of Mobility Goal 6 --> Walk 10 steps or more  -MB 6 --> Walk 10 steps or more  -TL      Row Name 01/12/24 1549          Functional Assessment    Outcome Measure Options AM-PAC 6 Clicks Daily Activity (OT)  -               User Key  (r) = Recorded By, (t) = Taken By, (c) = Cosigned By      Initials Name Provider Type    TL Andrea Baez RN Registered Nurse    Israel Roy OT Occupational Therapist    Bradley Low, PT Physical Therapist                    Occupational Therapy Education       Title: PT OT SLP Therapies (Done)       Topic: Occupational Therapy (Done)       Point: ADL training (Done)       Description:   Instruct learner(s) on proper safety adaptation and remediation techniques during self care or transfers.   Instruct in proper use of assistive devices.                  Learning Progress Summary             Patient Acceptance, E,TB, VU by  at 1/12/2024 1550                                         User Key       Initials Effective Dates Name Provider Type Discipline     09/22/22 -  Israel Segura OT Occupational Therapist OT                  OT Recommendation and Plan  Planned Therapy Interventions (OT): activity tolerance training, BADL retraining, functional balance retraining, IADL retraining, occupation/activity based interventions, ROM/therapeutic exercise, strengthening exercise, transfer/mobility retraining, patient/caregiver education/training  Therapy Frequency (OT): 3 times/wk  Plan of Care Review  Plan of Care Reviewed With: patient, daughter  Outcome Evaluation: 89 y.o. female who resides at  [place] [facility] with PMH of sick sinus syndrome status post permanent dual-chamber pacemaker, bioprosthetic aortic valve placement in 2014, coronary artery disease with prior bypass surgery, bilateral renal artery stenosis, lipidemia, paroxysmal A-fib, subdural hematoma, iron deficiency anemia, hypothyroidism.  Status post watchman implant 12/26/2023.  Presented to the hospital for 1/11/24, and was admitted with a principal diagnosis of Hypoxic respiratory failure. At baseline, pt performs ADLs without assistance, uses a rollator when walking distances. However when in apartment, she typically walks w/o AD. She goes to the dining room for all meals. This date pt Mod I for bed mobility and CGA to come to stand and ambulate in room. Pt on 2 liters O2 at 97%, but desatted to 91% ambulating in room. Recovery back to 97% within 60 seconds of resting with 2 liters replaced. CGA needed for lower body dressing and toileting. Pt appears slightly below baseline, thus OT will follow and recommends home health therapy upon dc.     Time Calculation:         Time Calculation- OT       Row Name 01/12/24 1550             Time Calculation- OT    OT Start Time 1314  -      OT Stop Time 1339  -      OT Time Calculation (min) 25 min  -      OT Received On 01/12/24  -      OT - Next Appointment 01/15/24  -      OT Goal Re-Cert Due Date 01/26/24  -         Untimed Charges    OT Eval/Re-eval Minutes 25  -LS         Total Minutes    Untimed Charges Total Minutes 25  -LS       Total Minutes 25  -LS                User Key  (r) = Recorded By, (t) = Taken By, (c) = Cosigned By      Initials Name Provider Type     Israel Segura OT Occupational Therapist                  Therapy Charges for Today       Code Description Service Date Service Provider Modifiers Qty    [id number]  OT EVAL MOD COMPLEXITY 4 1/12/2024 Israel Segura OT GO 1                 Israel Segura OT  1/12/2024

## 2024-01-12 NOTE — PLAN OF CARE
Goal Outcome Evaluation:  Plan of Care Reviewed With: patient, daughter           Outcome Evaluation: 89 y.o. female who resides at [facility] with PMH of sick sinus syndrome status post permanent dual-chamber pacemaker, bioprosthetic aortic valve placement in 2014, coronary artery disease with prior bypass surgery, bilateral renal artery stenosis, lipidemia, paroxysmal A-fib, subdural hematoma, iron deficiency anemia, hypothyroidism.  Status post watchman implant 12/26/2023.  Presented to the hospital for 1/11/24, and was admitted with a principal diagnosis of Hypoxic respiratory failure. At baseline, pt performs ADLs without assistance, uses a rollator when walking distances. However when in apartment, she typically walks w/o AD. She goes to the dining room for all meals. This date pt Mod I for bed mobility and CGA to come to stand and ambulate in room. Pt on 2 liters O2 at 97%, but desatted to 91% ambulating in room. Recovery back to 97% within 60 seconds of resting with 2 liters replaced. CGA needed for lower body dressing and toileting. Pt appears slightly below baseline, thus OT will follow and recommends home health therapy upon dc.      Anticipated Discharge Disposition (OT): home with home health

## 2024-01-12 NOTE — THERAPY EVALUATION
Patient Name: Tracy Jane  : 1934    MRN: [id number]                              Today's Date: 2024       Admit Date: 2024    Visit Dx:     ICD-10-CM ICD-9-CM   1. Congestive heart failure, unspecified HF chronicity, unspecified heart failure type  I50.9 428.0   2. Dyspnea, unspecified type  R06.00 786.09   3. Hypercapnia  R06.89 786.09   4. Fever and chills  R50.9 780.60   5. Resistant hypertension  I1A.0 401.9     Patient Active Problem List   Diagnosis    Abnormal cardiovascular stress test    Abnormal EKG    Abnormal levels of other serum enzymes    Anemia    Anxiety disorder    Aortic insufficiency    Aortic stenosis    Arthritis, rheumatoid    Asthma    Chronic coronary artery disease    Chronic kidney disease, stage III (moderate)    Depression    Cough    Edema of lower extremity    Encounter for therapeutic drug level monitoring    Excessive anticoagulation    Fatigue    Former smoker    GERD without esophagitis    Hemoptysis    High alkaline phosphatase    Hx of aortic valve replacement    Hyperglycemia    Mixed hyperlipidemia    Essential hypertension    Hyponatremia    Acquired hypothyroidism    Influenza    Nonspecific abnormal results of function study of liver    Osteoarthritis of knee    Other peripheral vertigo, unspecified ear    Paroxysmal atrial fibrillation    Peripheral vascular disease    Presence of aortocoronary bypass graft    Presence of cardiac pacemaker    Renal insufficiency    Shortness of breath    Sick sinus syndrome    Sore throat    Valvular heart disease    Elevated INR    Medication induced coagulopathy    COVID    PAF (paroxysmal atrial fibrillation)    Presence of Watchman left atrial appendage closure device    Hypoxic respiratory failure     Past Medical History:   Diagnosis Date    Anxiety     Asthma     Atrial fibrillation     CAD (coronary artery disease)     Carotid artery disease     GERD (gastroesophageal reflux disease)     Gout      Hyperlipidemia     Hypertension     Hyperthyroidism     Hypothyroidism      Past Surgical History:   Procedure Laterality Date    ATRIAL APPENDAGE EXCLUSION LEFT WITH TRANSESOPHAGEAL ECHOCARDIOGRAM Right 12/26/2023    Procedure: Atrial Appendage Occlusion;  Surgeon: Pk Crabtree MD;  Location: [facility] CATH INVASIVE LOCATION;  Service: Cardiovascular;  Laterality: Right;    ATRIAL APPENDAGE EXCLUSION LEFT WITH TRANSESOPHAGEAL ECHOCARDIOGRAM N/A 12/26/2023    Procedure: Atrial Appendage Occlusion;  Surgeon: Gen Caballero MD;  Location: [facility] CATH INVASIVE LOCATION;  Service: Cardiovascular;  Laterality: N/A;    BASAL CELL CARCINOMA EXCISION      spine, nose and arm, leg 2020    BREAST BIOPSY      CARDIAC CATHETERIZATION      CAROTID STENT      CATARACT EXTRACTION      CHOLECYSTECTOMY      CORONARY ARTERY BYPASS GRAFT      CORONARY STENT PLACEMENT      ENDOSCOPY N/A 04/24/2023    Procedure: ESOPHAGOGASTRODUODENOSCOPY with antrum body biopsies;  Surgeon: REGGIE Ace MD;  Location: [facility] ENDOSCOPY;  Service: Gastroenterology;  Laterality: N/A;  hiatal hernia    FINGER SURGERY      KNEE SURGERY      PACEMAKER IMPLANTATION      SHOULDER SURGERY      RACHEL Bilateral 11/03/2023      General Information       Row Name 01/12/24 1352          Physical Therapy Time and Intention    Document Type evaluation  -MB     Mode of Treatment physical therapy  -MB       Row Name 01/12/24 1352          General Information    Patient Profile Reviewed yes  -MB     Prior Level of Function independent:;all household mobility;community mobility;gait;transfer;ADL's;max assist:;home management  -MB     Existing Precautions/Restrictions oxygen therapy device and L/min;no known precautions/restrictions  2L O2 currently, no O2 at baseline  -MB     Barriers to Rehab none identified  -MB       Row Name 01/12/24 1352          Living Environment    People in Home facility resident  -MB       Row Name 01/12/24 1352          Home  Main Entrance    Number of Stairs, Main Entrance none  -MB       Row Name 01/12/24 1352          Stairs Within Home, Primary    Number of Stairs, Within Home, Primary none  -MB       Row Name 01/12/24 1352          Cognition    Orientation Status (Cognition) oriented x 4  -MB       Row Name 01/12/24 1352          Safety Issues, Functional Mobility    Impairments Affecting Function (Mobility) shortness of breath;endurance/activity tolerance;strength;balance  -MB               User Key  (r) = Recorded By, (t) = Taken By, (c) = Cosigned By      Initials Name Provider Type    Bradley Low PT Physical Therapist                   Mobility       Row Name 01/12/24 1354          Bed Mobility    Bed Mobility bed mobility (all) activities  -MB     All Activities, [place] (Bed Mobility) independent  -MB       Row Name 01/12/24 1354          Bed-Chair Transfer    Bed-Chair [place] (Transfers) contact guard  -MB     Comment, (Bed-Chair Transfer) HHAx1  -MB       Row Name 01/12/24 1354          Sit-Stand Transfer    Sit-Stand [place] (Transfers) contact guard  -MB     Comment, (Sit-Stand Transfer) HHAx1  -MB       Row Name 01/12/24 1354          Gait/Stairs (Locomotion)    [place] Level (Gait) contact guard  -MB     Patient was able to Ambulate yes  -MB     Distance in Feet (Gait) 20  -MB     Deviations/Abnormal Patterns (Gait) gait speed decreased;artis decreased;stride length decreased;weight shifting decreased  -MB     Comment, (Gait/Stairs) 20' CGA with no AD, slow artis and step length  -MB               User Key  (r) = Recorded By, (t) = Taken By, (c) = Cosigned By      Initials Name Provider Type    Bradley Low PT Physical Therapist                   Obj/Interventions       Row Name 01/12/24 1357          Range of Motion Comprehensive    General Range of Motion no range of motion deficits identified  -MB       Row Name 01/12/24 1357          Strength Comprehensive (MMT)    Comment, General  Manual Muscle Testing (MMT) Assessment BLE Strength 3+/5 grossly  -MB       Row Name 01/12/24 1849          Balance    Balance Assessment sitting static balance;sitting dynamic balance;sit to stand dynamic balance;standing static balance;standing dynamic balance  -MB     Static Sitting Balance independent  -MB     Dynamic Sitting Balance standby assist  -MB     Position, Sitting Balance unsupported;sitting edge of bed  -MB     Sit to Stand Dynamic Balance standby assist  -MB     Static Standing Balance contact guard  -MB     Dynamic Standing Balance contact guard  -MB       Row Name 01/12/24 1358          Sensory Assessment (Somatosensory)    Sensory Assessment (Somatosensory) sensation intact  -MB               User Key  (r) = Recorded By, (t) = Taken By, (c) = Cosigned By      Initials Name Provider Type    Bradley Low, PT Physical Therapist                   Goals/Plan       Row Name 01/12/24 1401          Bed Mobility Goal 1 (PT)    Activity/Assistive Device (Bed Mobility Goal 1, PT) bed mobility activities, all  -MB     [place] Level/Cues Needed (Bed Mobility Goal 1, PT) independent  -MB     Time Frame (Bed Mobility Goal 1, PT) long term goal (LTG);2 weeks  -MB       Row Name 01/12/24 1401          Transfer Goal 1 (PT)    Activity/Assistive Device (Transfer Goal 1, PT) transfers, all;walker, rolling  -MB     [place] Level/Cues Needed (Transfer Goal 1, PT) independent  -MB     Time Frame (Transfer Goal 1, PT) long term goal (LTG);2 weeks  -MB       Row Name 01/12/24 1401          Gait Training Goal 1 (PT)    Activity/Assistive Device (Gait Training Goal 1, PT) gait (walking locomotion);walker, rolling  -MB     [place] Level (Gait Training Goal 1, PT) standby assist  -MB     Distance (Gait Training Goal 1, PT) 150  -MB     Time Frame (Gait Training Goal 1, PT) long term goal (LTG);2 weeks  -MB       Row Name 01/12/24 1401          Therapy Assessment/Plan (PT)    Planned Therapy Interventions  (PT) balance training;bed mobility training;gait training;home exercise program;neuromuscular re-education;patient/family education;strengthening;transfer training  -MB               User Key  (r) = Recorded By, (t) = Taken By, (c) = Cosigned By      Initials Name Provider Type    Bradley Low, PT Physical Therapist                   Clinical Impression       Row Name 01/12/24 1400          Pain    Pretreatment Pain Rating 0/10 - no pain  -MB     Posttreatment Pain Rating 0/10 - no pain  -MB       Row Name 01/12/24 1410 01/12/24 1400       Plan of Care Review    Plan of Care Reviewed With -- patient;daughter  -MB    Progress -- improving  -MB    Outcome Evaluation Pt is a 88 y/o F admitted to [facility] on 1/11/24 from [facility] facility with complaints of worsening SOA with O2 sats reading 80s with EMS. She had gone to PCP, diagnosing her with PNA, but symptoms had worsened over the last day. Respiratory panel (-), XR Chest (+) for pulmonary edema pattern with small bilateral pleural effusions. Diagnosed with acute hypoxic respiratory failure, now on 4L O2. She is currently living at [facility] and reports that the facility only provides assist with meals and cleaning every 2 weeks. Pt is transitioning over to [facility] within 30 days for increased assistance. At baseline, she is normally IND with facility mobility with use of rollator. This date, she is AAOx4 and lying supine in bed reading 98% on 2L. She completes bed mobility IND, transfers CGA, and amb 20' CGA with no AD. Pt tachycardic with activity this date with HR reaching 128bpm, correlated with dropping O2 sats. Seems to be a relationship between anxiety and SOA. She is incontinent with positional changes, req changing of brief upon standing. Ambulates well with HHA this date, but balance likely improved with addition of RW. She seems to be functioning close to her baseline and should be safe to d/c back to [place] when  medically cleared. PT will follow during stay to promote mobility and assess desaturation with activity.  -MB --      Row Name 01/12/24 1400          Therapy Assessment/Plan (PT)    Rehab Potential (PT) good, to achieve stated therapy goals  -MB     Criteria for Skilled Interventions Met (PT) yes;meets criteria  -MB     Therapy Frequency (PT) 3 times/wk  -MB     Predicted Duration of Therapy Intervention (PT) until d/c  -MB       Row Name 01/12/24 1400          Vital Signs    Pretreatment Heart Rate (beats/min) 100  -MB     Intratreatment Heart Rate (beats/min) 128  -MB     Posttreatment Heart Rate (beats/min) 104  -MB     Pre SpO2 (%) 100  -MB     O2 Delivery Pre Treatment supplemental O2  2L  -MB     Intra SpO2 (%) 92  -MB     O2 Delivery Intra Treatment room air  -MB     Post SpO2 (%) 98  -MB     O2 Delivery Post Treatment supplemental O2  2L  -MB     Pre Patient Position Supine  -MB     Intra Patient Position Standing  -MB     Post Patient Position Supine  -MB       Row Name 01/12/24 1400          Positioning and Restraints    Pre-Treatment Position in bed  -MB     Post Treatment Position bed  -MB     In Bed notified nsg;call light within reach;encouraged to call for assist;supine;with family/caregiver  -MB               User Key  (r) = Recorded By, (t) = Taken By, (c) = Cosigned By      Initials Name Provider Type    Bradley Low, PT Physical Therapist                   Outcome Measures       Row Name 01/12/24 1401 01/12/24 0828       How much help from another person do you currently need...    Turning from your back to your side while in flat bed without using bedrails? 4  -MB 4  -TL    Moving from lying on back to sitting on the side of a flat bed without bedrails? 4  -MB 4  -TL    Moving to and from a bed to a chair (including a wheelchair)? 3  -MB 3  -TL    Standing up from a chair using your arms (e.g., wheelchair, bedside chair)? 3  -MB 3  -TL    Climbing 3-5 steps with a railing? 3  -MB 2  -TL    To  walk in hospital room? 3  -MB 3  -TL    AM-PAC 6 Clicks Score (PT) 20  -MB 19  -TL    Highest Level of Mobility Goal 6 --> Walk 10 steps or more  -MB 6 --> Walk 10 steps or more  -TL              User Key  (r) = Recorded By, (t) = Taken By, (c) = Cosigned By      Initials Name Provider Type    TL Andrea Baez RN Registered Nurse    Bradley Low, PT Physical Therapist                                 Physical Therapy Education       Title: PT OT SLP Therapies (Done)       Topic: Physical Therapy (Done)       Point: Mobility training (Done)       Learning Progress Summary             Patient Acceptance, E,TB, VU by MB at 1/12/2024 1401                         Point: Body mechanics (Done)       Learning Progress Summary             Patient Acceptance, E,TB, VU by MB at 1/12/2024 1401                         Point: Precautions (Done)       Learning Progress Summary             Patient Acceptance, E,TB, VU by MB at 1/12/2024 1401                                         User Key       Initials Effective Dates Name Provider Type Discipline    MB 06/06/23 -  Bradley Haynes, PT Physical Therapist PT                  PT Recommendation and Plan  Planned Therapy Interventions (PT): balance training, bed mobility training, gait training, home exercise program, neuromuscular re-education, patient/family education, strengthening, transfer training  Plan of Care Reviewed With: patient, daughter  Progress: improving  Outcome Evaluation: Pt is a 88 y/o F admitted to [facility] on 1/11/24 from [facility] with complaints of worsening SOA with O2 sats reading 80s with EMS. She had gone to PCP, diagnosing her with PNA, but symptoms had worsened over the last day. Respiratory panel (-), XR Chest (+) for pulmonary edema pattern with small bilateral pleural effusions. Diagnosed with acute hypoxic respiratory failure, now on 4L O2. She is currently living at [facility] and reports that the facility only provides  assist with meals and cleaning every 2 weeks. Pt is transitioning over to [facility] within 30 days for increased assistance. At baseline, she is normally IND with facility mobility with use of rollator. This date, she is AAOx4 and lying supine in bed reading 98% on 2L. She completes bed mobility IND, transfers CGA, and amb 20' CGA with no AD. Pt tachycardic with activity this date with HR reaching 128bpm, correlated with dropping O2 sats. Seems to be a relationship between anxiety and SOA. She is incontinent with positional changes, req changing of brief upon standing. Ambulates well with HHA this date, but balance likely improved with addition of RW. She seems to be functioning close to her baseline and should be safe to d/c back to [place] when medically cleared. PT will follow during stay to promote mobility and assess desaturation with activity.     Time Calculation:   PT Evaluation Complexity  History, PT Evaluation Complexity: 1-2 personal factors and/or comorbidities  Examination of Body Systems (PT Eval Complexity): total of 3 or more elements  Clinical Presentation (PT Evaluation Complexity): evolving  Clinical Decision Making (PT Evaluation Complexity): moderate complexity  Overall Complexity (PT Evaluation Complexity): moderate complexity     PT Charges       Row Name 01/12/24 1402             Time Calculation    Start Time 1010  -MB      Stop Time 1041  -MB      Time Calculation (min) 31 min  -MB      PT Received On 01/12/24  -MB      PT - Next Appointment 01/15/24  -MB      PT Goal Re-Cert Due Date 01/26/24  -MB         Time Calculation- PT    Total Timed Code Minutes- PT 0 minute(s)  -MB                User Key  (r) = Recorded By, (t) = Taken By, (c) = Cosigned By      Initials Name Provider Type    Bradley Low, PT Physical Therapist                  Therapy Charges for Today       Code Description Service Date Service Provider Modifiers Qty    79054791819 HC PT EVAL MOD COMPLEXITY 4  1/12/2024 Bradley Haynes, PT GP 1            PT G-Codes  AM-PAC 6 Clicks Score (PT): 20  PT Discharge Summary  Anticipated Discharge Disposition (PT): home    Bradley Haynes, PT  1/12/2024

## 2024-01-12 NOTE — CASE MANAGEMENT/SOCIAL WORK
Discharge Planning Assessment  [facility]     Patient Name: Tracy Jane  MRN: [id number]  Today's Date: 1/12/2024    Admit Date: 1/11/2024    Plan: Return to [facility]   Discharge Needs Assessment       Row Name 01/12/24 1050       Living Environment    People in Home alone    Current Living Arrangements independent living facility  [facility]    Primary Care Provided by self;child(aria)    Provides Primary Care For no one    Family Caregiver if Needed child(aria), adult    Quality of Family Relationships helpful;involved;supportive    Able to Return to Prior Arrangements yes       Resource/Environmental Concerns    Transportation Concerns none       Transition Planning    Patient/Family Anticipates Transition to other (see comments)  Return to [facility] Indepenent living       Discharge Needs Assessment    Equipment Currently Used at Home rollator;cane, straight    Concerns to be Addressed discharge planning                   Discharge Plan       Row Name 01/12/24 1052       Plan    Plan Return to [facility]    Plan Comments Spoke with patient and deep Schmidt at bedside, lives at [facility]. Spoke with PT at bedside, states safe to return to independent level of care. Denies transportation or medication coverage issues. PCP and pharmacy confirmed. DC Barrier:IVAB, monitor need for oxygen                  Continued Care and Services - Admitted Since 1/11/2024    Coordination has not been started for this encounter.       Expected Discharge Date and Time       Expected Discharge Date Expected Discharge Time    Jan 14, 2024            Demographic Summary       Row Name 01/12/24 1049       General Information    Admission Type inpatient    Arrived From home    Required Notices Provided Important Message from Medicare    Referral Source patient;family    Reason for Consult discharge planning    Preferred Language English                    Functional Status       Row Name 01/12/24 1050       Functional Status    Usual Activity Tolerance good    Current Activity Tolerance good       Functional Status, IADL    Medications independent    Meal Preparation assistive equipment    Housekeeping assistive equipment    Laundry assistive equipment    Shopping assistive equipment       Mental Status    General Appearance WDL WDL                Patient Forms       Row Name 01/12/24 1044       Patient Forms    Important Message from Medicare (Formerly [facility]) Delivered  1/11  Regisgtration           Met with patient in room wearing PPE: mask, face shield/goggles, gloves, gown.      Maintained distance greater than six feet and spent less than 15 minutes in the room.     Alexa Peter RN

## 2024-01-12 NOTE — CASE MANAGEMENT/SOCIAL WORK
Case Management Readmission Assessment Note    Case Management Readmission Assessment (all recorded)       Readmission Interview       [facility] Name 01/12/24 1046             Readmission Indications    Is this hospitalization related to the prior hospital diagnosis? No      What was the reason you were admitted? Resp.Failure        [facility] Name 01/12/24 1046             Recommendation for rehospitalization    Did you speak with your physician prior to coming to the hospital Yes  EMS called      Who recommended you return to the hospital? Other (comment)  family called EMS      Did you seek care elsewhere prior to coming to the hospital? No        [facility] Name 01/12/24 1046             Follow up appointment    Do you have a PCP? Yes      Did you have an appointment with PCP/specialist after hospitalization within 7 days? Yes      Did you keep your appointment? Yes        Row Name 01/12/24 1046             Medications    Did you have newly prescribed medications at discharge? Yes      Did you understand the reasons for your medications at discharge and how to take them? Yes      Did you understand the side effects of your medications? Yes        Row Name 01/12/24 1046             Discharge Instructions    Did you understand your discharge instructions? Yes      Did your family/caregiver hear your instructions? Yes      Were you told to eat a special diet? Yes      Did you adhere to the diet? Yes      Were you given a number of someone to call if you had questions or concerns? Yes        [facility] Name 01/12/24 1046             Index discharge location/services    Where did you go upon discharge? Other (comment)  Independent living [facility]        [facility] Name 01/12/24 1046             Discharge Readiness    On a scale of 1-5 (5 being well prepared), how ready were you for discharge 4      Recommendation based on interview Education on diagnosis/self management

## 2024-01-12 NOTE — ED PROVIDER NOTES
Subjective   History of Present Illness  Patient is an 89-year-old female who comes in from the extended care facility with worsening shortness of breath she was diagnosed with pneumonia yesterday-via and [facility].  Today she got worse and when EMS arrived they states she was tripoding and had an oxygen saturation in the 80s.          Review of Systems   Constitutional:  Negative for chills, fatigue and fever.   HENT:  Negative for congestion, tinnitus and trouble swallowing.    Eyes:  Negative for photophobia, discharge and redness.   Respiratory:  Positive for shortness of breath and wheezing. Negative for cough.    Cardiovascular:  Negative for chest pain and palpitations.   Gastrointestinal:  Negative for abdominal pain, diarrhea, nausea and vomiting.   Genitourinary:  Negative for dysuria, frequency and urgency.   Musculoskeletal:  Negative for back pain, joint swelling and myalgias.   Skin:  Negative for rash.   Neurological:  Negative for dizziness and headaches.   Psychiatric/Behavioral:  Negative for confusion. The patient is nervous/anxious.    All other systems reviewed and are negative.      Past Medical History:   Diagnosis Date    Anxiety     Asthma     Atrial fibrillation     CAD (coronary artery disease)     Carotid artery disease     GERD (gastroesophageal reflux disease)     Gout     Hyperlipidemia     Hypertension     Hyperthyroidism     Hypothyroidism        No Known Allergies    Past Surgical History:   Procedure Laterality Date    ATRIAL APPENDAGE EXCLUSION LEFT WITH TRANSESOPHAGEAL ECHOCARDIOGRAM Right 12/26/2023    Procedure: Atrial Appendage Occlusion;  Surgeon: Pk Crabtree MD;  Location: [facility] INVASIVE LOCATION;  Service: Cardiovascular;  Laterality: Right;    ATRIAL APPENDAGE EXCLUSION LEFT WITH TRANSESOPHAGEAL ECHOCARDIOGRAM N/A 12/26/2023    Procedure: Atrial Appendage Occlusion;  Surgeon: Gen Caballero MD;  Location: [facility]  INVASIVE LOCATION;  Service: Cardiovascular;  Laterality: N/A;    BASAL CELL CARCINOMA EXCISION      spine, nose and arm, leg     BREAST BIOPSY      CARDIAC CATHETERIZATION      CAROTID STENT      CATARACT EXTRACTION      CHOLECYSTECTOMY      CORONARY ARTERY BYPASS GRAFT      CORONARY STENT PLACEMENT      ENDOSCOPY N/A 2023    Procedure: ESOPHAGOGASTRODUODENOSCOPY with antrum body biopsies;  Surgeon: REGGIE Ace MD;  Location: [facility] ENDOSCOPY;  Service: Gastroenterology;  Laterality: N/A;  hiatal hernia    FINGER SURGERY      KNEE SURGERY      PACEMAKER IMPLANTATION      SHOULDER SURGERY      RACHEL Bilateral 2023       Family History   Problem Relation Age of Onset    Hypertension Mother     Heart disease Father     Heart disease Sister     Kidney cancer Sister     Stroke Sister     Cancer Sister         breast    Cancer Sister     Heart disease Brother        Social History     Socioeconomic History    Marital status:     Number of children: 4    Years of education: GED   Tobacco Use    Smoking status: Former     Packs/day: 1.00     Years: 4.00     Additional pack years: 0.00     Total pack years: 4.00     Types: Cigarettes     Quit date:      Years since quittin.0    Smokeless tobacco: Never   Vaping Use    Vaping Use: Never used   Substance and Sexual Activity    Alcohol use: No    Drug use: No    Sexual activity: Defer           Objective   Physical Exam  Vitals reviewed.   Constitutional:       Appearance: She is well-developed and normal weight. She is ill-appearing.   HENT:      Head: Normocephalic and atraumatic.   Eyes:      Conjunctiva/sclera: Conjunctivae normal.      Pupils: Pupils are equal, round, and reactive to light.   Neck:      Vascular: No JVD.   Cardiovascular:      Rate and Rhythm: Normal rate and regular rhythm.      Heart sounds: Normal heart sounds.   Pulmonary:      Effort: Pulmonary effort is normal. No respiratory distress.      Breath sounds:  Examination of the right-upper field reveals decreased breath sounds and wheezing. Examination of the left-upper field reveals decreased breath sounds and wheezing. Examination of the right-lower field reveals decreased breath sounds and wheezing. Examination of the left-lower field reveals decreased breath sounds and wheezing. Decreased breath sounds and wheezing present.   Abdominal:      General: Bowel sounds are normal. There is no distension.      Palpations: Abdomen is soft. There is no mass.      Tenderness: There is no abdominal tenderness. There is no guarding or rebound.   Musculoskeletal:         General: No deformity. Normal range of motion.      Cervical back: Normal range of motion and neck supple.      Right lower leg: No tenderness. No edema.      Left lower leg: No tenderness. No edema.   Skin:     General: Skin is warm and dry.      Capillary Refill: Capillary refill takes 2 to 3 seconds.   Neurological:      General: No focal deficit present.      Mental Status: She is alert and oriented to person, place, and time.      GCS: GCS eye subscore is 4. GCS verbal subscore is 5. GCS motor subscore is 6.      Cranial Nerves: No cranial nerve deficit.      Sensory: No sensory deficit.      Deep Tendon Reflexes: Reflexes normal.   Psychiatric:         Mood and Affect: Mood normal.         Behavior: Behavior normal.         Procedures         EKG was interpreted by myself as well as Dr. Saenz as atrial fibs with a rate of 95 the left bundle branch block which was compared to the previous and shows no acute changes  ED Course  ED Course as of 01/11/24 2351   Thu Jan 11, 2024 2244 ABG reviewed and the patient is slightly acidotic at 7.3 and has a pCO2 of 54.5 she does have a lot of wheezing and the respiratory therapist suggested doing 2 more DuoNebs which were ordered at this time [KW]   2245 NTG  gtt ordered to help with BP control [KW]   2305 Rectal temp was found to be 100.2 patient was given Tylenol  "and treated with antibiotics per the sepsis protocol [KW]   2309 Nitroglycerin drip has nicely controlled her blood pressure and will be back down at this time [KW]   2336 Blood pressure 145 systolic at this time and asked nursing staff to stop Nitroglycerin gtt.  [KW]   2336 Patient is resting comfortably feeling much better holding her oxygen saturation at 93% [KW]   2342 Spoke with Deborah the nurse practitioner who agreed to admit for Dr. Ellis [KW]      ED Course User Index  [KW] Geovanna Uriostegui, APRN                                 /69   Pulse 95   Temp 100.2 °F (37.9 °C) (Rectal)   Resp 22   Ht 152.4 cm (60\")   Wt 58.5 kg (129 lb)   SpO2 95%   BMI 25.19 kg/m²   Labs Reviewed   COMPREHENSIVE METABOLIC PANEL - Abnormal; Notable for the following components:       Result Value    Glucose 177 (*)     AST (SGOT) 46 (*)     Alkaline Phosphatase 118 (*)     BUN/Creatinine Ratio 27.6 (*)     All other components within normal limits    Narrative:     GFR Normal >60  Chronic Kidney Disease <60  Kidney Failure <15    The GFR formula is only valid for adults with stable renal function between ages 18 and 70.   BNP (IN-HOUSE) - Abnormal; Notable for the following components:    proBNP 5,057.0 (*)     All other components within normal limits    Narrative:     This assay is used as an aid in the diagnosis of individuals suspected of having heart failure. It can be used as an aid in the diagnosis of acute decompensated heart failure (ADHF) in patients presenting with signs and symptoms of ADHF to the emergency department (ED). In addition, NT-proBNP of <300 pg/mL indicates ADHF is not likely.    Age Range Result Interpretation  NT-proBNP Concentration (pg/mL:      <50             Positive            >450                   Gray                 300-450                    Negative             <300    50-75           Positive            >900                  Gray                300-900                  " Negative            <300      >75             Positive            >1800                  Gray                300-1800                  Negative            <300   TROPONIN - Abnormal; Notable for the following components:    HS Troponin T 17 (*)     All other components within normal limits    Narrative:     High Sensitive Troponin T Reference Range:  <14.0 ng/L- Negative Female for AMI  <22.0 ng/L- Negative Male for AMI  >=14 - Abnormal Female indicating possible myocardial injury.  >=22 - Abnormal Male indicating possible myocardial injury.   Clinicians would have to utilize clinical acumen, EKG, Troponin, and serial changes to determine if it is an Acute Myocardial Infarction or myocardial injury due to an underlying chronic condition.        CBC WITH AUTO DIFFERENTIAL - Abnormal; Notable for the following components:    WBC 11.20 (*)     RBC 3.14 (*)     Hemoglobin 10.0 (*)     Hematocrit 31.3 (*)     MCV 99.8 (*)     RDW 18.1 (*)     RDW-SD 62.1 (*)     Neutrophil % 80.6 (*)     Lymphocyte % 12.9 (*)     Monocyte % 4.6 (*)     Neutrophils, Absolute 9.10 (*)     All other components within normal limits   BLOOD GAS, ARTERIAL - Abnormal; Notable for the following components:    pH, Arterial 7.300 (*)     pCO2, Arterial 54.5 (*)     Base Excess, Arterial -0.1 (*)     All other components within normal limits   RESPIRATORY PANEL PCR W/ COVID-19 (SARS-COV-2), NP SWAB IN UTM/VTP, 2 HR TAT - Normal    Narrative:     In the setting of a positive respiratory panel with a viral infection PLUS a negative procalcitonin without other underlying concern for bacterial infection, consider observing off antibiotics or discontinuation of antibiotics and continue supportive care. If the respiratory panel is positive for atypical bacterial infection (Bordetella pertussis, Chlamydophila pneumoniae, or Mycoplasma pneumoniae), consider antibiotic de-escalation to target atypical bacterial infection.   POC LACTATE - Normal   BLOOD  CULTURE   BLOOD CULTURE   BLOOD GAS, ARTERIAL   LACTIC ACID, PLASMA   HIGH SENSITIVITIY TROPONIN T 2HR   CBC WITH AUTO DIFFERENTIAL   BASIC METABOLIC PANEL   POC LACTATE   CBC AND DIFFERENTIAL    Narrative:     The following orders were created for panel order CBC & Differential.  Procedure                               Abnormality         Status                     ---------                               -----------         ------                     CBC Auto Differential[[id number]]        Abnormal            Final result                 Please view results for these tests on the individual orders.   EXTRA TUBES    Narrative:     The following orders were created for panel order Extra Tubes.  Procedure                               Abnormality         Status                     ---------                               -----------         ------                     Gold Top - SST[[id number]]                                   Final result               Light Blue Top[[id number]]                                   Final result                 Please view results for these tests on the individual orders.   GOLD TOP - SST   LIGHT BLUE TOP   EXTRA TUBES    Narrative:     The following orders were created for panel order Extra Tubes.  Procedure                               Abnormality         Status                     ---------                               -----------         ------                     Lavender Top[[id number]]                                     Final result               Green Top (Gel)[[id number]]                                  Final result                 Please view results for these tests on the individual orders.   LAVENDER TOP   GREEN TOP     Medications   sodium chloride 0.9 % flush 10 mL (has no administration in time range)   nitroglycerin (TRIDIL) 200 mcg/ml infusion (0 mcg/min Intravenous Stopped 1/11/24 1377)   cefepime 2 gm IVPB in 100 ml NS (MBP) (2,000 mg Intravenous New Bag 1/11/24 2342)    Vancomycin HCl 1,250 mg in sodium chloride 0.9 % 250 mL IVPB (1,250 mg Intravenous New Bag 1/11/24 2342)   sodium chloride 0.9 % flush 10 mL (has no administration in time range)   sodium chloride 0.9 % flush 10 mL (has no administration in time range)   sodium chloride 0.9 % infusion 40 mL (has no administration in time range)   sennosides-docusate (PERICOLACE) 8.6-50 MG per tablet 2 tablet (has no administration in time range)     And   polyethylene glycol (MIRALAX) packet 17 g (has no administration in time range)     And   bisacodyl (DULCOLAX) EC tablet 5 mg (has no administration in time range)     And   bisacodyl (DULCOLAX) suppository 10 mg (has no administration in time range)   Enoxaparin Sodium (LOVENOX) syringe 40 mg (has no administration in time range)   nitroglycerin (NITROSTAT) SL tablet 0.4 mg (has no administration in time range)   acetaminophen (TYLENOL) tablet 650 mg (has no administration in time range)   ondansetron ODT (ZOFRAN-ODT) disintegrating tablet 4 mg (has no administration in time range)   ipratropium-albuterol (DUO-NEB) nebulizer solution 3 mL (3 mL Nebulization Given by Other 1/11/24 2240)   ondansetron (ZOFRAN) injection 4 mg (4 mg Intravenous Given 1/11/24 2236)   methylPREDNISolone sodium succinate (SOLU-Medrol) injection 125 mg (125 mg Intravenous Given 1/11/24 2308)   ipratropium-albuterol (DUO-NEB) nebulizer solution 3 mL (3 mL Nebulization Given 1/11/24 2252)   ipratropium-albuterol (DUO-NEB) nebulizer solution 3 mL (3 mL Nebulization Given 1/11/24 2252)   acetaminophen (TYLENOL) tablet 1,000 mg (1,000 mg Oral Given 1/11/24 2344)   furosemide (LASIX) injection 80 mg (80 mg Intravenous Given 1/11/24 2345)     XR Chest 1 View    Result Date: 1/11/2024  Impression: Mild pulmonary edema pattern with small bilateral pleural effusions, new as compared to the previous study. Mild bibasilar atelectatic changes are present. Viral illness cannot be excluded. Electronically Signed:  Radha Ponce MD  1/11/2024 11:25 PM EST  Workstation ID: [id number]               Medical Decision Making  XRAY CHEST 2 VIEWS     Date of Exam: 1/10/2024 15:05 EST     Indication: Shortness of breath     Comparison: AP portable chest 11/6/2023     Findings:   Heart size is upper limits normal but stable with signs of prior median sternotomy, CABG and valve replacement. Left chest wall pacemaker and leads appear similarly positioned. Chronic interstitial markings are scattered within both lungs. Vague density is seen in the periphery of the left midlung superimposed upon the sixth and seventh posterior rib shadows.. Trace bibasilar pleural fluid is suspected. Left shoulder replacement changes are redemonstrated.       Impression:     1. Vague peripheral left midlung density may represent a small focus of pneumonia. Short-term radiographic follow-up to document improvement or resolution is suggested.   2. Trace bibasilar pleural effusions.       Electronically Signed: Yary Deal MD   1/10/2024 15:42 EST       Patient is a 89-year-old female who comes in from the extended care facility with shortness of breath and tripoding and hypoxia concerning for pneumonia CHF pulmonary emboli RSV COVID or influenza this is not an all-inclusive list.  Patient had IV established and blood work was obtained and reviewed and interpreted by myself as having a arterial blood gas of 7.30 pH and a pCO2 of 54.5 the patient was given 3 DuoNebs and was able to maintain her oxygen saturation on nasal cannula the white count was found to be normal lactic was 1.4 and chemistries were essentially normal.    The patient was found to have a BNP of 5057-chest x-ray was also obtained and reviewed and interpreted by myself as well as the radiologist as having pulmonary edema.    Patient had great improvement after DuoNebs Solu-Medrol Lasix and bring her blood pressure down clinically she is much improved she is on 4 to 5 L of nasal cannula satting  93% and is much relaxed and resting comfortably at this time she was discussed with Deborah who agreed to admit for Dr. Morel    Amount and/or Complexity of Data Reviewed  Independent Historian: caregiver     Details: Daughter at bedside  External Data Reviewed: labs, radiology, ECG and notes.  Labs: ordered. Decision-making details documented in ED Course.  Radiology: ordered and independent interpretation performed. Decision-making details documented in ED Course.  ECG/medicine tests: ordered and independent interpretation performed. Decision-making details documented in ED Course.    Risk  Prescription drug management.  Decision regarding hospitalization.        Final diagnoses:   Dyspnea, unspecified type   Hypercapnia   Congestive heart failure, unspecified HF chronicity, unspecified heart failure type   Fever and chills   Resistant hypertension       ED Disposition  ED Disposition       ED Disposition   Decision to Admit    Condition   --    Comment   Level of Care: Telemetry [5]   Diagnosis: Hypoxic respiratory failure [[id number]]   Admitting Physician: RAMSEY MOREL [[id number]]   Attending Physician: RAMSEY MOREL [[id number]]   Certification: I Certify That Inpatient Hospital Services Are Medically Necessary For Greater Than 2 Midnights                 No follow-up provider specified.       Medication List      No changes were made to your prescriptions during this visit.            Geovanna Uriostegui, APRN  01/11/24 2928

## 2024-01-12 NOTE — H&P
[facility] Medicine Services    Hospitalist History and Physical     Tracy Jane : 1934 MRN:[id number] LOS:0 ROOM:      Reason for admission: Hypoxic respiratory failure     Assessment / Plan     Acute hypoxic respiratory failure  -Consider  secondary to pneumonia and pulmonary edema  -Diagnosed via chest x-ray at outlying facility with pneumonia on 1/10/2024  -Follow-up chest x-ray on 2024 in ED per radiology mild pulmonary edema pattern with small bilateral pleural effusions new when compared to previous study.  Mild bibasilar atelectatic changes are present.  Viral illness cannot be excluded  -Viral panel negative   - new O2 need 4-5L high flow NC.    - Respiratory status improved with solumedrol and lasix  - duo nebs   - IS  - continue empiric antibiotics   -Will continue Lasix.  Patient previously on 40 of Lasix daily.  -proBNP 5057  -Echo on 2023; left ventricular systolic function normal.  Normal right ventricular cavity size and systolic function noted.  Left atrial cavity dilated.  Successful implantation of [employer] Watchman Flex 31 mm  -IS  -Will consult cardiology to continue to follow as patient recently had watchman's device placed last month patient was seen in office for follow-up on 2024      Paroxysmal A-fib  -Status post watchman's device    Coronary artery disease status post CABG  -Dual antiplatelet therapy, diltiazem and sotalol  -On Lasix and isosorbide    History of subdural hematoma/anemia  -Ferrous sulfate  - appears stable    Hypothyroidism  -synthroid     Dyslipidemia  -statin    RA  - on methotrexate     Depression  - on Zoloft    Weakness   - PT eval     Code Status (Patient has no pulse and is not breathing): CPR (Attempt to Resuscitate)  Medical Interventions (Patient has pulse or is breathing): Full Support       Nutrition:   Diet: Cardiac Diets; Healthy Heart (2-3 Na+); Texture: Regular Texture (IDDSI 7); Fluid Consistency: Thin  (IDDSI 0)     DVT prophylaxis:  Medical DVT prophylaxis orders are present.     History of Present illness     Tracy Jane is a 89 y.o. female who resides at [facility] with PMH of sick sinus syndrome status post permanent dual-chamber pacemaker, bioprosthetic aortic valve placement in 2014, coronary artery disease with prior bypass surgery, bilateral renal artery stenosis, lipidemia, paroxysmal A-fib, subdural hematoma, iron deficiency anemia, hypothyroidism.  Status post watchman implant 12/26/2023.  Presented to the hospital for 1/11/24, and was admitted with a principal diagnosis of Hypoxic respiratory failure.     Presented known from previous admission for watchman's device implantation 12/2023.  She presented  from extended care facility with worsening shortness of breath.  She was diagnosed with pneumonia via chest x-ray at [facility] radiology on 1/10/2024.  Today she was sent via EMS to the ED for tripoding with O2 sats in the 80s.  She was positive for wheeze upon presentation to the ED.  Initial ABG was pH 7.3 pCO2 54.5 O2 sat 96.3.  She was given Solu-Medrol and Lasix as chest x-ray    She presents to the ED from her independent living facility.  Daughter at bedside.  Patient has had repeated hospitalizations for COVID, fall with injury, watchman's device placement and feels has not had adequate time to fully recover from any hospitalization.  Patient with persistence weakness and shortness of breath lack of energy.  She had gone to see her primary care on Tuesday who ordered outpatient chest x-ray that showed positive for pneumonia.  She was started on oral antibiotic but only take 1 dose before became progressively more short of breath with chills and weakness and daughter brought her to the ED    In the ED workup positive for mild pulmonary edema and bilateral pleural effusion.  Respiratory status improved now on 4 to 5 L nasal cannula.  proBNP 5057 initial troponin  17.  Electrolytes within normal limits.  Kidney function stable.  Positive for leukocytosis with white count 11.2.  Hemoglobin stable at 10.  Rectal temperature 100.2.  Blood pressure 217/94 and admission now improved to 116/49 after receiving Lasix.  Was given empiric cefepime and vancomycin for treatment of previously diagnosed pneumonia.  Lactic 1.4.  Will consult cardiology and she is been admitted for treatment    Patient was seen and examined on 01/11/24 at 23:50 EST .    Subjective / Review of systems     Review of Systems   Constitutional:  Positive for activity change, chills and fatigue.   Respiratory:  Positive for cough and shortness of breath.    Musculoskeletal:  Positive for arthralgias.   Neurological:  Positive for weakness.          Past Medical/Surgical/Social/Family History & Allergies     Past Medical History:   Diagnosis Date    Anxiety     Asthma     Atrial fibrillation     CAD (coronary artery disease)     Carotid artery disease     GERD (gastroesophageal reflux disease)     Gout     Hyperlipidemia     Hypertension     Hyperthyroidism     Hypothyroidism       Past Surgical History:   Procedure Laterality Date    ATRIAL APPENDAGE EXCLUSION LEFT WITH TRANSESOPHAGEAL ECHOCARDIOGRAM Right 12/26/2023    Procedure: Atrial Appendage Occlusion;  Surgeon: Pk Crabtree MD;  Location: [facility] CATH INVASIVE LOCATION;  Service: Cardiovascular;  Laterality: Right;    ATRIAL APPENDAGE EXCLUSION LEFT WITH TRANSESOPHAGEAL ECHOCARDIOGRAM N/A 12/26/2023    Procedure: Atrial Appendage Occlusion;  Surgeon: Gen Caballero MD;  Location: [facility] CATH INVASIVE LOCATION;  Service: Cardiovascular;  Laterality: N/A;    BASAL CELL CARCINOMA EXCISION      spine, nose and arm, leg 2020    BREAST BIOPSY      CARDIAC CATHETERIZATION      CAROTID STENT      CATARACT EXTRACTION      CHOLECYSTECTOMY      CORONARY ARTERY BYPASS GRAFT      CORONARY STENT PLACEMENT      ENDOSCOPY N/A 04/24/2023    Procedure:  ESOPHAGOGASTRODUODENOSCOPY with antrum body biopsies;  Surgeon: REGGIE Ace MD;  Location: [facility] ENDOSCOPY;  Service: Gastroenterology;  Laterality: N/A;  hiatal hernia    FINGER SURGERY      KNEE SURGERY      PACEMAKER IMPLANTATION      SHOULDER SURGERY      RACHEL Bilateral 2023      Social History     Socioeconomic History    Marital status:     Number of children: 4    Years of education: GED   Tobacco Use    Smoking status: Former     Packs/day: 1.00     Years: 4.00     Additional pack years: 0.00     Total pack years: 4.00     Types: Cigarettes     Quit date:      Years since quittin.0    Smokeless tobacco: Never   Vaping Use    Vaping Use: Never used   Substance and Sexual Activity    Alcohol use: No    Drug use: No    Sexual activity: Defer      Family History   Problem Relation Age of Onset    Hypertension Mother     Heart disease Father     Heart disease Sister     Kidney cancer Sister     Stroke Sister     Cancer Sister         breast    Cancer Sister     Heart disease Brother       No Known Allergies   Social Determinants of Health     Tobacco Use: Medium Risk (2024)    Patient History     Smoking Tobacco Use: Former     Smokeless Tobacco Use: Never     Passive Exposure: Not on file   Alcohol Use: Not At Risk (2023)    AUDIT-C     Frequency of Alcohol Consumption: Never     Average Number of Drinks: Patient does not drink     Frequency of Binge Drinking: Never   Financial Resource Strain: Not on file   Food Insecurity: Not on file   Transportation Needs: Not on file   Physical Activity: Not on file   Stress: Not on file   Social Connections: Unknown (10/7/2023)    Family and Community Support     Help with Day-to-Day Activities: Not on file     Lonely or Isolated: Not on file   Interpersonal Safety: Not At Risk (2024)    Abuse Screen     Unsafe at Home or Work/School: no     Feels Threatened by Someone?: no     Does Anyone Keep You from Contacting Others or Doint  Things Outside the Home?: no     Physical Sign of Abuse Present: no   Depression: Not on file   Housing Stability: Not At Risk (12/26/2023)    Housing Stability     Current Living Arrangements: independent living facility     Potentially Unsafe Housing Conditions: none   Utilities: Not on file   Health Literacy: Unknown (11/6/2023)    Education     Help with school or training?: Not on file     Preferred Language: English   Employment: Unknown (10/7/2023)    Employment     Do you want help finding or keeping work or a job?: Not on file   Disabilities: At Risk (12/26/2023)    Disabilities     Concentrating, Remembering, or Making Decisions Difficulty: no     Doing Errands Independently Difficulty: yes        Home Medications     Prior to Admission medications    Medication Sig Start Date End Date Taking? Authorizing Provider   acetaminophen (TYLENOL) 650 MG 8 hr tablet Take 1 tablet by mouth Every 8 (Eight) Hours As Needed.    Cam Alvarez MD   aspirin 81 MG EC tablet Take 1 tablet by mouth Every Night. 6/25/14   Cam Alvarez MD   Calcium 200 MG tablet Take 1,200 mg by mouth Daily.    Cam Alvarez MD   Calcium Carbonate-Vit D-Min (CALCIUM 1200 PO) Take 1 tablet by mouth Daily.    Cam Alvarez MD   Cholecalciferol 25 MCG (1000 UT) tablet Take 1 tablet by mouth Daily.    Cam Alvarez MD   clopidogrel (PLAVIX) 75 MG tablet Take 1 tablet by mouth Daily. 12/27/23   Gen Caballero MD   colchicine 0.6 MG capsule capsule Take 1 capsule by mouth Every Night.    Cam Alvarez MD   COLLAGEN PO Take 20 g by mouth Daily.    Cam Alvarez MD   Cranberry 500 MG capsule Take 500 mg by mouth Every Night.    Cam Alvarez MD   dilTIAZem CD (CARDIZEM CD) 240 MG 24 hr capsule Take 1 capsule by mouth Daily.    Cam Alvarez MD   doxazosin (CARDURA) 2 MG tablet Take 1 tablet by mouth Daily.    Cam Alvarez MD   ferrous sulfate 324 (65 Fe) MG  tablet delayed-release EC tablet Take 1 tablet by mouth Daily With Breakfast.    Cam Alvarez MD   folic acid (FOLVITE) 1 MG tablet Take 1 tablet by mouth Daily.    Cam Alvarez MD   furosemide (LASIX) 40 MG tablet Take 1 tablet by mouth Daily.    Cam Alvarez MD   Glucosamine-Chondroit-Vit C-Mn (Glucosamine 1500 Complex) capsule Take 1,500 mg by mouth Daily.    Cam Alvarez MD   isosorbide mononitrate (IMDUR) 30 MG 24 hr tablet Take 1 tablet by mouth 2 (Two) Times a Day.    Cam Alvarez MD   levothyroxine (SYNTHROID, LEVOTHROID) 75 MCG tablet TAKE ONE TABLET BY MOUTH DAILY 5 DAYS A WEEK 12/29/23   Chika Paula MD   melatonin 5 MG sublingual tablet sublingual tablet Place  under the tongue.    Cam Alvarez MD   methotrexate 2.5 MG tablet Take 5 tablets by mouth Take As Directed. 5 tablets on Sunday Morning AND 5 tablets Sunday Evening = 10 Total Tablets on Sunday 4/6/23   Cam Alvarez MD   Methylsulfonylmethane (MSM) 1000 MG tablet Take 1 tablet by mouth 2 (Two) Times a Day.    Cam Alvarez MD   Multiple Vitamin (MULTIVITAMIN) tablet Take 1 tablet by mouth Daily.    Cam Alvarez MD   Omega-3 Fatty Acids (fish oil) 1200 MG capsule capsule Take 1 capsule by mouth 2 (Two) Times a Day With Meals.    Cam Alvarez MD   pantoprazole (PROTONIX) 40 MG EC tablet Take 1 tablet by mouth Daily.    Cam Alvarez MD   polyethylene glycol (MiraLax) 17 g packet Take 17 g by mouth Every Night.    Cam Alvarez MD   potassium chloride (K-DUR,KLOR-CON) 20 MEQ CR tablet Take 1 tablet by mouth 2 (Two) Times a Day.    Cam Alvarez MD   rosuvastatin (CRESTOR) 20 MG tablet Take 1 tablet by mouth Every Night.    Cam Alvarez MD   sertraline (ZOLOFT) 100 MG tablet Take 1 tablet by mouth Daily. 9/25/23   Cam Alvarez MD   sotalol (BETAPACE) 120 MG tablet Take 1 tablet by mouth 2 (Two) Times a Day.    Antonio  MD Cam   vitamin C (ASCORBIC ACID) 250 MG tablet Take 4 tablets by mouth Daily.    Provider, MD Cam   Zinc 50 MG tablet Take 1 tablet by mouth Every Night.    Provider, MD Cam        Objective / Physical Exam     Vital signs:  Temp: 100.2 °F (37.9 °C)  BP: 150/69  Heart Rate: 95  Resp: 22  SpO2: 95 %  Weight: 58.5 kg (129 lb)    Admission Weight: Weight: 58.5 kg (129 lb)    Physical Exam  Constitutional:       Appearance: Normal appearance.   Eyes:      Pupils: Pupils are equal, round, and reactive to light.   Cardiovascular:      Rate and Rhythm: Normal rate. Rhythm irregular.      Heart sounds: Murmur heard.   Pulmonary:      Breath sounds: Wheezing present.   Abdominal:      General: Abdomen is flat.      Palpations: Abdomen is soft.   Musculoskeletal:         General: Normal range of motion.   Skin:     General: Skin is warm and dry.   Neurological:      Mental Status: She is alert and oriented to person, place, and time.   Psychiatric:         Mood and Affect: Mood normal.            Labs     Results from last 7 days   Lab Units 01/11/24  2241   WBC 10*3/mm3 11.20*   HEMOGLOBIN g/dL 10.0*   HEMATOCRIT % 31.3*   PLATELETS 10*3/mm3 384      Results from last 7 days   Lab Units 01/11/24  2241   ALK PHOS U/L 118*   AST (SGOT) U/L 46*   ALT (SGPT) U/L 26           Results from last 7 days   Lab Units 01/11/24  2241   SODIUM mmol/L 140   POTASSIUM mmol/L 4.3   CHLORIDE mmol/L 100   CO2 mmol/L 27.0   BUN mg/dL 21   CREATININE mg/dL 0.76   GLUCOSE mg/dL 177*        Imaging     XR Chest 1 View    Result Date: 1/11/2024  XR CHEST 1 VW Date of Exam: 1/11/2024 11:18 PM EST Indication: soa Comparison: 1/10/2024. Findings: The heart appears mildly enlarged. Median sternotomy wires are present. There is a left subclavian ICD/pacemaker device. There is indistinctness of the pulmonary vasculature, new as compared to the previous study. Spine bilateral pleural effusions are present, new/progressed as  compared to the previous study. No significant consolidation. Mild atelectatic changes are present within the lung bases. No pneumothorax.     Impression: Mild pulmonary edema pattern with small bilateral pleural effusions, new as compared to the previous study. Mild bibasilar atelectatic changes are present. Viral illness cannot be excluded. Electronically Signed: Radha Ponce MD  1/11/2024 11:25 PM EST  Workstation ID: [id number]      ECG 12 Lead Dyspnea   Preliminary Result   HEART RATE= 95  bpm   RR Interval= 632  ms   TX Interval= 198  ms   P Horizontal Axis= 30  deg   P Front Axis= 206  deg   QRSD Interval= 152  ms   QT Interval= 420  ms   QTcB= 528  ms   QRS Axis= -27  deg   T Wave Axis= 181  deg   - ABNORMAL ECG -   Sinus or ectopic atrial rhythm   Left bundle branch block   ST elevation secondary to IVCD   Electronically Signed By:    Date and Time of Study: 2024-01-11 22:12:01           Current Medications     Scheduled Meds:  cefepime, 2,000 mg, Intravenous, Once  [START ON 1/12/2024] enoxaparin, 40 mg, Subcutaneous, Daily  [START ON 1/12/2024] senna-docusate sodium, 2 tablet, Oral, BID  [START ON 1/12/2024] sodium chloride, 10 mL, Intravenous, Q12H  [START ON 1/12/2024] vancomycin, 20 mg/kg, Intravenous, Once         Continuous Infusions:  nitroglycerin, 5-200 mcg/min, Last Rate: Stopped (01/11/24 0336)           Deborah Luciano, [facility]  01/11/24   23:50 EST

## 2024-01-12 NOTE — PLAN OF CARE
Problem: Adult Inpatient Plan of Care  Goal: Plan of Care Review  Outcome: Ongoing, Progressing  Flowsheets (Taken 1/12/2024 7252)  Progress: no change  Plan of Care Reviewed With: patient  Outcome Evaluation: new admit  Goal: Patient-Specific Goal (Individualized)  Outcome: Ongoing, Progressing  Goal: Absence of Hospital-Acquired Illness or Injury  Outcome: Ongoing, Progressing  Goal: Optimal Comfort and Wellbeing  Outcome: Ongoing, Progressing  Goal: Readiness for Transition of Care  Outcome: Ongoing, Progressing   Goal Outcome Evaluation:  Plan of Care Reviewed With: patient        Progress: no change  Outcome Evaluation: new admit

## 2024-01-12 NOTE — PLAN OF CARE
Problem: Adult Inpatient Plan of Care  Goal: Plan of Care Review  Outcome: Ongoing, Progressing  Flowsheets (Taken 1/12/2024 0825)  Progress: no change  Plan of Care Reviewed With: patient  Outcome Evaluation: new admit  Goal: Patient-Specific Goal (Individualized)  Outcome: Ongoing, Progressing  Goal: Absence of Hospital-Acquired Illness or Injury  Outcome: Ongoing, Progressing  Goal: Optimal Comfort and Wellbeing  Outcome: Ongoing, Progressing  Goal: Readiness for Transition of Care  Outcome: Ongoing, Progressing  Intervention: Mutually Develop Transition Plan  Recent Flowsheet Documentation  Taken 1/12/2024 0830 by Andrea Baez, RN  Transportation Anticipated: family or friend will provide  Patient/Family Anticipates Transition to: home with help/services  Taken 1/12/2024 0827 by Andrea Baez, RN  Equipment Currently Used at Home:   walker, rolling   cane, straight   Goal Outcome Evaluation:  Plan of Care Reviewed With: patient        Progress: no change  Outcome Evaluation: new admit

## 2024-01-12 NOTE — PLAN OF CARE
Goal Outcome Evaluation:  Plan of Care Reviewed With: patient, daughter        Progress: improving   Pt is a 89 y/o F admitted to [facility] on 1/11/24 from [facility] facility with complaints of worsening SOA with O2 sats reading 80s with EMS. She had gone to PCP, diagnosing her with PNA, but symptoms had worsened over the last day. Respiratory panel (-), XR Chest (+) for pulmonary edema pattern with small bilateral pleural effusions. Diagnosed with acute hypoxic respiratory failure, now on 4L O2. She is currently living at [facility] and reports that the facility only provides assist with meals and cleaning every 2 weeks. Pt is transitioning over to [facility] within 30 days for increased assistance. At baseline, she is normally IND with facility mobility with use of rollator. This date, she is AAOx4 and lying supine in bed reading 98% on 2L. She completes bed mobility IND, transfers CGA, and amb 20' CGA with no AD. Pt tachycardic with activity this date with HR reaching 128bpm, correlated with dropping O2 sats. Seems to be a relationship between anxiety and SOA. She is incontinent with positional changes, req changing of brief upon standing. Ambulates well with HHA this date, but balance likely improved with addition of RW. She seems to be functioning close to her baseline and should be safe to d/c back to [place] when medically cleared. PT will follow during stay to promote mobility and assess desaturation with activity.     Anticipated Discharge Disposition (PT): home

## 2024-01-13 ENCOUNTER — READMISSION MANAGEMENT (OUTPATIENT)
Dept: CALL CENTER | Facility: HOSPITAL | Age: 89
End: 2024-01-13
Payer: MEDICARE

## 2024-01-13 ENCOUNTER — DOCUMENTATION (OUTPATIENT)
Dept: INTERNAL MEDICINE | Facility: HOSPITAL | Age: 89
End: 2024-01-13
Payer: MEDICARE

## 2024-01-13 VITALS
WEIGHT: 129 LBS | HEART RATE: 70 BPM | OXYGEN SATURATION: 94 % | DIASTOLIC BLOOD PRESSURE: 45 MMHG | TEMPERATURE: 98 F | BODY MASS INDEX: 25.32 KG/M2 | HEIGHT: 60 IN | RESPIRATION RATE: 17 BRPM | SYSTOLIC BLOOD PRESSURE: 140 MMHG

## 2024-01-13 LAB
ANION GAP SERPL CALCULATED.3IONS-SCNC: 12 MMOL/L (ref 5–15)
BUN SERPL-MCNC: 28 MG/DL (ref 8–23)
BUN/CREAT SERPL: 31.8 (ref 7–25)
CALCIUM SPEC-SCNC: 8.9 MG/DL (ref 8.6–10.5)
CHLORIDE SERPL-SCNC: 98 MMOL/L (ref 98–107)
CO2 SERPL-SCNC: 24 MMOL/L (ref 22–29)
CREAT SERPL-MCNC: 0.88 MG/DL (ref 0.57–1)
EGFRCR SERPLBLD CKD-EPI 2021: 62.9 ML/MIN/1.73
GLUCOSE SERPL-MCNC: 150 MG/DL (ref 65–99)
POTASSIUM SERPL-SCNC: 3.5 MMOL/L (ref 3.5–5.2)
SODIUM SERPL-SCNC: 134 MMOL/L (ref 136–145)

## 2024-01-13 PROCEDURE — 25010000002 METHYLPREDNISOLONE PER 125 MG: Performed by: NURSE PRACTITIONER

## 2024-01-13 PROCEDURE — 94799 UNLISTED PULMONARY SVC/PX: CPT

## 2024-01-13 PROCEDURE — 25010000002 FUROSEMIDE PER 20 MG: Performed by: NURSE PRACTITIONER

## 2024-01-13 PROCEDURE — 99232 SBSQ HOSP IP/OBS MODERATE 35: CPT | Performed by: INTERNAL MEDICINE

## 2024-01-13 PROCEDURE — 25010000002 CEFEPIME PER 500 MG: Performed by: NURSE PRACTITIONER

## 2024-01-13 PROCEDURE — 94761 N-INVAS EAR/PLS OXIMETRY MLT: CPT

## 2024-01-13 PROCEDURE — 94664 DEMO&/EVAL PT USE INHALER: CPT

## 2024-01-13 RX ORDER — SOTALOL HYDROCHLORIDE 80 MG/1
80 TABLET ORAL 2 TIMES DAILY
Status: DISCONTINUED | OUTPATIENT
Start: 2024-01-13 | End: 2024-01-13 | Stop reason: HOSPADM

## 2024-01-13 RX ORDER — SOTALOL HYDROCHLORIDE 80 MG/1
80 TABLET ORAL 2 TIMES DAILY
Qty: 60 TABLET | Refills: 0 | Status: SHIPPED | OUTPATIENT
Start: 2024-01-13 | End: 2024-01-14 | Stop reason: SDUPTHER

## 2024-01-13 RX ORDER — SPIRONOLACTONE 25 MG/1
25 TABLET ORAL DAILY
Status: DISCONTINUED | OUTPATIENT
Start: 2024-01-13 | End: 2024-01-13 | Stop reason: HOSPADM

## 2024-01-13 RX ORDER — SPIRONOLACTONE 25 MG/1
25 TABLET ORAL DAILY
Qty: 30 TABLET | Refills: 0 | Status: SHIPPED | OUTPATIENT
Start: 2024-01-14 | End: 2024-01-14 | Stop reason: SDUPTHER

## 2024-01-13 RX ADMIN — FERROUS SULFATE TAB EC 324 MG (65 MG FE EQUIVALENT) 324 MG: 324 (65 FE) TABLET DELAYED RESPONSE at 08:56

## 2024-01-13 RX ADMIN — CEFEPIME 2000 MG: 2 INJECTION, POWDER, FOR SOLUTION INTRAVENOUS at 00:15

## 2024-01-13 RX ADMIN — LEVOTHYROXINE SODIUM 75 MCG: 0.07 TABLET ORAL at 09:05

## 2024-01-13 RX ADMIN — DOCUSATE SODIUM 50 MG AND SENNOSIDES 8.6 MG 2 TABLET: 8.6; 5 TABLET, FILM COATED ORAL at 08:57

## 2024-01-13 RX ADMIN — POTASSIUM CHLORIDE 20 MEQ: 1500 TABLET, EXTENDED RELEASE ORAL at 08:56

## 2024-01-13 RX ADMIN — Medication 10 ML: at 08:57

## 2024-01-13 RX ADMIN — SERTRALINE 100 MG: 100 TABLET, FILM COATED ORAL at 08:56

## 2024-01-13 RX ADMIN — PANTOPRAZOLE SODIUM 40 MG: 40 TABLET, DELAYED RELEASE ORAL at 08:56

## 2024-01-13 RX ADMIN — SPIRONOLACTONE 25 MG: 25 TABLET ORAL at 11:39

## 2024-01-13 RX ADMIN — CEFEPIME 2000 MG: 2 INJECTION, POWDER, FOR SOLUTION INTRAVENOUS at 11:39

## 2024-01-13 RX ADMIN — METHYLPREDNISOLONE SODIUM SUCCINATE 60 MG: 125 INJECTION, POWDER, FOR SOLUTION INTRAMUSCULAR; INTRAVENOUS at 00:15

## 2024-01-13 RX ADMIN — FUROSEMIDE 40 MG: 10 INJECTION, SOLUTION INTRAMUSCULAR; INTRAVENOUS at 08:56

## 2024-01-13 RX ADMIN — DILTIAZEM HYDROCHLORIDE 240 MG: 240 CAPSULE, EXTENDED RELEASE ORAL at 08:56

## 2024-01-13 RX ADMIN — CLOPIDOGREL BISULFATE 75 MG: 75 TABLET ORAL at 08:57

## 2024-01-13 RX ADMIN — METHYLPREDNISOLONE SODIUM SUCCINATE 60 MG: 125 INJECTION, POWDER, FOR SOLUTION INTRAMUSCULAR; INTRAVENOUS at 06:55

## 2024-01-13 RX ADMIN — ISOSORBIDE MONONITRATE 30 MG: 30 TABLET, EXTENDED RELEASE ORAL at 08:56

## 2024-01-13 RX ADMIN — FOLIC ACID 1 MG: 1 TABLET ORAL at 09:05

## 2024-01-13 RX ADMIN — BUDESONIDE INHALATION 0.5 MG: 0.5 SUSPENSION RESPIRATORY (INHALATION) at 08:18

## 2024-01-13 NOTE — OUTREACH NOTE
Prep Survey      Flowsheet Row Responses   [facility] discharged [facility]? [facility]   Is LACE score < 7 ? No   Eligibility Readm Mgmt   Discharge diagnosis *Hypoxic respiratory failure   Does the patient have one of the following disease processes/diagnoses(primary or secondary)? Other   Does the patient have Home health ordered? No   Is there a DME ordered? No   General alerts for this patient [facility] living   Prep survey completed? Yes            JACOBO BACK - Registered Nurse

## 2024-01-13 NOTE — DISCHARGE SUMMARY
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         Hospital Course     HPI:  Per the H&P    Hospital Course:  Patient originally admitted for acute hypoxic respiratory failure secondary to pneumonia and pulmonary edema.  Patient was diagnosed with pneumonia on 1-, follow-up chest x-ray in the ED on 2024 showed mild pulmonary edema with small bilateral pleural effusions.  Patient required 4 to 5 L high flow nasal cannula upon admission, on room air at this time.  Patient was given diuretics and steroids, breathing treatments utilized.  Antibiotics continued throughout stay.  Cardiology followed due to patient having recent watchman's device placed.  Patient cleared for discharge through cardiology standpoint, patient to follow-up with Dr. Crabtree in 1 to 2 weeks.  Patient needs to follow-up with PCP and have BMP checked due to starting spironolactone, potassium needs to be above 4 per Dr. Crabtree.         DISCHARGE Follow Up Recommendations for labs and diagnostics: PCP in 1 week, needs BMP per cardiology      Reasons For Change In Medications and Indications for New Medications:  Aldactone 25 mg as per cardiology recommendation  Sotalol decreased to 80 mg per  cardiology recommendation    Day of Discharge     Vital Signs:  Temp:  [97.5 °F (36.4 °C)-98.3 °F (36.8 °C)] 98 °F (36.7 °C)  Heart Rate:  [] 80  Resp:  [15-20] 20  BP: (101-150)/(47-78) 150/56  Flow (L/min):  [1-4] 1    Physical Exam:  Physical Exam  Constitutional:       Appearance: Normal appearance.   HENT:      Head: Normocephalic and atraumatic.      Nose: Nose normal.      Mouth/Throat:      Mouth: Mucous membranes are moist.      Pharynx: Oropharynx is clear.   Eyes:      Extraocular Movements: Extraocular movements intact.      Conjunctiva/sclera: Conjunctivae normal.      Pupils: Pupils are equal, round, and reactive to light.   Cardiovascular:      Rate and Rhythm: Normal rate and regular rhythm.      Pulses: Normal pulses.      Heart sounds: Normal heart sounds.   Pulmonary:      Effort: Pulmonary effort is normal.      Breath sounds: Wheezing present.   Abdominal:      General: Abdomen is flat. Bowel sounds are normal.      Palpations: Abdomen is soft.   Musculoskeletal:         General: Normal range of motion.      Cervical back: Normal range of motion and neck supple.   Skin:     General: Skin is warm and dry.   Neurological:      General: No focal deficit present.      Mental Status: She is alert and oriented to person, place, and time. Mental status is at baseline.   Psychiatric:         Mood and Affect: Mood normal.         Behavior: Behavior normal.         Thought Content: Thought content normal.         Judgment: Judgment normal.            Pertinent  and/or Most Recent Results     LAB RESULTS:      Lab 01/12/24  2343 01/12/24  0551 01/11/24  2241 01/11/24  2236   WBC 9.20 7.70 11.20*  --    HEMOGLOBIN 7.7* 9.2* 10.0*  --    HEMATOCRIT 24.4* 28.4* 31.3*  --    PLATELETS 227 252 384  --    NEUTROS ABS 7.80* 7.00 9.10*  --    LYMPHS ABS 0.70 0.50* 1.50  --    MONOS ABS 0.60 0.10 0.50  --    EOS ABS 0.00 0.00 0.10  --    .9* 99.6* 99.8*  --    LACTATE  --   --   --  1.4         Lab  01/12/24  2343 01/12/24  0551 01/11/24 2241   SODIUM 134* 142 140   POTASSIUM 3.5 3.5 4.3   CHLORIDE 98 102 100   CO2 24.0 27.0 27.0   ANION GAP 12.0 13.0 13.0   BUN 28* 21 21   CREATININE 0.88 0.71 0.76   EGFR 62.9 81.4 75.0   GLUCOSE 150* 142* 177*   CALCIUM 8.9 9.1 9.7         Lab 01/11/24 2241   TOTAL PROTEIN 7.3   ALBUMIN 3.9   GLOBULIN 3.4   ALT (SGPT) 26   AST (SGOT) 46*   BILIRUBIN 0.8   ALK PHOS 118*         Lab 01/12/24  0551 01/11/24 2241   PROBNP  --  5,057.0*   HSTROP T 53* 17*                 Lab 01/11/24 2241   PH, ARTERIAL 7.300*   PCO2, ARTERIAL 54.5*   PO2 ART 94.3   O2 SATURATION ART 96.3   FIO2 36   HCO3 ART 26.8   BASE EXCESS ART -0.1*     Brief Urine Lab Results       None          Microbiology Results (last 10 days)       Procedure Component Value - Date/Time    Respiratory Panel PCR w/COVID-19(SARS-CoV-2) SACHA/RAMANDEEP/DEL/PAD/COR/RABIA In-House, NP Swab in UTM/VTM, 2 HR TAT - Swab, Nasopharynx [[id number]]  (Normal) Collected: 01/11/24 2246    Lab Status: Final result Specimen: Swab from Nasopharynx Updated: 01/11/24 2340     ADENOVIRUS, PCR Not Detected     Coronavirus 229E Not Detected     Coronavirus HKU1 Not Detected     Coronavirus NL63 Not Detected     Coronavirus OC43 Not Detected     COVID19 Not Detected     Human Metapneumovirus Not Detected     Human Rhinovirus/Enterovirus Not Detected     Influenza A PCR Not Detected     Influenza B PCR Not Detected     Parainfluenza Virus 1 Not Detected     Parainfluenza Virus 2 Not Detected     Parainfluenza Virus 3 Not Detected     Parainfluenza Virus 4 Not Detected     RSV, PCR Not Detected     Bordetella pertussis pcr Not Detected     Bordetella parapertussis PCR Not Detected     Chlamydophila pneumoniae PCR Not Detected     Mycoplasma pneumo by PCR Not Detected    Narrative:      In the setting of a positive respiratory panel with a viral infection PLUS a negative procalcitonin without other underlying concern for bacterial infection, consider  observing off antibiotics or discontinuation of antibiotics and continue supportive care. If the respiratory panel is positive for atypical bacterial infection (Bordetella pertussis, Chlamydophila pneumoniae, or Mycoplasma pneumoniae), consider antibiotic de-escalation to target atypical bacterial infection.    Blood Culture - Blood, Arm, Left [[id number]]  (Normal) Collected: 01/11/24 2242    Lab Status: Preliminary result Specimen: Blood from Arm, Left Updated: 01/12/24 2300     Blood Culture No growth at 24 hours    Narrative:      Less than seven (7) mL's of blood was collected.  Insufficient quantity may yield false negative results.    Blood Culture - Blood, Arm, Right [[id number]]  (Normal) Collected: 01/11/24 2241    Lab Status: Preliminary result Specimen: Blood from Arm, Right Updated: 01/12/24 2300     Blood Culture No growth at 24 hours            XR Chest 1 View    Result Date: 1/11/2024  Impression: Impression: Mild pulmonary edema pattern with small bilateral pleural effusions, new as compared to the previous study. Mild bibasilar atelectatic changes are present. Viral illness cannot be excluded. Electronically Signed: Radha Ponce MD  1/11/2024 11:25 PM EST  Workstation ID: [id number]             Results for orders placed during the hospital encounter of 12/26/23    Adult Transesophageal Echo (RACHEL) W/ Cont if Necessary Per Protocol    Interpretation Summary  Normal left and right ventricular size and function.  Prosthetic aortic valve is present.  Successful implantation of [employer] watchman Flex 31 mm  left atrial appendage occluder device via transseptal puncture.  Adequate device compression postimplantation.  No para-device leak noted.  Less than one third shoulder postimplantation  Trace right sided pericardial effusion which remained unchanged after procedure.      Labs Pending at Discharge:  Pending Labs       Order Current Status    Blood Culture - Blood, Arm, Left Preliminary result     Blood Culture - Blood, Arm, Right Preliminary result            Procedures Performed           Consults:   Consults       Date and Time Order Name Status Description    1/12/2024  1:01 AM Inpatient Cardiology Consult Completed     1/11/2024 11:31 PM Hospitalist (on-call MD unless specified)                Discharge Details        Discharge Medications        New Medications        Instructions Start Date   spironolactone 25 MG tablet  Commonly known as: ALDACTONE   25 mg, Oral, Daily   Start Date: January 14, 2024            Changes to Medications        Instructions Start Date   sotalol 80 MG tablet  Commonly known as: BETAPACE  What changed:   medication strength  how much to take   80 mg, Oral, 2 Times Daily             Continue These Medications        Instructions Start Date   acetaminophen 650 MG 8 hr tablet  Commonly known as: TYLENOL   650 mg, Oral, Every 8 Hours PRN      aspirin 81 MG EC tablet   81 mg, Oral, Nightly      CALCIUM 1200 PO   1 tablet, Oral, Daily      calcium 600 MG tablet  Commonly known as: OS-ROSHNI   1,200 mg, Oral, Daily      cholecalciferol 25 MCG (1000 UT) tablet  Commonly known as: VITAMIN D3   1,000 Units, Oral, Daily      clopidogrel 75 MG tablet  Commonly known as: PLAVIX   75 mg, Oral, Daily      COLLAGEN PO   20 g, Oral, Daily      Cranberry 500 MG capsule   500 mg, Oral, Nightly      dilTIAZem  MG 24 hr capsule  Commonly known as: CARDIZEM CD   240 mg, Oral, Daily      doxazosin 2 MG tablet  Commonly known as: CARDURA   2 mg, Oral, Daily      ferrous sulfate 324 (65 Fe) MG tablet delayed-release EC tablet   324 mg, Oral, Daily With Breakfast      fish oil 1200 MG capsule capsule   1,200 mg, Oral, 2 Times Daily With Meals      folic acid 1 MG tablet  Commonly known as: FOLVITE   1 mg, Oral, Daily      furosemide 40 MG tablet  Commonly known as: LASIX   40 mg, Oral, Daily      Glucosamine 1500 Complex capsule   1,500 mg, Oral, Daily      isosorbide mononitrate 30 MG 24 hr  tablet  Commonly known as: IMDUR   30 mg, Oral, 2 Times Daily      levothyroxine 75 MCG tablet  Commonly known as: SYNTHROID, LEVOTHROID   TAKE ONE TABLET BY MOUTH DAILY 5 DAYS A WEEK      melatonin 5 MG sublingual tablet sublingual tablet   5 mg, Sublingual, Nightly PRN      methotrexate 2.5 MG tablet   12.5 mg, Oral, Take As Directed, 5 tablets on Sunday Morning AND 5 tablets Sunday Evening = 10 Total Tablets on Sunday      MiraLax 17 g packet  Generic drug: polyethylene glycol   17 g, Oral, Nightly      MSM 1000 MG tablet   1 tablet, Oral, 2 Times Daily      multivitamin tablet  Generic drug: multivitamin   1 tablet, Oral, Daily      pantoprazole 40 MG EC tablet  Commonly known as: PROTONIX   40 mg, Oral, Daily      potassium chloride 20 MEQ CR tablet  Commonly known as: K-DUR,KLOR-CON   20 mEq, Oral, Daily      rosuvastatin 20 MG tablet  Commonly known as: CRESTOR   20 mg, Oral, Nightly      sertraline 100 MG tablet  Commonly known as: ZOLOFT   Take 1 tablet by mouth Daily.      vitamin C 250 MG tablet  Commonly known as: ASCORBIC ACID   1,000 mg, Oral, Daily      Zinc 50 MG tablet   1 tablet, Oral, Nightly               No Known Allergies      Discharge Disposition:   Home or Self Care    Diet:  Hospital:  Diet Order   Procedures    Diet: Cardiac Diets; Healthy Heart (2-3 Na+); Texture: Regular Texture (IDDSI 7); Fluid Consistency: Thin (IDDSI 0)         Discharge Activity:         CODE STATUS:  Code Status and Medical Interventions:   Ordered at: 01/11/24 2320     Code Status (Patient has no pulse and is not breathing):    CPR (Attempt to Resuscitate)     Medical Interventions (Patient has pulse or is breathing):    Full Support         Future Appointments   Date Time Provider Department Center   2/5/2024 11:15 AM Pk Crabtree MD [facility] FLO   2/20/2024  9:00 AM  [facility] 1  DEL [facility] DEL OPCV   4/25/2024 11:00 AM Bandar Henley DO [facility] DEL   10/8/2024 11:00 AM LAB  DEL  EL[facility] LAB [facility] DEL J[facility] None   10/15/2024 11:15 AM Chika Paula MD [facility] END NA DEL       Additional Instructions for the Follow-ups that You Need to Schedule       Discharge Follow-up with PCP   As directed       Currently Documented PCP:    Kacie Liriano APRN    PCP Phone Number:    [phone]     Follow Up Details: 1-2 weeks        Discharge Follow-up with Specified Provider: Dr. Crabtree; 2 Weeks   As directed      To: Dr. Crabtree   Follow Up: 2 Weeks   Follow Up Details: Cardiology                Time spent on Discharge including face to face service:  >30 minutes    Signature: Electronically signed by YEYO Hagen, 01/13/24, 11:48 EST.  [facility] Hospitalist Team

## 2024-01-13 NOTE — SIGNIFICANT NOTE
Pt will transport home to [facility], notified, via private vehicle with family.

## 2024-01-13 NOTE — PLAN OF CARE
Goal Outcome Evaluation:  Plan of Care Reviewed With: patient        Progress: improving  Outcome Evaluation: No significant events overnight. Pt rested well throughout the shift, vitals stable on 1L NC. Does get mildly SOB with bed activities but recovers quickly. External catheter and brief in place but pt states she would like to try getting up to bedside commode in AM. Pt's daughter remains at bedside. Good appetite and no complaints voiced this AM.

## 2024-01-13 NOTE — PROGRESS NOTES
CC--- acute on chronic class III diastolic heart failure  Paroxysmal atrial fibrillation Watchman device in situ      Sub  Clinically feels better and denies any active chest pain and dyspnea is reduced          Past Medical History:   Diagnosis Date    Anxiety     Asthma     Atrial fibrillation     CAD (coronary artery disease)     Carotid artery disease     GERD (gastroesophageal reflux disease)     Gout     Hyperlipidemia     Hypertension     Hyperthyroidism     Hypothyroidism      Past Surgical History:   Procedure Laterality Date    ATRIAL APPENDAGE EXCLUSION LEFT WITH TRANSESOPHAGEAL ECHOCARDIOGRAM Right 12/26/2023    Procedure: Atrial Appendage Occlusion;  Surgeon: Pk Crabtree MD;  Location: [facility] CATH INVASIVE LOCATION;  Service: Cardiovascular;  Laterality: Right;    ATRIAL APPENDAGE EXCLUSION LEFT WITH TRANSESOPHAGEAL ECHOCARDIOGRAM N/A 12/26/2023    Procedure: Atrial Appendage Occlusion;  Surgeon: Gen Caballero MD;  Location: [facility] CATH INVASIVE LOCATION;  Service: Cardiovascular;  Laterality: N/A;    BASAL CELL CARCINOMA EXCISION      spine, nose and arm, leg 2020    BREAST BIOPSY      CARDIAC CATHETERIZATION      CAROTID STENT      CATARACT EXTRACTION      CHOLECYSTECTOMY      CORONARY ARTERY BYPASS GRAFT      CORONARY STENT PLACEMENT      ENDOSCOPY N/A 04/24/2023    Procedure: ESOPHAGOGASTRODUODENOSCOPY with antrum body biopsies;  Surgeon: REGGIE Ace MD;  Location: [facility] ENDOSCOPY;  Service: Gastroenterology;  Laterality: N/A;  hiatal hernia    FINGER SURGERY      KNEE SURGERY      PACEMAKER IMPLANTATION      SHOULDER SURGERY      RACHEL Bilateral 11/03/2023     Family History   Problem Relation Age of Onset    Hypertension Mother     Heart disease Father     Heart disease Sister     Kidney cancer Sister     Stroke Sister     Cancer Sister         breast    Cancer Sister     Heart disease Brother      Social History     Tobacco Use    Smoking status: Former     Packs/day:  1.00     Years: 4.00     Additional pack years: 0.00     Total pack years: 4.00     Types: Cigarettes     Quit date:      Years since quittin.0    Smokeless tobacco: Never   Vaping Use    Vaping Use: Never used   Substance Use Topics    Alcohol use: No    Drug use: No     Review of Systems   General:  positive for fatigue and tiredness  Eyes: No redness  Cardiovascular: No chest pain, no palpitations          Physical Exam    General:      well developed, well nourished, in no acute distress.    Head:      normocephalic and atraumatic.    Eyes:      PERRL/EOM intact, conjunctivae and sclerae clear without nystagmus.    Neck:      no  thyromegaly, trachea central with normal respiratory effort  Lungs:      Bilateral reduced breath sounds without active wheezing  Heart:       regular rate and rhythm, S1, S2 without murmurs, rubs, or gallops  Skin:      intact without lesions or rashes.    Psych:      alert and cooperative; normal mood and affect; normal attention span and concentration.            CBC    Results from last 7 days   Lab Units 24  2343 24  0551 24  2241   WBC 10*3/mm3 9.20 7.70 11.20*   HEMOGLOBIN g/dL 7.7* 9.2* 10.0*   PLATELETS 10*3/mm3 227 252 384     BMP   Results from last 7 days   Lab Units 24  2343 24  0551 24  2241   SODIUM mmol/L 134* 142 140   POTASSIUM mmol/L 3.5 3.5 4.3   CHLORIDE mmol/L 98 102 100   CO2 mmol/L 24.0 27.0 27.0   BUN mg/dL 28* 21 21   CREATININE mg/dL 0.88 0.71 0.76   GLUCOSE mg/dL 150* 142* 177*     CMP   Results from last 7 days   Lab Units 24  2343 24  0551 24  2241   SODIUM mmol/L 134* 142 140   POTASSIUM mmol/L 3.5 3.5 4.3   CHLORIDE mmol/L 98 102 100   CO2 mmol/L 24.0 27.0 27.0   BUN mg/dL 28* 21 21   CREATININE mg/dL 0.88 0.71 0.76   GLUCOSE mg/dL 150* 142* 177*   ALBUMIN g/dL  --   --  3.9   BILIRUBIN mg/dL  --   --  0.8   ALK PHOS U/L  --   --  118*   AST (SGOT) U/L  --   --  46*   ALT (SGPT) U/L  --   --   26     Radiology(recent) XR Chest 1 View    Result Date: 1/11/2024  Impression: Mild pulmonary edema pattern with small bilateral pleural effusions, new as compared to the previous study. Mild bibasilar atelectatic changes are present. Viral illness cannot be excluded. 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

## 2024-01-13 NOTE — CASE MANAGEMENT/SOCIAL WORK
Case Management Discharge Note      Final Note: Srinivas LOPEZ     Transportation Services  Private: Car    Final Discharge Disposition Code: 01 - home or self-care

## 2024-01-14 ENCOUNTER — DOCUMENTATION (OUTPATIENT)
Dept: INTERNAL MEDICINE | Facility: HOSPITAL | Age: 89
End: 2024-01-14
Payer: MEDICARE

## 2024-01-14 RX ORDER — SPIRONOLACTONE 25 MG/1
25 TABLET ORAL DAILY
Qty: 30 TABLET | Refills: 0 | Status: SHIPPED | OUTPATIENT
Start: 2024-01-14 | End: 2024-02-13

## 2024-01-14 RX ORDER — SOTALOL HYDROCHLORIDE 80 MG/1
80 TABLET ORAL 2 TIMES DAILY
Qty: 60 TABLET | Refills: 0 | Status: SHIPPED | OUTPATIENT
Start: 2024-01-14 | End: 2024-02-13

## 2024-01-16 LAB
BACTERIA SPEC AEROBE CULT: NORMAL
BACTERIA SPEC AEROBE CULT: NORMAL

## 2024-01-17 ENCOUNTER — READMISSION MANAGEMENT (OUTPATIENT)
Dept: CALL CENTER | Facility: HOSPITAL | Age: 89
End: 2024-01-17
Payer: MEDICARE

## 2024-01-17 NOTE — OUTREACH NOTE
Medical Week 1 Survey      Flowsheet Row Responses   [facility] facility patient discharged from? [facility]   Does the patient have one of the following disease processes/diagnoses(primary or secondary)? Other   Week 1 attempt successful? No   Unsuccessful attempts Attempt 1            BRAN Mcgregor Registered Nurse

## 2024-01-19 ENCOUNTER — READMISSION MANAGEMENT (OUTPATIENT)
Dept: CALL CENTER | Facility: HOSPITAL | Age: 89
End: 2024-01-19
Payer: MEDICARE

## 2024-01-19 NOTE — OUTREACH NOTE
Medical Week 1 Survey      Flowsheet Row Responses   [facility] patient discharged from? [facility]   Does the patient have one of the following disease processes/diagnoses(primary or secondary)? Other   Week 1 attempt successful? Yes   Call start time 1817   Call end time 1819   General alerts for this patient [facility]   Discharge diagnosis *Hypoxic respiratory failure   Person spoke with today (if not patient) and relationship Patient   Meds reviewed with patient/caregiver? Yes   Is the patient having any side effects they believe may be caused by any medication additions or changes? No   Does the patient have all medications ordered at discharge? Yes   Is the patient taking all medications as directed (includes completed medication regime)? Yes   Does the patient have a primary care provider?  Yes   Does the patient have an appointment with their PCP within 7 days of discharge? Yes   Comments regarding PCP 1/19/24   Has the patient kept scheduled appointments due by today? Yes   Psychosocial issues? No   Did the patient receive a copy of their discharge instructions? Yes   Nursing interventions Reviewed instructions with patient   What is the patient's perception of their health status since discharge? Improving   Is the patient/caregiver able to teach back signs and symptoms related to disease process for when to call PCP? Yes   Is the patient/caregiver able to teach back signs and symptoms related to disease process for when to call 911? Yes   Is the patient/caregiver able to teach back the hierarchy of who to call/visit for symptoms/problems? PCP, Specialist, Home health nurse, Urgent Care, ED, 911 Yes   If the patient is a current smoker, are they able to teach back resources for cessation? Not a smoker   Week 1 call completed? Yes   Would this patient benefit from a Referral to Amb Social Work? No   Is the patient interested in additional calls from an ambulatory ? No    Wrap up additional comments Pt states she is doing better now that she is on 2LO2 per cardiologist. No medication issues. Pt had PCP fu appt.   Call end time 1819            Vanda GARCIA - Registered Nurse

## 2024-02-05 ENCOUNTER — OFFICE VISIT (OUTPATIENT)
Dept: CARDIOLOGY | Facility: CLINIC | Age: 89
End: 2024-02-05
Payer: MEDICARE

## 2024-02-05 VITALS
HEART RATE: 70 BPM | DIASTOLIC BLOOD PRESSURE: 68 MMHG | HEIGHT: 60 IN | OXYGEN SATURATION: 95 % | SYSTOLIC BLOOD PRESSURE: 132 MMHG | BODY MASS INDEX: 23.36 KG/M2 | WEIGHT: 119 LBS

## 2024-02-05 DIAGNOSIS — D50.0 IRON DEFICIENCY ANEMIA DUE TO CHRONIC BLOOD LOSS: ICD-10-CM

## 2024-02-05 DIAGNOSIS — I48.0 PAROXYSMAL ATRIAL FIBRILLATION: Primary | ICD-10-CM

## 2024-02-05 DIAGNOSIS — Z95.0 PRESENCE OF CARDIAC PACEMAKER: ICD-10-CM

## 2024-02-05 DIAGNOSIS — I10 ESSENTIAL HYPERTENSION: ICD-10-CM

## 2024-02-05 DIAGNOSIS — Z95.2 HX OF AORTIC VALVE REPLACEMENT: ICD-10-CM

## 2024-02-05 DIAGNOSIS — Z95.818 PRESENCE OF WATCHMAN LEFT ATRIAL APPENDAGE CLOSURE DEVICE: ICD-10-CM

## 2024-02-05 PROCEDURE — 99214 OFFICE O/P EST MOD 30 MIN: CPT | Performed by: INTERNAL MEDICINE

## 2024-02-05 PROCEDURE — 1160F RVW MEDS BY RX/DR IN RCRD: CPT | Performed by: INTERNAL MEDICINE

## 2024-02-05 PROCEDURE — 93000 ELECTROCARDIOGRAM COMPLETE: CPT | Performed by: INTERNAL MEDICINE

## 2024-02-05 PROCEDURE — 1159F MED LIST DOCD IN RCRD: CPT | Performed by: INTERNAL MEDICINE

## 2024-02-05 NOTE — PROGRESS NOTES
CC: Sick sinus syndrome with dual-chamber pacemaker in situ follow up .    Sub  89-year-old pleasant patient underwent watchman implantation in the last week of December and subsequently was admitted in January 2 week for pneumonia and effusions and was treated accordingly and comes in for follow-up.  Patient has sick sinus syndrome with pacemaker in situ and paroxysmal atrial fibrillation.  Patient had a bioprosthetic aortic valve placed in 2014 with elevated gradients with a mean gradient up to 29.  She has additional history of coronary disease with prior bypass surgery and bilateral renal artery stenosis carotid disease and dyslipidemia.  She is currently on sotalol to suppress her arrhythmia and she had a prior CT head revealing a small subdural hematoma and subsequently underwent watchman implantation in December 2023.  Patient continues to feel weak after recent hospitalization and she has moved to an alternative facility where the nutrition is better and currently she is on oxygen with chronic class III dyspnea.  Significant and recurrent epistaxis    Past Medical History:     Reviewed history from 06/13/2016 and no changes required:        AVR 2014-        Coronary artery disease: S/P CABG and PCI with stenting-        Carotid disease;left side 50-74%-Patrick Ocampo        Inferior myocardial infarction 12-06-        Asthma        Anxiety Disorder        Depression        G E R D        Hyperlipidemia        Hypertension-        Hyperthyroidism        fx thoracic ?        shoulder fx        Rotator cuff syndrome         Gout         Paroxysmal atrial fib        Arthritis        Hypothyroid        No Drug Allergies?         Eye exam:2014 &2015 Dr.Kelley Parekh in [place]         Rheumatoid Arthritis    Past Surgical History:     Reviewed history from 10/16/2018 and no changes required:        PCI/stent, LCX, 3-20-06, Cypher stent        PCI/stent x 2, LCX & priximal diagonal,  12-3-06, [employer] Vision stents        cataract r eye 12/07  lt eye 01/08        CABG x 3  07-19-08        thoracentesis l lung 8/08        Right rotator cuff repair - Oct. 15, 2013        Cholecystectomy-2009        Left Knee Arthoplasty-2014        Left Shoulder Replacement 4/2014        Aortic Valve  Replacement 6/11/2014        Heart Catherization:2/18/2015        Pacemaker placed 6/6/16        Colonoscopy 2011         Surgery Finger 2018             Physical Exam    General:      well developed, well nourished, in no acute distress.    Head:      normocephalic and atraumatic.    Eyes:      PERRL/EOM intact, conjunctivae and sclerae clear without nystagmus.    Neck:      no  thyromegaly, trachea central with normal respiratory effort  Lungs:  Left basilar crackles noted  Heart:       regular rate and rhythm, S1, S2 without  rubs, or gallops--- loud ejection systolic murmur base of the heart  Skin:      intact without lesions or rashes.    Psych:      alert and cooperative; normal mood and affect; normal attention span and concentration.                      Impressions    Recent labs include creatinine of 0.88 potassium 3.5 and hemoglobin of 7.7 and platelets are normal  Post watchman implantation without complications and surveillance RACHEL pending at 45 days as per protocol  Dual-chamber pacemaker in situ and home monitoring reviewed with 6% AF burden  Bioprosthetic aortic valve placed in 2014 with elevated mean gradient of 29 in the past  History of renal artery stenosis and peripheral vascular disease stable currently on aspirin  Intermittent atrial fibrillation suppressed with sotalol intake  Hyperlipidemia on rosuvastatin  Hypothyroidism on levothyroxine  Coronary artery disease stable on isosorbide without angina  Iron deficiency anemia on iron supplementation and repeat CBC to make sure the hemoglobin is corrected  History of small subdural hematoma post watchman implantation  Medications reviewed and  follow-up appointments made  Labs ordered  Stop aspirin because of recurrent epistaxis  Continue Plavix  Patient currently on oxygen  Reevaluate in 3-month  Patient is a frail patient with multiple medical problems      ECG 12 Lead    Date/Time: 2/5/2024 11:46 AM  Performed by: Pk Crabtree MD    Authorized by: Pk Crabtree MD  Comparison: compared with previous ECG   Similar to previous ECG  Rhythm: sinus rhythm and paced  Conduction: left bundle branch block        Electronically signed by Pk Crabtree MD, 02/05/24, 11:46 AM EST.

## 2024-02-20 ENCOUNTER — HOSPITAL ENCOUNTER (OUTPATIENT)
Dept: CARDIOLOGY | Facility: HOSPITAL | Age: 89
Discharge: HOME | End: 2024-02-20
Payer: MEDICARE

## 2024-02-20 ENCOUNTER — ANESTHESIA (OUTPATIENT)
Dept: CARDIOLOGY | Facility: HOSPITAL | Age: 89
End: 2024-02-20
Payer: MEDICARE

## 2024-02-20 ENCOUNTER — ANESTHESIA EVENT (OUTPATIENT)
Dept: CARDIOLOGY | Facility: HOSPITAL | Age: 89
End: 2024-02-20
Payer: MEDICARE

## 2024-02-20 VITALS
SYSTOLIC BLOOD PRESSURE: 157 MMHG | RESPIRATION RATE: 18 BRPM | OXYGEN SATURATION: 99 % | DIASTOLIC BLOOD PRESSURE: 55 MMHG | HEART RATE: 71 BPM

## 2024-02-20 DIAGNOSIS — Z95.818 PRESENCE OF WATCHMAN LEFT ATRIAL APPENDAGE CLOSURE DEVICE: ICD-10-CM

## 2024-02-20 DIAGNOSIS — I48.0 PAROXYSMAL ATRIAL FIBRILLATION: ICD-10-CM

## 2024-02-20 LAB
ALBUMIN SERPL-MCNC: 4.2 G/DL (ref 3.5–5.2)
ALBUMIN/GLOB SERPL: 1.4 G/DL
ALP SERPL-CCNC: 110 U/L (ref 39–117)
ALT SERPL W P-5'-P-CCNC: 21 U/L (ref 1–33)
ANION GAP SERPL CALCULATED.3IONS-SCNC: 11 MMOL/L (ref 5–15)
AST SERPL-CCNC: 29 U/L (ref 1–32)
BILIRUB SERPL-MCNC: 0.4 MG/DL (ref 0–1.2)
BUN SERPL-MCNC: 34 MG/DL (ref 8–23)
BUN/CREAT SERPL: 36.6 (ref 7–25)
CALCIUM SPEC-SCNC: 10 MG/DL (ref 8.6–10.5)
CHLORIDE SERPL-SCNC: 101 MMOL/L (ref 98–107)
CO2 SERPL-SCNC: 27 MMOL/L (ref 22–29)
CREAT SERPL-MCNC: 0.93 MG/DL (ref 0.57–1)
DEPRECATED RDW RBC AUTO: 57.8 FL (ref 37–54)
EGFRCR SERPLBLD CKD-EPI 2021: 58.9 ML/MIN/1.73
ERYTHROCYTE [DISTWIDTH] IN BLOOD BY AUTOMATED COUNT: 17 % (ref 12.3–15.4)
GLOBULIN UR ELPH-MCNC: 2.9 GM/DL
GLUCOSE SERPL-MCNC: 113 MG/DL (ref 65–99)
HCT VFR BLD AUTO: 31.7 % (ref 34–46.6)
HGB BLD-MCNC: 10.1 G/DL (ref 12–15.9)
MCH RBC QN AUTO: 31 PG (ref 26.6–33)
MCHC RBC AUTO-ENTMCNC: 31.9 G/DL (ref 31.5–35.7)
MCV RBC AUTO: 97 FL (ref 79–97)
PLATELET # BLD AUTO: 208 10*3/MM3 (ref 140–450)
PMV BLD AUTO: 7.4 FL (ref 6–12)
POTASSIUM SERPL-SCNC: 4.5 MMOL/L (ref 3.5–5.2)
PROT SERPL-MCNC: 7.1 G/DL (ref 6–8.5)
RBC # BLD AUTO: 3.26 10*6/MM3 (ref 3.77–5.28)
SODIUM SERPL-SCNC: 139 MMOL/L (ref 136–145)
WBC NRBC COR # BLD AUTO: 7.5 10*3/MM3 (ref 3.4–10.8)

## 2024-02-20 PROCEDURE — 85027 COMPLETE CBC AUTOMATED: CPT | Performed by: INTERNAL MEDICINE

## 2024-02-20 PROCEDURE — 25010000002 PROPOFOL 10 MG/ML EMULSION: Performed by: ANESTHESIOLOGIST ASSISTANT

## 2024-02-20 PROCEDURE — 25810000003 SODIUM CHLORIDE 0.9 % SOLUTION: Performed by: ANESTHESIOLOGIST ASSISTANT

## 2024-02-20 PROCEDURE — 80053 COMPREHEN METABOLIC PANEL: CPT | Performed by: INTERNAL MEDICINE

## 2024-02-20 PROCEDURE — 93325 DOPPLER ECHO COLOR FLOW MAPG: CPT

## 2024-02-20 PROCEDURE — 25810000003 SODIUM CHLORIDE 0.9 % SOLUTION: Performed by: INTERNAL MEDICINE

## 2024-02-20 PROCEDURE — 93320 DOPPLER ECHO COMPLETE: CPT

## 2024-02-20 RX ORDER — SPIRONOLACTONE 25 MG/1
25 TABLET ORAL DAILY
COMMUNITY

## 2024-02-20 RX ORDER — SODIUM CHLORIDE 9 MG/ML
30 INJECTION, SOLUTION INTRAVENOUS CONTINUOUS
Status: DISCONTINUED | OUTPATIENT
Start: 2024-02-20 | End: 2024-02-21 | Stop reason: HOSPADM

## 2024-02-20 RX ORDER — SOTALOL HYDROCHLORIDE 80 MG/1
80 TABLET ORAL 2 TIMES DAILY
COMMUNITY

## 2024-02-20 RX ORDER — ASPIRIN 81 MG/1
81 TABLET ORAL EVERY EVENING
COMMUNITY
End: 2024-02-20

## 2024-02-20 RX ORDER — SODIUM CHLORIDE 9 MG/ML
INJECTION, SOLUTION INTRAVENOUS CONTINUOUS PRN
Status: DISCONTINUED | OUTPATIENT
Start: 2024-02-20 | End: 2024-02-20 | Stop reason: SURG

## 2024-02-20 RX ORDER — COLCHICINE 0.6 MG/1
0.6 TABLET ORAL DAILY
COMMUNITY

## 2024-02-20 RX ADMIN — SODIUM CHLORIDE: 9 INJECTION, SOLUTION INTRAVENOUS at 09:26

## 2024-02-20 RX ADMIN — SODIUM CHLORIDE 30 ML/HR: 9 INJECTION, SOLUTION INTRAVENOUS at 08:20

## 2024-02-20 RX ADMIN — PROPOFOL 100 MCG/KG/MIN: 10 INJECTION, EMULSION INTRAVENOUS at 09:18

## 2024-02-20 NOTE — ANESTHESIA PREPROCEDURE EVALUATION
Anesthesia Evaluation     Patient summary reviewed and Nursing notes reviewed   NPO Solid Status: > 8 hours  NPO Liquid Status: > 2 hours           Airway   Mallampati: II  TM distance: >3 FB  Dental    (+) partials    Pulmonary    (+) COPD mild,home oxygen, shortness of breath, decreased breath sounds  Cardiovascular   Exercise tolerance: poor (<4 METS)    ECG reviewed  PT is on anticoagulation therapy  Rhythm: irregular  Rate: normal    (+) pacemaker pacemaker interrogated unknown, hypertension well controlled, valvular problems/murmurs (s/p repair with prostetic valve) AS, CAD, CABG >6 Months, dysrhythmias, CHF , PVD, hyperlipidemia,  carotid artery disease carotid bilateral      Neuro/Psych  GI/Hepatic/Renal/Endo    (+) GERD well controlled, renal disease- CRI, thyroid problem     Musculoskeletal     (+) arthralgias  Abdominal  - normal exam   Substance History      OB/GYN          Other   arthritis,   history of cancer remission    ROS/Med Hx Other: 12/23  Normal left and right ventricular size and function.  Prosthetic aortic valve is present.  Successful implantation of [employer] watchman Flex 31 mm  left atrial appendage occluder device via transseptal puncture.  Adequate device compression postimplantation.  No para-device leak noted.  Less than one third shoulder postimplantation  Trace right sided pericardial effusion which remained unchanged after procedure                  Anesthesia Plan    ASA 3     general     intravenous induction     Anesthetic plan, risks, benefits, and alternatives have been provided, discussed and informed consent has been obtained with: patient.    Use of blood products discussed with patient .    Plan discussed with CRNA and CAA.    CODE STATUS:

## 2024-02-20 NOTE — DISCHARGE INSTR - ACTIVITY
RACHEL DC Instructions  The medication, which was used to put the patient to sleep, will be acting in your body for the next twenty-four (24) hours, so you might feel a little sleepy; this feeling will slowly wear off.  Because the medicine is still in your system for the next twenty-four (24) hours, the adult patient SHOULD NOT:  Drive a car, operate machinery, or power tools  Drink any alcoholic drinks (not even beer)  Make any important decisions such as to sign important papers  We strongly suggest that a responsible adult be with the patient the rest of the day.  Your throat might be sore, so it may be better to start with liquids such as soft drinks, then soups, and graduate up to solid foods.  Any problems with:  EXCESSIVE MUCOUS  SPITTING UP BLOOD  SORE THROAT AT MORE THAN 72 HOURS  CALL THE [facility] -[phone].

## 2024-02-20 NOTE — ANESTHESIA POSTPROCEDURE EVALUATION
Patient: Tracy Jane    Procedure Summary       Date: 02/20/24 Room / Location: [facility]    Anesthesia Start: 0918 Anesthesia Stop: 0941    Procedure: ADULT TRANSESOPHAGEAL ECHO (RACHEL) W/ CONT IF NECESSARY PER PROTOCOL Diagnosis:       Paroxysmal atrial fibrillation      Presence of Watchman left atrial appendage closure device      (Guidance for Cardiac Intervention)      ()    Scheduled Providers: Bandar Henley DO; Prabhakar Jaramillo CAA Provider: Emir Mcgovern MD    Anesthesia Type: general ASA Status: 3            Anesthesia Type: general    Vitals  Vitals Value Taken Time   /55 02/20/24 1031   Temp     Pulse 70 02/20/24 1031   Resp 17 02/20/24 1015   SpO2 100 % 02/20/24 1031   Vitals shown include unfiled device data.        Post Anesthesia Care and Evaluation    Patient location during evaluation: PACU  Patient participation: complete - patient participated  Level of consciousness: awake  Pain scale: See nurse's notes for pain score.  Pain management: adequate    Airway patency: patent  Anesthetic complications: No anesthetic complications  PONV Status: none  Cardiovascular status: acceptable  Respiratory status: acceptable and spontaneous ventilation  Hydration status: acceptable    Comments: Patient seen and examined postoperatively; vital signs stable; SpO2 greater than or equal to 90%; cardiopulmonary status stable; nausea/vomiting adequately controlled; pain adequately controlled; no apparent anesthesia complications; patient discharged from anesthesia care when discharge criteria were met

## 2024-02-22 LAB
BH CV ECHO MEAS - AO MAX PG: 53 MMHG
BH CV ECHO MEAS - AO MEAN PG: 26 MMHG
BH CV ECHO MEAS - AO V2 MAX: 364 CM/SEC
BH CV ECHO MEAS - AO V2 VTI: 80.3 CM
BH CV ECHO SHUNT ASSESSMENT PERFORMED (HIDDEN SCRIPTING): 1

## 2024-03-04 ENCOUNTER — TELEPHONE (OUTPATIENT)
Dept: CARDIOLOGY | Facility: CLINIC | Age: 89
End: 2024-03-04
Payer: MEDICARE

## 2024-03-05 NOTE — TELEPHONE ENCOUNTER
Caller: DAYANA ROSADO    Relationship: Emergency Contact    Best call back number:  [phone]    Which medication are you concerned about: SPIRONOLACTONE, LASIX, AND SOTALOL.    Who prescribed you this medication: DR CELESTIN    When did you start taking this medication: AFTER WATCHMAN PROCEDURE    What are your concerns: PATIENT GETS VERY WEAK.  PATIENT IS ON TWO FLUID RETENTION MEDICATION LASIX AND SPIRONOLACTONE. PATIENT'S DAUGHTER WOULD LIKE TO REVIEW MEDIATIONS, SUCH AS SOTALOL. SHE IS CONCERN ABOUT FLUID PILLS THAT MAY EFFECT HER POTASSIUM LEVEL TO GOING LOW. PLEASE REACH OUT TO PATIENT'S DAUGHTER AND REVIEW WHICH MEDICATION SHE SHOULD BE TAKE AFTER THE PROCEDURE.    How long have you had these concerns: AFTER APPOINTMENT WITH DR MARISCAL AND HE QUESTION THE REMOVAL OF MEDICATION  
Her potassium was normal  One of the fluid pills actually retains potassium  If she is still feeling weak she can see our nurse practitioner    DR EDWARD   Patient's daughter informed. She will check on Ms. Jane and call back if she wants to be seen sooner with NP   
No

## 2024-03-15 RX ORDER — SPIRONOLACTONE 25 MG/1
25 TABLET ORAL DAILY
Qty: 90 TABLET | Refills: 1 | Status: SHIPPED | OUTPATIENT
Start: 2024-03-15

## 2024-03-15 RX ORDER — SOTALOL HYDROCHLORIDE 120 MG/1
120 TABLET ORAL 2 TIMES DAILY
Qty: 180 TABLET | Refills: 1 | Status: SHIPPED | OUTPATIENT
Start: 2024-03-15

## 2024-03-15 NOTE — TELEPHONE ENCOUNTER
Caller: DAYANA ROSADO    Relationship: Emergency Contact    Best call back number:  [phone]    Requested Prescriptions:   Requested Prescriptions     Pending Prescriptions Disp Refills    sotalol (BETAPACE) 120 MG tablet [Pharmacy Med Name: SOTALOL 120 MG TABLET] 180 tablet 0     Sig: TAKE 1 TABLET BY MOUTH TWICE A DAY    spironolactone (ALDACTONE) 25 MG tablet       Sig: Take 1 tablet by mouth Daily.        Pharmacy where request should be sent: [facility] [id number] [facility] [address] [facility] - [phone] [facility] [phone] FX     Last office visit with prescribing clinician: 2/5/2024   Last telemedicine visit with prescribing clinician: Visit date not found   Next office visit with prescribing clinician: 6/3/2024     Additional details provided by patient:  PATIENT IS OUT OF MEDICATION TODAY.     Does the patient have less than a 3 day supply:  [x] Yes  [] No    Would you like a call back once the refill request has been completed: [] Yes [] No    If the office needs to give you a call back, can they leave a voicemail: [] Yes [] No    Ame Sy Rep   03/15/24 10:11 EDT

## 2024-03-20 PROCEDURE — 93296 REM INTERROG EVL PM/IDS: CPT | Performed by: INTERNAL MEDICINE

## 2024-03-20 PROCEDURE — 93294 REM INTERROG EVL PM/LDLS PM: CPT | Performed by: INTERNAL MEDICINE

## 2024-05-08 ENCOUNTER — OFFICE VISIT (OUTPATIENT)
Dept: CARDIOLOGY | Facility: CLINIC | Age: 89
End: 2024-05-08
Payer: MEDICARE

## 2024-05-08 VITALS
OXYGEN SATURATION: 98 % | DIASTOLIC BLOOD PRESSURE: 50 MMHG | SYSTOLIC BLOOD PRESSURE: 124 MMHG | HEART RATE: 76 BPM | BODY MASS INDEX: 23.24 KG/M2 | HEIGHT: 60 IN

## 2024-05-08 DIAGNOSIS — I48.0 PAROXYSMAL ATRIAL FIBRILLATION: ICD-10-CM

## 2024-05-08 DIAGNOSIS — Z95.818 PRESENCE OF WATCHMAN LEFT ATRIAL APPENDAGE CLOSURE DEVICE: ICD-10-CM

## 2024-05-08 DIAGNOSIS — I25.10 CHRONIC CORONARY ARTERY DISEASE: ICD-10-CM

## 2024-05-08 DIAGNOSIS — E78.2 MIXED HYPERLIPIDEMIA: ICD-10-CM

## 2024-05-08 DIAGNOSIS — I10 ESSENTIAL HYPERTENSION: ICD-10-CM

## 2024-05-08 DIAGNOSIS — Z95.2 HX OF AORTIC VALVE REPLACEMENT: ICD-10-CM

## 2024-05-08 DIAGNOSIS — Z95.0 PRESENCE OF CARDIAC PACEMAKER: ICD-10-CM

## 2024-05-08 DIAGNOSIS — R94.31 ABNORMAL EKG: Primary | ICD-10-CM

## 2024-05-08 PROCEDURE — 1159F MED LIST DOCD IN RCRD: CPT | Performed by: INTERNAL MEDICINE

## 2024-05-08 PROCEDURE — 1160F RVW MEDS BY RX/DR IN RCRD: CPT | Performed by: INTERNAL MEDICINE

## 2024-05-08 PROCEDURE — 93000 ELECTROCARDIOGRAM COMPLETE: CPT | Performed by: INTERNAL MEDICINE

## 2024-05-08 PROCEDURE — 99214 OFFICE O/P EST MOD 30 MIN: CPT | Performed by: INTERNAL MEDICINE

## 2024-05-08 RX ORDER — LUBIPROSTONE 24 UG/1
24 CAPSULE ORAL 2 TIMES DAILY WITH MEALS
COMMUNITY

## 2024-05-09 ENCOUNTER — TELEPHONE (OUTPATIENT)
Dept: CARDIOLOGY | Facility: CLINIC | Age: 89
End: 2024-05-09
Payer: MEDICARE

## 2024-05-09 NOTE — TELEPHONE ENCOUNTER
Caller: DAYANA ROSADO    Relationship: Emergency Contact    Best call back number: [phone]    What is the best time to reach you: ANY    Who are you requesting to speak with (clinical staff, provider,  specific staff member): CLINICAL        What was the call regarding: PATIENT'S DAUGHTER DAYANA STATED THAT SHE  PRESCRIPTION YESTERDAY FOR SOTALOL AND IT WAS  MG. DAYANA STATED THAT THE MEDICATION WAS CHANGED FROM 120 MG TO 80 MG AT THE HOSPITAL. DAYANA NEEDS TO KNOW IF PATIENT SHOULD BE TAKING THE SOTALOL 120 MG OR SOTALOL 80 MG. IF PATIENT SHOULD BE TAKING THE SOTALOL 80 MG MEDICATION A REFILL NEEDS TO BE SENT TO [facility] ON [facility]. PLEASE CONTACT DAYANA TO ADVISE. THANK YOU.     Is it okay if the provider responds through [facility]: YES

## 2024-05-10 RX ORDER — SOTALOL HYDROCHLORIDE 80 MG/1
80 TABLET ORAL 2 TIMES DAILY
Qty: 60 TABLET | Refills: 4 | Status: SHIPPED | OUTPATIENT
Start: 2024-05-10

## 2024-05-10 RX ORDER — POTASSIUM CHLORIDE 750 MG/1
10 CAPSULE, EXTENDED RELEASE ORAL 2 TIMES DAILY
Qty: 180 CAPSULE | Refills: 3 | Status: SHIPPED | OUTPATIENT
Start: 2024-05-10

## 2024-05-13 RX ORDER — DILTIAZEM HYDROCHLORIDE 240 MG/1
240 CAPSULE, COATED, EXTENDED RELEASE ORAL DAILY
Qty: 90 CAPSULE | Refills: 1 | Status: SHIPPED | OUTPATIENT
Start: 2024-05-13

## 2024-05-13 NOTE — TELEPHONE ENCOUNTER
Spoke with pt daughter and per Dr Crabtree the pt should be taking the 80 mg of Sotalol and the diltiazem was sent to the pharmacy this morning. Pt daughter will call the office with any other concerns.

## 2024-05-13 NOTE — TELEPHONE ENCOUNTER
Caller: JAYDEN JETER    Relationship: DAUGHTER    Best call back number: [phone]    Requested Prescriptions:   Requested Prescriptions     Pending Prescriptions Disp Refills    dilTIAZem CD (CARDIZEM CD) 240 MG 24 hr capsule       Sig: Take 1 capsule by mouth Daily.        Pharmacy where request should be sent: [facility] [id number] [facility] [address] [facility] - [phone]  - [phone] FX     Last office visit with prescribing clinician: 2/5/2024   Last telemedicine visit with prescribing clinician: Visit date not found   Next office visit with prescribing clinician: 6/10/2024     Additional details provided by patient: [facility] SAID THIS MEDICATION WAS CANCELLED. IS SHE STILL SUPPOSE TO BE TAKING THIS MEDICATION? PLEASE REFILL, IF SHE SHOULD NOT BE TAKING THIS MEDICATION PLEASE NOTIFY DAUGHTER    Does the patient have less than a 3 day supply:  [] Yes  [] No    Would you like a call back once the refill request has been completed: [] Yes [] No    If the office needs to give you a call back, can they leave a voicemail: [x] Yes [] No    Ame Lipscomb Rep   05/13/24 09:14 EDT

## 2024-07-01 ENCOUNTER — OFFICE VISIT (OUTPATIENT)
Dept: CARDIOLOGY | Facility: CLINIC | Age: 89
End: 2024-07-01
Payer: MEDICARE

## 2024-07-01 VITALS
WEIGHT: 119 LBS | BODY MASS INDEX: 23.36 KG/M2 | SYSTOLIC BLOOD PRESSURE: 130 MMHG | OXYGEN SATURATION: 96 % | HEIGHT: 60 IN | HEART RATE: 69 BPM | DIASTOLIC BLOOD PRESSURE: 62 MMHG

## 2024-07-01 DIAGNOSIS — I10 ESSENTIAL HYPERTENSION: ICD-10-CM

## 2024-07-01 DIAGNOSIS — Z95.0 PRESENCE OF CARDIAC PACEMAKER: ICD-10-CM

## 2024-07-01 DIAGNOSIS — I25.10 CHRONIC CORONARY ARTERY DISEASE: ICD-10-CM

## 2024-07-01 DIAGNOSIS — Z95.2 HX OF AORTIC VALVE REPLACEMENT: ICD-10-CM

## 2024-07-01 DIAGNOSIS — I48.0 PAROXYSMAL ATRIAL FIBRILLATION: Primary | ICD-10-CM

## 2024-07-01 DIAGNOSIS — Z95.818 PRESENCE OF WATCHMAN LEFT ATRIAL APPENDAGE CLOSURE DEVICE: ICD-10-CM

## 2024-07-01 DIAGNOSIS — E78.2 MIXED HYPERLIPIDEMIA: ICD-10-CM

## 2024-07-01 DIAGNOSIS — D50.0 IRON DEFICIENCY ANEMIA DUE TO CHRONIC BLOOD LOSS: ICD-10-CM

## 2024-07-01 PROCEDURE — 93000 ELECTROCARDIOGRAM COMPLETE: CPT | Performed by: INTERNAL MEDICINE

## 2024-07-01 PROCEDURE — 1160F RVW MEDS BY RX/DR IN RCRD: CPT | Performed by: INTERNAL MEDICINE

## 2024-07-01 PROCEDURE — 1159F MED LIST DOCD IN RCRD: CPT | Performed by: INTERNAL MEDICINE

## 2024-07-01 PROCEDURE — 99214 OFFICE O/P EST MOD 30 MIN: CPT | Performed by: INTERNAL MEDICINE

## 2024-07-01 NOTE — PROGRESS NOTES
CC: Sick sinus syndrome with dual-chamber pacemaker in situ follow up .    Sub  89-year-old adam patient had prior watchman implantation has pacemaker in situ comes in for follow-up  Patient had a prior bioprosthetic aortic valve placed in 2014 with elevated gradients with a mean gradient up to 29.  He also had a history of coronary artery disease with prior bypass surgery and bilateral renal artery stenosis, carotid disease and hyperlipidemia.    Previous history attached below for reference  89-year-old adam patient underwent watchman implantation in the last week of December and subsequently was admitted in January 2 week for pneumonia and effusions and was treated accordingly and comes in for follow-up.  Patient has sick sinus syndrome with pacemaker in situ and paroxysmal atrial fibrillation.  Patient had a bioprosthetic aortic valve placed in 2014 with elevated gradients with a mean gradient up to 29.  She has additional history of coronary disease with prior bypass surgery and bilateral renal artery stenosis carotid disease and dyslipidemia.  She is currently on sotalol to suppress her arrhythmia and she had a prior CT head revealing a small subdural hematoma and subsequently underwent watchman implantation in December 2023.  Patient continues to feel weak after recent hospitalization and she has moved to an alternative facility where the nutrition is better and currently she is on oxygen with chronic class III dyspnea.  Significant and recurrent epistaxis    Past Medical History:     Reviewed history from 06/13/2016 and no changes required:        AVR 2014-        Coronary artery disease: S/P CABG and PCI with stenting-        Carotid disease;left side 50-74%-Patrick Ocampo        Inferior myocardial infarction 12-06-        Asthma        Anxiety Disorder        Depression        G E R D        Hyperlipidemia        Hypertension-        Hyperthyroidism        fx thoracic ?         shoulder fx        Rotator cuff syndrome         Gout         Paroxysmal atrial fib        Arthritis        Hypothyroid        No Drug Allergies?         Eye exam:2014 &2015 Dr.Kelley Parekh in [place]         Rheumatoid Arthritis    Past Surgical History:     Reviewed history from 10/16/2018 and no changes required:        PCI/stent, LCX, 3-20-06, Cypher stent        PCI/stent x 2, LCX & priximal diagonal, 12-3-06, [employer] stents        cataract r eye 12/07  lt eye 01/08        CABG x 3  07-19-08        thoracentesis l lung 8/08        Right rotator cuff repair - Oct. 15, 2013        Cholecystectomy-2009        Left Knee Arthoplasty-2014        Left Shoulder Replacement 4/2014        Aortic Valve  Replacement 6/11/2014        Heart Catherization:2/18/2015        Pacemaker placed 6/6/16        Colonoscopy 2011         Surgery Finger 2018             Physical Exam    General:      well developed, well nourished, in no acute distress.    Head:      normocephalic and atraumatic.    Eyes:      PERRL/EOM intact, conjunctivae and sclerae clear without nystagmus.    Neck:      no  thyromegaly, trachea central with normal respiratory effort  Lungs:      clear bilaterally to auscultation.    Heart:       regular rate and rhythm, S1, S2 without rubs, or gallops  Skin:      intact without lesions or rashes.    Psych:      alert and cooperative; normal mood and affect; normal attention span and concentration.          Impressions    Dual-chamber pacemaker in situ with normal function home monitoring with 8% AF burden and patient currently on sotalol  Prior watchman implantation without complications currently on Plavix  History of renal artery stenosis and peripheral vascular disease stable currently on Plavix  Prior history of small subdural hematoma  Bioprosthetic aortic valve with elevated mean gradient up to 29  Hyperlipidemia on rosuvastatin  Hypothyroidism on levothyroxine  Coronary artery disease stable  without angina on Plavix  Iron deficiency anemia on ferrous sulfate  Patient is a frail patient with multiple medical problems  Medications reviewed and follow-up appointments made        ECG 12 Lead    Date/Time: 7/1/2024 3:02 PM  Performed by: Pk Crabtree MD    Authorized by: Pk Crabtree MD  Comparison: compared with previous ECG   Similar to previous ECG  Rhythm: sinus rhythm and paced  Rate: normal  Conduction: left bundle branch block      Electronically signed by Pk Crabtree MD, 07/01/24, 3:02 PM EDT.

## 2024-07-09 RX ORDER — ISOSORBIDE MONONITRATE 30 MG/1
30 TABLET, EXTENDED RELEASE ORAL 2 TIMES DAILY
Qty: 180 TABLET | Refills: 1 | Status: SHIPPED | OUTPATIENT
Start: 2024-07-09

## 2024-07-26 RX ORDER — DOXAZOSIN 2 MG/1
2 TABLET ORAL DAILY
Qty: 90 TABLET | Refills: 0 | Status: SHIPPED | OUTPATIENT
Start: 2024-07-26

## 2024-07-26 RX ORDER — ROSUVASTATIN CALCIUM 20 MG/1
20 TABLET, COATED ORAL DAILY
Qty: 90 TABLET | Refills: 0 | Status: SHIPPED | OUTPATIENT
Start: 2024-07-26

## 2024-07-29 ENCOUNTER — HOSPITAL ENCOUNTER (INPATIENT)
Facility: HOSPITAL | Age: 89
LOS: 4 days | Discharge: NURSING, REHAB OR LONG-TERM CARE | End: 2024-08-02
Attending: INTERNAL MEDICINE | Admitting: INTERNAL MEDICINE
Payer: MEDICARE

## 2024-07-29 ENCOUNTER — APPOINTMENT (OUTPATIENT)
Dept: CT IMAGING | Facility: HOSPITAL | Age: 89
End: 2024-07-29
Payer: MEDICARE

## 2024-07-29 ENCOUNTER — APPOINTMENT (OUTPATIENT)
Dept: GENERAL RADIOLOGY | Facility: HOSPITAL | Age: 89
End: 2024-07-29
Payer: MEDICARE

## 2024-07-29 DIAGNOSIS — R06.00 DYSPNEA, UNSPECIFIED TYPE: Primary | ICD-10-CM

## 2024-07-29 DIAGNOSIS — I50.9 ACUTE ON CHRONIC CONGESTIVE HEART FAILURE, UNSPECIFIED HEART FAILURE TYPE: ICD-10-CM

## 2024-07-29 PROBLEM — D72.829 LEUKOCYTOSIS: Status: ACTIVE | Noted: 2024-07-29

## 2024-07-29 PROBLEM — I50.33 ACUTE ON CHRONIC HEART FAILURE WITH PRESERVED EJECTION FRACTION (HFPEF): Status: ACTIVE | Noted: 2024-07-29

## 2024-07-29 PROBLEM — J96.21 ACUTE ON CHRONIC RESPIRATORY FAILURE WITH HYPOXIA: Status: ACTIVE | Noted: 2024-07-29

## 2024-07-29 LAB
ANION GAP SERPL CALCULATED.3IONS-SCNC: 11 MMOL/L (ref 5–15)
ARTERIAL PATENCY WRIST A: POSITIVE
ATMOSPHERIC PRESS: ABNORMAL MM[HG]
B PARAPERT DNA SPEC QL NAA+PROBE: NOT DETECTED
B PERT DNA SPEC QL NAA+PROBE: NOT DETECTED
BACTERIA UR QL AUTO: ABNORMAL /HPF
BASE EXCESS BLDA CALC-SCNC: 0.3 MMOL/L (ref 0–3)
BASOPHILS # BLD AUTO: 0.04 10*3/MM3 (ref 0–0.2)
BASOPHILS NFR BLD AUTO: 0.3 % (ref 0–1.5)
BDY SITE: ABNORMAL
BILIRUB UR QL STRIP: NEGATIVE
BUN SERPL-MCNC: 19 MG/DL (ref 8–23)
BUN/CREAT SERPL: 18.1 (ref 7–25)
C PNEUM DNA NPH QL NAA+NON-PROBE: NOT DETECTED
CALCIUM SPEC-SCNC: 9.2 MG/DL (ref 8.6–10.5)
CHLORIDE SERPL-SCNC: 100 MMOL/L (ref 98–107)
CLARITY UR: CLEAR
CO2 BLDA-SCNC: 24.6 MMOL/L (ref 22–29)
CO2 SERPL-SCNC: 22 MMOL/L (ref 22–29)
COLOR UR: YELLOW
CREAT SERPL-MCNC: 1.05 MG/DL (ref 0.57–1)
D DIMER PPP FEU-MCNC: 2.75 MG/L (FEU) (ref 0–0.89)
DEPRECATED RDW RBC AUTO: 61.3 FL (ref 37–54)
EGFRCR SERPLBLD CKD-EPI 2021: 50.9 ML/MIN/1.73
EOSINOPHIL # BLD AUTO: 0.14 10*3/MM3 (ref 0–0.4)
EOSINOPHIL NFR BLD AUTO: 1.2 % (ref 0.3–6.2)
ERYTHROCYTE [DISTWIDTH] IN BLOOD BY AUTOMATED COUNT: 17 % (ref 12.3–15.4)
FLUAV SUBTYP SPEC NAA+PROBE: NOT DETECTED
FLUBV RNA ISLT QL NAA+PROBE: NOT DETECTED
GLUCOSE SERPL-MCNC: 120 MG/DL (ref 65–99)
GLUCOSE UR STRIP-MCNC: NEGATIVE MG/DL
HADV DNA SPEC NAA+PROBE: NOT DETECTED
HCO3 BLDA-SCNC: 23.7 MMOL/L (ref 21–28)
HCOV 229E RNA SPEC QL NAA+PROBE: NOT DETECTED
HCOV HKU1 RNA SPEC QL NAA+PROBE: NOT DETECTED
HCOV NL63 RNA SPEC QL NAA+PROBE: NOT DETECTED
HCOV OC43 RNA SPEC QL NAA+PROBE: NOT DETECTED
HCT VFR BLD AUTO: 32.2 % (ref 34–46.6)
HEMODILUTION: NO
HGB BLD-MCNC: 9.7 G/DL (ref 12–15.9)
HGB UR QL STRIP.AUTO: NEGATIVE
HMPV RNA NPH QL NAA+NON-PROBE: NOT DETECTED
HOLD SPECIMEN: NORMAL
HOLD SPECIMEN: NORMAL
HPIV1 RNA ISLT QL NAA+PROBE: NOT DETECTED
HPIV2 RNA SPEC QL NAA+PROBE: NOT DETECTED
HPIV3 RNA NPH QL NAA+PROBE: NOT DETECTED
HPIV4 P GENE NPH QL NAA+PROBE: NOT DETECTED
HYALINE CASTS UR QL AUTO: ABNORMAL /LPF
IMM GRANULOCYTES # BLD AUTO: 0.04 10*3/MM3 (ref 0–0.05)
IMM GRANULOCYTES NFR BLD AUTO: 0.3 % (ref 0–0.5)
INHALED O2 CONCENTRATION: 36 %
KETONES UR QL STRIP: NEGATIVE
LEUKOCYTE ESTERASE UR QL STRIP.AUTO: ABNORMAL
LYMPHOCYTES # BLD AUTO: 1.21 10*3/MM3 (ref 0.7–3.1)
LYMPHOCYTES NFR BLD AUTO: 10.3 % (ref 19.6–45.3)
M PNEUMO IGG SER IA-ACNC: NOT DETECTED
MCH RBC QN AUTO: 29.9 PG (ref 26.6–33)
MCHC RBC AUTO-ENTMCNC: 30.1 G/DL (ref 31.5–35.7)
MCV RBC AUTO: 99.4 FL (ref 79–97)
MODALITY: ABNORMAL
MONOCYTES # BLD AUTO: 0.59 10*3/MM3 (ref 0.1–0.9)
MONOCYTES NFR BLD AUTO: 5 % (ref 5–12)
NEUTROPHILS NFR BLD AUTO: 82.9 % (ref 42.7–76)
NEUTROPHILS NFR BLD AUTO: 9.68 10*3/MM3 (ref 1.7–7)
NITRITE UR QL STRIP: NEGATIVE
NRBC BLD AUTO-RTO: 0 /100 WBC (ref 0–0.2)
NT-PROBNP SERPL-MCNC: 8366 PG/ML (ref 0–1800)
PCO2 BLDA: 32.4 MM HG (ref 35–48)
PH BLDA: 7.47 PH UNITS (ref 7.35–7.45)
PH UR STRIP.AUTO: 7.5 [PH] (ref 5–8)
PLATELET # BLD AUTO: 270 10*3/MM3 (ref 140–450)
PMV BLD AUTO: 10.2 FL (ref 6–12)
PO2 BLD: 184 MM[HG] (ref 0–500)
PO2 BLDA: 66.2 MM HG (ref 83–108)
POTASSIUM SERPL-SCNC: 5.2 MMOL/L (ref 3.5–5.2)
PROT UR QL STRIP: NEGATIVE
RBC # BLD AUTO: 3.24 10*6/MM3 (ref 3.77–5.28)
RBC # UR STRIP: ABNORMAL /HPF
REF LAB TEST METHOD: ABNORMAL
RHINOVIRUS RNA SPEC NAA+PROBE: NOT DETECTED
RSV RNA NPH QL NAA+NON-PROBE: NOT DETECTED
SAO2 % BLDCOA: 94.2 % (ref 94–98)
SARS-COV-2 RNA NPH QL NAA+NON-PROBE: NOT DETECTED
SODIUM SERPL-SCNC: 133 MMOL/L (ref 136–145)
SP GR UR STRIP: 1.03 (ref 1–1.03)
SQUAMOUS #/AREA URNS HPF: ABNORMAL /HPF
UROBILINOGEN UR QL STRIP: ABNORMAL
WBC # UR STRIP: ABNORMAL /HPF
WBC NRBC COR # BLD AUTO: 11.7 10*3/MM3 (ref 3.4–10.8)
WHOLE BLOOD HOLD COAG: NORMAL
WHOLE BLOOD HOLD SPECIMEN: NORMAL

## 2024-07-29 PROCEDURE — 87040 BLOOD CULTURE FOR BACTERIA: CPT | Performed by: NURSE PRACTITIONER

## 2024-07-29 PROCEDURE — 36415 COLL VENOUS BLD VENIPUNCTURE: CPT

## 2024-07-29 PROCEDURE — 85379 FIBRIN DEGRADATION QUANT: CPT | Performed by: EMERGENCY MEDICINE

## 2024-07-29 PROCEDURE — 25510000001 IOPAMIDOL PER 1 ML: Performed by: EMERGENCY MEDICINE

## 2024-07-29 PROCEDURE — 71045 X-RAY EXAM CHEST 1 VIEW: CPT

## 2024-07-29 PROCEDURE — 0202U NFCT DS 22 TRGT SARS-COV-2: CPT | Performed by: NURSE PRACTITIONER

## 2024-07-29 PROCEDURE — 82803 BLOOD GASES ANY COMBINATION: CPT | Performed by: EMERGENCY MEDICINE

## 2024-07-29 PROCEDURE — 93005 ELECTROCARDIOGRAM TRACING: CPT | Performed by: EMERGENCY MEDICINE

## 2024-07-29 PROCEDURE — 83880 ASSAY OF NATRIURETIC PEPTIDE: CPT | Performed by: EMERGENCY MEDICINE

## 2024-07-29 PROCEDURE — 81001 URINALYSIS AUTO W/SCOPE: CPT | Performed by: NURSE PRACTITIONER

## 2024-07-29 PROCEDURE — 99285 EMERGENCY DEPT VISIT HI MDM: CPT

## 2024-07-29 PROCEDURE — 80048 BASIC METABOLIC PNL TOTAL CA: CPT | Performed by: EMERGENCY MEDICINE

## 2024-07-29 PROCEDURE — 25010000002 FUROSEMIDE PER 20 MG: Performed by: EMERGENCY MEDICINE

## 2024-07-29 PROCEDURE — 71275 CT ANGIOGRAPHY CHEST: CPT

## 2024-07-29 PROCEDURE — 36600 WITHDRAWAL OF ARTERIAL BLOOD: CPT | Performed by: EMERGENCY MEDICINE

## 2024-07-29 PROCEDURE — 93005 ELECTROCARDIOGRAM TRACING: CPT

## 2024-07-29 PROCEDURE — 87086 URINE CULTURE/COLONY COUNT: CPT | Performed by: NURSE PRACTITIONER

## 2024-07-29 PROCEDURE — 85025 COMPLETE CBC W/AUTO DIFF WBC: CPT | Performed by: EMERGENCY MEDICINE

## 2024-07-29 RX ORDER — FUROSEMIDE 10 MG/ML
80 INJECTION INTRAMUSCULAR; INTRAVENOUS ONCE
Status: COMPLETED | OUTPATIENT
Start: 2024-07-29 | End: 2024-07-29

## 2024-07-29 RX ORDER — SODIUM CHLORIDE 0.9 % (FLUSH) 0.9 %
10 SYRINGE (ML) INJECTION AS NEEDED
Status: DISCONTINUED | OUTPATIENT
Start: 2024-07-29 | End: 2024-08-02 | Stop reason: HOSPADM

## 2024-07-29 RX ADMIN — FUROSEMIDE 80 MG: 10 INJECTION, SOLUTION INTRAMUSCULAR; INTRAVENOUS at 20:45

## 2024-07-29 RX ADMIN — Medication 10 ML: at 18:30

## 2024-07-29 RX ADMIN — IOPAMIDOL 100 ML: 755 INJECTION, SOLUTION INTRAVENOUS at 19:56

## 2024-07-29 NOTE — Clinical Note
Level of Care: Med/Surg [1]   Diagnosis: Acute on chronic respiratory failure with hypoxia [[id number]]   Admitting Physician: TOMY MCDOWELL [[id number]]   Attending Physician: TOMY MCDOWELL [[id number]]   Bed Request Comments: cardiac monitor   Certification: I Certify That Inpatient Hospital Services Are Medically Necessary For Greater Than 2 Midnights

## 2024-07-30 ENCOUNTER — APPOINTMENT (OUTPATIENT)
Dept: CARDIOLOGY | Facility: HOSPITAL | Age: 89
End: 2024-07-30
Payer: MEDICARE

## 2024-07-30 LAB
ANION GAP SERPL CALCULATED.3IONS-SCNC: 13.3 MMOL/L (ref 5–15)
BASOPHILS # BLD AUTO: 0.05 10*3/MM3 (ref 0–0.2)
BASOPHILS NFR BLD AUTO: 0.4 % (ref 0–1.5)
BUN SERPL-MCNC: 19 MG/DL (ref 8–23)
BUN/CREAT SERPL: 19.8 (ref 7–25)
CALCIUM SPEC-SCNC: 9.8 MG/DL (ref 8.6–10.5)
CHLORIDE SERPL-SCNC: 97 MMOL/L (ref 98–107)
CO2 SERPL-SCNC: 25.7 MMOL/L (ref 22–29)
CREAT SERPL-MCNC: 0.96 MG/DL (ref 0.57–1)
DEPRECATED RDW RBC AUTO: 59.7 FL (ref 37–54)
EGFRCR SERPLBLD CKD-EPI 2021: 56.7 ML/MIN/1.73
EOSINOPHIL # BLD AUTO: 0.2 10*3/MM3 (ref 0–0.4)
EOSINOPHIL NFR BLD AUTO: 1.8 % (ref 0.3–6.2)
ERYTHROCYTE [DISTWIDTH] IN BLOOD BY AUTOMATED COUNT: 17.2 % (ref 12.3–15.4)
GLUCOSE SERPL-MCNC: 91 MG/DL (ref 65–99)
HCT VFR BLD AUTO: 32.7 % (ref 34–46.6)
HGB BLD-MCNC: 10.4 G/DL (ref 12–15.9)
IMM GRANULOCYTES # BLD AUTO: 0.05 10*3/MM3 (ref 0–0.05)
IMM GRANULOCYTES NFR BLD AUTO: 0.4 % (ref 0–0.5)
LYMPHOCYTES # BLD AUTO: 1.69 10*3/MM3 (ref 0.7–3.1)
LYMPHOCYTES NFR BLD AUTO: 15.1 % (ref 19.6–45.3)
MCH RBC QN AUTO: 30.5 PG (ref 26.6–33)
MCHC RBC AUTO-ENTMCNC: 31.8 G/DL (ref 31.5–35.7)
MCV RBC AUTO: 95.9 FL (ref 79–97)
MONOCYTES # BLD AUTO: 0.39 10*3/MM3 (ref 0.1–0.9)
MONOCYTES NFR BLD AUTO: 3.5 % (ref 5–12)
NEUTROPHILS NFR BLD AUTO: 78.8 % (ref 42.7–76)
NEUTROPHILS NFR BLD AUTO: 8.8 10*3/MM3 (ref 1.7–7)
NRBC BLD AUTO-RTO: 0 /100 WBC (ref 0–0.2)
PLATELET # BLD AUTO: 236 10*3/MM3 (ref 140–450)
PMV BLD AUTO: 10.9 FL (ref 6–12)
POTASSIUM SERPL-SCNC: 4.1 MMOL/L (ref 3.5–5.2)
QT INTERVAL: 445 MS
QTC INTERVAL: 480 MS
RBC # BLD AUTO: 3.41 10*6/MM3 (ref 3.77–5.28)
SODIUM SERPL-SCNC: 136 MMOL/L (ref 136–145)
TSH SERPL DL<=0.05 MIU/L-ACNC: 2.94 UIU/ML (ref 0.27–4.2)
WBC NRBC COR # BLD AUTO: 11.18 10*3/MM3 (ref 3.4–10.8)

## 2024-07-30 PROCEDURE — 25010000002 FUROSEMIDE PER 20 MG: Performed by: NURSE PRACTITIONER

## 2024-07-30 PROCEDURE — 25010000002 AMIODARONE IN DEXTROSE 5% 360-4.14 MG/200ML-% SOLUTION: Performed by: INTERNAL MEDICINE

## 2024-07-30 PROCEDURE — 84443 ASSAY THYROID STIM HORMONE: CPT | Performed by: NURSE PRACTITIONER

## 2024-07-30 PROCEDURE — 80048 BASIC METABOLIC PNL TOTAL CA: CPT | Performed by: NURSE PRACTITIONER

## 2024-07-30 PROCEDURE — 25010000002 AZITHROMYCIN PER 500 MG: Performed by: NURSE PRACTITIONER

## 2024-07-30 PROCEDURE — 93010 ELECTROCARDIOGRAM REPORT: CPT | Performed by: INTERNAL MEDICINE

## 2024-07-30 PROCEDURE — 93005 ELECTROCARDIOGRAM TRACING: CPT | Performed by: NURSE PRACTITIONER

## 2024-07-30 PROCEDURE — 93306 TTE W/DOPPLER COMPLETE: CPT | Performed by: INTERNAL MEDICINE

## 2024-07-30 PROCEDURE — 25010000002 ENOXAPARIN PER 10 MG: Performed by: INTERNAL MEDICINE

## 2024-07-30 PROCEDURE — 99223 1ST HOSP IP/OBS HIGH 75: CPT | Performed by: INTERNAL MEDICINE

## 2024-07-30 PROCEDURE — 25010000002 AMIODARONE IN DEXTROSE 5% 150-4.21 MG/100ML-% SOLUTION: Performed by: INTERNAL MEDICINE

## 2024-07-30 PROCEDURE — 25010000002 CEFTRIAXONE PER 250 MG: Performed by: NURSE PRACTITIONER

## 2024-07-30 PROCEDURE — 97162 PT EVAL MOD COMPLEX 30 MIN: CPT

## 2024-07-30 PROCEDURE — 85025 COMPLETE CBC W/AUTO DIFF WBC: CPT | Performed by: NURSE PRACTITIONER

## 2024-07-30 PROCEDURE — 25810000003 SODIUM CHLORIDE 0.9 % SOLUTION 250 ML FLEX CONT: Performed by: NURSE PRACTITIONER

## 2024-07-30 PROCEDURE — 25010000002 CEFTRIAXONE PER 250 MG: Performed by: INTERNAL MEDICINE

## 2024-07-30 PROCEDURE — 93306 TTE W/DOPPLER COMPLETE: CPT

## 2024-07-30 RX ORDER — LEVOTHYROXINE SODIUM 0.07 MG/1
75 TABLET ORAL DAILY
Status: DISCONTINUED | OUTPATIENT
Start: 2024-07-30 | End: 2024-07-31

## 2024-07-30 RX ORDER — ENOXAPARIN SODIUM 100 MG/ML
40 INJECTION SUBCUTANEOUS EVERY 24 HOURS
Status: DISCONTINUED | OUTPATIENT
Start: 2024-07-30 | End: 2024-07-31

## 2024-07-30 RX ORDER — AMIODARONE HYDROCHLORIDE 200 MG/1
200 TABLET ORAL DAILY
Status: DISCONTINUED | OUTPATIENT
Start: 2024-08-25 | End: 2024-07-30

## 2024-07-30 RX ORDER — CHOLECALCIFEROL (VITAMIN D3) 25 MCG
1000 CAPSULE ORAL DAILY
COMMUNITY

## 2024-07-30 RX ORDER — AMIODARONE HYDROCHLORIDE 200 MG/1
200 TABLET ORAL ONCE
Status: DISCONTINUED | OUTPATIENT
Start: 2024-07-31 | End: 2024-07-30

## 2024-07-30 RX ORDER — DILTIAZEM HYDROCHLORIDE 240 MG/1
240 CAPSULE, COATED, EXTENDED RELEASE ORAL
Status: DISCONTINUED | OUTPATIENT
Start: 2024-07-30 | End: 2024-07-31

## 2024-07-30 RX ORDER — AMIODARONE HYDROCHLORIDE 200 MG/1
200 TABLET ORAL EVERY 12 HOURS
Status: DISCONTINUED | OUTPATIENT
Start: 2024-08-11 | End: 2024-07-30

## 2024-07-30 RX ORDER — LEVOTHYROXINE SODIUM 0.07 MG/1
75 TABLET ORAL DAILY
COMMUNITY

## 2024-07-30 RX ORDER — AMIODARONE HYDROCHLORIDE 200 MG/1
200 TABLET ORAL EVERY 12 HOURS SCHEDULED
Status: DISCONTINUED | OUTPATIENT
Start: 2024-08-01 | End: 2024-07-30

## 2024-07-30 RX ORDER — GUAIFENESIN 600 MG/1
1200 TABLET, EXTENDED RELEASE ORAL EVERY 12 HOURS SCHEDULED
Status: DISCONTINUED | OUTPATIENT
Start: 2024-07-30 | End: 2024-08-02 | Stop reason: HOSPADM

## 2024-07-30 RX ORDER — ISOSORBIDE MONONITRATE 30 MG/1
30 TABLET, EXTENDED RELEASE ORAL
Status: DISCONTINUED | OUTPATIENT
Start: 2024-07-30 | End: 2024-07-31

## 2024-07-30 RX ORDER — FUROSEMIDE 10 MG/ML
40 INJECTION INTRAMUSCULAR; INTRAVENOUS EVERY 12 HOURS
Status: DISCONTINUED | OUTPATIENT
Start: 2024-07-30 | End: 2024-07-31

## 2024-07-30 RX ORDER — COLCHICINE 0.6 MG/1
0.6 TABLET ORAL DAILY
Status: DISCONTINUED | OUTPATIENT
Start: 2024-07-30 | End: 2024-07-30

## 2024-07-30 RX ORDER — PANTOPRAZOLE SODIUM 40 MG/1
40 TABLET, DELAYED RELEASE ORAL
Status: DISCONTINUED | OUTPATIENT
Start: 2024-07-30 | End: 2024-08-02 | Stop reason: HOSPADM

## 2024-07-30 RX ORDER — CLOPIDOGREL BISULFATE 75 MG/1
75 TABLET ORAL DAILY
Status: DISCONTINUED | OUTPATIENT
Start: 2024-07-30 | End: 2024-08-02 | Stop reason: HOSPADM

## 2024-07-30 RX ORDER — FOLIC ACID 1 MG/1
1 TABLET ORAL DAILY
Status: DISCONTINUED | OUTPATIENT
Start: 2024-07-30 | End: 2024-08-02 | Stop reason: HOSPADM

## 2024-07-30 RX ADMIN — GUAIFENESIN 1200 MG: 600 TABLET, EXTENDED RELEASE ORAL at 12:36

## 2024-07-30 RX ADMIN — FUROSEMIDE 40 MG: 10 INJECTION, SOLUTION INTRAMUSCULAR; INTRAVENOUS at 15:00

## 2024-07-30 RX ADMIN — AMIODARONE HYDROCHLORIDE 1 MG/MIN: 1.8 INJECTION, SOLUTION INTRAVENOUS at 23:23

## 2024-07-30 RX ADMIN — CEFTRIAXONE 1000 MG: 1 INJECTION, POWDER, FOR SOLUTION INTRAMUSCULAR; INTRAVENOUS at 00:51

## 2024-07-30 RX ADMIN — DILTIAZEM HYDROCHLORIDE 240 MG: 240 CAPSULE, EXTENDED RELEASE ORAL at 12:37

## 2024-07-30 RX ADMIN — AZITHROMYCIN MONOHYDRATE 500 MG: 500 INJECTION, POWDER, LYOPHILIZED, FOR SOLUTION INTRAVENOUS at 01:57

## 2024-07-30 RX ADMIN — CEFTRIAXONE 2000 MG: 2 INJECTION, POWDER, FOR SOLUTION INTRAMUSCULAR; INTRAVENOUS at 16:21

## 2024-07-30 RX ADMIN — Medication 10 ML: at 23:07

## 2024-07-30 RX ADMIN — ENOXAPARIN SODIUM 40 MG: 100 INJECTION SUBCUTANEOUS at 16:16

## 2024-07-30 RX ADMIN — FOLIC ACID 1 MG: 1 TABLET ORAL at 16:16

## 2024-07-30 RX ADMIN — LEVOTHYROXINE SODIUM 75 MCG: 0.07 TABLET ORAL at 16:16

## 2024-07-30 RX ADMIN — PANTOPRAZOLE SODIUM 40 MG: 40 TABLET, DELAYED RELEASE ORAL at 16:16

## 2024-07-30 RX ADMIN — FUROSEMIDE 40 MG: 10 INJECTION, SOLUTION INTRAMUSCULAR; INTRAVENOUS at 21:53

## 2024-07-30 RX ADMIN — ISOSORBIDE MONONITRATE 30 MG: 30 TABLET, EXTENDED RELEASE ORAL at 16:16

## 2024-07-30 RX ADMIN — CLOPIDOGREL BISULFATE 75 MG: 75 TABLET ORAL at 16:16

## 2024-07-30 RX ADMIN — Medication 10 ML: at 21:05

## 2024-07-30 RX ADMIN — GUAIFENESIN 1200 MG: 600 TABLET, EXTENDED RELEASE ORAL at 21:05

## 2024-07-30 RX ADMIN — AMIODARONE HYDROCHLORIDE 150 MG: 1.5 INJECTION, SOLUTION INTRAVENOUS at 23:06

## 2024-07-30 NOTE — H&P
[facility] Medicine Services  History & Physical    Patient Name: Tracy Jane  : 1934  MRN: [id number]  Primary Care Physician:  Amparo Eisenberg APRN  Date of admission: 2024  Date and Time of Service: 2024 at 2100    Subjective      Chief Complaint: shortness of air     History of Present Illness: Tracy Jane is a very pleasant  89 y.o. female with a CMH of atrial fibrillation status post watchman implantation, sick sinus syndrome, cardiac pacemaker in situ, valvular insufficiency with prior bioprosthetic aortic valve , coronary artery disease status post CABG, bilateral renal artery stenosis, carotid artery disease, on home oxygen 3 L via nasal cannula continuously, peripheral  vascular disease, hyperlipidemia, rheumatoid arthritis, depression anxiety, hyperlipidemia, hypertension, GERD, former smoker who presented to [facility] on 2024 with complaints of increased shortness of air past several days.  She reports increased shortness of air with activity however she is short of breath at rest.  She denies any chest pain.  She reports a nonproductive cough.  No bilateral lower extremity edema.  She denies any fevers chills or diaphoresis.    Labs today showed initial ABG with a pH of 7.472, pCO2 32.4 pO2 66.2, proBNP elevated at 8366, sodium 133 creatinine 1.05 glucose 120 WBC 11.7 and afebrile, hemoglobin 9.7 was previously 10.1 in February, D-dimer elevated at 2.75.  CT angiogram per radiology shows no evidence of pulmonary embolism but shows multifocal bilateral groundglass and consolidative opacities suspicious for multifocal pneumonia mild pulmonary edema with small bilateral pleural effusions.  EKG shows atrial paced rhythm heart rate 70 left bundle branch block ST elevation secondary to interventricular conduction delay.  Respiratory panel negative.  Urinalysis negative for infection.    She is stable on 3 L oxygen via nasal cannula and given 80 mg  IV Lasix in the emergency department.  Cardiology has been consulted.  Given leukocytosis and consolidative opacities suspicious for multifocal pneumonia, she was started on 1 g IV ceftriaxone 500 mg IV azithromycin with blood cultures pending.    Review of Systems   Constitutional: Negative.    HENT: Negative.     Eyes: Negative.    Respiratory:  Positive for shortness of breath and wheezing.    Cardiovascular: Negative.    Gastrointestinal: Negative.    Endocrine: Negative.    Genitourinary: Negative.    Musculoskeletal: Negative.    Skin: Negative.    Allergic/Immunologic: Negative.    Neurological: Negative.    Hematological: Negative.    Psychiatric/Behavioral: Negative.     All other systems reviewed and are negative.      Personal History     Past Medical History:   Diagnosis Date    Abnormal ECG     Anxiety     Aortic valve replaced     Arrhythmia     Asthma     Atrial fibrillation     CAD (coronary artery disease)     Carotid artery disease     CHF (congestive heart failure)     Congenital heart disease     GERD (gastroesophageal reflux disease)     Gout     Heart murmur     Hemorrhagic stroke 2023    was on Coumadin for A-fib    Hyperlipidemia     Hypertension     Hyperthyroidism     Hypothyroidism     Myocardial infarction     Overactive bladder     Pacemaker     [facility]    Skin cancer        Past Surgical History:   Procedure Laterality Date    AORTIC VALVE REPAIR/REPLACEMENT  2014    porcine    ATRIAL APPENDAGE EXCLUSION LEFT WITH TRANSESOPHAGEAL ECHOCARDIOGRAM Right 12/26/2023    Procedure: Atrial Appendage Occlusion;  Surgeon: Pk Crabtree MD;  Location: [facility] CATH INVASIVE LOCATION;  Service: Cardiovascular;  Laterality: Right;    ATRIAL APPENDAGE EXCLUSION LEFT WITH TRANSESOPHAGEAL ECHOCARDIOGRAM N/A 12/26/2023    Procedure: Atrial Appendage Occlusion;  Surgeon: Gen Caballero MD;  Location: [facility] CATH INVASIVE LOCATION;  Service: Cardiovascular;  Laterality: N/A;    BASAL CELL  CARCINOMA EXCISION      spine, nose and arm, leg 2020    BREAST BIOPSY      CARDIAC CATHETERIZATION      CARDIAC VALVE REPLACEMENT      CAROTID STENT      CATARACT EXTRACTION      CHOLECYSTECTOMY      CORONARY ARTERY BYPASS GRAFT      CORONARY STENT PLACEMENT      ENDOSCOPY N/A 04/24/2023    Procedure: ESOPHAGOGASTRODUODENOSCOPY with antrum body biopsies;  Surgeon: REGGIE Ace MD;  Location: [facility] ENDOSCOPY;  Service: Gastroenterology;  Laterality: N/A;  hiatal hernia    FINGER SURGERY      INSERT / REPLACE / REMOVE PACEMAKER      KNEE SURGERY      PACEMAKER IMPLANTATION      [facility]    SHOULDER SURGERY      RACHEL Bilateral 11/03/2023       Family History: family history includes Cancer in her sister and sister; Heart attack in her father; Heart disease in her brother, father, and sister; Hypertension in her mother; Kidney cancer in her sister; Stroke in her sister. Otherwise pertinent FHx was reviewed and not pertinent to current issue.    Social History:  reports that she quit smoking about 66 years ago. Her smoking use included cigarettes. She started smoking about 70 years ago. She has a 4 pack-year smoking history. She has been exposed to tobacco smoke. She has never used smokeless tobacco. She reports that she does not drink alcohol and does not use drugs.    Home Medications:  Prior to Admission Medications       Prescriptions Last Dose Informant Patient Reported? Taking?    acetaminophen (TYLENOL) 650 MG 8 hr tablet   Yes No    Take 1 tablet by mouth Every 8 (Eight) Hours As Needed.    calcium (OS-ROSHNI) 600 MG tablet   Yes No    Take 2 tablets by mouth Daily.    Cholecalciferol 25 MCG (1000 UT) tablet   Yes No    Take 1 tablet by mouth Daily.    clopidogrel (PLAVIX) 75 MG tablet   No No    Take 1 tablet by mouth Daily.    colchicine 0.6 MG tablet   Yes No    Take 1 tablet by mouth Daily.    COLLAGEN PO   Yes No    Take 20 g by mouth Daily.    Cranberry 500 MG capsule   Yes No    Take 500 mg by mouth  Every Night.    dilTIAZem CD (CARDIZEM CD) 240 MG 24 hr capsule   No No    Take 1 capsule by mouth Daily.    doxazosin (CARDURA) 2 MG tablet   No No    TAKE 1 TABLET BY MOUTH DAILY    ferrous sulfate 324 (65 Fe) MG tablet delayed-release EC tablet   Yes No    Take 1 tablet by mouth Daily With Breakfast.    folic acid (FOLVITE) 1 MG tablet   Yes No    Take 1 tablet by mouth Daily.    furosemide (LASIX) 40 MG tablet   Yes No    Take 1 tablet by mouth Daily.    Glucosamine-Chondroit-Vit C-Mn (Glucosamine 1500 Complex) capsule   Yes No    Take 1,500 mg by mouth 2 (Two) Times a Day.    isosorbide mononitrate (IMDUR) 30 MG 24 hr tablet   No No    TAKE 1 TABLET BY MOUTH TWICE A DAY    levothyroxine (SYNTHROID, LEVOTHROID) 75 MCG tablet   No No    TAKE ONE TABLET BY MOUTH DAILY 5 DAYS A WEEK    lubiprostone (AMITIZA) 24 MCG capsule   Yes No    Take 1 capsule by mouth As Needed.    melatonin 5 MG sublingual tablet sublingual tablet   Yes No    Place 1 tablet under the tongue At Night As Needed.    methotrexate 2.5 MG tablet   Yes No    Take 5 tablets by mouth Take As Directed. 5 tablets on Sunday Morning AND 5 tablets Sunday Evening = 10 Total Tablets on Sunday    Methylsulfonylmethane (MSM) 1000 MG tablet   Yes No    Take 1 tablet by mouth 2 (Two) Times a Day.    Multiple Vitamin (MULTIVITAMIN) tablet   Yes No    Take 1 tablet by mouth Daily.    Omega-3 Fatty Acids (fish oil) 1200 MG capsule capsule   Yes No    Take 1 capsule by mouth 2 (Two) Times a Day With Meals.    pantoprazole (PROTONIX) 40 MG EC tablet   Yes No    Take 1 tablet by mouth Daily.    polyethylene glycol (MiraLax) 17 g packet   Yes No    Take 17 g by mouth Every Night.    potassium chloride (K-DUR,KLOR-CON) 20 MEQ CR tablet   Yes No    Take 1 tablet by mouth Daily.    rosuvastatin (CRESTOR) 20 MG tablet   No No    TAKE 1 TABLET BY MOUTH DAILY    sertraline (ZOLOFT) 100 MG tablet   Yes No    Take 1 tablet by mouth Daily.    sotalol (BETAPACE) 80 MG tablet    No No    Take 1 tablet by mouth 2 (Two) Times a Day.    spironolactone (ALDACTONE) 25 MG tablet   No No    Take 1 tablet by mouth Daily.    vitamin C (ASCORBIC ACID) 250 MG tablet   Yes No    Take 4 tablets by mouth Daily.    Zinc 50 MG tablet   Yes No    Take 1 tablet by mouth Every Night.              Allergies:  No Known Allergies    Objective      Vitals:   Temp:  [97.6 °F (36.4 °C)] 97.6 °F (36.4 °C)  Heart Rate:  [70-71] 70  Resp:  [22] 22  BP: (116-157)/(51-94) 157/61  Body mass index is 26.22 kg/m².  Physical Exam  Vitals reviewed.   Constitutional:       Appearance: Normal appearance. She is normal weight.   HENT:      Head: Normocephalic and atraumatic.      Right Ear: External ear normal.      Left Ear: External ear normal.      Nose: Nose normal.      Mouth/Throat:      Mouth: Mucous membranes are moist.   Eyes:      Extraocular Movements: Extraocular movements intact.   Cardiovascular:      Rate and Rhythm: Normal rate and regular rhythm.      Pulses: Normal pulses.      Heart sounds: Normal heart sounds.   Pulmonary:      Breath sounds: Wheezing present.   Abdominal:      Palpations: Abdomen is soft.   Genitourinary:     Comments: deferred  Musculoskeletal:         General: Normal range of motion.      Cervical back: Normal range of motion and neck supple.   Skin:     General: Skin is warm and dry.   Neurological:      General: No focal deficit present.      Mental Status: She is alert and oriented to person, place, and time.   Psychiatric:         Mood and Affect: Mood normal.         Behavior: Behavior normal.         Thought Content: Thought content normal.         Judgment: Judgment normal.         Diagnostic Data:            Lab Results (last 24 hours)       Procedure Component Value Units Date/Time    Blood Culture - Blood, Arm, Right [[id number]] Collected: 07/29/24 2201    Specimen: Blood from Arm, Right Updated: 07/29/24 2204    Blood Culture - Blood, Arm, Left [[id number]] Collected: 07/29/24  2201    Specimen: Blood from Arm, Left Updated: 07/29/24 2204    Urinalysis With Culture If Indicated - Urine, Clean Catch [[id number]]  (Abnormal) Collected: 07/29/24 2121    Specimen: Urine, Clean Catch Updated: 07/29/24 2148     Color, UA Yellow     Appearance, UA Clear     pH, UA 7.5     Specific Gravity, UA 1.027     Glucose, UA Negative     Ketones, UA Negative     Bilirubin, UA Negative     Blood, UA Negative     Protein, UA Negative     Leuk Esterase, UA Small (1+)     Nitrite, UA Negative     Urobilinogen, UA 1.0 E.U./dL    Narrative:      In absence of clinical symptoms, the presence of pyuria, bacteria, and/or nitrites on the urinalysis result does not correlate with infection.    Urinalysis, Microscopic Only - Urine, Clean Catch [[id number]]  (Abnormal) Collected: 07/29/24 2121    Specimen: Urine, Clean Catch Updated: 07/29/24 2148     RBC, UA 0-2 /HPF      WBC, UA 6-10 /HPF      Bacteria, UA None Seen /HPF      Squamous Epithelial Cells, UA 0-2 /HPF      Hyaline Casts, UA None Seen /LPF      Methodology Automated Microscopy    Urine Culture - Urine, Urine, Clean Catch [[id number]] Collected: 07/29/24 2121    Specimen: Urine, Clean Catch Updated: 07/29/24 2148    Respiratory Panel PCR w/COVID-19(SARS-CoV-2) SACHA/RAMANDEEP/DEL/PAD/COR/RABIA In-House, NP Swab in UTM/VTM, 2 HR TAT - Swab, Nasopharynx [[id number]]  (Normal) Collected: 07/29/24 2048    Specimen: Swab from Nasopharynx Updated: 07/29/24 2140     ADENOVIRUS, PCR Not Detected     Coronavirus 229E Not Detected     Coronavirus HKU1 Not Detected     Coronavirus NL63 Not Detected     Coronavirus OC43 Not Detected     COVID19 Not Detected     Human Metapneumovirus Not Detected     Human Rhinovirus/Enterovirus Not Detected     Influenza A PCR Not Detected     Influenza B PCR Not Detected     Parainfluenza Virus 1 Not Detected     Parainfluenza Virus 2 Not Detected     Parainfluenza Virus 3 Not Detected     Parainfluenza Virus 4 Not Detected     RSV, PCR Not  Detected     Bordetella pertussis pcr Not Detected     Bordetella parapertussis PCR Not Detected     Chlamydophila pneumoniae PCR Not Detected     Mycoplasma pneumo by PCR Not Detected    Narrative:      In the setting of a positive respiratory panel with a viral infection PLUS a negative procalcitonin without other underlying concern for bacterial infection, consider observing off antibiotics or discontinuation of antibiotics and continue supportive care. If the respiratory panel is positive for atypical bacterial infection (Bordetella pertussis, Chlamydophila pneumoniae, or Mycoplasma pneumoniae), consider antibiotic de-escalation to target atypical bacterial infection.    Basic Metabolic Panel [[id number]]  (Abnormal) Collected: 07/29/24 1753    Specimen: Blood Updated: 07/29/24 1822     Glucose 120 mg/dL      BUN 19 mg/dL      Creatinine 1.05 mg/dL      Sodium 133 mmol/L      Potassium 5.2 mmol/L      Comment: Specimen hemolyzed.  Result may be falsely elevated.        Chloride 100 mmol/L      CO2 22.0 mmol/L      Calcium 9.2 mg/dL      BUN/Creatinine Ratio 18.1     Anion Gap 11.0 mmol/L      eGFR 50.9 mL/min/1.73     Narrative:      GFR Normal >60  Chronic Kidney Disease <60  Kidney Failure <15    The GFR formula is only valid for adults with stable renal function between ages 18 and 70.    D-dimer, Quantitative [[id number]]  (Abnormal) Collected: 07/29/24 1753    Specimen: Blood Updated: 07/29/24 1820     D-Dimer, Quantitative 2.75 mg/L (FEU)     Narrative:      According to the assay 's published package insert, a normal (<0.50 mg/L (FEU)) D-dimer result in conjunction with a non-high clinical probability assessment, excludes deep vein thrombosis (DVT) and pulmonary embolism (PE) with high sensitivity.    D-dimer values increase with age and this can make VTE exclusion of an older population difficult. To address this, the [employer], based on best available evidence and recent  "guidelines, recommends that clinicians use age-adjusted D-dimer thresholds in patients greater than 50 years of age with: a) a low probability of PE who do not meet all Pulmonary Embolism Rule Out Criteria, or b) in those with intermediate probability of PE.   The formula for an age-adjusted D-dimer cut-off is \"age/100\".  For example, a 60 year old patient would have an age-adjusted cut-off of 0.60 mg/L (FEU) and an 80 year old 0.80 mg/L (FEU).    BNP [[id number]]  (Abnormal) Collected: 07/29/24 1753    Specimen: Blood Updated: 07/29/24 1820     proBNP 8,366.0 pg/mL     Narrative:      This assay is used as an aid in the diagnosis of individuals suspected of having heart failure. It can be used as an aid in the diagnosis of acute decompensated heart failure (ADHF) in patients presenting with signs and symptoms of ADHF to the emergency department (ED). In addition, NT-proBNP of <300 pg/mL indicates ADHF is not likely.    Age Range Result Interpretation  NT-proBNP Concentration (pg/mL:      <50             Positive            >450                   Gray                 300-450                    Negative             <300    50-75           Positive            >900                  Gray                300-900                  Negative            <300      >75             Positive            >1800                  Gray                300-1800                  Negative            <300    Blood Gas, Arterial - [[id number]]  (Abnormal) Collected: 07/29/24 1816    Specimen: Arterial Blood Updated: 07/29/24 1819     Site Right Radial     Hermann's Test Positive     pH, Arterial 7.472 pH units      pCO2, Arterial 32.4 mm Hg      pO2, Arterial 66.2 mm Hg      HCO3, Arterial 23.7 mmol/L      Base Excess, Arterial 0.3 mmol/L      Comment: Serial Number: [id number]:  [id number]        O2 Saturation, Arterial 94.2 %      CO2 Content 24.6 mmol/L      Barometric Pressure for Blood Gas --     Comment: N/A        Modality Cannula     FIO2 " 36 %      Hemodilution No     PO2/FIO2 184    CBC & Differential [[id number]]  (Abnormal) Collected: 07/29/24 1753    Specimen: Blood Updated: 07/29/24 1818    Narrative:      The following orders were created for panel order CBC & Differential.  Procedure                               Abnormality         Status                     ---------                               -----------         ------                     CBC Auto Differential[[id number]]        Abnormal            Final result                 Please view results for these tests on the individual orders.    CBC Auto Differential [[id number]]  (Abnormal) Collected: 07/29/24 1753    Specimen: Blood Updated: 07/29/24 1818     WBC 11.70 10*3/mm3      RBC 3.24 10*6/mm3      Hemoglobin 9.7 g/dL      Hematocrit 32.2 %      MCV 99.4 fL      MCH 29.9 pg      MCHC 30.1 g/dL      RDW 17.0 %      RDW-SD 61.3 fl      MPV 10.2 fL      Platelets 270 10*3/mm3      Neutrophil % 82.9 %      Lymphocyte % 10.3 %      Monocyte % 5.0 %      Eosinophil % 1.2 %      Basophil % 0.3 %      Immature Grans % 0.3 %      Neutrophils, Absolute 9.68 10*3/mm3      Lymphocytes, Absolute 1.21 10*3/mm3      Monocytes, Absolute 0.59 10*3/mm3      Eosinophils, Absolute 0.14 10*3/mm3      Basophils, Absolute 0.04 10*3/mm3      Immature Grans, Absolute 0.04 10*3/mm3      nRBC 0.0 /100 WBC     [facility] Draw [[id number]] Collected: 07/29/24 1753    Specimen: Blood Updated: 07/29/24 1801    Narrative:      The following orders were created for panel order [facility] Draw.  Procedure                               Abnormality         Status                     ---------                               -----------         ------                     Green Top (Gel)[[id number]]                                  Final result               Lavender Top[[id number]]                                     Final result               Gold Top - SST[[id number]]                                   Final result                Light Blue Top[[id number]]                                   Final result                 Please view results for these tests on the individual orders.    Green Top (Gel) [[id number]] Collected: 07/29/24 1753    Specimen: Blood Updated: 07/29/24 1801     Extra Tube Hold for add-ons.     Comment: Auto resulted.       Lavender Top [[id number]] Collected: 07/29/24 1753    Specimen: Blood Updated: 07/29/24 1801     Extra Tube hold for add-on     Comment: Auto resulted       Gold Top - SST [[id number]] Collected: 07/29/24 1753    Specimen: Blood Updated: 07/29/24 1801     Extra Tube Hold for add-ons.     Comment: Auto resulted.       Light Blue Top [[id number]] Collected: 07/29/24 1753    Specimen: Blood Updated: 07/29/24 1801     Extra Tube Hold for add-ons.     Comment: Auto resulted                Imaging Results (Last 24 Hours)       Procedure Component Value Units Date/Time    CT Angiogram Chest Pulmonary Embolism [[id number]] Collected: 07/29/24 2002     Updated: 07/29/24 2022    Narrative:      CT ANGIOGRAM CHEST PULMONARY EMBOLISM    Date of Exam: 7/29/2024 7:55 PM EDT    Indication: Dyspnea.    Comparison: Chest radiograph 7/29/2024. Chest CT PE protocol 8/10/2020    Technique: Axial CT images were obtained of the chest after the uneventful intravenous administration of iodinated contrast utilizing pulmonary embolism protocol.  Sagittal and coronal reconstructions were performed.  Automated exposure control and   iterative reconstruction methods were used.      Findings:  CARDIOVASCULAR:    Diagnostic quality: Contrast opacification of the pulmonary arterial circulation is adequate for assessment of pulmonary embolism. Study is not significantly limited by respiratory motion artifact.    Pulmonary arteries: No filling defects to suggest pulmonary embolism. Not enlarged.    Heart: No interventricular septal deviation. Similar cardiomegaly. Coronary artery calcifications and prior coronary revascularization. Aortic  valve replacement. Left atrial appendage occlusion device. Left chest wall pacemaker with leads in the right   atrium and right ventricle.    Pericardium: No pericardial effusion.    Thoracic aorta: Atherosclerotic calcification without evidence of aneurysm.    REMAINING CHEST:    Thyroid and thoracic inlet: Normal.    Lymph nodes: Mildly enlarged multistation mediastinal lymph nodes, favored reactive.    Esophagus: Small hiatal hernia.    Lung parenchyma: Interlobular septal thickening. Mild perihilar groundglass opacities. Multifocal bilateral groundglass and consolidative opacities. Bibasilar atelectasis.    Airways: Patent trachea and mainstem bronchi. Bronchial wall thickening.    Pleura: Small right greater than left pleural effusions. No pneumothorax.    Chest wall and osseous structures: No acute abnormality. Prior median sternotomy. Degenerative changes of the imaged spine.    Included upper abdomen: Left renal cyst. Similar mild bilateral adrenal thickening without discrete nodule. Calcifications within the liver and spleen, likely sequelae of prior granulomatous disease.        Impression:      Impression:    No evidence of pulmonary embolism.    Multifocal bilateral groundglass and consolidative opacities suspicious for multifocal pneumonia.    Mild pulmonary edema with small bilateral pleural effusions.            Electronically Signed: Jose Atkins    7/29/2024 8:20 PM EDT    Workstation ID: [id number]    XR Chest 1 View [[id number]] Collected: 07/29/24 1838     Updated: 07/29/24 1845    Narrative:      XR CHEST 1 VW    Date of Exam: 7/29/2024 6:11 PM EDT    Indication: Dyspnea    Comparison: Chest radiographs 7/10/2024 and 1/11/2024    Findings:  Medical devices, lines, and tubes: Left chest wall ICD/pacemaker appears unchanged.    Mediastinum: Cardiomediastinal silhouette appears aortic valve prosthesis, surgical clips, and cardiomegaly.    Lungs: Indistinctness of the pulmonary vasculature, septal  thickening, and patchy perihilar opacities. Right basal atelectasis.    Pleura: Blunting of right costophrenic sulcus may represent a small right pleural effusion. No pneumothorax.    Bones and soft tissues: Median sternotomy. Left shoulder arthroplasty.        Impression:      Impression:  Mild pulmonary edema with possible small right pleural effusion.      Electronically Signed: Jose Atkins    7/29/2024 6:43 PM EDT    Workstation ID: [id number]              Assessment & Plan        This is a 89 y.o. female with:    Active and Resolved Problems  Active Hospital Problems    Diagnosis  POA    **Acute on chronic respiratory failure with hypoxia [J96.21]  Yes     Priority: High    Acute on chronic heart failure with preserved ejection fraction (HFpEF) [I50.33]  Yes     Priority: Medium    Leukocytosis [D72.829]  Yes     Priority: Medium    Renal insufficiency [N28.9]  Yes     Priority: Medium    Presence of Watchman left atrial appendage closure device [Z95.818]  Yes    GERD without esophagitis [K21.9]  Yes    Essential hypertension [I10]  Yes    Depression [F32.A]  Yes    Anxiety disorder [F41.9]  Yes    Hyponatremia [E87.1]  Yes    Sick sinus syndrome [I49.5]  Yes    Presence of cardiac pacemaker [Z95.0]  Yes    Arthritis, rheumatoid [M06.9]  Yes    Acquired hypothyroidism [E03.9]  Yes    Valvular heart disease [I38]  Yes    Presence of aortocoronary bypass graft [Z95.1]  Not Applicable    Peripheral vascular disease [I73.9]  Yes    Paroxysmal atrial fibrillation [I48.0]  Yes    Hx of aortic valve replacement [Z95.2]  Not Applicable    Chronic coronary artery disease [I25.10]  Yes    Mixed hyperlipidemia [E78.2]  Yes      Resolved Hospital Problems   No resolved problems to display.       Acute on chronic respiratory failure with hypoxia per ABG, now stable on home dose 3 L oxygen via nasal cannula, may be multifactorial including acute on chronic heart failure preserved ejection fraction possible pneumonia given  CTA see plan below    Acute on chronic heart failure with preserved ejection fraction, proBNP elevated, no bilateral lower extremity edema, edema seen on chest x-ray and CTA, 80 mg IV Lasix in ED, home meds unverified at this time reorder pending verification from pharmacy and if appropriate, cardiology consulted    Leukocytosis, WBC 11.7 afebrile, urinalysis negative for infection, CTA per radiology indicates opacities with possibility of pneumonia, respiratory panel negative, 1 g IV ceftriaxone 500 mg IV azithromycin with blood cultures pending    Renal insufficiency, avoid nephrotoxic meds trend renal function    Paroxysmal atrial fibrillation with watchman's device implanted, EKG shows sinus rhythm, home meds unverified at this time reorder pending verification pharmacy and if appropriate continuous cardiac monitoring, cardiology consulted    GERD without esophagitis, home meds unverified at this time reorder pending verification pharmacy if appropriate    Rheumatoid arthritis, home meds unverified at this time reorder pending verification pharmacy if appropriate    Coronary artery disease status post CABG, denies chest pain, home meds unverified at this time reorder pending verification pharmacy if appropriate cardiology consulted    Sick sinus syndrome with cardiac pacemaker in place noted cardiology consulted    Quired hypothyroidism, home meds unverified at this time reorder pending verification pharmacy if appropriate, TSH in a.m. pending    Velar heart disease, cardiology consulted for further evaluation    Peripheral vascular disease, home meds unverified at this time reorder pending verification pharmacy and if appropriate    History of aortic valve replacement bioprosthetic, noted    Hyperlipidemia, home meds unverified at this time reorder pending verification pharmacy and if appropriate    VTE Prophylaxis:  Mechanical VTE prophylaxis orders are present.        The patient desires to be as  follows:    CODE STATUS:    Code Status (Patient has no pulse and is not breathing): CPR (Attempt to Resuscitate)  Medical Interventions (Patient has pulse or is breathing): Full Support            Admission Status:  I believe this patient meets inpatient  status.    Expected Length of Stay: pending further evaluation and clinical course    PDMP and Medication Dispenses via Sidebar reviewed and consistent with patient reported medications.    I discussed the patient's findings and my recommendations with patient and family.      Signature:     This document has been electronically signed by YEYO Laughlin on July 29, 2024 23:17 EDT   [facility], operated by [facility] Hospitalist Team

## 2024-07-30 NOTE — CONSULTS
Group: [facility]         CONSULT NOTE    Patient Identification:  Tracy Jane  89 y.o.  female  1934  [id number]            Requesting physician: Attending physician    Reason for Consultation: Shortness of breath      History of Present Illness:  89-year-old female with history of CAD status post CABG and aortic valve replacement, A-fib status post Watchman procedure and pacemaker placement, chronic hypoxemia on 3 L of oxygen, PVD, HLD, HTN, RA and GERD who presented 7/29/2024 with increased shortness of breath for several days, nonproductive cough but no fever or chest pain.  WBCs 11.7, hemoglobin 9.7.  proBNP 8366, respiratory panel is negative  CT scan of the chest showed no PE but there was multifocal groundglass and consolidative opacities and small bilateral effusion.  Today the patient is afebrile and hemodynamically stable, oxygen saturation 98% on 2 L of oxygen      Assessment:  Acute on chronic respiratory failure with hypoxia: ABG 7.47/32.4/66.2  Decompensated heart failure  Multifocal pneumonia  Small bilateral pleural effusion  CAD status post CABG  Status post aortic valve replacement 2014  A-fib, sick sinus syndrome: Status post Watchman procedure and pacemaker placement  HTN  HLD  RA: On methotrexate  GERD  Chronic anemia  Former smoker      Recommendations:  Antibiotics: Rocephin, discontinue azithromycin  Oxygen supplement and titration to maintain saturation 90 to 95%: Currently on 3 L per nasal cannula  Mucinex  Diuresis  HR controlled  DVT prophylaxis    I personally reviewed the radiological studies      Review of Sytems:  Constitutional: Negative for chills, and fever and positive for malaise/fatigue.   HENT: Negative.    Eyes: Negative.    Cardiovascular: Negative.    Respiratory: Positive for cough and shortness of breath.    Skin: Negative.    Musculoskeletal: Negative.    Gastrointestinal: Negative.    Genitourinary: Negative.    Neurological: Negative.     Psychiatric/Behavioral: Negative.    Past Medical History:  Past Medical History:   Diagnosis Date    Abnormal ECG     Anxiety     Aortic valve replaced     Arrhythmia     Asthma     Atrial fibrillation     CAD (coronary artery disease)     Carotid artery disease     CHF (congestive heart failure)     Congenital heart disease     GERD (gastroesophageal reflux disease)     Gout     Heart murmur     Hemorrhagic stroke 2023    was on Coumadin for A-fib    Hyperlipidemia     Hypertension     Hyperthyroidism     Hypothyroidism     Myocardial infarction     Overactive bladder     Pacemaker     [facility]    Skin cancer        Past Surgical History:  Past Surgical History:   Procedure Laterality Date    AORTIC VALVE REPAIR/REPLACEMENT  2014    porcine    ATRIAL APPENDAGE EXCLUSION LEFT WITH TRANSESOPHAGEAL ECHOCARDIOGRAM Right 12/26/2023    Procedure: Atrial Appendage Occlusion;  Surgeon: Pk Crabtree MD;  Location: [facility] CATH INVASIVE LOCATION;  Service: Cardiovascular;  Laterality: Right;    ATRIAL APPENDAGE EXCLUSION LEFT WITH TRANSESOPHAGEAL ECHOCARDIOGRAM N/A 12/26/2023    Procedure: Atrial Appendage Occlusion;  Surgeon: Gen Caballero MD;  Location: [facility] CATH INVASIVE LOCATION;  Service: Cardiovascular;  Laterality: N/A;    BASAL CELL CARCINOMA EXCISION      spine, nose and arm, leg 2020    BREAST BIOPSY      CARDIAC CATHETERIZATION      CARDIAC VALVE REPLACEMENT      CAROTID STENT      CATARACT EXTRACTION      CHOLECYSTECTOMY      CORONARY ARTERY BYPASS GRAFT      CORONARY STENT PLACEMENT      ENDOSCOPY N/A 04/24/2023    Procedure: ESOPHAGOGASTRODUODENOSCOPY with antrum body biopsies;  Surgeon: REGGIE Ace MD;  Location: [facility] ENDOSCOPY;  Service: Gastroenterology;  Laterality: N/A;  hiatal hernia    FINGER SURGERY      INSERT / REPLACE / REMOVE PACEMAKER      KNEE SURGERY      PACEMAKER IMPLANTATION      [facility]    SHOULDER SURGERY      RACHEL Bilateral 11/03/2023        Home  Meds:  Medications Prior to Admission   Medication Sig Dispense Refill Last Dose    acetaminophen (TYLENOL) 650 MG 8 hr tablet Take 1 tablet by mouth Every 8 (Eight) Hours As Needed.   7/29/2024    calcium (OS-ROSHNI) 600 MG tablet Take 2 tablets by mouth Daily.   7/29/2024    Cholecalciferol (Vitamin D-3) 25 MCG (1000 UT) capsule Take 1,000 Units by mouth Daily. In the morning   7/29/2024    clopidogrel (PLAVIX) 75 MG tablet Take 1 tablet by mouth Daily. 90 tablet 3 7/29/2024    colchicine 0.6 MG tablet Take 1 tablet by mouth Daily.   7/29/2024    COLLAGEN PO Take 20 g by mouth Daily.   7/29/2024    Cranberry 500 MG capsule Take 500 mg by mouth Every Night.   7/29/2024    dilTIAZem CD (CARDIZEM CD) 240 MG 24 hr capsule Take 1 capsule by mouth Daily. 90 capsule 1 7/29/2024    doxazosin (CARDURA) 2 MG tablet TAKE 1 TABLET BY MOUTH DAILY 90 tablet 0 7/29/2024    Ferrous Sulfate 28 MG tablet Take 1 tablet by mouth Daily. In the morning   7/29/2024    folic acid (FOLVITE) 1 MG tablet Take 1 tablet by mouth Daily.   7/29/2024    furosemide (LASIX) 40 MG tablet Take 1 tablet by mouth Daily.   7/29/2024    Glucosamine-Chondroit-Vit C-Mn (Glucosamine 1500 Complex) capsule Take 1,500 mg by mouth 2 (Two) Times a Day.   7/29/2024    isosorbide mononitrate (IMDUR) 30 MG 24 hr tablet TAKE 1 TABLET BY MOUTH TWICE A  tablet 1 7/29/2024    levothyroxine (SYNTHROID, LEVOTHROID) 75 MCG tablet Take 1 tablet by mouth Daily. 5 days a week.   7/29/2024    lubiprostone (AMITIZA) 24 MCG capsule Take 1 capsule by mouth As Needed.   7/29/2024    melatonin 5 MG sublingual tablet sublingual tablet Place 1 tablet under the tongue At Night As Needed.   7/29/2024    Methylsulfonylmethane (MSM) 1000 MG tablet Take 1 tablet by mouth 2 (Two) Times a Day.   7/29/2024    Multiple Vitamin (MULTIVITAMIN) tablet Take 1 tablet by mouth Daily.   7/29/2024    Omega-3 Fatty Acids (fish oil) 1200 MG capsule capsule Take 1 capsule by mouth 2 (Two) Times a  Day With Meals.   2024    pantoprazole (PROTONIX) 40 MG EC tablet Take 1 tablet by mouth Daily.   2024    polyethylene glycol (MiraLax) 17 g packet Take 17 g by mouth Every Night.   2024    potassium chloride (K-DUR,KLOR-CON) 20 MEQ CR tablet Take 1 tablet by mouth Daily.   2024    rosuvastatin (CRESTOR) 20 MG tablet TAKE 1 TABLET BY MOUTH DAILY 90 tablet 0 2024    sertraline (ZOLOFT) 100 MG tablet Take 1 tablet by mouth Daily.   2024    sotalol (BETAPACE) 80 MG tablet Take 1 tablet by mouth 2 (Two) Times a Day. 60 tablet 4 2024    spironolactone (ALDACTONE) 25 MG tablet Take 1 tablet by mouth Daily. 90 tablet 1 2024    vitamin C (ASCORBIC ACID) 250 MG tablet Take 4 tablets by mouth Daily.   2024    Zinc 50 MG tablet Take 1 tablet by mouth Every Night.   2024    methotrexate 2.5 MG tablet Take 5 tablets by mouth Take As Directed. 5 tablets on  Morning AND 5 tablets  Evening = 10 Total Tablets on           Allergies:  No Known Allergies    Social History:   Social History     Socioeconomic History    Marital status:     Number of children: 4    Years of education: GED   Tobacco Use    Smoking status: Former     Current packs/day: 0.00     Average packs/day: 1 pack/day for 4.0 years (4.0 ttl pk-yrs)     Types: Cigarettes     Start date: 1954     Quit date:      Years since quittin.6     Passive exposure: Past    Smokeless tobacco: Never   Vaping Use    Vaping status: Never Used   Substance and Sexual Activity    Alcohol use: No    Drug use: No    Sexual activity: Not Currently       Family History:  Family History   Problem Relation Age of Onset    Hypertension Mother     Heart disease Father     Heart attack Father     Heart disease Sister     Kidney cancer Sister     Stroke Sister     Cancer Sister         breast    Cancer Sister     Heart disease Brother        Physical Exam:  /65 (BP Location: Left arm, Patient Position:  "Lying)   Pulse 105   Temp 97.8 °F (36.6 °C) (Oral)   Resp 26   Ht 152.4 cm (60\")   Wt 60.9 kg (134 lb 4.2 oz)   SpO2 98%   BMI 26.22 kg/m²  Body mass index is 26.22 kg/m². 98% 60.9 kg (134 lb 4.2 oz)  General Appearance:  Alert   HEENT:  Normocephalic, without obvious abnormality, Conjunctiva/corneas clear,.   Nares normal, no drainage     Neck:  Supple, symmetrical, trachea midline.   Lungs /Chest wall:   Bilateral basal rhonchi, respirations unlabored, symmetrical wall movement.     Heart:  Regular rate and rhythm, S1 S2 normal  Abdomen: Soft, non-tender, no masses, no organomegaly.    Extremities: No edema, no clubbing or cyanosis    LABS:  Lab Results   Component Value Date    CALCIUM 9.8 07/30/2024    PHOS 2.9 11/04/2023     Results from last 7 days   Lab Units 07/30/24  0427 07/29/24  1753   SODIUM mmol/L 136 133*   POTASSIUM mmol/L 4.1 5.2   CHLORIDE mmol/L 97* 100   CO2 mmol/L 25.7 22.0   BUN mg/dL 19 19   CREATININE mg/dL 0.96 1.05*   GLUCOSE mg/dL 91 120*   CALCIUM mg/dL 9.8 9.2   WBC 10*3/mm3 11.18* 11.70*   HEMOGLOBIN g/dL 10.4* 9.7*   PLATELETS 10*3/mm3 236 270   PROBNP pg/mL  --  8,366.0*     Lab Results   Component Value Date    TROPONINT 53 (C) 01/12/2024         Results from last 7 days   Lab Units 07/29/24  2121   URINECX  No growth         Results from last 7 days   Lab Units 07/29/24  1816   PH, ARTERIAL pH units 7.472*   PCO2, ARTERIAL mm Hg 32.4*   PO2 ART mm Hg 66.2*   O2 SATURATION ART % 94.2   MODALITY  Cannula     Results from last 7 days   Lab Units 07/29/24 2048   ADENOVIRUS DETECTION BY PCR  Not Detected   CORONAVIRUS 229E  Not Detected   CORONAVIRUS HKU1  Not Detected   CORONAVIRUS NL63  Not Detected   CORONAVIRUS OC43  Not Detected   HUMAN METAPNEUMOVIRUS  Not Detected   HUMAN RHINOVIRUS/ENTEROVIRUS  Not Detected   INFLUENZA B PCR  Not Detected   PARAINFLUENZA 1  Not Detected   PARAINFLUENZA VIRUS 2  Not Detected   PARAINFLUENZA VIRUS 3  Not Detected   PARAINFLUENZA VIRUS 4  " Not Detected   BORDETELLA PERTUSSIS PCR  Not Detected   CHLAMYDOPHILA PNEUMONIAE PCR  Not Detected   MYCOPLAMA PNEUMO PCR  Not Detected   INFLUENZA A PCR  Not Detected   RSV, PCR  Not Detected         Results from last 7 days   Lab Units 07/29/24  2121   URINECX  No growth     Lab Results   Component Value Date    TSH 2.940 07/30/2024     Estimated Creatinine Clearance: 32.4 mL/min (by C-G formula based on SCr of 0.96 mg/dL).  Results from last 7 days   Lab Units 07/29/24  2121   NITRITE UA  Negative   WBC UA /HPF 6-10*   BACTERIA UA /HPF None Seen   SQUAM EPITHEL UA /HPF 0-2   URINECX  No growth        Imaging:  Imaging Results (Last 24 Hours)       Procedure Component Value Units Date/Time    CT Angiogram Chest Pulmonary Embolism [[id number]] Collected: 07/29/24 2002     Updated: 07/29/24 2022    Narrative:      CT ANGIOGRAM CHEST PULMONARY EMBOLISM    Date of Exam: 7/29/2024 7:55 PM EDT    Indication: Dyspnea.    Comparison: Chest radiograph 7/29/2024. Chest CT PE protocol 8/10/2020    Technique: Axial CT images were obtained of the chest after the uneventful intravenous administration of iodinated contrast utilizing pulmonary embolism protocol.  Sagittal and coronal reconstructions were performed.  Automated exposure control and   iterative reconstruction methods were used.      Findings:  CARDIOVASCULAR:    Diagnostic quality: Contrast opacification of the pulmonary arterial circulation is adequate for assessment of pulmonary embolism. Study is not significantly limited by respiratory motion artifact.    Pulmonary arteries: No filling defects to suggest pulmonary embolism. Not enlarged.    Heart: No interventricular septal deviation. Similar cardiomegaly. Coronary artery calcifications and prior coronary revascularization. Aortic valve replacement. Left atrial appendage occlusion device. Left chest wall pacemaker with leads in the right   atrium and right ventricle.    Pericardium: No pericardial  effusion.    Thoracic aorta: Atherosclerotic calcification without evidence of aneurysm.    REMAINING CHEST:    Thyroid and thoracic inlet: Normal.    Lymph nodes: Mildly enlarged multistation mediastinal lymph nodes, favored reactive.    Esophagus: Small hiatal hernia.    Lung parenchyma: Interlobular septal thickening. Mild perihilar groundglass opacities. Multifocal bilateral groundglass and consolidative opacities. Bibasilar atelectasis.    Airways: Patent trachea and mainstem bronchi. Bronchial wall thickening.    Pleura: Small right greater than left pleural effusions. No pneumothorax.    Chest wall and osseous structures: No acute abnormality. Prior median sternotomy. Degenerative changes of the imaged spine.    Included upper abdomen: Left renal cyst. Similar mild bilateral adrenal thickening without discrete nodule. Calcifications within the liver and spleen, likely sequelae of prior granulomatous disease.        Impression:      Impression:    No evidence of pulmonary embolism.    Multifocal bilateral groundglass and consolidative opacities suspicious for multifocal pneumonia.    Mild pulmonary edema with small bilateral pleural effusions.            Electronically Signed: Jose Atkins    7/29/2024 8:20 PM EDT    Workstation ID: [id number]    XR Chest 1 View [[id number]] Collected: 07/29/24 1838     Updated: 07/29/24 1845    Narrative:      XR CHEST 1 VW    Date of Exam: 7/29/2024 6:11 PM EDT    Indication: Dyspnea    Comparison: Chest radiographs 7/10/2024 and 1/11/2024    Findings:  Medical devices, lines, and tubes: Left chest wall ICD/pacemaker appears unchanged.    Mediastinum: Cardiomediastinal silhouette appears aortic valve prosthesis, surgical clips, and cardiomegaly.    Lungs: Indistinctness of the pulmonary vasculature, septal thickening, and patchy perihilar opacities. Right basal atelectasis.    Pleura: Blunting of right costophrenic sulcus may represent a small right pleural effusion. No  pneumothorax.    Bones and soft tissues: Median sternotomy. Left shoulder arthroplasty.        Impression:      Impression:  Mild pulmonary edema with possible small right pleural effusion.      Electronically Signed: Jose Atkins    7/29/2024 6:43 PM EDT    Workstation ID: [id number]              Current Meds:   SCHEDULE  [START ON 7/31/2024] azithromycin, 500 mg, Intravenous, Q24H  cefTRIAXone, 1,000 mg, Intravenous, Q24H  furosemide, 40 mg, Intravenous, Q12H      Infusions     PRNs    [COMPLETED] Insert Peripheral IV **AND** sodium chloride        Debora Correa MD  7/30/2024  11:35 EDT      Much of this encounter note is an electronic transcription/translation of spoken language to printed text using Dragon Software.

## 2024-07-30 NOTE — ED PROVIDER NOTES
Subjective   History of Present Illness  Patient is an 89 to female complaint of increasing shortness of breath over the past several days.  Denies cough fever chest pain vomiting or other associated complaints.      Review of Systems    Past Medical History:   Diagnosis Date    Abnormal ECG     Anxiety     Aortic valve replaced     Arrhythmia     Asthma     Atrial fibrillation     CAD (coronary artery disease)     Carotid artery disease     CHF (congestive heart failure)     Congenital heart disease     GERD (gastroesophageal reflux disease)     Gout     Heart murmur     Hemorrhagic stroke 2023    was on Coumadin for A-fib    Hyperlipidemia     Hypertension     Hyperthyroidism     Hypothyroidism     Myocardial infarction     Overactive bladder     Pacemaker     [facility]    Skin cancer        No Known Allergies    Past Surgical History:   Procedure Laterality Date    AORTIC VALVE REPAIR/REPLACEMENT  2014    porcine    ATRIAL APPENDAGE EXCLUSION LEFT WITH TRANSESOPHAGEAL ECHOCARDIOGRAM Right 12/26/2023    Procedure: Atrial Appendage Occlusion;  Surgeon: Pk Crabtree MD;  Location: [facility] CATH INVASIVE LOCATION;  Service: Cardiovascular;  Laterality: Right;    ATRIAL APPENDAGE EXCLUSION LEFT WITH TRANSESOPHAGEAL ECHOCARDIOGRAM N/A 12/26/2023    Procedure: Atrial Appendage Occlusion;  Surgeon: Gen Caballero MD;  Location: [facility] CATH INVASIVE LOCATION;  Service: Cardiovascular;  Laterality: N/A;    BASAL CELL CARCINOMA EXCISION      spine, nose and arm, leg 2020    BREAST BIOPSY      CARDIAC CATHETERIZATION      CARDIAC VALVE REPLACEMENT      CAROTID STENT      CATARACT EXTRACTION      CHOLECYSTECTOMY      CORONARY ARTERY BYPASS GRAFT      CORONARY STENT PLACEMENT      ENDOSCOPY N/A 04/24/2023    Procedure: ESOPHAGOGASTRODUODENOSCOPY with antrum body biopsies;  Surgeon: REGGIE Ace MD;  Location: [facility] ENDOSCOPY;  Service: Gastroenterology;  Laterality: N/A;  hiatal hernia    FINGER SURGERY       INSERT / REPLACE / REMOVE PACEMAKER      KNEE SURGERY      PACEMAKER IMPLANTATION      [facility]    SHOULDER SURGERY      RACHEL Bilateral 2023       Family History   Problem Relation Age of Onset    Hypertension Mother     Heart disease Father     Heart attack Father     Heart disease Sister     Kidney cancer Sister     Stroke Sister     Cancer Sister         breast    Cancer Sister     Heart disease Brother        Social History     Socioeconomic History    Marital status:     Number of children: 4    Years of education: GED   Tobacco Use    Smoking status: Former     Current packs/day: 0.00     Average packs/day: 1 pack/day for 4.0 years (4.0 ttl pk-yrs)     Types: Cigarettes     Start date: 1954     Quit date:      Years since quittin.6     Passive exposure: Past    Smokeless tobacco: Never   Vaping Use    Vaping status: Never Used   Substance and Sexual Activity    Alcohol use: No    Drug use: No    Sexual activity: Not Currently           Objective   Physical Exam  Neck has no adenopathy JVD or bruits.  Lungs have mild rales at the base.  Heart has a regular rhythm with a loud aortic systolic murmur.  Abdomen is soft nontender.  Extremity exam is unremarkable.  Procedures  My EKG interpretation shows normal sinus rhythm at a rate of 70 with no acute ST change         ED Course      Results for orders placed or performed during the hospital encounter of 24   Basic Metabolic Panel    Specimen: Blood   Result Value Ref Range    Glucose 120 (H) 65 - 99 mg/dL    BUN 19 8 - 23 mg/dL    Creatinine 1.05 (H) 0.57 - 1.00 mg/dL    Sodium 133 (L) 136 - 145 mmol/L    Potassium 5.2 3.5 - 5.2 mmol/L    Chloride 100 98 - 107 mmol/L    CO2 22.0 22.0 - 29.0 mmol/L    Calcium 9.2 8.6 - 10.5 mg/dL    BUN/Creatinine Ratio 18.1 7.0 - 25.0    Anion Gap 11.0 5.0 - 15.0 mmol/L    eGFR 50.9 (L) >60.0 mL/min/1.73   Blood Gas, Arterial -    Specimen: Arterial Blood   Result Value Ref Range    Site Right  Radial     Hermann's Test Positive     pH, Arterial 7.472 (H) 7.350 - 7.450 pH units    pCO2, Arterial 32.4 (L) 35.0 - 48.0 mm Hg    pO2, Arterial 66.2 (L) 83.0 - 108.0 mm Hg    HCO3, Arterial 23.7 21.0 - 28.0 mmol/L    Base Excess, Arterial 0.3 0.0 - 3.0 mmol/L    O2 Saturation, Arterial 94.2 94.0 - 98.0 %    CO2 Content 24.6 22 - 29 mmol/L    Barometric Pressure for Blood Gas      Modality Cannula     FIO2 36 %    Hemodilution No     PO2/FIO2 184 0 - 500   D-dimer, Quantitative    Specimen: Blood   Result Value Ref Range    D-Dimer, Quantitative 2.75 (H) 0.00 - 0.89 mg/L (FEU)   BNP    Specimen: Blood   Result Value Ref Range    proBNP 8,366.0 (H) 0.0 - 1,800.0 pg/mL   CBC Auto Differential    Specimen: Blood   Result Value Ref Range    WBC 11.70 (H) 3.40 - 10.80 10*3/mm3    RBC 3.24 (L) 3.77 - 5.28 10*6/mm3    Hemoglobin 9.7 (L) 12.0 - 15.9 g/dL    Hematocrit 32.2 (L) 34.0 - 46.6 %    MCV 99.4 (H) 79.0 - 97.0 fL    MCH 29.9 26.6 - 33.0 pg    MCHC 30.1 (L) 31.5 - 35.7 g/dL    RDW 17.0 (H) 12.3 - 15.4 %    RDW-SD 61.3 (H) 37.0 - 54.0 fl    MPV 10.2 6.0 - 12.0 fL    Platelets 270 140 - 450 10*3/mm3    Neutrophil % 82.9 (H) 42.7 - 76.0 %    Lymphocyte % 10.3 (L) 19.6 - 45.3 %    Monocyte % 5.0 5.0 - 12.0 %    Eosinophil % 1.2 0.3 - 6.2 %    Basophil % 0.3 0.0 - 1.5 %    Immature Grans % 0.3 0.0 - 0.5 %    Neutrophils, Absolute 9.68 (H) 1.70 - 7.00 10*3/mm3    Lymphocytes, Absolute 1.21 0.70 - 3.10 10*3/mm3    Monocytes, Absolute 0.59 0.10 - 0.90 10*3/mm3    Eosinophils, Absolute 0.14 0.00 - 0.40 10*3/mm3    Basophils, Absolute 0.04 0.00 - 0.20 10*3/mm3    Immature Grans, Absolute 0.04 0.00 - 0.05 10*3/mm3    nRBC 0.0 0.0 - 0.2 /100 WBC   ECG 12 Lead Dyspnea   Result Value Ref Range    QT Interval 445 ms    QTC Interval 480 ms   Green Top (Gel)   Result Value Ref Range    Extra Tube Hold for add-ons.    Lavender Top   Result Value Ref Range    Extra Tube hold for add-on    Gold Top - SST   Result Value Ref Range     Extra Tube Hold for add-ons.    Light Blue Top   Result Value Ref Range    Extra Tube Hold for add-ons.      CT Angiogram Chest Pulmonary Embolism    Result Date: 7/29/2024  Impression: No evidence of pulmonary embolism. Multifocal bilateral groundglass and consolidative opacities suspicious for multifocal pneumonia. Mild pulmonary edema with small bilateral pleural effusions. Electronically Signed: Jose Atkins  7/29/2024 8:20 PM EDT  Workstation ID: [id number]    XR Chest 1 View    Result Date: 7/29/2024  Impression: Mild pulmonary edema with possible small right pleural effusion. Electronically Signed: Jose Atkins  7/29/2024 6:43 PM EDT  Workstation ID: [id number]                                            Medical Decision Making  Chest x-ray interpretation is no cardiomegaly fusion or infiltrate.  CT scan pulmonary embolism protocol shows no acute pulmonary embolus.  Metabolic panel shows no renal insufficiency or electrolyte abnormality.  Blood gas shows pH be 7.47 pCO2 32 pO2 of 66 D-dimer is 2.75 BNP is greater than 8300.  Patient has borderline leukocytosis with no left shift and chronic anemia.  Patient was given Lasix IV.  Patient be admitted for further diuresis and cardiac evaluation.  Did speak to the on-call hospitalist    Amount and/or Complexity of Data Reviewed  Labs: ordered. Decision-making details documented in ED Course.  Radiology: ordered and independent interpretation performed.  ECG/medicine tests: ordered and independent interpretation performed.    Risk  Prescription drug management.  Decision regarding hospitalization.        Final diagnoses:   Dyspnea, unspecified type   Acute on chronic congestive heart failure, unspecified heart failure type       ED Disposition  ED Disposition       ED Disposition   Decision to Admit    Condition   --    Comment   Level of Care: Med/Surg [1]   Diagnosis: Acute on chronic respiratory failure with hypoxia [[id number]]   Admitting Physician: JEREMIAS  TOMY LORENZANA [[id number]]   Attending Physician: TOMY MCDOWELL [[id number]]   Bed Request Comments: cardiac monitor   Certification: I Certify That Inpatient Hospital Services Are Medically Necessary For Greater Than 2 Midnights                 No follow-up provider specified.       Medication List      No changes were made to your prescriptions during this visit.            Gordo Avilez MD  07/29/24 [id number]

## 2024-07-30 NOTE — THERAPY EVALUATION
Patient Name: Tracy Jane  : 1934    MRN: [id number]                              Today's Date: 2024       Admit Date: 2024    Visit Dx:     ICD-10-CM ICD-9-CM   1. Dyspnea, unspecified type  R06.00 786.09   2. Acute on chronic congestive heart failure, unspecified heart failure type  I50.9 428.0     Patient Active Problem List   Diagnosis    Abnormal cardiovascular stress test    Abnormal EKG    Abnormal levels of other serum enzymes    Anemia    Anxiety disorder    Aortic insufficiency    Aortic stenosis    Arthritis, rheumatoid    Asthma    Chronic coronary artery disease    Chronic kidney disease, stage III (moderate)    Depression    Cough    Edema of lower extremity    Encounter for therapeutic drug level monitoring    Excessive anticoagulation    Fatigue    Former smoker    GERD without esophagitis    Hemoptysis    High alkaline phosphatase    Hx of aortic valve replacement    Hyperglycemia    Mixed hyperlipidemia    Essential hypertension    Hyponatremia    Acquired hypothyroidism    Influenza    Nonspecific abnormal results of function study of liver    Osteoarthritis of knee    Other peripheral vertigo, unspecified ear    Paroxysmal atrial fibrillation    Peripheral vascular disease    Presence of aortocoronary bypass graft    Presence of cardiac pacemaker    Renal insufficiency    Shortness of breath    Sick sinus syndrome    Sore throat    Valvular heart disease    Elevated INR    Medication induced coagulopathy    COVID    PAF (paroxysmal atrial fibrillation)    Presence of Watchman left atrial appendage closure device    Hypoxic respiratory failure    Acute on chronic respiratory failure with hypoxia    Acute on chronic heart failure with preserved ejection fraction (HFpEF)    Leukocytosis     Past Medical History:   Diagnosis Date    Abnormal ECG     Anxiety     Aortic valve replaced     Arrhythmia     Asthma     Atrial fibrillation     CAD (coronary artery disease)     Carotid  artery disease     CHF (congestive heart failure)     Congenital heart disease     GERD (gastroesophageal reflux disease)     Gout     Heart murmur     Hemorrhagic stroke 2023    was on Coumadin for A-fib    Hyperlipidemia     Hypertension     Hyperthyroidism     Hypothyroidism     Myocardial infarction     Overactive bladder     Pacemaker     [facility]    Skin cancer      Past Surgical History:   Procedure Laterality Date    AORTIC VALVE REPAIR/REPLACEMENT  2014    porcine    ATRIAL APPENDAGE EXCLUSION LEFT WITH TRANSESOPHAGEAL ECHOCARDIOGRAM Right 12/26/2023    Procedure: Atrial Appendage Occlusion;  Surgeon: Pk Crabtree MD;  Location: [facility] CATH INVASIVE LOCATION;  Service: Cardiovascular;  Laterality: Right;    ATRIAL APPENDAGE EXCLUSION LEFT WITH TRANSESOPHAGEAL ECHOCARDIOGRAM N/A 12/26/2023    Procedure: Atrial Appendage Occlusion;  Surgeon: Gen Caballero MD;  Location: [facility] CATH INVASIVE LOCATION;  Service: Cardiovascular;  Laterality: N/A;    BASAL CELL CARCINOMA EXCISION      spine, nose and arm, leg 2020    BREAST BIOPSY      CARDIAC CATHETERIZATION      CARDIAC VALVE REPLACEMENT      CAROTID STENT      CATARACT EXTRACTION      CHOLECYSTECTOMY      CORONARY ARTERY BYPASS GRAFT      CORONARY STENT PLACEMENT      ENDOSCOPY N/A 04/24/2023    Procedure: ESOPHAGOGASTRODUODENOSCOPY with antrum body biopsies;  Surgeon: REGGIE Ace MD;  Location: [facility] ENDOSCOPY;  Service: Gastroenterology;  Laterality: N/A;  hiatal hernia    FINGER SURGERY      INSERT / REPLACE / REMOVE PACEMAKER      KNEE SURGERY      PACEMAKER IMPLANTATION      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 Uses RW.  -OD     Existing Precautions/Restrictions oxygen therapy device and L/min  -OD     Barriers to Rehab medically complex  -OD       Row Name 07/30/24 1636          Living Environment    People in Home facility resident;other (see comments)  [facility]  -OD       Row Name 07/30/24 1636          Cognition    Orientation Status (Cognition) oriented x 4  -OD       Row Name 07/30/24 1636          Safety Issues, Functional Mobility    Impairments Affecting Function (Mobility) endurance/activity tolerance;shortness of breath  -OD               User Key  (r) = Recorded By, (t) = Taken By, (c) = Cosigned By      Initials Name Provider Type    OD Marti Maier, PT Physical Therapist                   Mobility       Row Name 07/30/24 1637          Bed Mobility    Bed Mobility bed mobility (all) activities  -OD     All Activities, [place] (Bed Mobility) modified independence  -OD     Assistive Device (Bed Mobility) bed rails  -OD       Row Name 07/30/24 1637          Transfers    Comment, (Transfers) Pt declined OOB mobility, had just transferred back to bed from a transport w/c from echo.  -OD               User Key  (r) = Recorded By, (t) = Taken By, (c) = Cosigned By      Initials Name Provider Type    OD Marti Maier, PT Physical Therapist                   Obj/Interventions       Row Name 07/30/24 1637          Range of Motion Comprehensive    General Range of Motion upper extremity range of motion deficits identified  -OD     Comment, General Range of Motion LUE limited shoulder flex/abd from prior surgery. Mainains good shoulder IR and reaching behind back.  -OD       Row Name 07/30/24 1637          Strength Comprehensive (MMT)    General Manual Muscle Testing (MMT) Assessment lower extremity strength deficits identified  -OD     Comment, General Manual Muscle Testing (MMT) Assessment Mild weakness noted 3+/5 grossly BLE  -OD       Row Name  07/30/24 1637          Motor Skills    Motor Skills functional endurance  -OD     Functional Endurance poor  -OD       Row Name 07/30/24 1637          Balance    Balance Interventions sitting;minimal challenge  -OD       Row Name 07/30/24 1637          Sensory Assessment (Somatosensory)    Sensory Assessment (Somatosensory) sensation intact  -OD               User Key  (r) = Recorded By, (t) = Taken By, (c) = Cosigned By      Initials Name Provider Type    OD Marti Maier, PT Physical Therapist                   Goals/Plan       Row Name 07/30/24 1641          Bed Mobility Goal 1 (PT)    Activity/Assistive Device (Bed Mobility Goal 1, PT) bed mobility activities, all  -OD     [place] Level/Cues Needed (Bed Mobility Goal 1, PT) independent  -OD     Time Frame (Bed Mobility Goal 1, PT) long term goal (LTG);2 weeks  -OD       Row Name 07/30/24 1641          Transfer Goal 1 (PT)    Activity/Assistive Device (Transfer Goal 1, PT) transfers, all;walker, rolling  -OD     [place] Level/Cues Needed (Transfer Goal 1, PT) modified independence  -OD     Time Frame (Transfer Goal 1, PT) long term goal (LTG);2 weeks  -OD       Row Name 07/30/24 1641          Gait Training Goal 1 (PT)    Activity/Assistive Device (Gait Training Goal 1, PT) gait (walking locomotion);assistive device use;walker, rolling  -OD     [place] Level (Gait Training Goal 1, PT) modified independence  -OD     Distance (Gait Training Goal 1, PT) 250'  -OD     Time Frame (Gait Training Goal 1, PT) long term goal (LTG);2 weeks  -OD       Row Name 07/30/24 1641          Therapy Assessment/Plan (PT)    Planned Therapy Interventions (PT) balance training;bed mobility training;gait training;home exercise program;neuromuscular re-education;ROM (range of motion);stair training;strengthening;stretching;patient/family education;postural re-education;transfer training  -OD               User Key  (r) = Recorded By, (t) = Taken By, (c) = Cosigned By       Initials Name Provider Type    OD Marti Maier, PT Physical Therapist                   Clinical Impression       Row Name 07/30/24 1638          Pain    Pretreatment Pain Rating 0/10 - no pain  -OD     Posttreatment Pain Rating 0/10 - no pain  -OD       Row Name 07/30/24 1638          Plan of Care Review    Plan of Care Reviewed With patient;daughter  -OD     Progress no change  -OD     Outcome Evaluation Tracy Jane is an 88 y/o F admitted to [facility] on 7/29/24 for SOA. Pt dx with AoC resp failure, AoC CHF, and in Afib. CTA (-) for PE, but shows effusion. PMH includes Afib, COPD on 3L O2, presence of watchman device. At baseline, pt lives at [facility] and is IND with ADLs and mobiltiy using RW. Pt reports good endurance typically, able to walk far distance to her dining stallworth. Pt had just returned from transport to [place] upon PT arrival, only able to tolerate sitting EOB. Pt was SBA but demo reduced endurance. PT will follow while admitted for progressing endurance, however pt likely safe to d/c back to [facility].  -OD       Row Name 07/30/24 1638          Therapy Assessment/Plan (PT)    Rehab Potential (PT) good, to achieve stated therapy goals  -OD     Criteria for Skilled Interventions Met (PT) yes;meets criteria  -OD     Therapy Frequency (PT) 3 times/wk  -OD     Predicted Duration of Therapy Intervention (PT) until d/c  -OD       Row Name 07/30/24 1638          Vital Signs    O2 Delivery Pre Treatment nasal cannula  3L  -OD     O2 Delivery Intra Treatment nasal cannula  -OD     O2 Delivery Post Treatment nasal cannula  -OD     Pre Patient Position Supine  -OD     Intra Patient Position Sitting  -OD     Post Patient Position Supine  -OD       Row Name 07/30/24 1638          Positioning and Restraints    Pre-Treatment Position in bed  -OD     Post Treatment Position bed  -OD     In Bed notified nsg;supine;call light within reach;encouraged to call for assist;with family/caregiver  -OD                User Key  (r) = Recorded By, (t) = Taken By, (c) = Cosigned By      Initials Name Provider Type    Marti Medel, CARMELO Physical Therapist                   Outcome Measures       Row Name 07/30/24 1642 07/30/24 0830       How much help from another person do you currently need...    Turning from your back to your side while in flat bed without using bedrails? 4  -OD 4  -RH    Moving from lying on back to sitting on the side of a flat bed without bedrails? 4  -OD 4  -RH    Moving to and from a bed to a chair (including a wheelchair)? 4  -OD 3  -RH    Standing up from a chair using your arms (e.g., wheelchair, bedside chair)? 3  -OD 3  -RH    Climbing 3-5 steps with a railing? 3  -OD 3  -RH    To walk in hospital room? 3  -OD 3  -RH    AM-PAC 6 Clicks Score (PT) 21  -OD 20  -RH    Highest Level of Mobility Goal 6 --> Walk 10 steps or more  -OD 6 --> Walk 10 steps or more  -RH      Row Name 07/30/24 1642          Functional Assessment    Outcome Measure Options AM-PAC 6 Clicks Basic Mobility (PT)  -OD               User Key  (r) = Recorded By, (t) = Taken By, (c) = Cosigned By      Initials Name Provider Type     Flora Poe, RN Registered Nurse    Marti Medel, PT Physical Therapist                                 Physical Therapy Education       Title: PT OT SLP Therapies (In Progress)       Topic: Physical Therapy (Done)       Point: Mobility training (Done)       Learning Progress Summary             Patient Acceptance, E, VU by OD at 7/30/2024 1642                         Point: Home exercise program (Done)       Learning Progress Summary             Patient Acceptance, E, VU by OD at 7/30/2024 1642                         Point: Body mechanics (Done)       Learning Progress Summary             Patient Acceptance, E, VU by OD at 7/30/2024 1642                         Point: Precautions (Done)       Learning Progress Summary             Patient Acceptance, E, VU by OD at 7/30/2024 1642                                          User Key       Initials Effective Dates Name Provider Type Discipline    OD 05/11/23 -  Marti Maier PT Physical Therapist PT                  PT Recommendation and Plan  Planned Therapy Interventions (PT): balance training, bed mobility training, gait training, home exercise program, neuromuscular re-education, ROM (range of motion), stair training, strengthening, stretching, patient/family education, postural re-education, transfer training  Plan of Care Reviewed With: patient, daughter  Progress: no change  Outcome Evaluation: Tracy Jane is an 88 y/o F admitted to [facility] on 7/29/24 for SOA. Pt dx with AoC resp failure, AoC CHF, and in Afib. CTA (-) for PE, but shows effusion. PMH includes Afib, COPD on 3L O2, presence of watchman device. At baseline, pt lives at [facility] and is IND with ADLs and mobiltiy using RW. Pt reports good endurance typically, able to walk far distance to her dining stallworth. Pt had just returned from transport to [place] upon PT arrival, only able to tolerate sitting EOB. Pt was SBA but demo reduced endurance. PT will follow while admitted for progressing endurance, however pt likely safe to d/c back to [facility].     Time Calculation:         PT Charges       Row Name 07/30/24 1642             Time Calculation    Start Time 1521  -OD      Stop Time 1535  -OD      Time Calculation (min) 14 min  -OD      PT Received On 07/30/24  -OD      PT - Next Appointment 07/31/24  -OD      PT Goal Re-Cert Due Date 08/13/24  -OD         Time Calculation- PT    Total Timed Code Minutes- PT 0 minute(s)  -OD                User Key  (r) = Recorded By, (t) = Taken By, (c) = Cosigned By      Initials Name Provider Type    OD Marti Maier, PT Physical Therapist                  Therapy Charges for Today       Code Description Service Date Service Provider Modifiers Qty    13249899413 HC PT EVAL MOD COMPLEXITY 3 7/30/2024 Marti Maier, PT GP 1            PT  G-Codes  Outcome Measure Options: AM-PAC 6 Clicks Basic Mobility (PT)  AM-PAC 6 Clicks Score (PT): 21  PT Discharge Summary  Anticipated Discharge Disposition (PT): assisted living    Marti Ponce, PT  7/30/2024

## 2024-07-30 NOTE — CASE MANAGEMENT/SOCIAL WORK
Discharge Planning Assessment   [facility]     Patient Name: Tracy Jane  MRN: [id number]  Today's Date: 7/30/2024    Admit Date: 7/29/2024    [facility]: [facility] Pending PT/OT nicolette.   Discharge Needs Assessment       Row Name 07/30/24 1428       Living Environment    People in Home facility resident    Name(s) of People in Home [facility]    Current Living Arrangements assisted living facility    Duration at Residence Feburary 2024    Potentially Unsafe Housing Conditions none    In the past 12 months has the electric, gas, oil, or water company threatened to shut off services in your home? No    Primary Care Provided by self    Provides Primary Care For no one    Family Caregiver if Needed child(aria), adult    Family Caregiver Names Daughter- Yenny    Quality of Family Relationships helpful;involved;supportive    Able to Return to Prior Arrangements yes       Resource/Environmental Concerns    Resource/Environmental Concerns none    Transportation Concerns none       Transportation Needs    In the past 12 months, has lack of transportation kept you from medical appointments or from getting medications? no    In the past 12 months, has lack of transportation kept you from meetings, work, or from getting things needed for daily living? No       Food Insecurity    Within the past 12 months, you worried that your food would run out before you got the money to buy more. Never true    Within the past 12 months, the food you bought just didn't last and you didn't have money to get more. Never true       Transition Planning    Patient/Family Anticipates Transition to other (see comments)  Assisted Living    Patient/Family Anticipated Services at Transition none    Transportation Anticipated family or friend will provide       Discharge Needs Assessment    Readmission Within the Last 30 Days no previous admission in last 30 days    Equipment Currently Used at Home rollator;oxygen;walker,  standard;bp cuff  O2- [facility]    Concerns to be Addressed discharge planning    Anticipated Changes Related to Illness none    Equipment Needed After Discharge none                   Discharge Plan       Row Name 07/30/24 1432       Plan    Plan From [facility]. Pending PT/OT nicolette.    Plan Comments Patient lives at [facility]. Patient does not drive and her daughter, Yenny, will transport at discharge. Patient can do IADL but has help with AL with meals, laundry, and cleaning. PCP and pharmacy confirmed. Patient does not wish to be enrolled in the MTB program. Denies financial assistance needs with medications and/or food. Denies PT and/or HH needs at this time. Dishcharge barriers: Echo pending, cardiology following, IV abx, increased WBC, decreased CHL, blood cx pending, urine cx pending.                  Continued Care and Services - Admitted Since 7/29/2024    No active coordination exists for this encounter.       Expected Discharge Date and Time       Expected Discharge Date Expected Discharge Time    Aug 2, 2024            Demographic Summary       Row Name 07/30/24 1427       General Information    Admission Type inpatient    Arrived From emergency department    Required Notices Provided Important Message from Medicare    Referral Source admission list    Reason for Consult discharge planning    Preferred Language English       Contact Information    Permission Granted to Share Info With                    Functional Status       Row Name 07/30/24 1427       Functional Status    Usual Activity Tolerance moderate    Current Activity Tolerance moderate       Functional Status, IADL    Medications assistive person    Meal Preparation assistive person    Housekeeping assistive person    Laundry assistive person    Shopping assistive person                Patient Forms       Row Name 07/30/24 1435       Patient Forms    Important Message from Medicare (IMM) Delivered  IMM  given by reg.                  Met with patient in room wearing PPE: mask.    Maintained distance greater than six feet and spent less than 15 minutes in the room.       Dariela Shannon RN

## 2024-07-30 NOTE — CONSULTS
"[facility] (formerly [facility]) CARDIOLOGY CONSULT  [facility]        Subjective:     Encounter Date:07/29/2024      Patient ID: Tracy Jane is a 89 y.o. female.    Chief Complaint: SOA      HPI:  Tracy Jane is a 89 y.o. female known to Dr. Henley in [facility] with a past cardiac history of chronic diastolic heart failure, valvular heart disease status post tissue AVR in 2014, CAD status post CABG 2008 and PCI 2006, sick sinus syndrome status post [employer] Jaya permanent pacemaker in 2016, and paroxysmal A-fib (on sotalol for suppression and off coumadin s/p Watchman implant 12/2023).  Post Watchman RACHEL 2/2024 showed EF 56-60% with elevated AV gradients, moderate AS, moderate MR, and good seal of the KRISTEL.  Last cath in 2016 showed no vessels amenable to PCI.  PMH includes HTN, HLD, CKD, PVD (renal artery stenosis, carotid stenosis), and bleeding issues with epistaxis and subdural hematoma status post fall in the summer 2023.      Presents to the ER with complaints of shortness of breath and found with hypervolemia.  Cardiology was consulted for further evaluation and management.  Patient reports progressively worsened dyspnea over the past month.  She resides in assisted living facility and has been more difficult for her to walk down to the dining stallworth.  She denies edema, PND/orthopnea, and reports that her weight fluctuates.  She denies excessive sodium or fluid intake but has been consuming \"liquid IV\" for the last several months.  She has been O2 dependent since last year following COVID.      Past Medical History:   Diagnosis Date    Abnormal ECG     Anxiety     Aortic valve replaced     Arrhythmia     Asthma     Atrial fibrillation     CAD (coronary artery disease)     Carotid artery disease     CHF (congestive heart failure)     Congenital heart disease     GERD (gastroesophageal reflux disease)     Gout     Heart murmur     Hemorrhagic stroke 2023    was on Coumadin for A-fib    " Hyperlipidemia     Hypertension     Hyperthyroidism     Hypothyroidism     Myocardial infarction     Overactive bladder     Pacemaker     St. Jaya    Skin cancer          Past Surgical History:   Procedure Laterality Date    AORTIC VALVE REPAIR/REPLACEMENT      porcine    ATRIAL APPENDAGE EXCLUSION LEFT WITH TRANSESOPHAGEAL ECHOCARDIOGRAM Right 2023    Procedure: Atrial Appendage Occlusion;  Surgeon: Pk Crabtree MD;  Location: [facility] CATH INVASIVE LOCATION;  Service: Cardiovascular;  Laterality: Right;    ATRIAL APPENDAGE EXCLUSION LEFT WITH TRANSESOPHAGEAL ECHOCARDIOGRAM N/A 2023    Procedure: Atrial Appendage Occlusion;  Surgeon: Gen Caballero MD;  Location: [facility] CATH INVASIVE LOCATION;  Service: Cardiovascular;  Laterality: N/A;    BASAL CELL CARCINOMA EXCISION      spine, nose and arm, leg     BREAST BIOPSY      CARDIAC CATHETERIZATION      CARDIAC VALVE REPLACEMENT      CAROTID STENT      CATARACT EXTRACTION      CHOLECYSTECTOMY      CORONARY ARTERY BYPASS GRAFT      CORONARY STENT PLACEMENT      ENDOSCOPY N/A 2023    Procedure: ESOPHAGOGASTRODUODENOSCOPY with antrum body biopsies;  Surgeon: REGGIE Ace MD;  Location: [facility] ENDOSCOPY;  Service: Gastroenterology;  Laterality: N/A;  hiatal hernia    FINGER SURGERY      INSERT / REPLACE / REMOVE PACEMAKER      KNEE SURGERY      PACEMAKER IMPLANTATION      St. Jaya    SHOULDER SURGERY      RACHEL Bilateral 2023         Social History     Socioeconomic History    Marital status:     Number of children: 4    Years of education: GED   Tobacco Use    Smoking status: Former     Current packs/day: 0.00     Average packs/day: 1 pack/day for 4.0 years (4.0 ttl pk-yrs)     Types: Cigarettes     Start date: 1954     Quit date:      Years since quittin.6     Passive exposure: Past    Smokeless tobacco: Never   Vaping Use    Vaping status: Never Used   Substance and Sexual Activity    Alcohol use: No  "   Drug use: No    Sexual activity: Not Currently       Family History   Problem Relation Age of Onset    Hypertension Mother     Heart disease Father     Heart attack Father     Heart disease Sister     Kidney cancer Sister     Stroke Sister     Cancer Sister         breast    Cancer Sister     Heart disease Brother          No Known Allergies    Current Medications:   Scheduled Meds:azithromycin, 500 mg, Intravenous, Q24H  cefTRIAXone, 1,000 mg, Intravenous, Q24H      Continuous Infusions:     ROS  All other systems reviewed and are negative.       Objective:         /65 (BP Location: Left arm, Patient Position: Lying)   Pulse 105   Temp 97.8 °F (36.6 °C) (Oral)   Resp 26   Ht 152.4 cm (60\")   Wt 60.9 kg (134 lb 4.2 oz)   SpO2 98%   BMI 26.22 kg/m²       General: elderly, in NAD.  Neuro: AAOx3. No gross deficits.  HEENT: Sclerae clear, no xanthelasmas.  CV: irregular S1S2, RRR. 3/6 systolic murmurs.  Resp: Breathing is unlabored. Lungs + wheezing, bibasilar crackles.  GI: BS+. Abdomen soft and NTTP.  Ext: Pedal pulses are palpable. Extremities are nonedematous.  MS: moves all extremities, generalized weakness.  Skin: warm, dry.  Psych: calm and cooperative.            Lab Review:     Results from last 7 days   Lab Units 07/30/24  0427 07/29/24  1753   SODIUM mmol/L 136 133*   POTASSIUM mmol/L 4.1 5.2   CHLORIDE mmol/L 97* 100   CO2 mmol/L 25.7 22.0   BUN mg/dL 19 19   CREATININE mg/dL 0.96 1.05*   GLUCOSE mg/dL 91 120*   CALCIUM mg/dL 9.8 9.2         Results from last 7 days   Lab Units 07/30/24  0427 07/29/24  1753   WBC 10*3/mm3 11.18* 11.70*   HEMOGLOBIN g/dL 10.4* 9.7*   HEMATOCRIT % 32.7* 32.2*   PLATELETS 10*3/mm3 236 270                   Invalid input(s): \"LDLCALC\"  Results from last 7 days   Lab Units 07/29/24  1753   PROBNP pg/mL 8,366.0*     Results from last 7 days   Lab Units 07/30/24  0427   TSH uIU/mL 2.940       Recent Radiology:  Imaging Results (Most Recent)       Procedure Component " Value Units Date/Time    CT Angiogram Chest Pulmonary Embolism [[id number]] Collected: 07/29/24 2002     Updated: 07/29/24 2022    Narrative:      CT ANGIOGRAM CHEST PULMONARY EMBOLISM    Date of Exam: 7/29/2024 7:55 PM EDT    Indication: Dyspnea.    Comparison: Chest radiograph 7/29/2024. Chest CT PE protocol 8/10/2020    Technique: Axial CT images were obtained of the chest after the uneventful intravenous administration of iodinated contrast utilizing pulmonary embolism protocol.  Sagittal and coronal reconstructions were performed.  Automated exposure control and   iterative reconstruction methods were used.      Findings:  CARDIOVASCULAR:    Diagnostic quality: Contrast opacification of the pulmonary arterial circulation is adequate for assessment of pulmonary embolism. Study is not significantly limited by respiratory motion artifact.    Pulmonary arteries: No filling defects to suggest pulmonary embolism. Not enlarged.    Heart: No interventricular septal deviation. Similar cardiomegaly. Coronary artery calcifications and prior coronary revascularization. Aortic valve replacement. Left atrial appendage occlusion device. Left chest wall pacemaker with leads in the right   atrium and right ventricle.    Pericardium: No pericardial effusion.    Thoracic aorta: Atherosclerotic calcification without evidence of aneurysm.    REMAINING CHEST:    Thyroid and thoracic inlet: Normal.    Lymph nodes: Mildly enlarged multistation mediastinal lymph nodes, favored reactive.    Esophagus: Small hiatal hernia.    Lung parenchyma: Interlobular septal thickening. Mild perihilar groundglass opacities. Multifocal bilateral groundglass and consolidative opacities. Bibasilar atelectasis.    Airways: Patent trachea and mainstem bronchi. Bronchial wall thickening.    Pleura: Small right greater than left pleural effusions. No pneumothorax.    Chest wall and osseous structures: No acute abnormality. Prior median sternotomy.  Degenerative changes of the imaged spine.    Included upper abdomen: Left renal cyst. Similar mild bilateral adrenal thickening without discrete nodule. Calcifications within the liver and spleen, likely sequelae of prior granulomatous disease.        Impression:      Impression:    No evidence of pulmonary embolism.    Multifocal bilateral groundglass and consolidative opacities suspicious for multifocal pneumonia.    Mild pulmonary edema with small bilateral pleural effusions.            Electronically Signed: Jose Atkins    7/29/2024 8:20 PM EDT    Workstation ID: [id number]    XR Chest 1 View [[id number]] Collected: 07/29/24 1838     Updated: 07/29/24 1845    Narrative:      XR CHEST 1 VW    Date of Exam: 7/29/2024 6:11 PM EDT    Indication: Dyspnea    Comparison: Chest radiographs 7/10/2024 and 1/11/2024    Findings:  Medical devices, lines, and tubes: Left chest wall ICD/pacemaker appears unchanged.    Mediastinum: Cardiomediastinal silhouette appears aortic valve prosthesis, surgical clips, and cardiomegaly.    Lungs: Indistinctness of the pulmonary vasculature, septal thickening, and patchy perihilar opacities. Right basal atelectasis.    Pleura: Blunting of right costophrenic sulcus may represent a small right pleural effusion. No pneumothorax.    Bones and soft tissues: Median sternotomy. Left shoulder arthroplasty.        Impression:      Impression:  Mild pulmonary edema with possible small right pleural effusion.      Electronically Signed: Jose Atkins    7/29/2024 6:43 PM EDT    Workstation ID: [id number]              ECHOCARDIOGRAM:    Results for orders placed during the hospital encounter of 02/20/24    Adult Transesophageal Echo (RACHEL) W/ Cont if Necessary Per Protocol    Interpretation Summary    Left ventricular systolic function is normal. Left ventricular ejection fraction appears to be 56 - 60%.    The left atrial cavity is moderately dilated.    Saline test results are negative.    Moderate  aortic valve stenosis is present.    There is a bioprosthetic aortic valve present. The prosthetic aortic valve peak and mean gradients are elevated.    Aortic valve maximum pressure gradient is 53 mmHg. Aortic valve mean pressure gradient is 26 mmHg.    Moderate mitral valve regurgitation is present.    Watchman left atrial appendage occlusion device is noted to be well-seated and without significant gap            Assessment:         Active Hospital Problems    Diagnosis  POA    **Acute on chronic respiratory failure with hypoxia [J96.21]  Yes    Acute on chronic heart failure with preserved ejection fraction (HFpEF) [I50.33]  Yes    Leukocytosis [D72.829]  Yes    Presence of Watchman left atrial appendage closure device [Z95.818]  Yes    GERD without esophagitis [K21.9]  Yes    Essential hypertension [I10]  Yes    Depression [F32.A]  Yes    Anxiety disorder [F41.9]  Yes    Hyponatremia [E87.1]  Yes    Sick sinus syndrome [I49.5]  Yes    Presence of cardiac pacemaker [Z95.0]  Yes    Arthritis, rheumatoid [M06.9]  Yes    Acquired hypothyroidism [E03.9]  Yes    Valvular heart disease [I38]  Yes    Presence of aortocoronary bypass graft [Z95.1]  Not Applicable    Peripheral vascular disease [I73.9]  Yes    Paroxysmal atrial fibrillation [I48.0]  Yes    Hx of aortic valve replacement [Z95.2]  Not Applicable    Renal insufficiency [N28.9]  Yes    Chronic coronary artery disease [I25.10]  Yes    Mixed hyperlipidemia [E78.2]  Yes     1) Acute on chronic diastolic heart failure  - BNP 8366  - CTA chest shows small bilateral pleural effusions but no PE  - TSH WNL  - started on IV lasix    2) valvular heart disease status post tissue AVR in 2014   - Post Watchman RACHEL 2/2024 showed EF 56-60% with elevated AV gradients, moderate AS, moderate MR, and good seal of the KRISTEL.      3) paroxysmal A-fib   - EKG on admit shows underlying sinus --> now in afib  - on sotalol for suppression  - off coumadin s/p Watchman implant 12/2023).     - Post Watchman RACHEL 2/2024 showed EF 56-60% with elevated AV gradients, moderate AS, moderate MR, and good seal of the KRISTEL.    - Device interrogation 6/2024 showed 8% AF burden    4) CAD status post CABG 2008 and PCI 2006  - Last cath in 2016 showed no vessels amenable to PCI.    5) sick sinus syndrome status post Saint Jaya permanent pacemaker in 2016    6) HTN    7) HLD    8) CKD    9) PVD   - renal artery stenosis  - carotid stenosis    10) subdural hematoma in 2023 status post fall              Plan:   Repeat 2D echo for f/u on VHD.  Continue IV diuretics.  Monitor daily weights, strict I/Os, renal function, and electrolytes closely.  Will institute daily fluid/sodium restriction.  EKG on admit shows underlying sinus.  Tele shows patient is now in afib.  Last remote device interrogation 6/2024 showed 8% afib burden on sotalol.  Will discuss with attending cardiologist for further recommendations.         Electronically signed by YEYO Anderson, 07/30/24, 9:53 AM EDT.       Patient seen and examined.  Labs x-rays EKGs reviewed  Patient presented with decompensated heart failure acute on chronic diastolic heart failure and has ongoing atrial fibrillation with rapid ventricular rate with underlying left bundle branch block.    Patient has Watchman in situ and has sick sinus syndrome with dual-chamber pacemaker in situ and patient has clearly failed sotalol therapy  Patient also has AVR with prior increased gradients mean gradient up to 29 mmHg.        Physical Exam    General:      well developed, well nourished, in no acute distress.    Head:      normocephalic and atraumatic.    Eyes:      PERRL/EOM intact, conjunctivae and sclerae clear without nystagmus.    Neck:      no  thyromegaly, trachea central with normal respiratory effort  Lungs:      clear bilaterally to auscultation.    Heart:       irregular rate and rhythm, S1, S2 , positive for loud ejection systolic murmur at the base of the heart ,  carotid upstroke reduced  Skin:      intact without lesions or rashes.    Psych:      alert and cooperative; normal mood and affect; normal attention span and concentration.        Patient feeling poorly with ongoing atrial fibrillation with a decompensation of chronic diastolic heart failure with underlying coronary artery disease and valvular heart disease and atrial fibrillation    Patient clearly failed sotalol therapy and sotalol has not been resumed  Start IV amiodarone to get the patient to sinus rhythm and will switch to oral amiodarone on this patient  Continue gentle diuresis  Colchicine has been held because of drug interaction with amiodarone  Echocardiogram pending to be read      Electronically signed by Pk Crabtree MD, 07/30/24, 4:03 PM EDT.

## 2024-07-30 NOTE — PLAN OF CARE
Goal Outcome Evaluation:  Plan of Care Reviewed With: patient, daughter        Progress: no change  Outcome Evaluation: Tracy Jane is an 88 y/o F admitted to [facility] on 7/29/24 for SOA. Pt dx with AoC resp failure, AoC CHF, and in Afib. CTA (-) for PE, but shows effusion. PMH includes Afib, COPD on 3L O2, presence of watchman device. At baseline, pt lives at [facility] and is IND with ADLs and mobiltiy using RW. Pt reports good endurance typically, able to walk far distance to her dining stallworth. Pt had just returned from transport to [place] upon PT arrival, only able to tolerate sitting EOB. Pt was SBA but demo reduced endurance. PT will follow while admitted for progressing endurance, however pt likely safe to d/c back to [facility].      Anticipated Discharge Disposition (PT): assisted living

## 2024-07-30 NOTE — PROGRESS NOTES
[facility] MEDICINE SERVICE  DAILY PROGRESS NOTE    NAME: Tracy Jane  : 1934  MRN: [id number]      LOS: 1 day     PROVIDER OF SERVICE: Unique Nelson MD    Chief Complaint: Acute on chronic respiratory failure with hypoxia    History of Present Illness:  Tracy Jane is a very pleasant  89 y.o. female with a CMH of atrial fibrillation status post watchman implantation, sick sinus syndrome, cardiac pacemaker in situ, valvular insufficiency with prior bioprosthetic aortic valve , coronary artery disease status post CABG, bilateral renal artery stenosis, carotid artery disease, on home oxygen 3 L via nasal cannula continuously, peripheral  vascular disease, hyperlipidemia, rheumatoid arthritis, depression anxiety, hyperlipidemia, hypertension, GERD, former smoker who presented to [facility] on 2024 with complaints of increased shortness of air past several days.  She reports increased shortness of air with activity however she is short of breath at rest.  She denies any chest pain.  She reports a nonproductive cough.  No bilateral lower extremity edema.  She denies any fevers chills or diaphoresis.     Labs today showed initial ABG with a pH of 7.472, pCO2 32.4 pO2 66.2, proBNP elevated at 8366, sodium 133 creatinine 1.05 glucose 120 WBC 11.7 and afebrile, hemoglobin 9.7 was previously 10.1 in February, D-dimer elevated at 2.75.  CT angiogram per radiology shows no evidence of pulmonary embolism but shows multifocal bilateral groundglass and consolidative opacities suspicious for multifocal pneumonia mild pulmonary edema with small bilateral pleural effusions.  EKG shows atrial paced rhythm heart rate 70 left bundle branch block ST elevation secondary to interventricular conduction delay.  Respiratory panel negative.  Urinalysis negative for infection.     She is stable on 3 L oxygen via nasal cannula and given 80 mg IV Lasix in the emergency department.  Cardiology  has been consulted.  Given leukocytosis and consolidative opacities suspicious for multifocal pneumonia, she was started on 1 g IV ceftriaxone 500 mg IV azithromycin with blood cultures pending.    Subjective:     Interval History:      7/30-patient was admitted with shortness of breath and pneumonia and possible CHF    Review of Systems:   Negative except what is listed above     Objective:     Vital Signs  Temp:  [97.6 °F (36.4 °C)-98.5 °F (36.9 °C)] 97.8 °F (36.6 °C)  Heart Rate:  [] 105  Resp:  [16-26] 26  BP: (116-157)/(51-94) 125/65  Flow (L/min):  [2-4] 3   Body mass index is 26.22 kg/m².    Physical Exam    General Appearance:    Alert, cooperative, in no acute distress   Head:    Normocephalic, without obvious abnormality, atraumatic   Eyes:            conjunctivae and sclerae normal, no   icterus, no pallor, corneas  clear, PERRLA   Neck:   No adenopathy, supple, trachea midline, no thyromegaly, no   carotid bruit, no JVD   Lungs:     Clear to auscultation,respirations regular, even and                  unlabored    Heart:    Regular rhythm and normal rate, normal S1 and S2, no            murmur, no gallop, no rub, no click   Abdomen:     Normal bowel sounds, no masses, no organomegaly, soft        nontender, nondistended, no guarding, no rebound                No tenderness   Extremities:   Moves all extremities well, no edema, no cyanosis, no             redness   Lymph nodes:   No palpable adenopathy   Neurologic:   Cranial nerves 2 - 12 grossly intact, sensation intact, DTR       present and equal bilaterally       Scheduled Meds   azithromycin, 500 mg, Intravenous, Q24H  cefTRIAXone, 1,000 mg, Intravenous, Q24H       PRN Meds     [COMPLETED] Insert Peripheral IV **AND** sodium chloride   Infusions         Diagnostic Data    Results from last 7 days   Lab Units 07/30/24  0427   WBC 10*3/mm3 11.18*   HEMOGLOBIN g/dL 10.4*   HEMATOCRIT % 32.7*   PLATELETS 10*3/mm3 236   GLUCOSE mg/dL 91    CREATININE mg/dL 0.96   BUN mg/dL 19   SODIUM mmol/L 136   POTASSIUM mmol/L 4.1   ANION GAP mmol/L 13.3       CT Angiogram Chest Pulmonary Embolism    Result Date: 7/29/2024  Impression: No evidence of pulmonary embolism. Multifocal bilateral groundglass and consolidative opacities suspicious for multifocal pneumonia. Mild pulmonary edema with small bilateral pleural effusions. Electronically Signed: Jose Atkins  7/29/2024 8:20 PM EDT  Workstation ID: [id number]    XR Chest 1 View    Result Date: 7/29/2024  Impression: Mild pulmonary edema with possible small right pleural effusion. Electronically Signed: Jose BLANCHE Wilbert  7/29/2024 6:43 PM EDT  Workstation ID: [id number]       I have personally reviewed the patient's new results.     Assessment/Plan:     Active and Resolved Problems  Active Hospital Problems    Diagnosis  POA    **Acute on chronic respiratory failure with hypoxia [J96.21]  Yes    Acute on chronic heart failure with preserved ejection fraction (HFpEF) [I50.33]  Yes    Leukocytosis [D72.829]  Yes    Presence of Watchman left atrial appendage closure device [Z95.818]  Yes    GERD without esophagitis [K21.9]  Yes    Essential hypertension [I10]  Yes    Depression [F32.A]  Yes    Anxiety disorder [F41.9]  Yes    Hyponatremia [E87.1]  Yes    Sick sinus syndrome [I49.5]  Yes    Presence of cardiac pacemaker [Z95.0]  Yes    Arthritis, rheumatoid [M06.9]  Yes    Acquired hypothyroidism [E03.9]  Yes    Valvular heart disease [I38]  Yes    Presence of aortocoronary bypass graft [Z95.1]  Not Applicable    Peripheral vascular disease [I73.9]  Yes    Paroxysmal atrial fibrillation [I48.0]  Yes    Hx of aortic valve replacement [Z95.2]  Not Applicable    Renal insufficiency [N28.9]  Yes    Chronic coronary artery disease [I25.10]  Yes    Mixed hyperlipidemia [E78.2]  Yes      Resolved Hospital Problems   No resolved problems to display.       Suggestion:    7/30-at this time I will consult Pulmonary.  Continue  antibiotic.  Continue supplemental oxygen.    VTE Prophylaxis:  Mechanical VTE prophylaxis orders are present.         Code status is   Code Status and Medical Interventions: CPR (Attempt to Resuscitate); Full Support   Ordered at: 07/29/24 2043     Code Status (Patient has no pulse and is not breathing):    CPR (Attempt to Resuscitate)     Medical Interventions (Patient has pulse or is breathing):    Full Support       Plan for disposition:  8/2    Time: 30 minutes    Signature: Electronically signed by Unique Nelson MD, 07/30/24, 09:35 EDT.  [facility] Hospitalist Team

## 2024-07-31 ENCOUNTER — APPOINTMENT (OUTPATIENT)
Dept: CARDIOLOGY | Facility: HOSPITAL | Age: 89
End: 2024-07-31
Payer: MEDICARE

## 2024-07-31 ENCOUNTER — ANESTHESIA (OUTPATIENT)
Dept: CARDIOLOGY | Facility: HOSPITAL | Age: 89
End: 2024-07-31
Payer: MEDICARE

## 2024-07-31 ENCOUNTER — ANESTHESIA EVENT (OUTPATIENT)
Dept: CARDIOLOGY | Facility: HOSPITAL | Age: 89
End: 2024-07-31
Payer: MEDICARE

## 2024-07-31 PROBLEM — I48.91 A-FIB: Status: ACTIVE | Noted: 2023-12-26

## 2024-07-31 LAB
ANION GAP SERPL CALCULATED.3IONS-SCNC: 14.2 MMOL/L (ref 5–15)
AORTIC DIMENSIONLESS INDEX: 0.24 (DI)
BACTERIA SPEC AEROBE CULT: NO GROWTH
BASOPHILS # BLD AUTO: 0.03 10*3/MM3 (ref 0–0.2)
BASOPHILS # BLD AUTO: 0.04 10*3/MM3 (ref 0–0.2)
BASOPHILS NFR BLD AUTO: 0.3 % (ref 0–1.5)
BASOPHILS NFR BLD AUTO: 0.4 % (ref 0–1.5)
BH CV ECHO MEAS - AI P1/2T: 320.2 MSEC
BH CV ECHO MEAS - AO MAX PG: 53.3 MMHG
BH CV ECHO MEAS - AO MEAN PG: 30 MMHG
BH CV ECHO MEAS - AO V2 MAX: 365 CM/SEC
BH CV ECHO MEAS - AO V2 VTI: 64.6 CM
BH CV ECHO MEAS - AVA(I,D): 0.55 CM2
BH CV ECHO MEAS - EDV(CUBED): 50.7 ML
BH CV ECHO MEAS - EDV(MOD-SP2): 66.4 ML
BH CV ECHO MEAS - EDV(MOD-SP4): 78.1 ML
BH CV ECHO MEAS - EF(MOD-BP): 45.2 %
BH CV ECHO MEAS - EF(MOD-SP2): 47.9 %
BH CV ECHO MEAS - EF(MOD-SP4): 42 %
BH CV ECHO MEAS - ESV(CUBED): 24.4 ML
BH CV ECHO MEAS - ESV(MOD-SP2): 34.6 ML
BH CV ECHO MEAS - ESV(MOD-SP4): 45.3 ML
BH CV ECHO MEAS - FS: 21.6 %
BH CV ECHO MEAS - IVS/LVPW: 0.85 CM
BH CV ECHO MEAS - IVSD: 1.1 CM
BH CV ECHO MEAS - LA DIMENSION: 4.3 CM
BH CV ECHO MEAS - LAT PEAK E' VEL: 9.8 CM/SEC
BH CV ECHO MEAS - LV DIASTOLIC VOL/BSA (35-75): 49.6 CM2
BH CV ECHO MEAS - LV MASS(C)D: 147.3 GRAMS
BH CV ECHO MEAS - LV MAX PG: 6.7 MMHG
BH CV ECHO MEAS - LV MEAN PG: 3 MMHG
BH CV ECHO MEAS - LV SYSTOLIC VOL/BSA (12-30): 28.8 CM2
BH CV ECHO MEAS - LV V1 MAX: 129 CM/SEC
BH CV ECHO MEAS - LV V1 VTI: 15.6 CM
BH CV ECHO MEAS - LVIDD: 3.7 CM
BH CV ECHO MEAS - LVIDS: 2.9 CM
BH CV ECHO MEAS - LVOT AREA: 2.27 CM2
BH CV ECHO MEAS - LVOT DIAM: 1.7 CM
BH CV ECHO MEAS - LVPWD: 1.3 CM
BH CV ECHO MEAS - MED PEAK E' VEL: 9.4 CM/SEC
BH CV ECHO MEAS - MR MAX PG: 136.4 MMHG
BH CV ECHO MEAS - MR MAX VEL: 584 CM/SEC
BH CV ECHO MEAS - MR MEAN PG: 74 MMHG
BH CV ECHO MEAS - MR MEAN VEL: 394 CM/SEC
BH CV ECHO MEAS - MR VTI: 143 CM
BH CV ECHO MEAS - MV DEC SLOPE: 563 CM/SEC2
BH CV ECHO MEAS - MV E MAX VEL: 116 CM/SEC
BH CV ECHO MEAS - MV MAX PG: 6.2 MMHG
BH CV ECHO MEAS - MV MEAN PG: 3 MMHG
BH CV ECHO MEAS - MV P1/2T: 60.3 MSEC
BH CV ECHO MEAS - MV V2 VTI: 18.9 CM
BH CV ECHO MEAS - MVA(P1/2T): 3.6 CM2
BH CV ECHO MEAS - MVA(VTI): 1.87 CM2
BH CV ECHO MEAS - PA ACC TIME: 0.1 SEC
BH CV ECHO MEAS - PA V2 MAX: 125 CM/SEC
BH CV ECHO MEAS - RAP SYSTOLE: 3 MMHG
BH CV ECHO MEAS - RV MAX PG: 3.5 MMHG
BH CV ECHO MEAS - RV V1 MAX: 94.2 CM/SEC
BH CV ECHO MEAS - RV V1 VTI: 13.6 CM
BH CV ECHO MEAS - RVSP: 36.9 MMHG
BH CV ECHO MEAS - SV(LVOT): 35.4 ML
BH CV ECHO MEAS - SV(MOD-SP2): 31.8 ML
BH CV ECHO MEAS - SV(MOD-SP4): 32.8 ML
BH CV ECHO MEAS - SVI(LVOT): 22.5 ML/M2
BH CV ECHO MEAS - SVI(MOD-SP2): 20.2 ML/M2
BH CV ECHO MEAS - SVI(MOD-SP4): 20.8 ML/M2
BH CV ECHO MEAS - TAPSE (>1.6): 1.15 CM
BH CV ECHO MEAS - TR MAX PG: 33.9 MMHG
BH CV ECHO MEAS - TR MAX VEL: 291 CM/SEC
BH CV ECHO MEASUREMENTS AVERAGE E/E' RATIO: 12.08
BH CV XLRA - RV BASE: 2.8 CM
BH CV XLRA - RV MID: 2.5 CM
BH CV XLRA - TDI S': 10.3 CM/SEC
BUN SERPL-MCNC: 23 MG/DL (ref 8–23)
BUN/CREAT SERPL: 25.3 (ref 7–25)
CALCIUM SPEC-SCNC: 9.5 MG/DL (ref 8.6–10.5)
CHLORIDE SERPL-SCNC: 94 MMOL/L (ref 98–107)
CO2 SERPL-SCNC: 23.8 MMOL/L (ref 22–29)
CREAT SERPL-MCNC: 0.91 MG/DL (ref 0.57–1)
DEPRECATED RDW RBC AUTO: 57.8 FL (ref 37–54)
DEPRECATED RDW RBC AUTO: 57.9 FL (ref 37–54)
EGFRCR SERPLBLD CKD-EPI 2021: 60.4 ML/MIN/1.73
EOSINOPHIL # BLD AUTO: 0.24 10*3/MM3 (ref 0–0.4)
EOSINOPHIL # BLD AUTO: 0.35 10*3/MM3 (ref 0–0.4)
EOSINOPHIL NFR BLD AUTO: 2.5 % (ref 0.3–6.2)
EOSINOPHIL NFR BLD AUTO: 3.4 % (ref 0.3–6.2)
ERYTHROCYTE [DISTWIDTH] IN BLOOD BY AUTOMATED COUNT: 16.6 % (ref 12.3–15.4)
ERYTHROCYTE [DISTWIDTH] IN BLOOD BY AUTOMATED COUNT: 16.7 % (ref 12.3–15.4)
GLUCOSE SERPL-MCNC: 104 MG/DL (ref 65–99)
HCT VFR BLD AUTO: 31.2 % (ref 34–46.6)
HCT VFR BLD AUTO: 32.1 % (ref 34–46.6)
HGB BLD-MCNC: 9.8 G/DL (ref 12–15.9)
HGB BLD-MCNC: 9.9 G/DL (ref 12–15.9)
IMM GRANULOCYTES # BLD AUTO: 0.07 10*3/MM3 (ref 0–0.05)
IMM GRANULOCYTES # BLD AUTO: 0.07 10*3/MM3 (ref 0–0.05)
IMM GRANULOCYTES NFR BLD AUTO: 0.7 % (ref 0–0.5)
IMM GRANULOCYTES NFR BLD AUTO: 0.7 % (ref 0–0.5)
LEFT ATRIUM VOLUME INDEX: 40.5 ML/M2
LV EF 2D ECHO EST: 55 %
LYMPHOCYTES # BLD AUTO: 1.92 10*3/MM3 (ref 0.7–3.1)
LYMPHOCYTES # BLD AUTO: 2.04 10*3/MM3 (ref 0.7–3.1)
LYMPHOCYTES NFR BLD AUTO: 20 % (ref 19.6–45.3)
LYMPHOCYTES NFR BLD AUTO: 20.2 % (ref 19.6–45.3)
MCH RBC QN AUTO: 29.7 PG (ref 26.6–33)
MCH RBC QN AUTO: 30.1 PG (ref 26.6–33)
MCHC RBC AUTO-ENTMCNC: 30.8 G/DL (ref 31.5–35.7)
MCHC RBC AUTO-ENTMCNC: 31.4 G/DL (ref 31.5–35.7)
MCV RBC AUTO: 95.7 FL (ref 79–97)
MCV RBC AUTO: 96.4 FL (ref 79–97)
MONOCYTES # BLD AUTO: 0.41 10*3/MM3 (ref 0.1–0.9)
MONOCYTES # BLD AUTO: 0.41 10*3/MM3 (ref 0.1–0.9)
MONOCYTES NFR BLD AUTO: 4 % (ref 5–12)
MONOCYTES NFR BLD AUTO: 4.3 % (ref 5–12)
NEUTROPHILS NFR BLD AUTO: 6.83 10*3/MM3 (ref 1.7–7)
NEUTROPHILS NFR BLD AUTO: 7.3 10*3/MM3 (ref 1.7–7)
NEUTROPHILS NFR BLD AUTO: 71.6 % (ref 42.7–76)
NEUTROPHILS NFR BLD AUTO: 71.9 % (ref 42.7–76)
NRBC BLD AUTO-RTO: 0 /100 WBC (ref 0–0.2)
NRBC BLD AUTO-RTO: 0 /100 WBC (ref 0–0.2)
PLATELET # BLD AUTO: 248 10*3/MM3 (ref 140–450)
PLATELET # BLD AUTO: 254 10*3/MM3 (ref 140–450)
PMV BLD AUTO: 10.4 FL (ref 6–12)
PMV BLD AUTO: 10.6 FL (ref 6–12)
POTASSIUM SERPL-SCNC: 3.7 MMOL/L (ref 3.5–5.2)
RBC # BLD AUTO: 3.26 10*6/MM3 (ref 3.77–5.28)
RBC # BLD AUTO: 3.33 10*6/MM3 (ref 3.77–5.28)
SINUS: 2.7 CM
SODIUM SERPL-SCNC: 132 MMOL/L (ref 136–145)
STJ: 2.2 CM
WBC NRBC COR # BLD AUTO: 10.2 10*3/MM3 (ref 3.4–10.8)
WBC NRBC COR # BLD AUTO: 9.51 10*3/MM3 (ref 3.4–10.8)

## 2024-07-31 PROCEDURE — 93010 ELECTROCARDIOGRAM REPORT: CPT | Performed by: INTERNAL MEDICINE

## 2024-07-31 PROCEDURE — 5A2204Z RESTORATION OF CARDIAC RHYTHM, SINGLE: ICD-10-PCS | Performed by: INTERNAL MEDICINE

## 2024-07-31 PROCEDURE — 94640 AIRWAY INHALATION TREATMENT: CPT

## 2024-07-31 PROCEDURE — 93005 ELECTROCARDIOGRAM TRACING: CPT | Performed by: INTERNAL MEDICINE

## 2024-07-31 PROCEDURE — 84100 ASSAY OF PHOSPHORUS: CPT | Performed by: INTERNAL MEDICINE

## 2024-07-31 PROCEDURE — 85025 COMPLETE CBC W/AUTO DIFF WBC: CPT | Performed by: NURSE PRACTITIONER

## 2024-07-31 PROCEDURE — 25010000002 AMIODARONE IN DEXTROSE 5% 360-4.14 MG/200ML-% SOLUTION: Performed by: INTERNAL MEDICINE

## 2024-07-31 PROCEDURE — 85730 THROMBOPLASTIN TIME PARTIAL: CPT | Performed by: INTERNAL MEDICINE

## 2024-07-31 PROCEDURE — 25810000003 SODIUM CHLORIDE 0.9 % SOLUTION: Performed by: NURSE ANESTHETIST, CERTIFIED REGISTERED

## 2024-07-31 PROCEDURE — 25010000002 PROPOFOL 500 MG/50ML EMULSION: Performed by: NURSE ANESTHETIST, CERTIFIED REGISTERED

## 2024-07-31 PROCEDURE — 92960 CARDIOVERSION ELECTRIC EXT: CPT | Performed by: INTERNAL MEDICINE

## 2024-07-31 PROCEDURE — 85025 COMPLETE CBC W/AUTO DIFF WBC: CPT | Performed by: INTERNAL MEDICINE

## 2024-07-31 PROCEDURE — 80048 BASIC METABOLIC PNL TOTAL CA: CPT | Performed by: NURSE PRACTITIONER

## 2024-07-31 PROCEDURE — 25010000002 AMIODARONE IN DEXTROSE 5% 150-4.21 MG/100ML-% SOLUTION: Performed by: INTERNAL MEDICINE

## 2024-07-31 PROCEDURE — 83735 ASSAY OF MAGNESIUM: CPT | Performed by: INTERNAL MEDICINE

## 2024-07-31 PROCEDURE — 94799 UNLISTED PULMONARY SVC/PX: CPT

## 2024-07-31 PROCEDURE — 94761 N-INVAS EAR/PLS OXIMETRY MLT: CPT

## 2024-07-31 PROCEDURE — 25010000002 CEFTRIAXONE PER 250 MG: Performed by: INTERNAL MEDICINE

## 2024-07-31 PROCEDURE — 92960 CARDIOVERSION ELECTRIC EXT: CPT

## 2024-07-31 PROCEDURE — 85610 PROTHROMBIN TIME: CPT | Performed by: INTERNAL MEDICINE

## 2024-07-31 PROCEDURE — 80048 BASIC METABOLIC PNL TOTAL CA: CPT | Performed by: INTERNAL MEDICINE

## 2024-07-31 PROCEDURE — 25010000002 FUROSEMIDE PER 20 MG: Performed by: NURSE PRACTITIONER

## 2024-07-31 RX ORDER — PROPOFOL 10 MG/ML
INJECTION, EMULSION INTRAVENOUS AS NEEDED
Status: DISCONTINUED | OUTPATIENT
Start: 2024-07-31 | End: 2024-07-31 | Stop reason: SURG

## 2024-07-31 RX ORDER — DILTIAZEM HYDROCHLORIDE 120 MG/1
120 CAPSULE, COATED, EXTENDED RELEASE ORAL
Status: DISCONTINUED | OUTPATIENT
Start: 2024-08-01 | End: 2024-08-02 | Stop reason: HOSPADM

## 2024-07-31 RX ORDER — SERTRALINE HYDROCHLORIDE 100 MG/1
100 TABLET, FILM COATED ORAL DAILY
Status: DISCONTINUED | OUTPATIENT
Start: 2024-07-31 | End: 2024-08-02 | Stop reason: HOSPADM

## 2024-07-31 RX ORDER — LEVOTHYROXINE SODIUM 0.07 MG/1
75 TABLET ORAL
Status: DISCONTINUED | OUTPATIENT
Start: 2024-08-01 | End: 2024-08-02 | Stop reason: HOSPADM

## 2024-07-31 RX ORDER — PHENYLEPHRINE HCL IN 0.9% NACL 1 MG/10 ML
SYRINGE (ML) INTRAVENOUS AS NEEDED
Status: DISCONTINUED | OUTPATIENT
Start: 2024-07-31 | End: 2024-07-31 | Stop reason: SURG

## 2024-07-31 RX ORDER — FUROSEMIDE 10 MG/ML
40 INJECTION INTRAMUSCULAR; INTRAVENOUS DAILY
Status: DISCONTINUED | OUTPATIENT
Start: 2024-08-01 | End: 2024-07-31

## 2024-07-31 RX ORDER — AMIODARONE HYDROCHLORIDE 200 MG/1
200 TABLET ORAL EVERY 12 HOURS SCHEDULED
Status: DISCONTINUED | OUTPATIENT
Start: 2024-08-01 | End: 2024-08-02 | Stop reason: HOSPADM

## 2024-07-31 RX ORDER — SODIUM CHLORIDE 9 MG/ML
INJECTION, SOLUTION INTRAVENOUS CONTINUOUS PRN
Status: DISCONTINUED | OUTPATIENT
Start: 2024-07-31 | End: 2024-07-31 | Stop reason: SURG

## 2024-07-31 RX ORDER — IPRATROPIUM BROMIDE AND ALBUTEROL SULFATE 2.5; .5 MG/3ML; MG/3ML
3 SOLUTION RESPIRATORY (INHALATION)
Status: DISCONTINUED | OUTPATIENT
Start: 2024-07-31 | End: 2024-08-02 | Stop reason: HOSPADM

## 2024-07-31 RX ORDER — BUDESONIDE 0.5 MG/2ML
0.5 INHALANT ORAL
Status: DISCONTINUED | OUTPATIENT
Start: 2024-07-31 | End: 2024-08-02 | Stop reason: HOSPADM

## 2024-07-31 RX ORDER — ROSUVASTATIN CALCIUM 10 MG/1
20 TABLET, COATED ORAL NIGHTLY
Status: DISCONTINUED | OUTPATIENT
Start: 2024-07-31 | End: 2024-08-02 | Stop reason: HOSPADM

## 2024-07-31 RX ORDER — DOXYCYCLINE 100 MG/1
100 CAPSULE ORAL EVERY 12 HOURS SCHEDULED
Status: DISCONTINUED | OUTPATIENT
Start: 2024-07-31 | End: 2024-08-02 | Stop reason: HOSPADM

## 2024-07-31 RX ORDER — HEPARIN SODIUM 10000 [USP'U]/100ML
12 INJECTION, SOLUTION INTRAVENOUS
Status: DISCONTINUED | OUTPATIENT
Start: 2024-08-01 | End: 2024-08-01

## 2024-07-31 RX ORDER — FUROSEMIDE 40 MG/1
40 TABLET ORAL DAILY
Status: DISCONTINUED | OUTPATIENT
Start: 2024-08-01 | End: 2024-08-02 | Stop reason: HOSPADM

## 2024-07-31 RX ADMIN — IPRATROPIUM BROMIDE AND ALBUTEROL SULFATE 3 ML: .5; 3 SOLUTION RESPIRATORY (INHALATION) at 13:04

## 2024-07-31 RX ADMIN — LEVOTHYROXINE SODIUM 75 MCG: 0.07 TABLET ORAL at 05:18

## 2024-07-31 RX ADMIN — AMIODARONE HYDROCHLORIDE 0.5 MG/MIN: 1.8 INJECTION, SOLUTION INTRAVENOUS at 05:30

## 2024-07-31 RX ADMIN — PROPOFOL INJECTABLE EMULSION 20 MG: 10 INJECTION, EMULSION INTRAVENOUS at 13:33

## 2024-07-31 RX ADMIN — ROSUVASTATIN 20 MG: 10 TABLET, FILM COATED ORAL at 20:21

## 2024-07-31 RX ADMIN — GUAIFENESIN 1200 MG: 600 TABLET, EXTENDED RELEASE ORAL at 09:20

## 2024-07-31 RX ADMIN — Medication 100 MCG: at 13:29

## 2024-07-31 RX ADMIN — Medication 100 MCG: at 13:32

## 2024-07-31 RX ADMIN — DILTIAZEM HYDROCHLORIDE 240 MG: 240 CAPSULE, EXTENDED RELEASE ORAL at 09:20

## 2024-07-31 RX ADMIN — DOXYCYCLINE 100 MG: 100 CAPSULE ORAL at 12:08

## 2024-07-31 RX ADMIN — PANTOPRAZOLE SODIUM 40 MG: 40 TABLET, DELAYED RELEASE ORAL at 09:21

## 2024-07-31 RX ADMIN — AMIODARONE HYDROCHLORIDE 150 MG: 1.5 INJECTION, SOLUTION INTRAVENOUS at 13:37

## 2024-07-31 RX ADMIN — AMIODARONE HYDROCHLORIDE 0.5 MG/MIN: 1.8 INJECTION, SOLUTION INTRAVENOUS at 16:04

## 2024-07-31 RX ADMIN — CEFTRIAXONE SODIUM 1000 MG: 1 INJECTION, POWDER, FOR SOLUTION INTRAMUSCULAR; INTRAVENOUS at 17:30

## 2024-07-31 RX ADMIN — PROPOFOL INJECTABLE EMULSION 60 MG: 10 INJECTION, EMULSION INTRAVENOUS at 13:29

## 2024-07-31 RX ADMIN — CLOPIDOGREL BISULFATE 75 MG: 75 TABLET ORAL at 09:21

## 2024-07-31 RX ADMIN — DOXYCYCLINE 100 MG: 100 CAPSULE ORAL at 20:21

## 2024-07-31 RX ADMIN — FOLIC ACID 1 MG: 1 TABLET ORAL at 09:20

## 2024-07-31 RX ADMIN — GUAIFENESIN 1200 MG: 600 TABLET, EXTENDED RELEASE ORAL at 20:22

## 2024-07-31 RX ADMIN — SODIUM CHLORIDE: 9 INJECTION, SOLUTION INTRAVENOUS at 13:28

## 2024-07-31 RX ADMIN — ISOSORBIDE MONONITRATE 30 MG: 30 TABLET, EXTENDED RELEASE ORAL at 09:21

## 2024-07-31 RX ADMIN — APIXABAN 2.5 MG: 2.5 TABLET, FILM COATED ORAL at 14:03

## 2024-07-31 RX ADMIN — IPRATROPIUM BROMIDE AND ALBUTEROL SULFATE 3 ML: .5; 3 SOLUTION RESPIRATORY (INHALATION) at 18:23

## 2024-07-31 RX ADMIN — SERTRALINE 100 MG: 100 TABLET, FILM COATED ORAL at 14:47

## 2024-07-31 RX ADMIN — BUDESONIDE 0.5 MG: 0.5 INHALANT RESPIRATORY (INHALATION) at 18:17

## 2024-07-31 RX ADMIN — FUROSEMIDE 40 MG: 10 INJECTION, SOLUTION INTRAMUSCULAR; INTRAVENOUS at 14:47

## 2024-07-31 NOTE — PLAN OF CARE
Goal Outcome Evaluation:              Outcome Evaluation: Patient currently resting abed, no distress noted. Patient arrived on unit as a transfer from . Patient's heart rate 130 - 150 while on  per nurse. Patient alert and able to make neesd known. Patient denied any chest pain or discomfort. Patient started on amio infusion with heart rate going between 107 - 140. Patient's heart rate staying mostly in the 120 -130 range.

## 2024-07-31 NOTE — PROGRESS NOTES
"PULMONARY CRITICAL CARE PROGRESS  NOTE      PATIENT IDENTIFICATION:  Name: Tracy Jane  MRN: [id number]  :  1934     Age: 89 y.o.  Sex: female    DATE OF Note:  2024   Referring Physician: Citlali Ashraf MD                  Subjective:   Laying in bed, very weak   On 3 L O2 with no SOB   Has no complaints this am   HR in the 100's on Amiodarone drip      Objective:  tMax 24 hrs: Temp (24hrs), Av.1 °F (36.7 °C), Min:97.3 °F (36.3 °C), Max:100 °F (37.8 °C)      Vitals Ranges:   Temp:  [97.3 °F (36.3 °C)-100 °F (37.8 °C)] 97.3 °F (36.3 °C)  Heart Rate:  [105-136] 113  Resp:  [20-27] 20  BP: ()/(54-79) 115/73    Intake and Output Last 3 Shifts:   I/O last 3 completed shifts:  In: -   Out:  [Urine:]    Exam:  /73 (BP Location: Right arm, Patient Position: Lying)   Pulse 113   Temp 97.3 °F (36.3 °C) (Oral)   Resp 20   Ht 152.4 cm (60\")   Wt 55.1 kg (121 lb 7.6 oz)   SpO2 99%   BMI 23.72 kg/m²     General Appearance:  Alert, weak    HEENT:  Normocephalic, without obvious abnormality. Conjunctivae/corneas clear.  Normal external ear canals. Nares normal, no drainage     Neck:  Supple, symmetrical, trachea midline. No JVD.  Lungs /Chest wall:   Bilateral basal rhonchi, respirations unlabored, symmetrical wall movement.     Heart:  Regular rate and rhythm, systolic murmur PMI left sternal border  Abdomen: Soft, nontender, no masses, no organomegaly.    Extremities: Trace edema, no clubbing or cyanosis        Medications:  budesonide, 0.5 mg, Nebulization, BID - RT  cefTRIAXone, 1,000 mg, Intravenous, Q24H  clopidogrel, 75 mg, Oral, Daily  dilTIAZem CD, 240 mg, Oral, Q24H  doxycycline, 100 mg, Intravenous, Q12H  enoxaparin, 40 mg, Subcutaneous, Q24H  folic acid, 1 mg, Oral, Daily  furosemide, 40 mg, Intravenous, Q12H  guaiFENesin, 1,200 mg, Oral, Q12H  ipratropium-albuterol, 3 mL, Nebulization, 4x Daily - RT  isosorbide mononitrate, 30 mg, Oral, Q24H  levothyroxine, 75 " mcg, Oral, Daily  pantoprazole, 40 mg, Oral, QAM AC        Infusion:  amiodarone, 0.5 mg/min, Last Rate: 0.5 mg/min (07/31/24 0559)         PRN:    [COMPLETED] Insert Peripheral IV **AND** sodium chloride  Data Review:  All labs (24hrs):   Recent Results (from the past 24 hour(s))   Adult Transthoracic Echo Complete W/ Cont if Necessary Per Protocol    Collection Time: 07/30/24  3:18 PM   Result Value Ref Range    EF(MOD-bp) 45.2 %    LVIDd 3.7 cm    LVIDs 2.9 cm    IVSd 1.10 cm    LVPWd 1.30 cm    FS 21.6 %    IVS/LVPW 0.85 cm    ESV(cubed) 24.4 ml    LV Sys Vol (BSA corrected) 28.8 cm2    EDV(cubed) 50.7 ml    LV Sharpe Vol (BSA corrected) 49.6 cm2    LV mass(C)d 147.3 grams    LVOT area 2.27 cm2    LVOT diam 1.70 cm    EDV(MOD-sp2) 66.4 ml    EDV(MOD-sp4) 78.1 ml    ESV(MOD-sp2) 34.6 ml    ESV(MOD-sp4) 45.3 ml    SV(MOD-sp2) 31.8 ml    SV(MOD-sp4) 32.8 ml    SVi(MOD-SP2) 20.2 ml/m2    SVi(MOD-SP4) 20.8 ml/m2    SVi (LVOT) 22.5 ml/m2    EF(MOD-sp2) 47.9 %    EF(MOD-sp4) 42.0 %    MV E max surinder 116.0 cm/sec    LA ESV Index (BP) 40.5 ml/m2    Med Peak E' Surinder 9.4 cm/sec    Lat Peak E' Surinder 9.8 cm/sec    TR max surinder 291.0 cm/sec    Avg E/e' ratio 12.08     SV(LVOT) 35.4 ml    RV Base 2.8 cm    RV Mid 2.5 cm    TAPSE (>1.6) 1.15 cm    RV S' 10.3 cm/sec    LA dimension (2D)  4.3 cm    LV V1 max 129.0 cm/sec    LV V1 max PG 6.7 mmHg    LV V1 mean PG 3.0 mmHg    LV V1 VTI 15.6 cm    Ao pk surinder 365.0 cm/sec    Ao max PG 53.3 mmHg    Ao mean PG 30.0 mmHg    Ao V2 VTI 64.6 cm    TY(I,D) 0.55 cm2    AI P1/2t 320.2 msec    MV max PG 6.2 mmHg    MV mean PG 3.0 mmHg    MV V2 VTI 18.9 cm    MV P1/2t 60.3 msec    MVA(P1/2t) 3.6 cm2    MVA(VTI) 1.87 cm2    MV dec slope 563.0 cm/sec2    MR max surinder 584.0 cm/sec    MR max .4 mmHg    MR mean surinder 394.0 cm/sec    MR mean PG 74.0 mmHg    MR .0 cm    TR max PG 33.9 mmHg    RVSP(TR) 36.9 mmHg    RAP systole 3.0 mmHg    RV V1 max PG 3.5 mmHg    RV V1 max 94.2 cm/sec    RV V1 VTI 13.6  cm    PA V2 max 125.0 cm/sec    PA acc time 0.10 sec    Sinus 2.7 cm    STJ 2.20 cm    Dimensionless Index 0.24 (DI)    Echo EF Estimated 55.0 %   CBC Auto Differential    Collection Time: 07/31/24  5:25 AM    Specimen: Blood   Result Value Ref Range    WBC 10.20 3.40 - 10.80 10*3/mm3    RBC 3.26 (L) 3.77 - 5.28 10*6/mm3    Hemoglobin 9.8 (L) 12.0 - 15.9 g/dL    Hematocrit 31.2 (L) 34.0 - 46.6 %    MCV 95.7 79.0 - 97.0 fL    MCH 30.1 26.6 - 33.0 pg    MCHC 31.4 (L) 31.5 - 35.7 g/dL    RDW 16.7 (H) 12.3 - 15.4 %    RDW-SD 57.8 (H) 37.0 - 54.0 fl    MPV 10.6 6.0 - 12.0 fL    Platelets 248 140 - 450 10*3/mm3    Neutrophil % 71.6 42.7 - 76.0 %    Lymphocyte % 20.0 19.6 - 45.3 %    Monocyte % 4.0 (L) 5.0 - 12.0 %    Eosinophil % 3.4 0.3 - 6.2 %    Basophil % 0.3 0.0 - 1.5 %    Immature Grans % 0.7 (H) 0.0 - 0.5 %    Neutrophils, Absolute 7.30 (H) 1.70 - 7.00 10*3/mm3    Lymphocytes, Absolute 2.04 0.70 - 3.10 10*3/mm3    Monocytes, Absolute 0.41 0.10 - 0.90 10*3/mm3    Eosinophils, Absolute 0.35 0.00 - 0.40 10*3/mm3    Basophils, Absolute 0.03 0.00 - 0.20 10*3/mm3    Immature Grans, Absolute 0.07 (H) 0.00 - 0.05 10*3/mm3    nRBC 0.0 0.0 - 0.2 /100 WBC        Imaging:  Adult Transthoracic Echo Complete W/ Cont if Necessary Per Protocol    Left ventricular systolic function is normal. Estimated left   ventricular EF = 55% Left ventricular ejection fraction appears to be 51 -   55%.    Left ventricular wall thickness is consistent with mild to moderate   concentric hypertrophy.    Left ventricular diastolic function is consistent with (grade III   w/high LAP) reversible restrictive pattern.    The left atrial cavity is moderate to severely dilated.    The right atrial cavity is borderline dilated.    Severe aortic valve stenosis is present.    Abnormal mitral valve structure consistent with dilated annulus.    Moderate mitral valve regurgitation is present.    Estimated right ventricular systolic pressure from tricuspid    regurgitation is mildly elevated (35-45 mmHg).    Transthoracic echocardiography reveals paradoxical septal motion from left   bundle branch block with ejection fraction between 50 to 55% with   restrictive filling with moderate to severe left atrial enlargement and   calcific aortic valve apparatus with a mean gradient of 30 suggestive of   severe aortic stenosis with mild to moderate AI and calcific mitral valve   apparatus with moderate laterally directed mitral regurgitation.  Mild to   moderate TR with mild pulm hypertension and no effusion    Electronically signed by Pk Crabtree MD, 07/31/24, 8:19 AM EDT.       ASSESSMENT:  Acute hypoxic respiratory distress  Multifocal pneumonia  Asthma/COPD  Small bilateral pleural effusion   CAD s/p CABG  A-fib s/p Watchman procedure  SSS s/p pacemaker   CHF  HTN  HLD  GERD  Hypothyroidism  Anemia  Anxiety/Depression  Hx tobacco abuse         PLAN:  PT/OT  Encourage movement and sitting in chair during day  Antibiotics  Bronchodilators  Inhaled corticosteroids  Incentive spirometer  Amiodarone drip per Cardiology   Diuresis and monitor ARELY's   Electrolytes/ glycemic control  DVT prophylaxis-Lovenox    Discussed with Dr Geneva Jose, APRN   7/31/2024  09:59 EDT   7/31/2024  10:09 EDT    I personally have examined  and interviewed the patient. I have reviewed the history, data, problems, assessment and plan with our NP.  Total Critical care time in direct medical management (   ) minutes, This time specifically excludes time spent performing procedures.    Yolanda Bean MD   7/31/2024  22:15 EDT

## 2024-07-31 NOTE — SIGNIFICANT NOTE
07/31/24 1521   OTHER   Discipline physical therapist   Rehab Time/Intention   Session Not Performed other (see comments)  (Pt just brought back from cardioversion, reporting fatigue and declining mobility. Edu regarding up to chair for dinner. PT will f/u tomorrow)   Therapy Assessment/Plan (PT)   Criteria for Skilled Interventions Met (PT) yes;meets criteria   Recommendation   PT - Next Appointment 08/01/24

## 2024-07-31 NOTE — PROGRESS NOTES
Severe aortic stenosis with a mean gradient of 30  Acute on chronic diastolic heart failure  Post cardioversion to sinus rhythm with atrial pacing      Plan    Leave amiodarone till morning  Reduce diltiazem dose  Stop isosorbide    Start oral amiodarone in the morning  Eliquis for 2 weeks  Patient has severe aortic stenosis and needs to be evaluated for possible TAVR  Reduce Lasix dose      Electronically signed by Pk Crabtree MD, 07/31/24, 1:54 PM EDT.

## 2024-07-31 NOTE — ANESTHESIA PREPROCEDURE EVALUATION
Anesthesia Evaluation     Patient summary reviewed and Nursing notes reviewed   NPO Solid Status: > 8 hours  NPO Liquid Status: > 8 hours           Airway   Mallampati: II  TM distance: >3 FB  Neck ROM: full  No difficulty expected  Dental - normal exam     Pulmonary    (+) asthma,shortness of breath  Cardiovascular     (+) pacemaker pacemaker, hypertension, valvular problems/murmurs AS, past MI , CAD, CABG, dysrhythmias Atrial Fib, CHF , PVD, hyperlipidemia,  carotid artery disease      Neuro/Psych  (+) psychiatric history  GI/Hepatic/Renal/Endo    (+) GERD, renal disease-, thyroid problem     Musculoskeletal     Abdominal    Substance History      OB/GYN          Other   arthritis,   history of cancer    ROS/Med Hx Other:   Left ventricular systolic function is normal. Estimated left ventricular EF = 55% Left ventricular ejection fraction appears to be 51 - 55%.  ·  Left ventricular wall thickness is consistent with mild to moderate concentric hypertrophy.  ·  Left ventricular diastolic function is consistent with (grade III w/high LAP) reversible restrictive pattern.  ·  The left atrial cavity is moderate to severely dilated.  ·  The right atrial cavity is borderline dilated.  ·  Severe aortic valve stenosis is present.  ·  Abnormal mitral valve structure consistent with dilated annulus.  ·  Moderate mitral valve regurgitation is present.  ·  Estimated right ventricular systolic pressure from tricuspid regurgitation is mildly elevated (35-45 mmHg).                Anesthesia Plan    ASA 4     MAC     intravenous induction     Anesthetic plan, risks, benefits, and alternatives have been provided, discussed and informed consent has been obtained with: patient.    Plan discussed with CRNA.    CODE STATUS:    Code Status (Patient has no pulse and is not breathing): CPR (Attempt to Resuscitate)  Medical Interventions (Patient has pulse or is breathing): Full Support

## 2024-07-31 NOTE — PLAN OF CARE
Goal Outcome Evaluation:   Pt A&Ox4 with VSS. Pt received 3 shocks at cardioversion and has tenderness to her chest from it. Pt continues amio drip at 0.5. Pt converted to NSR with cardioversion, but has converted back to a fib. Dr. Kim aware, possible cath tomorrow and heparin to be started tomorrow. Daughter at bedside at this time. Pt able to voice concerns. Call light within reach of pt.

## 2024-07-31 NOTE — PROGRESS NOTES
[facility] MEDICINE SERVICE  DAILY PROGRESS NOTE    NAME: Tracy Jane  : 1934  MRN: [id number]      LOS: 2 days     PROVIDER OF SERVICE: Nima Calhoun MD    Chief Complaint: Acute on chronic respiratory failure with hypoxia    Subjective:     Interval History:  History taken from: patient chart family RN  No chest pain shortness of breath improved family at bedside  Awaiting cardioversion    Review of Systems:   Review of Systems  All negative except as above  Objective:     Vital Signs  Temp:  [97.3 °F (36.3 °C)-100 °F (37.8 °C)] 97.3 °F (36.3 °C)  Heart Rate:  [105-136] 134  Resp:  [20-27] 20  BP: ()/(54-79) 115/73  Flow (L/min):  [3] 3   Body mass index is 23.72 kg/m².    Physical Exam  Physical Exam  NAD  Irregular rhythm rate controlled S1-S2 audible  Lungs with decreased breath sound at bases  Abdomen soft nontender nondistended  Scheduled Meds   budesonide, 0.5 mg, Nebulization, BID - RT  cefTRIAXone, 1,000 mg, Intravenous, Q24H  clopidogrel, 75 mg, Oral, Daily  dilTIAZem CD, 240 mg, Oral, Q24H  doxycycline, 100 mg, Oral, Q12H  enoxaparin, 40 mg, Subcutaneous, Q24H  folic acid, 1 mg, Oral, Daily  furosemide, 40 mg, Intravenous, Q12H  guaiFENesin, 1,200 mg, Oral, Q12H  ipratropium-albuterol, 3 mL, Nebulization, 4x Daily - RT  isosorbide mononitrate, 30 mg, Oral, Q24H  levothyroxine, 75 mcg, Oral, Daily  pantoprazole, 40 mg, Oral, QAM AC       PRN Meds     [COMPLETED] Insert Peripheral IV **AND** sodium chloride   Infusions  amiodarone, 0.5 mg/min, Last Rate: 0.5 mg/min (24 0559)          Diagnostic Data    Results from last 7 days   Lab Units 24  0525 24  0427   WBC 10*3/mm3 10.20 11.18*   HEMOGLOBIN g/dL 9.8* 10.4*   HEMATOCRIT % 31.2* 32.7*   PLATELETS 10*3/mm3 248 236   GLUCOSE mg/dL  --  91   CREATININE mg/dL  --  0.96   BUN mg/dL  --  19   SODIUM mmol/L  --  136   POTASSIUM mmol/L  --  4.1   ANION GAP mmol/L  --  13.3       CT Angiogram Chest Pulmonary  Embolism    Result Date: 7/29/2024  Impression: No evidence of pulmonary embolism. Multifocal bilateral groundglass and consolidative opacities suspicious for multifocal pneumonia. Mild pulmonary edema with small bilateral pleural effusions. Electronically Signed: Jose Atkins  7/29/2024 8:20 PM EDT  Workstation ID: [id number]    XR Chest 1 View    Result Date: 7/29/2024  Impression: Mild pulmonary edema with possible small right pleural effusion. Electronically Signed: Jose Atkins  7/29/2024 6:43 PM EDT  Workstation ID: [id number]       I reviewed the patient's new clinical results.  I reviewed the patient's new imaging results and agree with the interpretation.    Assessment/Plan:     Active and Resolved Problems  Active Hospital Problems    Diagnosis  POA    **Acute on chronic respiratory failure with hypoxia [J96.21]  Yes    Acute on chronic heart failure with preserved ejection fraction (HFpEF) [I50.33]  Yes    Leukocytosis [D72.829]  Yes    Presence of Watchman left atrial appendage closure device [Z95.818]  Yes    GERD without esophagitis [K21.9]  Yes    Essential hypertension [I10]  Yes    Depression [F32.A]  Yes    Anxiety disorder [F41.9]  Yes    Hyponatremia [E87.1]  Yes    Sick sinus syndrome [I49.5]  Yes    Presence of cardiac pacemaker [Z95.0]  Yes    Arthritis, rheumatoid [M06.9]  Yes    Acquired hypothyroidism [E03.9]  Yes    Valvular heart disease [I38]  Yes    Presence of aortocoronary bypass graft [Z95.1]  Not Applicable    Peripheral vascular disease [I73.9]  Yes    Paroxysmal atrial fibrillation [I48.0]  Yes    Hx of aortic valve replacement [Z95.2]  Not Applicable    Renal insufficiency [N28.9]  Yes    Chronic coronary artery disease [I25.10]  Yes    Mixed hyperlipidemia [E78.2]  Yes      Resolved Hospital Problems   No resolved problems to display.       89-year-old female with history of atrial fibrillation status post Watchman device, SSS status post PPM, VHD status post bioprosthetic  aortic valve, CAD status post CABG, bilateral renal artery stenosis, carotid stenosis, chronic hypoxic respiratory failure on 3 L supplemental oxygen, PVD, hyperlipidemia, rheumatoid arthritis, depression, anxiety, hyperlipidemia, hypertension, GERD admitted to [facility] 7/29 with progressive shortness of breath    #Acute on chronic hypoxic respiratory failure  #Acute on chronic HFpEF  #Multifocal pneumonia  #COPD  Cardiology and pulmonary on board  Continue ceftriaxone doxycycline  Diuretics per cardiology currently on IV Lasix 40 twice daily monitor I's and O's  Follow blood cultures.  NGTD  Continue current nebs    #A-fib with RVR  Status post Watchman device  Previously on sotalol now transition to IV amiodarone  Noted plan for cardioversion today  On Cardizem to 40 CD    #CAD status post CABG-stable  #Hypertension  Continue Imdur  Continue Plavix  Restart statins    #VHD status post bioprosthetic aortic valve  Continue Plavix    #PVD  Continue Plavix  Restart statins    #Depression  Restart sertraline 100 daily    #Hypothyroidism  Continue home dose of levothyroxine      VTE Prophylaxis:  Pharmacologic & mechanical VTE prophylaxis orders are present.         Code status is   Code Status and Medical Interventions: CPR (Attempt to Resuscitate); Full Support   Ordered at: 07/29/24 2043     Code Status (Patient has no pulse and is not breathing):    CPR (Attempt to Resuscitate)     Medical Interventions (Patient has pulse or is breathing):    Full Support       Plan for disposition:2 days    Time: 30 minutes    Signature: Electronically signed by Nima Calhoun MD, 07/31/24, 12:11 EDT.  [facility] Hospitalist Team

## 2024-07-31 NOTE — CASE MANAGEMENT/SOCIAL WORK
Continued Stay Note   [facility]     Patient Name: Tracy Jane  MRN: [id number]  Today's Date: 7/31/2024    Admit Date: 7/29/2024    Plan: Anticipate return to [facility]. Current home O2 3L through [employer].   Discharge Plan       Row Name 07/31/24 1254       Plan    Plan Anticipate return to [facility]. Current home O2 3L through [employer].    Plan Comments Therapy recommending return to assisted living at d/c. DC Barriers: Scheduled to have cardioversion performed, cardiology following.                  Dea Alvarado RN     Office Phone: [phone]  Office Cell: [phone]

## 2024-07-31 NOTE — ANESTHESIA POSTPROCEDURE EVALUATION
Patient: Tracy Jane    Procedure Summary       Date: 07/31/24 Room / Location: [facility]    Anesthesia Start: 1328 Anesthesia Stop: 1343    Procedure: CARDIOVERSION EXTERNAL IN CARDIOLOGY DEPARTMENT Diagnosis: (AF)    Scheduled Providers: Pk Crabtree MD Provider: Roe Peterson MD    Anesthesia Type: MAC ASA Status: 4            Anesthesia Type: MAC    Vitals  Vitals Value Taken Time   /55 07/31/24 1416   Temp     Pulse 70 07/31/24 1426   Resp     SpO2 99 % 07/31/24 1426   Vitals shown include unfiled device data.        Post Anesthesia Care and Evaluation    Patient location during evaluation: PACU  Patient participation: complete - patient participated  Level of consciousness: awake  Pain scale: See nurse's notes for pain score.  Pain management: adequate    Airway patency: patent  Anesthetic complications: No anesthetic complications  PONV Status: none  Cardiovascular status: acceptable  Respiratory status: acceptable and spontaneous ventilation  Hydration status: acceptable    Comments: Patient seen and examined postoperatively; vital signs stable; SpO2 greater than or equal to 90%; cardiopulmonary status stable; nausea/vomiting adequately controlled; pain adequately controlled; no apparent anesthesia complications; patient discharged from anesthesia care when discharge criteria were met

## 2024-08-01 LAB
ANION GAP SERPL CALCULATED.3IONS-SCNC: 14.3 MMOL/L (ref 5–15)
APTT PPP: 34.5 SECONDS (ref 61–76.5)
APTT PPP: 36.5 SECONDS (ref 61–76.5)
BUN SERPL-MCNC: 25 MG/DL (ref 8–23)
BUN/CREAT SERPL: 24.3 (ref 7–25)
CALCIUM SPEC-SCNC: 9.2 MG/DL (ref 8.6–10.5)
CHLORIDE SERPL-SCNC: 95 MMOL/L (ref 98–107)
CO2 SERPL-SCNC: 24.7 MMOL/L (ref 22–29)
CREAT SERPL-MCNC: 1.03 MG/DL (ref 0.57–1)
EGFRCR SERPLBLD CKD-EPI 2021: 52.1 ML/MIN/1.73
GLUCOSE SERPL-MCNC: 93 MG/DL (ref 65–99)
INR PPP: 1.1 (ref 0.93–1.1)
MAGNESIUM SERPL-MCNC: 1.7 MG/DL (ref 1.6–2.4)
PHOSPHATE SERPL-MCNC: 3.7 MG/DL (ref 2.5–4.5)
POTASSIUM SERPL-SCNC: 3.5 MMOL/L (ref 3.5–5.2)
PROTHROMBIN TIME: 11.9 SECONDS (ref 9.6–11.7)
QT INTERVAL: 366 MS
QT INTERVAL: 393 MS
QTC INTERVAL: 496 MS
QTC INTERVAL: 557 MS
SODIUM SERPL-SCNC: 134 MMOL/L (ref 136–145)

## 2024-08-01 PROCEDURE — 25010000002 CEFTRIAXONE PER 250 MG: Performed by: INTERNAL MEDICINE

## 2024-08-01 PROCEDURE — 94761 N-INVAS EAR/PLS OXIMETRY MLT: CPT

## 2024-08-01 PROCEDURE — 25010000002 AMIODARONE IN DEXTROSE 5% 360-4.14 MG/200ML-% SOLUTION: Performed by: INTERNAL MEDICINE

## 2024-08-01 PROCEDURE — 94799 UNLISTED PULMONARY SVC/PX: CPT

## 2024-08-01 PROCEDURE — 99233 SBSQ HOSP IP/OBS HIGH 50: CPT | Performed by: INTERNAL MEDICINE

## 2024-08-01 PROCEDURE — 99222 1ST HOSP IP/OBS MODERATE 55: CPT | Performed by: INTERNAL MEDICINE

## 2024-08-01 PROCEDURE — 94664 DEMO&/EVAL PT USE INHALER: CPT

## 2024-08-01 PROCEDURE — 85730 THROMBOPLASTIN TIME PARTIAL: CPT | Performed by: HOSPITALIST

## 2024-08-01 PROCEDURE — 97530 THERAPEUTIC ACTIVITIES: CPT

## 2024-08-01 PROCEDURE — 97110 THERAPEUTIC EXERCISES: CPT

## 2024-08-01 PROCEDURE — 25010000002 HEPARIN (PORCINE) 25000-0.45 UT/250ML-% SOLUTION: Performed by: INTERNAL MEDICINE

## 2024-08-01 RX ORDER — HYDROXYZINE HYDROCHLORIDE 25 MG/1
25 TABLET, FILM COATED ORAL ONCE AS NEEDED
Status: DISCONTINUED | OUTPATIENT
Start: 2024-08-01 | End: 2024-08-02 | Stop reason: HOSPADM

## 2024-08-01 RX ORDER — BISACODYL 10 MG
10 SUPPOSITORY, RECTAL RECTAL DAILY PRN
Status: DISCONTINUED | OUTPATIENT
Start: 2024-08-01 | End: 2024-08-02 | Stop reason: HOSPADM

## 2024-08-01 RX ORDER — AMOXICILLIN 250 MG
2 CAPSULE ORAL 2 TIMES DAILY
Status: DISCONTINUED | OUTPATIENT
Start: 2024-08-01 | End: 2024-08-02 | Stop reason: HOSPADM

## 2024-08-01 RX ORDER — METOPROLOL SUCCINATE 50 MG/1
50 TABLET, EXTENDED RELEASE ORAL
Status: DISCONTINUED | OUTPATIENT
Start: 2024-08-01 | End: 2024-08-02 | Stop reason: HOSPADM

## 2024-08-01 RX ORDER — BISACODYL 5 MG/1
5 TABLET, DELAYED RELEASE ORAL DAILY PRN
Status: DISCONTINUED | OUTPATIENT
Start: 2024-08-01 | End: 2024-08-02 | Stop reason: HOSPADM

## 2024-08-01 RX ORDER — DIPHENHYDRAMINE HYDROCHLORIDE, ZINC ACETATE 2; .1 G/100G; G/100G
1 CREAM TOPICAL 3 TIMES DAILY PRN
Status: DISCONTINUED | OUTPATIENT
Start: 2024-08-01 | End: 2024-08-02 | Stop reason: HOSPADM

## 2024-08-01 RX ORDER — POLYETHYLENE GLYCOL 3350 17 G/17G
17 POWDER, FOR SOLUTION ORAL DAILY PRN
Status: DISCONTINUED | OUTPATIENT
Start: 2024-08-01 | End: 2024-08-02 | Stop reason: HOSPADM

## 2024-08-01 RX ADMIN — BUDESONIDE 0.5 MG: 0.5 INHALANT RESPIRATORY (INHALATION) at 08:33

## 2024-08-01 RX ADMIN — FUROSEMIDE 40 MG: 40 TABLET ORAL at 08:32

## 2024-08-01 RX ADMIN — IPRATROPIUM BROMIDE AND ALBUTEROL SULFATE 3 ML: .5; 3 SOLUTION RESPIRATORY (INHALATION) at 08:25

## 2024-08-01 RX ADMIN — SENNOSIDES AND DOCUSATE SODIUM 2 TABLET: 50; 8.6 TABLET ORAL at 20:29

## 2024-08-01 RX ADMIN — BUDESONIDE 0.5 MG: 0.5 INHALANT RESPIRATORY (INHALATION) at 18:49

## 2024-08-01 RX ADMIN — DOXYCYCLINE 100 MG: 100 CAPSULE ORAL at 20:29

## 2024-08-01 RX ADMIN — AMIODARONE HYDROCHLORIDE 0.5 MG/MIN: 1.8 INJECTION, SOLUTION INTRAVENOUS at 06:07

## 2024-08-01 RX ADMIN — APIXABAN 2.5 MG: 2.5 TABLET, FILM COATED ORAL at 13:33

## 2024-08-01 RX ADMIN — AMIODARONE HYDROCHLORIDE 200 MG: 200 TABLET ORAL at 08:33

## 2024-08-01 RX ADMIN — PANTOPRAZOLE SODIUM 40 MG: 40 TABLET, DELAYED RELEASE ORAL at 08:32

## 2024-08-01 RX ADMIN — METOPROLOL SUCCINATE 50 MG: 50 TABLET, EXTENDED RELEASE ORAL at 13:33

## 2024-08-01 RX ADMIN — ROSUVASTATIN 20 MG: 10 TABLET, FILM COATED ORAL at 20:29

## 2024-08-01 RX ADMIN — DILTIAZEM HYDROCHLORIDE 120 MG: 120 CAPSULE, EXTENDED RELEASE ORAL at 08:33

## 2024-08-01 RX ADMIN — AMIODARONE HYDROCHLORIDE 200 MG: 200 TABLET ORAL at 20:29

## 2024-08-01 RX ADMIN — IPRATROPIUM BROMIDE AND ALBUTEROL SULFATE 3 ML: .5; 3 SOLUTION RESPIRATORY (INHALATION) at 12:01

## 2024-08-01 RX ADMIN — SERTRALINE 100 MG: 100 TABLET, FILM COATED ORAL at 08:32

## 2024-08-01 RX ADMIN — CEFTRIAXONE SODIUM 1000 MG: 1 INJECTION, POWDER, FOR SOLUTION INTRAMUSCULAR; INTRAVENOUS at 17:02

## 2024-08-01 RX ADMIN — GUAIFENESIN 1200 MG: 600 TABLET, EXTENDED RELEASE ORAL at 08:32

## 2024-08-01 RX ADMIN — DOXYCYCLINE 100 MG: 100 CAPSULE ORAL at 08:32

## 2024-08-01 RX ADMIN — Medication 10 ML: at 20:30

## 2024-08-01 RX ADMIN — CLOPIDOGREL BISULFATE 75 MG: 75 TABLET ORAL at 08:32

## 2024-08-01 RX ADMIN — DIPHENHYDRAMINE HYDROCHLORIDE, ZINC ACETATE 1 APPLICATION: 2; .1 CREAM TOPICAL at 02:50

## 2024-08-01 RX ADMIN — IPRATROPIUM BROMIDE AND ALBUTEROL SULFATE 3 ML: .5; 3 SOLUTION RESPIRATORY (INHALATION) at 15:47

## 2024-08-01 RX ADMIN — GUAIFENESIN 1200 MG: 600 TABLET, EXTENDED RELEASE ORAL at 20:29

## 2024-08-01 RX ADMIN — FOLIC ACID 1 MG: 1 TABLET ORAL at 08:32

## 2024-08-01 RX ADMIN — APIXABAN 2.5 MG: 2.5 TABLET, FILM COATED ORAL at 20:42

## 2024-08-01 RX ADMIN — IPRATROPIUM BROMIDE AND ALBUTEROL SULFATE 3 ML: .5; 3 SOLUTION RESPIRATORY (INHALATION) at 18:45

## 2024-08-01 RX ADMIN — LEVOTHYROXINE SODIUM 75 MCG: 0.07 TABLET ORAL at 06:07

## 2024-08-01 RX ADMIN — HEPARIN SODIUM 12 UNITS/KG/HR: 10000 INJECTION, SOLUTION INTRAVENOUS at 01:56

## 2024-08-01 NOTE — PROGRESS NOTES
"PULMONARY CRITICAL CARE PROGRESS  NOTE      PATIENT IDENTIFICATION:  Name: Tracy Jane  MRN: [id number]  :  1934     Age: 89 y.o.  Sex: female    DATE OF Note:  2024   Referring Physician: Citlali Ashraf MD                  Subjective:   Sitting in chair at bedside  On room air with no SOB   No distress        Objective:  tMax 24 hrs: Temp (24hrs), Av.9 °F (36.6 °C), Min:97.6 °F (36.4 °C), Max:98.2 °F (36.8 °C)      Vitals Ranges:   Temp:  [97.6 °F (36.4 °C)-98.2 °F (36.8 °C)] 98.1 °F (36.7 °C)  Heart Rate:  [] 77  Resp:  [12-26] 22  BP: (128-168)/(51-70) 160/63    Intake and Output Last 3 Shifts:   I/O last 3 completed shifts:  In: 278 [P.O.:170; I.V.:108]  Out: 1575 [Urine:1575]    Exam:  /63 (BP Location: Right arm, Patient Position: Lying)   Pulse 77   Temp 98.1 °F (36.7 °C) (Axillary)   Resp 22   Ht 152.4 cm (60\")   Wt 54.9 kg (121 lb 0.5 oz)   SpO2 99%   BMI 23.64 kg/m²     General Appearance:  Alert, weak    HEENT:  Normocephalic, without obvious abnormality. Conjunctivae/corneas clear.  Normal external ear canals. Nares normal, no drainage     Neck:  Supple, symmetrical, trachea midline. No JVD.  Lungs /Chest wall:   Bilateral basal rhonchi, respirations unlabored, symmetrical wall movement.     Heart:  Regular rate and rhythm, systolic murmur PMI left sternal border  Abdomen: Soft, nontender, no masses, no organomegaly.    Extremities: Trace edema, no clubbing or cyanosis        Medications:  amiodarone, 200 mg, Oral, Q12H  apixaban, 2.5 mg, Oral, Q12H  budesonide, 0.5 mg, Nebulization, BID - RT  cefTRIAXone, 1,000 mg, Intravenous, Q24H  clopidogrel, 75 mg, Oral, Daily  dilTIAZem CD, 120 mg, Oral, Q24H  doxycycline, 100 mg, Oral, Q12H  folic acid, 1 mg, Oral, Daily  furosemide, 40 mg, Oral, Daily  guaiFENesin, 1,200 mg, Oral, Q12H  ipratropium-albuterol, 3 mL, Nebulization, 4x Daily - RT  levothyroxine, 75 mcg, Oral, Once per day on  " Thursday Friday  metoprolol succinate XL, 50 mg, Oral, Q24H  pantoprazole, 40 mg, Oral, QAM AC  rosuvastatin, 20 mg, Oral, Nightly  sertraline, 100 mg, Oral, Daily        Infusion:          PRN:    diphenhydrAMINE-zinc acetate    hydrOXYzine    [COMPLETED] Insert Peripheral IV **AND** sodium chloride  Data Review:  All labs (24hrs):   Recent Results (from the past 24 hour(s))   Basic Metabolic Panel    Collection Time: 07/31/24  3:32 PM    Specimen: Blood   Result Value Ref Range    Glucose 104 (H) 65 - 99 mg/dL    BUN 23 8 - 23 mg/dL    Creatinine 0.91 0.57 - 1.00 mg/dL    Sodium 132 (L) 136 - 145 mmol/L    Potassium 3.7 3.5 - 5.2 mmol/L    Chloride 94 (L) 98 - 107 mmol/L    CO2 23.8 22.0 - 29.0 mmol/L    Calcium 9.5 8.6 - 10.5 mg/dL    BUN/Creatinine Ratio 25.3 (H) 7.0 - 25.0    Anion Gap 14.2 5.0 - 15.0 mmol/L    eGFR 60.4 >60.0 mL/min/1.73   ECG 12 Lead Rhythm Change    Collection Time: 07/31/24  6:43 PM   Result Value Ref Range    QT Interval 393 ms    QTC Interval 557 ms   Protime-INR    Collection Time: 07/31/24 11:47 PM    Specimen: Blood   Result Value Ref Range    Protime 11.9 (H) 9.6 - 11.7 Seconds    INR 1.10 0.93 - 1.10   aPTT    Collection Time: 07/31/24 11:47 PM    Specimen: Blood   Result Value Ref Range    PTT 34.5 (L) 61.0 - 76.5 seconds   Basic Metabolic Panel    Collection Time: 07/31/24 11:47 PM    Specimen: Blood   Result Value Ref Range    Glucose 93 65 - 99 mg/dL    BUN 25 (H) 8 - 23 mg/dL    Creatinine 1.03 (H) 0.57 - 1.00 mg/dL    Sodium 134 (L) 136 - 145 mmol/L    Potassium 3.5 3.5 - 5.2 mmol/L    Chloride 95 (L) 98 - 107 mmol/L    CO2 24.7 22.0 - 29.0 mmol/L    Calcium 9.2 8.6 - 10.5 mg/dL    BUN/Creatinine Ratio 24.3 7.0 - 25.0    Anion Gap 14.3 5.0 - 15.0 mmol/L    eGFR 52.1 (L) >60.0 mL/min/1.73   Magnesium    Collection Time: 07/31/24 11:47 PM    Specimen: Blood   Result Value Ref Range    Magnesium 1.7 1.6 - 2.4 mg/dL   Phosphorus    Collection Time: 07/31/24 11:47 PM    Specimen:  Blood   Result Value Ref Range    Phosphorus 3.7 2.5 - 4.5 mg/dL   CBC Auto Differential    Collection Time: 07/31/24 11:47 PM    Specimen: Blood   Result Value Ref Range    WBC 9.51 3.40 - 10.80 10*3/mm3    RBC 3.33 (L) 3.77 - 5.28 10*6/mm3    Hemoglobin 9.9 (L) 12.0 - 15.9 g/dL    Hematocrit 32.1 (L) 34.0 - 46.6 %    MCV 96.4 79.0 - 97.0 fL    MCH 29.7 26.6 - 33.0 pg    MCHC 30.8 (L) 31.5 - 35.7 g/dL    RDW 16.6 (H) 12.3 - 15.4 %    RDW-SD 57.9 (H) 37.0 - 54.0 fl    MPV 10.4 6.0 - 12.0 fL    Platelets 254 140 - 450 10*3/mm3    Neutrophil % 71.9 42.7 - 76.0 %    Lymphocyte % 20.2 19.6 - 45.3 %    Monocyte % 4.3 (L) 5.0 - 12.0 %    Eosinophil % 2.5 0.3 - 6.2 %    Basophil % 0.4 0.0 - 1.5 %    Immature Grans % 0.7 (H) 0.0 - 0.5 %    Neutrophils, Absolute 6.83 1.70 - 7.00 10*3/mm3    Lymphocytes, Absolute 1.92 0.70 - 3.10 10*3/mm3    Monocytes, Absolute 0.41 0.10 - 0.90 10*3/mm3    Eosinophils, Absolute 0.24 0.00 - 0.40 10*3/mm3    Basophils, Absolute 0.04 0.00 - 0.20 10*3/mm3    Immature Grans, Absolute 0.07 (H) 0.00 - 0.05 10*3/mm3    nRBC 0.0 0.0 - 0.2 /100 WBC   aPTT    Collection Time: 08/01/24 10:27 AM    Specimen: Blood   Result Value Ref Range    PTT 36.5 (L) 61.0 - 76.5 seconds        Imaging:  Cardioversion External in Cardiology Department    Post cardioversion the patient displayed A-paced.    The cardioversion was successful.    Procedure indication  Uncontrollable atrial fibrillation despite amiodarone with acute on   chronic systolic heart failure and patient brought in for cardioversion   for symptomatic relief and patient has pacemaker in situ    Sedation per anesthesia    Procedure done  Synchronized cardioversion  Dual-chamber pacemaker interrogation    Procedure note  After obtaining valid consent, timeout was performed and patient was   sedated by anesthesia team.  Patient underwent synchronized cardioversion   with 150 J to sinus rhythm with resumption of atrial fibrillation and   amiodarone was  reinitiated with additional bolus and repeat cardioversion   with 150 J x 2 to keep the patient in sinus rhythm.  Dual-chamber   pacemaker appropriately functioning.  No complications    Plan    Leave amiodarone till morning  Reduce diltiazem dose  Stop isosorbide    Start oral amiodarone in the morning  Eliquis for 2 weeks  Patient has severe aortic stenosis and needs to be evaluated for possible   TAVR  Reduce Lasix dose    Electronically signed by Pk Crabtree MD, 07/31/24, 1:53 PM EDT.  Adult Transthoracic Echo Complete W/ Cont if Necessary Per Protocol    Left ventricular systolic function is normal. Estimated left   ventricular EF = 55% Left ventricular ejection fraction appears to be 51 -   55%.    Left ventricular wall thickness is consistent with mild to moderate   concentric hypertrophy.    Left ventricular diastolic function is consistent with (grade III   w/high LAP) reversible restrictive pattern.    The left atrial cavity is moderate to severely dilated.    The right atrial cavity is borderline dilated.    Severe aortic valve stenosis is present.    Abnormal mitral valve structure consistent with dilated annulus.    Moderate mitral valve regurgitation is present.    Estimated right ventricular systolic pressure from tricuspid   regurgitation is mildly elevated (35-45 mmHg).    Transthoracic echocardiography reveals paradoxical septal motion from left   bundle branch block with ejection fraction between 50 to 55% with   restrictive filling with moderate to severe left atrial enlargement and   calcific aortic valve apparatus with a mean gradient of 30 suggestive of   severe aortic stenosis with mild to moderate AI and calcific mitral valve   apparatus with moderate laterally directed mitral regurgitation.  Mild to   moderate TR with mild pulm hypertension and no effusion    Electronically signed by Pk Crabtree MD, 07/31/24, 8:19 AM EDT.       ASSESSMENT:  Acute hypoxic respiratory  distress  Multifocal pneumonia  Asthma/COPD  Small bilateral pleural effusion   CAD s/p CABG  A-fib s/p Watchman procedure  SSS s/p pacemaker   CHF  HTN  HLD  GERD  Hypothyroidism  Anemia  Anxiety/Depression  Hx tobacco abuse         PLAN:  Likely discharge in 2-3 days  Will need 6 min walk prior to discharge   Can be off O2 if sitting, will need for ambulation   PT/OT  Antibiotics  Bronchodilators  Inhaled corticosteroids  Incentive spirometer  Amiodarone drip per Cardiology   Diuresis and monitor ARELY's   Electrolytes/ glycemic control  DVT prophylaxis-Lovenox    Discussed with Dr Geneva Jose, APRN   7/31/2024  09:59 EDT   8/1/2024  13:32 EDT    I personally have examined  and interviewed the patient. I have reviewed the history, data, problems, assessment and plan with our NP.  Total Critical care time in direct medical management (   ) minutes, This time specifically excludes time spent performing procedures.    Yolanda Bean MD   8/1/2024  21:59 EDT

## 2024-08-01 NOTE — PLAN OF CARE
Goal Outcome Evaluation:         Tracy Jnae presents with functional mobility impairments which indicate the need for skilled intervention. Tolerating session today without incident. Pt exhibited willingness to work to build endurance and carryover of learning within the session. Will continue to follow and progress as tolerated. We will continue to work with her while here but feel that she is close to baseline and should be able to return to [facility] at discharge.

## 2024-08-01 NOTE — CASE MANAGEMENT/SOCIAL WORK
Continued Stay Note  [facility]     Patient Name: Tracy Jane  MRN: [id number]  Today's Date: 8/1/2024    Admit Date: 7/29/2024    Plan: Anticipate return to [facility]. Current home O2 3L through [employer].   Discharge Plan       Row Name 08/01/24 1125       Plan    Plan Comments Barrier to D/C: amio gtt, IV abx, heparin gtt, cardiac cath tomorrow.                      Expected Discharge Date and Time       Expected Discharge Date Expected Discharge Time    Aug 3, 2024           Phone communication or documentation only - no physical contact with patient or family.     HELIO GoodmanN, RN    [address] [address]    Office: [phone]  Fax: [phone]

## 2024-08-01 NOTE — CONSULTS
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  Prior history of aortic valve stenosis status post tissue AVR in 2014  Status post watchman deployment  Paroxysmal atrial fibrillation with difficult to maintain sinus rhythm  Coronary artery disease prior coronary artery bypass surgery  Sick sinus syndrome s/p [employer] pacemaker placement  Hypertension  Hyperlipidemia  Chronic kidney disease  Peripheral vascular disease  Subdural hematoma in 2023 status post fall  Status post cardioversion currently maintaining sinus rhythm  Prior echocardiogram and RACHEL findings reviewed  Normal LV systolic function  Elevated gradients across the bioprosthetic aortic valve consistent with moderate to severe prosthetic valve aortic stenosis  Aortic valve area by planimetry is 1.15 cm squire  Sodium is 134 potassium is 3.5 creatinine is 1.0 INR is 1.1 hemoglobin is 9.9  Frail patient with multiple comorbidities  Diagnosis and treatment options reviewed and discussed with patient and family  Plan to manage conservatively for valvular aortic stenosis for now and close monitoring of valve gradients as an outpatient  Risk benefits and alternatives reviewed and discussed with patient and family  Further recommendations based on patient course                    Chief complaint shortness of breath    Subjective .     History of present illness:  Tracy Jane is a 89 y.o. female who presents with a history of valvular heart disease status post bioprosthetic aortic valve replacement in 2014. She is additional history that includes peripheral vascular disease, coronary artery disease status post coronary arterial bypass grafting, bilateral renal artery stenosis, carotid  artery disease, dyslipidemia, and sick sinus syndrome status post permanent pacemaker placement     presented to [facility] on 7/29/2024 with complaints of increased shortness of air past several days.  She reports increased shortness of air with activity however she is short of breath at rest.  She denies any chest pain.  She reports a nonproductive cough.  No bilateral lower extremity edema.  She denies any fevers chills or diaphoresis.     Complaining of shortness of breath and dyspnea on exertion  No chest discomfort  Worsening respiratory status  No syncope          Review of Systems  Review of Systems   Constitutional: Negative for chills, decreased appetite and malaise/fatigue.   HENT:  Negative for congestion and nosebleeds.    Eyes:  Negative for blurred vision and double vision.   Cardiovascular:  Positive for dyspnea on exertion, irregular heartbeat, leg swelling and orthopnea. Negative for palpitations, paroxysmal nocturnal dyspnea and syncope.   Respiratory:  Positive for shortness of breath. Negative for cough.    Hematologic/Lymphatic: Negative for adenopathy. Does not bruise/bleed easily.   Skin:  Negative for color change and rash.   Musculoskeletal:  Negative for back pain and joint pain.   Gastrointestinal:  Negative for bloating, abdominal pain, hematemesis and hematochezia.   Genitourinary:  Negative for flank pain and hematuria.   Neurological:  Negative for dizziness and focal weakness.   Psychiatric/Behavioral:  Negative for altered mental status. The patient does not have insomnia.        Past Medical History  Past Medical History:   Diagnosis Date    Abnormal ECG     Anxiety     Aortic valve replaced     Arrhythmia     Asthma     Atrial fibrillation     CAD (coronary artery disease)     Carotid artery disease     CHF (congestive heart failure)     Congenital heart disease     GERD (gastroesophageal reflux disease)     Gout     Heart murmur     Hemorrhagic stroke 2023    was on  Coumadin for A-fib    Hyperlipidemia     Hypertension     Hyperthyroidism     Hypothyroidism     Myocardial infarction     Overactive bladder     Pacemaker     [employer]    Skin cancer     and   Past Surgical History:   Procedure Laterality Date    AORTIC VALVE REPAIR/REPLACEMENT  2014    porcine    ATRIAL APPENDAGE EXCLUSION LEFT WITH TRANSESOPHAGEAL ECHOCARDIOGRAM Right 12/26/2023    Procedure: Atrial Appendage Occlusion;  Surgeon: Pk Crabtree MD;  Location: [facility] CATH INVASIVE LOCATION;  Service: Cardiovascular;  Laterality: Right;    ATRIAL APPENDAGE EXCLUSION LEFT WITH TRANSESOPHAGEAL ECHOCARDIOGRAM N/A 12/26/2023    Procedure: Atrial Appendage Occlusion;  Surgeon: Gen Caballero MD;  Location: [facility] CATH INVASIVE LOCATION;  Service: Cardiovascular;  Laterality: N/A;    BASAL CELL CARCINOMA EXCISION      spine, nose and arm, leg 2020    BREAST BIOPSY      CARDIAC CATHETERIZATION      CARDIAC VALVE REPLACEMENT      CAROTID STENT      CATARACT EXTRACTION      CHOLECYSTECTOMY      CORONARY ARTERY BYPASS GRAFT      CORONARY STENT PLACEMENT      ENDOSCOPY N/A 04/24/2023    Procedure: ESOPHAGOGASTRODUODENOSCOPY with antrum body biopsies;  Surgeon: REGGIE Ace MD;  Location: [facility] ENDOSCOPY;  Service: Gastroenterology;  Laterality: N/A;  hiatal hernia    FINGER SURGERY      INSERT / REPLACE / REMOVE PACEMAKER      KNEE SURGERY      PACEMAKER IMPLANTATION      [employer]    SHOULDER SURGERY      RACHEL Bilateral 11/03/2023       Family History  Family History   Problem Relation Age of Onset    Hypertension Mother     Heart disease Father     Heart attack Father     Heart disease Sister     Kidney cancer Sister     Stroke Sister     Cancer Sister         breast    Cancer Sister     Heart disease Brother        Social History  Social History     Socioeconomic History    Marital status:     Number of children: 4    Years of education: GED   Tobacco Use    Smoking status: Former     Current  "packs/day: 0.00     Average packs/day: 1 pack/day for 4.0 years (4.0 ttl pk-yrs)     Types: Cigarettes     Start date: 1954     Quit date:      Years since quittin.6     Passive exposure: Past    Smokeless tobacco: Never   Vaping Use    Vaping status: Never Used   Substance and Sexual Activity    Alcohol use: No    Drug use: No    Sexual activity: Not Currently       Objective     Physical Exam:  Constitutional:       Appearance: Well-developed.   Eyes:      Conjunctiva/sclera: Conjunctivae normal.      Pupils: Pupils are equal, round, and reactive to light.   HENT:      Head: Normocephalic and atraumatic.   Neck:      Thyroid: No thyromegaly.   Pulmonary:      Effort: Pulmonary effort is normal.      Breath sounds: Normal breath sounds. Examination of the right-lower field reveals decreased breath sounds and rales. Examination of the left-lower field reveals decreased breath sounds and rales.   Cardiovascular:      Abnormal PMI. Normal rate. Regular rhythm.      Murmurs: There is a grade 2/6 midsystolic murmur.      S4 .    Pulses:     Carotid: 1+ bilaterally.     Radial: 1+ bilaterally.     Femoral: 1+ bilaterally.     Dorsalis pedis: 1+ bilaterally.     Posterior tibial: 1+ bilaterally.  Edema:     Peripheral edema absent.   Abdominal:      General: Bowel sounds are normal.      Palpations: Abdomen is soft.   Musculoskeletal:      Cervical back: Normal range of motion and neck supple. Skin:     General: Skin is warm.   Neurological:      Mental Status: Alert and oriented to person, place, and time.         Vital Signs  Vitals:    24 0825 24 0833 24 0836 24 0900   BP:    167/67   BP Location:    Right arm   Patient Position:    Sitting   Pulse: 71 70 73 78   Resp: 15 15 15 16   Temp:    98.2 °F (36.8 °C)   TempSrc:    Oral   SpO2: 100% 100% 100% 98%   Weight:       Height:           Weight  Flowsheet Rows      Flowsheet Row First Filed Value   Admission Height 152.4 cm (60\") " Documented at 07/29/2024 1734   Admission Weight 60.9 kg (134 lb 4.2 oz) Documented at 07/29/2024 1734                Results Review:  Lab Results (last 24 hours)       Procedure Component Value Units Date/Time    aPTT [[id number]]  (Abnormal) Collected: 08/01/24 1027    Specimen: Blood Updated: 08/01/24 1049     PTT 36.5 seconds     Basic Metabolic Panel [[id number]]  (Abnormal) Collected: 07/31/24 2347    Specimen: Blood Updated: 08/01/24 0022     Glucose 93 mg/dL      BUN 25 mg/dL      Creatinine 1.03 mg/dL      Sodium 134 mmol/L      Potassium 3.5 mmol/L      Chloride 95 mmol/L      CO2 24.7 mmol/L      Calcium 9.2 mg/dL      BUN/Creatinine Ratio 24.3     Anion Gap 14.3 mmol/L      eGFR 52.1 mL/min/1.73     Narrative:      GFR Normal >60  Chronic Kidney Disease <60  Kidney Failure <15    The GFR formula is only valid for adults with stable renal function between ages 18 and 70.    Magnesium [[id number]]  (Normal) Collected: 07/31/24 2347    Specimen: Blood Updated: 08/01/24 0022     Magnesium 1.7 mg/dL     Phosphorus [[id number]]  (Normal) Collected: 07/31/24 2347    Specimen: Blood Updated: 08/01/24 0022     Phosphorus 3.7 mg/dL     aPTT [[id number]]  (Abnormal) Collected: 07/31/24 2347    Specimen: Blood Updated: 08/01/24 0009     PTT 34.5 seconds     Protime-INR [[id number]]  (Abnormal) Collected: 07/31/24 2347    Specimen: Blood Updated: 08/01/24 0009     Protime 11.9 Seconds      INR 1.10    CBC & Differential [[id number]]  (Abnormal) Collected: 07/31/24 2347    Specimen: Blood Updated: 07/31/24 2358    Narrative:      The following orders were created for panel order CBC & Differential.  Procedure                               Abnormality         Status                     ---------                               -----------         ------                     CBC Auto Differential[[id number]]        Abnormal            Final result                 Please view results for these tests on the individual orders.    CBC  Auto Differential [[id number]]  (Abnormal) Collected: 07/31/24 2347    Specimen: Blood Updated: 07/31/24 2358     WBC 9.51 10*3/mm3      RBC 3.33 10*6/mm3      Hemoglobin 9.9 g/dL      Hematocrit 32.1 %      MCV 96.4 fL      MCH 29.7 pg      MCHC 30.8 g/dL      RDW 16.6 %      RDW-SD 57.9 fl      MPV 10.4 fL      Platelets 254 10*3/mm3      Neutrophil % 71.9 %      Lymphocyte % 20.2 %      Monocyte % 4.3 %      Eosinophil % 2.5 %      Basophil % 0.4 %      Immature Grans % 0.7 %      Neutrophils, Absolute 6.83 10*3/mm3      Lymphocytes, Absolute 1.92 10*3/mm3      Monocytes, Absolute 0.41 10*3/mm3      Eosinophils, Absolute 0.24 10*3/mm3      Basophils, Absolute 0.04 10*3/mm3      Immature Grans, Absolute 0.07 10*3/mm3      nRBC 0.0 /100 WBC     Blood Culture - Blood, Arm, Left [[id number]]  (Normal) Collected: 07/29/24 2201    Specimen: Blood from Arm, Left Updated: 07/31/24 2215     Blood Culture No growth at 2 days    Narrative:      Less than seven (7) mL's of blood was collected.  Insufficient quantity may yield false negative results.    Blood Culture - Blood, Arm, Right [[id number]]  (Normal) Collected: 07/29/24 2201    Specimen: Blood from Arm, Right Updated: 07/31/24 2215     Blood Culture No growth at 2 days    Narrative:      Less than seven (7) mL's of blood was collected.  Insufficient quantity may yield false negative results.    Basic Metabolic Panel [[id number]]  (Abnormal) Collected: 07/31/24 1532    Specimen: Blood Updated: 07/31/24 1602     Glucose 104 mg/dL      BUN 23 mg/dL      Creatinine 0.91 mg/dL      Sodium 132 mmol/L      Potassium 3.7 mmol/L      Chloride 94 mmol/L      CO2 23.8 mmol/L      Calcium 9.5 mg/dL      BUN/Creatinine Ratio 25.3     Anion Gap 14.2 mmol/L      eGFR 60.4 mL/min/1.73     Narrative:      GFR Normal >60  Chronic Kidney Disease <60  Kidney Failure <15    The GFR formula is only valid for adults with stable renal function between ages 18 and 70.          Imaging Results  (Last 72 Hours)       Procedure Component Value Units Date/Time    CT Angiogram Chest Pulmonary Embolism [[id number]] Collected: 07/29/24 2002     Updated: 07/29/24 2022    Narrative:      CT ANGIOGRAM CHEST PULMONARY EMBOLISM    Date of Exam: 7/29/2024 7:55 PM EDT    Indication: Dyspnea.    Comparison: Chest radiograph 7/29/2024. Chest CT PE protocol 8/10/2020    Technique: Axial CT images were obtained of the chest after the uneventful intravenous administration of iodinated contrast utilizing pulmonary embolism protocol.  Sagittal and coronal reconstructions were performed.  Automated exposure control and   iterative reconstruction methods were used.      Findings:  CARDIOVASCULAR:    Diagnostic quality: Contrast opacification of the pulmonary arterial circulation is adequate for assessment of pulmonary embolism. Study is not significantly limited by respiratory motion artifact.    Pulmonary arteries: No filling defects to suggest pulmonary embolism. Not enlarged.    Heart: No interventricular septal deviation. Similar cardiomegaly. Coronary artery calcifications and prior coronary revascularization. Aortic valve replacement. Left atrial appendage occlusion device. Left chest wall pacemaker with leads in the right   atrium and right ventricle.    Pericardium: No pericardial effusion.    Thoracic aorta: Atherosclerotic calcification without evidence of aneurysm.    REMAINING CHEST:    Thyroid and thoracic inlet: Normal.    Lymph nodes: Mildly enlarged multistation mediastinal lymph nodes, favored reactive.    Esophagus: Small hiatal hernia.    Lung parenchyma: Interlobular septal thickening. Mild perihilar groundglass opacities. Multifocal bilateral groundglass and consolidative opacities. Bibasilar atelectasis.    Airways: Patent trachea and mainstem bronchi. Bronchial wall thickening.    Pleura: Small right greater than left pleural effusions. No pneumothorax.    Chest wall and osseous structures: No acute  abnormality. Prior median sternotomy. Degenerative changes of the imaged spine.    Included upper abdomen: Left renal cyst. Similar mild bilateral adrenal thickening without discrete nodule. Calcifications within the liver and spleen, likely sequelae of prior granulomatous disease.        Impression:      Impression:    No evidence of pulmonary embolism.    Multifocal bilateral groundglass and consolidative opacities suspicious for multifocal pneumonia.    Mild pulmonary edema with small bilateral pleural effusions.            Electronically Signed: Jose Atkins    7/29/2024 8:20 PM EDT    Workstation ID: [id number]    XR Chest 1 View [[id number]] Collected: 07/29/24 1838     Updated: 07/29/24 1845    Narrative:      XR CHEST 1 VW    Date of Exam: 7/29/2024 6:11 PM EDT    Indication: Dyspnea    Comparison: Chest radiographs 7/10/2024 and 1/11/2024    Findings:  Medical devices, lines, and tubes: Left chest wall ICD/pacemaker appears unchanged.    Mediastinum: Cardiomediastinal silhouette appears aortic valve prosthesis, surgical clips, and cardiomegaly.    Lungs: Indistinctness of the pulmonary vasculature, septal thickening, and patchy perihilar opacities. Right basal atelectasis.    Pleura: Blunting of right costophrenic sulcus may represent a small right pleural effusion. No pneumothorax.    Bones and soft tissues: Median sternotomy. Left shoulder arthroplasty.        Impression:      Impression:  Mild pulmonary edema with possible small right pleural effusion.      Electronically Signed: Jose Atkins    7/29/2024 6:43 PM EDT    Workstation ID: [id number]            Results for orders placed during the hospital encounter of 07/29/24    Adult Transthoracic Echo Complete W/ Cont if Necessary Per Protocol    Interpretation Summary    Left ventricular systolic function is normal. Estimated left ventricular EF = 55% Left ventricular ejection fraction appears to be 51 - 55%.    Left ventricular wall thickness is  consistent with mild to moderate concentric hypertrophy.    Left ventricular diastolic function is consistent with (grade III w/high LAP) reversible restrictive pattern.    The left atrial cavity is moderate to severely dilated.    The right atrial cavity is borderline dilated.    Severe aortic valve stenosis is present.    Abnormal mitral valve structure consistent with dilated annulus.    Moderate mitral valve regurgitation is present.    Estimated right ventricular systolic pressure from tricuspid regurgitation is mildly elevated (35-45 mmHg).    Transthoracic echocardiography reveals paradoxical septal motion from left bundle branch block with ejection fraction between 50 to 55% with restrictive filling with moderate to severe left atrial enlargement and calcific aortic valve apparatus with a mean gradient of 30 suggestive of severe aortic stenosis with mild to moderate AI and calcific mitral valve apparatus with moderate laterally directed mitral regurgitation.  Mild to moderate TR with mild pulm hypertension and no effusion    Electronically signed by Pk Crabtree MD, 07/31/24, 8:19 AM EDT.      Medication Review  Scheduled Meds:amiodarone, 200 mg, Oral, Q12H  budesonide, 0.5 mg, Nebulization, BID - RT  cefTRIAXone, 1,000 mg, Intravenous, Q24H  clopidogrel, 75 mg, Oral, Daily  dilTIAZem CD, 120 mg, Oral, Q24H  doxycycline, 100 mg, Oral, Q12H  folic acid, 1 mg, Oral, Daily  furosemide, 40 mg, Oral, Daily  guaiFENesin, 1,200 mg, Oral, Q12H  ipratropium-albuterol, 3 mL, Nebulization, 4x Daily - RT  levothyroxine, 75 mcg, Oral, Once per day on Monday Tuesday Wednesday Thursday Friday  metoprolol succinate XL, 50 mg, Oral, Q24H  pantoprazole, 40 mg, Oral, QAM AC  rosuvastatin, 20 mg, Oral, Nightly  sertraline, 100 mg, Oral, Daily      Continuous Infusions:heparin, 12 Units/kg/hr, Last Rate: 15 Units/kg/hr (08/01/24 1124)      PRN Meds:.  diphenhydrAMINE-zinc acetate    heparin    heparin    hydrOXYzine     [COMPLETED] Insert Peripheral IV **AND** sodium chloride    Lab Results   Component Value Date    GLUCOSE 93 07/31/2024    BUN 25 (H) 07/31/2024    CREATININE 1.03 (H) 07/31/2024    EGFR 52.1 (L) 07/31/2024    BCR 24.3 07/31/2024    K 3.5 07/31/2024    CO2 24.7 07/31/2024    CALCIUM 9.2 07/31/2024    ALBUMIN 4.2 02/20/2024    BILITOT 0.4 02/20/2024    AST 29 02/20/2024    ALT 21 02/20/2024     No results found for this or any previous visit.     Results for orders placed during the hospital encounter of 07/29/24    Adult Transthoracic Echo Complete W/ Cont if Necessary Per Protocol    Interpretation Summary    Left ventricular systolic function is normal. Estimated left ventricular EF = 55% Left ventricular ejection fraction appears to be 51 - 55%.    Left ventricular wall thickness is consistent with mild to moderate concentric hypertrophy.    Left ventricular diastolic function is consistent with (grade III w/high LAP) reversible restrictive pattern.    The left atrial cavity is moderate to severely dilated.    The right atrial cavity is borderline dilated.    Severe aortic valve stenosis is present.    Abnormal mitral valve structure consistent with dilated annulus.    Moderate mitral valve regurgitation is present.    Estimated right ventricular systolic pressure from tricuspid regurgitation is mildly elevated (35-45 mmHg).    Transthoracic echocardiography reveals paradoxical septal motion from left bundle branch block with ejection fraction between 50 to 55% with restrictive filling with moderate to severe left atrial enlargement and calcific aortic valve apparatus with a mean gradient of 30 suggestive of severe aortic stenosis with mild to moderate AI and calcific mitral valve apparatus with moderate laterally directed mitral regurgitation.  Mild to moderate TR with mild pulm hypertension and no effusion    Electronically signed by Pk Crabtree MD, 07/31/24, 8:19 AM EDT.     Lab Results   Component  Value Date    CHOL 108 04/28/2023    TRIG 104 04/28/2023    HDL 50 04/28/2023    LDL 39 04/28/2023            Assessment & Plan       Acute on chronic respiratory failure with hypoxia    Anxiety disorder    Arthritis, rheumatoid    Chronic coronary artery disease    Depression    GERD without esophagitis    Hx of aortic valve replacement    Mixed hyperlipidemia    Essential hypertension    Hyponatremia    Acquired hypothyroidism    Paroxysmal atrial fibrillation    Peripheral vascular disease    Presence of aortocoronary bypass graft    Presence of cardiac pacemaker    Renal insufficiency    Sick sinus syndrome    Valvular heart disease    A-fib    Presence of Watchman left atrial appendage closure device    Acute on chronic heart failure with preserved ejection fraction (HFpEF)    Leukocytosis          Zoe Soliman MD  08/01/24  11:35 EDT

## 2024-08-01 NOTE — THERAPY TREATMENT NOTE
Subjective: Pt agreeable to therapeutic plan of care. Pt just completed lunch and reports some fatigue but willing to do exercises.     Objective:     Precautions - fall    Bed mobility - suprvision  Transfers - CGA Posterior lean; with repetition and verbal cues for wt shift onto toes, pt exhibits improved standing balance.   Ambulation -N/A or Not attempted.    Therapeutic Exercise -reviewed pt's current Home exercise regime and discussed her reported limitations to compliance and ways to incorporate exercise into her daily routine.    Pt performed 2 sets of sit to stand x 5 with seated rest between.  On first set, pt with posterior lean, decreased eccentric control and limited upright.  On second set, pt demonstrated improved control and posture.      Vitals: WNL    Pain: 2 VAS   Location: itching on back;   Intervention for pain: Repositioned; anti itch cream applied.    Education: Provided education on the importance of mobility in the acute care setting and Transfer Training    Assessment: Tracy Jane presents with functional mobility impairments which indicate the need for skilled intervention. Tolerating session today without incident. Pt exhibited willingness to work to build endurance and carryover of learning within the session. Will continue to follow and progress as tolerated. We will continue to work with her while here but feel that she is close to baseline and should be able to return to [facility] at discharge.    Plan/Recommendations:   If medically appropriate, No ongoing therapy recommended post-acute care. No therapy needs. Pt requires no DME at discharge.     Pt desires Home at discharge. Pt cooperative; agreeable to therapeutic recommendations and plan of care.         Basic Mobility 6-click:  Rollin = Total, A lot = 2, A little = 3; 4 = None  Supine>Sit:   1 = Total, A lot = 2, A little = 3; 4 = None   Sit>Stand with arms:  1 = Total, A lot = 2, A little = 3; 4 = None  Bed>Chair:    1 = Total, A lot = 2, A little = 3; 4 = None  Ambulate in room:  1 = Total, A lot = 2, A little = 3; 4 = None  3-5 Steps with railin = Total, A lot = 2, A little = 3; 4 = None  Score: 21    Modified Steve: N/A = No pre-op stroke/TIA    Post-Tx Position: Supine with HOB Elevated and Call light and personal items within reach; bed alarm  PPE: gloves

## 2024-08-01 NOTE — PROGRESS NOTES
CC--- recurrent atrial fibrillation  Presence of Watchman  Coronary artery disease and severe aortic stenosis  Sick sinus syndrome with pacemaker in situ      Sub  Patient denies any symptoms and appears fatigued          Past Medical History:   Diagnosis Date    Abnormal ECG     Anxiety     Aortic valve replaced     Arrhythmia     Asthma     Atrial fibrillation     CAD (coronary artery disease)     Carotid artery disease     CHF (congestive heart failure)     Congenital heart disease     GERD (gastroesophageal reflux disease)     Gout     Heart murmur     Hemorrhagic stroke 2023    was on Coumadin for A-fib    Hyperlipidemia     Hypertension     Hyperthyroidism     Hypothyroidism     Myocardial infarction     Overactive bladder     Pacemaker     [facility]    Skin cancer      Past Surgical History:   Procedure Laterality Date    AORTIC VALVE REPAIR/REPLACEMENT  2014    porcine    ATRIAL APPENDAGE EXCLUSION LEFT WITH TRANSESOPHAGEAL ECHOCARDIOGRAM Right 12/26/2023    Procedure: Atrial Appendage Occlusion;  Surgeon: Pk Crabtree MD;  Location: [facility] CATH INVASIVE LOCATION;  Service: Cardiovascular;  Laterality: Right;    ATRIAL APPENDAGE EXCLUSION LEFT WITH TRANSESOPHAGEAL ECHOCARDIOGRAM N/A 12/26/2023    Procedure: Atrial Appendage Occlusion;  Surgeon: Gen Caballero MD;  Location: [facility] CATH INVASIVE LOCATION;  Service: Cardiovascular;  Laterality: N/A;    BASAL CELL CARCINOMA EXCISION      spine, nose and arm, leg 2020    BREAST BIOPSY      CARDIAC CATHETERIZATION      CARDIAC VALVE REPLACEMENT      CAROTID STENT      CATARACT EXTRACTION      CHOLECYSTECTOMY      CORONARY ARTERY BYPASS GRAFT      CORONARY STENT PLACEMENT      ENDOSCOPY N/A 04/24/2023    Procedure: ESOPHAGOGASTRODUODENOSCOPY with antrum body biopsies;  Surgeon: REGGIE Ace MD;  Location: [facility] ENDOSCOPY;  Service: Gastroenterology;  Laterality: N/A;  hiatal hernia    FINGER SURGERY      INSERT / REPLACE / REMOVE PACEMAKER       KNEE SURGERY      PACEMAKER IMPLANTATION      [facility]    SHOULDER SURGERY      RACHEL Bilateral 11/03/2023         Physical Exam    General:      well developed, well nourished, in no acute distress.    Head:      normocephalic and atraumatic.    Eyes:      PERRL/EOM intact, conjunctivae and sclerae clear without nystagmus.    Neck:      no  thyromegaly, trachea central with normal respiratory effort  Lungs:      clear bilaterally to auscultation.    Heart:       regular rate and rhythm, S1, S2 without murmurs, rubs, or gallops  Skin:      intact without lesions or rashes.    Psych:      alert and cooperative; normal mood and affect; normal attention span and concentration.          CBC    Results from last 7 days   Lab Units 07/31/24 2347 07/31/24  0525 07/30/24 0427 07/29/24  1753   WBC 10*3/mm3 9.51 10.20 11.18* 11.70*   HEMOGLOBIN g/dL 9.9* 9.8* 10.4* 9.7*   PLATELETS 10*3/mm3 254 248 236 270     BMP   Results from last 7 days   Lab Units 07/31/24 2347 07/31/24  1532 07/30/24 0427 07/29/24  1753   SODIUM mmol/L 134* 132* 136 133*   POTASSIUM mmol/L 3.5 3.7 4.1 5.2   CHLORIDE mmol/L 95* 94* 97* 100   CO2 mmol/L 24.7 23.8 25.7 22.0   BUN mg/dL 25* 23 19 19   CREATININE mg/dL 1.03* 0.91 0.96 1.05*   GLUCOSE mg/dL 93 104* 91 120*   MAGNESIUM mg/dL 1.7  --   --   --    PHOSPHORUS mg/dL 3.7  --   --   --      CMP   Results from last 7 days   Lab Units 07/31/24  2347 07/31/24  1532 07/30/24 0427 07/29/24  1753   SODIUM mmol/L 134* 132* 136 133*   POTASSIUM mmol/L 3.5 3.7 4.1 5.2   CHLORIDE mmol/L 95* 94* 97* 100   CO2 mmol/L 24.7 23.8 25.7 22.0   BUN mg/dL 25* 23 19 19   CREATININE mg/dL 1.03* 0.91 0.96 1.05*   GLUCOSE mg/dL 93 104* 91 120*         Assessment and plan    Acute on chronic diastolic heart failure and etiology of diastolic heart failure is hypertension, coronary artery disease, atrial fibrillation, aortic stenosis  Post cardioversion with recurrence of AF started on amiodarone currently in sinus  rhythm  IV amiodarone can be stopped and changed to oral amiodarone  Had metoprolol to optimize arrhythmia and hypertension and heart failure apart from amiodarone and Cardizem  Left heart cath to evaluate severity of aortic stenosis and discussed with interventional cardiology  Sick sinus syndrome with pacemaker in situ with appropriate function  Watchman device in situ  Currently on heparin until heart catheterization is done  Patient will need anticoagulation for 4 weeks because of cardioversion  Coronary artery disease stable without angina    Electronically signed by Pk Crabtree MD, 08/01/24, 9:05 AM EDT.

## 2024-08-01 NOTE — PROGRESS NOTES
[facility] MEDICINE SERVICE  DAILY PROGRESS NOTE    NAME: Tracy Jane  : 1934  MRN: [id number]      LOS: 3 days     PROVIDER OF SERVICE: Nima Calhoun MD    Chief Complaint: Acute on chronic respiratory failure with hypoxia    Subjective:     Interval History:  History taken from: patient chart RN  No chest pain   dyspnea on exertion    Review of Systems:   Review of Systems  All negative except as above  Objective:     Vital Signs  Temp:  [97.6 °F (36.4 °C)-98.2 °F (36.8 °C)] 98.2 °F (36.8 °C)  Heart Rate:  [] 74  Resp:  [12-26] 16  BP: (128-168)/(51-70) 167/67  Flow (L/min):  [2-10] 2   Body mass index is 23.64 kg/m².    Physical Exam  Physical Exam  NAD  Irregular rhythm rate controlled S1-S2 audible  Lungs with decreased breath sound at bases  Abdomen soft nontender nondistended  Scheduled Meds   amiodarone, 200 mg, Oral, Q12H  budesonide, 0.5 mg, Nebulization, BID - RT  cefTRIAXone, 1,000 mg, Intravenous, Q24H  clopidogrel, 75 mg, Oral, Daily  dilTIAZem CD, 120 mg, Oral, Q24H  doxycycline, 100 mg, Oral, Q12H  folic acid, 1 mg, Oral, Daily  furosemide, 40 mg, Oral, Daily  guaiFENesin, 1,200 mg, Oral, Q12H  ipratropium-albuterol, 3 mL, Nebulization, 4x Daily - RT  levothyroxine, 75 mcg, Oral, Once per day on   metoprolol succinate XL, 50 mg, Oral, Q24H  pantoprazole, 40 mg, Oral, QAM AC  rosuvastatin, 20 mg, Oral, Nightly  sertraline, 100 mg, Oral, Daily       PRN Meds     diphenhydrAMINE-zinc acetate    heparin    heparin    hydrOXYzine    [COMPLETED] Insert Peripheral IV **AND** sodium chloride   Infusions  heparin, 12 Units/kg/hr, Last Rate: 15 Units/kg/hr (24 1124)          Diagnostic Data    Results from last 7 days   Lab Units 24  2347   WBC 10*3/mm3 9.51   HEMOGLOBIN g/dL 9.9*   HEMATOCRIT % 32.1*   PLATELETS 10*3/mm3 254   GLUCOSE mg/dL 93   CREATININE mg/dL 1.03*   BUN mg/dL 25*   SODIUM mmol/L 134*   POTASSIUM mmol/L  3.5   ANION GAP mmol/L 14.3       No radiology results for the last day      I reviewed the patient's new clinical results.  I reviewed the patient's new imaging results and agree with the interpretation.    Assessment/Plan:     Active and Resolved Problems  Active Hospital Problems    Diagnosis  POA    **Acute on chronic respiratory failure with hypoxia [J96.21]  Yes    Acute on chronic heart failure with preserved ejection fraction (HFpEF) [I50.33]  Yes    Leukocytosis [D72.829]  Yes    Presence of Watchman left atrial appendage closure device [Z95.818]  Yes    A-fib [I48.91]  Yes    GERD without esophagitis [K21.9]  Yes    Essential hypertension [I10]  Yes    Depression [F32.A]  Yes    Anxiety disorder [F41.9]  Yes    Hyponatremia [E87.1]  Yes    Sick sinus syndrome [I49.5]  Yes    Presence of cardiac pacemaker [Z95.0]  Yes    Arthritis, rheumatoid [M06.9]  Yes    Acquired hypothyroidism [E03.9]  Yes    Valvular heart disease [I38]  Yes    Presence of aortocoronary bypass graft [Z95.1]  Not Applicable    Peripheral vascular disease [I73.9]  Yes    Paroxysmal atrial fibrillation [I48.0]  Yes    Hx of aortic valve replacement [Z95.2]  Not Applicable    Renal insufficiency [N28.9]  Yes    Chronic coronary artery disease [I25.10]  Yes    Mixed hyperlipidemia [E78.2]  Yes      Resolved Hospital Problems   No resolved problems to display.       89-year-old female with history of atrial fibrillation status post Watchman device, SSS status post PPM, VHD status post bioprosthetic aortic valve, CAD status post CABG, bilateral renal artery stenosis, carotid stenosis, chronic hypoxic respiratory failure on 3 L supplemental oxygen, PVD, hyperlipidemia, rheumatoid arthritis, depression, anxiety, hyperlipidemia, hypertension, GERD admitted to [facility] 7/29 with progressive shortness of breath     #Acute on chronic hypoxic respiratory failure  #Acute on chronic HFpEF  #Multifocal pneumonia  #COPD  Cardiology and pulmonary  on board  Continue ceftriaxone doxycycline per pulmonary  Diuretics per cardiology.  IV Lasix transition to p.o. 40 mg daily monitor I's and O's  Follow blood cultures.  NGTD  Continue current nebs     #A-fib with RVR  Status post Watchman device  Previously on sotalol now transition to IV amiodarone> p.o.  Status post cardioversion since 7/31  P.o. Cardizem decreased to 120 CD  On IV heparin drip plan for total 4 weeks of anticoagulation from cardioversion     #CAD status post CABG-stable  #Hypertension    Discontinued  Continue Plavix  Restart statins     #VHD status post bioprosthetic aortic valve  #Severe stenosis  Noted plan for cardiac cath to assess severity of aortic stenosis in a.m. 8/2     #PVD  Continue Plavix  Continue statins     #Depression  Continue sertraline 100 daily     #Hypothyroidism  Continue home dose of levothyroxine    VTE Prophylaxis:  Pharmacologic & mechanical VTE prophylaxis orders are present.         Code status is   Code Status and Medical Interventions: CPR (Attempt to Resuscitate); Full Support   Ordered at: 07/29/24 2043     Code Status (Patient has no pulse and is not breathing):    CPR (Attempt to Resuscitate)     Medical Interventions (Patient has pulse or is breathing):    Full Support       Plan for disposition:2 days    Time: 30 minutes    Signature: Electronically signed by Nima Calhoun MD, 08/01/24, 12:55 EDT.  [facility], [place], operated by [facility] Hospitalist Team

## 2024-08-01 NOTE — PLAN OF CARE
Problem: Adult Inpatient Plan of Care  Goal: Plan of Care Review  8/1/2024 0718 by Carin Castorena RN  Outcome: Ongoing, Progressing  8/1/2024 0718 by Carin Castorena RN  Outcome: Ongoing, Progressing  Goal: Patient-Specific Goal (Individualized)  8/1/2024 0718 by Carin Castorena RN  Outcome: Ongoing, Progressing  8/1/2024 0718 by Carin Castorena RN  Outcome: Ongoing, Progressing  Goal: Absence of Hospital-Acquired Illness or Injury  8/1/2024 0718 by Carin Castorena RN  Outcome: Ongoing, Progressing  8/1/2024 0718 by Carin Castorena RN  Outcome: Ongoing, Progressing  Intervention: Identify and Manage Fall Risk  Intervention: Prevent Skin Injury  Intervention: Prevent and Manage VTE (Venous Thromboembolism) Risk  Intervention: Prevent Infection  Goal: Optimal Comfort and Wellbeing  8/1/2024 0718 by Carin Castorena RN  Outcome: Ongoing, Progressing  8/1/2024 0718 by Carin Castorena RN  Outcome: Ongoing, Progressing  Intervention: Provide Person-Centered Care  Goal: Readiness for Transition of Care  8/1/2024 0718 by Carin Castorena RN  Outcome: Ongoing, Progressing  8/1/2024 0718 by Carin Castorena RN  Outcome: Ongoing, Progressing   Goal Outcome Evaluation:        Patient currently is on Amiodarone IV as well as Heparin IV.  NPO for possible cardiac cath.    Plan of care is ongoing.

## 2024-08-01 NOTE — PLAN OF CARE
Goal Outcome Evaluation:      Cardiology decided to not proceed with heart cath tomorrow and monitor valve as outpatient.     Heparin gtt d/c'd and started on eliquis.     Amio gtt converted to po this am.         Problem: Adult Inpatient Plan of Care  Goal: Plan of Care Review  Outcome: Ongoing, Progressing  Goal: Patient-Specific Goal (Individualized)  Outcome: Ongoing, Progressing  Goal: Absence of Hospital-Acquired Illness or Injury  Outcome: Ongoing, Progressing  Intervention: Identify and Manage Fall Risk  Recent Flowsheet Documentation  Taken 8/1/2024 1600 by Therese Bourne RN  Safety Promotion/Fall Prevention:   activity supervised   clutter free environment maintained   nonskid shoes/slippers when out of bed   assistive device/personal items within reach   safety round/check completed  Taken 8/1/2024 1200 by Therese Bourne RN  Safety Promotion/Fall Prevention:   activity supervised   clutter free environment maintained   nonskid shoes/slippers when out of bed   safety round/check completed  Taken 8/1/2024 1000 by Therese Bourne RN  Safety Promotion/Fall Prevention:   activity supervised   assistive device/personal items within reach   clutter free environment maintained   nonskid shoes/slippers when out of bed   safety round/check completed  Intervention: Prevent and Manage VTE (Venous Thromboembolism) Risk  Recent Flowsheet Documentation  Taken 8/1/2024 1600 by Therese Bourne RN  Activity Management: up in chair  Taken 8/1/2024 1000 by Therese Bourne RN  Activity Management: up in chair  Taken 8/1/2024 0800 by Therese Bourne RN  VTE Prevention/Management: sequential compression devices on  Intervention: Prevent Infection  Recent Flowsheet Documentation  Taken 8/1/2024 1600 by Therese Bourne RN  Infection Prevention:   hand hygiene promoted   personal protective equipment utilized  Taken 8/1/2024 1200 by Therese Bourne RN  Infection Prevention:   personal protective  equipment utilized   hand hygiene promoted  Taken 8/1/2024 1000 by Therese Bourne, RN  Infection Prevention:   hand hygiene promoted   personal protective equipment utilized  Goal: Optimal Comfort and Wellbeing  Outcome: Ongoing, Progressing  Goal: Readiness for Transition of Care  Outcome: Ongoing, Progressing

## 2024-08-02 VITALS
RESPIRATION RATE: 24 BRPM | DIASTOLIC BLOOD PRESSURE: 46 MMHG | TEMPERATURE: 98.2 F | OXYGEN SATURATION: 100 % | HEART RATE: 73 BPM | SYSTOLIC BLOOD PRESSURE: 141 MMHG | HEIGHT: 60 IN | WEIGHT: 124.56 LBS | BODY MASS INDEX: 24.45 KG/M2

## 2024-08-02 LAB
ANION GAP SERPL CALCULATED.3IONS-SCNC: 12.3 MMOL/L (ref 5–15)
BASOPHILS # BLD AUTO: 0.04 10*3/MM3 (ref 0–0.2)
BASOPHILS NFR BLD AUTO: 0.5 % (ref 0–1.5)
BUN SERPL-MCNC: 22 MG/DL (ref 8–23)
BUN/CREAT SERPL: 25.6 (ref 7–25)
CALCIUM SPEC-SCNC: 9.2 MG/DL (ref 8.6–10.5)
CHLORIDE SERPL-SCNC: 99 MMOL/L (ref 98–107)
CO2 SERPL-SCNC: 25.7 MMOL/L (ref 22–29)
CREAT SERPL-MCNC: 0.86 MG/DL (ref 0.57–1)
DEPRECATED RDW RBC AUTO: 57.8 FL (ref 37–54)
EGFRCR SERPLBLD CKD-EPI 2021: 64.7 ML/MIN/1.73
EOSINOPHIL # BLD AUTO: 0.39 10*3/MM3 (ref 0–0.4)
EOSINOPHIL NFR BLD AUTO: 4.6 % (ref 0.3–6.2)
ERYTHROCYTE [DISTWIDTH] IN BLOOD BY AUTOMATED COUNT: 16.8 % (ref 12.3–15.4)
GLUCOSE SERPL-MCNC: 92 MG/DL (ref 65–99)
HCT VFR BLD AUTO: 31.7 % (ref 34–46.6)
HGB BLD-MCNC: 9.7 G/DL (ref 12–15.9)
IMM GRANULOCYTES # BLD AUTO: 0.14 10*3/MM3 (ref 0–0.05)
IMM GRANULOCYTES NFR BLD AUTO: 1.7 % (ref 0–0.5)
LYMPHOCYTES # BLD AUTO: 1.22 10*3/MM3 (ref 0.7–3.1)
LYMPHOCYTES NFR BLD AUTO: 14.4 % (ref 19.6–45.3)
MAGNESIUM SERPL-MCNC: 1.8 MG/DL (ref 1.6–2.4)
MCH RBC QN AUTO: 29.3 PG (ref 26.6–33)
MCHC RBC AUTO-ENTMCNC: 30.6 G/DL (ref 31.5–35.7)
MCV RBC AUTO: 95.8 FL (ref 79–97)
MONOCYTES # BLD AUTO: 0.59 10*3/MM3 (ref 0.1–0.9)
MONOCYTES NFR BLD AUTO: 7 % (ref 5–12)
NEUTROPHILS NFR BLD AUTO: 6.08 10*3/MM3 (ref 1.7–7)
NEUTROPHILS NFR BLD AUTO: 71.8 % (ref 42.7–76)
NRBC BLD AUTO-RTO: 0 /100 WBC (ref 0–0.2)
PHOSPHATE SERPL-MCNC: 3.2 MG/DL (ref 2.5–4.5)
PLATELET # BLD AUTO: 177 10*3/MM3 (ref 140–450)
PMV BLD AUTO: 11.9 FL (ref 6–12)
POTASSIUM SERPL-SCNC: 3.7 MMOL/L (ref 3.5–5.2)
RBC # BLD AUTO: 3.31 10*6/MM3 (ref 3.77–5.28)
SODIUM SERPL-SCNC: 137 MMOL/L (ref 136–145)
WBC NRBC COR # BLD AUTO: 8.46 10*3/MM3 (ref 3.4–10.8)

## 2024-08-02 PROCEDURE — 84100 ASSAY OF PHOSPHORUS: CPT | Performed by: INTERNAL MEDICINE

## 2024-08-02 PROCEDURE — 94664 DEMO&/EVAL PT USE INHALER: CPT

## 2024-08-02 PROCEDURE — 80048 BASIC METABOLIC PNL TOTAL CA: CPT | Performed by: INTERNAL MEDICINE

## 2024-08-02 PROCEDURE — 94799 UNLISTED PULMONARY SVC/PX: CPT

## 2024-08-02 PROCEDURE — 94761 N-INVAS EAR/PLS OXIMETRY MLT: CPT

## 2024-08-02 PROCEDURE — 83735 ASSAY OF MAGNESIUM: CPT | Performed by: INTERNAL MEDICINE

## 2024-08-02 PROCEDURE — 99232 SBSQ HOSP IP/OBS MODERATE 35: CPT | Performed by: NURSE PRACTITIONER

## 2024-08-02 PROCEDURE — 25010000002 CEFTRIAXONE PER 250 MG: Performed by: HOSPITALIST

## 2024-08-02 PROCEDURE — 85025 COMPLETE CBC W/AUTO DIFF WBC: CPT | Performed by: INTERNAL MEDICINE

## 2024-08-02 RX ORDER — METOPROLOL SUCCINATE 50 MG/1
50 TABLET, EXTENDED RELEASE ORAL
Qty: 30 TABLET | Refills: 0 | Status: SHIPPED | OUTPATIENT
Start: 2024-08-03 | End: 2024-09-02

## 2024-08-02 RX ORDER — DILTIAZEM HYDROCHLORIDE 120 MG/1
120 CAPSULE, COATED, EXTENDED RELEASE ORAL
Qty: 30 CAPSULE | Refills: 0 | Status: SHIPPED | OUTPATIENT
Start: 2024-08-03 | End: 2024-09-02

## 2024-08-02 RX ORDER — AMIODARONE HYDROCHLORIDE 200 MG/1
200 TABLET ORAL EVERY 12 HOURS SCHEDULED
Qty: 60 TABLET | Refills: 0 | Status: SHIPPED | OUTPATIENT
Start: 2024-08-02 | End: 2024-09-01

## 2024-08-02 RX ORDER — HYDROXYZINE HYDROCHLORIDE 25 MG/1
25 TABLET, FILM COATED ORAL EVERY 8 HOURS PRN
Qty: 15 TABLET | Refills: 0 | Status: SHIPPED | OUTPATIENT
Start: 2024-08-02 | End: 2024-08-07

## 2024-08-02 RX ADMIN — GUAIFENESIN 1200 MG: 600 TABLET, EXTENDED RELEASE ORAL at 08:49

## 2024-08-02 RX ADMIN — CLOPIDOGREL BISULFATE 75 MG: 75 TABLET ORAL at 08:50

## 2024-08-02 RX ADMIN — APIXABAN 2.5 MG: 2.5 TABLET, FILM COATED ORAL at 08:50

## 2024-08-02 RX ADMIN — METOPROLOL SUCCINATE 50 MG: 50 TABLET, EXTENDED RELEASE ORAL at 08:50

## 2024-08-02 RX ADMIN — IPRATROPIUM BROMIDE AND ALBUTEROL SULFATE 3 ML: .5; 3 SOLUTION RESPIRATORY (INHALATION) at 07:16

## 2024-08-02 RX ADMIN — SERTRALINE 100 MG: 100 TABLET, FILM COATED ORAL at 08:50

## 2024-08-02 RX ADMIN — IPRATROPIUM BROMIDE AND ALBUTEROL SULFATE 3 ML: .5; 3 SOLUTION RESPIRATORY (INHALATION) at 11:05

## 2024-08-02 RX ADMIN — BUDESONIDE 0.5 MG: 0.5 INHALANT RESPIRATORY (INHALATION) at 07:20

## 2024-08-02 RX ADMIN — CEFTRIAXONE 1000 MG: 1 INJECTION, POWDER, FOR SOLUTION INTRAMUSCULAR; INTRAVENOUS at 13:22

## 2024-08-02 RX ADMIN — LEVOTHYROXINE SODIUM 75 MCG: 0.07 TABLET ORAL at 05:03

## 2024-08-02 RX ADMIN — DILTIAZEM HYDROCHLORIDE 120 MG: 120 CAPSULE, EXTENDED RELEASE ORAL at 08:50

## 2024-08-02 RX ADMIN — FOLIC ACID 1 MG: 1 TABLET ORAL at 08:49

## 2024-08-02 RX ADMIN — PANTOPRAZOLE SODIUM 40 MG: 40 TABLET, DELAYED RELEASE ORAL at 08:50

## 2024-08-02 RX ADMIN — Medication 10 ML: at 08:50

## 2024-08-02 RX ADMIN — DOXYCYCLINE 100 MG: 100 CAPSULE ORAL at 08:50

## 2024-08-02 RX ADMIN — AMIODARONE HYDROCHLORIDE 200 MG: 200 TABLET ORAL at 08:50

## 2024-08-02 RX ADMIN — FUROSEMIDE 40 MG: 40 TABLET ORAL at 08:50

## 2024-08-02 RX ADMIN — SENNOSIDES AND DOCUSATE SODIUM 2 TABLET: 50; 8.6 TABLET ORAL at 08:50

## 2024-08-02 NOTE — PROGRESS NOTES
"PULMONARY CRITICAL CARE PROGRESS  NOTE      PATIENT IDENTIFICATION:  Name: Tracy Jane  MRN: [id number]  :  1934     Age: 89 y.o.  Sex: female    DATE OF Note:  2024   Referring Physician: Citlali Ashraf MD                  Subjective:   Sitting in chair at bedside  On room air with no SOB   No distress, no complaints   Wants to go home         Objective:  tMax 24 hrs: Temp (24hrs), Av °F (36.7 °C), Min:97.7 °F (36.5 °C), Max:98.6 °F (37 °C)      Vitals Ranges:   Temp:  [97.7 °F (36.5 °C)-98.6 °F (37 °C)] 97.8 °F (36.6 °C)  Heart Rate:  [70-79] 76  Resp:  [10-22] 20  BP: (139-160)/(56-63) 139/58    Intake and Output Last 3 Shifts:   I/O last 3 completed shifts:  In: 290 [P.O.:290]  Out: 475 [Urine:475]    Exam:  /58 (BP Location: Left arm, Patient Position: Lying)   Pulse 76   Temp 97.8 °F (36.6 °C) (Oral)   Resp 20   Ht 152.4 cm (60\")   Wt 56.5 kg (124 lb 9 oz)   SpO2 93%   BMI 24.33 kg/m²     General Appearance:  Alert, weak    HEENT:  Normocephalic, without obvious abnormality. Conjunctivae/corneas clear.  Normal external ear canals. Nares normal, no drainage     Neck:  Supple, symmetrical, trachea midline. No JVD.  Lungs /Chest wall:   Bilateral basal rhonchi, respirations unlabored, symmetrical wall movement.     Heart:  Regular rate and rhythm, systolic murmur PMI left sternal border  Abdomen: Soft, nontender, no masses, no organomegaly.    Extremities: Trace edema, no clubbing or cyanosis        Medications:  amiodarone, 200 mg, Oral, Q12H  apixaban, 2.5 mg, Oral, Q12H  budesonide, 0.5 mg, Nebulization, BID - RT  cefTRIAXone, 1,000 mg, Intravenous, Q24H  clopidogrel, 75 mg, Oral, Daily  dilTIAZem CD, 120 mg, Oral, Q24H  doxycycline, 100 mg, Oral, Q12H  folic acid, 1 mg, Oral, Daily  furosemide, 40 mg, Oral, Daily  guaiFENesin, 1,200 mg, Oral, Q12H  ipratropium-albuterol, 3 mL, Nebulization, 4x Daily - RT  levothyroxine, 75 mcg, Oral, Once per day on  " Wednesday Thursday Friday  metoprolol succinate XL, 50 mg, Oral, Q24H  pantoprazole, 40 mg, Oral, QAM AC  rosuvastatin, 20 mg, Oral, Nightly  senna-docusate sodium, 2 tablet, Oral, BID  sertraline, 100 mg, Oral, Daily        Infusion:          PRN:    senna-docusate sodium **AND** polyethylene glycol **AND** bisacodyl **AND** bisacodyl    diphenhydrAMINE-zinc acetate    hydrOXYzine    [COMPLETED] Insert Peripheral IV **AND** sodium chloride  Data Review:  All labs (24hrs):   Recent Results (from the past 24 hour(s))   aPTT    Collection Time: 08/01/24 10:27 AM    Specimen: Blood   Result Value Ref Range    PTT 36.5 (L) 61.0 - 76.5 seconds   Phosphorus    Collection Time: 08/02/24  5:02 AM    Specimen: Arm, Left; Blood   Result Value Ref Range    Phosphorus 3.2 2.5 - 4.5 mg/dL   CBC Auto Differential    Collection Time: 08/02/24  5:02 AM    Specimen: Arm, Left; Blood   Result Value Ref Range    WBC 8.46 3.40 - 10.80 10*3/mm3    RBC 3.31 (L) 3.77 - 5.28 10*6/mm3    Hemoglobin 9.7 (L) 12.0 - 15.9 g/dL    Hematocrit 31.7 (L) 34.0 - 46.6 %    MCV 95.8 79.0 - 97.0 fL    MCH 29.3 26.6 - 33.0 pg    MCHC 30.6 (L) 31.5 - 35.7 g/dL    RDW 16.8 (H) 12.3 - 15.4 %    RDW-SD 57.8 (H) 37.0 - 54.0 fl    MPV 11.9 6.0 - 12.0 fL    Platelets 177 140 - 450 10*3/mm3    Neutrophil % 71.8 42.7 - 76.0 %    Lymphocyte % 14.4 (L) 19.6 - 45.3 %    Monocyte % 7.0 5.0 - 12.0 %    Eosinophil % 4.6 0.3 - 6.2 %    Basophil % 0.5 0.0 - 1.5 %    Immature Grans % 1.7 (H) 0.0 - 0.5 %    Neutrophils, Absolute 6.08 1.70 - 7.00 10*3/mm3    Lymphocytes, Absolute 1.22 0.70 - 3.10 10*3/mm3    Monocytes, Absolute 0.59 0.10 - 0.90 10*3/mm3    Eosinophils, Absolute 0.39 0.00 - 0.40 10*3/mm3    Basophils, Absolute 0.04 0.00 - 0.20 10*3/mm3    Immature Grans, Absolute 0.14 (H) 0.00 - 0.05 10*3/mm3    nRBC 0.0 0.0 - 0.2 /100 WBC   Basic Metabolic Panel    Collection Time: 08/02/24  5:54 AM    Specimen: Arm, Left; Blood   Result Value Ref Range    Glucose 92 65 -  99 mg/dL    BUN 22 8 - 23 mg/dL    Creatinine 0.86 0.57 - 1.00 mg/dL    Sodium 137 136 - 145 mmol/L    Potassium 3.7 3.5 - 5.2 mmol/L    Chloride 99 98 - 107 mmol/L    CO2 25.7 22.0 - 29.0 mmol/L    Calcium 9.2 8.6 - 10.5 mg/dL    BUN/Creatinine Ratio 25.6 (H) 7.0 - 25.0    Anion Gap 12.3 5.0 - 15.0 mmol/L    eGFR 64.7 >60.0 mL/min/1.73   Magnesium    Collection Time: 08/02/24  5:54 AM    Specimen: Arm, Left; Blood   Result Value Ref Range    Magnesium 1.8 1.6 - 2.4 mg/dL        Imaging:  Cardioversion External in Cardiology Department    Post cardioversion the patient displayed A-paced.    The cardioversion was successful.    Procedure indication  Uncontrollable atrial fibrillation despite amiodarone with acute on   chronic systolic heart failure and patient brought in for cardioversion   for symptomatic relief and patient has pacemaker in situ    Sedation per anesthesia    Procedure done  Synchronized cardioversion  Dual-chamber pacemaker interrogation    Procedure note  After obtaining valid consent, timeout was performed and patient was   sedated by anesthesia team.  Patient underwent synchronized cardioversion   with 150 J to sinus rhythm with resumption of atrial fibrillation and   amiodarone was reinitiated with additional bolus and repeat cardioversion   with 150 J x 2 to keep the patient in sinus rhythm.  Dual-chamber   pacemaker appropriately functioning.  No complications    Plan    Leave amiodarone till morning  Reduce diltiazem dose  Stop isosorbide    Start oral amiodarone in the morning  Eliquis for 2 weeks  Patient has severe aortic stenosis and needs to be evaluated for possible   TAVR  Reduce Lasix dose    Electronically signed by Pk Crabtree MD, 07/31/24, 1:53 PM EDT.  Adult Transthoracic Echo Complete W/ Cont if Necessary Per Protocol    Left ventricular systolic function is normal. Estimated left   ventricular EF = 55% Left ventricular ejection fraction appears to be 51 -   55%.    Left  ventricular wall thickness is consistent with mild to moderate   concentric hypertrophy.    Left ventricular diastolic function is consistent with (grade III   w/high LAP) reversible restrictive pattern.    The left atrial cavity is moderate to severely dilated.    The right atrial cavity is borderline dilated.    Severe aortic valve stenosis is present.    Abnormal mitral valve structure consistent with dilated annulus.    Moderate mitral valve regurgitation is present.    Estimated right ventricular systolic pressure from tricuspid   regurgitation is mildly elevated (35-45 mmHg).    Transthoracic echocardiography reveals paradoxical septal motion from left   bundle branch block with ejection fraction between 50 to 55% with   restrictive filling with moderate to severe left atrial enlargement and   calcific aortic valve apparatus with a mean gradient of 30 suggestive of   severe aortic stenosis with mild to moderate AI and calcific mitral valve   apparatus with moderate laterally directed mitral regurgitation.  Mild to   moderate TR with mild pulm hypertension and no effusion    Electronically signed by Pk Crabtree MD, 07/31/24, 8:19 AM EDT.       ASSESSMENT:  Acute hypoxic respiratory distress  Multifocal pneumonia  Asthma/COPD  Small bilateral pleural effusion   CAD s/p CABG  A-fib s/p Watchman procedure  SSS s/p pacemaker   CHF  HTN  HLD  GERD  Hypothyroidism  Anemia  Anxiety/Depression  Hx tobacco abuse         PLAN:  Will need 6 min walk prior to discharge follow-up in 2 weeks  Can be off O2 if sitting, will need for ambulation   PT/OT  Antibiotics  Bronchodilators  Inhaled corticosteroids  Incentive spirometer  Amiodarone drip per Cardiology   Diuresis and monitor ARELY's   Electrolytes/ glycemic control  DVT prophylaxis-Apixaban    Discussed with Dr Geneva Jose, APRN   8/2/2024  10:00 EDT    I personally have examined  and interviewed the patient. I have reviewed the history, data,  problems, assessment and plan with our NP.  Total Critical care time in direct medical management (   ) minutes, This time specifically excludes time spent performing procedures.    Yolanda Bean MD   8/2/2024  10:10 EDT

## 2024-08-02 NOTE — PROGRESS NOTES
[facility] CARDIOLOGY  [facility]        LOS:  LOS: 4 days   Patient Name: Tracy Jane  Age/Sex: 89 y.o. female  : 1934  MRN: [id number]    Day of Service: 24   Length of Stay: 4  Encounter Provider: YEYO Anderson  Place of Service: [facility] CARDIOLOGY  Patient Care Team:  Amparo Eisenberg APRN as PCP - General (Nurse Practitioner)    Subjective:     Chief Complaint:  F/U HF, AS, AF    Subjective:   Patient has mild nosebleed from the right nare today, which has stopped.  She has ambulated with walker to the bathroom without difficulty.      Current Medications:   Scheduled Meds:amiodarone, 200 mg, Oral, Q12H  apixaban, 2.5 mg, Oral, Q12H  budesonide, 0.5 mg, Nebulization, BID - RT  cefTRIAXone, 1,000 mg, Intravenous, Once  clopidogrel, 75 mg, Oral, Daily  dilTIAZem CD, 120 mg, Oral, Q24H  doxycycline, 100 mg, Oral, Q12H  folic acid, 1 mg, Oral, Daily  furosemide, 40 mg, Oral, Daily  guaiFENesin, 1,200 mg, Oral, Q12H  ipratropium-albuterol, 3 mL, Nebulization, 4x Daily - RT  levothyroxine, 75 mcg, Oral, Once per day on   metoprolol succinate XL, 50 mg, Oral, Q24H  pantoprazole, 40 mg, Oral, QAM AC  rosuvastatin, 20 mg, Oral, Nightly  senna-docusate sodium, 2 tablet, Oral, BID  sertraline, 100 mg, Oral, Daily      Continuous Infusions:     Allergies:  No Known Allergies    ROS    Objective:     Temp:  [97.7 °F (36.5 °C)-98.6 °F (37 °C)] 97.8 °F (36.6 °C)  Heart Rate:  [70-79] 71  Resp:  [10-22] 12  BP: (139-160)/(56-63) 139/58     Intake/Output Summary (Last 24 hours) at 2024 1231  Last data filed at 2024 0918  Gross per 24 hour   Intake 360 ml   Output --   Net 360 ml     Body mass index is 24.33 kg/m².      24  1245 24  0500 24  0602   Weight: 55.1 kg (121 lb 7.6 oz) 54.9 kg (121 lb 0.5 oz) 56.5 kg (124 lb 9 oz)         Physical  "Exam:  Neuro:  CV:  Resp:  GI:  Ext:  Tele: AAOx3, no gross deficits  S1S2 RRR, grade 3/6 systolic murmur  Nonlabored, CTA  BS+, abd soft  Pedal pulses palp, no edema  Apacing                                                   Lab Review:   Results from last 7 days   Lab Units 08/02/24  0554 07/31/24  2347   SODIUM mmol/L 137 134*   POTASSIUM mmol/L 3.7 3.5   CHLORIDE mmol/L 99 95*   CO2 mmol/L 25.7 24.7   BUN mg/dL 22 25*   CREATININE mg/dL 0.86 1.03*   GLUCOSE mg/dL 92 93   CALCIUM mg/dL 9.2 9.2         Results from last 7 days   Lab Units 08/02/24  0502 07/31/24  2347   WBC 10*3/mm3 8.46 9.51   HEMOGLOBIN g/dL 9.7* 9.9*   HEMATOCRIT % 31.7* 32.1*   PLATELETS 10*3/mm3 177 254     Results from last 7 days   Lab Units 08/01/24  1027 07/31/24  2347   INR   --  1.10   APTT seconds 36.5* 34.5*     Results from last 7 days   Lab Units 08/02/24  0554 07/31/24  2347   MAGNESIUM mg/dL 1.8 1.7           Invalid input(s): \"LDLCALC\"  Results from last 7 days   Lab Units 07/29/24  1753   PROBNP pg/mL 8,366.0*     Results from last 7 days   Lab Units 07/30/24  0427   TSH uIU/mL 2.940       Recent Radiology:  Imaging Results (Most Recent)       Procedure Component Value Units Date/Time    CT Angiogram Chest Pulmonary Embolism [[id number]] Collected: 07/29/24 2002     Updated: 07/29/24 2022    Narrative:      CT ANGIOGRAM CHEST PULMONARY EMBOLISM    Date of Exam: 7/29/2024 7:55 PM EDT    Indication: Dyspnea.    Comparison: Chest radiograph 7/29/2024. Chest CT PE protocol 8/10/2020    Technique: Axial CT images were obtained of the chest after the uneventful intravenous administration of iodinated contrast utilizing pulmonary embolism protocol.  Sagittal and coronal reconstructions were performed.  Automated exposure control and   iterative reconstruction methods were used.      Findings:  CARDIOVASCULAR:    Diagnostic quality: Contrast opacification of the pulmonary arterial circulation is adequate for assessment of pulmonary " embolism. Study is not significantly limited by respiratory motion artifact.    Pulmonary arteries: No filling defects to suggest pulmonary embolism. Not enlarged.    Heart: No interventricular septal deviation. Similar cardiomegaly. Coronary artery calcifications and prior coronary revascularization. Aortic valve replacement. Left atrial appendage occlusion device. Left chest wall pacemaker with leads in the right   atrium and right ventricle.    Pericardium: No pericardial effusion.    Thoracic aorta: Atherosclerotic calcification without evidence of aneurysm.    REMAINING CHEST:    Thyroid and thoracic inlet: Normal.    Lymph nodes: Mildly enlarged multistation mediastinal lymph nodes, favored reactive.    Esophagus: Small hiatal hernia.    Lung parenchyma: Interlobular septal thickening. Mild perihilar groundglass opacities. Multifocal bilateral groundglass and consolidative opacities. Bibasilar atelectasis.    Airways: Patent trachea and mainstem bronchi. Bronchial wall thickening.    Pleura: Small right greater than left pleural effusions. No pneumothorax.    Chest wall and osseous structures: No acute abnormality. Prior median sternotomy. Degenerative changes of the imaged spine.    Included upper abdomen: Left renal cyst. Similar mild bilateral adrenal thickening without discrete nodule. Calcifications within the liver and spleen, likely sequelae of prior granulomatous disease.        Impression:      Impression:    No evidence of pulmonary embolism.    Multifocal bilateral groundglass and consolidative opacities suspicious for multifocal pneumonia.    Mild pulmonary edema with small bilateral pleural effusions.            Electronically Signed: Jose Atkins    7/29/2024 8:20 PM EDT    Workstation ID: [id number]    XR Chest 1 View [[id number]] Collected: 07/29/24 1838     Updated: 07/29/24 1845    Narrative:      XR CHEST 1 VW    Date of Exam: 7/29/2024 6:11 PM EDT    Indication: Dyspnea    Comparison: Chest  radiographs 7/10/2024 and 1/11/2024    Findings:  Medical devices, lines, and tubes: Left chest wall ICD/pacemaker appears unchanged.    Mediastinum: Cardiomediastinal silhouette appears aortic valve prosthesis, surgical clips, and cardiomegaly.    Lungs: Indistinctness of the pulmonary vasculature, septal thickening, and patchy perihilar opacities. Right basal atelectasis.    Pleura: Blunting of right costophrenic sulcus may represent a small right pleural effusion. No pneumothorax.    Bones and soft tissues: Median sternotomy. Left shoulder arthroplasty.        Impression:      Impression:  Mild pulmonary edema with possible small right pleural effusion.      Electronically Signed: Jose Atkins    7/29/2024 6:43 PM EDT    Workstation ID: [id number]            ECHOCARDIOGRAM:    Results for orders placed during the hospital encounter of 07/29/24    Adult Transthoracic Echo Complete W/ Cont if Necessary Per Protocol    Interpretation Summary    Left ventricular systolic function is normal. Estimated left ventricular EF = 55% Left ventricular ejection fraction appears to be 51 - 55%.    Left ventricular wall thickness is consistent with mild to moderate concentric hypertrophy.    Left ventricular diastolic function is consistent with (grade III w/high LAP) reversible restrictive pattern.    The left atrial cavity is moderate to severely dilated.    The right atrial cavity is borderline dilated.    Severe aortic valve stenosis is present.    Abnormal mitral valve structure consistent with dilated annulus.    Moderate mitral valve regurgitation is present.    Estimated right ventricular systolic pressure from tricuspid regurgitation is mildly elevated (35-45 mmHg).    Transthoracic echocardiography reveals paradoxical septal motion from left bundle branch block with ejection fraction between 50 to 55% with restrictive filling with moderate to severe left atrial enlargement and calcific aortic valve apparatus with a  mean gradient of 30 suggestive of severe aortic stenosis with mild to moderate AI and calcific mitral valve apparatus with moderate laterally directed mitral regurgitation.  Mild to moderate TR with mild pulm hypertension and no effusion    Electronically signed by Pk Crabtree MD, 07/31/24, 8:19 AM EDT.        I reviewed the patient's new clinical results.    EKG:      Assessment:       Acute on chronic respiratory failure with hypoxia    Anxiety disorder    Arthritis, rheumatoid    Chronic coronary artery disease    Depression    GERD without esophagitis    Hx of aortic valve replacement    Mixed hyperlipidemia    Essential hypertension    Hyponatremia    Acquired hypothyroidism    Paroxysmal atrial fibrillation    Peripheral vascular disease    Presence of aortocoronary bypass graft    Presence of cardiac pacemaker    Renal insufficiency    Sick sinus syndrome    Valvular heart disease    A-fib    Presence of Watchman left atrial appendage closure device    Acute on chronic heart failure with preserved ejection fraction (HFpEF)    Leukocytosis    1) Acute on chronic diastolic heart failure  - BNP 8366  - CTA chest shows small bilateral pleural effusions but no PE  - TSH WNL  - started on IV lasix --> PO lasix     2) valvular heart disease status post tissue AVR in 2014   - Post Watchman RACHEL 2/2024 showed EF 56-60% with elevated AV gradients, moderate AS, moderate MR, and good seal of the KRISTEL.    - 2D echo 7/2024 shows EF 51-55% with grade 3 diastolic dysfunction, severe AS, moderate MR, mild to moderate TR.  - As per Dr. Soliman, AS will be managed conservatively at this juncture; can be discussed with primary cardiologist further as an outpatient  - stop long-acting nitrate  - started on low dose eliquis x 2 weeks post cardioversion     3) paroxysmal A-fib --> afib RVR s/p cardioversion 7/31  - EKG on admit shows underlying sinus --> now in afib  - on sotalol for suppression --> discontinued d/t failure  --> started on amidoarone  - started on Toprol XL; on diltiazem  - off coumadin s/p Watchman implant 12/2023).    - Post Watchman RACHEL 2/2024 showed EF 56-60% with elevated AV gradients, moderate AS, moderate MR, and good seal of the KRISTEL.    - Device interrogation 6/2024 showed 8% AF burden     4) CAD status post CABG 2008 and PCI 2006  - Last cath in 2016 showed no vessels amenable to PCI.  - continue on plavix     5) sick sinus syndrome status post [place] permanent pacemaker in 2016     6) HTN     7) HLD     8) CKD     9) PVD   - renal artery stenosis  - carotid stenosis     10) subdural hematoma in 2023 status post fall       Plan:   Patient appears compensated today with respect to volume on PO lasix.  Tele shows Apacing.  Continue on Toprol XL (sotalol has been discontinued d/t failure) and amiodarone 200 mg PO bid, which will be tapered to daily maintenance dosing on follow-up.  Continue on renal dose eliquis x 2 weeks post cardioversion.  AS will be managed conservatively at this juncture.  Stop long-acting nitrate.  Patient appears CV stable for discharge home today.  Hospital f/u with Siria ORONA 8/15 at 11:00 am.          Electronically signed by YEYO Anderson, 08/02/24, 12:46 PM EDT.

## 2024-08-02 NOTE — SIGNIFICANT NOTE
08/02/24 1305   OTHER   Discipline physical therapist   Rehab Time/Intention   Session Not Performed other (see comments)  (d/c orders signed)

## 2024-08-02 NOTE — PLAN OF CARE
Goal Outcome Evaluation:               Pt has had no complaints and has been resting between care.

## 2024-08-02 NOTE — PLAN OF CARE
Problem: Adult Inpatient Plan of Care  Goal: Plan of Care Review  Outcome: Met  Goal: Patient-Specific Goal (Individualized)  Outcome: Met  Goal: Absence of Hospital-Acquired Illness or Injury  Outcome: Met  Intervention: Identify and Manage Fall Risk  Recent Flowsheet Documentation  Taken 8/2/2024 1200 by Lucero Bates RN  Safety Promotion/Fall Prevention:   safety round/check completed   assistive device/personal items within reach   clutter free environment maintained  Taken 8/2/2024 1000 by Lucero Bates RN  Safety Promotion/Fall Prevention:   safety round/check completed   clutter free environment maintained   assistive device/personal items within reach  Taken 8/2/2024 0800 by Lucero Bates RN  Safety Promotion/Fall Prevention:   safety round/check completed   clutter free environment maintained   assistive device/personal items within reach  Intervention: Prevent Skin Injury  Recent Flowsheet Documentation  Taken 8/2/2024 0800 by Lucero Bates RN  Skin Protection: adhesive use limited  Intervention: Prevent Infection  Recent Flowsheet Documentation  Taken 8/2/2024 1200 by Lucero Bates RN  Infection Prevention: personal protective equipment utilized  Taken 8/2/2024 1000 by Lucero Bates RN  Infection Prevention: personal protective equipment utilized  Taken 8/2/2024 0800 by Lucero Bates RN  Infection Prevention: personal protective equipment utilized  Goal: Optimal Comfort and Wellbeing  Outcome: Met  Intervention: Provide Person-Centered Care  Recent Flowsheet Documentation  Taken 8/2/2024 1200 by Lucero Bates RN  Trust Relationship/Rapport:   care explained   choices provided  Taken 8/2/2024 0800 by Lucero Bates RN  Trust Relationship/Rapport:   choices provided   care explained   questions encouraged   reassurance provided   thoughts/feelings acknowledged  Goal: Readiness for Transition of Care  Outcome: Met   Goal Outcome Evaluation:

## 2024-08-02 NOTE — CASE MANAGEMENT/SOCIAL WORK
Discharge Planning Assessment  [facility]     Patient Name: Tracy Jane  MRN: [id number]  Today's Date: 8/2/2024    Admit Date: 7/29/2024    Plan: Return to [facility]. Current home O2 (3L) through [facility]. Eliquis copay $47/month.       Discharge Plan       Row Name 08/02/24 1308       Plan    Plan Return to [facility]. Current home O2 (3L) through [facility]. Eliquis copay $47/month.    Patient/Family in Agreement with Plan yes    Plan Comments CM verified Eliquis cost with [facility], $47/month. CM met with patient and son at bedside, agreeable to Eliquis cost. [facility] letter reviewed with patient, consent obtained and copy left at bedside. D/C orders noted.    Final Discharge Disposition Code 01 - home or self-care    Final Note Return to [facility]                      Expected Discharge Date and Time       Expected Discharge Date Expected Discharge Time    Aug 2, 2024                Patient Forms       Row Name 08/02/24 1310       Patient Forms    Important Message from Medicare (IMM) Delivered  8/2/24    Delivered to Patient    Method of delivery In person                  Case Management Discharge Note      Final Note: Return to [facility]         Selected Continued Care - Admitted Since 7/29/2024          Transportation Services  Private: Car    Final Discharge Disposition Code: 01 - home or self-care    Met with patient in room.  Maintained distance greater than six feet and spent less than 15 minutes in the room.   HELIO GoodmanN, RN    [address] [address]    Office: [phone]  Fax: [phone]

## 2024-08-02 NOTE — DISCHARGE SUMMARY
[facility] Medicine Services  Discharge Summary    Date of Service: 24  Patient Name: Tracy Jane  : 1934  MRN: [id number]    Date of Admission: 2024  Discharge Diagnosis: #Acute on chronic hypoxic respiratory failure  #Acute on chronic HFpEF  #Multifocal pneumonia  #A-fib with RVR  #VHD status post bioprosthetic aortic valve  #Severe Aortic stenosis  Date of Discharge:  24  Primary Care Physician: Amparo Eisenberg APRN      Presenting Problem:   Acute on chronic respiratory failure with hypoxia [J96.21]  Dyspnea, unspecified type [R06.00]  Acute on chronic congestive heart failure, unspecified heart failure type [I50.9]  A-fib [I48.91]    Active and Resolved Hospital Problems:  Active Hospital Problems    Diagnosis POA    **Acute on chronic respiratory failure with hypoxia [J96.21] Yes    Acute on chronic heart failure with preserved ejection fraction (HFpEF) [I50.33] Yes    Leukocytosis [D72.829] Yes    Presence of Watchman left atrial appendage closure device [Z95.818] Yes    A-fib [I48.91] Yes    GERD without esophagitis [K21.9] Yes    Essential hypertension [I10] Yes    Depression [F32.A] Yes    Anxiety disorder [F41.9] Yes    Hyponatremia [E87.1] Yes    Sick sinus syndrome [I49.5] Yes    Presence of cardiac pacemaker [Z95.0] Yes    Arthritis, rheumatoid [M06.9] Yes    Acquired hypothyroidism [E03.9] Yes    Valvular heart disease [I38] Yes    Presence of aortocoronary bypass graft [Z95.1] Not Applicable    Peripheral vascular disease [I73.9] Yes    Paroxysmal atrial fibrillation [I48.0] Yes    Hx of aortic valve replacement [Z95.2] Not Applicable    Renal insufficiency [N28.9] Yes    Chronic coronary artery disease [I25.10] Yes    Mixed hyperlipidemia [E78.2] Yes      Resolved Hospital Problems   No resolved problems to display.         Hospital Course     HPI:  Admitting team HPI    Tracy Jane is a very pleasant  89 y.o. female with a CMH of atrial  fibrillation status post watchman implantation, sick sinus syndrome, cardiac pacemaker in situ, valvular insufficiency with prior bioprosthetic aortic valve 2014, coronary artery disease status post CABG, bilateral renal artery stenosis, carotid artery disease, on home oxygen 3 L via nasal cannula continuously, peripheral  vascular disease, hyperlipidemia, rheumatoid arthritis, depression anxiety, hyperlipidemia, hypertension, GERD, former smoker who presented to [facility] on 7/29/2024 with complaints of increased shortness of air past several days.  She reports increased shortness of air with activity however she is short of breath at rest.  She denies any chest pain.  She reports a nonproductive cough.  No bilateral lower extremity edema.  She denies any fevers chills or diaphoresis.     Hospital Course:  89-year-old female with history of atrial fibrillation status post Watchman device, SSS status post PPM, VHD status post bioprosthetic aortic valve, CAD status post CABG, bilateral renal artery stenosis, carotid stenosis, chronic hypoxic respiratory failure on 3 L supplemental oxygen, PVD, hyperlipidemia, rheumatoid arthritis, depression, anxiety, hyperlipidemia, hypertension, GERD admitted to [facility] 7/29 with progressive shortness of breath     #Acute on chronic hypoxic respiratory failure  #Acute on chronic HFpEF  #Multifocal pneumonia  #COPD  Seen by cardiology and pulmonary  Started on IV ceftriaxone and doxycycline.  Will finish total 5 days of empiric and biotics.  Culture data nonrevealing  Status post IV Lasix diuresed well transition to p.o.       #A-fib with RVR  Status post Watchman device  Previously on sotalol now transitioned to IV amiodarone> p.o.  Status post cardioversion since 7/31  P.o. Cardizem decreased to 120 CD  Plan for anticoagulation for 4 weeks with Eliquis post cardioversion    #CAD status post CABG-stable  #Hypertension       Continue Plavix  Restart continue    #VHD  status post bioprosthetic aortic valve  #Severe Aortic stenosis  Plan for cardiac cath deferred after discussion between cardiology and patient/family     #PVD  Continue Plavix  Continue statins     #Depression  Continue sertraline 100 daily     #Hypothyroidism  Continue home dose of levothyroxine        DISCHARGE Follow Up Recommendations for labs and diagnostics: Outpatient follow-up with cardiology      Reasons For Change In Medications and Indications for New Medications:      Day of Discharge     Vital Signs:  Temp:  [97.7 °F (36.5 °C)-98.6 °F (37 °C)] 97.8 °F (36.6 °C)  Heart Rate:  [70-79] 71  Resp:  [10-22] 12  BP: (139-160)/(56-63) 139/58  Flow (L/min):  [2] 2    Physical Exam:  Physical Exam NAD  Irregular rhythm rate controlled S1-S2 audible  Lungs with decreased breath sound at bases  Abdomen soft nontender nondistended      Pertinent  and/or Most Recent Results     LAB RESULTS:      Lab 08/02/24  0502 08/01/24  1027 07/31/24 2347 07/31/24  0525 07/30/24  0427 07/29/24  1753   WBC 8.46  --  9.51 10.20 11.18* 11.70*   HEMOGLOBIN 9.7*  --  9.9* 9.8* 10.4* 9.7*   HEMATOCRIT 31.7*  --  32.1* 31.2* 32.7* 32.2*   PLATELETS 177  --  254 248 236 270   NEUTROS ABS 6.08  --  6.83 7.30* 8.80* 9.68*   IMMATURE GRANS (ABS) 0.14*  --  0.07* 0.07* 0.05 0.04   LYMPHS ABS 1.22  --  1.92 2.04 1.69 1.21   MONOS ABS 0.59  --  0.41 0.41 0.39 0.59   EOS ABS 0.39  --  0.24 0.35 0.20 0.14   MCV 95.8  --  96.4 95.7 95.9 99.4*   PROTIME  --   --  11.9*  --   --   --    APTT  --  36.5* 34.5*  --   --   --          Lab 08/02/24  0554 08/02/24  0502 07/31/24  2347 07/31/24  1532 07/30/24  0427 07/29/24  1753   SODIUM 137  --  134* 132* 136 133*   POTASSIUM 3.7  --  3.5 3.7 4.1 5.2   CHLORIDE 99  --  95* 94* 97* 100   CO2 25.7  --  24.7 23.8 25.7 22.0   ANION GAP 12.3  --  14.3 14.2 13.3 11.0   BUN 22  --  25* 23 19 19   CREATININE 0.86  --  1.03* 0.91 0.96 1.05*   EGFR 64.7  --  52.1* 60.4 56.7* 50.9*   GLUCOSE 92  --  93 104* 91  120*   CALCIUM 9.2  --  9.2 9.5 9.8 9.2   MAGNESIUM 1.8  --  1.7  --   --   --    PHOSPHORUS  --  3.2 3.7  --   --   --    TSH  --   --   --   --  2.940  --              Lab 07/31/24  2347 07/29/24  1753   PROBNP  --  8,366.0*   PROTIME 11.9*  --    INR 1.10  --                  Lab 07/29/24  1816   PH, ARTERIAL 7.472*   PCO2, ARTERIAL 32.4*   PO2 ART 66.2*   O2 SATURATION ART 94.2   FIO2 36   HCO3 ART 23.7   BASE EXCESS ART 0.3     Brief Urine Lab Results  (Last result in the past 365 days)        Color   Clarity   Blood   Leuk Est   Nitrite   Protein   CREAT   Urine HCG        07/29/24 2121 Yellow   Clear   Negative   Small (1+)   Negative   Negative                 Microbiology Results (last 10 days)       Procedure Component Value - Date/Time    Blood Culture - Blood, Arm, Left [[id number]]  (Normal) Collected: 07/29/24 2201    Lab Status: Preliminary result Specimen: Blood from Arm, Left Updated: 08/01/24 2215     Blood Culture No growth at 3 days    Narrative:      Less than seven (7) mL's of blood was collected.  Insufficient quantity may yield false negative results.    Blood Culture - Blood, Arm, Right [[id number]]  (Normal) Collected: 07/29/24 2201    Lab Status: Preliminary result Specimen: Blood from Arm, Right Updated: 08/01/24 2215     Blood Culture No growth at 3 days    Narrative:      Less than seven (7) mL's of blood was collected.  Insufficient quantity may yield false negative results.    Urine Culture - Urine, Urine, Clean Catch [[id number]]  (Normal) Collected: 07/29/24 2121    Lab Status: Final result Specimen: Urine, Clean Catch Updated: 07/31/24 0956     Urine Culture No growth    Respiratory Panel PCR w/COVID-19(SARS-CoV-2) SACHA/RAMANDEEP/DEL/PAD/COR/RABIA In-House, NP Swab in UTM/VTM, 2 HR TAT - Swab, Nasopharynx [[id number]]  (Normal) Collected: 07/29/24 2048    Lab Status: Final result Specimen: Swab from Nasopharynx Updated: 07/29/24 2140     ADENOVIRUS, PCR Not Detected     Coronavirus 229E Not  Detected     Coronavirus HKU1 Not Detected     Coronavirus NL63 Not Detected     Coronavirus OC43 Not Detected     COVID19 Not Detected     Human Metapneumovirus Not Detected     Human Rhinovirus/Enterovirus Not Detected     Influenza A PCR Not Detected     Influenza B PCR Not Detected     Parainfluenza Virus 1 Not Detected     Parainfluenza Virus 2 Not Detected     Parainfluenza Virus 3 Not Detected     Parainfluenza Virus 4 Not Detected     RSV, PCR Not Detected     Bordetella pertussis pcr Not Detected     Bordetella parapertussis PCR Not Detected     Chlamydophila pneumoniae PCR Not Detected     Mycoplasma pneumo by PCR Not Detected    Narrative:      In the setting of a positive respiratory panel with a viral infection PLUS a negative procalcitonin without other underlying concern for bacterial infection, consider observing off antibiotics or discontinuation of antibiotics and continue supportive care. If the respiratory panel is positive for atypical bacterial infection (Bordetella pertussis, Chlamydophila pneumoniae, or Mycoplasma pneumoniae), consider antibiotic de-escalation to target atypical bacterial infection.            CT Angiogram Chest Pulmonary Embolism    Result Date: 7/29/2024  Impression: Impression: No evidence of pulmonary embolism. Multifocal bilateral groundglass and consolidative opacities suspicious for multifocal pneumonia. Mild pulmonary edema with small bilateral pleural effusions. Electronically Signed: Jose Atkins  7/29/2024 8:20 PM EDT  Workstation ID: [id number]    XR Chest 1 View    Result Date: 7/29/2024  Impression: Impression: Mild pulmonary edema with possible small right pleural effusion. Electronically Signed: Jose Atkins  7/29/2024 6:43 PM EDT  Workstation ID: [id number]             Results for orders placed during the hospital encounter of 07/29/24    Adult Transthoracic Echo Complete W/ Cont if Necessary Per Protocol    Interpretation Summary    Left ventricular  systolic function is normal. Estimated left ventricular EF = 55% Left ventricular ejection fraction appears to be 51 - 55%.    Left ventricular wall thickness is consistent with mild to moderate concentric hypertrophy.    Left ventricular diastolic function is consistent with (grade III w/high LAP) reversible restrictive pattern.    The left atrial cavity is moderate to severely dilated.    The right atrial cavity is borderline dilated.    Severe aortic valve stenosis is present.    Abnormal mitral valve structure consistent with dilated annulus.    Moderate mitral valve regurgitation is present.    Estimated right ventricular systolic pressure from tricuspid regurgitation is mildly elevated (35-45 mmHg).    Transthoracic echocardiography reveals paradoxical septal motion from left bundle branch block with ejection fraction between 50 to 55% with restrictive filling with moderate to severe left atrial enlargement and calcific aortic valve apparatus with a mean gradient of 30 suggestive of severe aortic stenosis with mild to moderate AI and calcific mitral valve apparatus with moderate laterally directed mitral regurgitation.  Mild to moderate TR with mild pulm hypertension and no effusion    Electronically signed by Pk Crabtree MD, 07/31/24, 8:19 AM EDT.      Labs Pending at Discharge:  Pending Labs       Order Current Status    Blood Culture - Blood, Arm, Left Preliminary result    Blood Culture - Blood, Arm, Right Preliminary result            Procedures Performed           Consults:   Consults       Date and Time Order Name Status Description    7/30/2024  9:38 AM Inpatient Pulmonology Consult Completed     7/29/2024  8:46 PM Inpatient Cardiology Consult Completed     7/29/2024  8:31 PM Hospitalist (on-call MD unless specified)                Discharge Details        Discharge Medications        Continue These Medications        Instructions Start Date   acetaminophen 650 MG 8 hr tablet  Commonly known  as: TYLENOL   650 mg, Oral, Every 8 Hours PRN      calcium 600 MG tablet  Commonly known as: OS-ROSHNI   1,200 mg, Oral, Daily      clopidogrel 75 MG tablet  Commonly known as: PLAVIX   75 mg, Oral, Daily      colchicine 0.6 MG tablet   0.6 mg, Oral, Daily      COLLAGEN PO   20 g, Oral, Daily      Cranberry 500 MG capsule   500 mg, Oral, Nightly      dilTIAZem  MG 24 hr capsule  Commonly known as: CARDIZEM CD   240 mg, Oral, Daily      doxazosin 2 MG tablet  Commonly known as: CARDURA   2 mg, Oral, Daily      fish oil 1200 MG capsule capsule   1,200 mg, Oral, 2 Times Daily With Meals      folic acid 1 MG tablet  Commonly known as: FOLVITE   1 mg, Oral, Daily      furosemide 40 MG tablet  Commonly known as: LASIX   40 mg, Oral, Daily      Glucosamine 1500 Complex capsule   1,500 mg, Oral, 2 Times Daily      isosorbide mononitrate 30 MG 24 hr tablet  Commonly known as: IMDUR   30 mg, Oral, 2 Times Daily      lubiprostone 24 MCG capsule  Commonly known as: AMITIZA   24 mcg, Oral, As Needed      melatonin 5 MG sublingual tablet sublingual tablet   5 mg, Sublingual, Nightly PRN      methotrexate 2.5 MG tablet   12.5 mg, Oral, Take As Directed, 5 tablets on Sunday Morning AND 5 tablets Sunday Evening = 10 Total Tablets on Sunday      MiraLax 17 g packet  Generic drug: polyethylene glycol   17 g, Oral, Nightly      MSM 1000 MG tablet   1 tablet, Oral, 2 Times Daily      multivitamin tablet  Generic drug: multivitamin   1 tablet, Oral, Daily      pantoprazole 40 MG EC tablet  Commonly known as: PROTONIX   40 mg, Oral, Daily      potassium chloride 20 MEQ CR tablet  Commonly known as: KLOR-CON M20   20 mEq, Oral, Daily      rosuvastatin 20 MG tablet  Commonly known as: CRESTOR   20 mg, Oral, Daily      sertraline 100 MG tablet  Commonly known as: ZOLOFT   Take 1 tablet by mouth Daily.      sotalol 80 MG tablet  Commonly known as: BETAPACE   80 mg, Oral, 2 Times Daily      spironolactone 25 MG tablet  Commonly known as:  ALDACTONE   25 mg, Oral, Daily      vitamin C 250 MG tablet  Commonly known as: ASCORBIC ACID   1,000 mg, Oral, Daily      Zinc 50 MG tablet   1 tablet, Oral, Nightly             ASK your doctor about these medications        Instructions Start Date   Ferrous Sulfate 28 MG tablet  Ask about: Which instructions should I use?   28 mg, Oral, Daily, In the morning      levothyroxine 75 MCG tablet  Commonly known as: SYNTHROID, LEVOTHROID  Ask about: Which instructions should I use?   75 mcg, Oral, Daily, 5 days a week. Monday through Friday       Vitamin D-3 25 MCG (1000 UT) capsule  Ask about: Which instructions should I use?   1,000 Units, Oral, Daily, In the morning               No Known Allergies      Discharge Disposition: Return to assisted living      Diet:  Hospital:  Diet Order   Procedures    Diet: Cardiac; Healthy Heart (2-3 Na+); Fluid Consistency: Thin (IDDSI 0)         Discharge Activity:         CODE STATUS:  Code Status and Medical Interventions: CPR (Attempt to Resuscitate); Full Support   Ordered at: 07/29/24 2043     Code Status (Patient has no pulse and is not breathing):    CPR (Attempt to Resuscitate)     Medical Interventions (Patient has pulse or is breathing):    Full Support         Future Appointments   Date Time Provider Department Center   8/15/2024 11:00 AM Amparo Mckeon APRN [facility]   10/8/2024 11:00 AM LAB  [facility]  [facility] None   10/15/2024 11:15 AM Chika Paula MD [facility]   1/6/2025  2:30 PM Pk Crabtree MD [facility]   2/12/2025  1:30 PM Bandar Henley DO [facility]           Time spent on Discharge including face to face service:  >30 minutes    Signature: Electronically signed by Nima Calhoun MD, 08/02/24, 11:42 EDT.  [facility] Hospitalist Team

## 2024-08-03 LAB
BACTERIA SPEC AEROBE CULT: NORMAL
BACTERIA SPEC AEROBE CULT: NORMAL

## 2024-08-04 NOTE — PROGRESS NOTES
"Enter Query Response Below      Query Response: acute on chronic hypoxic respiratory failure  Pt required 4L of supplemental o2 to maintain adequate oxygenation. At baseline uses 3 L              If applicable, please update the problem list.     Patient: Tracy Jane        : 1934  Account: [id number]           Admit Date:         How to Respond to this query:       a. Click New Note     b. Answer query within the yellow box.                c. Update the Problem List, if applicable.      If you have any questions about this query contact me at: [email or web address]     Dr. Calhoun,    Patient with history of COPD and chronic diastolic CHF presented  for shortness of breath. Notes include acute on chronic diastolic CHF, and pneumonia. H&P notes acute on chronic hypoxic respiratory failure, and \"Acute on chronic respiratory failure with hypoxia per ABG, now stable on home dose 3 L oxygen via nasal cannula.\" Pulmonary note dated  includes \"Lungs /Chest wall: Bilateral basal rhonchi, respirations unlabored, symmetrical wall movement,\" and \"Acute hypoxic respiratory distress.\" Patient treated with monitoring, supplemental oxygen, cardioversion, IV and PO furosemide, and IV and PO antibiotics.    ABG : PaO2 66.2    Nursing flowsheet documentation includes:   : SpO2 91% on 4 L/min; RR 22  : RR 23 - 27    After study, was acute hypoxic respiratory failure clinically supported during this admission?     - Acute hypoxic respiratory failure was supported with additional clinical indicators ___________________  - Acute hypoxic respiratory failure was not supported  - Other ___________________  - Unable to clinically determine    By submitting this query, we are merely seeking further clarification of documentation to accurately reflect all conditions that you are monitoring, evaluating, treating or that extend the hospitalization or utilize additional resources of care. Please utilize your " independent clinical judgment when addressing the question(s) above.     This query and your response, once completed, will be entered into the legal medical record.    Sincerely,  Rajiv Flores RN, CDS  Clinical Documentation Integrity Program

## 2024-08-04 NOTE — PROGRESS NOTES
Enter Query Response Below      Query Response: Hyponatremia - clinically insignificant              If applicable, please update the problem list.     Patient: Tracy Jane        : 1934  Account: [id number]           Admit Date:         How to Respond to this query:       a. Click New Note     b. Answer query within the yellow box.                c. Update the Problem List, if applicable.      If you have any questions about this query contact me at: [email or web address]     Dr. Calhoun,    Patient with history of COPD and chronic diastolic CHF presented  for shortness of breath. Notes include acute on chronic diastolic CHF, and pneumonia. H&P, progress notes, and discharge summary include hyponatremia in the Active Hospital Problem list. Patient treated with monitoring, supplemental oxygen, cardioversion, IV and PO furosemide, and IV and PO antibiotics.    Sodium levels this encounter include:  : 133  : 136  : 132, 134  : 137    Please clarify the following:     - Hyponatremia - clinically significant  - Hyponatremia - clinically insignificant  - Other ___________________  - Unable to clinically determine    By submitting this query, we are merely seeking further clarification of documentation to accurately reflect all conditions that you are monitoring, evaluating, treating or that extend the hospitalization or utilize additional resources of care. Please utilize your independent clinical judgment when addressing the question(s) above.     This query and your response, once completed, will be entered into the legal medical record.    Sincerely,  Rajiv Flores RN, CDS  Clinical Documentation Integrity Program

## 2024-08-12 NOTE — TELEPHONE ENCOUNTER
Caller: JAYDEN    Relationship: DAUGHTER    Best call back number: [phone]    What is the best time to reach you: ANY      What was the call regarding: EVER SINCE SHE LEFT THE HOSPITAL PT HAS BEEN WEAK, SICK TO HER STOMACH AND UNABLE TO EAT.  THEY BELIEVE IT COULD BE DUE TO THE MEDICATION CHANGES FROM THE HOSPITAL.    NEXT OV 8/15/24

## 2024-08-12 NOTE — TELEPHONE ENCOUNTER
Amparo Mckeon, YEYO Hines, [place], [place]  She did have some medication changes which could be contributing to her nausea, but doubtful that they are contributing to the weakness.  Sotalol was discontinued, diltiazem dose was decreased.  The new medications were amiodarone 200 mg bid and metoprolol.  She can decrease the amiodarone to once daily and see if this helps the nausea.  She's on Protonix for her stomach.  She can have a prescription for Zofran 4mg ODT every 4-6 hours as needed for nausea, but she needs to know that this medicine can cause sleepiness/drowsiness.  If she isn't receiving any home PT, that's something her PCP can order and might help her also.    Patient informed

## 2024-08-14 PROBLEM — N30.00 ACUTE CYSTITIS: Status: ACTIVE | Noted: 2024-01-01

## 2024-08-14 NOTE — ED NOTES
Nursing report ED to floor  Tracy Jane  89 y.o.  female    HPI:   Chief Complaint   Patient presents with    Vomiting     Nausea, vomiting, not eating, started 2 weeks ago when she was d/c from hospital.. Pt was here for afib/pneumonia.          Admitting doctor:   Gordo Avilez MD    Admitting diagnosis:   The primary encounter diagnosis was Acute cystitis with hematuria. Diagnoses of Nausea and vomiting, unspecified vomiting type and Anemia, unspecified type were also pertinent to this visit.    Code status:   Current Code Status       Date Active Code Status Order ID Comments User Context       8/14/2024 1640 CPR (Attempt to Resuscitate) [id number]  Bandar Jane PA-C ED        Question Answer    Code Status (Patient has no pulse and is not breathing) CPR (Attempt to Resuscitate)    Medical Interventions (Patient has pulse or is breathing) Full Support                    Allergies:   Patient has no known allergies.    Isolation:  No active isolations     Fall Risk:  Fall Risk Assessment was completed, and patient is at low risk for falls.   Predictive Model Details         32 (Low) Factor Value    Calculated 8/14/2024 17:21 Age 89    Risk of Fall Model Active Peripheral IV Present     Respiratory Rate 20     Magnesium not on file     Diastolic BP 59     Number of Distinct Medication Classes administered 3     Albumin 3.4 g/dL     Raul Scale not on file     Chloride 99 mmol/L     Total Bilirubin 0.6 mg/dL     Gastrointestinal Assessment X     Days after Admission 0.221     Calcium 9.4 mg/dL     Tobacco Use Quit     Creatinine 0.81 mg/dL     ALT 15 U/L     Potassium 3.3 mmol/L         Weight:       08/14/24  1158   Weight: 53.5 kg (117 lb 15.1 oz)       Intake and Output  No intake or output data in the 24 hours ending 08/14/24 1721    Diet:        Most recent vitals:   Vitals:    08/14/24 1416 08/14/24 1601 08/14/24 1701 08/14/24 1716   BP: 133/63 145/56 147/57 144/59   BP Location:       Patient  Position:       Pulse: 101 79 75 75   Resp:       Temp:       TempSrc:       SpO2: 98% 98% 99% 99%   Weight:       Height:           Active LDAs/IV Access:   Lines, Drains & Airways       Active LDAs       Name Placement date Placement time Site Days    Peripheral IV 08/14/24 1300 Anterior;Left Forearm 08/14/24  1300  Forearm  less than 1                    Skin Condition:   Skin Assessments (last day)       Date/Time Skin WDL    08/14/24 12:50:45 WDL             Labs (abnormal labs have a star):   Labs Reviewed   COMPREHENSIVE METABOLIC PANEL - Abnormal; Notable for the following components:       Result Value    Potassium 3.3 (*)     CO2 29.2 (*)     Albumin 3.4 (*)     All other components within normal limits    Narrative:     GFR Normal >60  Chronic Kidney Disease <60  Kidney Failure <15    The GFR formula is only valid for adults with stable renal function between ages 18 and 70.   URINALYSIS W/ CULTURE IF INDICATED - Abnormal; Notable for the following components:    Color, UA Orange (*)     Appearance, UA Turbid (*)     Ketones, UA 15 mg/dL (1+) (*)     Blood, UA Small (1+) (*)     Protein, UA 30 mg/dL (1+) (*)     Leuk Esterase, UA Large (3+) (*)     Nitrite, UA Positive (*)     All other components within normal limits    Narrative:     In absence of clinical symptoms, the presence of pyuria, bacteria, and/or nitrites on the urinalysis result does not correlate with infection.   CBC WITH AUTO DIFFERENTIAL - Abnormal; Notable for the following components:    RBC 2.90 (*)     Hemoglobin 8.5 (*)     Hematocrit 29.4 (*)     .4 (*)     MCHC 28.9 (*)     RDW 17.4 (*)     RDW-SD 63.6 (*)     Neutrophil % 82.7 (*)     Lymphocyte % 11.9 (*)     Monocyte % 4.2 (*)     Eosinophil % 0.2 (*)     Immature Grans % 0.7 (*)     Neutrophils, Absolute 7.45 (*)     Immature Grans, Absolute 0.06 (*)     All other components within normal limits   URINALYSIS, MICROSCOPIC ONLY - Abnormal; Notable for the following  components:    RBC, UA 6-10 (*)     WBC, UA Too Numerous to Count (*)     Bacteria, UA 4+ (*)     All other components within normal limits   LIPASE - Normal   URINE CULTURE   BLOOD CULTURE   BLOOD CULTURE   CBC AND DIFFERENTIAL    Narrative:     The following orders were created for panel order CBC & Differential.  Procedure                               Abnormality         Status                     ---------                               -----------         ------                     CBC Auto Differential[[id number]]        Abnormal            Final result                 Please view results for these tests on the individual orders.       LOC: Person, Place, Time, and Situation    Telemetry:  Observation Unit    Cardiac Monitoring Ordered: yes    EKG:   Telemetry Scan   Final Result      Telemetry Scan   Final Result      Telemetry Scan   Final Result          Medications Given in the ED:   Medications   cefTRIAXone (ROCEPHIN) 1,000 mg in sodium chloride 0.9 % 100 mL MBP (1,000 mg Intravenous New Bag 24 1657)   sodium chloride 0.9 % bolus 1,000 mL (1,000 mL Intravenous New Bag 24 1626)   ondansetron (ZOFRAN) injection 4 mg (4 mg Intravenous Given 24 1627)       Imaging results:  No radiology results for the last day    Social issues:   Social History     Socioeconomic History    Marital status:     Number of children: 4    Years of education: GED   Tobacco Use    Smoking status: Former     Current packs/day: 0.00     Average packs/day: 1 pack/day for 4.0 years (4.0 ttl pk-yrs)     Types: Cigarettes     Start date: 1954     Quit date:      Years since quittin.6     Passive exposure: Past    Smokeless tobacco: Never   Vaping Use    Vaping status: Never Used   Substance and Sexual Activity    Alcohol use: No    Drug use: No    Sexual activity: Not Currently       NIH Stroke Scale:  Interval: (not recorded)  1a. Level of Consciousness: (not recorded)  1b. LOC Questions: (not  recorded)  1c. LOC Commands: (not recorded)  2. Best Gaze: (not recorded)  3. Visual: (not recorded)  4. Facial Palsy: (not recorded)  5a. Motor Arm, Left: (not recorded)  5b. Motor Arm, Right: (not recorded)  6a. Motor Leg, Left: (not recorded)  6b. Motor Leg, Right: (not recorded)  7. Limb Ataxia: (not recorded)  8. Sensory: (not recorded)  9. Best Language: (not recorded)  10. Dysarthria: (not recorded)  11. Extinction and Inattention (formerly Neglect): (not recorded)    Total (NIH Stroke Scale): (not recorded)     Additional notable assessment information:     Nursing report ED to floor:      Alexa Adorno RN   08/14/24 17:21 EDT Nursing report ED to floor  Tracy Jane  89 y.o.  female    HPI:   Chief Complaint   Patient presents with    Vomiting     Nausea, vomiting, not eating, started 2 weeks ago when she was d/c from hospital.. Pt was here for afib/pneumonia.          Admitting doctor:   Gordo Avilez MD    Admitting diagnosis:   The primary encounter diagnosis was Acute cystitis with hematuria. Diagnoses of Nausea and vomiting, unspecified vomiting type and Anemia, unspecified type were also pertinent to this visit.    Code status:   Current Code Status       Date Active Code Status Order ID Comments User Context       8/14/2024 1640 CPR (Attempt to Resuscitate) [id number]  Bandar Jane PA-C ED        Question Answer    Code Status (Patient has no pulse and is not breathing) CPR (Attempt to Resuscitate)    Medical Interventions (Patient has pulse or is breathing) Full Support                    Allergies:   Patient has no known allergies.    Isolation:  No active isolations     Fall Risk:  Fall Risk Assessment was completed, and patient is at moderate risk for falls.   Predictive Model Details         32 (Low) Factor Value    Calculated 8/14/2024 17:21 Age 89    Risk of Fall Model Active Peripheral IV Present     Respiratory Rate 20     Magnesium not on file     Diastolic BP 59     Number of  Distinct Medication Classes administered 3     Albumin 3.4 g/dL     Raul Scale not on file     Chloride 99 mmol/L     Total Bilirubin 0.6 mg/dL     Gastrointestinal Assessment X     Days after Admission 0.221     Calcium 9.4 mg/dL     Tobacco Use Quit     Creatinine 0.81 mg/dL     ALT 15 U/L     Potassium 3.3 mmol/L         Weight:       08/14/24  1158   Weight: 53.5 kg (117 lb 15.1 oz)       Intake and Output  No intake or output data in the 24 hours ending 08/14/24 1721    Diet:        Most recent vitals:   Vitals:    08/14/24 1416 08/14/24 1601 08/14/24 1701 08/14/24 1716   BP: 133/63 145/56 147/57 144/59   BP Location:       Patient Position:       Pulse: 101 79 75 75   Resp:       Temp:       TempSrc:       SpO2: 98% 98% 99% 99%   Weight:       Height:           Active LDAs/IV Access:   Lines, Drains & Airways       Active LDAs       Name Placement date Placement time Site Days    Peripheral IV 08/14/24 1300 Anterior;Left Forearm 08/14/24  1300  Forearm  less than 1                    Skin Condition:   Skin Assessments (last day)       Date/Time Skin WDL    08/14/24 12:50:45 WDL             Labs (abnormal labs have a star):   Labs Reviewed   COMPREHENSIVE METABOLIC PANEL - Abnormal; Notable for the following components:       Result Value    Potassium 3.3 (*)     CO2 29.2 (*)     Albumin 3.4 (*)     All other components within normal limits    Narrative:     GFR Normal >60  Chronic Kidney Disease <60  Kidney Failure <15    The GFR formula is only valid for adults with stable renal function between ages 18 and 70.   URINALYSIS W/ CULTURE IF INDICATED - Abnormal; Notable for the following components:    Color, UA Orange (*)     Appearance, UA Turbid (*)     Ketones, UA 15 mg/dL (1+) (*)     Blood, UA Small (1+) (*)     Protein, UA 30 mg/dL (1+) (*)     Leuk Esterase, UA Large (3+) (*)     Nitrite, UA Positive (*)     All other components within normal limits    Narrative:     In absence of clinical symptoms,  the presence of pyuria, bacteria, and/or nitrites on the urinalysis result does not correlate with infection.   CBC WITH AUTO DIFFERENTIAL - Abnormal; Notable for the following components:    RBC 2.90 (*)     Hemoglobin 8.5 (*)     Hematocrit 29.4 (*)     .4 (*)     MCHC 28.9 (*)     RDW 17.4 (*)     RDW-SD 63.6 (*)     Neutrophil % 82.7 (*)     Lymphocyte % 11.9 (*)     Monocyte % 4.2 (*)     Eosinophil % 0.2 (*)     Immature Grans % 0.7 (*)     Neutrophils, Absolute 7.45 (*)     Immature Grans, Absolute 0.06 (*)     All other components within normal limits   URINALYSIS, MICROSCOPIC ONLY - Abnormal; Notable for the following components:    RBC, UA 6-10 (*)     WBC, UA Too Numerous to Count (*)     Bacteria, UA 4+ (*)     All other components within normal limits   LIPASE - Normal   URINE CULTURE   BLOOD CULTURE   BLOOD CULTURE   CBC AND DIFFERENTIAL    Narrative:     The following orders were created for panel order CBC & Differential.  Procedure                               Abnormality         Status                     ---------                               -----------         ------                     CBC Auto Differential[[id number]]        Abnormal            Final result                 Please view results for these tests on the individual orders.       LOC: Person, Place, Time, and Situation    Telemetry:  Observation Unit    Cardiac Monitoring Ordered: yes    EKG:   Telemetry Scan   Final Result      Telemetry Scan   Final Result      Telemetry Scan   Final Result          Medications Given in the ED:   Medications   cefTRIAXone (ROCEPHIN) 1,000 mg in sodium chloride 0.9 % 100 mL MBP (1,000 mg Intravenous New Bag 8/14/24 1657)   sodium chloride 0.9 % bolus 1,000 mL (1,000 mL Intravenous New Bag 8/14/24 1626)   ondansetron (ZOFRAN) injection 4 mg (4 mg Intravenous Given 8/14/24 1627)       Imaging results:  No radiology results for the last day    Social issues:   Social History     Socioeconomic  History    Marital status:     Number of children: 4    Years of education: GED   Tobacco Use    Smoking status: Former     Current packs/day: 0.00     Average packs/day: 1 pack/day for 4.0 years (4.0 ttl pk-yrs)     Types: Cigarettes     Start date: 1954     Quit date:      Years since quittin.6     Passive exposure: Past    Smokeless tobacco: Never   Vaping Use    Vaping status: Never Used   Substance and Sexual Activity    Alcohol use: No    Drug use: No    Sexual activity: Not Currently       NIH Stroke Scale:  Interval: (not recorded)  1a. Level of Consciousness: (not recorded)  1b. LOC Questions: (not recorded)  1c. LOC Commands: (not recorded)  2. Best Gaze: (not recorded)  3. Visual: (not recorded)  4. Facial Palsy: (not recorded)  5a. Motor Arm, Left: (not recorded)  5b. Motor Arm, Right: (not recorded)  6a. Motor Leg, Left: (not recorded)  6b. Motor Leg, Right: (not recorded)  7. Limb Ataxia: (not recorded)  8. Sensory: (not recorded)  9. Best Language: (not recorded)  10. Dysarthria: (not recorded)  11. Extinction and Inattention (formerly Neglect): (not recorded)    Total (NIH Stroke Scale): (not recorded)     Additional notable assessment information:     Nursing report ED to floor:      Alexa Adorno RN   24 17:21 EDT

## 2024-08-14 NOTE — ED PROVIDER NOTES
Subjective   History of Present Illness  89-year-old female presents emerged part with family member with complaints of nausea vomiting for the past 2 weeks.  Patient states he was discharged from the hospital proxy 2 weeks ago on some new medications per cardiology.  States that since she has developed nausea and vomiting states occasionally keeps food down but today she was unable to.  Cardiologist was informed of symptoms decreased that medication 2 days ago patient states that is improving.  Patient has fever chills, she denies dysuria frequency of urination or hematuria.        Review of Systems   Constitutional:  Positive for fatigue. Negative for chills and fever.   Respiratory:  Negative for cough, chest tightness and shortness of breath.    Cardiovascular:  Negative for chest pain and palpitations.   Gastrointestinal:  Positive for nausea and vomiting. Negative for abdominal pain.   Genitourinary:  Negative for dysuria, frequency and hematuria.       Past Medical History:   Diagnosis Date    Abnormal ECG     Anxiety     Aortic valve replaced     Arrhythmia     Asthma     Atrial fibrillation     CAD (coronary artery disease)     Carotid artery disease     CHF (congestive heart failure)     Congenital heart disease     GERD (gastroesophageal reflux disease)     Gout     Heart murmur     Hemorrhagic stroke 2023    was on Coumadin for A-fib    Hyperlipidemia     Hypertension     Hyperthyroidism     Hypothyroidism     Myocardial infarction     Overactive bladder     Pacemaker     [facility]    Skin cancer        No Known Allergies    Past Surgical History:   Procedure Laterality Date    AORTIC VALVE REPAIR/REPLACEMENT  2014    porcine    ATRIAL APPENDAGE EXCLUSION LEFT WITH TRANSESOPHAGEAL ECHOCARDIOGRAM Right 12/26/2023    Procedure: Atrial Appendage Occlusion;  Surgeon: Pk Crabtree MD;  Location: [facility] CATH INVASIVE LOCATION;  Service: Cardiovascular;  Laterality: Right;    ATRIAL APPENDAGE EXCLUSION  LEFT WITH TRANSESOPHAGEAL ECHOCARDIOGRAM N/A 2023    Procedure: Atrial Appendage Occlusion;  Surgeon: Gen Caballero MD;  Location: [facility] CATH INVASIVE LOCATION;  Service: Cardiovascular;  Laterality: N/A;    BASAL CELL CARCINOMA EXCISION      spine, nose and arm, leg     BREAST BIOPSY      CARDIAC CATHETERIZATION      CARDIAC VALVE REPLACEMENT      CAROTID STENT      CATARACT EXTRACTION      CHOLECYSTECTOMY      CORONARY ARTERY BYPASS GRAFT      CORONARY STENT PLACEMENT      ENDOSCOPY N/A 2023    Procedure: ESOPHAGOGASTRODUODENOSCOPY with antrum body biopsies;  Surgeon: REGGIE Ace MD;  Location: [facility] ENDOSCOPY;  Service: Gastroenterology;  Laterality: N/A;  hiatal hernia    FINGER SURGERY      INSERT / REPLACE / REMOVE PACEMAKER      KNEE SURGERY      PACEMAKER IMPLANTATION      [facility]    SHOULDER SURGERY      RACHEL Bilateral 2023       Family History   Problem Relation Age of Onset    Hypertension Mother     Heart disease Father     Heart attack Father     Heart disease Sister     Kidney cancer Sister     Stroke Sister     Cancer Sister         breast    Cancer Sister     Heart disease Brother        Social History     Socioeconomic History    Marital status:     Number of children: 4    Years of education: GED   Tobacco Use    Smoking status: Former     Current packs/day: 0.00     Average packs/day: 1 pack/day for 4.0 years (4.0 ttl pk-yrs)     Types: Cigarettes     Start date: 1954     Quit date:      Years since quittin.6     Passive exposure: Past    Smokeless tobacco: Never   Vaping Use    Vaping status: Never Used   Substance and Sexual Activity    Alcohol use: No    Drug use: No    Sexual activity: Defer           Objective   Physical Exam  Vitals and nursing note reviewed.   Constitutional:       General: She is not in acute distress.     Appearance: Normal appearance. She is ill-appearing and diaphoretic. She is not toxic-appearing.   HENT:     "  Head: Normocephalic.   Eyes:      General: No scleral icterus.  Cardiovascular:      Rate and Rhythm: Normal rate and regular rhythm.      Heart sounds: Normal heart sounds.   Pulmonary:      Effort: Pulmonary effort is normal.      Breath sounds: Normal breath sounds.   Abdominal:      General: Abdomen is flat. Bowel sounds are normal. There is no distension.      Palpations: Abdomen is soft.      Tenderness: There is no abdominal tenderness.   Neurological:      Mental Status: She is alert.         Procedures           ED Course    /70 (BP Location: Left arm)   Pulse 80   Temp 97.8 °F (36.6 °C) (Oral)   Resp 18   Ht 152.4 cm (60\")   Wt 53.5 kg (117 lb 15.1 oz)   SpO2 97%   BMI 23.03 kg/m²   Labs Reviewed   COMPREHENSIVE METABOLIC PANEL - Abnormal; Notable for the following components:       Result Value    Potassium 3.3 (*)     CO2 29.2 (*)     Albumin 3.4 (*)     All other components within normal limits    Narrative:     GFR Normal >60  Chronic Kidney Disease <60  Kidney Failure <15    The GFR formula is only valid for adults with stable renal function between ages 18 and 70.   URINALYSIS W/ CULTURE IF INDICATED - Abnormal; Notable for the following components:    Color, UA Orange (*)     Appearance, UA Turbid (*)     Ketones, UA 15 mg/dL (1+) (*)     Blood, UA Small (1+) (*)     Protein, UA 30 mg/dL (1+) (*)     Leuk Esterase, UA Large (3+) (*)     Nitrite, UA Positive (*)     All other components within normal limits    Narrative:     In absence of clinical symptoms, the presence of pyuria, bacteria, and/or nitrites on the urinalysis result does not correlate with infection.   CBC WITH AUTO DIFFERENTIAL - Abnormal; Notable for the following components:    RBC 2.90 (*)     Hemoglobin 8.5 (*)     Hematocrit 29.4 (*)     .4 (*)     MCHC 28.9 (*)     RDW 17.4 (*)     RDW-SD 63.6 (*)     Neutrophil % 82.7 (*)     Lymphocyte % 11.9 (*)     Monocyte % 4.2 (*)     Eosinophil % 0.2 (*)     " Immature Grans % 0.7 (*)     Neutrophils, Absolute 7.45 (*)     Immature Grans, Absolute 0.06 (*)     All other components within normal limits   URINALYSIS, MICROSCOPIC ONLY - Abnormal; Notable for the following components:    RBC, UA 6-10 (*)     WBC, UA Too Numerous to Count (*)     Bacteria, UA 4+ (*)     All other components within normal limits   LIPASE - Normal   MAGNESIUM - Normal   URINE CULTURE   BLOOD CULTURE   BLOOD CULTURE   CBC AND DIFFERENTIAL    Narrative:     The following orders were created for panel order CBC & Differential.  Procedure                               Abnormality         Status                     ---------                               -----------         ------                     CBC Auto Differential[[id number]]        Abnormal            Final result                 Please view results for these tests on the individual orders.     Medications   sodium chloride 0.9 % flush 10 mL (has no administration in time range)   sodium chloride 0.9 % flush 10 mL (has no administration in time range)   sodium chloride 0.9 % infusion 40 mL (has no administration in time range)   aluminum-magnesium hydroxide-simethicone (MAALOX MAX) 400-400-40 MG/5ML suspension 15 mL (has no administration in time range)   ondansetron ODT (ZOFRAN-ODT) disintegrating tablet 4 mg (has no administration in time range)     Or   ondansetron (ZOFRAN) injection 4 mg (has no administration in time range)   melatonin tablet 5 mg (has no administration in time range)   pantoprazole (PROTONIX) injection 40 mg (has no administration in time range)   nitroglycerin (NITROSTAT) SL tablet 0.4 mg (has no administration in time range)   Potassium Replacement - Follow Nurse / BPA Driven Protocol (has no administration in time range)   Magnesium Standard Dose Replacement - Follow Nurse / BPA Driven Protocol (has no administration in time range)   Phosphorus Replacement - Follow Nurse / BPA Driven Protocol (has no administration  in time range)   Calcium Replacement - Follow Nurse / BPA Driven Protocol (has no administration in time range)   sennosides-docusate (PERICOLACE) 8.6-50 MG per tablet 2 tablet (has no administration in time range)     And   polyethylene glycol (MIRALAX) packet 17 g (has no administration in time range)     And   bisacodyl (DULCOLAX) EC tablet 5 mg (has no administration in time range)     And   bisacodyl (DULCOLAX) suppository 10 mg (has no administration in time range)   cefTRIAXone (ROCEPHIN) 1,000 mg in sodium chloride 0.9 % 100 mL MBP (has no administration in time range)   potassium chloride (KLOR-CON M20) CR tablet 40 mEq (40 mEq Oral Given 8/14/24 1802)   sodium chloride 0.9 % bolus 1,000 mL (0 mL Intravenous Stopped 8/14/24 1812)   cefTRIAXone (ROCEPHIN) 1,000 mg in sodium chloride 0.9 % 100 mL MBP (0 mg Intravenous Stopped 8/14/24 1812)   ondansetron (ZOFRAN) injection 4 mg (4 mg Intravenous Given 8/14/24 1627)     No radiology results for the last day                                           Medical Decision Making  89-year-old female presents with family member to the emergency room with nausea vomiting x 2 weeks.  Symptoms come and go worsening over the past 48 hours only p.o. solids she is Now is a piece of toast.  She is able to tolerate fluids.  Physical exam of reveals a chronically ill-appearing female nontoxic in no acute distress.  HEENT is normocephalic and nontraumatic lungs are clear to auscultation bilaterally heart rate regular without murmur abdomen soft nontender nondistended positive bowel sounds all 4 quads extremities and noncyanotic nonedematous.  Vitals BP is 151/70 temps 97.8.  Heart rate 80 respirations 18 SpO2 on room air is 97%.  ER course: CBC: W BC 9.01 hemoglobin 8.5 hematocrit 29.4.  CMP: Potassium is 3.3 UA RBCs 6-10, WBCs too numerous to count, bacteria 4+, nitrite positive.  Patient received a liter of normal saline, Rocephin 1 g IV.  Diagnosis of acute cystitis discussed  with patient patient's caregiver.  Advised to be admitted for observation for least 24 hours to make sure symptoms of nausea and vomiting resolved.  Patient had no nausea or vomiting while in ER today.  Patient was admitted to observation in stable condition.    Amount and/or Complexity of Data Reviewed  Labs: ordered. Decision-making details documented in ED Course.    Risk  Decision regarding hospitalization.        Final diagnoses:   Acute cystitis with hematuria   Nausea and vomiting, unspecified vomiting type   Anemia, unspecified type       ED Disposition  ED Disposition       ED Disposition   Decision to Admit    Condition   --    Comment   --               No follow-up provider specified.       Medication List      No changes were made to your prescriptions during this visit.            Rory Negron PA-C  08/14/24 [id number]

## 2024-08-14 NOTE — PLAN OF CARE
Goal Outcome Evaluation:   A/O X4 on 3L NC (home oxygen) generalized weakness, awaiting urine and Blood cx. Patient without C/O pain no N,V.

## 2024-08-15 PROBLEM — N30.00 CYSTITIS, ACUTE: Status: ACTIVE | Noted: 2024-01-01

## 2024-08-15 NOTE — CONSULTS
[facility] Medicine Services    Hospitalist History and Physical     Tracy Jane : 1934 MRN:[id number] LOS:0 ROOM: 111/1     Chief Complaint: weakness    Assessment / Plan     #Pneumonia    - CXR shows diffuse bilateral alveolar and interstitial infiltrates most likely 2/2 pneumonia    - suspect aspiration    - rocephin 2g IV daily    - Doxycycline 100mg IV BID    - strep, legionella, sputums    - RVP     - blood cultures    - pt/ot/slp    - states she is currently tolerating po intake    #Acute hypoxic Respiratory insufficiency    - Currently on 4L NC, wean as tolerated    - tx pneumonia as above    #UTI    - UA shows UTI    - urine culture    - blood cultures    - Rocephin 2g IV daily    #A. Fib    - s/p watchman    - resume home Amiodarone, Toprol, and Cardizem    #CAD    -status post CABG  and PCI     - Last cath in  showed no vessels amenable to PCI.    - continue on plavix    #HFpEF    - Chronic, stable    - restart home Lasix    #Anemia    - hgb 8.5 on admission, base around 10.0    - no overt bleeding, monitor labs    #Anxiety    - resume home Zoloft    #GERD    - PPI     #Hypothyroid    - resume home synthroid    #HTN    - resume home Toprol and Cardizem    #HLD    - resume home statin      Code Status (Patient has no pulse and is not breathing): CPR (Attempt to Resuscitate)  Medical Interventions (Patient has pulse or is breathing): Full Support         VTE Prophylaxis:  No VTE prophylaxis order currently exists.         History of Present illness     Tracy Jane is a 89 y.o. female with PMHx of HTN, HLD, hypothyroid, GERD, anxiety, anemia, HFpEF, CAD, A. fib presented to [facility] for nausea and vomiting.  Of note, patient recently discharged from [facility] 2 weeks ago after undergoing treatment for a. Fib and CHF.  Since then has had nausea and vomiting causing difficulty with food intake at times.  Cardiology reportedly decreased her medication doses and symptoms improved but  then patient started to have fevers and chills with dyspnea. Presented to [facility] for evaluation.  Admitted to ER obs unit for treatment of UTI.  Patient became dyspneic and CXR showed pneumonia.  Hospitalist consulted for admission and treatment.        Patient was seen and examined on 08/15/24 at 02:42 EDT .    Subjective / Review of systems     Review of Systems   12 point ROS reviewed and negative except as mentioned above      Past Medical/Surgical/Social/Family History & Allergies     Past Medical History:   Diagnosis Date    Abnormal ECG     Anxiety     Aortic valve replaced     Arrhythmia     Asthma     Atrial fibrillation     CAD (coronary artery disease)     Carotid artery disease     CHF (congestive heart failure)     Congenital heart disease     GERD (gastroesophageal reflux disease)     Gout     Heart murmur     Hemorrhagic stroke 2023    was on Coumadin for A-fib    Hyperlipidemia     Hypertension     Hyperthyroidism     Hypothyroidism     Myocardial infarction     Overactive bladder     Pacemaker     [facility]    Skin cancer       Past Surgical History:   Procedure Laterality Date    AORTIC VALVE REPAIR/REPLACEMENT  2014    porcine    ATRIAL APPENDAGE EXCLUSION LEFT WITH TRANSESOPHAGEAL ECHOCARDIOGRAM Right 12/26/2023    Procedure: Atrial Appendage Occlusion;  Surgeon: Pk Crabtree MD;  Location: [facility] CATH INVASIVE LOCATION;  Service: Cardiovascular;  Laterality: Right;    ATRIAL APPENDAGE EXCLUSION LEFT WITH TRANSESOPHAGEAL ECHOCARDIOGRAM N/A 12/26/2023    Procedure: Atrial Appendage Occlusion;  Surgeon: Gen Caballero MD;  Location:  [facility] CATH INVASIVE LOCATION;  Service: Cardiovascular;  Laterality: N/A;    BASAL CELL CARCINOMA EXCISION      spine, nose and arm, leg 2020    BREAST BIOPSY      CARDIAC CATHETERIZATION      CARDIAC VALVE REPLACEMENT      CAROTID STENT      CATARACT EXTRACTION      CHOLECYSTECTOMY      CORONARY ARTERY BYPASS GRAFT      CORONARY STENT PLACEMENT       ENDOSCOPY N/A 2023    Procedure: ESOPHAGOGASTRODUODENOSCOPY with antrum body biopsies;  Surgeon: REGGIE Ace MD;  Location: [facility] ENDOSCOPY;  Service: Gastroenterology;  Laterality: N/A;  hiatal hernia    FINGER SURGERY      INSERT / REPLACE / REMOVE PACEMAKER      KNEE SURGERY      PACEMAKER IMPLANTATION      [facility]    SHOULDER SURGERY      RACHEL Bilateral 2023      Social History     Socioeconomic History    Marital status:     Number of children: 4    Years of education: GED   Tobacco Use    Smoking status: Former     Current packs/day: 0.00     Average packs/day: 1 pack/day for 4.0 years (4.0 ttl pk-yrs)     Types: Cigarettes     Start date: 1954     Quit date:      Years since quittin.6     Passive exposure: Past    Smokeless tobacco: Never   Vaping Use    Vaping status: Never Used   Substance and Sexual Activity    Alcohol use: No    Drug use: No    Sexual activity: Defer      Family History   Problem Relation Age of Onset    Hypertension Mother     Heart disease Father     Heart attack Father     Heart disease Sister     Kidney cancer Sister     Stroke Sister     Cancer Sister         breast    Cancer Sister     Heart disease Brother       No Known Allergies   Social Determinants of Health     Tobacco Use: Medium Risk (2024)    Patient History     Smoking Tobacco Use: Former     Smokeless Tobacco Use: Never     Passive Exposure: Past   Alcohol Use: Not At Risk (2024)    AUDIT-C     Frequency of Alcohol Consumption: Never     Average Number of Drinks: Patient does not drink     Frequency of Binge Drinking: Never   Financial Resource Strain: Not on file   Food Insecurity: No Food Insecurity (2024)    Hunger Vital Sign     Worried About Running Out of Food in the Last Year: Never true     Ran Out of Food in the Last Year: Never true   Transportation Needs: No Transportation Needs (2024)    PRAPARE - Transportation     Lack of Transportation (Medical): No      Lack of Transportation (Non-Medical): No   Physical Activity: Inactive (1/12/2024)    Exercise Vital Sign     Days of Exercise per Week: 0 days     Minutes of Exercise per Session: 0 min   Stress: Not on file   Social Connections: Unknown (10/7/2023)    Family and Community Support     Help with Day-to-Day Activities: Not on file     Lonely or Isolated: Not on file   Interpersonal Safety: Not At Risk (8/14/2024)    Abuse Screen     Unsafe at Home or Work/School: no     Feels Threatened by Someone?: no     Does Anyone Keep You from Contacting Others or Doint Things Outside the Home?: no     Physical Sign of Abuse Present: no   Depression: Not on file   Housing Stability: Not At Risk (8/14/2024)    Housing Stability     Current Living Arrangements: assisted living facility     Potentially Unsafe Housing Conditions: none   Utilities: Not At Risk (7/30/2024)    [facility] Utilities     Threatened with loss of utilities: No   Health Literacy: Unknown (7/30/2024)    Education     Help with school or training?: Not on file     Preferred Language: English   Employment: Unknown (10/7/2023)    Employment     Do you want help finding or keeping work or a job?: Not on file   Disabilities: Not At Risk (8/14/2024)    Disabilities     Concentrating, Remembering, or Making Decisions Difficulty: no     Doing Errands Independently Difficulty: no        Home Medications     Prior to Admission medications    Medication Sig Start Date End Date Taking? Authorizing Provider   acetaminophen (TYLENOL) 650 MG 8 hr tablet Take 1 tablet by mouth Every 8 (Eight) Hours As Needed.   Yes Provider, MD Cam   amiodarone (PACERONE) 200 MG tablet Take 1 tablet by mouth Every 12 (Twelve) Hours for 30 days.  Patient taking differently: Take 1 tablet by mouth Every Night. 8/2/24 9/1/24 Yes Nima Calhoun MD   apixaban (ELIQUIS) 2.5 MG tablet tablet Take 1 tablet by mouth Every 12 (Twelve) Hours for 30 days. Indications: Atrial Fibrillation  8/2/24 9/1/24 Yes Nima Calhoun MD   calcium (OS-ROSHNI) 600 MG tablet Take 2 tablets by mouth Daily.   Yes Cam Alvarez MD   Cholecalciferol (Vitamin D-3) 25 MCG (1000 UT) capsule Take 1,000 Units by mouth Daily. In the morning   Yes Cam Alvarez MD   clopidogrel (PLAVIX) 75 MG tablet Take 1 tablet by mouth Daily. 12/27/23  Yes Gen Caballero MD   colchicine 0.6 MG tablet Take 1 tablet by mouth Daily.   Yes Cam Alvarez MD   COLLAGEN PO Take 20 g by mouth Daily.   Yes Cam Alvarez MD   Cranberry 500 MG capsule Take 500 mg by mouth Every Night.   Yes Cam Alvarez MD   dilTIAZem CD (CARDIZEM CD) 120 MG 24 hr capsule Take 1 capsule by mouth Daily for 30 days. 8/3/24 9/2/24 Yes Nima Calhoun MD   doxazosin (CARDURA) 2 MG tablet TAKE 1 TABLET BY MOUTH DAILY 7/26/24  Yes Bandar Henley DO   Ferrous Sulfate 28 MG tablet Take 1 tablet by mouth Daily. In the morning   Yes Cam Alvarez MD   folic acid (FOLVITE) 1 MG tablet Take 1 tablet by mouth Daily.   Yes Cam Alvarez MD   furosemide (LASIX) 40 MG tablet Take 1 tablet by mouth Daily.   Yes aCm Alvarez MD   Glucosamine-Chondroit-Vit C-Mn (Glucosamine 1500 Complex) capsule Take 1,500 mg by mouth 2 (Two) Times a Day.   Yes Cam Alvarez MD   levothyroxine (SYNTHROID, LEVOTHROID) 75 MCG tablet Take 1 tablet by mouth Daily. 5 days a week. Monday through Friday   Yes Cam Alvarez MD   lubiprostone (AMITIZA) 24 MCG capsule Take 1 capsule by mouth As Needed.   Yes Cam Alvarez MD   melatonin 5 MG sublingual tablet sublingual tablet Place 1 tablet under the tongue At Night As Needed.   Yes Cam Alvarez MD   methotrexate 2.5 MG tablet Take 5 tablets by mouth Take As Directed. 5 tablets on Sunday Morning AND 5 tablets Sunday Evening = 10 Total Tablets on Sunday 4/6/23  Yes Cam Alvarez MD   Methylsulfonylmethane (MSM) 1000 MG tablet  Take 1 tablet by mouth 2 (Two) Times a Day.   Yes Cam Alvarez MD   metoprolol succinate XL (TOPROL-XL) 50 MG 24 hr tablet Take 1 tablet by mouth Daily for 30 days. 8/3/24 9/2/24 Yes Nima Calhoun MD   Multiple Vitamin (MULTIVITAMIN) tablet Take 1 tablet by mouth Daily.   Yes Cam Alvarez MD   Omega-3 Fatty Acids (fish oil) 1200 MG capsule capsule Take 1 capsule by mouth 2 (Two) Times a Day With Meals.   Yes Cam Alvarez MD   pantoprazole (PROTONIX) 40 MG EC tablet Take 1 tablet by mouth Daily.   Yes Cam Alvarez MD   rosuvastatin (CRESTOR) 20 MG tablet TAKE 1 TABLET BY MOUTH DAILY 7/26/24  Yes Bandar Henley DO   sertraline (ZOLOFT) 100 MG tablet Take 1 tablet by mouth Daily. 9/25/23  Yes Cam Alvarez MD   vitamin C (ASCORBIC ACID) 250 MG tablet Take 4 tablets by mouth Daily.   Yes Cam Alvarez MD   Zinc 50 MG tablet Take 1 tablet by mouth Every Night.   Yes Cam Alvarez MD   polyethylene glycol (MiraLax) 17 g packet Take 17 g by mouth Every Night.    Cam Alvarez MD        Objective / Physical Exam     Vital signs:  Temp: 98 °F (36.7 °C)  BP: 154/57  Heart Rate: 89  Resp: 24  SpO2: 100 %  Weight: 53.5 kg (117 lb 15.1 oz)    Admission Weight: Weight: 53.5 kg (117 lb 15.1 oz)    Physical Exam  Constitutional:       General: She is not in acute distress.     Appearance: She is not ill-appearing.   HENT:      Head: Normocephalic and atraumatic.      Nose: Nose normal. No congestion.      Mouth/Throat:      Pharynx: Oropharynx is clear. No oropharyngeal exudate.   Eyes:      General: No scleral icterus.  Cardiovascular:      Rate and Rhythm: Normal rate. Rhythm irregular.      Heart sounds: No murmur heard.     No friction rub. No gallop.   Pulmonary:      Effort: No respiratory distress.      Breath sounds: Rales present. No wheezing.      Comments: Patient on 4L NC  Abdominal:      General: There is no distension.       Tenderness: There is no abdominal tenderness. There is no guarding.   Musculoskeletal:         General: No swelling or deformity.      Cervical back: Normal range of motion. No rigidity.      Right lower leg: No edema.      Left lower leg: No edema.   Skin:     Coloration: Skin is not jaundiced.      Findings: No bruising or lesion.   Neurological:      General: No focal deficit present.      Mental Status: She is alert and oriented to person, place, and time.      Motor: No weakness.            Labs     Results from last 7 days   Lab Units 08/14/24  1304   WBC 10*3/mm3 9.01   HEMOGLOBIN g/dL 8.5*   HEMATOCRIT % 29.4*   PLATELETS 10*3/mm3 264      Results from last 7 days   Lab Units 08/14/24  1512   ALK PHOS U/L 94   AST (SGOT) U/L 21   ALT (SGPT) U/L 15           Results from last 7 days   Lab Units 08/14/24  1512   SODIUM mmol/L 139   POTASSIUM mmol/L 3.3*   CHLORIDE mmol/L 99   CO2 mmol/L 29.2*   BUN mg/dL 10   CREATININE mg/dL 0.81   GLUCOSE mg/dL 93        Imaging     XR Chest 1 View    Result Date: 8/15/2024  XR CHEST 1 VW Date of Exam: 8/15/2024 12:40 AM EDT Indication: SOB Comparison: Chest radiograph 7/29/2024 Findings: There are postoperative changes from midline sternotomy with aortic valve replacement and coronary graft markers. There is a left subclavian pacemaker device in place with leads overlying the right atrium and right ventricle. The heart is enlarged. There  are diffuse bilateral alveolar and interstitial markings throughout both lungs most likely secondary to pneumonia. There are postoperative changes from left shoulder hemiarthroplasty.     Impression: Diffuse bilateral alveolar and interstitial infiltrates most likely secondary to pneumonia. Electronically Signed: Gordo Ashley MD  8/15/2024 1:03 AM EDT  Workstation ID: [id number]          Current Medications     Scheduled Meds:  cefTRIAXone, 1,000 mg, Intravenous, Q24H  pantoprazole, 40 mg, Intravenous, Q AM  sodium chloride, 10 mL,  Intravenous, Q12H         Continuous Infusions:          Jona Ellis [facility] Medicine  08/15/24   02:42 EDT

## 2024-08-15 NOTE — PLAN OF CARE
Problem: Pain Acute  Goal: Acceptable Pain Control and Functional Ability  Outcome: Ongoing, Progressing     Problem: Anemia  Goal: Anemia Symptom Improvement  Outcome: Ongoing, Progressing  Intervention: Monitor and Manage Anemia  Recent Flowsheet Documentation  Taken 8/14/2024 2000 by Shagufta Zamorano RN  Safety Promotion/Fall Prevention: safety round/check completed     Problem: Adult Inpatient Plan of Care  Goal: Plan of Care Review  Outcome: Ongoing, Progressing  Flowsheets (Taken 8/14/2024 2243)  Plan of Care Reviewed With: patient  Goal: Patient-Specific Goal (Individualized)  Outcome: Ongoing, Progressing  Goal: Absence of Hospital-Acquired Illness or Injury  Outcome: Ongoing, Progressing  Intervention: Identify and Manage Fall Risk  Recent Flowsheet Documentation  Taken 8/14/2024 2000 by Shagufta Zamorano RN  Safety Promotion/Fall Prevention: safety round/check completed  Intervention: Prevent Skin Injury  Recent Flowsheet Documentation  Taken 8/14/2024 2000 by Shagufta Zamorano, RN  Body Position: position changed independently  Intervention: Prevent and Manage VTE (Venous Thromboembolism) Risk  Recent Flowsheet Documentation  Taken 8/14/2024 2000 by Shagufta Zamorano, RN  Activity Management: up to bedside commode  Goal: Optimal Comfort and Wellbeing  Outcome: Ongoing, Progressing  Goal: Readiness for Transition of Care  Outcome: Ongoing, Progressing     Problem: Asthma Comorbidity  Goal: Maintenance of Asthma Control  Outcome: Ongoing, Progressing     Problem: COPD (Chronic Obstructive Pulmonary Disease) Comorbidity  Goal: Maintenance of COPD Symptom Control  Outcome: Ongoing, Progressing     Problem: Heart Failure Comorbidity  Goal: Maintenance of Heart Failure Symptom Control  Outcome: Ongoing, Progressing     Problem: Hypertension Comorbidity  Goal: Blood Pressure in Desired Range  Outcome: Ongoing, Progressing     Problem: Skin Injury Risk Increased  Goal: Skin Health and Integrity  Outcome: Ongoing,  Progressing  Intervention: Optimize Skin Protection  Recent Flowsheet Documentation  Taken 8/14/2024 2000 by Shagufta Zamorano, RN  Head of Bed (HOB) Positioning: HOB elevated   Goal Outcome Evaluation:  Plan of Care Reviewed With: patient

## 2024-08-15 NOTE — PLAN OF CARE
Goal Outcome Evaluation:     Pt is an 89 y/o female who presents to [facility] for nausea and vomiting. PMH significant for AVR, L TSA, PM, a-fib, HTN, CAD, and anxiety. Pt's chest x-ray showed PNA and is being treated for UTI. At [facility] pt lives in an [place] and was independent with ADLs and ambulates community distances/to meals using a rollator. Pt is typically on 3L O2 at home and is now requiring 5L O2. At this time pt requires min A for bed mobility, min A for STS, and min A for lateral steps at EOB. Pt still feeling poorly limiting ambulation and endurance. Pt encouraged to be up in the chair for meals tomorrow or this evening. Pt is functioning below her baseline and is at a high risk for falls where pt would benefit from SNF to improve activity tolerance and strength.

## 2024-08-15 NOTE — THERAPY EVALUATION
Patient Name: Tracy Jane  : 1934    MRN: [id number]                              Today's Date: 8/15/2024       Admit Date: 2024    Visit Dx:     ICD-10-CM ICD-9-CM   1. Acute cystitis with hematuria  N30.01 595.0   2. Nausea and vomiting, unspecified vomiting type  R11.2 787.01   3. Anemia, unspecified type  D64.9 285.9     Patient Active Problem List   Diagnosis    Abnormal cardiovascular stress test    Abnormal EKG    Abnormal levels of other serum enzymes    Anemia    Anxiety disorder    Aortic insufficiency    Aortic stenosis    Arthritis, rheumatoid    Asthma    Chronic coronary artery disease    Chronic kidney disease, stage III (moderate)    Depression    Cough    Edema of lower extremity    Encounter for therapeutic drug level monitoring    Excessive anticoagulation    Fatigue    Former smoker    GERD without esophagitis    Hemoptysis    High alkaline phosphatase    Hx of aortic valve replacement    Hyperglycemia    Mixed hyperlipidemia    Essential hypertension    Hyponatremia    Acquired hypothyroidism    Influenza    Nonspecific abnormal results of function study of liver    Osteoarthritis of knee    Other peripheral vertigo, unspecified ear    Paroxysmal atrial fibrillation    Peripheral vascular disease    Presence of aortocoronary bypass graft    Presence of cardiac pacemaker    Renal insufficiency    Shortness of breath    Sick sinus syndrome    Sore throat    Valvular heart disease    Elevated INR    Medication induced coagulopathy    COVID    A-fib    Presence of Watchman left atrial appendage closure device    Hypoxic respiratory failure    Acute on chronic respiratory failure with hypoxia    Acute on chronic heart failure with preserved ejection fraction (HFpEF)    Leukocytosis    Acute cystitis    Cystitis, acute     Past Medical History:   Diagnosis Date    Abnormal ECG     Anxiety     Aortic valve replaced     Arrhythmia     Asthma     Atrial fibrillation     CAD (coronary  artery disease)     Carotid artery disease     CHF (congestive heart failure)     Congenital heart disease     GERD (gastroesophageal reflux disease)     Gout     Heart murmur     Hemorrhagic stroke 2023    was on Coumadin for A-fib    Hyperlipidemia     Hypertension     Hyperthyroidism     Hypothyroidism     Myocardial infarction     Overactive bladder     Pacemaker     [facility]    Skin cancer      Past Surgical History:   Procedure Laterality Date    AORTIC VALVE REPAIR/REPLACEMENT  2014    porcine    ATRIAL APPENDAGE EXCLUSION LEFT WITH TRANSESOPHAGEAL ECHOCARDIOGRAM Right 12/26/2023    Procedure: Atrial Appendage Occlusion;  Surgeon: Pk Crabtree MD;  Location: [facility] CATH INVASIVE LOCATION;  Service: Cardiovascular;  Laterality: Right;    ATRIAL APPENDAGE EXCLUSION LEFT WITH TRANSESOPHAGEAL ECHOCARDIOGRAM N/A 12/26/2023    Procedure: Atrial Appendage Occlusion;  Surgeon: Gen Caballreo MD;  Location: [facility] CATH INVASIVE LOCATION;  Service: Cardiovascular;  Laterality: N/A;    BASAL CELL CARCINOMA EXCISION      spine, nose and arm, leg 2020    BREAST BIOPSY      CARDIAC CATHETERIZATION      CARDIAC VALVE REPLACEMENT      CAROTID STENT      CATARACT EXTRACTION      CHOLECYSTECTOMY      CORONARY ARTERY BYPASS GRAFT      CORONARY STENT PLACEMENT      ENDOSCOPY N/A 04/24/2023    Procedure: ESOPHAGOGASTRODUODENOSCOPY with antrum body biopsies;  Surgeon: REGGIE Ace MD;  Location: [facility] ENDOSCOPY;  Service: Gastroenterology;  Laterality: N/A;  hiatal hernia    FINGER SURGERY      INSERT / REPLACE / REMOVE PACEMAKER      KNEE SURGERY      PACEMAKER IMPLANTATION      [facility]    SHOULDER SURGERY      RACHEL Bilateral 11/03/2023      General Information       Row Name 08/15/24 1513          Physical Therapy Time and Intention    Document Type evaluation  -SHAYLEE     Mode of Treatment physical therapy  -SHAYLEE       Row Name 08/15/24 1513          General Information    Prior Level of Function  independent:;community mobility;bed mobility;transfer;ADL's  utilized rollator, reports no falls in the past six months. 3L O2 at home  -     Existing Precautions/Restrictions oxygen therapy device and L/min  -SHAYLEE     Barriers to Rehab medically complex  -       Row Name 08/15/24 1513          Living Environment    People in Home facility resident  [facility]  -       Row Name 08/15/24 1513          Home Main Entrance    Number of Stairs, Main Entrance none  -       Row Name 08/15/24 1513          Stairs Within Home, Primary    Number of Stairs, Within Home, Primary none  -[facility] Name 08/15/24 1513          Cognition    Orientation Status (Cognition) oriented x 4  -[facility] Name 08/15/24 1513          Safety Issues, Functional Mobility    Impairments Affecting Function (Mobility) balance;endurance/activity tolerance;grasp;pain;range of motion (ROM);shortness of breath;strength  -               User Key  (r) = Recorded By, (t) = Taken By, (c) = Cosigned By      Initials Name Provider Type    Emmy Durán, PT Physical Therapist                   Mobility       Row Name 08/15/24 1514          Bed Mobility    Bed Mobility sit-supine;supine-sit  -     Supine-Sit [place] (Bed Mobility) modified independence  -     Sit-Supine [place] (Bed Mobility) minimum assist (75% patient effort)  -SHAYLEE     Assistive Device (Bed Mobility) bed rails  -SHAYLEE     Comment, (Bed Mobility) increased time to complete  -       Row Name 08/15/24 1514          Sit-Stand Transfer    Sit-Stand [place] (Transfers) minimum assist (75% patient effort)  -     Comment, (Sit-Stand Transfer) STS from EOB; RW not available, but pt would benefit from use. Pt uses HHA this date.  -       Row Name 08/15/24 1514          Gait/Stairs (Locomotion)    [place] Level (Gait) minimum assist (75% patient effort)  -     Distance in Feet (Gait) 3  -SHAYLEE     Comment, (Gait/Stairs) Pt takes lateral steps at EOB with HHA to  reposition self prior to returning to bed. Min postural sway requiring assist for stability  -               User Key  (r) = Recorded By, (t) = Taken By, (c) = Cosigned By      Initials Name Provider Type    Emmy Durán PT Physical Therapist                   Obj/Interventions       [facility] Name 08/15/24 1520          Range of Motion Comprehensive    General Range of Motion bilateral lower extremity ROM WFL  -SHAYLEE       Row Name 08/15/24 1520          Strength Comprehensive (MMT)    General Manual Muscle Testing (MMT) Assessment lower extremity strength deficits identified  -     Comment, General Manual Muscle Testing (MMT) Assessment BLE grossly 3/5  -       Row Name 08/15/24 1520          Balance    Balance Assessment sitting static balance;sitting dynamic balance;standing static balance  -     Static Sitting Balance standby assist  -     Dynamic Sitting Balance contact guard  -     Position, Sitting Balance sitting edge of bed  -     Static Standing Balance minimal assist  -     Position/Device Used, Standing Balance unsupported  -[facility] Name 08/15/24 1520          Sensory Assessment (Somatosensory)    Sensory Assessment (Somatosensory) sensation intact  -               User Key  (r) = Recorded By, (t) = Taken By, (c) = Cosigned By      Initials Name Provider Type    Emmy Durán PT Physical Therapist                   Goals/Plan       [facility] Name 08/15/24 1523          Bed Mobility Goal 1 (PT)    Activity/Assistive Device (Bed Mobility Goal 1, PT) bed mobility activities, all  -     [place] Level/Cues Needed (Bed Mobility Goal 1, PT) independent  -     Time Frame (Bed Mobility Goal 1, PT) long term goal (LTG);2 weeks  -[facility] Name 08/15/24 1523          Transfer Goal 1 (PT)    Activity/Assistive Device (Transfer Goal 1, PT) sit-to-stand/stand-to-sit;bed-to-chair/chair-to-bed  -     [place] Level/Cues Needed (Transfer Goal 1, PT) supervision required  -      Time Frame (Transfer Goal 1, PT) long term goal (LTG);2 weeks  -       Row Name 08/15/24 1523          Gait Training Goal 1 (PT)    Activity/Assistive Device (Gait Training Goal 1, PT) gait (walking locomotion)  -     [place] Level (Gait Training Goal 1, PT) supervision required  -     Distance (Gait Training Goal 1, PT) 50ft  -     Time Frame (Gait Training Goal 1, PT) long term goal (LTG);2 weeks  -       Row Name 08/15/24 1523          Therapy Assessment/Plan (PT)    Planned Therapy Interventions (PT) balance training;bed mobility training;gait training;postural re-education;strengthening;neuromuscular re-education;transfer training;home exercise program;patient/family education  -               User Key  (r) = Recorded By, (t) = Taken By, (c) = Cosigned By      Initials Name Provider Type    Emmy Durán, PT Physical Therapist                   Clinical Impression       Row Name 08/15/24 1521          Pain    Pretreatment Pain Rating 5/10  -SHAYLEE     Posttreatment Pain Rating 5/10  -     Pain Location - chest  -     Pre/Posttreatment Pain Comment Pt reports pain when taking a deep breath or coughing  -     Pain Intervention(s) Repositioned;Therapeutic presence  -       Row Name 08/15/24 1521          Plan of Care Review    Plan of Care Reviewed With patient  -     Outcome Evaluation Pt is an 89 y/o female who presents to [facility] for nausea and vomiting. PMH significant for AVR, L TSA, PM, a-fib, HTN, CAD, and anxiety. Pt's chest x-ray showed PNA and is being treated for UTI. At [facility] pt lives in an intermediate and was independent with ADLs and ambulates community distances/to meals using a rollator. Pt is typically on 3L O2 at home and is now requiring 5L O2. At this time pt requires min A for bed mobility, min A for STS, and min A for lateral steps at EOB. Pt still feeling poorly limiting ambulation and endurance. Pt encouraged to be up in the chair for meals tomorrow or this evening. Pt is  functioning below her baseline and is at a high risk for falls where pt would benefit from SNF to improve activity tolerance and strength.  -       Row Name 08/15/24 1521          Therapy Assessment/Plan (PT)    Rehab Potential (PT) good, to achieve stated therapy goals  -SHAYLEE     Criteria for Skilled Interventions Met (PT) yes;meets criteria  -SHAYLEE     Therapy Frequency (PT) 3 times/wk  -SHAYLEE     Predicted Duration of Therapy Intervention (PT) Until d/c  -SHAYLEE       Row Name 08/15/24 1521          Vital Signs    Pre Systolic BP Rehab 151  -SHAYLEE     Pre Treatment Diastolic BP 52  -SHAYLEE     Intra Systolic BP Rehab 144  -SHAYLEE     Intra Treatment Diastolic BP 52  -SHAYLEE     Post Systolic BP Rehab 134  -SHAYLEE     Post Treatment Diastolic BP 54  -SHAYLEE     Pre SpO2 (%) 98  -SHAYLEE     O2 Delivery Pre Treatment supplemental O2  -SHAYLEE     Intra SpO2 (%) 94  -SHAYLEE     O2 Delivery Intra Treatment supplemental O2  -SHAYLEE     Post SpO2 (%) 96  -SHAYLEE     O2 Delivery Post Treatment supplemental O2  5L  -SHAYLEE       Row Name 08/15/24 1521          Positioning and Restraints    Pre-Treatment Position in bed  -SHAYLEE     Post Treatment Position bed  -SHAYLEE     In Bed notified nsg;fowlers;encouraged to call for assist;call light within reach;with family/caregiver;exit alarm on  -SHAYLEE               User Key  (r) = Recorded By, (t) = Taken By, (c) = Cosigned By      Initials Name Provider Type    Emmy Durán, PT Physical Therapist                   Outcome Measures       Row Name 08/15/24 1524          How much help from another person do you currently need...    Turning from your back to your side while in flat bed without using bedrails? 3  -SHAYLEE     Moving from lying on back to sitting on the side of a flat bed without bedrails? 3  -SHAYLEE     Moving to and from a bed to a chair (including a wheelchair)? 3  -SHAYLEE     Standing up from a chair using your arms (e.g., wheelchair, bedside chair)? 3  -SHAYLEE     Climbing 3-5 steps with a railing? 2  -SHAYLEE     To walk in hospital room? 2   -SHAYLEE     AM-PAC 6 Clicks Score (PT) 16  -     Highest Level of Mobility Goal 5 --> Static standing  -       Row Name 08/15/24 1524 08/15/24 1141       Functional Assessment    Outcome Measure Options AM-PAC 6 Clicks Basic Mobility (PT)  -SHAYLEE AM-PAC 6 Clicks Daily Activity (OT)  -SP              User Key  (r) = Recorded By, (t) = Taken By, (c) = Cosigned By      Initials Name Provider Type    Emmy Durán, PT Physical Therapist    SP Leander Wang, OT Occupational Therapist                                 Physical Therapy Education       Title: PT OT SLP Therapies (In Progress)       Topic: Physical Therapy (In Progress)       Point: Mobility training (Done)       Learning Progress Summary             Patient Acceptance, E,TB, VU by  at 8/15/2024 1525                         Point: Home exercise program (Not Started)       Learner Progress:  Not documented in this visit.              Point: Body mechanics (Not Started)       Learner Progress:  Not documented in this visit.              Point: Precautions (Not Started)       Learner Progress:  Not documented in this visit.                              User Key       Initials Effective Dates Name Provider Type Discipline     08/23/21 -  Emmy Saez, PT Physical Therapist PT                  PT Recommendation and Plan  Planned Therapy Interventions (PT): balance training, bed mobility training, gait training, postural re-education, strengthening, neuromuscular re-education, transfer training, home exercise program, patient/family education  Plan of Care Reviewed With: patient  Outcome Evaluation: Pt is an 89 y/o female who presents to [facility] for nausea and vomiting. PMH significant for AVR, L TSA, PM, a-fib, HTN, CAD, and anxiety. Pt's chest x-ray showed PNA and is being treated for UTI. At [facility] pt lives in an [facility] and was independent with ADLs and ambulates community distances/to meals using a rollator. Pt is typically on 3L O2 at home and is now  requiring 5L O2. At this time pt requires min A for bed mobility, min A for STS, and min A for lateral steps at EOB. Pt still feeling poorly limiting ambulation and endurance. Pt encouraged to be up in the chair for meals tomorrow or this evening. Pt is functioning below her baseline and is at a high risk for falls where pt would benefit from SNF to improve activity tolerance and strength.     Time Calculation:         PT Charges       Row Name 08/15/24 1525             Time Calculation    Start Time 1221  -SHAYLEE      Stop Time 1237  -SHAYLEE      Time Calculation (min) 16 min  -SHAYLEE      PT Received On 08/15/24  -SHAYLEE      PT - Next Appointment 08/16/24  -SHAYLEE      PT Goal Re-Cert Due Date 08/29/24  -SHAYLEE                User Key  (r) = Recorded By, (t) = Taken By, (c) = Cosigned By      Initials Name Provider Type    Emmy Durán, PT Physical Therapist                  Therapy Charges for Today       Code Description Service Date Service Provider Modifiers Qty    [id number] HC PT EVAL MOD COMPLEXITY 4 8/15/2024 Emmy Saez, PT GP 1            PT G-Codes  Outcome Measure Options: AM-PAC 6 Clicks Basic Mobility (PT)  AM-PAC 6 Clicks Score (PT): 16  AM-PAC 6 Clicks Score (OT): 13  PT Discharge Summary  Anticipated Discharge Disposition (PT): skilled nursing facility    Emmy Saez PT  8/15/2024

## 2024-08-15 NOTE — CASE MANAGEMENT/SOCIAL WORK
Discharge Planning Assessment   [facility]acc     Patient Name: Tracy Jane  MRN: [id number]  Today's Date: 8/15/2024    Admit Date: 8/14/2024    Plan: [facility] [place]. No pasrr or pre-cert needed. Home O2 3L - [facility]   Discharge Needs Assessment       Row Name 08/15/24 1222       Living Environment    People in Home facility resident    Name(s) of People in Home John Garcia    [place] Situation [place]    Current Living Arrangements assisted living facility    Potentially Unsafe Housing Conditions none    In the past 12 months has the electric, gas, oil, or water company threatened to shut off services in your home? No    Primary Care Provided by self;other (see comments)    Provides Primary Care For no one    Family Caregiver if Needed child(aria), adult    Family Caregiver Names daughter Yenny    Quality of Family Relationships helpful;involved;supportive    Able to Return to Prior Arrangements yes       Resource/Environmental Concerns    Resource/Environmental Concerns none    Transportation Concerns none       Transportation Needs    In the past 12 months, has lack of transportation kept you from medical appointments or from getting medications? no    In the past 12 months, has lack of transportation kept you from meetings, work, or from getting things needed for daily living? No       Food Insecurity    Within the past 12 months, you worried that your food would run out before you got the money to buy more. Never true    Within the past 12 months, the food you bought just didn't last and you didn't have money to get more. Never true       Transition Planning    Patient/Family Anticipates Transition to home    Patient/Family Anticipated Services at Transition none    Transportation Anticipated health plan transportation;family or friend will provide       Discharge Needs Assessment    Readmission Within the Last 30 Days no previous admission in last 30 days    Equipment Currently Used at  Home walker, standard;oxygen;cane, straight  O2-3L thru [facility]    Concerns to be Addressed denies needs/concerns at this time    Anticipated Changes Related to Illness none    Outpatient/Agency/Support Group Needs assisted living facility    Discharge Facility/Level of Care Needs assisted living facility                   Discharge Plan       Row Name 08/15/24 1223       Plan    Plan [place]. No pasrr or pre-cert needed. Home O2 3L - [facility]    Plan Comments CM met with patient and daughter Yenny at the bedside. Confirmed PCP, insurance, and pharmacy. Patient agreeable in  M2B. Patient denies any difficulty affording medications. Patient is not current with any HHC/OPPT/OT services. Patient lives at [place], is [place], and does not drive. Daughter Yenny is able to provide DC transport. DC Barriers: IV abx, blood and urine cultures pending.                  Continued Care and Services - Admitted Since 8/14/2024    No active coordination exists for this encounter.       Expected Discharge Date and Time       Expected Discharge Date Expected Discharge Time    Aug 17, 2024            Demographic Summary       Row Name 08/15/24 1221       General Information    Admission Type observation    Arrived From emergency department    Required Notices Provided Observation Status Notice    Referral Source admission list    Reason for Consult discharge planning    Preferred Language English                   Functional Status       Row Name 08/15/24 1221       Functional Status    Usual Activity Tolerance moderate    Current Activity Tolerance moderate       Functional Status, IADL    Medications independent;assistive person    Meal Preparation independent;assistive person    Housekeeping independent;assistive person    Laundry independent;assistive person    Shopping independent;assistive person           Ethan Gross RN     Cell number [phone]  Office number  [phone]

## 2024-08-15 NOTE — THERAPY EVALUATION
Patient Name: Tracy Jane  : 1934    MRN: [id number]                              Today's Date: 8/15/2024       Admit Date: 2024    Visit Dx:     ICD-10-CM ICD-9-CM   1. Acute cystitis with hematuria  N30.01 595.0   2. Nausea and vomiting, unspecified vomiting type  R11.2 787.01   3. Anemia, unspecified type  D64.9 285.9     Patient Active Problem List   Diagnosis    Abnormal cardiovascular stress test    Abnormal EKG    Abnormal levels of other serum enzymes    Anemia    Anxiety disorder    Aortic insufficiency    Aortic stenosis    Arthritis, rheumatoid    Asthma    Chronic coronary artery disease    Chronic kidney disease, stage III (moderate)    Depression    Cough    Edema of lower extremity    Encounter for therapeutic drug level monitoring    Excessive anticoagulation    Fatigue    Former smoker    GERD without esophagitis    Hemoptysis    High alkaline phosphatase    Hx of aortic valve replacement    Hyperglycemia    Mixed hyperlipidemia    Essential hypertension    Hyponatremia    Acquired hypothyroidism    Influenza    Nonspecific abnormal results of function study of liver    Osteoarthritis of knee    Other peripheral vertigo, unspecified ear    Paroxysmal atrial fibrillation    Peripheral vascular disease    Presence of aortocoronary bypass graft    Presence of cardiac pacemaker    Renal insufficiency    Shortness of breath    Sick sinus syndrome    Sore throat    Valvular heart disease    Elevated INR    Medication induced coagulopathy    COVID    A-fib    Presence of Watchman left atrial appendage closure device    Hypoxic respiratory failure    Acute on chronic respiratory failure with hypoxia    Acute on chronic heart failure with preserved ejection fraction (HFpEF)    Leukocytosis    Acute cystitis     Past Medical History:   Diagnosis Date    Abnormal ECG     Anxiety     Aortic valve replaced     Arrhythmia     Asthma     Atrial fibrillation     CAD (coronary artery disease)      Carotid artery disease     CHF (congestive heart failure)     Congenital heart disease     GERD (gastroesophageal reflux disease)     Gout     Heart murmur     Hemorrhagic stroke 2023    was on Coumadin for A-fib    Hyperlipidemia     Hypertension     Hyperthyroidism     Hypothyroidism     Myocardial infarction     Overactive bladder     Pacemaker     [facility]    Skin cancer      Past Surgical History:   Procedure Laterality Date    AORTIC VALVE REPAIR/REPLACEMENT  2014    porcine    ATRIAL APPENDAGE EXCLUSION LEFT WITH TRANSESOPHAGEAL ECHOCARDIOGRAM Right 12/26/2023    Procedure: Atrial Appendage Occlusion;  Surgeon: Pk Crabtree MD;  Location: [facility] CATH INVASIVE LOCATION;  Service: Cardiovascular;  Laterality: Right;    ATRIAL APPENDAGE EXCLUSION LEFT WITH TRANSESOPHAGEAL ECHOCARDIOGRAM N/A 12/26/2023    Procedure: Atrial Appendage Occlusion;  Surgeon: Gen Caballero MD;  Location: [facility] CATH INVASIVE LOCATION;  Service: Cardiovascular;  Laterality: N/A;    BASAL CELL CARCINOMA EXCISION      spine, nose and arm, leg 2020    BREAST BIOPSY      CARDIAC CATHETERIZATION      CARDIAC VALVE REPLACEMENT      CAROTID STENT      CATARACT EXTRACTION      CHOLECYSTECTOMY      CORONARY ARTERY BYPASS GRAFT      CORONARY STENT PLACEMENT      ENDOSCOPY N/A 04/24/2023    Procedure: ESOPHAGOGASTRODUODENOSCOPY with antrum body biopsies;  Surgeon: REGGIE Ace MD;  Location: [facility] ENDOSCOPY;  Service: Gastroenterology;  Laterality: N/A;  hiatal hernia    FINGER SURGERY      INSERT / REPLACE / REMOVE PACEMAKER      KNEE SURGERY      PACEMAKER IMPLANTATION      [facility]    SHOULDER SURGERY      RACHEL Bilateral 11/03/2023      General Information       Row Name 08/15/24 1131          OT Time and Intention    Document Type evaluation  -SP     Mode of Treatment occupational therapy  -SP       Row Name 08/15/24 1131          General Information    Patient Profile Reviewed yes  -SP     Prior Level of Function  independent:;ADL's  -SP     Existing Precautions/Restrictions oxygen therapy device and L/min  3L O2 at home  -SP     Barriers to Rehab medically complex  -SP       Row Name 08/15/24 1131          Living Environment    People in Home facility resident  -SP       Row Name 08/15/24 1131          Home Main Entrance    Number of Stairs, Main Entrance none  -SP       Row Name 08/15/24 1131          Stairs Within Home, Primary    Number of Stairs, Within Home, Primary none;other (see comments)  2nd floor, elevator access  -SP       Row Name 08/15/24 1131          Cognition    Orientation Status (Cognition) oriented x 4  -SP       Row Name 08/15/24 1131          Safety Issues, Functional Mobility    Impairments Affecting Function (Mobility) balance;endurance/activity tolerance;grasp;pain;range of motion (ROM);shortness of breath;strength  -SP               User Key  (r) = Recorded By, (t) = Taken By, (c) = Cosigned By      Initials Name Provider Type    SP Leander Wang OT Occupational Therapist                     Mobility/ADL's       Row Name 08/15/24 1137          Bed Mobility    Bed Mobility supine-sit;sit-supine  -SP     Supine-Sit [place] (Bed Mobility) modified independence  -SP     Sit-Supine [place] (Bed Mobility) minimum assist (75% patient effort)  -SP     Assistive Device (Bed Mobility) bed rails  -SP       Row Name 08/15/24 1137          Functional Mobility    Patient was able to Ambulate no, other medical factors prevent ambulation  -SP     Reason Patient was unable to Ambulate Excessive Weakness;Chest Pain;Nausea/Vomiting  -SP               User Key  (r) = Recorded By, (t) = Taken By, (c) = Cosigned By      Initials Name Provider Type    SP Leander Wang OT Occupational Therapist                   Obj/Interventions       Row Name 08/15/24 1137          Sensory Assessment (Somatosensory)    Sensory Assessment (Somatosensory) UE sensation intact  -SP       Row Name 08/15/24 1137          Vision  Assessment/Intervention    Visual Impairment/Limitations WFL  -SP       Row Name 08/15/24 1137          Range of Motion Comprehensive    Comment, General Range of Motion RUE shoulder 90*, LUE AAROM 90*, pt reports she broke her LUE a few years ago  -SP       Row Name 08/15/24 1137          Strength Comprehensive (MMT)    Comment, General Manual Muscle Testing (MMT) Assessment BUE grossly 3+/5  -SP       Row Name 08/15/24 1137          Balance    Balance Assessment sitting static balance;sitting dynamic balance  -SP     Static Sitting Balance standby assist  -SP     Dynamic Sitting Balance standby assist;contact guard  -SP     Position, Sitting Balance unsupported;sitting edge of bed  -SP               User Key  (r) = Recorded By, (t) = Taken By, (c) = Cosigned By      Initials Name Provider Type    SP Leander Wang, OT Occupational Therapist                   Goals/Plan       Row Name 08/15/24 1140          Bathing Goal 1 (OT)    Activity/Device (Bathing Goal 1, OT) bathing skills, all  -SP     [place] Level/Cues Needed (Bathing Goal 1, OT) moderate assist (50-74% patient effort);minimum assist (75% or more patient effort)  -SP     Time Frame (Bathing Goal 1, OT) 2 weeks  -SP     Strategies/Barriers (Bathing Goal 1, OT) until d/c  -SP       Row Name 08/15/24 1140          Dressing Goal 1 (OT)    Activity/Device (Dressing Goal 1, OT) dressing skills, all  -SP     [place]/Cues Needed (Dressing Goal 1, OT) minimum assist (75% or more patient effort)  -SP     Time Frame (Dressing Goal 1, OT) 2 weeks  -SP     Strategies/Barriers (Dressing Goal 1, OT) until d/c  -SP       Row Name 08/15/24 1140          Toileting Goal 1 (OT)    Activity/Device (Toileting Goal 1, OT) toileting skills, all  -SP     [place] Level/Cues Needed (Toileting Goal 1, OT) minimum assist (75% or more patient effort)  -SP     Time Frame (Toileting Goal 1, OT) 2 weeks  -SP     Strategies/Barriers (Toileting Goal 1, OT) until d/c  -SP        Row Name 08/15/24 1140          Therapy Assessment/Plan (OT)    Planned Therapy Interventions (OT) activity tolerance training;BADL retraining;functional balance retraining;IADL retraining;occupation/activity based interventions;passive ROM/stretching;patient/caregiver education/training;ROM/therapeutic exercise;strengthening exercise;transfer/mobility retraining  -SP               User Key  (r) = Recorded By, (t) = Taken By, (c) = Cosigned By      Initials Name Provider Type    SP Leander Wang OT Occupational Therapist                   Clinical Impression       Row Name 08/15/24 1138          Pain Assessment    Pretreatment Pain Rating 8/10  -SP     Posttreatment Pain Rating 8/10  -SP     Pain Location - chest  -SP     Pre/Posttreatment Pain Comment when taking a deep breath  -SP     Pain Intervention(s) Repositioned;Therapeutic presence  -SP       Row Name 08/15/24 1130          Plan of Care Review    Plan of Care Reviewed With patient  -SP     Outcome Evaluation Pt is a 88 y/o female admitted to [facility] on 8/14/24 with c/o n/v and weakness. Pt recent admit x2 weeks ago for treatment of a. Fib and CHF. PMHx significant for HTN, HLD, hypothyroid, GERD, anxiety, anemia, HFpEF, CAD, and A. Fib. At baseline, pt resides at assisted living facility on 2nd floor with elevator access. Pt typically IND with ADLs and utilizes a walker for ambulation. Reports she typically ambulates to dining stallworth for lunch and dinner with walker. On 3L O2 at home. Upon assessment, pt alert and oriented x4 wit reports of 8/10 pain in chest when taking a deep breath. Pt completed sup<>sit with mod I and sit<>sup with min a d/t fatigue. Further mobility deferred as pt became nauseous sitting EOB. Based on assessment, pt is below baseline with significant decreased functional endurance and strength. OT recommending SNF with continued skilled OT intervention while admitted for increased activity tolerance and strength I preparation for  ADLs for a safe discharge.  -SP       Row Name 08/15/24 1138          Therapy Assessment/Plan (OT)    Criteria for Skilled Therapeutic Interventions Met (OT) yes;meets criteria;skilled treatment is necessary  -SP     Therapy Frequency (OT) 3 times/wk  -SP     Predicted Duration of Therapy Intervention (OT) until d/c  -SP       Row Name 08/15/24 1138          Therapy Plan Review/Discharge Plan (OT)    Anticipated Discharge Disposition (OT) [facility] nursing [facility]  -SP       Row Name 08/15/24 1138          Vital Signs    Pre Systolic BP Rehab 152  -SP     Pre Treatment Diastolic BP 55  -SP     Intra Systolic BP Rehab 152  -SP     Intra Treatment Diastolic BP 58  -SP     Pre SpO2 (%) 98  -SP     O2 Delivery Pre Treatment supplemental O2  5L O2 NC  -SP     O2 Delivery Intra Treatment supplemental O2  5L O2 NC  -SP     Post SpO2 (%) 94  -SP     O2 Delivery Post Treatment supplemental O2  5L O2 NC  -SP     Pre Patient Position Supine  -SP     Intra Patient Position Sitting  -SP     Post Patient Position Supine  -SP       Row Name 08/15/24 1138          Positioning and Restraints    Pre-Treatment Position in bed  -SP     Post Treatment Position bed  -SP     In Bed notified nsg;fowlers;call light within reach;encouraged to call for assist;exit alarm on  -SP               User Key  (r) = Recorded By, (t) = Taken By, (c) = Cosigned By      Initials Name Provider Type    SP Leander Wang, OT Occupational Therapist                   Outcome Measures       Row Name 08/15/24 1141          How much help from another is currently needed...    Putting on and taking off regular lower body clothing? 2  -SP     Bathing (including washing, rinsing, and drying) 2  -SP     Toileting (which includes using toilet bed pan or urinal) 1  -SP     Putting on and taking off regular upper body clothing 2  -SP     Taking care of personal grooming (such as brushing teeth) 3  -SP     Eating meals 3  -SP     AM-PAC 6 Clicks Score (OT) 13  -SP        Row Name 08/15/24 1141          Functional Assessment    Outcome Measure Options AM-PAC 6 Clicks Daily Activity (OT)  -SP               User Key  (r) = Recorded By, (t) = Taken By, (c) = Cosigned By      Initials Name Provider Type    SP Leander Wang OT Occupational Therapist                    Occupational Therapy Education       Title: PT OT SLP Therapies (In Progress)       Topic: Occupational Therapy (In Progress)       Point: ADL training (Done)       Description:   Instruct learner(s) on proper safety adaptation and remediation techniques during self care or transfers.   Instruct in proper use of assistive devices.                  Learning Progress Summary             Patient Acceptance, E,TB, VU by SP at 8/15/2024 1141                         Point: Home exercise program (Not Started)       Description:   Instruct learner(s) on appropriate technique for monitoring, assisting and/or progressing therapeutic exercises/activities.                  Learner Progress:  Not documented in this visit.              Point: Precautions (Done)       Description:   Instruct learner(s) on prescribed precautions during self-care and functional transfers.                  Learning Progress Summary             Patient Acceptance, E,TB, VU by SP at 8/15/2024 1141                         Point: Body mechanics (Done)       Description:   Instruct learner(s) on proper positioning and spine alignment during self-care, functional mobility activities and/or exercises.                  Learning Progress Summary             Patient Acceptance, E,TB, VU by SP at 8/15/2024 1141                                         User Key       Initials Effective Dates Name Provider Type Discipline    SP 11/15/23 -  Leander Wang OT Occupational Therapist OT                  OT Recommendation and Plan  Planned Therapy Interventions (OT): activity tolerance training, BADL retraining, functional balance retraining, IADL retraining,  occupation/activity based interventions, passive ROM/stretching, patient/caregiver education/training, ROM/therapeutic exercise, strengthening exercise, transfer/mobility retraining  Therapy Frequency (OT): 3 times/wk  Plan of Care Review  Plan of Care Reviewed With: patient  Outcome Evaluation: Pt is a 88 y/o female admitted to [facility] on 8/14/24 with c/o n/v and weakness. Pt recent admit x2 weeks ago for treatment of a. Fib and CHF. PMHx significant for HTN, HLD, hypothyroid, GERD, anxiety, anemia, HFpEF, CAD, and A. Fib. At baseline, pt resides at assisted living facility on 2nd floor with elevator access. Pt typically IND with ADLs and utilizes a walker for ambulation. Reports she typically ambulates to dining [place] for lunch and dinner with walker. On 3L O2 at home. Upon assessment, pt alert and oriented x4 wit reports of 8/10 pain in chest when taking a deep breath. Pt completed sup<>sit with mod I and sit<>sup with min a d/t fatigue. Further mobility deferred as pt became nauseous sitting EOB. Based on assessment, pt is below baseline with significant decreased functional endurance and strength. OT recommending SNF with continued skilled OT intervention while admitted for increased activity tolerance and strength I preparation for ADLs for a safe discharge.     Time Calculation:         Time Calculation- OT       Row Name 08/15/24 1141             Time Calculation- OT    OT Start Time 0851  -SP      OT Stop Time 0916  -SP      OT Time Calculation (min) 25 min  -SP      OT Received On 08/15/24  -SP      OT - Next Appointment 08/16/24  -SP      OT Goal Re-Cert Due Date 08/29/24  -SP                User Key  (r) = Recorded By, (t) = Taken By, (c) = Cosigned By      Initials Name Provider Type    Leander Mays OT Occupational Therapist                  Therapy Charges for Today       Code Description Service Date Service Provider Modifiers Qty    [id number]  OT EVAL MOD COMPLEXITY 4 8/15/2024 Leander Wang  OT GO 1                 Leander Wang, OT  8/15/2024

## 2024-08-15 NOTE — PLAN OF CARE
Goal Outcome Evaluation:  Plan of Care Reviewed With: patient     Outcome Evaluation: Pt is a 88 y/o female admitted to [facility] on 8/14/24 with c/o n/v and weakness. Pt recent admit x2 weeks ago for treatment of a. Fib and CHF. PMHx significant for HTN, HLD, hypothyroid, GERD, anxiety, anemia, HFpEF, CAD, and A. Fib. At baseline, pt resides at assisted living facility on 2nd floor with elevator access. Pt typically IND with ADLs and utilizes a walker for ambulation. Reports she typically ambulates to dining [place] for lunch and dinner with walker. On 3L O2 at home. Upon assessment, pt alert and oriented x4 wit reports of 8/10 pain in chest when taking a deep breath. Pt completed sup<>sit with mod I and sit<>sup with min a d/t fatigue. Further mobility deferred as pt became nauseous sitting EOB. Based on assessment, pt is below baseline with significant decreased functional endurance and strength. OT recommending SNF with continued skilled OT intervention while admitted for increased activity tolerance and strength I preparation for ADLs for a safe discharge.    Anticipated Discharge Disposition (OT): skilled nursing facility

## 2024-08-15 NOTE — PLAN OF CARE
Goal Outcome Evaluation: Pt goals progressing, pt adminitered IV antibiotics and on 5L NC, pt states baseline is 3L NC.

## 2024-08-16 NOTE — PROGRESS NOTES
[facility] MEDICINE SERVICE  DAILY PROGRESS NOTE    NAME: Tracy Jane  : 1934  MRN: [id number]      LOS: 1 day     PROVIDER OF SERVICE: Dejon Amaya MD    Chief Complaint: Acute cystitis    Subjective:     Interval History:  History taken from: patient    Denies chest pain, reported SOB.    Review of Systems:   Review of Systems    Objective:     Vital Signs  Temp:  [97.8 °F (36.6 °C)-98.6 °F (37 °C)] 98.1 °F (36.7 °C)  Heart Rate:  [] 111  Resp:  [14-20] 14  BP: (127-153)/(49-94) 138/72  Flow (L/min):  [3-5] 3   Body mass index is 24.5 kg/m².    Physical Exam  General Appearance:  aaox3   Head:  Atrumatic normocephalic   Eyes:        No sclera icterus   Neck: Normal ROM   Pulm: Decreased breath sounds, crackles at bases   Cardio: Irregular rhythm   Extremities: No edema on BLE   Abdomen: Soft, non tender   /Renal: No suprapubic tenderness   Musculoskeletal: Moves all extremities       Neurologic: Aaox3, no focal neurological defcits           Scheduled Meds   amiodarone, 200 mg, Oral, Q12H  cefTRIAXone, 2,000 mg, Intravenous, Q24H  clopidogrel, 75 mg, Oral, Daily  dilTIAZem CD, 120 mg, Oral, Q24H  doxycycline, 100 mg, Intravenous, Q12H  levothyroxine, 75 mcg, Oral, Q AM  metoprolol succinate XL, 50 mg, Oral, Q24H  pantoprazole, 40 mg, Oral, Daily  rosuvastatin, 20 mg, Oral, Daily  sertraline, 100 mg, Oral, Daily  sodium chloride, 10 mL, Intravenous, Q12H       PRN Meds     acetaminophen    aluminum-magnesium hydroxide-simethicone    senna-docusate sodium **AND** polyethylene glycol **AND** bisacodyl **AND** bisacodyl    Calcium Replacement - Follow Nurse / BPA Driven Protocol    ipratropium-albuterol    Magnesium Standard Dose Replacement - Follow Nurse / BPA Driven Protocol    melatonin    nitroglycerin    ondansetron ODT **OR** ondansetron    Phosphorus Replacement - Follow Nurse / BPA Driven Protocol    Potassium Replacement - Follow Nurse / BPA Driven Protocol    sodium  chloride    sodium chloride   Infusions         Diagnostic Data    Results from last 7 days   Lab Units 08/15/24  0232 08/14/24  1512   WBC 10*3/mm3 12.34*  --    HEMOGLOBIN g/dL 8.6*  --    HEMATOCRIT % 29.8*  --    PLATELETS 10*3/mm3 204  --    GLUCOSE mg/dL 100* 93   CREATININE mg/dL 0.79 0.81   BUN mg/dL 10 10   SODIUM mmol/L 140 139   POTASSIUM mmol/L 4.7 3.3*   AST (SGOT) U/L  --  21   ALT (SGPT) U/L  --  15   ALK PHOS U/L  --  94   BILIRUBIN mg/dL  --  0.6   ANION GAP mmol/L 8.6 10.8       XR Chest 1 View    Result Date: 8/15/2024  Impression: Diffuse bilateral alveolar and interstitial infiltrates most likely secondary to pneumonia. Electronically Signed: Gordo Ashley MD  8/15/2024 1:03 AM EDT  Workstation ID: [id number]       I reviewed the patient's new clinical results.    Assessment/Plan:   #Pneumonia    - CXR shows diffuse bilateral alveolar and interstitial infiltrates most likely 2/2 pneumonia    - suspect aspiration    - rocephin 2g IV daily    - Doxycycline 100mg IV BID    - strep, legionella, sputums    - RVP     - blood cultures    - pt/ot/slp    - states she is currently tolerating po intake     #Acute hypoxic Respiratory insufficiency    - Currently on 4L NC, wean as tolerated    - tx pneumonia as above     #UTI    - UA shows UTI    - urine culture    - blood cultures    - Rocephin 2g IV daily     #A. Fib    - s/p watchman    - resume home Amiodarone, Toprol, and Cardizem     #CAD    -status post CABG 2008 and PCI 2006    - Last cath in 2016 showed no vessels amenable to PCI.    - continue on plavix     #HFpEF    - Chronic, stable    - restart home Lasix     #Anemia    - hgb 8.5 on admission, base around 10.0    - no overt bleeding, monitor labs     #Anxiety    - resume home Zoloft     #GERD    - PPI      #Hypothyroid    - resume home synthroid     #HTN    - resume home Toprol and Cardizem     #HLD    - resume home statin    VTE Prophylaxis:  Mechanical VTE prophylaxis orders are present.          Code status is   Code Status and Medical Interventions: CPR (Attempt to Resuscitate); Full Support   Ordered at: 08/14/24 1640     Code Status (Patient has no pulse and is not breathing):    CPR (Attempt to Resuscitate)     Medical Interventions (Patient has pulse or is breathing):    Full Support         Time: 30 minutes    Part of this note may be an electronic transcription/translation of spoken language to printed text using the Dragon Dictation System.    Signature: Electronically signed by Dejon Amaya MD, 08/16/24, 13:03 EDT.  [facility] Hospitalist Team

## 2024-08-16 NOTE — SIGNIFICANT NOTE
Case Management Readmission Assessment Note    Case Management Readmission Assessment (all recorded)       Readmission Interview       [facility] Name 08/16/24 1534             Readmission Indications    Is the patient and/or family able to complete the readmission assessment questions? Yes      Is this hospitalization related to the prior hospital diagnosis? Yes  Pt had PNA when admitted on 7/29 and PNA on readmission 8/14        [facility] Name 08/16/24 1534             Recommendation for rehospitalization    Did you speak with your physician prior to coming to the hospital No        [facility] Name 08/16/24 1538             Follow-up Appointments    Do you have a PCP? Yes      Did you have an appointment with PCP after your hospitalization? No      Did you have an appointment with a Specialist? Yes      When was your appointment scheduled? 08/14/24      Did you go to appointment? No  PT was too sick, came to ED      Are you current with the Pulmonary Clinic? No      Are you current with the CHF Clinic? No        [facility] Name 08/16/24 1535             Medications    Did you have newly prescribed medications at discharge? Yes      Did you understand the reasons for your medications at discharge and how to take them? Yes      Did you understand the side effects of your medications? Yes      Are you taking all of you prescribed medications? Yes      What pharmacy was used to fill prescription(s)? [facility]      Were medications picked up? Yes        [facility] Name 08/16/24 1530             Discharge Instructions    Did you understand your discharge instructions? Yes      Did your family/caregiver hear your instructions? Yes      Were you told to eat a special diet? No      Did you adhere to the diet? No      Were you given a number of someone to call if you had questions or concerns? Yes        [facility] Name 08/16/24 1534             Index discharge location/services    Where did you go upon discharge? Assisted Living      Do you have  supportive family or friends in the home? Yes      Which Assisted Living Facility were your admitted from? [facility]        Row Name 08/16/24 1534             Discharge Readiness    On a scale of 1-5 (5 being well prepared), how ready were you for discharge 3      Recommendation based on interview Additional caregiver support        Row Name 08/16/24 1534             Palliative Care/Hospice    Are you current with Palliative Care? No      Are you current with Hospice Care? No        Row Name 08/16/24 1534 08/14/24 1749          Advance Directives (For Healthcare)    Pre-existing AND/MOST/POLST Order No No     Advance Directive Status Patient does not have advance directive Patient does not have advance directive     Have you reviewed your Advance Directive and is it valid for this stay? No No     Literature Provided on Advance Directives No No     Patient Requests Assistance on Advance Directives Patient Declined Patient Declined       Row Name 08/16/24 1534             Readmission Assessment Final Comments    Final Comments PREVIOUS: 7/29-8/2 Afib s/p Watchman, Resp failure, PNA  -PNA IV Abx  IV Lasix trans to p.o  Afib - IV amio cardioversion p.o Cardizem d/c with 4 weeks Eliquis  F/U PCP AND Cardio within 2 weeks  CURRENT:  8/14 - N/V, Fatigue, PNA, UTI.   -Afib on Amio s/p Watchman device  N/V decreased oral intake  NPO speech to eval  PNA & UTI - IV Abx  Blood cx pending  Pt feels like the oral abx she was given at d/c were not sufficient to clear up her PNA. Wishes she would of stayed longer and received additional IV Abx. PT is recommending SNF CM left list but pt is unsure if she will go to SNF. Pt [facility] at [facility] ([facility])

## 2024-08-16 NOTE — THERAPY EVALUATION
Acute Care - Speech Language Pathology   Swallow Initial Evaluation  Isacc     Patient Name: Tracy Jane  : 1934  MRN: [id number]  Today's Date: 2024               Admit Date: 2024    Visit Dx:     ICD-10-CM ICD-9-CM   1. Acute cystitis with hematuria  N30.01 595.0   2. Nausea and vomiting, unspecified vomiting type  R11.2 787.01   3. Anemia, unspecified type  D64.9 285.9     Patient Active Problem List   Diagnosis    Abnormal cardiovascular stress test    Abnormal EKG    Abnormal levels of other serum enzymes    Anemia    Anxiety disorder    Aortic insufficiency    Aortic stenosis    Arthritis, rheumatoid    Asthma    Chronic coronary artery disease    Chronic kidney disease, stage III (moderate)    Depression    Cough    Edema of lower extremity    Encounter for therapeutic drug level monitoring    Excessive anticoagulation    Fatigue    Former smoker    GERD without esophagitis    Hemoptysis    High alkaline phosphatase    Hx of aortic valve replacement    Hyperglycemia    Mixed hyperlipidemia    Essential hypertension    Hyponatremia    Acquired hypothyroidism    Influenza    Nonspecific abnormal results of function study of liver    Osteoarthritis of knee    Other peripheral vertigo, unspecified ear    Paroxysmal atrial fibrillation    Peripheral vascular disease    Presence of aortocoronary bypass graft    Presence of cardiac pacemaker    Renal insufficiency    Shortness of breath    Sick sinus syndrome    Sore throat    Valvular heart disease    Elevated INR    Medication induced coagulopathy    COVID    A-fib    Presence of Watchman left atrial appendage closure device    Hypoxic respiratory failure    Acute on chronic respiratory failure with hypoxia    Acute on chronic heart failure with preserved ejection fraction (HFpEF)    Leukocytosis    Acute cystitis    Cystitis, acute     Past Medical History:   Diagnosis Date    Abnormal ECG     Anxiety     Aortic valve replaced      Arrhythmia     Asthma     Atrial fibrillation     CAD (coronary artery disease)     Carotid artery disease     CHF (congestive heart failure)     Congenital heart disease     GERD (gastroesophageal reflux disease)     Gout     Heart murmur     Hemorrhagic stroke 2023    was on Coumadin for A-fib    Hyperlipidemia     Hypertension     Hyperthyroidism     Hypothyroidism     Myocardial infarction     Overactive bladder     Pacemaker     [facility]    Skin cancer      Past Surgical History:   Procedure Laterality Date    AORTIC VALVE REPAIR/REPLACEMENT  2014    porcine    ATRIAL APPENDAGE EXCLUSION LEFT WITH TRANSESOPHAGEAL ECHOCARDIOGRAM Right 12/26/2023    Procedure: Atrial Appendage Occlusion;  Surgeon: Pk Crabtree MD;  Location: [facility] CATH INVASIVE LOCATION;  Service: Cardiovascular;  Laterality: Right;    ATRIAL APPENDAGE EXCLUSION LEFT WITH TRANSESOPHAGEAL ECHOCARDIOGRAM N/A 12/26/2023    Procedure: Atrial Appendage Occlusion;  Surgeon: Gen Caballero MD;  Location: [facility] CATH INVASIVE LOCATION;  Service: Cardiovascular;  Laterality: N/A;    BASAL CELL CARCINOMA EXCISION      spine, nose and arm, leg 2020    BREAST BIOPSY      CARDIAC CATHETERIZATION      CARDIAC VALVE REPLACEMENT      CAROTID STENT      CATARACT EXTRACTION      CHOLECYSTECTOMY      CORONARY ARTERY BYPASS GRAFT      CORONARY STENT PLACEMENT      ENDOSCOPY N/A 04/24/2023    Procedure: ESOPHAGOGASTRODUODENOSCOPY with antrum body biopsies;  Surgeon: REGGIE Ace MD;  Location: [facility] ENDOSCOPY;  Service: Gastroenterology;  Laterality: N/A;  hiatal hernia    FINGER SURGERY      INSERT / REPLACE / REMOVE PACEMAKER      KNEE SURGERY      PACEMAKER IMPLANTATION      [facility]    SHOULDER SURGERY      RACHEL Bilateral 11/03/2023       SLP Recommendation and Plan  SLP Swallowing Diagnosis: functional pharyngeal phase, mild (08/16/24 1600)  SLP Diet Recommendation: soft to chew textures, thin liquids (08/16/24 1600)  Recommended  Precautions and Strategies: upright posture during/after eating, small bites of food and sips of liquid, multiple swallows per bite of food, alternate between small bites of food and sips of liquid, general aspiration precautions, reflux precautions (08/16/24 1600)  SLP Rec. for Method of Medication Administration: meds whole, with puree (08/16/24 1600)     Monitor for Signs of Aspiration: yes, notify SLP if any concerns (08/16/24 1600)     Swallow Criteria for Skilled Therapeutic Interventions Met: demonstrates skilled criteria (08/16/24 1600)     Rehab Potential/Prognosis, Swallowing: good, to achieve stated therapy goals (08/16/24 1600)  Therapy Frequency (Swallow): PRN (08/16/24 1600)  Predicted Duration Therapy Intervention (Days): until discharge (08/16/24 1600)  Oral Care Recommendations: Oral Care BID/PRN, Swab (08/16/24 1600)        SWALLOW EVALUATION (Last 72 Hours)       SLP Adult Swallow Evaluation       Row Name 08/16/24 1600       Rehab Evaluation    Document Type evaluation  -CB    Subjective Information no complaints  -CB    Patient Observations alert;cooperative  -CB    Patient/Family/Caregiver Comments/Observations Patient is able to follow simple directives. Patient's family was present during assessment.  -CB    Patient Effort good  -CB       General Information    Patient Profile Reviewed yes  -CB    Pertinent History Of Current Problem Tracy Jane is a 89 y.o. female with PMHx of HTN, HLD, hypothyroid, GERD, anxiety, anemia, HFpEF, CAD, A. fib presented to [facility] for nausea and vomiting.  Of note, patient recently discharged from [facility] 2 weeks ago after undergoing treatment for a. Fib and CHF.  Since then has had nausea and vomiting causing difficulty with food intake at times.  Cardiology reportedly decreased her medication doses and symptoms improved but then patient started to have fevers and chills with dyspnea. Presented to [facility] for evaluation.  Admitted to ER obs unit for treatment of UTI.   Patient became dyspneic and CXR showed pneumonia.  Hospitalist consulted for admission and treatment.  -CB    Current Method of Nutrition NPO  -CB       Pain    Additional Documentation Pain Scale: FACES Pre/Post-Treatment (Group)  -CB       Pain Scale: FACES Pre/Post-Treatment    Pain: FACES Scale, Pretreatment 0-->no hurt  -CB    Posttreatment Pain Rating 0-->no hurt  -CB       Oral Motor Structure and Function    Dentition Assessment natural, present and adequate;missing teeth  -CB    Secretion Management WNL/WFL  -CB    Mucosal Quality moist, healthy  -CB    Gag Response WFL  -CB       Oral Musculature and Cranial Nerve Assessment    Oral Motor General Assessment WFL  -CB    Oral Motor, Comment Oral mechanism examination revealed patient demonstrates full ROM and mobility of lingual and labial structures. Lingual and jaw strength was adequate. Palatal movement was evident with higher movement on left versus right. Noted positive gag reflex.  -CB       General Eating/Swallowing Observations    Respiratory Support Currently in Use nasal cannula  -CB    O2 Liters 3L  -CB    Eating/Swallowing Skills fed by SLP  -CB    Positioning During Eating upright in bed  -CB    Utensils Used cup;straw  -CB    Consistencies Trialed regular textures;soft to chew textures;mixed consistency;pureed;thin liquids  -CB       Clinical Swallow Eval    Clinical Swallow Evaluation Summary Patient was seen for dysphagia evaluation per concerns for aspiration pneumonia. Patient reports that baseline diet is regular. Most recent chest x-ray revealed diffused bilateral alveolar and interstitial infiltrates most likely secondary to pneumonia. Patient reports that she takes her medication whole in applesauce. She reports no difficulty with taking medication. She does report occassional choking on food at times but not consistent. Patient does not have a history of CVA and/or esophageal stricture. Patient does have a history of GERD and  pneumonia. Patient was reported to have nausea and vomiting. Patient was last seen for VFSS 12/22/21. Patient demonstrated functional oral and pharyngeal swallow at that time. 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 Family reported that patient has had a decrease appetite lately.  Patient has her own natural teeth with bridge and some missing teeth. Patient also reported that she has some loose teeth on her right side. Oral mechanism examination revealed patient demonstrated full ROM and mobility of lingual and labial structures. Lingual nad  jaw strength was adequate. Palatal movement was present greater movement noted on left versus right. Noted positive gag reflex. Properly positioned patient upright in bed prior to trials. Patient fed self.  Provided trials of thins via cup x 3, straw x 3. Given digital palpation, swallow response was timely. No overt s/s of aspiration was observed. No wet vocal quality was identified. Provided trials of puree x 3. Oral transit was 3 seconds in duration.  Patient cleared oral cavity between bites effectively. No clearing of throat, cough and/or vocal changes were detected. Provided trials of mixed consistency x 3. Oral transit ranged from 6-8 seconds in duration. Patient displayed adequate rotary chewing. Patient cleared oral cavity effectively between bites without evidence of oral residue. Provided trials of STC x 3. Patient demonstrated adequate rotary chewing. Patient cleared oral cavity effectively given liquid wash as the item was dry. No clearing of throat, cough and/or vocal changes were identified. Provided trials of regular consistency x 3. Patient demonstrated adequate rotary chewing but slightly extended. Patient reports that she does have difficulty with some regular consistency such as blackman, nuts d/t missing teeth and some loose teeth. Patient reports that she tolerates taking pills whole in applesauce. Therefore, it is  recommended that patient receive a [facility] diet at this time. Provide medication whole in applesauce. Patient should follow safe swallow strategies outlined in assessment.   -CB       SLP Evaluation Clinical Impression    SLP Swallowing Diagnosis functional pharyngeal phase;mild  -CB    Functional Impact risk of aspiration/pneumonia  -CB    Rehab Potential/Prognosis, Swallowing good, to achieve stated therapy goals  -CB    Swallow Criteria for Skilled Therapeutic Interventions Met demonstrates skilled criteria  -CB       Recommendations    Therapy Frequency (Swallow) PRN  -CB    Predicted Duration Therapy Intervention (Days) until discharge  -CB    SLP Diet Recommendation soft to chew textures;thin liquids  -CB    Recommended Precautions and Strategies upright posture during/after eating;small bites of food and sips of liquid;multiple swallows per bite of food;alternate between small bites of food and sips of liquid;general aspiration precautions;reflux precautions  -CB    Oral Care Recommendations Oral Care BID/PRN;Swab  -CB    SLP Rec. for Method of Medication Administration meds whole;with puree  -CB    Monitor for Signs of Aspiration yes;notify SLP if any concerns  -CB       Swallow Goals (SLP)    Swallow LTGs Swallow Long Term Goal (free text)  -CB    Swallow STGs diet tolerance goal selection (SLP)  -CB    Diet Tolerance Goal Selection (SLP) Swallow Short Term Goal 1  -CB       (LTG) Swallow    (LTG) Swallow Patient will tolerate safest and least restrictive diet without complication from aspiration.  -CB    Time Frame (Swallow Long Term Goal) by discharge  -CB    Progress/Outcomes (Swallow Long Term Goal) new goal  -CB       (STG) Swallow 1    (STG) Swallow 1 Patient will participate in full meal assessment to assure safety and adequacy of recommended diet without complications from aspiration.  -CB    Time Frame (Swallow Short Term Goal 1) 1 week  -CB    Progress/Outcomes (Swallow Short Term Goal 1) new goal   -CB              User Key  (r) = Recorded By, (t) = Taken By, (c) = Cosigned By      Initials Name Effective Dates    Judy Simons SLP 09/21/21 -                     EDUCATION  The patient has been educated in the following areas:   Dysphagia (Swallowing Impairment) Oral Care/Hydration.        SLP GOALS       Row Name 08/16/24 1600       (LTG) Swallow    (LTG) Swallow Patient will tolerate safest and least restrictive diet without complication from aspiration.  -CB    Time Frame (Swallow Long Term Goal) by discharge  -CB    Progress/Outcomes (Swallow Long Term Goal) new goal  -CB       (STG) Swallow 1    (STG) Swallow 1 Patient will participate in full meal assessment to assure safety and adequacy of recommended diet without complications from aspiration.  -CB    Time Frame (Swallow Short Term Goal 1) 1 week  -CB    Progress/Outcomes (Swallow Short Term Goal 1) new goal  -CB              User Key  (r) = Recorded By, (t) = Taken By, (c) = Cosigned By      Initials Name Provider Type    Judy Simons, SLP Speech and Language Pathologist                         Time Calculation:                AZALIA Vieira  8/16/2024

## 2024-08-16 NOTE — NURSING NOTE
Pt seems more stable, IV antibiotics administered per order, pt had dose of IV lasix this shift, pt has been stable on 3L NC, IS at bedside, pt went for CT of chest and we are still awaiting results.

## 2024-08-16 NOTE — NURSING NOTE
Around 2000, pt's left forearm IV flushed with NS to check for patency but vein collapsed. Pt reported pain on IV site. Line removed and attempted to put in a new line twice to no avail. Called IV team around 2230 to inform that pt is a hard stick and may need their assistance with new IV and got routed to voicemail. Around 0400, charge nurse reinserted IV on right hand but with slow blood return, unable to draw blood. IV maintained for antibiotic infusion.Left another voicemail to IV team to provide update.   Addendum: [facility] IV team called back to inform nurse that they are not doing lab draws and referred nurse to provider or laboratory. Called phlebotomy around 0648 and spoke to Monica and said there is no phlebotomist available as of now.

## 2024-08-16 NOTE — PLAN OF CARE
Problem: Adult Inpatient Plan of Care  Goal: Absence of Hospital-Acquired Illness or Injury  Intervention: Identify and Manage Fall Risk  Recent Flowsheet Documentation  Taken 8/16/2024 0600 by Riya Hamlin RN  Safety Promotion/Fall Prevention: safety round/check completed  Taken 8/16/2024 0400 by Riya Hamlin RN  Safety Promotion/Fall Prevention: safety round/check completed  Taken 8/16/2024 0200 by Riya Hamlin RN  Safety Promotion/Fall Prevention: safety round/check completed  Taken 8/16/2024 0000 by Riya Hamlin RN  Safety Promotion/Fall Prevention: safety round/check completed  Taken 8/15/2024 2200 by Riya Hamlin RN  Safety Promotion/Fall Prevention: safety round/check completed  Taken 8/15/2024 2000 by Riya Hamlin RN  Safety Promotion/Fall Prevention: safety round/check completed  Intervention: Prevent Skin Injury  Recent Flowsheet Documentation  Taken 8/15/2024 2000 by Riya Hamlin RN  Body Position: position changed independently  Intervention: Prevent and Manage VTE (Venous Thromboembolism) Risk  Recent Flowsheet Documentation  Taken 8/15/2024 2000 by Riya Hamlin RN  Activity Management: bedrest  Range of Motion: active ROM (range of motion) encouraged     Problem: Adult Inpatient Plan of Care  Goal: Plan of Care Review  Outcome: Ongoing, Progressing  Flowsheets (Taken 8/16/2024 0619)  Plan of Care Reviewed With: patient  Outcome Evaluation: Pt has been found to have PNA and is on IV antibiotics. Pt maintained on oxygen via NC at 3L/min with no reported SOB. Pt is noted to get easily anxious. PRN melatonin given as ordered to help with sleep. Old IV line on left forearm with collapsed vein and removed. New IV line on right hand established but with slow blood return. IV team informed of sitution and need for blood draw. No significant/acute changes. Care continued.   Goal Outcome Evaluation:

## 2024-08-16 NOTE — CASE MANAGEMENT/SOCIAL WORK
Continued Stay Note   Isacc     Patient Name: Tracy Jane  MRN: [id number]  Today's Date: 8/16/2024    Admit Date: 8/14/2024    Plan: [facility] approved, no precert needed. Home 02 3L - [facility]   Discharge Plan       Row Name 08/16/24 1658       Plan    Plan [facility] approved, no precert needed. [facility]    Patient/Family in Agreement with Plan yes    Provided Post Acute Provider List? Yes    Post Acute Provider List Inpatient Rehab  SNF    Delivered To Patient    Method of Delivery In person    Plan Comments CM met with carson at bedside and reviewed previous admisson and completed readmission. PT feels like she was d/c too early and should of been on IV abx longer to clear up PNA. Pt lives at [facility], PT is recommending SNF. CM gave pt SNF list, pt is unsure if she will go to SNF. Pt is currently on 3L O2 (her baseline). DC Barriers: IV Abx, weakness, monitor labs             Expected Discharge Date and Time       Expected Discharge Date Expected Discharge Time    Aug 17, 2024           Shana Melara, BENOIT    phone [phone]  fax [phone]

## 2024-08-16 NOTE — PROGRESS NOTES
[facility] MEDICINE SERVICE  DAILY PROGRESS NOTE    NAME: Tracy Jane  : 1934  MRN: [id number]      LOS: 1 day     PROVIDER OF SERVICE: Dejon Amaya MD    Chief Complaint: Acute cystitis    Subjective:     Interval History:  History taken from: patient    No acute events, reports SOB, denies chest pain, stated her Sob has slight worse than yesterday, denies fever or chills.     Review of Systems:   Review of Systems Neg    Objective:     Vital Signs  Temp:  [97.7 °F (36.5 °C)-98.6 °F (37 °C)] 98.1 °F (36.7 °C)  Heart Rate:  [] 125  Resp:  [14-20] 17  BP: (127-151)/(49-88) 127/71  Flow (L/min):  [3-5] 3   Body mass index is 24.5 kg/m².    Physical Exam  General Appearance:  aaox3   Head:  Atrumatic normocephalic   Eyes:        No sclera icterus   Neck: Normal ROM   Pulm: Decreased breath sounds, crackles at bases more appreciable than yesterday   Cardio: Irregular rhythm   Extremities: No edema on BLE   Abdomen: Soft, non tender   /Renal: No suprapubic tenderness   Musculoskeletal: Moves all extremities         Neurologic: Aaox3, no focal neurological defcits             Scheduled Meds   amiodarone, 200 mg, Oral, Q12H  cefTRIAXone, 2,000 mg, Intravenous, Q24H  clopidogrel, 75 mg, Oral, Daily  dilTIAZem CD, 120 mg, Oral, Q24H  doxycycline, 100 mg, Intravenous, Q12H  furosemide, 40 mg, Oral, Daily  levothyroxine, 75 mcg, Oral, Q AM  metoprolol succinate XL, 50 mg, Oral, Q24H  pantoprazole, 40 mg, Oral, Daily  rosuvastatin, 20 mg, Oral, Daily  sertraline, 100 mg, Oral, Daily  sodium chloride, 10 mL, Intravenous, Q12H       PRN Meds     acetaminophen    aluminum-magnesium hydroxide-simethicone    senna-docusate sodium **AND** polyethylene glycol **AND** bisacodyl **AND** bisacodyl    Calcium Replacement - Follow Nurse / BPA Driven Protocol    ipratropium-albuterol    Magnesium Standard Dose Replacement - Follow Nurse / BPA Driven Protocol    melatonin    nitroglycerin     ondansetron ODT **OR** ondansetron    Phosphorus Replacement - Follow Nurse / BPA Driven Protocol    Potassium Replacement - Follow Nurse / BPA Driven Protocol    sodium chloride    sodium chloride   Infusions         Diagnostic Data    Results from last 7 days   Lab Units 08/15/24  0232 08/14/24  1512   WBC 10*3/mm3 12.34*  --    HEMOGLOBIN g/dL 8.6*  --    HEMATOCRIT % 29.8*  --    PLATELETS 10*3/mm3 204  --    GLUCOSE mg/dL 100* 93   CREATININE mg/dL 0.79 0.81   BUN mg/dL 10 10   SODIUM mmol/L 140 139   POTASSIUM mmol/L 4.7 3.3*   AST (SGOT) U/L  --  21   ALT (SGPT) U/L  --  15   ALK PHOS U/L  --  94   BILIRUBIN mg/dL  --  0.6   ANION GAP mmol/L 8.6 10.8       XR Chest 1 View    Result Date: 8/15/2024  Impression: Diffuse bilateral alveolar and interstitial infiltrates most likely secondary to pneumonia. Electronically Signed: Gordo Ashley MD  8/15/2024 1:03 AM EDT  Workstation ID: [id number]       I reviewed the patient's new clinical results.    Assessment/Plan:   Patient is a 89 y.o. female with PMHx of HTN, HLD, hypothyroid, GERD, anxiety, anemia, HFpEF, CAD, A. fib presented to [facility] for nausea and vomiting.  Of note, patient recently discharged from [facility] 2 weeks ago after undergoing treatment for a. Fib and CHF.  Since then has had nausea and vomiting causing difficulty with food intake at times.     Assessments:   Acute hypoxic respiratory failure: PNA  Possible UTI  Afib on Amio, not on AC s/p Watchman device  Status post Bioprosthetic aortic valve  CAD s/p CABG 2008, s/p stent in 2006  HFpEF, Last echo: 50-55 % LVEF  GERD  Hypothyroidism  HTN  HLD    Plan:   -continue abx for possible PNA, More crackles today that yesterday on exam   -Strict NPO, will have speech eval for possible aspiration   -CT chest w.o contrast  -D/c po lasix, start IV Lasix once, Monitor I/Os  -Blood Cx: 1/2, likely contaminated, will obtain repeat Blood CX  -B12/Folic /b9 for anemia, no overt signs of bleeding   -On amio/dilt/Cardizem   for afib  -Resume home meds  -AM labs      VTE Prophylaxis:  Mechanical VTE prophylaxis orders are present.         Code status is   Code Status and Medical Interventions: CPR (Attempt to Resuscitate); Full Support   Ordered at: 08/14/24 1640     Code Status (Patient has no pulse and is not breathing):    CPR (Attempt to Resuscitate)     Medical Interventions (Patient has pulse or is breathing):    Full Support       Time: 30 minutes    Part of this note may be an electronic transcription/translation of spoken language to printed text using the Dragon Dictation System.    Signature: Electronically signed by Dejon Amaya MD, 08/16/24, 07:29 EDT.  [facility] Hospitalist Team

## 2024-08-16 NOTE — PLAN OF CARE
Goal Outcome Evaluation:      Patient was seen for dysphagia evaluation per concerns for aspiration pneumonia. Patient reports that baseline diet is regular. Most recent chest x-ray revealed diffused bilateral alveolar and interstitial infiltrates most likely secondary to pneumonia. Patient reports that she takes her medication whole in applesauce. She reports no difficulty with taking medication. She does report occassional choking on food at times but not consistent. Patient does not have a history of CVA and/or esophageal stricture. Patient does have a history of GERD and pneumonia. Patient was reported to have nausea and vomiting. Patient was last seen for VFSS 12/22/21. Patient demonstrated functional oral and pharyngeal swallow at that time. 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 Family reported that patient has had a decrease appetite lately.  Patient has her own natural teeth with bridge and some missing teeth. Patient also reported that she has some loose teeth on her right side. Oral mechanism examination revealed patient demonstrated full ROM and mobility of lingual and labial structures. Lingual nad  jaw strength was adequate. Palatal movement was present greater movement noted on left versus right. Noted positive gag reflex. Properly positioned patient upright in bed prior to trials. Patient fed self.  Provided trials of thins via cup x 3, straw x 3. Given digital palpation, swallow response was timely. No overt s/s of aspiration was observed. No wet vocal quality was identified. Provided trials of puree x 3. Oral transit was 3 seconds in duration.  Patient cleared oral cavity between bites effectively. No clearing of throat, cough and/or vocal changes were detected. Provided trials of mixed consistency x 3. Oral transit ranged from 6-8 seconds in duration. Patient displayed adequate rotary chewing. Patient cleared oral cavity effectively between bites without  evidence of oral residue. Provided trials of STC x 3. Patient demonstrated adequate rotary chewing. Patient cleared oral cavity effectively given liquid wash as the item was dry. No clearing of throat, cough and/or vocal changes were identified. Provided trials of regular consistency x 3. Patient demonstrated adequate rotary chewing but slightly extended. Patient reports that she does have difficulty with some regular consistency such as blackman, nuts d/t missing teeth and some loose teeth. Patient reports that she tolerates taking pills whole in applesauce. Therefore, it is recommended that patient receive a STC diet at this time. Provide medication whole in applesauce. Patient should follow safe swallow strategies outlined in assessment.                          SLP Swallowing Diagnosis: functional pharyngeal phase, mild (08/16/24 1600)

## 2024-08-17 NOTE — PROGRESS NOTES
[facility] MEDICINE SERVICE  DAILY PROGRESS NOTE    NAME: Tracy Jane  : 1934  MRN: [id number]      LOS: 2 days     PROVIDER OF SERVICE: Dejon Amaya MD    Chief Complaint: Acute cystitis    Subjective:     Interval History:  History taken from: patient  Overnight patient desaturating in mid 85s. Patient stated she felt better yesterday after IV lasix yesterday, however now she feels same as she did last morning, has not received any lasix this AM due to unable to obtain IV access.    Review of Systems:   Review of Systems Worsening SOB    Objective:     Vital Signs  Temp:  [97.9 °F (36.6 °C)-98.1 °F (36.7 °C)] 97.9 °F (36.6 °C)  Heart Rate:  [] 95  Resp:  [14-26] 26  BP: (100-153)/(53-81) 153/63  Flow (L/min):  [3-5] 4   Body mass index is 24.5 kg/m².    Physical Exam  General Appearance:  aaox3   Head:  Atrumatic normocephalic   Eyes:        No sclera icterus   Neck: Normal ROM   Pulm: Decreased breath sounds, crackles at bases more appreciable than yesterday    Cardio: Irregular rhythm   Extremities: No edema on BLE   Abdomen: Soft, non tender   /Renal: No suprapubic tenderness   Musculoskeletal: Moves all extremities         Neurologic: Aaox3, no focal neurological defcits                Scheduled Meds   amiodarone, 200 mg, Oral, Q12H  budesonide, 0.5 mg, Nebulization, BID - RT  cefTRIAXone, 2,000 mg, Intravenous, Q24H  clopidogrel, 75 mg, Oral, Daily  dilTIAZem CD, 120 mg, Oral, Q24H  doxycycline, 100 mg, Intravenous, Q12H  furosemide, 60 mg, Intravenous, Q12H  ipratropium-albuterol, 3 mL, Nebulization, Q6H - RT  levothyroxine, 75 mcg, Oral, Q AM  methylPREDNISolone sodium succinate, 40 mg, Intravenous, Q12H  metoprolol succinate XL, 50 mg, Oral, Q24H  pantoprazole, 40 mg, Oral, Daily  rosuvastatin, 20 mg, Oral, Daily  sertraline, 100 mg, Oral, Daily  sodium chloride, 10 mL, Intravenous, Q12H       PRN Meds     acetaminophen    aluminum-magnesium hydroxide-simethicone     senna-docusate sodium **AND** polyethylene glycol **AND** bisacodyl **AND** bisacodyl    Calcium Replacement - Follow Nurse / BPA Driven Protocol    Magnesium Standard Dose Replacement - Follow Nurse / BPA Driven Protocol    melatonin    nitroglycerin    ondansetron ODT **OR** ondansetron    Phosphorus Replacement - Follow Nurse / BPA Driven Protocol    Potassium Replacement - Follow Nurse / BPA Driven Protocol    sodium chloride    sodium chloride   Infusions         Diagnostic Data    Results from last 7 days   Lab Units 08/17/24  0217   WBC 10*3/mm3 11.81*   HEMOGLOBIN g/dL 9.2*   HEMATOCRIT % 30.4*   PLATELETS 10*3/mm3 268   GLUCOSE mg/dL 107*   CREATININE mg/dL 0.85   BUN mg/dL 15   SODIUM mmol/L 138   POTASSIUM mmol/L 3.9   AST (SGOT) U/L 22   ALT (SGPT) U/L 12   ALK PHOS U/L 105   BILIRUBIN mg/dL 0.4   ANION GAP mmol/L 11.3       CT Chest Without Contrast Diagnostic    Result Date: 8/16/2024  Impression: 1. Worsening diffuse groundglass opacities with interlobular septal thickening most concerning for pulmonary edema and superimposed multifocal pneumonia. 2. Small bilateral pleural effusions increased from prior study. 3. Stable enlarged mediastinal lymph nodes likely reactive adenopathy. 4. Additional chronic findings above. Electronically Signed: Demetris Whiteside MD  8/16/2024 7:27 PM EDT  Workstation ID: [id number]       I reviewed the patient's new clinical results.    Assessment/Plan:   Patient is a 89 y.o. female with PMHx of HTN, HLD, hypothyroid, GERD, anxiety, anemia, HFpEF, CAD, A. fib presented to [facility] for nausea and vomiting.  Of note, patient recently discharged from [facility] 2 weeks ago after undergoing treatment for a. Fib and CHF.  Since then has had nausea and vomiting causing difficulty with food intake at times.      Assessments:   Acute hypoxic respiratory failure: PNA worsing  Possible UTI  Afib on Amio, not on AC s/p Watchman device  Status post Bioprosthetic aortic valve  CAD s/p CABG 2008, s/p  stent in 2006  HFpEF, Last echo: 50-55 % LVEF  GERD  Hypothyroidism  HTN  HLD     Plan:   -continue abx for possible PNA, More crackles today that yesterday on exam   -Strict NPO, will have speech eval for possible aspiration   -CT chest w.o contrast  -D/c po lasix, start IV Lasix once, Monitor I/Os  -Blood Cx: 1/2, likely contaminated, will obtain repeat Blood CX  -B12/Folic /b9 for anemia, no overt signs of bleeding   -On amio/dilt/Cardizem  for afib  -Resume home meds  -AM labs    8/17:  -CT Chest: Worsening diffuse groundglass opacities, pulmonary edema and superimposed multifocal pneumonia.   -crackles appreciated on exam, start lasix 60 bid,  solumedrol 40 mg bid, monitor strict I.os  -Continue Incentive spirometry   -Adolfo, pulmicort, Discussed with RT  -HR is better today, in 90s  -Will consult pulm today with concern of possible early ARDs  -Speech therapy reccs noted  -will follow on iv access, nursing staff in touch with PICC team to establish IV Acess  -am labs    Addendum:  -discussed with the patient and daughter (POA) - patient would like to be DNR/DNI, code status updated per patient and POA wishes.      VTE Prophylaxis:  Mechanical VTE prophylaxis orders are present.         Code status is   Code Status and Medical Interventions: CPR (Attempt to Resuscitate); Full Support   Ordered at: 08/14/24 1640     Code Status (Patient has no pulse and is not breathing):    CPR (Attempt to Resuscitate)     Medical Interventions (Patient has pulse or is breathing):    Full Support         Time: 30 minutes    Part of this note may be an electronic transcription/translation of spoken language to printed text using the Dragon Dictation System.    Signature: Electronically signed by Dejon Amaya MD, 08/17/24, 08:16 EDT.  [facility] Hospitalist Team

## 2024-08-17 NOTE — NURSING NOTE
Patient 02 is in the low 80's, RT has been called for treatment. According to RT the dosage for the treatment is not available and has sent a message to the Dr and is yet to hear from him.      Dr. Amaya has been paged twice to be informed of patient change in status and awaiting call back (06:13, 06:26).    Patient might need a midline due to frequent lost of iv and we couldn't put in a new PIV

## 2024-08-17 NOTE — CONSULTS
Group: [facility]         CONSULT NOTE    Patient Identification:  Tracy Jane  89 y.o.  female  1934  [id number]            Requesting physician: Attending physician    Reason for Consultation: Pneumonia      History of Present Illness:  89-year-old female with history of CAD status post CABG and bioprosthetic aortic valve replacement 2014, chronic hypoxemia 3 L of oxygen, peripheral vascular disease, atrial fibrillation status post Watchman procedure, HTN and HLD who presented 8/14/2024 with complaints of nausea, vomiting, fever, chills and shortness of breath.  The patient since leaving the hospital by the end of July was reported to still have generalized weakness and shortness of breath but got worse in the last few days    The patient is afebrile, she was tachycardic yesterday but today HR 77, /75, oxygen saturation 96% on 5 L per nasal cannula.  WBCs 11.8, hemoglobin 9.2, blood cultures 1 out of 2 Streptococcus alphahemolytic.  Chest x-ray today revealed diffuse bilateral pulmonary infiltrates with small bibasilar effusion, CT scan of the chest revealed bilateral infiltrates/groundglass opacity and small pleural effusion    Assessment:    Pneumonia due to unspecified pathogen  Acute on chronic hypoxemic respiratory failure: Due to mostly to decompensated heart failure and to a lesser degree to pneumonia  Decompensated heart failure  Multifocal pneumonia  UTI due to ESBL Klebsiella pneumonia  Small bilateral pleural effusion  CAD status post CABG  Status post aortic valve replacement 2014  A-fib, sick sinus syndrome: Status post Watchman procedure and pacemaker placement  HTN  HLD  RA: On methotrexate  GERD  Chronic anemia  Former smoker      Recommendations:  Antibiotic: Consult ID  Oxygen supplement and titration to maintain saturation 90 to 95%: Currently requiring 5 L per nasal cannula  Bronchodilators  Inhaled corticosteroids  IV steroids  Mucinex    Diuresis  HR/BP  control  Crestor, Plavix  Thyroid hormone replacement      I personally reviewed the radiological studies      Review of Sytems:  Constitutional: Negative for chills, and fever and positive for malaise/fatigue.   HENT: Negative.    Eyes: Negative.    Cardiovascular: Negative.    Respiratory: Positive for cough and shortness of breath.    Skin: Negative.    Musculoskeletal: Negative.    Gastrointestinal: Negative.    Genitourinary: Negative.    Neurological: Generalized weakness    Past Medical History:  Past Medical History:   Diagnosis Date    Abnormal ECG     Anxiety     Aortic valve replaced     Arrhythmia     Asthma     Atrial fibrillation     CAD (coronary artery disease)     Carotid artery disease     CHF (congestive heart failure)     Congenital heart disease     GERD (gastroesophageal reflux disease)     Gout     Heart murmur     Hemorrhagic stroke 2023    was on Coumadin for A-fib    Hyperlipidemia     Hypertension     Hyperthyroidism     Hypothyroidism     Myocardial infarction     Overactive bladder     Pacemaker     [facility]    Skin cancer        Past Surgical History:  Past Surgical History:   Procedure Laterality Date    AORTIC VALVE REPAIR/REPLACEMENT  2014    porcine    ATRIAL APPENDAGE EXCLUSION LEFT WITH TRANSESOPHAGEAL ECHOCARDIOGRAM Right 12/26/2023    Procedure: Atrial Appendage Occlusion;  Surgeon: Pk Crabtree MD;  Location: [facility] CATH INVASIVE LOC[facility];  Service: Cardiovascular;  Laterality: Right;    ATRIAL APPENDAGE EXCLUSION LEFT WITH TRANSESOPHAGEAL ECHOCARDIOGRAM N/A 12/26/2023    Procedure: Atrial Appendage Occlusion;  Surgeon: Gen Caballero MD;  Location:  [facility] CATH INVASIVE LOCATION;  Service: Cardiovascular;  Laterality: N/A;    BASAL CELL CARCINOMA EXCISION      spine, nose and arm, leg 2020    BREAST BIOPSY      CARDIAC CATHETERIZATION      CARDIAC VALVE REPLACEMENT      CAROTID STENT      CATARACT EXTRACTION      CHOLECYSTECTOMY      CORONARY ARTERY BYPASS  GRAFT      CORONARY STENT PLACEMENT      ENDOSCOPY N/A 04/24/2023    Procedure: ESOPHAGOGASTRODUODENOSCOPY with antrum body biopsies;  Surgeon: REGGIE Ace MD;  Location: [facility] ENDOSCOPY;  Service: Gastroenterology;  Laterality: N/A;  hiatal hernia    FINGER SURGERY      INSERT / REPLACE / REMOVE PACEMAKER      KNEE SURGERY      PACEMAKER IMPLANTATION      [facility]    SHOULDER SURGERY      RACHEL Bilateral 11/03/2023        Home Meds:  Medications Prior to Admission   Medication Sig Dispense Refill Last Dose    acetaminophen (TYLENOL) 650 MG 8 hr tablet Take 1 tablet by mouth Every 8 (Eight) Hours As Needed.   Past Month    amiodarone (PACERONE) 200 MG tablet Take 1 tablet by mouth Every 12 (Twelve) Hours for 30 days. (Patient taking differently: Take 1 tablet by mouth Every Night.) 60 tablet 0 Patient Taking Differently    apixaban (ELIQUIS) 2.5 MG tablet tablet Take 1 tablet by mouth Every 12 (Twelve) Hours for 30 days. Indications: Atrial Fibrillation 60 tablet 0 8/13/2024    calcium (OS-ROSHNI) 600 MG tablet Take 2 tablets by mouth Daily.   8/13/2024    Cholecalciferol (Vitamin D-3) 25 MCG (1000 UT) capsule Take 1,000 Units by mouth Daily. In the morning   8/13/2024    clopidogrel (PLAVIX) 75 MG tablet Take 1 tablet by mouth Daily. 90 tablet 3 8/13/2024    colchicine 0.6 MG tablet Take 1 tablet by mouth Daily.   8/13/2024    COLLAGEN PO Take 20 g by mouth Daily.   8/13/2024    Cranberry 500 MG capsule Take 500 mg by mouth Every Night.   8/13/2024    dilTIAZem CD (CARDIZEM CD) 120 MG 24 hr capsule Take 1 capsule by mouth Daily for 30 days. 30 capsule 0 8/13/2024    doxazosin (CARDURA) 2 MG tablet TAKE 1 TABLET BY MOUTH DAILY 90 tablet 0 8/13/2024    Ferrous Sulfate 28 MG tablet Take 1 tablet by mouth Daily. In the morning   8/13/2024    folic acid (FOLVITE) 1 MG tablet Take 1 tablet by mouth Daily.   8/13/2024    furosemide (LASIX) 40 MG tablet Take 1 tablet by mouth Daily.   8/13/2024     Glucosamine-Chondroit-Vit C-Mn (Glucosamine 1500 Complex) capsule Take 1,500 mg by mouth 2 (Two) Times a Day.   8/13/2024    levothyroxine (SYNTHROID, LEVOTHROID) 75 MCG tablet Take 1 tablet by mouth Daily. 5 days a week. Monday through Friday 8/13/2024    lubiprostone (AMITIZA) 24 MCG capsule Take 1 capsule by mouth As Needed.   Past Week    melatonin 5 MG sublingual tablet sublingual tablet Place 1 tablet under the tongue At Night As Needed.   8/13/2024    methotrexate 2.5 MG tablet Take 5 tablets by mouth Take As Directed. 5 tablets on Sunday Morning AND 5 tablets Sunday Evening = 10 Total Tablets on Sunday 8/13/2024    Methylsulfonylmethane (MSM) 1000 MG tablet Take 1 tablet by mouth 2 (Two) Times a Day.   8/13/2024    metoprolol succinate XL (TOPROL-XL) 50 MG 24 hr tablet Take 1 tablet by mouth Daily for 30 days. 30 tablet 0 8/13/2024    Multiple Vitamin (MULTIVITAMIN) tablet Take 1 tablet by mouth Daily.   8/13/2024    Omega-3 Fatty Acids (fish oil) 1200 MG capsule capsule Take 1 capsule by mouth 2 (Two) Times a Day With Meals.   8/13/2024    pantoprazole (PROTONIX) 40 MG EC tablet Take 1 tablet by mouth Daily.   8/13/2024    rosuvastatin (CRESTOR) 20 MG tablet TAKE 1 TABLET BY MOUTH DAILY 90 tablet 0 8/13/2024    sertraline (ZOLOFT) 100 MG tablet Take 1 tablet by mouth Daily.   8/13/2024    vitamin C (ASCORBIC ACID) 250 MG tablet Take 4 tablets by mouth Daily.   8/13/2024    Zinc 50 MG tablet Take 1 tablet by mouth Every Night.   8/13/2024    polyethylene glycol (MiraLax) 17 g packet Take 17 g by mouth Every Night.   More than a month       Allergies:  No Known Allergies    Social History:   Social History     Socioeconomic History    Marital status:     Number of children: 4    Years of education: GED   Tobacco Use    Smoking status: Former     Current packs/day: 0.00     Average packs/day: 1 pack/day for 4.0 years (4.0 ttl pk-yrs)     Types: Cigarettes     Start date: 1/1/1954     Quit date: 1958  "    Years since quittin.6     Passive exposure: Past    Smokeless tobacco: Never   Vaping Use    Vaping status: Never Used   Substance and Sexual Activity    Alcohol use: No    Drug use: No    Sexual activity: Defer       Family History:  Family History   Problem Relation Age of Onset    Hypertension Mother     Heart disease Father     Heart attack Father     Heart disease Sister     Kidney cancer Sister     Stroke Sister     Cancer Sister         breast    Cancer Sister     Heart disease Brother        Physical Exam:  /75 (BP Location: Left arm, Patient Position: Lying)   Pulse 77   Temp 98.3 °F (36.8 °C) (Oral)   Resp 21   Ht 152.4 cm (60\")   Wt 56.9 kg (125 lb 7.1 oz)   SpO2 96%   BMI 24.50 kg/m²  Body mass index is 24.5 kg/m². 96% 56.9 kg (125 lb 7.1 oz)  General Appearance:  Alert, patient looks chronically ill  HEENT:  Normocephalic, without obvious abnormality, Conjunctiva/corneas clear,.   Nares normal, no drainage     Neck:  Supple, symmetrical, trachea midline. No JVD.  Lungs /Chest wall:   Bilateral  rhonchi, respirations unlabored, symmetrical wall movement.     Heart:  Regular rate and rhythm, S1 S2 normal  Abdomen: Soft, non-tender, no masses, no organomegaly.    Extremities: No edema, no clubbing or cyanosis    LABS:  Lab Results   Component Value Date    CALCIUM 9.4 2024    PHOS 2.9 2024     Results from last 7 days   Lab Units 24  0217 08/15/24  0232 24  1512 24  1512 24  1304   MAGNESIUM mg/dL 1.5* 1.7  --  1.7  --    SODIUM mmol/L 138 140  --  139  --    POTASSIUM mmol/L 3.9 4.7  --  3.3*  --    CHLORIDE mmol/L 102 105  --  99  --    CO2 mmol/L 24.7 26.4  --  29.2*  --    BUN mg/dL 15 10  --  10  --    CREATININE mg/dL 0.85 0.79  --  0.81  --    GLUCOSE mg/dL 107* 100*   < > 93  --    CALCIUM mg/dL 9.4 9.3  --  9.4  --    WBC 10*3/mm3 11.81* 12.34*  --   --  9.01   HEMOGLOBIN g/dL 9.2* 8.6*  --   --  8.5*   PLATELETS 10*3/mm3 268 204  --   --  " 264   ALT (SGPT) U/L 12  --   --  15  --    AST (SGOT) U/L 22  --   --  21  --    PROCALCITONIN ng/mL  --  0.09  --   --   --     < > = values in this interval not displayed.     Lab Results   Component Value Date    TROPONINT 53 (C) 01/12/2024         Results from last 7 days   Lab Units 08/14/24  1639 08/14/24  1611 08/14/24  1353   BLOODCX  Streptococcus, Alpha Hemolytic* No growth at 2 days  --    URINECX   --   --  >100,000 CFU/mL Klebsiella pneumoniae ESBL*   BCIDPCR  Streptococcus spp, not A, B, or pneumoniae. Identification by BCID2 PCR.*  --   --      Results from last 7 days   Lab Units 08/17/24  0217 08/15/24  0232   PROCALCITONIN ng/mL  --  0.09   LACTATE mmol/L 1.2  --          Results from last 7 days   Lab Units 08/15/24  0841   ADENOVIRUS DETECTION BY PCR  Not Detected   CORONAVIRUS 229E  Not Detected   CORONAVIRUS HKU1  Not Detected   CORONAVIRUS NL63  Not Detected   CORONAVIRUS OC43  Not Detected   HUMAN METAPNEUMOVIRUS  Not Detected   HUMAN RHINOVIRUS/ENTEROVIRUS  Not Detected   INFLUENZA B PCR  Not Detected   PARAINFLUENZA 1  Not Detected   PARAINFLUENZA VIRUS 2  Not Detected   PARAINFLUENZA VIRUS 3  Not Detected   PARAINFLUENZA VIRUS 4  Not Detected   BORDETELLA PERTUSSIS PCR  Not Detected   CHLAMYDOPHILA PNEUMONIAE PCR  Not Detected   MYCOPLAMA PNEUMO PCR  Not Detected   INFLUENZA A PCR  Not Detected   RSV, PCR  Not Detected         Results from last 7 days   Lab Units 08/14/24  1639 08/14/24  1611 08/14/24  1353   BLOODCX  Streptococcus, Alpha Hemolytic* No growth at 2 days  --    URINECX   --   --  >100,000 CFU/mL Klebsiella pneumoniae ESBL*   BCIDPCR  Streptococcus spp, not A, B, or pneumoniae. Identification by BCID2 PCR.*  --   --      Lab Results   Component Value Date    TSH 2.940 07/30/2024     Estimated Creatinine Clearance: 35.5 mL/min (by C-G formula based on SCr of 0.85 mg/dL).  Results from last 7 days   Lab Units 08/14/24  1353   NITRITE UA  Positive*   WBC UA /HPF Too Numerous to  Count*   BACTERIA UA /HPF 4+*   SQUAM EPITHEL UA /HPF 0-2   URINECX  >100,000 CFU/mL Klebsiella pneumoniae ESBL*        Imaging:  Imaging Results (Last 24 Hours)       Procedure Component Value Units Date/Time    XR Chest 1 View [[id number]] Collected: 08/17/24 0816     Updated: 08/17/24 0820    Narrative:      XR CHEST 1 VW    Date of Exam: 8/17/2024 7:50 AM EDT    Indication: Crackles/PNA    Comparison: August 16, 2024    Findings:  Cardiac pacemaker device is present. Sternotomy wires are noted. Prosthetic heart valve is present. There are diffuse bilateral areas of airspace disease. Small bibasilar effusions are suggested. Patient had left shoulder arthroplasty.      Impression:      Impression:  1.Diffuse bilateral pulmonary infiltrates.  2.Small bibasilar effusions  3.Cardiomegaly. Patient has had prior cardiothoracic surgery.      Electronically Signed: Wyatt Wagner MD    8/17/2024 8:18 AM EDT    Workstation ID: [id number]    CT Chest Without Contrast Diagnostic [[id number]] Collected: 08/16/24 1914     Updated: 08/16/24 1929    Narrative:      CT CHEST WO CONTRAST DIAGNOSTIC    Date of Exam: 8/16/2024 5:02 PM EDT    Indication: worsening SOB.    Comparison: CT chest with contrast 7/29/2024    Technique: Axial CT images were obtained of the chest without contrast administration.  Sagittal and coronal reconstructions were performed.  Automated exposure control and iterative reconstruction methods were used.    Findings:  Noncontrast soft tissues of the lower neck are without acute abnormality. There is a left-sided AICD noted. Postsurgical changes of prior sternotomy and CABG. Left atrial appendage occlusion device. Prior aortic valve replacement. No pericardial   effusion. Mildly enlarged mediastinal lymph nodes are unchanged likely reactive adenopathy. There are small bilateral pleural effusions increased from the prior exam.    Trachea and mainstem bronchi are patent. There is no pneumothorax. There are  worsening groundglass opacities throughout the lungs with superimposed interlobular septal thickening most concerning for pulmonary edema and likely superimposed multifocal   pneumonia. No suspicious solid pulmonary nodule. There is pleural fluid noted in the right major fissure which is new from the prior exam.    The upper abdomen demonstrates normal noncontrast visualized portions of the liver, spleen and left adrenal gland. 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 Gallbladder absent. Left upper renal cyst partially imaged measuring 3   cm. No free fluid or air in the upper abdomen. Left humeral prosthesis partially imaged. Compression fracture at T12 unchanged. Remote healed fractures of the right posterior central 10th through 12th ribs.      Impression:      Impression:  1. Worsening diffuse groundglass opacities with interlobular septal thickening most concerning for pulmonary edema and superimposed multifocal pneumonia.  2. Small bilateral pleural effusions increased from prior study.  3. Stable enlarged mediastinal lymph nodes likely reactive adenopathy.  4. Additional chronic findings above.        Electronically Signed: Demetris Whiteside MD    8/16/2024 7:27 PM EDT    Workstation ID: [id number]              Current Meds:   SCHEDULE  amiodarone, 200 mg, Oral, Q12H  budesonide, 0.5 mg, Nebulization, BID - RT  cefTRIAXone, 2,000 mg, Intravenous, Q24H  clopidogrel, 75 mg, Oral, Daily  dilTIAZem CD, 120 mg, Oral, Q24H  doxycycline, 100 mg, Intravenous, Q12H  furosemide, 60 mg, Intravenous, Q12H  ipratropium-albuterol, 3 mL, Nebulization, Q6H - RT  levothyroxine, 75 mcg, Oral, Q AM  methylPREDNISolone sodium succinate, 40 mg, Intravenous, Q12H  metoprolol succinate XL, 50 mg, Oral, Q24H  pantoprazole, 40 mg, Oral, Daily  rosuvastatin, 20 mg, Oral, Daily  sertraline, 100 mg, Oral, Daily  sodium chloride, 10 mL, Intravenous, Q12H      Infusions     PRNs    acetaminophen    aluminum-magnesium  hydroxide-simethicone    senna-docusate sodium **AND** polyethylene glycol **AND** bisacodyl **AND** bisacodyl    Calcium Replacement - Follow Nurse / BPA Driven Protocol    Magnesium Standard Dose Replacement - Follow Nurse / BPA Driven Protocol    melatonin    nitroglycerin    ondansetron ODT **OR** ondansetron    Phosphorus Replacement - Follow Nurse / BPA Driven Protocol    Potassium Replacement - Follow Nurse / BPA Driven Protocol    sodium chloride    sodium chloride        Debora Correa MD  8/17/2024  11:25 EDT      Much of this encounter note is an electronic transcription/translation of spoken language to printed text using Dragon Software.

## 2024-08-17 NOTE — CONSULTS
Infectious Diseases Consult Note    Referring Provider: Sunny Ocampo    Reason for Consultation: UTI and bacteremia    Patient Care Team:  Amparo Eisenberg APRN as PCP - General (Nurse Practitioner)    Chief complaint shortness of breath, general weakness    Subjective     History of present illness:      This is a 89-year-old female presents to the hospital on 8/14/2024 with worsening shortness of breath and weakness.  Family states that patient has recently had COVID 3 times and has been generally weak and more hypoxic since that time.  She does use oxygen at home.  Patient also has congestive heart failure, history of an aortic valve bioprosthetic replacement, pacemaker, Watchman device.  Patient also had some nausea vomiting at admission but this has improved.     Review of Systems   Review of Systems   Constitutional:  Positive for fatigue.   HENT: Negative.     Eyes: Negative.    Respiratory:  Positive for shortness of breath.    Cardiovascular: Negative.    Gastrointestinal: Negative.    Endocrine: Negative.    Genitourinary: Negative.    Musculoskeletal: Negative.    Skin: Negative.    Neurological:  Positive for weakness.   Psychiatric/Behavioral: Negative.     All other systems reviewed and are negative.      Medications  Medications Prior to Admission   Medication Sig Dispense Refill Last Dose    acetaminophen (TYLENOL) 650 MG 8 hr tablet Take 1 tablet by mouth Every 8 (Eight) Hours As Needed.   Past Month    amiodarone (PACERONE) 200 MG tablet Take 1 tablet by mouth Every 12 (Twelve) Hours for 30 days. (Patient taking differently: Take 1 tablet by mouth Every Night.) 60 tablet 0 Patient Taking Differently    apixaban (ELIQUIS) 2.5 MG tablet tablet Take 1 tablet by mouth Every 12 (Twelve) Hours for 30 days. Indications: Atrial Fibrillation 60 tablet 0 8/13/2024    calcium (OS-ROSHNI) 600 MG tablet Take 2 tablets by mouth Daily.   8/13/2024    Cholecalciferol (Vitamin D-3) 25 MCG (1000 UT) capsule Take 1,000  Units by mouth Daily. In the morning   8/13/2024    clopidogrel (PLAVIX) 75 MG tablet Take 1 tablet by mouth Daily. 90 tablet 3 8/13/2024    colchicine 0.6 MG tablet Take 1 tablet by mouth Daily.   8/13/2024    COLLAGEN PO Take 20 g by mouth Daily.   8/13/2024    Cranberry 500 MG capsule Take 500 mg by mouth Every Night.   8/13/2024    dilTIAZem CD (CARDIZEM CD) 120 MG 24 hr capsule Take 1 capsule by mouth Daily for 30 days. 30 capsule 0 8/13/2024    doxazosin (CARDURA) 2 MG tablet TAKE 1 TABLET BY MOUTH DAILY 90 tablet 0 8/13/2024    Ferrous Sulfate 28 MG tablet Take 1 tablet by mouth Daily. In the morning   8/13/2024    folic acid (FOLVITE) 1 MG tablet Take 1 tablet by mouth Daily.   8/13/2024    furosemide (LASIX) 40 MG tablet Take 1 tablet by mouth Daily.   8/13/2024    Glucosamine-Chondroit-Vit C-Mn (Glucosamine 1500 Complex) capsule Take 1,500 mg by mouth 2 (Two) Times a Day.   8/13/2024    levothyroxine (SYNTHROID, LEVOTHROID) 75 MCG tablet Take 1 tablet by mouth Daily. 5 days a week. Monday through Friday 8/13/2024    lubiprostone (AMITIZA) 24 MCG capsule Take 1 capsule by mouth As Needed.   Past Week    melatonin 5 MG sublingual tablet sublingual tablet Place 1 tablet under the tongue At Night As Needed.   8/13/2024    methotrexate 2.5 MG tablet Take 5 tablets by mouth Take As Directed. 5 tablets on Sunday Morning AND 5 tablets Sunday Evening = 10 Total Tablets on Sunday 8/13/2024    Methylsulfonylmethane (MSM) 1000 MG tablet Take 1 tablet by mouth 2 (Two) Times a Day.   8/13/2024    metoprolol succinate XL (TOPROL-XL) 50 MG 24 hr tablet Take 1 tablet by mouth Daily for 30 days. 30 tablet 0 8/13/2024    Multiple Vitamin (MULTIVITAMIN) tablet Take 1 tablet by mouth Daily.   8/13/2024    Omega-3 Fatty Acids (fish oil) 1200 MG capsule capsule Take 1 capsule by mouth 2 (Two) Times a Day With Meals.   8/13/2024    pantoprazole (PROTONIX) 40 MG EC tablet Take 1 tablet by mouth Daily.   8/13/2024     rosuvastatin (CRESTOR) 20 MG tablet TAKE 1 TABLET BY MOUTH DAILY 90 tablet 0 8/13/2024    sertraline (ZOLOFT) 100 MG tablet Take 1 tablet by mouth Daily.   8/13/2024    vitamin C (ASCORBIC ACID) 250 MG tablet Take 4 tablets by mouth Daily.   8/13/2024    Zinc 50 MG tablet Take 1 tablet by mouth Every Night.   8/13/2024    polyethylene glycol (MiraLax) 17 g packet Take 17 g by mouth Every Night.   More than a month       History  Past Medical History:   Diagnosis Date    Abnormal ECG     Anxiety     Aortic valve replaced     Arrhythmia     Asthma     Atrial fibrillation     CAD (coronary artery disease)     Carotid artery disease     CHF (congestive heart failure)     Congenital heart disease     GERD (gastroesophageal reflux disease)     Gout     Heart murmur     Hemorrhagic stroke 2023    was on Coumadin for A-fib    Hyperlipidemia     Hypertension     Hyperthyroidism     Hypothyroidism     Myocardial infarction     Overactive bladder     Pacemaker     [facility]    Skin cancer      Past Surgical History:   Procedure Laterality Date    AORTIC VALVE REPAIR/REPLACEMENT  2014    porcine    ATRIAL APPENDAGE EXCLUSION LEFT WITH TRANSESOPHAGEAL ECHOCARDIOGRAM Right 12/26/2023    Procedure: Atrial Appendage Occlusion;  Surgeon: Pk Crabtree MD;  Location: [facility] CATH INVASIVE LOCATION;  Service: Cardiovascular;  Laterality: Right;    ATRIAL APPENDAGE EXCLUSION LEFT WITH TRANSESOPHAGEAL ECHOCARDIOGRAM N/A 12/26/2023    Procedure: Atrial Appendage Occlusion;  Surgeon: Gen Caballero MD;  Location: [facility] CATH INVASIVE LOCATION;  Service: Cardiovascular;  Laterality: N/A;    BASAL CELL CARCINOMA EXCISION      spine, nose and arm, leg 2020    BREAST BIOPSY      CARDIAC CATHETERIZATION      CARDIAC VALVE REPLACEMENT      CAROTID STENT      CATARACT EXTRACTION      CHOLECYSTECTOMY      CORONARY ARTERY BYPASS GRAFT      CORONARY STENT PLACEMENT      ENDOSCOPY N/A 04/24/2023    Procedure:  ESOPHAGOGASTRODUODENOSCOPY with antrum body biopsies;  Surgeon: REGGIE Ace MD;  Location: [facility] ENDOSCOPY;  Service: Gastroenterology;  Laterality: N/A;  hiatal hernia    FINGER SURGERY      INSERT / REPLACE / REMOVE PACEMAKER      KNEE SURGERY      PACEMAKER IMPLANTATION      [facility]    SHOULDER SURGERY      RACHEL Bilateral 11/03/2023       Family History  Family History   Problem Relation Age of Onset    Hypertension Mother     Heart disease Father     Heart attack Father     Heart disease Sister     Kidney cancer Sister     Stroke Sister     Cancer Sister         breast    Cancer Sister     Heart disease Brother        Social History   reports that she quit smoking about 66 years ago. Her smoking use included cigarettes. She started smoking about 70 years ago. She has a 4 pack-year smoking history. She has been exposed to tobacco smoke. She has never used smokeless tobacco. She reports that she does not drink alcohol and does not use drugs.    Allergies  Patient has no known allergies.    Objective     Vital Signs   Vital Signs (last 24 hours)         08/16 0700  08/17 0659 08/17 0700  08/17 1656   Most Recent      Temp (°F) 97.9 -  98.1    98.3 -  98.4     98.4 (36.9) 08/17 1408    Heart Rate 70 -  137    77 -  126     117 08/17 1527    Resp 14 -  26    20 -  26     20 08/17 1527    /53 -  153/94    116/75 -  130/75     116/75 08/17 1408    SpO2 (%) 91 -  100    95 -  98     98 08/17 1527    Flow (L/min) 3 -  5    3 -  5     5 08/17 1527    Oxygen Concentration (%)   32                    Physical Exam:  Physical Exam  Vitals and nursing note reviewed.   Constitutional:       General: She is not in acute distress.     Appearance: She is well-developed and normal weight. She is ill-appearing. She is not diaphoretic.   HENT:      Head: Normocephalic and atraumatic.   Eyes:      General: No scleral icterus.     Extraocular Movements: Extraocular movements intact.      Conjunctiva/sclera: Conjunctivae  normal.      Pupils: Pupils are equal, round, and reactive to light.   Cardiovascular:      Rate and Rhythm: Normal rate and regular rhythm.      Heart sounds: Normal heart sounds, S1 normal and S2 normal. No murmur heard.  Pulmonary:      Effort: Pulmonary effort is normal. No respiratory distress.      Breath sounds: No stridor. Rhonchi present. No wheezing or rales.   Chest:      Chest wall: No tenderness.   Abdominal:      General: Bowel sounds are normal. There is no distension.      Palpations: Abdomen is soft. There is no mass.      Tenderness: There is no abdominal tenderness. There is no guarding.   Genitourinary:     Comments: External catheter  Musculoskeletal:         General: No swelling, tenderness or deformity.      Cervical back: Neck supple.   Skin:     General: Skin is warm and dry.      Coloration: Skin is not pale.      Findings: No bruising, erythema or rash.   Neurological:      Mental Status: She is alert and oriented to person, place, and time.         Microbiology  Microbiology Results (last 10 days)       Procedure Component Value - Date/Time    Respiratory Panel PCR w/COVID-19(SARS-CoV-2) SACHA/RAMANDEEP/DEL/PAD/COR/RABIA In-House, NP Swab in UTM/VTM, 2 HR TAT - Swab, Nasopharynx [[id number]]  (Normal) Collected: 08/15/24 0841    Lab Status: Final result Specimen: Swab from Nasopharynx Updated: 08/15/24 0943     ADENOVIRUS, PCR Not Detected     Coronavirus 229E Not Detected     Coronavirus HKU1 Not Detected     Coronavirus NL63 Not Detected     Coronavirus OC43 Not Detected     COVID19 Not Detected     Human Metapneumovirus Not Detected     Human Rhinovirus/Enterovirus Not Detected     Influenza A PCR Not Detected     Influenza B PCR Not Detected     Parainfluenza Virus 1 Not Detected     Parainfluenza Virus 2 Not Detected     Parainfluenza Virus 3 Not Detected     Parainfluenza Virus 4 Not Detected     RSV, PCR Not Detected     Bordetella pertussis pcr Not Detected     Bordetella parapertussis PCR  Not Detected     Chlamydophila pneumoniae PCR Not Detected     Mycoplasma pneumo by PCR Not Detected    Narrative:      In the setting of a positive respiratory panel with a viral infection PLUS a negative procalcitonin without other underlying concern for bacterial infection, consider observing off antibiotics or discontinuation of antibiotics and continue supportive care. If the respiratory panel is positive for atypical bacterial infection (Bordetella pertussis, Chlamydophila pneumoniae, or Mycoplasma pneumoniae), consider antibiotic de-escalation to target atypical bacterial infection.    Blood Culture - Blood, Arm, Right [[id number]]  (Abnormal) Collected: 08/14/24 1639    Lab Status: Final result Specimen: Blood from Arm, Right Updated: 08/17/24 0637     Blood Culture Streptococcus, Alpha Hemolytic     Isolated from Aerobic Bottle     Gram Stain Aerobic Bottle Gram positive cocci in chains    Narrative:      Probable contaminant requires clinical correlation, susceptibility not performed unless requested by physician.    Blood Culture ID, PCR - Blood, Arm, Right [[id number]]  (Abnormal) Collected: 08/14/24 1639    Lab Status: Final result Specimen: Blood from Arm, Right Updated: 08/16/24 0814     BCID, PCR Streptococcus spp, not A, B, or pneumoniae. Identification by BCID2 PCR.     BOTTLE TYPE Aerobic Bottle    Blood Culture - Blood, Arm, Left [[id number]]  (Normal) Collected: 08/14/24 1611    Lab Status: Preliminary result Specimen: Blood from Arm, Left Updated: 08/17/24 1616     Blood Culture No growth at 3 days    Narrative:      Less than seven (7) mL's of blood was collected.  Insufficient quantity may yield false negative results.    Urine Culture - Urine, Urine, Clean Catch [[id number]]  (Abnormal)  (Susceptibility) Collected: 08/14/24 1353    Lab Status: Final result Specimen: Urine, Clean Catch Updated: 08/16/24 1146     Urine Culture >100,000 CFU/mL Klebsiella pneumoniae ESBL    Narrative:       Colonization of the urinary tract without infection is common. Treatment is discouraged unless the patient is symptomatic, pregnant, or undergoing an invasive urologic procedure.  Recent outcomes data supports the use of pip/tazo in the treatment of susceptible ESBL infections for uncomplicated UTI. Consider use of pip/tazo as a carbapenem-sparing regimen in applicable patients.    Susceptibility        Klebsiella pneumoniae ESBL      DESI      Amikacin Susceptible      Ertapenem Susceptible      Gentamicin Resistant      Levofloxacin Intermediate      Meropenem Susceptible      Nitrofurantoin Intermediate      Piperacillin + Tazobactam Susceptible      Tobramycin Intermediate      Trimethoprim + Sulfamethoxazole Resistant                           S. Pneumo Ag Urine or CSF - Urine, Urine, Clean Catch [[id number]]  (Normal) Collected: 08/14/24 1353    Lab Status: Final result Specimen: Urine, Clean Catch Updated: 08/15/24 0320     Strep Pneumo Ag Negative    Legionella Antigen, Urine - Urine, Urine, Clean Catch [[id number]]  (Normal) Collected: 08/14/24 1353    Lab Status: Final result Specimen: Urine, Clean Catch Updated: 08/15/24 0320     LEGIONELLA ANTIGEN, URINE Negative            Laboratory  Results from last 7 days   Lab Units 08/17/24  0217   WBC 10*3/mm3 11.81*   HEMOGLOBIN g/dL 9.2*   HEMATOCRIT % 30.4*   PLATELETS 10*3/mm3 268     Results from last 7 days   Lab Units 08/17/24  0217   SODIUM mmol/L 138   POTASSIUM mmol/L 3.9   CHLORIDE mmol/L 102   CO2 mmol/L 24.7   BUN mg/dL 15   CREATININE mg/dL 0.85   GLUCOSE mg/dL 107*   CALCIUM mg/dL 9.4     Results from last 7 days   Lab Units 08/17/24  0217   SODIUM mmol/L 138   POTASSIUM mmol/L 3.9   CHLORIDE mmol/L 102   CO2 mmol/L 24.7   BUN mg/dL 15   CREATININE mg/dL 0.85   GLUCOSE mg/dL 107*   CALCIUM mg/dL 9.4                   Radiology  Imaging Results (Last 72 Hours)       Procedure Component Value Units Date/Time    XR Chest 1 View [[id number]] Collected:  08/17/24 0816     Updated: 08/17/24 0820    Narrative:      XR CHEST 1 VW    Date of Exam: 8/17/2024 7:50 AM EDT    Indication: Crackles/PNA    Comparison: August 16, 2024    Findings:  Cardiac pacemaker device is present. Sternotomy wires are noted. Prosthetic heart valve is present. There are diffuse bilateral areas of airspace disease. Small bibasilar effusions are suggested. Patient had left shoulder arthroplasty.      Impression:      Impression:  1.Diffuse bilateral pulmonary infiltrates.  2.Small bibasilar effusions  3.Cardiomegaly. Patient has had prior cardiothoracic surgery.      Electronically Signed: Wyatt Wagner MD    8/17/2024 8:18 AM EDT    Workstation ID: [id number]    CT Chest Without Contrast Diagnostic [[id number]] Collected: 08/16/24 1914     Updated: 08/16/24 1929    Narrative:      CT CHEST WO CONTRAST DIAGNOSTIC    Date of Exam: 8/16/2024 5:02 PM EDT    Indication: worsening SOB.    Comparison: CT chest with contrast 7/29/2024    Technique: Axial CT images were obtained of the chest without contrast administration.  Sagittal and coronal reconstructions were performed.  Automated exposure control and iterative reconstruction methods were used.    Findings:  Noncontrast soft tissues of the lower neck are without acute abnormality. There is a left-sided AICD noted. Postsurgical changes of prior sternotomy and CABG. Left atrial appendage occlusion device. Prior aortic valve replacement. No pericardial   effusion. Mildly enlarged mediastinal lymph nodes are unchanged likely reactive adenopathy. There are small bilateral pleural effusions increased from the prior exam.    Trachea and mainstem bronchi are patent. There is no pneumothorax. There are worsening groundglass opacities throughout the lungs with superimposed interlobular septal thickening most concerning for pulmonary edema and likely superimposed multifocal   pneumonia. No suspicious solid pulmonary nodule. There is pleural fluid noted in the  right major fissure which is new from the prior exam.    The upper abdomen demonstrates normal noncontrast visualized portions of the liver, spleen and left adrenal gland. 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 Gallbladder absent. Left upper renal cyst partially imaged measuring 3   cm. No free fluid or air in the upper abdomen. Left humeral prosthesis partially imaged. Compression fracture at T12 unchanged. Remote healed fractures of the right posterior central 10th through 12th ribs.      Impression:      Impression:  1. Worsening diffuse groundglass opacities with interlobular septal thickening most concerning for pulmonary edema and superimposed multifocal pneumonia.  2. Small bilateral pleural effusions increased from prior study.  3. Stable enlarged mediastinal lymph nodes likely reactive adenopathy.  4. Additional chronic findings above.        Electronically Signed: Demetris Whiteside MD    8/16/2024 7:27 PM EDT    Workstation ID: [id number]    XR Chest 1 View [[id number]] Collected: 08/15/24 0101     Updated: 08/15/24 0105    Narrative:      XR CHEST 1 VW    Date of Exam: 8/15/2024 12:40 AM EDT    Indication: SOB    Comparison: Chest radiograph 7/29/2024    Findings:  There are postoperative changes from midline sternotomy with aortic valve replacement and coronary graft markers. There is a left subclavian pacemaker device in place with leads overlying the right atrium and right ventricle. The heart is enlarged. There   are diffuse bilateral alveolar and interstitial markings throughout both lungs most likely secondary to pneumonia. There are postoperative changes from left shoulder hemiarthroplasty.      Impression:      Impression:  Diffuse bilateral alveolar and interstitial infiltrates most likely secondary to pneumonia.        Electronically Signed: Gordo Ashley MD    8/15/2024 1:03 AM EDT    Workstation ID: [id number]            Cardiology      Results Review:  I have reviewed all  clinical data, test, lab, and imaging results.       Schedule Meds  amiodarone, 200 mg, Oral, Q12H  budesonide, 0.5 mg, Nebulization, BID - RT  clopidogrel, 75 mg, Oral, Daily  dilTIAZem CD, 120 mg, Oral, Q24H  furosemide, 60 mg, Intravenous, Q12H  ipratropium-albuterol, 3 mL, Nebulization, Q4H - RT  levothyroxine, 75 mcg, Oral, Q AM  meropenem, 1,000 mg, Intravenous, Q12H  methylPREDNISolone sodium succinate, 40 mg, Intravenous, Q12H  metoprolol succinate XL, 50 mg, Oral, Q24H  pantoprazole, 40 mg, Oral, Daily  rosuvastatin, 20 mg, Oral, Daily  sertraline, 100 mg, Oral, Daily  sodium chloride, 10 mL, Intravenous, Q12H        Infusion Meds       PRN Meds    acetaminophen    aluminum-magnesium hydroxide-simethicone    senna-docusate sodium **AND** polyethylene glycol **AND** bisacodyl **AND** bisacodyl    Calcium Replacement - Follow Nurse / BPA Driven Protocol    Magnesium Standard Dose Replacement - Follow Nurse / BPA Driven Protocol    melatonin    nitroglycerin    ondansetron ODT **OR** ondansetron    Phosphorus Replacement - Follow Nurse / BPA Driven Protocol    Potassium Replacement - Follow Nurse / BPA Driven Protocol    sodium chloride    sodium chloride      Assessment & Plan             Assessment    Hypoxia.  Most likely secondary to congestive heart failure.  Patient is chronically on oxygen 2 L at home.  Currently on 5 L  CT scan of the chest is consistent with pulmonary edema.  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    Positive blood culture in 1 out of 2 sets for Streptococcus species.  This is usually contamination however patient has bioprosthetic aortic valve so we need to rule out endocarditis.    UTI with urine cultures growing ESBL  Klebsiella pneumoniae.  Patient had history of recurrent UTI    Acute kidney injury    S/p aortic valve replacement with bioprosthetic valve in the past    S/p pacemaker placement in the past and Watchman  procedure    Poor dental hygiene    Plan    Discontinue IV Rocephin and doxycycline  Start IV meropenem 1 g every 12 hours  Repeat blood cultures x 2 sets  CT stone protocol to rule out obstructive uropathy  Continue supportive care  A.m. labs  The case was discussed with the patient's daughter at bedside  2D echo to rule out vegetation of the aortic    Lengthy discussion with the patient's daughter about aggressive workup for this patient.  They do not seem interested in transesophageal echo.    The above note was transcribed for Dr. Santana-physical exam and review of systems were performed by him    Luz Berry, YEYO  08/17/24  16:56 EDT    Note is dictated utilizing voice recognition software/Dragon

## 2024-08-17 NOTE — CONSULTS
Order noted for PICC placement. Pt chart/MAR reviewed. Pt is not on any IV vesicants or vasopressors. No mention of long term/home antibiotic therapy. This pt is not currently an appropriate candidate for a PICC.     A 20G ultrasound guided IV was placed without difficulty to the right upper arm.    If the pt's condition changes, and a PICC or midline is required, please re-enter a PICC/VAT consult.

## 2024-08-17 NOTE — PLAN OF CARE
Problem: Pain Acute  Goal: Acceptable Pain Control and Functional Ability  Outcome: Ongoing, Progressing  Intervention: Prevent or Manage Pain  Recent Flowsheet Documentation  Taken 8/17/2024 0400 by Angelica Love RN  Medication Review/Management: medications reviewed  Taken 8/17/2024 0200 by Angelica Love RN  Medication Review/Management: medications reviewed  Taken 8/17/2024 0000 by Angelica Love RN  Medication Review/Management: medications reviewed  Taken 8/16/2024 2200 by Angelica Love RN  Medication Review/Management: medications reviewed  Taken 8/16/2024 2000 by Angelica Love RN  Medication Review/Management: medications reviewed  Intervention: Develop Pain Management Plan  Recent Flowsheet Documentation  Taken 8/17/2024 0400 by Angelica Love RN  Pain Management Interventions: see MAR  Taken 8/17/2024 0000 by Angelica Love RN  Pain Management Interventions: see MAR  Taken 8/16/2024 2200 by Angelica Love RN  Pain Management Interventions: see MAR  Taken 8/16/2024 2000 by Angelica Love RN  Pain Management Interventions: see MAR  Intervention: Optimize Psychosocial Wellbeing  Recent Flowsheet Documentation  Taken 8/16/2024 2000 by Angelica Love RN  Supportive Measures: active listening utilized  Diversional Activities: television     Problem: Anemia  Goal: Anemia Symptom Improvement  Outcome: Ongoing, Progressing  Intervention: Monitor and Manage Anemia  Recent Flowsheet Documentation  Taken 8/17/2024 0400 by Angelica Love RN  Safety Promotion/Fall Prevention:   activity supervised   clutter free environment maintained   fall prevention program maintained   nonskid shoes/slippers when out of bed   room organization consistent   safety round/check completed  Taken 8/17/2024 0200 by Angelica Love RN  Safety Promotion/Fall Prevention:   activity supervised   clutter free environment maintained   fall prevention program maintained   nonskid shoes/slippers when out of  bed   room organization consistent   safety round/check completed  Taken 8/17/2024 0000 by Angelica Love RN  Safety Promotion/Fall Prevention:   activity supervised   clutter free environment maintained   fall prevention program maintained   lighting adjusted   nonskid shoes/slippers when out of bed   room organization consistent   safety round/check completed  Taken 8/16/2024 2200 by Angelica Love RN  Safety Promotion/Fall Prevention:   activity supervised   clutter free environment maintained   fall prevention program maintained   lighting adjusted   nonskid shoes/slippers when out of bed   room organization consistent   safety round/check completed  Taken 8/16/2024 2000 by Angelica Love RN  Safety Promotion/Fall Prevention:   activity supervised   clutter free environment maintained   fall prevention program maintained   lighting adjusted   nonskid shoes/slippers when out of bed   room organization consistent   safety round/check completed     Problem: Adult Inpatient Plan of Care  Goal: Plan of Care Review  Outcome: Ongoing, Progressing  Flowsheets (Taken 8/17/2024 0448)  Progress: improving  Plan of Care Reviewed With: patient  Goal: Patient-Specific Goal (Individualized)  Outcome: Ongoing, Progressing  Goal: Absence of Hospital-Acquired Illness or Injury  Outcome: Ongoing, Progressing  Intervention: Identify and Manage Fall Risk  Recent Flowsheet Documentation  Taken 8/17/2024 0400 by Angelica Love RN  Safety Promotion/Fall Prevention:   activity supervised   clutter free environment maintained   fall prevention program maintained   nonskid shoes/slippers when out of bed   room organization consistent   safety round/check completed  Taken 8/17/2024 0200 by Angelica Love RN  Safety Promotion/Fall Prevention:   activity supervised   clutter free environment maintained   fall prevention program maintained   nonskid shoes/slippers when out of bed   room organization consistent   safety  round/check completed  Taken 8/17/2024 0000 by Angelica Love RN  Safety Promotion/Fall Prevention:   activity supervised   clutter free environment maintained   fall prevention program maintained   lighting adjusted   nonskid shoes/slippers when out of bed   room organization consistent   safety round/check completed  Taken 8/16/2024 2200 by Angelica Love RN  Safety Promotion/Fall Prevention:   activity supervised   clutter free environment maintained   fall prevention program maintained   lighting adjusted   nonskid shoes/slippers when out of bed   room organization consistent   safety round/check completed  Taken 8/16/2024 2000 by Angelica Love RN  Safety Promotion/Fall Prevention:   activity supervised   clutter free environment maintained   fall prevention program maintained   lighting adjusted   nonskid shoes/slippers when out of bed   room organization consistent   safety round/check completed  Intervention: Prevent Skin Injury  Recent Flowsheet Documentation  Taken 8/17/2024 0400 by Angelica Love RN  Body Position: supine, legs elevated  Taken 8/17/2024 0200 by Angelica Love RN  Body Position: supine, legs elevated  Taken 8/17/2024 0000 by Angelica Love RN  Body Position: supine, legs elevated  Taken 8/16/2024 2200 by Angelica Love RN  Body Position: position changed independently  Taken 8/16/2024 2000 by Angelica Love RN  Body Position: position changed independently  Intervention: Prevent and Manage VTE (Venous Thromboembolism) Risk  Recent Flowsheet Documentation  Taken 8/17/2024 0400 by Angelica Love RN  Activity Management: bedrest  Taken 8/17/2024 0200 by Angelica Love RN  Activity Management: bedrest  Taken 8/17/2024 0000 by Angelica Love RN  Activity Management: bedrest  Taken 8/16/2024 2200 by Angelica Love RN  Activity Management: bedrest  Taken 8/16/2024 2000 by Angelica Love RN  Activity Management: bedrest  Range of Motion: active ROM (range of  motion) encouraged  Intervention: Prevent Infection  Recent Flowsheet Documentation  Taken 8/17/2024 0400 by Angelica Love RN  Infection Prevention:   rest/sleep promoted   single patient room provided  Taken 8/17/2024 0200 by Angelica Love RN  Infection Prevention:   rest/sleep promoted   single patient room provided  Taken 8/17/2024 0000 by Angelica Love RN  Infection Prevention:   rest/sleep promoted   single patient room provided  Taken 8/16/2024 2200 by Angelica Love RN  Infection Prevention:   rest/sleep promoted   single patient room provided  Taken 8/16/2024 2000 by Angelica Love RN  Infection Prevention:   rest/sleep promoted   single patient room provided  Goal: Optimal Comfort and Wellbeing  Outcome: Ongoing, Progressing  Intervention: Monitor Pain and Promote Comfort  Recent Flowsheet Documentation  Taken 8/17/2024 0400 by Angelica Love RN  Pain Management Interventions: see MAR  Taken 8/17/2024 0000 by Angelica Love RN  Pain Management Interventions: see MAR  Taken 8/16/2024 2200 by Angelica Love RN  Pain Management Interventions: see MAR  Taken 8/16/2024 2000 by Angelica Love RN  Pain Management Interventions: see MAR  Intervention: Provide Person-Centered Care  Recent Flowsheet Documentation  Taken 8/16/2024 2000 by Angelica Love RN  Trust Relationship/Rapport:   care explained   questions answered   questions encouraged  Goal: Readiness for Transition of Care  Outcome: Ongoing, Progressing     Problem: Asthma Comorbidity  Goal: Maintenance of Asthma Control  Outcome: Ongoing, Progressing  Intervention: Maintain Asthma Symptom Control  Recent Flowsheet Documentation  Taken 8/17/2024 0400 by Angelica Love RN  Medication Review/Management: medications reviewed  Taken 8/17/2024 0200 by Angelica Love RN  Medication Review/Management: medications reviewed  Taken 8/17/2024 0000 by Angelica Love RN  Medication Review/Management: medications reviewed  Taken  8/16/2024 2200 by Angelica Love RN  Medication Review/Management: medications reviewed  Taken 8/16/2024 2000 by Angelica Love RN  Medication Review/Management: medications reviewed     Problem: COPD (Chronic Obstructive Pulmonary Disease) Comorbidity  Goal: Maintenance of COPD Symptom Control  Outcome: Ongoing, Progressing  Intervention: Maintain COPD-Symptom Control  Recent Flowsheet Documentation  Taken 8/17/2024 0400 by Angelica Love RN  Medication Review/Management: medications reviewed  Taken 8/17/2024 0200 by Angelica Love RN  Medication Review/Management: medications reviewed  Taken 8/17/2024 0000 by Angelica Love RN  Medication Review/Management: medications reviewed  Taken 8/16/2024 2200 by Angelica Love RN  Medication Review/Management: medications reviewed  Taken 8/16/2024 2000 by Angelica Love RN  Supportive Measures: active listening utilized  Medication Review/Management: medications reviewed     Problem: Heart Failure Comorbidity  Goal: Maintenance of Heart Failure Symptom Control  Outcome: Ongoing, Progressing  Intervention: Maintain Heart Failure-Management  Recent Flowsheet Documentation  Taken 8/17/2024 0400 by Angelica Love RN  Medication Review/Management: medications reviewed  Taken 8/17/2024 0200 by Angelica Love RN  Medication Review/Management: medications reviewed  Taken 8/17/2024 0000 by Angelica Love RN  Medication Review/Management: medications reviewed  Taken 8/16/2024 2200 by Angelica Love RN  Medication Review/Management: medications reviewed  Taken 8/16/2024 2000 by Angelica Love RN  Medication Review/Management: medications reviewed     Problem: Hypertension Comorbidity  Goal: Blood Pressure in Desired Range  Outcome: Ongoing, Progressing  Intervention: Maintain Blood Pressure Management  Recent Flowsheet Documentation  Taken 8/17/2024 0400 by Angelica Love RN  Medication Review/Management: medications reviewed  Taken 8/17/2024  0200 by Angelica Love RN  Medication Review/Management: medications reviewed  Taken 8/17/2024 0000 by Angelica Love RN  Medication Review/Management: medications reviewed  Taken 8/16/2024 2200 by Angelica Love RN  Medication Review/Management: medications reviewed  Taken 8/16/2024 2000 by Angelica Love RN  Medication Review/Management: medications reviewed     Problem: Skin Injury Risk Increased  Goal: Skin Health and Integrity  Outcome: Ongoing, Progressing  Intervention: Optimize Skin Protection  Recent Flowsheet Documentation  Taken 8/17/2024 0400 by Angelica Love RN  Head of Bed (HOB) Positioning: HOB elevated  Taken 8/17/2024 0200 by Angelica Love RN  Head of Bed (HOB) Positioning: HOB elevated  Taken 8/17/2024 0000 by Angelica Love RN  Head of Bed (HOB) Positioning: HOB elevated  Taken 8/16/2024 2200 by Angelica Love RN  Head of Bed (HOB) Positioning: HOB elevated  Taken 8/16/2024 2000 by Angelica Love RN  Pressure Reduction Techniques: frequent weight shift encouraged  Head of Bed (HOB) Positioning: HOB elevated  Pressure Reduction Devices: positioning supports utilized     Problem: Fall Injury Risk  Goal: Absence of Fall and Fall-Related Injury  Outcome: Ongoing, Progressing  Intervention: Identify and Manage Contributors  Recent Flowsheet Documentation  Taken 8/17/2024 0400 by Angelica Love RN  Medication Review/Management: medications reviewed  Taken 8/17/2024 0200 by Angelica Love RN  Medication Review/Management: medications reviewed  Taken 8/17/2024 0000 by Angelica Love RN  Medication Review/Management: medications reviewed  Taken 8/16/2024 2200 by Angelica Love RN  Medication Review/Management: medications reviewed  Taken 8/16/2024 2000 by Angelica Love RN  Medication Review/Management: medications reviewed  Intervention: Promote Injury-Free Environment  Recent Flowsheet Documentation  Taken 8/17/2024 0400 by Angelica Love RN  Safety  Promotion/Fall Prevention:   activity supervised   clutter free environment maintained   fall prevention program maintained   nonskid shoes/slippers when out of bed   room organization consistent   safety round/check completed  Taken 8/17/2024 0200 by Angelica Love RN  Safety Promotion/Fall Prevention:   activity supervised   clutter free environment maintained   fall prevention program maintained   nonskid shoes/slippers when out of bed   room organization consistent   safety round/check completed  Taken 8/17/2024 0000 by Angelica Love RN  Safety Promotion/Fall Prevention:   activity supervised   clutter free environment maintained   fall prevention program maintained   lighting adjusted   nonskid shoes/slippers when out of bed   room organization consistent   safety round/check completed  Taken 8/16/2024 2200 by Angelica Love RN  Safety Promotion/Fall Prevention:   activity supervised   clutter free environment maintained   fall prevention program maintained   lighting adjusted   nonskid shoes/slippers when out of bed   room organization consistent   safety round/check completed  Taken 8/16/2024 2000 by Angelica Love RN  Safety Promotion/Fall Prevention:   activity supervised   clutter free environment maintained   fall prevention program maintained   lighting adjusted   nonskid shoes/slippers when out of bed   room organization consistent   safety round/check completed   Goal Outcome Evaluation:  Plan of Care Reviewed With: patient        Progress: improving

## 2024-08-18 NOTE — PROGRESS NOTES
Daily Progress Note          Assessment    Pneumonia due to unspecified pathogen  Acute on chronic hypoxemic respiratory failure: Due to mostly to decompensated heart failure and to a lesser degree to pneumonia  Decompensated heart failure  Multifocal pneumonia  UTI due to ESBL Klebsiella pneumonia  Small bilateral pleural effusion  CAD status post CABG  Status post aortic valve replacement 2014  A-fib, sick sinus syndrome: Status post Watchman procedure and pacemaker placement  HTN  HLD  RA: On methotrexate  GERD  Chronic anemia  Former smoker        Recommendations:  Antibiotic: As per ID: On meropenem  Oxygen supplement and titration to maintain saturation 90 to 95%: Currently requiring 3 L per nasal cannula  Bronchodilators  Inhaled corticosteroids  IV steroids  Mucinex     Diuresis  HR/BP control  Crestor, Plavix  Thyroid hormone replacement        I personally reviewed the radiological studies             LOS: 3 days     Subjective     Cough and shortness of breath    Objective     Vital signs for last 24 hours:  Vitals:    08/18/24 0742 08/18/24 0840 08/18/24 1100 08/18/24 1104   BP:  132/46     BP Location:       Patient Position:       Pulse: 82 81 83 83   Resp: 16  16 16   Temp:       TempSrc:       SpO2: 97%  98% 98%   Weight:       Height:           Intake/Output last 3 shifts:  I/O last 3 completed shifts:  In: 180 [P.O.:180]  Out: 1950 [Urine:1950]  Intake/Output this shift:  No intake/output data recorded.      Radiology  Imaging Results (Last 24 Hours)       Procedure Component Value Units Date/Time    CT Abdomen Pelvis Stone Protocol [[id number]] Resulted: 08/17/24 2108     Updated: 08/17/24 2116            Labs:  Results from last 7 days   Lab Units 08/18/24  0241   WBC 10*3/mm3 12.36*   HEMOGLOBIN g/dL 8.8*   HEMATOCRIT % 28.3*   PLATELETS 10*3/mm3 315     Results from last 7 days   Lab Units 08/18/24  0553   SODIUM mmol/L 142   POTASSIUM mmol/L 3.0*   CHLORIDE mmol/L 101   CO2 mmol/L 27.1   BUN  mg/dL 25*   CREATININE mg/dL 0.94   CALCIUM mg/dL 9.4   BILIRUBIN mg/dL 0.4   ALK PHOS U/L 101   ALT (SGPT) U/L 16   AST (SGOT) U/L 30   GLUCOSE mg/dL 123*         Results from last 7 days   Lab Units 08/18/24  0553 08/17/24  0217 08/14/24  1512   ALBUMIN g/dL 2.8* 2.8* 3.4*             Results from last 7 days   Lab Units 08/18/24  0553   MAGNESIUM mg/dL 2.5*                   Meds:   SCHEDULE  amiodarone, 200 mg, Oral, Q12H  budesonide, 0.5 mg, Nebulization, BID - RT  clopidogrel, 75 mg, Oral, Daily  dilTIAZem CD, 120 mg, Oral, Q24H  furosemide, 60 mg, Intravenous, Q12H  ipratropium-albuterol, 3 mL, Nebulization, Q4H - RT  levothyroxine, 75 mcg, Oral, Q AM  meropenem, 1,000 mg, Intravenous, Q12H  methylPREDNISolone sodium succinate, 40 mg, Intravenous, Q12H  metoprolol succinate XL, 50 mg, Oral, Q24H  pantoprazole, 40 mg, Oral, Daily  potassium chloride ER, 40 mEq, Oral, Q4H  rosuvastatin, 20 mg, Oral, Daily  sertraline, 100 mg, Oral, Daily  sodium chloride, 10 mL, Intravenous, Q12H      Infusions     PRNs    acetaminophen    aluminum-magnesium hydroxide-simethicone    senna-docusate sodium **AND** polyethylene glycol **AND** bisacodyl **AND** bisacodyl    Calcium Replacement - Follow Nurse / BPA Driven Protocol    Magnesium Standard Dose Replacement - Follow Nurse / BPA Driven Protocol    melatonin    nitroglycerin    ondansetron ODT **OR** ondansetron    Phosphorus Replacement - Follow Nurse / BPA Driven Protocol    Potassium Replacement - Follow Nurse / BPA Driven Protocol    sodium chloride    sodium chloride    Physical Exam:  General Appearance:  Alert   HEENT:  Normocephalic, without obvious abnormality, Conjunctiva/corneas clear,.   Nares normal, no drainage     Neck:  Supple, symmetrical, trachea midline.   Lungs /Chest wall:   Bilateral basal rhonchi, respirations unlabored, symmetrical wall movement.     Heart:  Regular rate and rhythm, S1 S2 normal, 2/6 systolic murmur  Abdomen: Soft, non-tender, no  masses, no organomegaly.    Extremities: No edema, no clubbing or cyanosis     ROS  Constitutional: Negative for chills, fever and malaise/fatigue.   HENT: Negative.    Eyes: Negative.    Cardiovascular: Negative.    Respiratory: Positive for cough and shortness of breath.    Skin: Negative.    Musculoskeletal: Negative.    Gastrointestinal: Negative.    Genitourinary: Negative.    Neurological: Generalized weakness      I reviewed the recent clinical results  I personally reviewed the latest radiological studies    Part of this note may be an electronic transcription/translation of spoken language to printed text using the Dragon Dictation System.

## 2024-08-18 NOTE — PROGRESS NOTES
Infectious Diseases Progress Note      LOS: 3 days   Patient Care Team:  Amparo Eisenberg APRN as PCP - General (Nurse Practitioner)    Chief Complaint: Shortness of breath, general weakness    Subjective       The patient has been afebrile for the last 24 hours.  The patient is on 3 L of oxygen by nasal cannula hemodynamically stable, and is tolerating antimicrobial therapy.  Patient states that she feels slightly better today      Review of Systems:   Review of Systems   Constitutional:  Positive for fatigue.   HENT: Negative.     Eyes: Negative.    Respiratory:  Positive for shortness of breath.    Cardiovascular: Negative.    Gastrointestinal: Negative.    Endocrine: Negative.    Genitourinary: Negative.    Musculoskeletal: Negative.    Skin: Negative.    Neurological:  Positive for weakness.   Psychiatric/Behavioral: Negative.     All other systems reviewed and are negative.       Objective     Vital Signs  Temp:  [98 °F (36.7 °C)-98.4 °F (36.9 °C)] 98 °F (36.7 °C)  Heart Rate:  [] 83  Resp:  [16-22] 16  BP: (113-135)/(46-75) 132/46    Physical Exam:  Physical Exam  Vitals and nursing note reviewed.   Constitutional:       General: She is not in acute distress.     Appearance: She is well-developed and normal weight. She is ill-appearing. She is not diaphoretic.   HENT:      Head: Normocephalic and atraumatic.   Eyes:      General: No scleral icterus.     Extraocular Movements: Extraocular movements intact.      Conjunctiva/sclera: Conjunctivae normal.      Pupils: Pupils are equal, round, and reactive to light.   Cardiovascular:      Rate and Rhythm: Normal rate and regular rhythm.      Heart sounds: Normal heart sounds, S1 normal and S2 normal. No murmur heard.  Pulmonary:      Effort: Pulmonary effort is normal. No respiratory distress.      Breath sounds: No stridor. No wheezing or rales.      Comments: Mild lower lobe crackles  Chest:      Chest wall: No tenderness.   Abdominal:      General:  Bowel sounds are normal. There is no distension.      Palpations: Abdomen is soft. There is no mass.      Tenderness: There is no abdominal tenderness. There is no guarding.   Genitourinary:     Comments: External catheter  Musculoskeletal:         General: No swelling, tenderness or deformity.      Cervical back: Neck supple.   Skin:     General: Skin is warm and dry.      Coloration: Skin is not pale.      Findings: No bruising, erythema or rash.   Neurological:      Mental Status: She is alert and oriented to person, place, and time.      Comments: General Weakness   Psychiatric:         Mood and Affect: Mood normal.          Results Review:    I have reviewed all clinical data, test, lab, and imaging results.     Radiology  CT Abdomen Pelvis Stone Protocol    Result Date: 8/18/2024  CT ABDOMEN PELVIS STONE PROTOCOL Date of Exam: 8/17/2024 9:08 PM EDT Indication: Rule out obstructive uropathy. UTI. Comparison: CT abdomen/pelvis 12/21/2021. Chest CT 8/16/2024. Technique: Axial CT images were obtained of the abdomen and pelvis without the administration of contrast. Sagittal and coronal reconstructions were performed.  Automated exposure control and iterative reconstruction methods were used. Findings: Lung Bases: Multifocal groundglass opacities with interlobular septal thickening. Small bilateral pleural effusions. Coronary artery calcifications. Pacemaker leads partially imaged. Liver: Scattered calcifications, likely sequelae of prior granulomatous disease.  Gallbladder and biliary tree: Cholecystectomy  Spleen: Scattered calcifications, likely sequelae of prior granulomatous disease.  Pancreas: No significant abnormality.  Adrenal glands: No significant abnormality.  Kidneys and ureters: Left simple renal cyst. No hydroureteronephrosis. No radiopaque calculi seen.  Stomach and duodenum: Small hiatal hernia. Small and large bowel:No significant abnormality. Normal-appearing appendix. No evidence of obstruction.  Peritoneal cavity: No free fluid or free air. Bladder: No significant abnormality.  Pelvic organs: Multiple calcifications in the uterus, likely representing calcified fibroids.  Vasculature: Atherosclerotic calcifications.  Lymph nodes: No pathologic appearing lymph nodes by imaging criteria.  Bones and soft tissues: Degenerative changes of the imaged spine. No acute osseous abnormality. Chronic appearing anterior wedging of T11 and T12 vertebral bodies. The bones are demineralized. Median sternotomy.     Impression: No acute finding in the abdomen/pelvis. Multifocal groundglass opacities with interlobular septal thickening and small bilateral pleural effusions is nonspecific but can be seen with multifocal pneumonia and/or pulmonary edema. Electronically Signed: Jose Atkins  8/18/2024 11:39 AM EDT  Workstation ID: [id number]     Cardiology    Laboratory    Results from last 7 days   Lab Units 08/18/24  0241 08/17/24  0217 08/15/24  0232 08/14/24  1304   WBC 10*3/mm3 12.36* 11.81* 12.34* 9.01   HEMOGLOBIN g/dL 8.8* 9.2* 8.6* 8.5*   HEMATOCRIT % 28.3* 30.4* 29.8* 29.4*   PLATELETS 10*3/mm3 315 268 204 264     Results from last 7 days   Lab Units 08/18/24  0553 08/17/24  0217 08/15/24  0232 08/14/24  1512   SODIUM mmol/L 142 138 140 139   POTASSIUM mmol/L 3.0* 3.9 4.7 3.3*   CHLORIDE mmol/L 101 102 105 99   CO2 mmol/L 27.1 24.7 26.4 29.2*   BUN mg/dL 25* 15 10 10   CREATININE mg/dL 0.94 0.85 0.79 0.81   GLUCOSE mg/dL 123* 107* 100* 93   ALBUMIN g/dL 2.8* 2.8*  --  3.4*   BILIRUBIN mg/dL 0.4 0.4  --  0.6   ALK PHOS U/L 101 105  --  94   AST (SGOT) U/L 30 22  --  21   ALT (SGPT) U/L 16 12  --  15   LIPASE U/L  --   --   --  14   CALCIUM mg/dL 9.4 9.4 9.3 9.4                 Microbiology   Microbiology Results (last 10 days)       Procedure Component Value - Date/Time    MRSA Screen, PCR (Inpatient) - Swab, Nares [[id number]]  (Normal) Collected: 08/17/24 0439    Lab Status: Final result Specimen: Swab from Nares  Updated: 08/17/24 2005     MRSA PCR No MRSA Detected    Narrative:      The negative predictive value of this diagnostic test is high and should only be used to consider de-escalating anti-MRSA therapy. A positive result may indicate colonization with MRSA and must be correlated clinically.    Blood Culture - Blood, Arm, Right [[id number]]  (Normal) Collected: 08/17/24 0218    Lab Status: Preliminary result Specimen: Blood from Arm, Right Updated: 08/18/24 0245     Blood Culture No growth at 24 hours    Blood Culture - Blood, Arm, Left [[id number]]  (Normal) Collected: 08/17/24 0217    Lab Status: Preliminary result Specimen: Blood from Arm, Left Updated: 08/18/24 0245     Blood Culture No growth at 24 hours    Respiratory Panel PCR w/COVID-19(SARS-CoV-2) SACHA/RAMANDEEP/DEL/PAD/COR/RABIA In-House, NP Swab in UTM/VTM, 2 HR TAT - Swab, Nasopharynx [[id number]]  (Normal) Collected: 08/15/24 0841    Lab Status: Final result Specimen: Swab from Nasopharynx Updated: 08/15/24 0943     ADENOVIRUS, PCR Not Detected     Coronavirus 229E Not Detected     Coronavirus HKU1 Not Detected     Coronavirus NL63 Not Detected     Coronavirus OC43 Not Detected     COVID19 Not Detected     Human Metapneumovirus Not Detected     Human Rhinovirus/Enterovirus Not Detected     Influenza A PCR Not Detected     Influenza B PCR Not Detected     Parainfluenza Virus 1 Not Detected     Parainfluenza Virus 2 Not Detected     Parainfluenza Virus 3 Not Detected     Parainfluenza Virus 4 Not Detected     RSV, PCR Not Detected     Bordetella pertussis pcr Not Detected     Bordetella parapertussis PCR Not Detected     Chlamydophila pneumoniae PCR Not Detected     Mycoplasma pneumo by PCR Not Detected    Narrative:      In the setting of a positive respiratory panel with a viral infection PLUS a negative procalcitonin without other underlying concern for bacterial infection, consider observing off antibiotics or discontinuation of antibiotics and continue supportive  care. If the respiratory panel is positive for atypical bacterial infection (Bordetella pertussis, Chlamydophila pneumoniae, or Mycoplasma pneumoniae), consider antibiotic de-escalation to target atypical bacterial infection.    Blood Culture - Blood, Arm, Right [[id number]]  (Abnormal) Collected: 08/14/24 1639    Lab Status: Final result Specimen: Blood from Arm, Right Updated: 08/17/24 0637     Blood Culture Streptococcus, Alpha Hemolytic     Isolated from Aerobic Bottle     Gram Stain Aerobic Bottle Gram positive cocci in chains    Narrative:      Probable contaminant requires clinical correlation, susceptibility not performed unless requested by physician.    Blood Culture ID, PCR - Blood, Arm, Right [[id number]]  (Abnormal) Collected: 08/14/24 1639    Lab Status: Final result Specimen: Blood from Arm, Right Updated: 08/16/24 0814     BCID, PCR Streptococcus spp, not A, B, or pneumoniae. Identification by BCID2 PCR.     BOTTLE TYPE Aerobic Bottle    Blood Culture - Blood, Arm, Left [[id number]]  (Normal) Collected: 08/14/24 1611    Lab Status: Preliminary result Specimen: Blood from Arm, Left Updated: 08/17/24 1616     Blood Culture No growth at 3 days    Narrative:      Less than seven (7) mL's of blood was collected.  Insufficient quantity may yield false negative results.    Urine Culture - Urine, Urine, Clean Catch [[id number]]  (Abnormal)  (Susceptibility) Collected: 08/14/24 1353    Lab Status: Final result Specimen: Urine, Clean Catch Updated: 08/16/24 1146     Urine Culture >100,000 CFU/mL Klebsiella pneumoniae ESBL    Narrative:      Colonization of the urinary tract without infection is common. Treatment is discouraged unless the patient is symptomatic, pregnant, or undergoing an invasive urologic procedure.  Recent outcomes data supports the use of pip/tazo in the treatment of susceptible ESBL infections for uncomplicated UTI. Consider use of pip/tazo as a carbapenem-sparing regimen in applicable  patients.    Susceptibility        Klebsiella pneumoniae ESBL      DESI      Amikacin Susceptible      Ertapenem Susceptible      Gentamicin Resistant      Levofloxacin Intermediate      Meropenem Susceptible      Nitrofurantoin Intermediate      Piperacillin + Tazobactam Susceptible      Tobramycin Intermediate      Trimethoprim + Sulfamethoxazole Resistant                           S. Pneumo Ag Urine or CSF - Urine, Urine, Clean Catch [[id number]]  (Normal) Collected: 08/14/24 1353    Lab Status: Final result Specimen: Urine, Clean Catch Updated: 08/15/24 0320     Strep Pneumo Ag Negative    Legionella Antigen, Urine - Urine, Urine, Clean Catch [[id number]]  (Normal) Collected: 08/14/24 1353    Lab Status: Final result Specimen: Urine, Clean Catch Updated: 08/15/24 0320     LEGIONELLA ANTIGEN, URINE Negative            Medication Review:       Schedule Meds  amiodarone, 200 mg, Oral, Q12H  budesonide, 0.5 mg, Nebulization, BID - RT  clopidogrel, 75 mg, Oral, Daily  dilTIAZem CD, 120 mg, Oral, Q24H  furosemide, 60 mg, Intravenous, Q12H  guaiFENesin, 1,200 mg, Oral, Q12H  ipratropium-albuterol, 3 mL, Nebulization, Q4H - RT  levothyroxine, 75 mcg, Oral, Q AM  meropenem, 1,000 mg, Intravenous, Q12H  [START ON 8/19/2024] methylPREDNISolone sodium succinate, 40 mg, Intravenous, Q24H  metoprolol succinate XL, 50 mg, Oral, Q24H  pantoprazole, 40 mg, Oral, Daily  potassium chloride ER, 40 mEq, Oral, Q4H  rosuvastatin, 20 mg, Oral, Daily  sertraline, 100 mg, Oral, Daily  sodium chloride, 10 mL, Intravenous, Q12H        Infusion Meds       PRN Meds    acetaminophen    aluminum-magnesium hydroxide-simethicone    senna-docusate sodium **AND** polyethylene glycol **AND** bisacodyl **AND** bisacodyl    Calcium Replacement - Follow Nurse / BPA Driven Protocol    Magnesium Standard Dose Replacement - Follow Nurse / BPA Driven Protocol    melatonin    nitroglycerin    ondansetron ODT **OR** ondansetron    Phosphorus Replacement -  Follow Nurse / BPA Driven Protocol    Potassium Replacement - Follow Nurse / BPA Driven Protocol    sodium chloride    sodium chloride        Assessment & Plan       Antimicrobial Therapy   1.  IV Merrem        2.        3.        4.        5.            Assessment     Hypoxia.  Most likely secondary to congestive heart failure.  Patient is chronically on oxygen 2 L at home.  Currently on 3 L  CT scan of the chest is consistent with pulmonary edema.  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     Positive blood culture in 1 out of 2 sets for Streptococcus species.  This is usually contamination however patient has bioprosthetic aortic valve so we need to rule out endocarditis.  Repeat blood culture set is negative so far     UTI with urine cultures growing ESBL  Klebsiella pneumoniae.  Patient had history of recurrent UTI.  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 labs  The case was discussed with the patient's daughter at bedside  Case discussed with RN     Lengthy discussion with the patient's daughter about aggressive workup for this patient.  They do not seem interested in transesophageal echo.    Luz Berry, YEYO  08/18/24  11:57 EDT    Note is dictated utilizing voice recognition software/Dragon

## 2024-08-18 NOTE — PLAN OF CARE
Problem: Pain Acute  Goal: Acceptable Pain Control and Functional Ability  Outcome: Ongoing, Progressing  Intervention: Prevent or Manage Pain  Recent Flowsheet Documentation  Taken 8/18/2024 0400 by Angelica Love RN  Medication Review/Management: medications reviewed  Taken 8/18/2024 0200 by Angelica Love RN  Medication Review/Management: medications reviewed  Taken 8/18/2024 0000 by Angelica Love RN  Medication Review/Management: medications reviewed  Taken 8/17/2024 2200 by Angelica Love RN  Medication Review/Management: medications reviewed  Taken 8/17/2024 2000 by Angelica Love RN  Sensory Stimulation Regulation: lighting decreased  Bowel Elimination Promotion: adequate fluid intake promoted  Medication Review/Management: medications reviewed  Intervention: Develop Pain Management Plan  Recent Flowsheet Documentation  Taken 8/18/2024 0400 by Angelica Love RN  Pain Management Interventions: see MAR  Taken 8/18/2024 0000 by Angelica Love RN  Pain Management Interventions: see MAR  Taken 8/17/2024 2000 by Angelica Love RN  Pain Management Interventions: see MAR  Intervention: Optimize Psychosocial Wellbeing  Recent Flowsheet Documentation  Taken 8/17/2024 2000 by Angelica Love RN  Supportive Measures: active listening utilized  Diversional Activities: television     Problem: Anemia  Goal: Anemia Symptom Improvement  Outcome: Ongoing, Progressing  Intervention: Monitor and Manage Anemia  Recent Flowsheet Documentation  Taken 8/18/2024 0400 by Angelica Love RN  Safety Promotion/Fall Prevention:   activity supervised   clutter free environment maintained   fall prevention program maintained   lighting adjusted   nonskid shoes/slippers when out of bed   room organization consistent   toileting scheduled  Taken 8/18/2024 0200 by Angelica Love RN  Safety Promotion/Fall Prevention:   activity supervised   clutter free environment maintained   fall prevention program  maintained   lighting adjusted   muscle strengthening facilitated   room organization consistent   safety round/check completed  Taken 8/18/2024 0000 by Angelica Love RN  Safety Promotion/Fall Prevention:   activity supervised   clutter free environment maintained   fall prevention program maintained   lighting adjusted   nonskid shoes/slippers when out of bed   room organization consistent   safety round/check completed  Taken 8/17/2024 2200 by Angelica Love RN  Safety Promotion/Fall Prevention:   activity supervised   clutter free environment maintained   fall prevention program maintained   lighting adjusted   nonskid shoes/slippers when out of bed   room organization consistent   safety round/check completed  Taken 8/17/2024 2000 by Angelica Love RN  Safety Promotion/Fall Prevention:   activity supervised   clutter free environment maintained   fall prevention program maintained   lighting adjusted   nonskid shoes/slippers when out of bed   room organization consistent   safety round/check completed     Problem: Adult Inpatient Plan of Care  Goal: Plan of Care Review  Outcome: Ongoing, Progressing  Flowsheets (Taken 8/18/2024 0456)  Progress: improving  Plan of Care Reviewed With: patient  Goal: Patient-Specific Goal (Individualized)  Outcome: Ongoing, Progressing  Goal: Absence of Hospital-Acquired Illness or Injury  Outcome: Ongoing, Progressing  Intervention: Identify and Manage Fall Risk  Recent Flowsheet Documentation  Taken 8/18/2024 0400 by Angelica Love RN  Safety Promotion/Fall Prevention:   activity supervised   clutter free environment maintained   fall prevention program maintained   lighting adjusted   nonskid shoes/slippers when out of bed   room organization consistent   toileting scheduled  Taken 8/18/2024 0200 by Angelica Love RN  Safety Promotion/Fall Prevention:   activity supervised   clutter free environment maintained   fall prevention program maintained   lighting  adjusted   muscle strengthening facilitated   room organization consistent   safety round/check completed  Taken 8/18/2024 0000 by Angelica Love RN  Safety Promotion/Fall Prevention:   activity supervised   clutter free environment maintained   fall prevention program maintained   lighting adjusted   nonskid shoes/slippers when out of bed   room organization consistent   safety round/check completed  Taken 8/17/2024 2200 by Angelica Love RN  Safety Promotion/Fall Prevention:   activity supervised   clutter free environment maintained   fall prevention program maintained   lighting adjusted   nonskid shoes/slippers when out of bed   room organization consistent   safety round/check completed  Taken 8/17/2024 2000 by Angelica Love RN  Safety Promotion/Fall Prevention:   activity supervised   clutter free environment maintained   fall prevention program maintained   lighting adjusted   nonskid shoes/slippers when out of bed   room organization consistent   safety round/check completed  Intervention: Prevent Skin Injury  Recent Flowsheet Documentation  Taken 8/18/2024 0400 by Angelica Love RN  Body Position: position changed independently  Taken 8/18/2024 0200 by Angelica Love RN  Body Position: position changed independently  Taken 8/18/2024 0000 by Angelica Love RN  Body Position: position changed independently  Taken 8/17/2024 2200 by Angelica Love RN  Body Position: position changed independently  Taken 8/17/2024 2000 by Angelica Love RN  Body Position: position changed independently  Intervention: Prevent and Manage VTE (Venous Thromboembolism) Risk  Recent Flowsheet Documentation  Taken 8/18/2024 0400 by Angelica Love RN  Activity Management: activity encouraged  Taken 8/18/2024 0200 by Angelica Love RN  Activity Management: activity encouraged  Taken 8/18/2024 0000 by Angelica Love RN  Activity Management: activity encouraged  Taken 8/17/2024 2200 by Angelica Love  RN  Activity Management: activity encouraged  Taken 8/17/2024 2000 by Angelica Love RN  Activity Management: activity encouraged  Range of Motion: active ROM (range of motion) encouraged  Intervention: Prevent Infection  Recent Flowsheet Documentation  Taken 8/18/2024 0400 by Angelica Love RN  Infection Prevention:   environmental surveillance performed   rest/sleep promoted   single patient room provided  Taken 8/18/2024 0200 by Angelica Love RN  Infection Prevention:   environmental surveillance performed   rest/sleep promoted   single patient room provided  Taken 8/18/2024 0000 by Angelica Love RN  Infection Prevention: environmental surveillance performed  Taken 8/17/2024 2200 by Angelica Love RN  Infection Prevention:   rest/sleep promoted   single patient room provided  Taken 8/17/2024 2000 by Angelica Love RN  Infection Prevention:   environmental surveillance performed   rest/sleep promoted   single patient room provided  Goal: Optimal Comfort and Wellbeing  Outcome: Ongoing, Progressing  Intervention: Monitor Pain and Promote Comfort  Recent Flowsheet Documentation  Taken 8/18/2024 0400 by Angelica Love RN  Pain Management Interventions: see MAR  Taken 8/18/2024 0000 by Angelica Love RN  Pain Management Interventions: see MAR  Taken 8/17/2024 2000 by Angelica Love RN  Pain Management Interventions: see MAR  Intervention: Provide Person-Centered Care  Recent Flowsheet Documentation  Taken 8/17/2024 2000 by Angelica Love RN  Trust Relationship/Rapport:   care explained   questions answered   questions encouraged  Goal: Readiness for Transition of Care  Outcome: Ongoing, Progressing     Problem: Asthma Comorbidity  Goal: Maintenance of Asthma Control  Outcome: Ongoing, Progressing  Intervention: Maintain Asthma Symptom Control  Recent Flowsheet Documentation  Taken 8/18/2024 0400 by Angelica Love RN  Medication Review/Management: medications reviewed  Taken 8/18/2024  0200 by Angelica Love RN  Medication Review/Management: medications reviewed  Taken 8/18/2024 0000 by Angelica Love RN  Medication Review/Management: medications reviewed  Taken 8/17/2024 2200 by Angelica Love RN  Medication Review/Management: medications reviewed  Taken 8/17/2024 2000 by Angelica Love RN  Medication Review/Management: medications reviewed     Problem: COPD (Chronic Obstructive Pulmonary Disease) Comorbidity  Goal: Maintenance of COPD Symptom Control  Outcome: Ongoing, Progressing  Intervention: Maintain COPD-Symptom Control  Recent Flowsheet Documentation  Taken 8/18/2024 0400 by Angelica Love RN  Medication Review/Management: medications reviewed  Taken 8/18/2024 0200 by Angelica Love RN  Medication Review/Management: medications reviewed  Taken 8/18/2024 0000 by Angelica Love RN  Medication Review/Management: medications reviewed  Taken 8/17/2024 2200 by Angelica Love RN  Medication Review/Management: medications reviewed  Taken 8/17/2024 2000 by Angelica Love RN  Supportive Measures: active listening utilized  Medication Review/Management: medications reviewed     Problem: Heart Failure Comorbidity  Goal: Maintenance of Heart Failure Symptom Control  Outcome: Ongoing, Progressing  Intervention: Maintain Heart Failure-Management  Recent Flowsheet Documentation  Taken 8/18/2024 0400 by Angelica Love RN  Medication Review/Management: medications reviewed  Taken 8/18/2024 0200 by Angelica Love RN  Medication Review/Management: medications reviewed  Taken 8/18/2024 0000 by Angelica Love RN  Medication Review/Management: medications reviewed  Taken 8/17/2024 2200 by Angelica Love RN  Medication Review/Management: medications reviewed  Taken 8/17/2024 2000 by Angelica Love RN  Medication Review/Management: medications reviewed     Problem: Hypertension Comorbidity  Goal: Blood Pressure in Desired Range  Outcome: Ongoing, Progressing  Intervention:  Maintain Blood Pressure Management  Recent Flowsheet Documentation  Taken 8/18/2024 0400 by Angelica Love RN  Medication Review/Management: medications reviewed  Taken 8/18/2024 0200 by Angelica Love RN  Medication Review/Management: medications reviewed  Taken 8/18/2024 0000 by Angelica Love RN  Medication Review/Management: medications reviewed  Taken 8/17/2024 2200 by Angelica Love RN  Medication Review/Management: medications reviewed  Taken 8/17/2024 2000 by Angelica Love RN  Medication Review/Management: medications reviewed     Problem: Skin Injury Risk Increased  Goal: Skin Health and Integrity  Outcome: Ongoing, Progressing  Intervention: Optimize Skin Protection  Recent Flowsheet Documentation  Taken 8/18/2024 0400 by Angelica Love RN  Head of Bed (HOB) Positioning: HOB elevated  Taken 8/18/2024 0200 by Angelica Love RN  Head of Bed (HOB) Positioning: HOB elevated  Taken 8/18/2024 0000 by Angelica Love RN  Head of Bed (HOB) Positioning: HOB elevated  Taken 8/17/2024 2200 by Angelica Love RN  Head of Bed (HOB) Positioning: HOB elevated  Taken 8/17/2024 2000 by Angelica Love RN  Head of Bed (HOB) Positioning: HOB elevated     Problem: Fall Injury Risk  Goal: Absence of Fall and Fall-Related Injury  Outcome: Ongoing, Progressing  Intervention: Identify and Manage Contributors  Recent Flowsheet Documentation  Taken 8/18/2024 0400 by Angelica Love RN  Medication Review/Management: medications reviewed  Taken 8/18/2024 0200 by Angelica Love RN  Medication Review/Management: medications reviewed  Self-Care Promotion: BADL personal objects within reach  Taken 8/18/2024 0000 by Angelica Love RN  Medication Review/Management: medications reviewed  Taken 8/17/2024 2200 by Angelica Love RN  Medication Review/Management: medications reviewed  Taken 8/17/2024 2000 by Angelica Love RN  Medication Review/Management: medications reviewed  Self-Care Promotion:    BADL personal objects within reach   independence encouraged  Intervention: Promote Injury-Free Environment  Recent Flowsheet Documentation  Taken 8/18/2024 0400 by Angelica Love RN  Safety Promotion/Fall Prevention:   activity supervised   clutter free environment maintained   fall prevention program maintained   lighting adjusted   nonskid shoes/slippers when out of bed   room organization consistent   toileting scheduled  Taken 8/18/2024 0200 by Angelica Love RN  Safety Promotion/Fall Prevention:   activity supervised   clutter free environment maintained   fall prevention program maintained   lighting adjusted   muscle strengthening facilitated   room organization consistent   safety round/check completed  Taken 8/18/2024 0000 by Angelica Love RN  Safety Promotion/Fall Prevention:   activity supervised   clutter free environment maintained   fall prevention program maintained   lighting adjusted   nonskid shoes/slippers when out of bed   room organization consistent   safety round/check completed  Taken 8/17/2024 2200 by Angelica Love RN  Safety Promotion/Fall Prevention:   activity supervised   clutter free environment maintained   fall prevention program maintained   lighting adjusted   nonskid shoes/slippers when out of bed   room organization consistent   safety round/check completed  Taken 8/17/2024 2000 by Angelica Love RN  Safety Promotion/Fall Prevention:   activity supervised   clutter free environment maintained   fall prevention program maintained   lighting adjusted   nonskid shoes/slippers when out of bed   room organization consistent   safety round/check completed   Goal Outcome Evaluation:  Plan of Care Reviewed With: patient        Progress: improving

## 2024-08-18 NOTE — CASE MANAGEMENT/SOCIAL WORK
Continued Stay Note   [facility]     Patient Name: Tracy Jane  MRN: [id number]  Today's Date: 8/18/2024    Admit Date: 8/14/2024    Plan: SNF choices needed. Pt from [facility] approved. No precert required. Home O2 [facility]L [facility].   Discharge Plan       Row Name 08/18/24 1303       Plan    Plan SNF choices needed. Pt from [facility] approved. No precert required. Home O2 [facility].    Provided Post Acute Provider List? Yes    Post Acute Provider List Nursing Home    Provided Post Acute Provider Quality & Resource List? Yes    Post Acute Provider Quality and Resource List Nursing Home    Delivered To Support Person;Patient    Method of Delivery In person    Plan Comments Met with pt and daughter in room to discuss SNF choices. ECF list provided. Patient daughter expressed concern about pt going to SNF, and she would prefer [facility] or [facility]. Explained acute vs subacute, and recommendation or subacute at this time. Pt and daughter want to discuss and CM to follow-up for choices.                      Expected Discharge Date and Time       Expected Discharge Date Expected Discharge Time    Aug 20, 2024               Sarah Martinez RN

## 2024-08-18 NOTE — PROGRESS NOTES
[facility] MEDICINE SERVICE  DAILY PROGRESS NOTE    NAME: Tracy Jane  : 1934  MRN: [id number]      LOS: 3 days     PROVIDER OF SERVICE: Dejon Amaya MD    Chief Complaint: Acute cystitis    Subjective:     Interval History:  History taken from: patient    No acute overnight events per nurse. Patient reports she is feeling better than yesterday, her SOB is improving today, sitting up in bed, denies fever or chills.     Review of Systems:   Review of Systems SOB improving     Objective:     Vital Signs  Temp:  [98 °F (36.7 °C)-98.4 °F (36.9 °C)] 98 °F (36.7 °C)  Heart Rate:  [] 82  Resp:  [16-26] 16  BP: (113-135)/(51-75) 121/51  Flow (L/min):  [3-5] 3   Body mass index is 24.5 kg/m².    Physical Exam  General Appearance:  aaox3   Head:  Atrumatic normocephalic   Eyes:        No sclera icterus   Neck: Normal ROM   Pulm: Crackles improving, Wheezing improved, decreased breath sounds but improved from yesterady   Cardio: Irregular rhythm   Extremities: No edema on BLE   Abdomen: Soft, non tender   /Renal: No suprapubic tenderness   Musculoskeletal: Moves all extremities         Neurologic: Aaox3, no focal neurological defcits       Scheduled Meds   amiodarone, 200 mg, Oral, Q12H  budesonide, 0.5 mg, Nebulization, BID - RT  clopidogrel, 75 mg, Oral, Daily  dilTIAZem CD, 120 mg, Oral, Q24H  furosemide, 60 mg, Intravenous, Q12H  ipratropium-albuterol, 3 mL, Nebulization, Q4H - RT  levothyroxine, 75 mcg, Oral, Q AM  meropenem, 1,000 mg, Intravenous, Q12H  methylPREDNISolone sodium succinate, 40 mg, Intravenous, Q12H  metoprolol succinate XL, 50 mg, Oral, Q24H  pantoprazole, 40 mg, Oral, Daily  potassium chloride ER, 40 mEq, Oral, Q4H  rosuvastatin, 20 mg, Oral, Daily  sertraline, 100 mg, Oral, Daily  sodium chloride, 10 mL, Intravenous, Q12H       PRN Meds     acetaminophen    aluminum-magnesium hydroxide-simethicone    senna-docusate sodium **AND** polyethylene glycol **AND**  bisacodyl **AND** bisacodyl    Calcium Replacement - Follow Nurse / BPA Driven Protocol    Magnesium Standard Dose Replacement - Follow Nurse / BPA Driven Protocol    melatonin    nitroglycerin    ondansetron ODT **OR** ondansetron    Phosphorus Replacement - Follow Nurse / BPA Driven Protocol    Potassium Replacement - Follow Nurse / BPA Driven Protocol    sodium chloride    sodium chloride   Infusions         Diagnostic Data    Results from last 7 days   Lab Units 08/18/24  0553 08/18/24  0241   WBC 10*3/mm3  --  12.36*   HEMOGLOBIN g/dL  --  8.8*   HEMATOCRIT %  --  28.3*   PLATELETS 10*3/mm3  --  315   GLUCOSE mg/dL 123*  --    CREATININE mg/dL 0.94  --    BUN mg/dL 25*  --    SODIUM mmol/L 142  --    POTASSIUM mmol/L 3.0*  --    AST (SGOT) U/L 30  --    ALT (SGPT) U/L 16  --    ALK PHOS U/L 101  --    BILIRUBIN mg/dL 0.4  --    ANION GAP mmol/L 13.9  --        XR Chest 1 View    Result Date: 8/17/2024  Impression: 1.Diffuse bilateral pulmonary infiltrates. 2.Small bibasilar effusions 3.Cardiomegaly. Patient has had prior cardiothoracic surgery. Electronically Signed: Wyatt Wagner MD  8/17/2024 8:18 AM EDT  Workstation ID: [id number]    CT Chest Without Contrast Diagnostic    Result Date: 8/16/2024  Impression: 1. Worsening diffuse groundglass opacities with interlobular septal thickening most concerning for pulmonary edema and superimposed multifocal pneumonia. 2. Small bilateral pleural effusions increased from prior study. 3. Stable enlarged mediastinal lymph nodes likely reactive adenopathy. 4. Additional chronic findings above. Electronically Signed: Demetris Whiteside MD  8/16/2024 7:27 PM EDT  Workstation ID: [id number]       I reviewed the patient's new clinical results.    Assessment/Plan:   Patient is a 89 y.o. female with PMHx of HTN, HLD, hypothyroid, GERD, anxiety, anemia, HFpEF, CAD, A. fib presented to [facility] for nausea and vomiting.  Of note, patient recently discharged from [facility] 2 weeks ago after undergoing  treatment for a. Fib and CHF.  Since then has had nausea and vomiting causing difficulty with food intake at times.      Assessments:   Acute hypoxic respiratory failure: PNA worsing  Possible UTI  Afib on Amio, not on AC s/p Watchman device  Status post Bioprosthetic aortic valve  CAD s/p CABG 2008, s/p stent in 2006  HFpEF, Last echo: 50-55 % LVEF  GERD  Hypothyroidism  HTN  HLD     Plan:   -continue abx for possible PNA, More crackles today that yesterday on exam   -Strict NPO, will have speech eval for possible aspiration   -CT chest w.o contrast  -D/c po lasix, start IV Lasix once, Monitor I/Os  -Blood Cx: 1/2, likely contaminated, will obtain repeat Blood CX  -B12/Folic /b9 for anemia, no overt signs of bleeding   -On amio/dilt/Cardizem  for afib  -Resume home meds  -AM labs     8/17:  -CT Chest: Worsening diffuse groundglass opacities, pulmonary edema and superimposed multifocal pneumonia.   -crackles appreciated on exam, start lasix 60 bid,  solumedrol 40 mg bid, monitor strict I.os  -Continue Incentive spirometry   -Duonebs, pulmicort, Discussed with RT  -HR is better today, in 90s  -Will consult pulm today with concern of possible early ARDs  -Speech therapy reccs noted  -will follow on iv access, nursing staff in touch with PICC team to establish IV Acess  -am labs     Addendum:  -discussed with the patient and daughter (POA) - patient would like to be DNR/DNI, code status updated per patient and POA wishes.    8/18:  -SOB improving, less Crackles, Repeat Blood Cx: Neg. On merepenems per ID  -Continue steroids, lasix, Strict I/Os, duonebs/pulmicort, pulm following  -am Labs    VTE Prophylaxis:  Mechanical VTE prophylaxis orders are present.         Code status is   Code Status and Medical Interventions: No CPR (Do Not Attempt to Resuscitate); Limited Support; No intubation (DNI)   Ordered at: 08/17/24 1710     Medical Intervention Limits:    No intubation (DNI)     Level Of Support Discussed With:     Patient     Code Status (Patient has no pulse and is not breathing):    No CPR (Do Not Attempt to Resuscitate)     Medical Interventions (Patient has pulse or is breathing):    Limited Support           Time: 30 minutes    Part of this note may be an electronic transcription/translation of spoken language to printed text using the Dragon Dictation System.    Signature: Electronically signed by Dejon Amaya MD, 08/18/24, 07:46 EDT.  [facility] Hospitalist Team

## 2024-08-18 NOTE — PLAN OF CARE
Problem: Pain Acute  Goal: Acceptable Pain Control and Functional Ability  Outcome: Ongoing, Progressing  Intervention: Prevent or Manage Pain  Recent Flowsheet Documentation  Taken 8/18/2024 0800 by Debbie Becerra RN  Sensory Stimulation Regulation:   care clustered   lighting decreased  Bowel Elimination Promotion: adequate fluid intake promoted  Sleep/Rest Enhancement:   room darkened   awakenings minimized  Medication Review/Management: medications reviewed  Intervention: Optimize Psychosocial Wellbeing  Recent Flowsheet Documentation  Taken 8/18/2024 0800 by Debbie Becerra RN  Supportive Measures: active listening utilized  Diversional Activities: television   Goal Outcome Evaluation:

## 2024-08-19 NOTE — CONSULTS
"Palliative Care Consultation    Patient Name: Tracy Jane  : 1934  MRN: [id number]  Allergies: Patient has no known allergies.    Requesting clinician:  You   Reason for consult: Consultation for clarification of goals of care and code status.      Patient Code Status:   Code Status and Medical Interventions: No CPR (Do Not Attempt to Resuscitate); Limited Support; No intubation (DNI)   Ordered at: 24 1710     Medical Intervention Limits:    No intubation (DNI)     Level Of Support Discussed With:    Patient     Code Status (Patient has no pulse and is not breathing):    No CPR (Do Not Attempt to Resuscitate)     Medical Interventions (Patient has pulse or is breathing):    Limited Support           Chief Complaint:    Nausea, vomiting     History of Present Illness    Tracy Jane is a 89 y.o. female who presented to [facility] ED on  with reports of nausea and vomiting. Reported symptoms ongoing for 2-3 weeks. No shortness of breath, chest pain, or diarrhea.    Of note: patient recently admitted to our facility and started on new medications by cardiologist. Cardiology was aware of her symptoms and per her report \"decreased the dose of the medication\" 2 days prior to presentation.     In ED: K 3.3, Albumin 3.4, Hgb 8.5, WBC 9.01    UA with large leuks, positive nitrites, and blood    Patient started on IV antibiotics. Cultures obtained.     CT Chest: Worsening diffuse groundglass opacities, pulmonary edema and superimposed multifocal pneumonia from . Pulmonary and ID consulted.    1/2 sets of blood cultures + for streptococcus. Echo obtained to rule out endocarditis.     UTI growing ESBL. Antibiotics adjusted.      Palliative consult for goals of care discussion in an acutely ill patient with worsening respiratory status.    VITAL SIGNS:   Temp:  [97.4 °F (36.3 °C)-97.9 °F (36.6 °C)] 97.4 °F (36.3 °C)  Heart Rate:  [48-91] 87  Resp:  [16-24] 22  BP: (111-163)/(58-93) 122/75 "       PMH: CAD, Anxiety, Afib, CHF, GERD, Gout, HLD, HTN, Hypothyroidism, MI    Past Surgical History:   Procedure Laterality Date    AORTIC VALVE REPAIR/REPLACEMENT      porcine    ATRIAL APPENDAGE EXCLUSION LEFT WITH TRANSESOPHAGEAL ECHOCARDIOGRAM Right 2023    Procedure: Atrial Appendage Occlusion;  Surgeon: Pk Crabtree MD;  Location: [facility] CATH INVASIVE LOCATION;  Service: Cardiovascular;  Laterality: Right;    ATRIAL APPENDAGE EXCLUSION LEFT WITH TRANSESOPHAGEAL ECHOCARDIOGRAM N/A 2023    Procedure: Atrial Appendage Occlusion;  Surgeon: Gen Caballero MD;  Location: [facility] CATH INVASIVE LOCATION;  Service: Cardiovascular;  Laterality: N/A;    BASAL CELL CARCINOMA EXCISION      spine, nose and arm, leg     BREAST BIOPSY      CARDIAC CATHETERIZATION      CARDIAC VALVE REPLACEMENT      CAROTID STENT      CATARACT EXTRACTION      CHOLECYSTECTOMY      CORONARY ARTERY BYPASS GRAFT      CORONARY STENT PLACEMENT      ENDOSCOPY N/A 2023    Procedure: ESOPHAGOGASTRODUODENOSCOPY with antrum body biopsies;  Surgeon: REGGIE Ace MD;  Location: [facility] ENDOSCOPY;  Service: Gastroenterology;  Laterality: N/A;  hiatal hernia    FINGER SURGERY      INSERT / REPLACE / REMOVE PACEMAKER      KNEE SURGERY      PACEMAKER IMPLANTATION      [facility]    SHOULDER SURGERY      RACHEL Bilateral 2023       Family History   Problem Relation Age of Onset    Hypertension Mother     Heart disease Father     Heart attack Father     Heart disease Sister     Kidney cancer Sister     Stroke Sister     Cancer Sister         breast    Cancer Sister     Heart disease Brother        Social History     Tobacco Use    Smoking status: Former     Current packs/day: 0.00     Average packs/day: 1 pack/day for 4.0 years (4.0 ttl pk-yrs)     Types: Cigarettes     Start date: 1954     Quit date:      Years since quittin.6     Passive exposure: Past    Smokeless tobacco: Never   Vaping Use     Vaping status: Never Used   Substance Use Topics    Alcohol use: No    Drug use: No           LABS:    Results from last 7 days   Lab Units 08/19/24  0427   WBC 10*3/mm3 16.74*   HEMOGLOBIN g/dL 9.0*   HEMATOCRIT % 30.3*   PLATELETS 10*3/mm3 352     Results from last 7 days   Lab Units 08/19/24  0427   SODIUM mmol/L 144   POTASSIUM mmol/L 4.8   CHLORIDE mmol/L 104   CO2 mmol/L 28.8   BUN mg/dL 39*   CREATININE mg/dL 1.12*   GLUCOSE mg/dL 154*   CALCIUM mg/dL 10.1     Results from last 7 days   Lab Units 08/19/24  0427   SODIUM mmol/L 144   POTASSIUM mmol/L 4.8   CHLORIDE mmol/L 104   CO2 mmol/L 28.8   BUN mg/dL 39*   CREATININE mg/dL 1.12*   CALCIUM mg/dL 10.1   BILIRUBIN mg/dL 0.4   ALK PHOS U/L 113   ALT (SGPT) U/L 47*   AST (SGOT) U/L 83*   GLUCOSE mg/dL 154*         IMAGING STUDIES:  CT Abdomen Pelvis Stone Protocol    Result Date: 8/18/2024  Impression: No acute finding in the abdomen/pelvis. Multifocal groundglass opacities with interlobular septal thickening and small bilateral pleural effusions is nonspecific but can be seen with multifocal pneumonia and/or pulmonary edema. Electronically Signed: Jose Atkins  8/18/2024 11:39 AM EDT  Workstation ID: [id number]       I reviewed the patient's new clinical results including labs, imaging, and vitals.        Scheduled Meds:  amiodarone, 200 mg, Oral, Q12H  budesonide, 0.5 mg, Nebulization, BID - RT  clopidogrel, 75 mg, Oral, Daily  dilTIAZem CD, 120 mg, Oral, Q24H  furosemide, 40 mg, Oral, Daily  guaiFENesin, 1,200 mg, Oral, Q12H  ipratropium-albuterol, 3 mL, Nebulization, Q4H - RT  levothyroxine, 75 mcg, Oral, Q AM  meropenem, 1,000 mg, Intravenous, Q12H  methylPREDNISolone sodium succinate, 40 mg, Intravenous, Q24H  metoprolol succinate XL, 50 mg, Oral, Q24H  pantoprazole, 40 mg, Oral, Daily  rosuvastatin, 20 mg, Oral, Daily  sertraline, 100 mg, Oral, Daily  sodium chloride, 10 mL, Intravenous, Q12H      Continuous Infusions:       I have reviewed patient's  current medication list.     Review of Systems   Constitutional:  Positive for fatigue.   Respiratory:  Positive for shortness of breath.    Neurological:  Positive for weakness.   Psychiatric/Behavioral:  The patient is nervous/anxious.    All other systems reviewed and are negative.        Physical Exam  Vitals and nursing note reviewed.   Constitutional:       Appearance: She is ill-appearing.   HENT:      Head: Normocephalic and atraumatic.      Nose: Nose normal.      Comments: Nasal canula      Mouth/Throat:      Mouth: Mucous membranes are dry.   Eyes:      Conjunctiva/sclera: Conjunctivae normal.   Cardiovascular:      Rate and Rhythm: Normal rate.      Pulses: Normal pulses.   Pulmonary:      Effort: Respiratory distress present.   Abdominal:      Palpations: Abdomen is soft.   Musculoskeletal:         General: Normal range of motion.      Cervical back: Normal range of motion.   Skin:     General: Skin is dry.      Coloration: Skin is pale.   Neurological:      General: No focal deficit present.      Mental Status: She is alert and oriented to person, place, and time.             PROBLEM LIST:    Acute cystitis    Cystitis, acute          ASSESSMENT/PLAN:    Pneumonia: Chest x-ray with bilateral alveolar and interstitial infiltrates. On IV antibiotics. RVP negative. + cultures. ID following. Echo ordered.     Acute hypoxic respiratory failure: requiring supplemental 02. CT Chest: Worsening diffuse groundglass opacities, pulmonary edema and superimposed multifocal pneumonia. Pulmonary and ID consulted.     UTI: UA shows UTI. Cultures pending. On antibiotics. + for ESBL. ID following.     Afib: s/p watchman. On amio, cardizem, and toprol.     CAD/CHF/Anemia/Anxiety/GERD/Hypothyroidism: chronic conditions per primary.     These illnesses and their management contribute to the need for a palliative consult and advanced care planning.      ADVANCED CARE PLANNIN/19 Met with patient and daughter at  bedside this am. Patient is awake and alert, although appears anxious at time of visit. We discussed her current clinical status and overall goals of care. Patient and daughter adamant that patient is to be a DNR/DNI but are currently awaiting results from echo to decide about treatment plan. They do not believe they would want aggressive surgical interventions. Daughter states that they were recently told they have minimal treatment options for her heart failure and now her respiratory status is declining. We discussed continuation of aggressive treatment vs home with hospice. Daughter tells me that the patients main goal is to return to her assisted living facility but to do that she has to go to rehab. If she is unable to go to rehab, they would bring the patient home to [facility]. They are agreeable to referral to hospice to assist at home when patient is able to discharge whether after hospitalization or after rehab. [facility] referral made. Liaison updated.       Advanced Directives: Patient does not have advance directive  Health Care Directive on file: No  Health Care Surrogate:      Palliative Performance Scale Score:    Comments:           Decisional Capacity: yes  Patient's understanding of illness: adequate  Patient goals of care:  DNR/DNI      Thank you for this consult and allowing us to participate in patient's plan of care. Palliative Care Team will continue to follow patient.       I spent 45 minutes reviewing providers documentation, medication records, assessing and examining patient, discussing with family, answering questions, providing guidance about a plan of care, and coordinating care with other healthcare members. More than 50% of time spent face to face discussing disease education, current clinical status, and medication management.     I spent an additional 19 minutes on advanced care planning, goals of care, and code status discussion.  I obtained consent for ACP discussion.      Rosemarie Montenegro, APRN  8/19/2024

## 2024-08-19 NOTE — PLAN OF CARE
Problem: Pain Acute  Goal: Acceptable Pain Control and Functional Ability  Outcome: Ongoing, Progressing     Problem: Anemia  Goal: Anemia Symptom Improvement  Outcome: Ongoing, Progressing  Intervention: Monitor and Manage Anemia  Recent Flowsheet Documentation  Taken 8/19/2024 0420 by Hannah Ny RN  Safety Promotion/Fall Prevention: assistive device/personal items within reach  Taken 8/19/2024 0240 by Hannah Ny RN  Safety Promotion/Fall Prevention: safety round/check completed  Taken 8/19/2024 0030 by Hannah Ny RN  Safety Promotion/Fall Prevention: safety round/check completed  Taken 8/18/2024 2210 by Hannah Ny RN  Safety Promotion/Fall Prevention: safety round/check completed  Taken 8/18/2024 2030 by Hannah Ny RN  Safety Promotion/Fall Prevention: safety round/check completed     Problem: Adult Inpatient Plan of Care  Goal: Plan of Care Review  Outcome: Ongoing, Progressing  Flowsheets (Taken 8/19/2024 0603)  Progress: improving  Plan of Care Reviewed With: patient  Goal: Patient-Specific Goal (Individualized)  Outcome: Ongoing, Progressing  Goal: Absence of Hospital-Acquired Illness or Injury  Outcome: Ongoing, Progressing  Intervention: Identify and Manage Fall Risk  Recent Flowsheet Documentation  Taken 8/19/2024 0420 by Hannah Ny RN  Safety Promotion/Fall Prevention: assistive device/personal items within reach  Taken 8/19/2024 0240 by Hannah Ny RN  Safety Promotion/Fall Prevention: safety round/check completed  Taken 8/19/2024 0030 by Hannah Ny RN  Safety Promotion/Fall Prevention: safety round/check completed  Taken 8/18/2024 2210 by Hannah Ny RN  Safety Promotion/Fall Prevention: safety round/check completed  Taken 8/18/2024 2030 by Hannah Ny RN  Safety Promotion/Fall Prevention: safety round/check completed  Intervention: Prevent Skin Injury  Recent Flowsheet Documentation  Taken 8/18/2024 2030 by Hannah Ny  RN  Skin Protection: adhesive use limited  Intervention: Prevent and Manage VTE (Venous Thromboembolism) Risk  Recent Flowsheet Documentation  Taken 8/18/2024 2030 by Hannah Ny RN  VTE Prevention/Management: sequential compression devices off  Intervention: Prevent Infection  Recent Flowsheet Documentation  Taken 8/18/2024 2030 by Hannah Ny RN  Infection Prevention: hand hygiene promoted  Goal: Optimal Comfort and Wellbeing  Outcome: Ongoing, Progressing  Intervention: Provide Person-Centered Care  Recent Flowsheet Documentation  Taken 8/18/2024 2030 by Hannah Ny RN  Trust Relationship/Rapport:   care explained   questions answered  Goal: Readiness for Transition of Care  Outcome: Ongoing, Progressing     Problem: Asthma Comorbidity  Goal: Maintenance of Asthma Control  Outcome: Ongoing, Progressing     Problem: COPD (Chronic Obstructive Pulmonary Disease) Comorbidity  Goal: Maintenance of COPD Symptom Control  Outcome: Ongoing, Progressing     Problem: Heart Failure Comorbidity  Goal: Maintenance of Heart Failure Symptom Control  Outcome: Ongoing, Progressing     Problem: Hypertension Comorbidity  Goal: Blood Pressure in Desired Range  Outcome: Ongoing, Progressing     Problem: Skin Injury Risk Increased  Goal: Skin Health and Integrity  Outcome: Ongoing, Progressing  Intervention: Optimize Skin Protection  Recent Flowsheet Documentation  Taken 8/18/2024 2030 by Hannah Ny RN  Pressure Reduction Techniques: frequent weight shift encouraged  Pressure Reduction Devices: pressure-redistributing mattress utilized  Skin Protection: adhesive use limited     Problem: Fall Injury Risk  Goal: Absence of Fall and Fall-Related Injury  Outcome: Ongoing, Progressing  Intervention: Promote Injury-Free Environment  Recent Flowsheet Documentation  Taken 8/19/2024 0420 by Hannah Ny RN  Safety Promotion/Fall Prevention: assistive device/personal items within reach  Taken 8/19/2024 0240 by  Amistad, Hannah, RN  Safety Promotion/Fall Prevention: safety round/check completed  Taken 8/19/2024 0030 by Hannah Ny RN  Safety Promotion/Fall Prevention: safety round/check completed  Taken 8/18/2024 2210 by Hannah Ny RN  Safety Promotion/Fall Prevention: safety round/check completed  Taken 8/18/2024 2030 by Hannah Ny RN  Safety Promotion/Fall Prevention: safety round/check completed     Problem: Adjustment to Illness (Sepsis/Septic Shock)  Goal: Optimal Coping  Outcome: Ongoing, Progressing     Problem: Bleeding (Sepsis/Septic Shock)  Goal: Absence of Bleeding  Outcome: Ongoing, Progressing     Problem: Glycemic Control Impaired (Sepsis/Septic Shock)  Goal: Blood Glucose Level Within Desired Range  Outcome: Ongoing, Progressing     Problem: Infection Progression (Sepsis/Septic Shock)  Goal: Absence of Infection Signs and Symptoms  Outcome: Ongoing, Progressing  Intervention: Initiate Sepsis Management  Recent Flowsheet Documentation  Taken 8/18/2024 2030 by Hannah Ny RN  Infection Prevention: hand hygiene promoted  Isolation Precautions:   contact   precautions maintained     Problem: Nutrition Impaired (Sepsis/Septic Shock)  Goal: Optimal Nutrition Intake  Outcome: Ongoing, Progressing   Goal Outcome Evaluation:  Plan of Care Reviewed With: patient        Progress: improving

## 2024-08-19 NOTE — PLAN OF CARE
"Goal Outcome Evaluation:  Assessment: Tracy Jane is very anxious w/ movement but was able to transfer well to chair. Needs to keep lights on during day, as she is not sleeping at night. Pt presents with functional mobility impairments which indicate the need for skilled intervention. Tolerating session today without incident. Will continue to follow and progress as tolerated.     Plan/Recommendations:   If medically appropriate, Moderate Intensity Therapy recommended post-acute care. This is recommended as therapy feels the patient would require 3-4 days per week and wouldn't tolerate \"3 hour daily\" rehab intensity. SNF would be the preferred choice. If the patient does not agree to SNF, arrange HH or OP depending on home bound status. If patient is medically complex, consider LTACH. Pt requires no DME at discharge.     Pt desires Skilled Rehab placement at discharge. Pt cooperative; agreeable to therapeutic recommendations and plan of care.                                               "

## 2024-08-19 NOTE — PROGRESS NOTES
Daily Progress Note          Assessment    Pneumonia due to unspecified pathogen  Acute on chronic hypoxemic respiratory failure: Due to mostly to decompensated heart failure and to a lesser degree to pneumonia  Decompensated heart failure  Multifocal pneumonia  UTI due to ESBL Klebsiella pneumonia  Small bilateral pleural effusion  CAD status post CABG  Status post aortic valve replacement 2014  A-fib, sick sinus syndrome: Status post Watchman procedure and pacemaker placement  HTN  HLD  RA: On methotrexate  GERD  Chronic anemia  Former smoker        Recommendations:  Pt is still weak and short of breath  Add mucomyst nebulized    Antibiotic: As per ID: On meropenem  Oxygen supplement and titration to maintain saturation 90 to 95%: Currently requiring 3 L per nasal cannula  Bronchodilators  Inhaled corticosteroids  IV steroids  Mucinex     Diuresis  HR/BP control  Crestor, Plavix  Thyroid hormone replacement        I personally reviewed the radiological studies             LOS: 4 days     Subjective     Cough and shortness of breath    Objective     Vital signs for last 24 hours:  Vitals:    08/19/24 0940 08/19/24 1100 08/19/24 1102 08/19/24 1105   BP: 122/75 169/78     BP Location:  Right arm     Patient Position:  Lying     Pulse: 87 94 93 92   Resp:  22 22 22   Temp:  98 °F (36.7 °C)     TempSrc:  Oral     SpO2:  94% 96% 96%   Weight:       Height:           Intake/Output last 3 shifts:  I/O last 3 completed shifts:  In: 300 [IV Piggyback:300]  Out: 2300 [Urine:2300]  Intake/Output this shift:  No intake/output data recorded.      Radiology  Imaging Results (Last 24 Hours)       ** No results found for the last 24 hours. **            Labs:  Results from last 7 days   Lab Units 08/19/24  0427   WBC 10*3/mm3 16.74*   HEMOGLOBIN g/dL 9.0*   HEMATOCRIT % 30.3*   PLATELETS 10*3/mm3 352     Results from last 7 days   Lab Units 08/19/24  0427   SODIUM mmol/L 144   POTASSIUM mmol/L 4.8   CHLORIDE mmol/L 104   CO2  mmol/L 28.8   BUN mg/dL 39*   CREATININE mg/dL 1.12*   CALCIUM mg/dL 10.1   BILIRUBIN mg/dL 0.4   ALK PHOS U/L 113   ALT (SGPT) U/L 47*   AST (SGOT) U/L 83*   GLUCOSE mg/dL 154*         Results from last 7 days   Lab Units 08/19/24  0427 08/18/24  0553 08/17/24  0217   ALBUMIN g/dL 3.0* 2.8* 2.8*             Results from last 7 days   Lab Units 08/19/24  0427   MAGNESIUM mg/dL 2.4                   Meds:   SCHEDULE  amiodarone, 200 mg, Oral, Q12H  budesonide, 0.5 mg, Nebulization, BID - RT  clopidogrel, 75 mg, Oral, Daily  dilTIAZem CD, 120 mg, Oral, Q24H  furosemide, 40 mg, Oral, Daily  guaiFENesin, 1,200 mg, Oral, Q12H  ipratropium-albuterol, 3 mL, Nebulization, Q4H - RT  levothyroxine, 75 mcg, Oral, Q AM  meropenem, 1,000 mg, Intravenous, Q12H  methylPREDNISolone sodium succinate, 40 mg, Intravenous, Q24H  metoprolol succinate XL, 50 mg, Oral, Q24H  pantoprazole, 40 mg, Oral, Daily  rosuvastatin, 20 mg, Oral, Daily  sertraline, 100 mg, Oral, Daily  sodium chloride, 10 mL, Intravenous, Q12H      Infusions     PRNs    acetaminophen    aluminum-magnesium hydroxide-simethicone    senna-docusate sodium **AND** polyethylene glycol **AND** bisacodyl **AND** bisacodyl    Calcium Replacement - Follow Nurse / BPA Driven Protocol    hydrOXYzine    Magnesium Standard Dose Replacement - Follow Nurse / BPA Driven Protocol    melatonin    nitroglycerin    ondansetron ODT **OR** ondansetron    Phosphorus Replacement - Follow Nurse / BPA Driven Protocol    Potassium Replacement - Follow Nurse / BPA Driven Protocol    sodium chloride    sodium chloride    Physical Exam:  General Appearance:  Alert, chronic   HEENT:  Normocephalic, without obvious abnormality, Conjunctiva/corneas clear,.   Nares normal, no drainage     Neck:  Supple, symmetrical, trachea midline.   Lungs /Chest wall:   Bilateral basal rhonchi, respirations unlabored, symmetrical wall movement.     Heart:  Regular rate and rhythm, S1 S2 normal, 2/6 systolic  murmur  Abdomen: Soft, non-tender, no masses, no organomegaly.    Extremities: No edema, no clubbing or cyanosis     ROS  Constitutional: Negative for chills, fever and malaise/fatigue.   HENT: Negative.    Eyes: Negative.    Cardiovascular: Negative.    Respiratory: Positive for cough and shortness of breath.    Skin: Negative.    Musculoskeletal: Negative.    Gastrointestinal: Negative.    Genitourinary: Negative.    Neurological: Generalized weakness      I reviewed the recent clinical results  I personally reviewed the latest radiological studies    Part of this note may be an electronic transcription/translation of spoken language to printed text using the Dragon Dictation System.

## 2024-08-19 NOTE — THERAPY TREATMENT NOTE
Acute Care - Speech Language Pathology   Swallow Treatment Note  [facility]     Patient Name: Tracy Jane  : 1934  MRN: [id number]  Today's Date: 2024               Admit Date: 2024    Visit Dx:     ICD-10-CM ICD-9-CM   1. Acute cystitis with hematuria  N30.01 595.0   2. Nausea and vomiting, unspecified vomiting type  R11.2 787.01   3. Anemia, unspecified type  D64.9 285.9     Patient Active Problem List   Diagnosis    Abnormal cardiovascular stress test    Abnormal EKG    Abnormal levels of other serum enzymes    Anemia    Anxiety disorder    Aortic insufficiency    Aortic stenosis    Arthritis, rheumatoid    Asthma    Chronic coronary artery disease    Chronic kidney disease, stage III (moderate)    Depression    Cough    Edema of lower extremity    Encounter for therapeutic drug level monitoring    Excessive anticoagulation    Fatigue    Former smoker    GERD without esophagitis    Hemoptysis    High alkaline phosphatase    Hx of aortic valve replacement    Hyperglycemia    Mixed hyperlipidemia    Essential hypertension    Hyponatremia    Acquired hypothyroidism    Influenza    Nonspecific abnormal results of function study of liver    Osteoarthritis of knee    Other peripheral vertigo, unspecified ear    Paroxysmal atrial fibrillation    Peripheral vascular disease    Presence of aortocoronary bypass graft    Presence of cardiac pacemaker    Renal insufficiency    Shortness of breath    Sick sinus syndrome    Sore throat    Valvular heart disease    Elevated INR    Medication induced coagulopathy    COVID    A-fib    Presence of Watchman left atrial appendage closure device    Hypoxic respiratory failure    Acute on chronic respiratory failure with hypoxia    Acute on chronic heart failure with preserved ejection fraction (HFpEF)    Leukocytosis    Acute cystitis    Cystitis, acute     Past Medical History:   Diagnosis Date    Abnormal ECG     Anxiety     Aortic valve replaced     Arrhythmia      Asthma     Atrial fibrillation     CAD (coronary artery disease)     Carotid artery disease     CHF (congestive heart failure)     Congenital heart disease     GERD (gastroesophageal reflux disease)     Gout     Heart murmur     Hemorrhagic stroke 2023    was on Coumadin for A-fib    Hyperlipidemia     Hypertension     Hyperthyroidism     Hypothyroidism     Myocardial infarction     Overactive bladder     Pacemaker     [facility]    Skin cancer      Past Surgical History:   Procedure Laterality Date    AORTIC VALVE REPAIR/REPLACEMENT  2014    porcine    ATRIAL APPENDAGE EXCLUSION LEFT WITH TRANSESOPHAGEAL ECHOCARDIOGRAM Right 12/26/2023    Procedure: Atrial Appendage Occlusion;  Surgeon: Pk Crabtree MD;  Location: [facility] CATH INVASIVE LOCATION;  Service: Cardiovascular;  Laterality: Right;    ATRIAL APPENDAGE EXCLUSION LEFT WITH TRANSESOPHAGEAL ECHOCARDIOGRAM N/A 12/26/2023    Procedure: Atrial Appendage Occlusion;  Surgeon: Gen Caballero MD;  Location: [facility] CATH INVASIVE LOCATION;  Service: Cardiovascular;  Laterality: N/A;    BASAL CELL CARCINOMA EXCISION      spine, nose and arm, leg 2020    BREAST BIOPSY      CARDIAC CATHETERIZATION      CARDIAC VALVE REPLACEMENT      CAROTID STENT      CATARACT EXTRACTION      CHOLECYSTECTOMY      CORONARY ARTERY BYPASS GRAFT      CORONARY STENT PLACEMENT      ENDOSCOPY N/A 04/24/2023    Procedure: ESOPHAGOGASTRODUODENOSCOPY with antrum body biopsies;  Surgeon: REGGIE Ace MD;  Location: [facility] ENDOSCOPY;  Service: Gastroenterology;  Laterality: N/A;  hiatal hernia    FINGER SURGERY      INSERT / REPLACE / REMOVE PACEMAKER      KNEE SURGERY      PACEMAKER IMPLANTATION      [facility]    SHOULDER SURGERY      RACHEL Bilateral 11/03/2023       SLP Recommendation and Plan     SLP Diet Recommendation: soft to chew textures, thin liquids (08/19/24 1400)  Recommended Precautions and Strategies: upright posture during/after eating, small bites of food and sips  of liquid, multiple swallows per bite of food, alternate between small bites of food and sips of liquid, general aspiration precautions, reflux precautions (08/19/24 1400)  SLP Rec. for Method of Medication Administration: meds whole, with puree (08/19/24 1400)     Monitor for Signs of Aspiration: yes, notify SLP if any concerns (08/19/24 1400)              Therapy Frequency (Swallow): PRN (08/19/24 1400)  Predicted Duration Therapy Intervention (Days): until discharge (08/19/24 1400)  Oral Care Recommendations: Oral Care BID/PRN, Swab (08/19/24 1400)                                               SWALLOW EVALUATION (Last 72 Hours)       SLP Adult Swallow Evaluation       Row Name 08/19/24 1400       Rehab Evaluation    Document Type therapy note (daily note)  -LF    Subjective Information complains of;fatigue  -LF    Patient Observations alert;cooperative  -LF    Patient Effort fair  -LF    Comment Pt seen for skilled ST targeting dysphagia this date. Upon entry, pt awake and alert in a recliner. Pt and family report diet tolerance but low PO intake. Pt assessed w/ trials of thin liquids by straw. Pt self fed and took appropriate sized sips. Adequate labial seal w/ no anterior loss. Delayed cough x1. No other clincial s/s of aspiration observed at any time. Pt's vocal quality remained clear and unchanged. Fatigue w/ and w/o PO noted. Recommend pt continue current diet of soft to chew (chopped) and thin liquids. Pt should be upright at 90 degrees for all PO, take small bites/sips, and fatigue precuations. Hold PO if any s/s of difficulty are noted. ST will continue to follow for ongoing assessment of diet tolerance w/ further recs as indicated.  -LF    Symptoms Noted During/After Treatment fatigue  -LF       General Information    Patient Profile Reviewed yes  -LF       Recommendations    Therapy Frequency (Swallow) PRN  -LF    Predicted Duration Therapy Intervention (Days) until discharge  -LF    SLP Diet  Recommendation soft to chew textures;thin liquids  -    Recommended Precautions and Strategies upright posture during/after eating;small bites of food and sips of liquid;multiple swallows per bite of food;alternate between small bites of food and sips of liquid;general aspiration precautions;reflux precautions  -    Oral Care Recommendations Oral Care BID/PRN;Swab  -LF    SLP Rec. for Method of Medication Administration meds whole;with puree  -    Monitor for Signs of Aspiration yes;notify SLP if any concerns  -LF       Swallow Goals (SLP)    Swallow LTGs Swallow Long Term Goal (free text)  -LF    Swallow STGs diet tolerance goal selection (SLP)  -LF    Diet Tolerance Goal Selection (SLP) Swallow Short Term Goal 1  -LF       (LTG) Swallow    (LTG) Swallow Patient will tolerate safest and least restrictive diet without complication from aspiration.  -LF    Time Frame (Swallow Long Term Goal) by discharge  -LF    Progress/Outcomes (Swallow Long Term Goal) goal ongoing  -LF       (STG) Swallow 1    (STG) Swallow 1 Patient will participate in full meal assessment to assure safety and adequacy of recommended diet without complications from aspiration.  -LF    Time Frame (Swallow Short Term Goal 1) 1 week  -LF    Progress/Outcomes (Swallow Short Term Goal 1) goal ongoing  -              User Key  (r) = Recorded By, (t) = Taken By, (c) = Cosigned By      Initials Name Effective Dates     Sandy Ashley SLP 06/16/21 -      Judy Burton SLP 09/21/21 -                     EDUCATION  The patient has been educated in the following areas:   Dysphagia (Swallowing Impairment) Oral Care/Hydration Modified Diet Instruction.                  Time Calculation:                AZALIA Cheng  8/19/2024

## 2024-08-19 NOTE — PLAN OF CARE
Goal Outcome Evaluation:                        Problem: Pain Acute  Goal: Acceptable Pain Control and Functional Ability  Outcome: Ongoing, Progressing  Intervention: Prevent or Manage Pain  Recent Flowsheet Documentation  Taken 8/19/2024 1603 by Lena Kruse RN  Medication Review/Management: medications reviewed  Taken 8/19/2024 1400 by Lena Kruse RN  Medication Review/Management: medications reviewed  Taken 8/19/2024 1200 by Lena Kruse RN  Medication Review/Management: medications reviewed  Taken 8/19/2024 1000 by Lena Kruse RN  Medication Review/Management: medications reviewed  Taken 8/19/2024 0815 by Lena Kruse RN  Medication Review/Management: medications reviewed  Intervention: Develop Pain Management Plan  Recent Flowsheet Documentation  Taken 8/19/2024 1603 by Lena Kruse RN  Pain Management Interventions:   pillow support provided   quiet environment facilitated   care clustered  Taken 8/19/2024 1200 by Lena Kruse RN  Pain Management Interventions:   pillow support provided   quiet environment facilitated   care clustered     Problem: Anemia  Goal: Anemia Symptom Improvement  Outcome: Ongoing, Progressing  Intervention: Monitor and Manage Anemia  Recent Flowsheet Documentation  Taken 8/19/2024 1603 by Lena Kruse RN  Safety Promotion/Fall Prevention:   safety round/check completed   room organization consistent   nonskid shoes/slippers when out of bed   assistive device/personal items within reach   clutter free environment maintained  Taken 8/19/2024 1400 by Lena Kruse RN  Safety Promotion/Fall Prevention:   safety round/check completed   room organization consistent   nonskid shoes/slippers when out of bed   assistive device/personal items within reach   clutter free environment maintained  Taken 8/19/2024 1200 by Lena Kruse RN  Safety Promotion/Fall Prevention:   safety round/check completed   room organization consistent   nonskid  shoes/slippers when out of bed   assistive device/personal items within reach   clutter free environment maintained  Taken 8/19/2024 1000 by Lena Kruse RN  Safety Promotion/Fall Prevention:   safety round/check completed   room organization consistent   nonskid shoes/slippers when out of bed   assistive device/personal items within reach   clutter free environment maintained  Taken 8/19/2024 0815 by Lena Kruse RN  Safety Promotion/Fall Prevention:   safety round/check completed   room organization consistent   nonskid shoes/slippers when out of bed   assistive device/personal items within reach   clutter free environment maintained     Problem: Adult Inpatient Plan of Care  Goal: Plan of Care Review  Outcome: Ongoing, Progressing  Goal: Patient-Specific Goal (Individualized)  Outcome: Ongoing, Progressing  Goal: Absence of Hospital-Acquired Illness or Injury  Outcome: Ongoing, Progressing  Intervention: Identify and Manage Fall Risk  Recent Flowsheet Documentation  Taken 8/19/2024 1603 by Lena Kruse RN  Safety Promotion/Fall Prevention:   safety round/check completed   room organization consistent   nonskid shoes/slippers when out of bed   assistive device/personal items within reach   clutter free environment maintained  Taken 8/19/2024 1400 by Lena Kruse RN  Safety Promotion/Fall Prevention:   safety round/check completed   room organization consistent   nonskid shoes/slippers when out of bed   assistive device/personal items within reach   clutter free environment maintained  Taken 8/19/2024 1200 by Lena Kruse RN  Safety Promotion/Fall Prevention:   safety round/check completed   room organization consistent   nonskid shoes/slippers when out of bed   assistive device/personal items within reach   clutter free environment maintained  Taken 8/19/2024 1000 by Lena Kruse RN  Safety Promotion/Fall Prevention:   safety round/check completed   room organization consistent    nonskid shoes/slippers when out of bed   assistive device/personal items within reach   clutter free environment maintained  Taken 8/19/2024 0815 by Lena Kruse RN  Safety Promotion/Fall Prevention:   safety round/check completed   room organization consistent   nonskid shoes/slippers when out of bed   assistive device/personal items within reach   clutter free environment maintained  Intervention: Prevent Infection  Recent Flowsheet Documentation  Taken 8/19/2024 1603 by Lena Kruse RN  Infection Prevention: environmental surveillance performed  Taken 8/19/2024 1400 by Lena Kruse RN  Infection Prevention: environmental surveillance performed  Taken 8/19/2024 1200 by Lena Kruse RN  Infection Prevention: environmental surveillance performed  Taken 8/19/2024 1000 by Lena Kruse RN  Infection Prevention: environmental surveillance performed  Taken 8/19/2024 0815 by Lena Kruse RN  Infection Prevention: environmental surveillance performed  Goal: Optimal Comfort and Wellbeing  Outcome: Ongoing, Progressing  Intervention: Monitor Pain and Promote Comfort  Recent Flowsheet Documentation  Taken 8/19/2024 1603 by Lena Kruse RN  Pain Management Interventions:   pillow support provided   quiet environment facilitated   care clustered  Taken 8/19/2024 1200 by Lena Kruse RN  Pain Management Interventions:   pillow support provided   quiet environment facilitated   care clustered  Goal: Readiness for Transition of Care  Outcome: Ongoing, Progressing     Problem: Asthma Comorbidity  Goal: Maintenance of Asthma Control  Outcome: Ongoing, Progressing  Intervention: Maintain Asthma Symptom Control  Recent Flowsheet Documentation  Taken 8/19/2024 1603 by Lena Kruse RN  Medication Review/Management: medications reviewed  Taken 8/19/2024 1400 by Lena Kruse RN  Medication Review/Management: medications reviewed  Taken 8/19/2024 1200 by Lena Kruse, BENOIT  Medication  Review/Management: medications reviewed  Taken 8/19/2024 1000 by Lena Kruse RN  Medication Review/Management: medications reviewed  Taken 8/19/2024 0815 by Lena Kruse RN  Medication Review/Management: medications reviewed     Problem: COPD (Chronic Obstructive Pulmonary Disease) Comorbidity  Goal: Maintenance of COPD Symptom Control  Outcome: Ongoing, Progressing  Intervention: Maintain COPD-Symptom Control  Recent Flowsheet Documentation  Taken 8/19/2024 1603 by Lena Kruse RN  Medication Review/Management: medications reviewed  Taken 8/19/2024 1400 by Lena Kruse RN  Medication Review/Management: medications reviewed  Taken 8/19/2024 1200 by Lena Kruse RN  Medication Review/Management: medications reviewed  Taken 8/19/2024 1000 by Lena Kruse RN  Medication Review/Management: medications reviewed  Taken 8/19/2024 0815 by Lena Kruse RN  Medication Review/Management: medications reviewed     Problem: Heart Failure Comorbidity  Goal: Maintenance of Heart Failure Symptom Control  Outcome: Ongoing, Progressing  Intervention: Maintain Heart Failure-Management  Recent Flowsheet Documentation  Taken 8/19/2024 1603 by Lena Kruse RN  Medication Review/Management: medications reviewed  Taken 8/19/2024 1400 by Lena Kruse RN  Medication Review/Management: medications reviewed  Taken 8/19/2024 1200 by Lena Kruse RN  Medication Review/Management: medications reviewed  Taken 8/19/2024 1000 by Lena Kruse RN  Medication Review/Management: medications reviewed  Taken 8/19/2024 0815 by Lena Kruse RN  Medication Review/Management: medications reviewed     Problem: Hypertension Comorbidity  Goal: Blood Pressure in Desired Range  Outcome: Ongoing, Progressing  Intervention: Maintain Blood Pressure Management  Recent Flowsheet Documentation  Taken 8/19/2024 1603 by Lena Kruse RN  Medication Review/Management: medications reviewed  Taken 8/19/2024 1400  by Lena Kruse RN  Medication Review/Management: medications reviewed  Taken 8/19/2024 1200 by Lena Kruse RN  Medication Review/Management: medications reviewed  Taken 8/19/2024 1000 by Lena Kruse RN  Medication Review/Management: medications reviewed  Taken 8/19/2024 0815 by Lena Kruse RN  Medication Review/Management: medications reviewed     Problem: Skin Injury Risk Increased  Goal: Skin Health and Integrity  Outcome: Ongoing, Progressing  Intervention: Optimize Skin Protection  Recent Flowsheet Documentation  Taken 8/19/2024 1603 by Lena Kruse RN  Head of Bed (HOB) Positioning: HOB elevated  Taken 8/19/2024 1200 by Lena Kruse RN  Head of Bed (HOB) Positioning: HOB elevated     Problem: Fall Injury Risk  Goal: Absence of Fall and Fall-Related Injury  Outcome: Ongoing, Progressing  Intervention: Identify and Manage Contributors  Recent Flowsheet Documentation  Taken 8/19/2024 1603 by Lena Kruse RN  Medication Review/Management: medications reviewed  Taken 8/19/2024 1400 by Lena Kruse RN  Medication Review/Management: medications reviewed  Taken 8/19/2024 1200 by Lena Kruse RN  Medication Review/Management: medications reviewed  Taken 8/19/2024 1000 by Lena Kruse RN  Medication Review/Management: medications reviewed  Taken 8/19/2024 0815 by Lena Kruse RN  Medication Review/Management: medications reviewed  Intervention: Promote Injury-Free Environment  Recent Flowsheet Documentation  Taken 8/19/2024 1603 by Lena Kruse RN  Safety Promotion/Fall Prevention:   safety round/check completed   room organization consistent   nonskid shoes/slippers when out of bed   assistive device/personal items within reach   clutter free environment maintained  Taken 8/19/2024 1400 by Lena Kruse, RN  Safety Promotion/Fall Prevention:   safety round/check completed   room organization consistent   nonskid shoes/slippers when out of bed   assistive  device/personal items within reach   clutter free environment maintained  Taken 8/19/2024 1200 by Lena Kruse RN  Safety Promotion/Fall Prevention:   safety round/check completed   room organization consistent   nonskid shoes/slippers when out of bed   assistive device/personal items within reach   clutter free environment maintained  Taken 8/19/2024 1000 by Lena Kruse RN  Safety Promotion/Fall Prevention:   safety round/check completed   room organization consistent   nonskid shoes/slippers when out of bed   assistive device/personal items within reach   clutter free environment maintained  Taken 8/19/2024 0815 by Lena Kruse RN  Safety Promotion/Fall Prevention:   safety round/check completed   room organization consistent   nonskid shoes/slippers when out of bed   assistive device/personal items within reach   clutter free environment maintained     Problem: Adjustment to Illness (Sepsis/Septic Shock)  Goal: Optimal Coping  Outcome: Ongoing, Progressing     Problem: Bleeding (Sepsis/Septic Shock)  Goal: Absence of Bleeding  Outcome: Ongoing, Progressing     Problem: Glycemic Control Impaired (Sepsis/Septic Shock)  Goal: Blood Glucose Level Within Desired Range  Outcome: Ongoing, Progressing     Problem: Infection Progression (Sepsis/Septic Shock)  Goal: Absence of Infection Signs and Symptoms  Outcome: Ongoing, Progressing  Intervention: Initiate Sepsis Management  Recent Flowsheet Documentation  Taken 8/19/2024 1603 by Lena Kruse RN  Infection Prevention: environmental surveillance performed  Taken 8/19/2024 1400 by Lena Kruse RN  Infection Prevention: environmental surveillance performed  Taken 8/19/2024 1200 by Lena Kruse RN  Infection Prevention: environmental surveillance performed  Taken 8/19/2024 1000 by Lena rKuse RN  Infection Prevention: environmental surveillance performed  Taken 8/19/2024 0815 by Lena Kruse RN  Infection Prevention: environmental  surveillance performed     Problem: Nutrition Impaired (Sepsis/Septic Shock)  Goal: Optimal Nutrition Intake  Outcome: Ongoing, Progressing     Patient seen by consulted doctors. Patient alert and orient. Patient drowsy this shift. Patient assisted back to bed from recliner. Patient continues iv antibiotics. Safety maintained this shift.

## 2024-08-19 NOTE — PROGRESS NOTES
[facility] MEDICINE SERVICE  DAILY PROGRESS NOTE    NAME: Tracy Jane  : 1934  MRN: [id number]      LOS: 4 days     PROVIDER OF SERVICE: Jacoby Orta MD    Chief Complaint: Acute cystitis    Subjective:     Interval History:  History taken from: patient    Patient states that she is more short of breath this morning.  She has difficulty with deep breaths.    Review of Systems:   Review of Systems SOB improving     Objective:     Vital Signs  Temp:  [97.4 °F (36.3 °C)-98 °F (36.7 °C)] 98 °F (36.7 °C)  Heart Rate:  [48-94] 92  Resp:  [16-24] 22  BP: (111-169)/(62-93) 169/78  Flow (L/min):  [4-5] 4   Body mass index is 23.59 kg/m².    Physical Exam  General Appearance:  aaox3   Head:  Atrumatic normocephalic   Eyes:        No sclera icterus   Neck: Normal ROM   Pulm: Crackles improving, bilateral expiratory wheezing, decreased breath sounds   Cardio: Irregular rhythm   Extremities: No edema on BLE   Abdomen: Soft, non tender   /Renal: No suprapubic tenderness   Musculoskeletal: Moves all extremities         Neurologic: Aaox3, no focal neurological defcits       Scheduled Meds   acetylcysteine, 3 mL, Nebulization, BID - RT  amiodarone, 200 mg, Oral, Q12H  budesonide, 0.5 mg, Nebulization, BID - RT  clopidogrel, 75 mg, Oral, Daily  dilTIAZem CD, 120 mg, Oral, Q24H  furosemide, 40 mg, Oral, Daily  guaiFENesin, 1,200 mg, Oral, Q12H  ipratropium-albuterol, 3 mL, Nebulization, Q4H - RT  levothyroxine, 75 mcg, Oral, Q AM  meropenem, 1,000 mg, Intravenous, Q12H  methylPREDNISolone sodium succinate, 40 mg, Intravenous, Q24H  metoprolol succinate XL, 50 mg, Oral, Q24H  pantoprazole, 40 mg, Oral, Daily  rosuvastatin, 20 mg, Oral, Daily  sertraline, 100 mg, Oral, Daily  sodium chloride, 10 mL, Intravenous, Q12H       PRN Meds     acetaminophen    aluminum-magnesium hydroxide-simethicone    senna-docusate sodium **AND** polyethylene glycol **AND** bisacodyl **AND** bisacodyl    Calcium Replacement - Follow  Nurse / BPA Driven Protocol    hydrOXYzine    Magnesium Standard Dose Replacement - Follow Nurse / BPA Driven Protocol    melatonin    nitroglycerin    ondansetron ODT **OR** ondansetron    Phosphorus Replacement - Follow Nurse / BPA Driven Protocol    Potassium Replacement - Follow Nurse / BPA Driven Protocol    sodium chloride    sodium chloride   Infusions         Diagnostic Data    Results from last 7 days   Lab Units 08/19/24  0427   WBC 10*3/mm3 16.74*   HEMOGLOBIN g/dL 9.0*   HEMATOCRIT % 30.3*   PLATELETS 10*3/mm3 352   GLUCOSE mg/dL 154*   CREATININE mg/dL 1.12*   BUN mg/dL 39*   SODIUM mmol/L 144   POTASSIUM mmol/L 4.8   AST (SGOT) U/L 83*   ALT (SGPT) U/L 47*   ALK PHOS U/L 113   BILIRUBIN mg/dL 0.4   ANION GAP mmol/L 11.2       CT Abdomen Pelvis Stone Protocol    Result Date: 8/18/2024  Impression: No acute finding in the abdomen/pelvis. Multifocal groundglass opacities with interlobular septal thickening and small bilateral pleural effusions is nonspecific but can be seen with multifocal pneumonia and/or pulmonary edema. Electronically Signed: Jose Atkins  8/18/2024 11:39 AM EDT  Workstation ID: [id number]       I reviewed the patient's new clinical results.    Assessment/Plan:   Patient is a 89 y.o. female with PMHx of HTN, HLD, hypothyroid, GERD, anxiety, anemia, HFpEF, CAD, A. fib presented to [facility] for nausea and vomiting.  Of note, patient recently discharged from [facility] 2 weeks ago after undergoing treatment for a. Fib and CHF.  Since then has had nausea and vomiting causing difficulty with food intake at times.      Assessments:   Acute hypoxic respiratory failure: PNA worsing  Possible UTI  Afib on Amio, not on AC s/p Watchman device  Status post Bioprosthetic aortic valve  CAD s/p CABG 2008, s/p stent in 2006  HFpEF, Last echo: 50-55 % LVEF  GERD  Hypothyroidism  HTN  HLD     Plan:     Acute on chronic Systolic heart failure  -Diuresing with IV Lasix  Transition to oral diuretics given increasing  creatinine  -Continue to monitor I's/O and daily weights    Possible community-acquired bacterial pneumonia, unspecified organism  -Continue IV antibiotics with meropenem per ID recommendations  -Pulmonary toileting  -Inhaled Mucomyst and nebulizers    Acute respiratory failure with hypoxia  -Secondary to decompensated heart failure and pneumonia  -Continue treatment as above with diuretics, antibiotics, pulmonary toileting  -Wean oxygen as tolerated    Acute UTI  -Urine culture with ESBL Klebsiella  -Continue IV meropenem    A-fib  -Rate controlled on amiodarone, diltiazem  Patient has history of Watchman device as she did not tolerate anticoagulation    CAD  -Continue GDMT with aspirin, statin therapy    GERD  -Continue PPI    Hypothyroidism  -Continue Synthroid    Hypertension  -Continue current medical therapy for BP control    Dyslipidemia  -Continue statin therapy           VTE Prophylaxis:  Mechanical VTE prophylaxis orders are present.         Code status is   Code Status and Medical Interventions: No CPR (Do Not Attempt to Resuscitate); Limited Support; No intubation (DNI)   Ordered at: 08/17/24 1710     Medical Intervention Limits:    No intubation (DNI)     Level Of Support Discussed With:    Patient     Code Status (Patient has no pulse and is not breathing):    No CPR (Do Not Attempt to Resuscitate)     Medical Interventions (Patient has pulse or is breathing):    Limited Support           Time: 30 minutes    Part of this note may be an electronic transcription/translation of spoken language to printed text using the Dragon Dictation System.    Signature: Electronically signed by Jacoby Orta MD, 08/19/24, 14:19 EDT.  [facility]

## 2024-08-19 NOTE — PROGRESS NOTES
Infectious Diseases Progress Note      LOS: 4 days   Patient Care Team:  Amparo Eisenberg APRN as PCP - General (Nurse Practitioner)    Chief Complaint: Shortness of breath, general weakness    Subjective       The patient has been afebrile for the last 24 hours.  The patient is on 5 L of oxygen by nasal cannula hemodynamically stable, and is tolerating antimicrobial therapy.  Patient is having increased shortness of breath but states she is very anxious at this time.      Review of Systems:   Review of Systems   Constitutional:  Positive for fatigue.   HENT: Negative.     Eyes: Negative.    Respiratory:  Positive for shortness of breath.    Cardiovascular: Negative.    Gastrointestinal: Negative.    Endocrine: Negative.    Genitourinary: Negative.    Musculoskeletal: Negative.    Skin: Negative.    Neurological:  Positive for weakness.   Psychiatric/Behavioral: Negative.  The patient is nervous/anxious.    All other systems reviewed and are negative.       Objective     Vital Signs  Temp:  [97.4 °F (36.3 °C)-98 °F (36.7 °C)] 98 °F (36.7 °C)  Heart Rate:  [48-94] 92  Resp:  [16-24] 22  BP: (111-169)/(58-93) 169/78    Physical Exam:  Physical Exam  Vitals and nursing note reviewed.   Constitutional:       General: She is not in acute distress.     Appearance: She is well-developed and normal weight. She is ill-appearing. She is not diaphoretic.   HENT:      Head: Normocephalic and atraumatic.   Eyes:      General: No scleral icterus.     Extraocular Movements: Extraocular movements intact.      Conjunctiva/sclera: Conjunctivae normal.      Pupils: Pupils are equal, round, and reactive to light.   Cardiovascular:      Rate and Rhythm: Normal rate and regular rhythm.      Heart sounds: Normal heart sounds, S1 normal and S2 normal. No murmur heard.  Pulmonary:      Effort: Pulmonary effort is normal. No respiratory distress.      Breath sounds: No stridor. No wheezing or rales.      Comments: Mild lower lobe  crackles  Chest:      Chest wall: No tenderness.   Abdominal:      General: Bowel sounds are normal. There is no distension.      Palpations: Abdomen is soft. There is no mass.      Tenderness: There is no abdominal tenderness. There is no guarding.   Genitourinary:     Comments: External catheter  Musculoskeletal:         General: No swelling, tenderness or deformity.      Cervical back: Neck supple.   Skin:     General: Skin is warm and dry.      Coloration: Skin is not pale.      Findings: No bruising, erythema or rash.   Neurological:      Mental Status: She is alert and oriented to person, place, and time.      Comments: General Weakness   Psychiatric:         Mood and Affect: Mood normal.          Results Review:    I have reviewed all clinical data, test, lab, and imaging results.     Radiology  No Radiology Exams Resulted Within Past 24 Hours    Cardiology    Laboratory    Results from last 7 days   Lab Units 08/19/24  0427 08/18/24  0241 08/17/24  0217 08/15/24  0232 08/14/24  1304   WBC 10*3/mm3 16.74* 12.36* 11.81* 12.34* 9.01   HEMOGLOBIN g/dL 9.0* 8.8* 9.2* 8.6* 8.5*   HEMATOCRIT % 30.3* 28.3* 30.4* 29.8* 29.4*   PLATELETS 10*3/mm3 352 315 268 204 264     Results from last 7 days   Lab Units 08/19/24  0427 08/18/24  2152 08/18/24  0553 08/17/24  0217 08/15/24  0232 08/14/24  1512   SODIUM mmol/L 144  --  142 138 140 139   POTASSIUM mmol/L 4.8 4.6 3.0* 3.9 4.7 3.3*   CHLORIDE mmol/L 104  --  101 102 105 99   CO2 mmol/L 28.8  --  27.1 24.7 26.4 29.2*   BUN mg/dL 39*  --  25* 15 10 10   CREATININE mg/dL 1.12*  --  0.94 0.85 0.79 0.81   GLUCOSE mg/dL 154*  --  123* 107* 100* 93   ALBUMIN g/dL 3.0*  --  2.8* 2.8*  --  3.4*   BILIRUBIN mg/dL 0.4  --  0.4 0.4  --  0.6   ALK PHOS U/L 113  --  101 105  --  94   AST (SGOT) U/L 83*  --  30 22  --  21   ALT (SGPT) U/L 47*  --  16 12  --  15   LIPASE U/L  --   --   --   --   --  14   CALCIUM mg/dL 10.1  --  9.4 9.4 9.3 9.4                 Microbiology   Microbiology  Results (last 10 days)       Procedure Component Value - Date/Time    MRSA Screen, PCR (Inpatient) - Swab, Nares [[id number]]  (Normal) Collected: 08/17/24 1839    Lab Status: Final result Specimen: Swab from Nares Updated: 08/17/24 2005     MRSA PCR No MRSA Detected    Narrative:      The negative predictive value of this diagnostic test is high and should only be used to consider de-escalating anti-MRSA therapy. A positive result may indicate colonization with MRSA and must be correlated clinically.    Blood Culture - Blood, Arm, Right [[id number]]  (Normal) Collected: 08/17/24 0218    Lab Status: Preliminary result Specimen: Blood from Arm, Right Updated: 08/19/24 0245     Blood Culture No growth at 2 days    Blood Culture - Blood, Arm, Left [[id number]]  (Normal) Collected: 08/17/24 0217    Lab Status: Preliminary result Specimen: Blood from Arm, Left Updated: 08/19/24 0245     Blood Culture No growth at 2 days    Respiratory Panel PCR w/COVID-19(SARS-CoV-2) SACHA/RAMANDEEP/DEL/PAD/COR/RABIA In-House, NP Swab in UTM/VTM, 2 HR TAT - Swab, Nasopharynx [[id number]]  (Normal) Collected: 08/15/24 0841    Lab Status: Final result Specimen: Swab from Nasopharynx Updated: 08/15/24 0943     ADENOVIRUS, PCR Not Detected     Coronavirus 229E Not Detected     Coronavirus HKU1 Not Detected     Coronavirus NL63 Not Detected     Coronavirus OC43 Not Detected     COVID19 Not Detected     Human Metapneumovirus Not Detected     Human Rhinovirus/Enterovirus Not Detected     Influenza A PCR Not Detected     Influenza B PCR Not Detected     Parainfluenza Virus 1 Not Detected     Parainfluenza Virus 2 Not Detected     Parainfluenza Virus 3 Not Detected     Parainfluenza Virus 4 Not Detected     RSV, PCR Not Detected     Bordetella pertussis pcr Not Detected     Bordetella parapertussis PCR Not Detected     Chlamydophila pneumoniae PCR Not Detected     Mycoplasma pneumo by PCR Not Detected    Narrative:      In the setting of a positive respiratory  panel with a viral infection PLUS a negative procalcitonin without other underlying concern for bacterial infection, consider observing off antibiotics or discontinuation of antibiotics and continue supportive care. If the respiratory panel is positive for atypical bacterial infection (Bordetella pertussis, Chlamydophila pneumoniae, or Mycoplasma pneumoniae), consider antibiotic de-escalation to target atypical bacterial infection.    Blood Culture - Blood, Arm, Right [[id number]]  (Abnormal) Collected: 08/14/24 1639    Lab Status: Final result Specimen: Blood from Arm, Right Updated: 08/17/24 0637     Blood Culture Streptococcus, Alpha Hemolytic     Isolated from Aerobic Bottle     Gram Stain Aerobic Bottle Gram positive cocci in chains    Narrative:      Probable contaminant requires clinical correlation, susceptibility not performed unless requested by physician.    Blood Culture ID, PCR - Blood, Arm, Right [[id number]]  (Abnormal) Collected: 08/14/24 1639    Lab Status: Final result Specimen: Blood from Arm, Right Updated: 08/16/24 0814     BCID, PCR Streptococcus spp, not A, B, or pneumoniae. Identification by BCID2 PCR.     BOTTLE TYPE Aerobic Bottle    Blood Culture - Blood, Arm, Left [[id number]]  (Normal) Collected: 08/14/24 1611    Lab Status: Preliminary result Specimen: Blood from Arm, Left Updated: 08/18/24 1616     Blood Culture No growth at 4 days    Narrative:      Less than seven (7) mL's of blood was collected.  Insufficient quantity may yield false negative results.    Urine Culture - Urine, Urine, Clean Catch [[id number]]  (Abnormal)  (Susceptibility) Collected: 08/14/24 1353    Lab Status: Final result Specimen: Urine, Clean Catch Updated: 08/16/24 1146     Urine Culture >100,000 CFU/mL Klebsiella pneumoniae ESBL    Narrative:      Colonization of the urinary tract without infection is common. Treatment is discouraged unless the patient is symptomatic, pregnant, or undergoing an invasive urologic  procedure.  Recent outcomes data supports the use of pip/tazo in the treatment of susceptible ESBL infections for uncomplicated UTI. Consider use of pip/tazo as a carbapenem-sparing regimen in applicable patients.    Susceptibility        Klebsiella pneumoniae ESBL      DESI      Amikacin Susceptible      Ertapenem Susceptible      Gentamicin Resistant      Levofloxacin Intermediate      Meropenem Susceptible      Nitrofurantoin Intermediate      Piperacillin + Tazobactam Susceptible      Tobramycin Intermediate      Trimethoprim + Sulfamethoxazole Resistant                           S. Pneumo Ag Urine or CSF - Urine, Urine, Clean Catch [[id number]]  (Normal) Collected: 08/14/24 1353    Lab Status: Final result Specimen: Urine, Clean Catch Updated: 08/15/24 0320     Strep Pneumo Ag Negative    Legionella Antigen, Urine - Urine, Urine, Clean Catch [[id number]]  (Normal) Collected: 08/14/24 3936    Lab Status: Final result Specimen: Urine, Clean Catch Updated: 08/15/24 0320     LEGIONELLA ANTIGEN, URINE Negative            Medication Review:       Schedule Meds  acetylcysteine, 3 mL, Nebulization, BID - RT  amiodarone, 200 mg, Oral, Q12H  budesonide, 0.5 mg, Nebulization, BID - RT  clopidogrel, 75 mg, Oral, Daily  dilTIAZem CD, 120 mg, Oral, Q24H  furosemide, 40 mg, Oral, Daily  guaiFENesin, 1,200 mg, Oral, Q12H  ipratropium-albuterol, 3 mL, Nebulization, Q4H - RT  levothyroxine, 75 mcg, Oral, Q AM  meropenem, 1,000 mg, Intravenous, Q12H  methylPREDNISolone sodium succinate, 40 mg, Intravenous, Q24H  metoprolol succinate XL, 50 mg, Oral, Q24H  pantoprazole, 40 mg, Oral, Daily  rosuvastatin, 20 mg, Oral, Daily  sertraline, 100 mg, Oral, Daily  sodium chloride, 10 mL, Intravenous, Q12H        Infusion Meds       PRN Meds    acetaminophen    aluminum-magnesium hydroxide-simethicone    senna-docusate sodium **AND** polyethylene glycol **AND** bisacodyl **AND** bisacodyl    Calcium Replacement - Follow Nurse / BPA Driven  Protocol    hydrOXYzine    Magnesium Standard Dose Replacement - Follow Nurse / BPA Driven Protocol    melatonin    nitroglycerin    ondansetron ODT **OR** ondansetron    Phosphorus Replacement - Follow Nurse / BPA Driven Protocol    Potassium Replacement - Follow Nurse / BPA Driven Protocol    sodium chloride    sodium chloride        Assessment & Plan       Antimicrobial Therapy   1.  IV Merrem        2.        3.        4.        5.            Assessment     Hypoxia.  Most likely secondary to congestive heart failure.  Patient is chronically on oxygen 2 L at home.  Currently on 5 L  CT scan of the chest is consistent with pulmonary edema.  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  Patient appears very anxious today     Positive blood culture in 1 out of 2 sets for Streptococcus species.  This is usually contamination however patient has bioprosthetic aortic valve so we need to rule out endocarditis.  Repeat blood culture set is negative so far     UTI with urine cultures growing ESBL  Klebsiella pneumoniae.  Patient had history of recurrent UTI.  CT of the abdomen pelvis did not show any obstructive uropathy     Acute kidney injury     S/p aortic valve replacement with bioprosthetic valve in the past     S/p pacemaker placement in the past and Watchman procedure     Poor dental hygiene     Plan     Continue IV meropenem 1 g every 12 hours  Waiting on 2D echo report  Continue supportive care  A.m. labs  The case was discussed with the patient's daughter at bedside  Case discussed with RN about patient's anxiety-she will contact the hospitalist     Case discussed with palliative care.  Family is waiting to determine if patient will go to rehab or possibly home with hospice depending on workup results    Luz Berry, APRN  08/19/24  12:18 EDT    Note is dictated utilizing voice recognition software/Dragon

## 2024-08-19 NOTE — SIGNIFICANT NOTE
08/19/24 1113   OTHER   Discipline occupational therapist   Rehab Time/Intention   Session Not Performed patient unavailable for treatment  (Pt being seen for a procedure in room by another discipline & unavailable for tx. Will try back later for OT tx, time permitting.)   Therapy Assessment/Plan (PT)   Criteria for Skilled Interventions Met (PT) yes;meets criteria   Recommendation   OT - Next Appointment 08/20/24

## 2024-08-19 NOTE — THERAPY TREATMENT NOTE
"Subjective: Pt agreeable to therapeutic plan of care. Pt reports she is a little dizzy but admits she is very anxious. Dtr reports pt just had anxiety meds.     Objective:     Precautions - falls    Bed mobility - Min-A  Transfers - Min-A and Mod-A  Ambulation - 2 feet Min-A and Mod-A; amb to chair w/ pt holding on to PT's forearms.     Therapeutic Exercise - 10 Reps B LE AAROM supported sitting / chair    Vitals: Tachycardic; becomes tachycardic w/ activity but calms and goes back to normal rate w/ deep breathing.     Pain: 0 VAS   Location:  n/a  Intervention for pain: Repositioned, RN notified, Increased Activity, and Therapeutic Presence    Education: Provided education on the importance of mobility in the acute care setting, Verbal/Tactile Cues, and Transfer Training    Assessment: Tracy Jane is very anxious w/ movement but was able to transfer well to chair. Needs to keep lights on during day, as she is not sleeping at night. Pt presents with functional mobility impairments which indicate the need for skilled intervention. Tolerating session today without incident. Will continue to follow and progress as tolerated.     Plan/Recommendations:   If medically appropriate, Moderate Intensity Therapy recommended post-acute care. This is recommended as therapy feels the patient would require 3-4 days per week and wouldn't tolerate \"3 hour daily\" rehab intensity. SNF would be the preferred choice. If the patient does not agree to SNF, arrange HH or OP depending on home bound status. If patient is medically complex, consider LTACH. Pt requires no DME at discharge.     Pt desires Skilled Rehab placement at discharge. Pt cooperative; agreeable to therapeutic recommendations and plan of care.         Basic Mobility 6-click:  Rollin = Total, A lot = 2, A little = 3; 4 = None  Supine>Sit:   1 = Total, A lot = 2, A little = 3; 4 = None   Sit>Stand with arms:  1 = Total, A lot = 2, A little = 3; 4 = " None  Bed>Chair:   1 = Total, A lot = 2, A little = 3; 4 = None  Ambulate in room:  1 = Total, A lot = 2, A little = 3; 4 = None  3-5 Steps with railin = Total, A lot = 2, A little = 3; 4 = None  Score: 15    Modified Ranger: N/A = No pre-op stroke/TIA    Post-Tx Position: Up in Chair and Call light and personal items within reach  PPE: gloves and gown

## 2024-08-20 PROBLEM — T17.800A MULTIPLE TRACHEOBRONCHIAL MUCUS PLUGS: Status: ACTIVE | Noted: 2024-01-01

## 2024-08-20 PROBLEM — J18.9 MULTIFOCAL PNEUMONIA: Status: ACTIVE | Noted: 2024-08-14

## 2024-08-20 NOTE — PROGRESS NOTES
[facility] MEDICINE SERVICE  DAILY PROGRESS NOTE    NAME: Tracy Jane  : 1934  MRN: [id number]      LOS: 5 days     PROVIDER OF SERVICE: Jacoby Orta MD    Chief Complaint: Acute cystitis    Subjective:     Interval History:  History taken from: patient    Patient answer breathing is somewhat better today although she still seems short of breath.    Review of Systems:   Review of Systems SOB improving     Objective:     Vital Signs  Temp:  [97.2 °F (36.2 °C)-97.9 °F (36.6 °C)] 97.9 °F (36.6 °C)  Heart Rate:  [] 81  Resp:  [16-22] 16  BP: (111-155)/(55-77) 151/63  Flow (L/min):  [3-5] 4   Body mass index is 23.68 kg/m².    Physical Exam  General Appearance:  aaox3   Head:  Atrumatic normocephalic   Eyes:        No sclera icterus   Neck: Normal ROM   Pulm: Crackles improving, bilateral expiratory wheezing improving, decreased breath sounds   Cardio: Irregular rhythm   Extremities: No edema on BLE   Abdomen: Soft, non tender   /Renal: No suprapubic tenderness   Musculoskeletal: Moves all extremities         Neurologic: Aaox3, no focal neurological defcits       Scheduled Meds   acetylcysteine, 3 mL, Nebulization, BID - RT  amiodarone, 200 mg, Oral, Q12H  budesonide, 0.5 mg, Nebulization, BID - RT  clopidogrel, 75 mg, Oral, Daily  dilTIAZem CD, 120 mg, Oral, Q24H  furosemide, 40 mg, Oral, Daily  guaiFENesin, 1,200 mg, Oral, Q12H  ipratropium-albuterol, 3 mL, Nebulization, Q4H - RT  levothyroxine, 75 mcg, Oral, Q AM  meropenem, 1,000 mg, Intravenous, Q12H  methylPREDNISolone sodium succinate, 40 mg, Intravenous, Q24H  metoprolol succinate XL, 50 mg, Oral, Q24H  pantoprazole, 40 mg, Oral, Daily  rosuvastatin, 20 mg, Oral, Daily  sertraline, 100 mg, Oral, Daily  sodium chloride, 10 mL, Intravenous, Q12H       PRN Meds     acetaminophen    aluminum-magnesium hydroxide-simethicone    senna-docusate sodium **AND** polyethylene glycol **AND** bisacodyl **AND** bisacodyl    Calcium Replacement -  Follow Nurse / BPA Driven Protocol    hydrOXYzine    Magnesium Standard Dose Replacement - Follow Nurse / BPA Driven Protocol    melatonin    nitroglycerin    ondansetron ODT **OR** ondansetron    Phosphorus Replacement - Follow Nurse / BPA Driven Protocol    Potassium Replacement - Follow Nurse / BPA Driven Protocol    sodium chloride    sodium chloride   Infusions         Diagnostic Data    Results from last 7 days   Lab Units 08/20/24  0456 08/19/24  0427   WBC 10*3/mm3 13.39* 16.74*   HEMOGLOBIN g/dL 9.3* 9.0*   HEMATOCRIT % 33.5* 30.3*   PLATELETS 10*3/mm3 324 352   GLUCOSE mg/dL 110* 154*   CREATININE mg/dL 0.97 1.12*   BUN mg/dL 46* 39*   SODIUM mmol/L 141 144   POTASSIUM mmol/L 4.3 4.8   AST (SGOT) U/L  --  83*   ALT (SGPT) U/L  --  47*   ALK PHOS U/L  --  113   BILIRUBIN mg/dL  --  0.4   ANION GAP mmol/L 9.9 11.2       No radiology results for the last day      I reviewed the patient's new clinical results.    Assessment/Plan:   Patient is a 89 y.o. female with PMHx of HTN, HLD, hypothyroid, GERD, anxiety, anemia, HFpEF, CAD, A. fib presented to [facility] for nausea and vomiting.  Of note, patient recently discharged from [facility] 2 weeks ago after undergoing treatment for a. Fib and CHF.  Since then has had nausea and vomiting causing difficulty with food intake at times.      Assessments:   Acute hypoxic respiratory failure: PNA worsing  Possible UTI  Afib on Amio, not on AC s/p Watchman device  Status post Bioprosthetic aortic valve  CAD s/p CABG 2008, s/p stent in 2006  HFpEF, Last echo: 50-55 % LVEF  GERD  Hypothyroidism  HTN  HLD     Plan:     Acute on chronic Systolic heart failure  -Diuresing with IV Lasix  -Echocardiogram shows ejection fraction of 36 to 40%  -Switched to oral diuretics given increasing creatinine; renal function improved today  -Continue to monitor I's/O and daily weights    Possible community-acquired bacterial pneumonia, unspecified organism  -Continue IV antibiotics with meropenem per ID  recommendations; this is also being used to treat her ESBL E. coli  -Pulmonary toileting  -Inhaled Mucomyst and nebulizers  -Planning for bronchoscopy on 8/21    Acute respiratory failure with hypoxia  -Secondary to decompensated heart failure and pneumonia  -Continue treatment as above with diuretics, antibiotics, pulmonary toileting  -Wean oxygen as tolerated    Acute UTI  -Urine culture with ESBL Klebsiella  -Continue IV meropenem    A-fib  -Rate controlled on amiodarone, diltiazem  -Patient has history of Watchman device as she did not tolerate anticoagulation    CAD  -Continue GDMT with aspirin, statin therapy    GERD  -Continue PPI    Hypothyroidism  -Continue Synthroid    Hypertension  -Continue current medical therapy for BP control    Dyslipidemia  -Continue statin therapy      Patient's overall long-term prognosis is poor given her multiple comorbid conditions and severe physical deconditioning.  Patient has spoken to both palliative care and hospice, however family is not yet ready to proceed with hospice care.  They are hoping for skilled rehab or possibly LTAC placement.  I anticipate that if she is transferred to either facility, she would likely not have any progression of her physical deconditioning and will probably readmit to the hospital in the near future.  I believe that she is an appropriate candidate for hospice at this time.    VTE Prophylaxis:  Mechanical VTE prophylaxis orders are present.         Code status is   Code Status and Medical Interventions: No CPR (Do Not Attempt to Resuscitate); Limited Support; No intubation (DNI)   Ordered at: 08/17/24 4360     Medical Intervention Limits:    No intubation (DNI)     Level Of Support Discussed With:    Patient     Code Status (Patient has no pulse and is not breathing):    No CPR (Do Not Attempt to Resuscitate)     Medical Interventions (Patient has pulse or is breathing):    Limited Support           Time: 30 minutes    Part of this note may  be an electronic transcription/translation of spoken language to printed text using the Dragon Dictation System.    Signature: Electronically signed by Jacoby Orta MD, 08/20/24, 12:03 EDT.  [facility]

## 2024-08-20 NOTE — PROGRESS NOTES
Infectious Diseases Progress Note      LOS: 5 days   Patient Care Team:  Amparo Eisenberg APRN as PCP - General (Nurse Practitioner)    Chief Complaint: Shortness of breath, general weakness    Subjective       The patient has been afebrile for the last 24 hours.  The patient is currently on 3 L of oxygen via nasal cannula.      Review of Systems:   Review of Systems   Constitutional:  Positive for fatigue.   HENT: Negative.     Eyes: Negative.    Respiratory:  Positive for shortness of breath.    Cardiovascular: Negative.    Gastrointestinal: Negative.    Endocrine: Negative.    Genitourinary: Negative.    Musculoskeletal: Negative.    Skin: Negative.    Neurological:  Positive for weakness.   Psychiatric/Behavioral: Negative.  The patient is nervous/anxious.    All other systems reviewed and are negative.       Objective     Vital Signs  Temp:  [97.2 °F (36.2 °C)-97.9 °F (36.6 °C)] 97.4 °F (36.3 °C)  Heart Rate:  [] 70  Resp:  [16-22] 16  BP: (111-151)/(55-77) 141/56    Physical Exam:  Physical Exam  Vitals and nursing note reviewed.   Constitutional:       General: She is not in acute distress.     Appearance: She is well-developed and normal weight. She is ill-appearing. She is not diaphoretic.   HENT:      Head: Normocephalic and atraumatic.   Eyes:      General: No scleral icterus.     Extraocular Movements: Extraocular movements intact.      Conjunctiva/sclera: Conjunctivae normal.      Pupils: Pupils are equal, round, and reactive to light.   Cardiovascular:      Rate and Rhythm: Normal rate and regular rhythm.      Heart sounds: Normal heart sounds, S1 normal and S2 normal. No murmur heard.  Pulmonary:      Effort: Pulmonary effort is normal. No respiratory distress.      Breath sounds: No stridor. No wheezing or rales.      Comments: Mild lower lobe crackles  Chest:      Chest wall: No tenderness.   Abdominal:      General: Bowel sounds are normal. There is no distension.      Palpations: Abdomen  is soft. There is no mass.      Tenderness: There is no abdominal tenderness. There is no guarding.   Genitourinary:     Comments: External catheter  Musculoskeletal:         General: No swelling, tenderness or deformity.      Cervical back: Neck supple.   Skin:     General: Skin is warm and dry.      Coloration: Skin is not pale.      Findings: No bruising, erythema or rash.   Neurological:      Mental Status: She is alert and oriented to person, place, and time.      Comments: General Weakness   Psychiatric:         Mood and Affect: Mood normal.          Results Review:    I have reviewed all clinical data, test, lab, and imaging results.     Radiology  No Radiology Exams Resulted Within Past 24 Hours    Cardiology    Laboratory    Results from last 7 days   Lab Units 08/20/24  0456 08/19/24  0427 08/18/24  0241 08/17/24  0217 08/15/24  0232 08/14/24  1304   WBC 10*3/mm3 13.39* 16.74* 12.36* 11.81* 12.34* 9.01   HEMOGLOBIN g/dL 9.3* 9.0* 8.8* 9.2* 8.6* 8.5*   HEMATOCRIT % 33.5* 30.3* 28.3* 30.4* 29.8* 29.4*   PLATELETS 10*3/mm3 324 352 315 268 204 264     Results from last 7 days   Lab Units 08/20/24  0456 08/19/24  0427 08/18/24  2152 08/18/24  0553 08/17/24  0217 08/15/24  0232 08/14/24  1512   SODIUM mmol/L 141 144  --  142 138 140 139   POTASSIUM mmol/L 4.3 4.8 4.6 3.0* 3.9 4.7 3.3*   CHLORIDE mmol/L 102 104  --  101 102 105 99   CO2 mmol/L 29.1* 28.8  --  27.1 24.7 26.4 29.2*   BUN mg/dL 46* 39*  --  25* 15 10 10   CREATININE mg/dL 0.97 1.12*  --  0.94 0.85 0.79 0.81   GLUCOSE mg/dL 110* 154*  --  123* 107* 100* 93   ALBUMIN g/dL  --  3.0*  --  2.8* 2.8*  --  3.4*   BILIRUBIN mg/dL  --  0.4  --  0.4 0.4  --  0.6   ALK PHOS U/L  --  113  --  101 105  --  94   AST (SGOT) U/L  --  83*  --  30 22  --  21   ALT (SGPT) U/L  --  47*  --  16 12  --  15   LIPASE U/L  --   --   --   --   --   --  14   CALCIUM mg/dL 9.5 10.1  --  9.4 9.4 9.3 9.4                 Microbiology   Microbiology Results (last 10 days)        Procedure Component Value - Date/Time    MRSA Screen, PCR (Inpatient) - Swab, Nares [[id number]]  (Normal) Collected: 08/17/24 1839    Lab Status: Final result Specimen: Swab from Nares Updated: 08/17/24 2005     MRSA PCR No MRSA Detected    Narrative:      The negative predictive value of this diagnostic test is high and should only be used to consider de-escalating anti-MRSA therapy. A positive result may indicate colonization with MRSA and must be correlated clinically.    Blood Culture - Blood, Arm, Right [[id number]]  (Normal) Collected: 08/17/24 0218    Lab Status: Preliminary result Specimen: Blood from Arm, Right Updated: 08/20/24 0245     Blood Culture No growth at 3 days    Blood Culture - Blood, Arm, Left [[id number]]  (Normal) Collected: 08/17/24 0217    Lab Status: Preliminary result Specimen: Blood from Arm, Left Updated: 08/20/24 0245     Blood Culture No growth at 3 days    Respiratory Panel PCR w/COVID-19(SARS-CoV-2) SACHA/RAMANDEEP/DEL/PAD/COR/RABIA In-House, NP Swab in UTM/VTM, 2 HR TAT - Swab, Nasopharynx [[id number]]  (Normal) Collected: 08/15/24 0841    Lab Status: Final result Specimen: Swab from Nasopharynx Updated: 08/15/24 0943     ADENOVIRUS, PCR Not Detected     Coronavirus 229E Not Detected     Coronavirus HKU1 Not Detected     Coronavirus NL63 Not Detected     Coronavirus OC43 Not Detected     COVID19 Not Detected     Human Metapneumovirus Not Detected     Human Rhinovirus/Enterovirus Not Detected     Influenza A PCR Not Detected     Influenza B PCR Not Detected     Parainfluenza Virus 1 Not Detected     Parainfluenza Virus 2 Not Detected     Parainfluenza Virus 3 Not Detected     Parainfluenza Virus 4 Not Detected     RSV, PCR Not Detected     Bordetella pertussis pcr Not Detected     Bordetella parapertussis PCR Not Detected     Chlamydophila pneumoniae PCR Not Detected     Mycoplasma pneumo by PCR Not Detected    Narrative:      In the setting of a positive respiratory panel with a viral infection  PLUS a negative procalcitonin without other underlying concern for bacterial infection, consider observing off antibiotics or discontinuation of antibiotics and continue supportive care. If the respiratory panel is positive for atypical bacterial infection (Bordetella pertussis, Chlamydophila pneumoniae, or Mycoplasma pneumoniae), consider antibiotic de-escalation to target atypical bacterial infection.    Blood Culture - Blood, Arm, Right [[id number]]  (Abnormal) Collected: 08/14/24 1639    Lab Status: Final result Specimen: Blood from Arm, Right Updated: 08/17/24 0637     Blood Culture Streptococcus, Alpha Hemolytic     Isolated from Aerobic Bottle     Gram Stain Aerobic Bottle Gram positive cocci in chains    Narrative:      Probable contaminant requires clinical correlation, susceptibility not performed unless requested by physician.    Blood Culture ID, PCR - Blood, Arm, Right [[id number]]  (Abnormal) Collected: 08/14/24 1639    Lab Status: Final result Specimen: Blood from Arm, Right Updated: 08/16/24 0814     BCID, PCR Streptococcus spp, not A, B, or pneumoniae. Identification by BCID2 PCR.     BOTTLE TYPE Aerobic Bottle    Blood Culture - Blood, Arm, Left [[id number]]  (Normal) Collected: 08/14/24 1611    Lab Status: Final result Specimen: Blood from Arm, Left Updated: 08/19/24 1615     Blood Culture No growth at 5 days    Narrative:      Less than seven (7) mL's of blood was collected.  Insufficient quantity may yield false negative results.    Urine Culture - Urine, Urine, Clean Catch [[id number]]  (Abnormal)  (Susceptibility) Collected: 08/14/24 1353    Lab Status: Final result Specimen: Urine, Clean Catch Updated: 08/16/24 1146     Urine Culture >100,000 CFU/mL Klebsiella pneumoniae ESBL    Narrative:      Colonization of the urinary tract without infection is common. Treatment is discouraged unless the patient is symptomatic, pregnant, or undergoing an invasive urologic procedure.  Recent outcomes data  supports the use of pip/tazo in the treatment of susceptible ESBL infections for uncomplicated UTI. Consider use of pip/tazo as a carbapenem-sparing regimen in applicable patients.    Susceptibility        Klebsiella pneumoniae ESBL      DESI      Amikacin Susceptible      Ertapenem Susceptible      Gentamicin Resistant      Levofloxacin Intermediate      Meropenem Susceptible      Nitrofurantoin Intermediate      Piperacillin + Tazobactam Susceptible      Tobramycin Intermediate      Trimethoprim + Sulfamethoxazole Resistant                           S. Pneumo Ag Urine or CSF - Urine, Urine, Clean Catch [[id number]]  (Normal) Collected: 08/14/24 1353    Lab Status: Final result Specimen: Urine, Clean Catch Updated: 08/15/24 0320     Strep Pneumo Ag Negative    Legionella Antigen, Urine - Urine, Urine, Clean Catch [[id number]]  (Normal) Collected: 08/14/24 9030    Lab Status: Final result Specimen: Urine, Clean Catch Updated: 08/15/24 0320     LEGIONELLA ANTIGEN, URINE Negative            Medication Review:       Schedule Meds  acetylcysteine, 3 mL, Nebulization, BID - RT  amiodarone, 200 mg, Oral, Q12H  budesonide, 0.5 mg, Nebulization, BID - RT  clopidogrel, 75 mg, Oral, Daily  dilTIAZem CD, 120 mg, Oral, Q24H  furosemide, 40 mg, Oral, Daily  guaiFENesin, 1,200 mg, Oral, Q12H  ipratropium-albuterol, 3 mL, Nebulization, Q4H - RT  levothyroxine, 75 mcg, Oral, Q AM  meropenem, 1,000 mg, Intravenous, Q12H  methylPREDNISolone sodium succinate, 40 mg, Intravenous, Q24H  metoprolol succinate XL, 50 mg, Oral, Q24H  pantoprazole, 40 mg, Oral, Daily  rosuvastatin, 20 mg, Oral, Daily  sertraline, 100 mg, Oral, Daily  sodium chloride, 10 mL, Intravenous, Q12H        Infusion Meds       PRN Meds    acetaminophen    aluminum-magnesium hydroxide-simethicone    senna-docusate sodium **AND** polyethylene glycol **AND** bisacodyl **AND** bisacodyl    Calcium Replacement - Follow Nurse / BPA Driven Protocol    hydrOXYzine    Magnesium  Standard Dose Replacement - Follow Nurse / BPA Driven Protocol    melatonin    nitroglycerin    ondansetron ODT **OR** ondansetron    Phosphorus Replacement - Follow Nurse / BPA Driven Protocol    Potassium Replacement - Follow Nurse / BPA Driven Protocol    sodium chloride    sodium chloride        Assessment & Plan       Antimicrobial Therapy   1.  IV Merrem        2.        3.        4.        5.            Assessment     Hypoxia.  Most likely secondary to congestive heart failure.  Patient is chronically on oxygen 2 L at home.  Currently on 3 L  CT scan of the chest is consistent with pulmonary edema.  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  Patient appears very anxious today     Positive blood culture in 1 out of 2 sets for Streptococcus species.  This is usually contamination however patient has bioprosthetic aortic valve so we need to rule out endocarditis.  Repeat blood culture set is negative so far.  2D echo was not diagnostic.  Based on that patient does not meet criteria for endocarditis.  Family are very reluctant for [facility]     UTI with urine cultures growing ESBL  Klebsiella pneumoniae.  Patient had history of recurrent UTI.  CT of the abdomen pelvis did not show any obstructive uropathy     Acute kidney injury     S/p aortic valve replacement with bioprosthetic valve in the past     S/p pacemaker placement in the past and Watchman procedure     Poor dental hygiene     Plan     Continue IV meropenem 1 g every 12 hours  Continue supportive care  A.m. labs  The case was discussed with the patient's daughter at bedside  The patient is scheduled for bronchoscopy by pulmonary service    Maggie Santana MD  08/20/24  16:14 EDT    Note is dictated utilizing voice recognition software/Dragon

## 2024-08-20 NOTE — THERAPY TREATMENT NOTE
"Subjective: Pt agreeable to therapeutic plan of care.  Pt reports she is anxious to move due to worsening her symptoms.  Reports she is agreeable to PT. Daughter present and encouraging/supportive to patient.     Objective:     Precautions - desaturates with activity    Bed mobility - Mod-A supine to sit, min to mod A scooting to EOB.   Transfers - Mod-A sit to stand and bed to chair   Ambulation - 0 feet N/A or Not attempted.  *Gait belt applied and non-skid socks worn during treatment.       Vitals: Desaturates - O2 sats dropped to 79% immediately when checking post transfer.  Recovered to mid to high 80's on 4L HF.  Increased pt's O2 to 6L due to slow recovery time.  Pt improved to >90%.  Notified nursing.     Pain: 4 VAS   Location: chest pressure  Intervention for pain: Repositioned and Therapeutic Presence    Education: Provided education on the importance of mobility in the acute care setting, Verbal/Tactile Cues, and Transfer Training    Assessment: Tracy Jane presents with functional mobility impairments which indicate the need for skilled intervention.  Patient with overall reduced mobility and significant weakness.  Limited due to desaturation of O2 with activity and anxiety about movement.  Pt required mod A for bed mobility and sit to stand/SPS to chair.  Unable to ambulate this date due to O2 sats and reduced strength/impaired balance.  Pt will significantly benefit from rehab at discharge.  Per pt's daughter, [facility] is in contact with them as they know pt prefers to go there.   Will continue to follow and progress as tolerated.     Plan/Recommendations:   If medically appropriate, Moderate Intensity Therapy recommended post-acute care. This is recommended as therapy feels the patient would require 3-4 days per week and wouldn't tolerate \"3 hour daily\" rehab intensity. SNF would be the preferred choice. If the patient does not agree to SNF, arrange HH or OP depending on home bound status. If " patient is medically complex, consider LTACH. Pt requires no DME at discharge.     Pt desires Skilled Rehab placement at discharge. Pt cooperative; agreeable to therapeutic recommendations and plan of care.     Basic Mobility 6-click:  Rollin = Total, A lot = 2, A little = 3; 4 = None  Supine>Sit:   1 = Total, A lot = 2, A little = 3; 4 = None   Sit>Stand with arms:  1 = Total, A lot = 2, A little = 3; 4 = None  Bed>Chair:   1 = Total, A lot = 2, A little = 3; 4 = None  Ambulate in room:  1 = Total, A lot = 2, A little = 3; 4 = None  3-5 Steps with railin = Total, A lot = 2, A little = 3; 4 = None  Score: 13    Post-Tx Position: Up in Chair, Alarms activated, and Call light and personal items within reach, daughter present, notified nursing   PPE: gloves and gown

## 2024-08-20 NOTE — CASE MANAGEMENT/SOCIAL WORK
Continued Stay Note  [facility]     Patient Name: Tracy Jane  MRN: [id number]  Today's Date: 8/20/2024    Admit Date: 8/14/2024    Plan: [facility] (accepted) no PASRR or pre-cert needed.   Discharge Plan       Row Name 08/20/24 1114       Plan    Plan [facility] (accepted) no PASRR or pre-cert needed.    Patient/Family in Agreement with Plan yes    Plan Comments [facility] hospice met with patient at the bedside today and spoke with daughter Yenny. Patient and family have chosen to not pursure hospice care at this time. Per liaison Rhonda, [facility] will follow patient after discharge and be available should the family wish to pursue hospice care. Patient and daughter Yenny would like a referral sent to [facility] and are still declining SNF facilities at this time. CM placed referral in [facility] Epic basket and messaged liaisons Mercedez/Ava. Per [facility] liaison- patient has been to their facility before and made good progress. [facility] able to accept patient when medically ready to DC. JENNIFER updated careteam via secure chat.              Cell number [phone]  Office number [phone]     Ethan Gross RN

## 2024-08-20 NOTE — DISCHARGE PLACEMENT REQUEST
"Ovi Mcdermott JUAN DIEGO (89 y.o. Female)       Date of Birth   1934    Social Security Number       Address   [address]   [place] [facility]    Home Phone   [phone]    MRN   [id number]       [facility]    Marital Status                               Admission Date   8/14/24    Admission Type   Emergency    Admitting Provider   Dejon Amaya MD    Attending Provider   Jacoby Orta MD    Department, Room/Bed   [facility] OBSERVATION, 111/1       Discharge Date       Discharge Disposition       Discharge Destination                                 Attending Provider: Jacoby Orta MD    Allergies: No Known Allergies    Isolation: Contact   Infection: ESBL Klebsiella (08/16/24)   Code Status: No CPR    Ht: 152.4 cm (60\")   Wt: 55 kg (121 lb 4.1 oz)    Admission Cmt: None   Principal Problem: Acute cystitis [N30.00]                   Active Insurance as of 8/14/2024       Primary Coverage       Payor Plan Insurance Group Employer/Plan Group    MEDICARE MEDICARE A & B        Payor Plan Address Payor Plan Phone Number Payor Plan Fax Number Effective Dates    [address] [phone]  12/1/1999 - None Entered    [facility] [id number]         Subscriber Name Subscriber Birth Date Member ID       OVI MCDERMOTT JUAN DIEGO 1934 [id number]               Secondary Coverage       Payor Plan Insurance Group Employer/Plan Group    [employer] [place] [employer] [place]        Payor Plan Address Payor Plan Phone Number Payor Plan Fax Number Effective Dates    [address] [employer] [place] [address] [phone]  5/1/2010 - None Entered    [place] [address]         Subscriber Name Subscriber Birth Date Member ID       OVI MCDERMOTT JUAN DIEGO 1934 [id number]                     Emergency Contacts        (Rel.) Home Phone Work Phone Mobile Phone    DAYANA ROSADO (Daughter) [phone] -- [phone]    ABIGAIL MCDERMOTT (Son) [phone] -- " [phone]

## 2024-08-20 NOTE — CASE MANAGEMENT/SOCIAL WORK
Continued Stay Note  CORNELIO Dexter     Patient Name: Tracy Jane  MRN: [id number]  Today's Date: 8/20/2024    Admit Date: 8/14/2024    Plan: [facility] No precert required. Home O2 3L [facility].   Discharge Plan       Row Name 08/20/24 0823       Plan    Plan Comments CM met with patient and daughter in law, Collin, at the bedside on 8/19 to obtain 3 SNF choices. Collin and patient state that if patient cannot return to [facility] assisted living patient would discharge to daughter Yenny' home and family will provide 24/7 care.           Ethan Gross RN     Cell number [phone]  Office number [phone]

## 2024-08-20 NOTE — PLAN OF CARE
Goal Outcome Evaluation:  Plan of Care Reviewed With: patient        Progress: no change  Outcome Evaluation: Continues to receive 02 at 3l per NC. Sleeping well between care. No complaints.

## 2024-08-20 NOTE — PLAN OF CARE
"Goal Outcome Evaluation:     Assessment: Tracy Jane presents with functional mobility impairments which indicate the need for skilled intervention.  Patient with overall reduced mobility and significant weakness.  Limited due to desaturation of O2 with activity and anxiety about movement.  Pt required mod A for bed mobility and sit to stand/SPS to chair.  Unable to ambulate this date due to O2 sats and reduced strength/impaired balance.  Pt will significantly benefit from rehab at discharge.  Per pt's daughter, KATE is in contact with them as they know pt prefers to go there.   Will continue to follow and progress as tolerated.     Plan/Recommendations:   If medically appropriate, Moderate Intensity Therapy recommended post-acute care. This is recommended as therapy feels the patient would require 3-4 days per week and wouldn't tolerate \"3 hour daily\" rehab intensity. SNF would be the preferred choice. If the patient does not agree to SNF, arrange HH or OP depending on home bound status. If patient is medically complex, consider LTACH. Pt requires no DME at discharge.     Pt desires Skilled Rehab placement at discharge. Pt cooperative; agreeable to therapeutic recommendations and plan of care.                                            "

## 2024-08-20 NOTE — PLAN OF CARE
Problem: Pain Acute  Goal: Acceptable Pain Control and Functional Ability  Outcome: Ongoing, Progressing  Intervention: Prevent or Manage Pain  Recent Flowsheet Documentation  Taken 8/20/2024 1600 by Lena Kruse RN  Medication Review/Management: medications reviewed  Taken 8/20/2024 1400 by Lena Kruse RN  Medication Review/Management: medications reviewed  Taken 8/20/2024 1200 by Lena Kruse RN  Medication Review/Management: medications reviewed  Taken 8/20/2024 1003 by Lena Kruse RN  Medication Review/Management: medications reviewed  Taken 8/20/2024 0815 by Lena Kruse RN  Medication Review/Management: medications reviewed  Intervention: Develop Pain Management Plan  Recent Flowsheet Documentation  Taken 8/20/2024 1600 by Lena Kruse RN  Pain Management Interventions:   pillow support provided   quiet environment facilitated   care clustered  Taken 8/20/2024 1200 by Lena Kruse RN  Pain Management Interventions:   pillow support provided   quiet environment facilitated   care clustered  Taken 8/20/2024 0815 by Lena Kruse RN  Pain Management Interventions:   pillow support provided   care clustered     Problem: Anemia  Goal: Anemia Symptom Improvement  Outcome: Ongoing, Progressing  Intervention: Monitor and Manage Anemia  Recent Flowsheet Documentation  Taken 8/20/2024 1600 by Lena Kruse RN  Safety Promotion/Fall Prevention:   safety round/check completed   room organization consistent   nonskid shoes/slippers when out of bed   assistive device/personal items within reach   clutter free environment maintained  Taken 8/20/2024 1400 by Lena Kruse RN  Safety Promotion/Fall Prevention:   safety round/check completed   room organization consistent   nonskid shoes/slippers when out of bed   assistive device/personal items within reach   clutter free environment maintained  Taken 8/20/2024 1200 by Lena Kruse RN  Safety Promotion/Fall Prevention:    safety round/check completed   room organization consistent   nonskid shoes/slippers when out of bed   assistive device/personal items within reach   clutter free environment maintained  Taken 8/20/2024 1003 by Lena Kruse RN  Safety Promotion/Fall Prevention:   safety round/check completed   room organization consistent   nonskid shoes/slippers when out of bed   assistive device/personal items within reach   clutter free environment maintained  Taken 8/20/2024 0815 by Lena Kruse RN  Safety Promotion/Fall Prevention:   safety round/check completed   room organization consistent   nonskid shoes/slippers when out of bed   assistive device/personal items within reach   clutter free environment maintained     Problem: Adult Inpatient Plan of Care  Goal: Plan of Care Review  Outcome: Ongoing, Progressing  Goal: Patient-Specific Goal (Individualized)  Outcome: Ongoing, Progressing  Goal: Absence of Hospital-Acquired Illness or Injury  Outcome: Ongoing, Progressing  Intervention: Identify and Manage Fall Risk  Recent Flowsheet Documentation  Taken 8/20/2024 1600 by Lena Kruse RN  Safety Promotion/Fall Prevention:   safety round/check completed   room organization consistent   nonskid shoes/slippers when out of bed   assistive device/personal items within reach   clutter free environment maintained  Taken 8/20/2024 1400 by Lena Kruse RN  Safety Promotion/Fall Prevention:   safety round/check completed   room organization consistent   nonskid shoes/slippers when out of bed   assistive device/personal items within reach   clutter free environment maintained  Taken 8/20/2024 1200 by Lena Kruse RN  Safety Promotion/Fall Prevention:   safety round/check completed   room organization consistent   nonskid shoes/slippers when out of bed   assistive device/personal items within reach   clutter free environment maintained  Taken 8/20/2024 1003 by Lena Kruse RN  Safety Promotion/Fall  Prevention:   safety round/check completed   room organization consistent   nonskid shoes/slippers when out of bed   assistive device/personal items within reach   clutter free environment maintained  Taken 8/20/2024 0815 by Lena Kruse RN  Safety Promotion/Fall Prevention:   safety round/check completed   room organization consistent   nonskid shoes/slippers when out of bed   assistive device/personal items within reach   clutter free environment maintained  Intervention: Prevent and Manage VTE (Venous Thromboembolism) Risk  Recent Flowsheet Documentation  Taken 8/20/2024 0815 by Lena Kruse RN  VTE Prevention/Management:   sequential compression devices off   compression stockings off  Intervention: Prevent Infection  Recent Flowsheet Documentation  Taken 8/20/2024 1600 by Lena Kruse RN  Infection Prevention: environmental surveillance performed  Taken 8/20/2024 1400 by Lena Kruse RN  Infection Prevention: environmental surveillance performed  Taken 8/20/2024 1200 by Lena Kruse RN  Infection Prevention: environmental surveillance performed  Taken 8/20/2024 1003 by Lena Kruse RN  Infection Prevention: environmental surveillance performed  Taken 8/20/2024 0815 by Lena Kruse RN  Infection Prevention: environmental surveillance performed  Goal: Optimal Comfort and Wellbeing  Outcome: Ongoing, Progressing  Intervention: Monitor Pain and Promote Comfort  Recent Flowsheet Documentation  Taken 8/20/2024 1600 by Lena Kruse RN  Pain Management Interventions:   pillow support provided   quiet environment facilitated   care clustered  Taken 8/20/2024 1200 by Lena Kruse RN  Pain Management Interventions:   pillow support provided   quiet environment facilitated   care clustered  Taken 8/20/2024 0815 by Lena Kruse RN  Pain Management Interventions:   pillow support provided   care clustered  Goal: Readiness for Transition of Care  Outcome: Ongoing,  Progressing     Problem: Asthma Comorbidity  Goal: Maintenance of Asthma Control  Outcome: Ongoing, Progressing  Intervention: Maintain Asthma Symptom Control  Recent Flowsheet Documentation  Taken 8/20/2024 1600 by Lena Kruse RN  Medication Review/Management: medications reviewed  Taken 8/20/2024 1400 by Lena Kruse RN  Medication Review/Management: medications reviewed  Taken 8/20/2024 1200 by Lena Kruse RN  Medication Review/Management: medications reviewed  Taken 8/20/2024 1003 by Lnea Kruse RN  Medication Review/Management: medications reviewed  Taken 8/20/2024 0815 by Lena Kruse RN  Medication Review/Management: medications reviewed     Problem: COPD (Chronic Obstructive Pulmonary Disease) Comorbidity  Goal: Maintenance of COPD Symptom Control  Outcome: Ongoing, Progressing  Intervention: Maintain COPD-Symptom Control  Recent Flowsheet Documentation  Taken 8/20/2024 1600 by Lena Kruse RN  Medication Review/Management: medications reviewed  Taken 8/20/2024 1400 by Lena Kruse RN  Medication Review/Management: medications reviewed  Taken 8/20/2024 1200 by Lena Kruse RN  Medication Review/Management: medications reviewed  Taken 8/20/2024 1003 by Lena Kruse RN  Medication Review/Management: medications reviewed  Taken 8/20/2024 0815 by Lena Kruse RN  Medication Review/Management: medications reviewed     Problem: Heart Failure Comorbidity  Goal: Maintenance of Heart Failure Symptom Control  Outcome: Ongoing, Progressing  Intervention: Maintain Heart Failure-Management  Recent Flowsheet Documentation  Taken 8/20/2024 1600 by Lena Kruse RN  Medication Review/Management: medications reviewed  Taken 8/20/2024 1400 by Lena Kruse RN  Medication Review/Management: medications reviewed  Taken 8/20/2024 1200 by Lena Kurse RN  Medication Review/Management: medications reviewed  Taken 8/20/2024 1003 by Lena Kruse RN  Medication  Review/Management: medications reviewed  Taken 8/20/2024 0815 by Lena Kruse RN  Medication Review/Management: medications reviewed     Problem: Hypertension Comorbidity  Goal: Blood Pressure in Desired Range  Outcome: Ongoing, Progressing  Intervention: Maintain Blood Pressure Management  Recent Flowsheet Documentation  Taken 8/20/2024 1600 by Lena Kruse RN  Medication Review/Management: medications reviewed  Taken 8/20/2024 1400 by Lena Kruse RN  Medication Review/Management: medications reviewed  Taken 8/20/2024 1200 by Lena Kruse RN  Medication Review/Management: medications reviewed  Taken 8/20/2024 1003 by Lena Kruse RN  Medication Review/Management: medications reviewed  Taken 8/20/2024 0815 by Lena Kruse RN  Medication Review/Management: medications reviewed     Problem: Skin Injury Risk Increased  Goal: Skin Health and Integrity  Outcome: Ongoing, Progressing  Intervention: Optimize Skin Protection  Recent Flowsheet Documentation  Taken 8/20/2024 1600 by Lena Kruse RN  Head of Bed (HOB) Positioning: HOB elevated  Taken 8/20/2024 1200 by Lena Kruse RN  Head of Bed (HOB) Positioning: HOB elevated  Taken 8/20/2024 0815 by Lena Kruse RN  Pressure Reduction Techniques:   frequent weight shift encouraged   weight shift assistance provided  Head of Bed (HOB) Positioning: HOB elevated     Problem: Fall Injury Risk  Goal: Absence of Fall and Fall-Related Injury  Outcome: Ongoing, Progressing  Intervention: Identify and Manage Contributors  Recent Flowsheet Documentation  Taken 8/20/2024 1600 by Lena Kruse RN  Medication Review/Management: medications reviewed  Taken 8/20/2024 1400 by Lena Kruse RN  Medication Review/Management: medications reviewed  Taken 8/20/2024 1200 by Lena Kruse RN  Medication Review/Management: medications reviewed  Taken 8/20/2024 1003 by Lena Kruse RN  Medication Review/Management: medications  reviewed  Taken 8/20/2024 0815 by Lena Kruse RN  Medication Review/Management: medications reviewed  Intervention: Promote Injury-Free Environment  Recent Flowsheet Documentation  Taken 8/20/2024 1600 by Lena Kruse RN  Safety Promotion/Fall Prevention:   safety round/check completed   room organization consistent   nonskid shoes/slippers when out of bed   assistive device/personal items within reach   clutter free environment maintained  Taken 8/20/2024 1400 by Lena Kruse RN  Safety Promotion/Fall Prevention:   safety round/check completed   room organization consistent   nonskid shoes/slippers when out of bed   assistive device/personal items within reach   clutter free environment maintained  Taken 8/20/2024 1200 by Lena Kruse RN  Safety Promotion/Fall Prevention:   safety round/check completed   room organization consistent   nonskid shoes/slippers when out of bed   assistive device/personal items within reach   clutter free environment maintained  Taken 8/20/2024 1003 by Lena Kruse RN  Safety Promotion/Fall Prevention:   safety round/check completed   room organization consistent   nonskid shoes/slippers when out of bed   assistive device/personal items within reach   clutter free environment maintained  Taken 8/20/2024 0815 by Lena Kruse RN  Safety Promotion/Fall Prevention:   safety round/check completed   room organization consistent   nonskid shoes/slippers when out of bed   assistive device/personal items within reach   clutter free environment maintained     Problem: Adjustment to Illness (Sepsis/Septic Shock)  Goal: Optimal Coping  Outcome: Ongoing, Progressing     Problem: Bleeding (Sepsis/Septic Shock)  Goal: Absence of Bleeding  Outcome: Ongoing, Progressing     Problem: Glycemic Control Impaired (Sepsis/Septic Shock)  Goal: Blood Glucose Level Within Desired Range  Outcome: Ongoing, Progressing     Problem: Infection Progression (Sepsis/Septic Shock)  Goal:  Absence of Infection Signs and Symptoms  Outcome: Ongoing, Progressing  Intervention: Initiate Sepsis Management  Recent Flowsheet Documentation  Taken 8/20/2024 1600 by Lena Kruse RN  Infection Prevention: environmental surveillance performed  Taken 8/20/2024 1400 by Lena Kruse RN  Infection Prevention: environmental surveillance performed  Taken 8/20/2024 1200 by Lena Kruse RN  Infection Prevention: environmental surveillance performed  Taken 8/20/2024 1003 by Lena Kruse RN  Infection Prevention: environmental surveillance performed  Taken 8/20/2024 0815 by Lena Kruse RN  Infection Prevention: environmental surveillance performed     Problem: Nutrition Impaired (Sepsis/Septic Shock)  Goal: Optimal Nutrition Intake  Outcome: Ongoing, Progressing   Goal Outcome Evaluation:                 Patient alert and orient. Patient a little drowsy , but not as much as previous encounter yesterday. Patient plans to have a bronch done tomorrow. Consents signed and on chart. Safety maintained.

## 2024-08-20 NOTE — THERAPY TREATMENT NOTE
Acute Care - Speech Language Pathology   Swallow Treatment Note  [facility]     Patient Name: Tracy Jane  : 1934  MRN: [id number]  Today's Date: 2024               Admit Date: 2024    Visit Dx:     ICD-10-CM ICD-9-CM   1. Acute cystitis with hematuria  N30.01 595.0   2. Nausea and vomiting, unspecified vomiting type  R11.2 787.01   3. Anemia, unspecified type  D64.9 285.9   4. Multiple tracheobronchial mucus plugs  T17.800A 519.19   5. Multifocal pneumonia  J18.9 486     Patient Active Problem List   Diagnosis    Abnormal cardiovascular stress test    Abnormal EKG    Abnormal levels of other serum enzymes    Anemia    Anxiety disorder    Aortic insufficiency    Aortic stenosis    Arthritis, rheumatoid    Asthma    Chronic coronary artery disease    Chronic kidney disease, stage III (moderate)    Depression    Cough    Edema of lower extremity    Encounter for therapeutic drug level monitoring    Excessive anticoagulation    Fatigue    Former smoker    GERD without esophagitis    Hemoptysis    High alkaline phosphatase    Hx of aortic valve replacement    Hyperglycemia    Mixed hyperlipidemia    Essential hypertension    Hyponatremia    Acquired hypothyroidism    Influenza    Nonspecific abnormal results of function study of liver    Osteoarthritis of knee    Other peripheral vertigo, unspecified ear    Paroxysmal atrial fibrillation    Peripheral vascular disease    Presence of aortocoronary bypass graft    Presence of cardiac pacemaker    Renal insufficiency    Shortness of breath    Sick sinus syndrome    Sore throat    Valvular heart disease    Elevated INR    Medication induced coagulopathy    COVID    A-fib    Presence of Watchman left atrial appendage closure device    Hypoxic respiratory failure    Acute on chronic respiratory failure with hypoxia    Acute on chronic heart failure with preserved ejection fraction (HFpEF)    Leukocytosis    Acute cystitis    Cystitis, acute    Multiple  tracheobronchial mucus plugs    Multifocal pneumonia     Past Medical History:   Diagnosis Date    Abnormal ECG     Anxiety     Aortic valve replaced     Arrhythmia     Asthma     Atrial fibrillation     CAD (coronary artery disease)     Carotid artery disease     CHF (congestive heart failure)     Congenital heart disease     GERD (gastroesophageal reflux disease)     Gout     Heart murmur     Hemorrhagic stroke 2023    was on Coumadin for A-fib    Hyperlipidemia     Hypertension     Hyperthyroidism     Hypothyroidism     Myocardial infarction     Overactive bladder     Pacemaker     [facility]    Skin cancer      Past Surgical History:   Procedure Laterality Date    AORTIC VALVE REPAIR/REPLACEMENT  2014    porcine    ATRIAL APPENDAGE EXCLUSION LEFT WITH TRANSESOPHAGEAL ECHOCARDIOGRAM Right 12/26/2023    Procedure: Atrial Appendage Occlusion;  Surgeon: Pk Crabtree MD;  Location: [facility] CATH INVASIVE LOCATION;  Service: Cardiovascular;  Laterality: Right;    ATRIAL APPENDAGE EXCLUSION LEFT WITH TRANSESOPHAGEAL ECHOCARDIOGRAM N/A 12/26/2023    Procedure: Atrial Appendage Occlusion;  Surgeon: Gen Caballero MD;  Location: [facility] CATH INVASIVE LOCATION;  Service: Cardiovascular;  Laterality: N/A;    BASAL CELL CARCINOMA EXCISION      spine, nose and arm, leg 2020    BREAST BIOPSY      CARDIAC CATHETERIZATION      CARDIAC VALVE REPLACEMENT      CAROTID STENT      CATARACT EXTRACTION      CHOLECYSTECTOMY      CORONARY ARTERY BYPASS GRAFT      CORONARY STENT PLACEMENT      ENDOSCOPY N/A 04/24/2023    Procedure: ESOPHAGOGASTRODUODENOSCOPY with antrum body biopsies;  Surgeon: REGGIE Ace MD;  Location: [facility] ENDOSCOPY;  Service: Gastroenterology;  Laterality: N/A;  hiatal hernia    FINGER SURGERY      INSERT / REPLACE / REMOVE PACEMAKER      KNEE SURGERY      PACEMAKER IMPLANTATION      [facility]    SHOULDER SURGERY      RACHEL Bilateral 11/03/2023       SLP Recommendation and Plan       EDUCATION  The  "patient has been educated in the following areas:   Dysphagia (Swallowing Impairment) Oral Care/Hydration Modified Diet Instruction.        SLP GOALS       Row Name 08/20/24 1300       (LTG) Swallow    (LTG) Swallow Patient will tolerate safest and least restrictive diet without complication from aspiration.  -PF    Time Frame (Swallow Long Term Goal) by discharge  -PF    Progress/Outcomes (Swallow Long Term Goal) goal ongoing  -PF       (STG) Swallow 1    (STG) Swallow 1 Patient will participate in full meal assessment to assure safety and adequacy of recommended diet without complications from aspiration.  -PF    Time Frame (Swallow Short Term Goal 1) 1 week  -PF    Progress/Outcomes (Swallow Short Term Goal 1) goal ongoing  -PF    Comment (Swallow Short Term Goal 1) Pt was seen for skilled DT/meal assessment to ensure tolerance of rec'd diet. Pt is currently on soft to chew diet w/ thin liquids. Pt and daughter reported pt dislikes soft to chew diet and prefers to be on a regular diet as it is pt's baseline. Pt stated \"I can pick and choose items I can and can't eat off the regular menu.\" Pt was only agreeable to consuming thin by cup x5 (broth) at lunch. Pt's daughter reported pt has had low intake. No labial spillage occured and oral transit was functional on thins. Pharyngeal phase marked by clear vocal quality after the swallow w/ no clinical s/s of aspiration. D/w nsg and MD regarding pt's preference of being advanced to a regular diet. All in agreement w/ plan. ST will continue to follow to ensure tolerance of PO diet.  -PF              User Key  (r) = Recorded By, (t) = Taken By, (c) = Cosigned By      Initials Name Provider Type    Sandy Campos, SLP Speech and Language Pathologist    Shruthi Razo SLP Speech and Language Pathologist             AZALIA Parker  8/20/2024  "

## 2024-08-20 NOTE — PROGRESS NOTES
Daily Progress Note          Assessment    Pneumonia due to unspecified pathogen  Acute on chronic hypoxemic respiratory failure: Due to mostly to decompensated heart failure and to a lesser degree to pneumonia  Decompensated heart failure  Multifocal pneumonia  UTI due to ESBL Klebsiella pneumonia  Small bilateral pleural effusion  CAD status post CABG  Status post aortic valve replacement 2014  A-fib, sick sinus syndrome: Status post Watchman procedure and pacemaker placement  HTN  HLD  RA: On methotrexate  GERD  Chronic anemia  Former smoker        Recommendations:  Pt is still weak and short of breath and unable to clear her secretions, I discussed with the patient and family about option for bronchoscopy and they both agreed, schedule it for tomorrow    Continue mucomyst nebulized    Antibiotic: As per ID: On meropenem  Oxygen supplement and titration to maintain saturation 90 to 95%: Currently requiring 3 L per nasal cannula  Bronchodilators  Inhaled corticosteroids  IV steroids  Mucinex     Diuresis  HR/BP control  Crestor, Plavix  Thyroid hormone replacement        I personally reviewed the radiological studies             LOS: 5 days     Subjective     Cough and shortness of breath    Objective     Vital signs for last 24 hours:  Vitals:    08/20/24 0808 08/20/24 1003 08/20/24 1137 08/20/24 1142   BP: 115/61 151/63     BP Location:  Right arm     Patient Position:  Lying     Pulse: 73 78 81 81   Resp:  16 16 16   Temp:  97.9 °F (36.6 °C)     TempSrc:  Oral     SpO2:  97% 99% 99%   Weight:       Height:           Intake/Output last 3 shifts:  I/O last 3 completed shifts:  In: 900 [P.O.:300; IV Piggyback:600]  Out: 2650 [Urine:2650]  Intake/Output this shift:  I/O this shift:  In: 473 [P.O.:473]  Out: -       Radiology  Imaging Results (Last 24 Hours)       ** No results found for the last 24 hours. **            Labs:  Results from last 7 days   Lab Units 08/20/24  0456   WBC 10*3/mm3 13.39*   HEMOGLOBIN  g/dL 9.3*   HEMATOCRIT % 33.5*   PLATELETS 10*3/mm3 324     Results from last 7 days   Lab Units 08/20/24  0456 08/19/24  0427   SODIUM mmol/L 141 144   POTASSIUM mmol/L 4.3 4.8   CHLORIDE mmol/L 102 104   CO2 mmol/L 29.1* 28.8   BUN mg/dL 46* 39*   CREATININE mg/dL 0.97 1.12*   CALCIUM mg/dL 9.5 10.1   BILIRUBIN mg/dL  --  0.4   ALK PHOS U/L  --  113   ALT (SGPT) U/L  --  47*   AST (SGOT) U/L  --  83*   GLUCOSE mg/dL 110* 154*         Results from last 7 days   Lab Units 08/19/24  0427 08/18/24  0553 08/17/24  0217   ALBUMIN g/dL 3.0* 2.8* 2.8*             Results from last 7 days   Lab Units 08/19/24  0427   MAGNESIUM mg/dL 2.4                   Meds:   SCHEDULE  acetylcysteine, 3 mL, Nebulization, BID - RT  amiodarone, 200 mg, Oral, Q12H  budesonide, 0.5 mg, Nebulization, BID - RT  clopidogrel, 75 mg, Oral, Daily  dilTIAZem CD, 120 mg, Oral, Q24H  furosemide, 40 mg, Oral, Daily  guaiFENesin, 1,200 mg, Oral, Q12H  ipratropium-albuterol, 3 mL, Nebulization, Q4H - RT  levothyroxine, 75 mcg, Oral, Q AM  meropenem, 1,000 mg, Intravenous, Q12H  methylPREDNISolone sodium succinate, 40 mg, Intravenous, Q24H  metoprolol succinate XL, 50 mg, Oral, Q24H  pantoprazole, 40 mg, Oral, Daily  rosuvastatin, 20 mg, Oral, Daily  sertraline, 100 mg, Oral, Daily  sodium chloride, 10 mL, Intravenous, Q12H      Infusions     PRNs    acetaminophen    aluminum-magnesium hydroxide-simethicone    senna-docusate sodium **AND** polyethylene glycol **AND** bisacodyl **AND** bisacodyl    Calcium Replacement - Follow Nurse / BPA Driven Protocol    hydrOXYzine    Magnesium Standard Dose Replacement - Follow Nurse / BPA Driven Protocol    melatonin    nitroglycerin    ondansetron ODT **OR** ondansetron    Phosphorus Replacement - Follow Nurse / BPA Driven Protocol    Potassium Replacement - Follow Nurse / BPA Driven Protocol    sodium chloride    sodium chloride    Physical Exam:  General Appearance:  Alert, patient looks chronically ill but not  in acute distress  HEENT:  Normocephalic, without obvious abnormality, Conjunctiva/corneas clear,.   Nares normal, no drainage     Neck:  Supple, symmetrical, trachea midline.   Lungs /Chest wall:   Bilateral basal rhonchi, respirations unlabored, symmetrical wall movement.     Heart:  Regular rate and rhythm, S1 S2 normal, 2/6 systolic murmur  Abdomen: Soft, non-tender, no masses, no organomegaly.    Extremities: No edema, no clubbing or cyanosis     ROS  Constitutional: Negative for chills, fever and positive for malaise/fatigue.   HENT: Negative.    Eyes: Negative.    Cardiovascular: Negative.    Respiratory: Positive for cough and shortness of breath.    Skin: Negative.    Musculoskeletal: Negative.    Gastrointestinal: Negative.    Genitourinary: Negative.    Neurological: Generalized weakness      I reviewed the recent clinical results  I personally reviewed the latest radiological studies    Part of this note may be an electronic transcription/translation of spoken language to printed text using the Dragon Dictation System.

## 2024-08-21 ENCOUNTER — PATIENT MESSAGE (OUTPATIENT)
Dept: CARDIOLOGY | Facility: CLINIC | Age: 89
End: 2024-08-21
Payer: MEDICARE

## 2024-08-21 ENCOUNTER — ANESTHESIA EVENT (OUTPATIENT)
Dept: GASTROENTEROLOGY | Facility: HOSPITAL | Age: 89
End: 2024-08-21
Payer: MEDICARE

## 2024-08-21 ENCOUNTER — ANESTHESIA (OUTPATIENT)
Dept: GASTROENTEROLOGY | Facility: HOSPITAL | Age: 89
End: 2024-08-21
Payer: MEDICARE

## 2024-08-21 PROCEDURE — 25010000002 PROPOFOL 10 MG/ML EMULSION: Performed by: ANESTHESIOLOGIST ASSISTANT

## 2024-08-21 PROCEDURE — 25810000003 SODIUM CHLORIDE 0.9 % SOLUTION: Performed by: ANESTHESIOLOGIST ASSISTANT

## 2024-08-21 RX ORDER — SODIUM CHLORIDE 9 MG/ML
INJECTION, SOLUTION INTRAVENOUS CONTINUOUS PRN
Status: DISCONTINUED | OUTPATIENT
Start: 2024-08-21 | End: 2024-08-21 | Stop reason: SURG

## 2024-08-21 RX ADMIN — PROPOFOL INJECTABLE EMULSION 100 MCG/KG/MIN: 10 INJECTION, EMULSION INTRAVENOUS at 11:39

## 2024-08-21 RX ADMIN — SODIUM CHLORIDE: 9 INJECTION, SOLUTION INTRAVENOUS at 11:40

## 2024-08-21 NOTE — OP NOTE
Bronchoscopy Procedure Note    Procedure:  Bronchoscopy, Diagnostic  Bronchoscopy, Therapeutic lavage of multiple mucous plugs  Bronchoalveolar lavage, BAL from right lower lobe    Pre-Operative Diagnosis: Multifocal pneumonia, multiple mucous plugs    Post-Operative Diagnosis: Same    Indication: Multifocal pneumonia, multiple mucous plugs and patient unable to clear her secretions    Anesthesia: Monitored Anesthesia Care (MAC)    Procedure Details: Patient was consented for the procedure with all risk and benefit of the procedure explained in detail.  Patient was given the opportunity to ask questions and all concerns were answered.    Timeout was done in the standard manner   the bronchoscope was inserted into the main airway via the oropharynx. An anatomical survey was done of the main airways and the subsegmental bronchus of the 5 lobes.  The findings are consistent of moderate amount of white mucous plugs from the lower lobes bilaterally.  A therapeutic lavage was performed using aliquots of normal saline instilled into the airways then aspirated back until clear.    Estimated Blood Loss: None           Specimens: Diagnostic BAL was performed right lower lobe by instilling 90 mL sterile saline and return of 40 mL                Complications:  None; patient tolerated the procedure well.           Disposition: PACU - hemodynamically stable.    Post op plan:  Resume p.o. after 2 hours  Follow-up results    Patient tolerated the procedure well.

## 2024-08-21 NOTE — PLAN OF CARE
Goal Outcome Evaluation:  Plan of Care Reviewed With: patient        Progress: improving  Outcome Evaluation: Patient has been recieving IV antibiotics. Bronchoscopy performed.

## 2024-08-21 NOTE — PROGRESS NOTES
Daily Progress Note          Assessment    Pneumonia due to unspecified pathogen  Acute on chronic hypoxemic respiratory failure: Due to mostly to decompensated heart failure and to a lesser degree to pneumonia  Decompensated heart failure  Multifocal pneumonia  UTI due to ESBL Klebsiella pneumonia  Small bilateral pleural effusion  CAD status post CABG  Status post aortic valve replacement 2014  A-fib, sick sinus syndrome: Status post Watchman procedure and pacemaker placement  HTN  HLD  RA: On methotrexate  GERD  Chronic anemia  Former smoker        Recommendations:  Pt is still weak and short of breath and unable to clear her secretions, I discussed with the patient and family about option for bronchoscopy and they both agreed, scheduled today, the patient and family requested to maintain DNR during the procedure    Continue mucomyst nebulized    Antibiotic: As per ID: On meropenem  Oxygen supplement and titration to maintain saturation 90 to 95%: Currently requiring 3 L per nasal cannula  Bronchodilators  Inhaled corticosteroids  IV steroids  Mucinex     Diuresis  HR/BP control  Crestor, Plavix  Thyroid hormone replacement        I personally reviewed the radiological studies             LOS: 6 days     Subjective     Cough and shortness of breath    Objective     Vital signs for last 24 hours:  Vitals:    08/21/24 0826 08/21/24 0827 08/21/24 0831 08/21/24 1115   BP:    180/56   BP Location:    Left arm   Patient Position:    Lying   Pulse: 76 77 78 81   Resp: 16 16 16 (!) 33   Temp:    98.5 °F (36.9 °C)   TempSrc:    Oral   SpO2: 100% 100% 100% 96%   Weight:       Height:           Intake/Output last 3 shifts:  I/O last 3 completed shifts:  In: 1351 [P.O.:1051; IV Piggyback:300]  Out: 1950 [Urine:1950]  Intake/Output this shift:  I/O this shift:  In: 400 [I.V.:200; IV Piggyback:200]  Out: -       Radiology  Imaging Results (Last 24 Hours)       ** No results found for the last 24 hours. **             Labs:  Results from last 7 days   Lab Units 08/20/24  0456   WBC 10*3/mm3 13.39*   HEMOGLOBIN g/dL 9.3*   HEMATOCRIT % 33.5*   PLATELETS 10*3/mm3 324     Results from last 7 days   Lab Units 08/20/24  0456 08/19/24  0427   SODIUM mmol/L 141 144   POTASSIUM mmol/L 4.3 4.8   CHLORIDE mmol/L 102 104   CO2 mmol/L 29.1* 28.8   BUN mg/dL 46* 39*   CREATININE mg/dL 0.97 1.12*   CALCIUM mg/dL 9.5 10.1   BILIRUBIN mg/dL  --  0.4   ALK PHOS U/L  --  113   ALT (SGPT) U/L  --  47*   AST (SGOT) U/L  --  83*   GLUCOSE mg/dL 110* 154*         Results from last 7 days   Lab Units 08/19/24  0427 08/18/24  0553 08/17/24  0217   ALBUMIN g/dL 3.0* 2.8* 2.8*             Results from last 7 days   Lab Units 08/19/24  0427   MAGNESIUM mg/dL 2.4                   Meds:   SCHEDULE  acetylcysteine, 3 mL, Nebulization, BID - RT  amiodarone, 200 mg, Oral, Q12H  budesonide, 0.5 mg, Nebulization, BID - RT  clopidogrel, 75 mg, Oral, Daily  dilTIAZem CD, 120 mg, Oral, Q24H  furosemide, 40 mg, Oral, Daily  guaiFENesin, 1,200 mg, Oral, Q12H  ipratropium-albuterol, 3 mL, Nebulization, Q4H - RT  levothyroxine, 75 mcg, Oral, Q AM  meropenem, 1,000 mg, Intravenous, Q12H  methylPREDNISolone sodium succinate, 40 mg, Intravenous, Q24H  metoprolol succinate XL, 50 mg, Oral, Q24H  pantoprazole, 40 mg, Oral, Daily  rosuvastatin, 20 mg, Oral, Daily  sertraline, 100 mg, Oral, Daily  sodium chloride, 10 mL, Intravenous, Q12H      Infusions  sodium chloride, 9 mL/hr      PRNs    acetaminophen    aluminum-magnesium hydroxide-simethicone    senna-docusate sodium **AND** polyethylene glycol **AND** bisacodyl **AND** bisacodyl    Calcium Replacement - Follow Nurse / BPA Driven Protocol    hydrOXYzine    Magnesium Standard Dose Replacement - Follow Nurse / BPA Driven Protocol    melatonin    nitroglycerin    ondansetron ODT **OR** ondansetron    Phosphorus Replacement - Follow Nurse / BPA Driven Protocol    Potassium Replacement - Follow Nurse / BPA Driven  Protocol    sodium chloride    sodium chloride    Physical Exam:  General Appearance:  Alert, patient looks chronically ill but not in acute distress  HEENT:  Normocephalic, without obvious abnormality, Conjunctiva/corneas clear,.   Nares normal, no drainage     Neck:  Supple, symmetrical, trachea midline.   Lungs /Chest wall:   Bilateral basal rhonchi, respirations unlabored, symmetrical wall movement.     Heart:  Regular rate and rhythm, S1 S2 normal, 2/6 systolic murmur  Abdomen: Soft, non-tender, no masses, no organomegaly.    Extremities: No edema, no clubbing or cyanosis     ROS  Constitutional: Negative for chills, fever and positive for malaise/fatigue.   HENT: Negative.    Eyes: Negative.    Cardiovascular: Negative.    Respiratory: Positive for cough and shortness of breath.    Skin: Negative.    Musculoskeletal: Negative.    Gastrointestinal: Negative.    Genitourinary: Negative.    Neurological: Generalized weakness      I reviewed the recent clinical results  I personally reviewed the latest radiological studies    Part of this note may be an electronic transcription/translation of spoken language to printed text using the Dragon Dictation System.

## 2024-08-21 NOTE — ANESTHESIA POSTPROCEDURE EVALUATION
Patient: Tracy Jane    Procedure Summary       Date: 08/21/24 Room / Location: [facility] / [facility]    Anesthesia Start: 1137 Anesthesia Stop: 1146    Procedure: BRONCHOSCOPY with BRONCHIAL ALVEOLAR LAVAGE (Bronchus) Diagnosis:       Multiple tracheobronchial mucus plugs      Multifocal pneumonia      (Multiple tracheobronchial mucus plugs [T17.800A])      (Multifocal pneumonia [J18.9])    Surgeons: Debora Correa MD Provider: Bradley Douglas MD    Anesthesia Type: general ASA Status: 4            Anesthesia Type: general    Vitals  Vitals Value Taken Time   /67 08/21/24 1241   Temp     Pulse 82 08/21/24 1252   Resp 23 08/21/24 1206   SpO2 97 % 08/21/24 1252   Vitals shown include unfiled device data.        Post Anesthesia Care and Evaluation    Patient location during evaluation: PACU  Patient participation: complete - patient participated  Level of consciousness: awake  Pain scale: See nurse's notes for pain score.  Pain management: adequate    Airway patency: patent  Anesthetic complications: No anesthetic complications  PONV Status: none  Cardiovascular status: acceptable  Respiratory status: acceptable and spontaneous ventilation  Hydration status: acceptable    Comments: Patient seen and examined postoperatively; vital signs stable; SpO2 greater than or equal to 90%; cardiopulmonary status stable; nausea/vomiting adequately controlled; pain adequately controlled; no apparent anesthesia complications; patient discharged from anesthesia care when discharge criteria were met

## 2024-08-21 NOTE — ANESTHESIA PREPROCEDURE EVALUATION
Anesthesia Evaluation     Patient summary reviewed and Nursing notes reviewed   no history of anesthetic complications:   NPO Solid Status: > 8 hours  NPO Liquid Status: > 8 hours           Airway   Mallampati: II  TM distance: >3 FB  Neck ROM: full  No difficulty expected  Dental      Pulmonary - normal exam   (+) pneumonia , asthma,shortness of breath  Cardiovascular - normal exam    (+) pacemaker pacemaker, hypertension, valvular problems/murmurs AS and MR, past MI , CAD, CABG >6 Months, dysrhythmias Atrial Fib, CHF , PVD, hyperlipidemia,  carotid artery disease      Neuro/Psych  (+) psychiatric history Anxiety and Depression  GI/Hepatic/Renal/Endo    (+) GERD, renal disease-, thyroid problem     Musculoskeletal     Abdominal  - normal exam   Substance History      OB/GYN          Other   arthritis,   history of cancer    ROS/Med Hx Other:   Narrative  ·  Left ventricular systolic function is moderately decreased. Left  ventricular ejection fraction appears to be 36 - 40%.  ·  The left ventricular cavity is moderately dilated.  ·  Left ventricular wall thickness is consistent with a thin walled  ventricle.  ·  The left atrial cavity is moderate to severely dilated.  ·  Moderate aortic valve regurgitation is present.  ·  Mild aortic valve stenosis is present.  ·  Peak velocity of the flow distal to the aortic valve is 274 cm/s.  Aortic valve maximum pressure gradient is 30 mmHg. Aortic valve mean  pressure gradient is 16 mmHg.  ·  There is mild, bileaflet mitral valve thickening present.  ·  Moderate to severe mitral valve regurgitation is present.  ·  Estimated right ventricular systolic pressure from tricuspid  regurgitation is normal (<35 mmHg)                    Anesthesia Plan    ASA 4     general     intravenous induction     Anesthetic plan, risks, benefits, and alternatives have been provided, discussed and informed consent has been obtained with: patient.    Plan discussed with CRNA.      CODE STATUS:     While under anesthesia and immediate perioperative period:  Code Status: CPR - Full Support

## 2024-08-21 NOTE — PLAN OF CARE
"Assessment: Tracy Jane presents with functional mobility impairments which indicate the need for skilled intervention. Tolerating session today without incident. Pt had bronch earlier this date with multiple mucus plugs removed. During PT session, pt presents with significant global weakness and poor activity tolerance, desaturating with minimal activity. Pt's daughter present during PT session, reporting pt planning to d/c to IRF. Discussed SNF vs IRF with pt and daughter. Do not anticipate pt will be able to tolerate intensity of Acute IP Rehab; however, pt's daughter reports she wants pt to have best chance of recovery. Will continue to follow and progress as tolerated.     Plan/Recommendations:   If medically appropriate, Moderate Intensity Therapy recommended post-acute care. This is recommended as therapy feels the patient would require 3-4 days per week and wouldn't tolerate \"3 hour daily\" rehab intensity. SNF would be the preferred choice. If the patient does not agree to SNF, arrange HH or OP depending on home bound status. If patient is medically complex, consider LTACH. Pt requires no DME at discharge.     Pt desires Inpatient Rehabilitation placement at discharge. Pt cooperative; agreeable to therapeutic recommendations and plan of care.     "

## 2024-08-21 NOTE — THERAPY TREATMENT NOTE
"Subjective: Pt agreeable to therapeutic plan of care. Pt supine upon entering room with daughter at bedside. Pt on nonrebreather mask.     Objective:     Bed mobility - Supine to sitting Max-A    Transfers - STS from EOB Max-A x3 trials secondary to BLE weakness.     Ambulation - 3 feet Max-A with HHA. Significant BLE weakness noted.     Therapeutic Exercise - 10 Reps B LE AROM supported sitting / chair    Vitals: Desaturates  *Pt initially on nonrebreather mask saturating high 90's. Pt placed on nasal cannula with clearance from RN. Pt initially on 4L via nasal cannula; however, quickly desats with minimal activity to low 80's. Pt titrated up to 7L via NC for transfer to recliner chair. Pt desats after transfer to mid 80's, with steady recover to ~92-95%. RN notified and aware.     Pain: 0 VAS   Location:   Intervention for pain: N/A    Education: Provided education on the importance of mobility in the acute care setting, Verbal/Tactile Cues, Transfer Training, and Gait Training    Assessment: Tracy JUAN DIEGO Jane presents with functional mobility impairments which indicate the need for skilled intervention. Tolerating session today without incident. Pt had bronch earlier this date with multiple mucus plugs removed. During PT session, pt presents with significant global weakness and poor activity tolerance, desaturating with minimal activity. Pt's daughter present during PT session, reporting pt planning to d/c to IRF. Discussed SNF vs IRF with pt and daughter. Do not anticipate pt will be able to tolerate intensity of Acute IP Rehab; however, pt's daughter reports she wants pt to have best chance of recovery. Will continue to follow and progress as tolerated.     Plan/Recommendations:   If medically appropriate, Moderate Intensity Therapy recommended post-acute care. This is recommended as therapy feels the patient would require 3-4 days per week and wouldn't tolerate \"3 hour daily\" rehab intensity. SNF would be the " preferred choice. If the patient does not agree to SNF, arrange HH or OP depending on home bound status. If patient is medically complex, consider LTACH. Pt requires no DME at discharge.     Pt desires Inpatient Rehabilitation placement at discharge. Pt cooperative; agreeable to therapeutic recommendations and plan of care.         Basic Mobility 6-click:  Rollin = Total, A lot = 2, A little = 3; 4 = None  Supine>Sit:   1 = Total, A lot = 2, A little = 3; 4 = None   Sit>Stand with arms:  1 = Total, A lot = 2, A little = 3; 4 = None  Bed>Chair:   1 = Total, A lot = 2, A little = 3; 4 = None  Ambulate in room:  1 = Total, A lot = 2, A little = 3; 4 = None  3-5 Steps with railin = Total, A lot = 2, A little = 3; 4 = None  Score: 10    Modified Graham: N/A = No pre-op stroke/TIA    Post-Tx Position: Up in Chair and Call light and personal items within reach  PPE: gloves and gown

## 2024-08-21 NOTE — PROGRESS NOTES
Infectious Diseases Progress Note      LOS: 6 days   Patient Care Team:  Amparo Eisenberg APRN as PCP - General (Nurse Practitioner)    Chief Complaint: Shortness of breath, general weakness    Subjective       The patient has been afebrile for the last 24 hours.  The patient is currently on 4 L of oxygen via nasal cannula.      Review of Systems:   Review of Systems   Constitutional:  Positive for fatigue.   HENT: Negative.     Eyes: Negative.    Respiratory:  Positive for shortness of breath.    Cardiovascular: Negative.    Gastrointestinal: Negative.    Endocrine: Negative.    Genitourinary: Negative.    Musculoskeletal: Negative.    Skin: Negative.    Neurological:  Positive for weakness.   Psychiatric/Behavioral:  The patient is nervous/anxious.    All other systems reviewed and are negative.       Objective     Vital Signs  Temp:  [97.5 °F (36.4 °C)-98.6 °F (37 °C)] 98.6 °F (37 °C)  Heart Rate:  [70-87] 87  Resp:  [16-33] 22  BP: (129-180)/(52-88) 169/88    Physical Exam:  Physical Exam  Vitals and nursing note reviewed.   Constitutional:       General: She is not in acute distress.     Appearance: She is well-developed and normal weight. She is ill-appearing. She is not diaphoretic.   HENT:      Head: Normocephalic and atraumatic.   Eyes:      General: No scleral icterus.     Extraocular Movements: Extraocular movements intact.      Conjunctiva/sclera: Conjunctivae normal.      Pupils: Pupils are equal, round, and reactive to light.   Cardiovascular:      Rate and Rhythm: Normal rate and regular rhythm.      Heart sounds: Normal heart sounds, S1 normal and S2 normal. No murmur heard.  Pulmonary:      Effort: Pulmonary effort is normal. No respiratory distress.      Breath sounds: No stridor. No wheezing or rales.      Comments: Mild lower lobe crackles  Chest:      Chest wall: No tenderness.   Abdominal:      General: Bowel sounds are normal. There is no distension.      Palpations: Abdomen is soft. There  is no mass.      Tenderness: There is no abdominal tenderness. There is no guarding.   Genitourinary:     Comments: External catheter  Musculoskeletal:         General: No swelling, tenderness or deformity.      Cervical back: Neck supple.   Skin:     General: Skin is warm and dry.      Coloration: Skin is not pale.      Findings: No bruising, erythema or rash.   Neurological:      Mental Status: She is alert and oriented to person, place, and time.      Comments: General Weakness   Psychiatric:         Mood and Affect: Mood normal.          Results Review:    I have reviewed all clinical data, test, lab, and imaging results.     Radiology  No Radiology Exams Resulted Within Past 24 Hours    Cardiology    Laboratory    Results from last 7 days   Lab Units 08/20/24  0456 08/19/24  0427 08/18/24  0241 08/17/24  0217 08/15/24  0232   WBC 10*3/mm3 13.39* 16.74* 12.36* 11.81* 12.34*   HEMOGLOBIN g/dL 9.3* 9.0* 8.8* 9.2* 8.6*   HEMATOCRIT % 33.5* 30.3* 28.3* 30.4* 29.8*   PLATELETS 10*3/mm3 324 352 315 268 204     Results from last 7 days   Lab Units 08/20/24  0456 08/19/24  0427 08/18/24  2152 08/18/24  0553 08/17/24  0217 08/15/24  0232 08/14/24  1512   SODIUM mmol/L 141 144  --  142 138 140 139   POTASSIUM mmol/L 4.3 4.8 4.6 3.0* 3.9 4.7 3.3*   CHLORIDE mmol/L 102 104  --  101 102 105 99   CO2 mmol/L 29.1* 28.8  --  27.1 24.7 26.4 29.2*   BUN mg/dL 46* 39*  --  25* 15 10 10   CREATININE mg/dL 0.97 1.12*  --  0.94 0.85 0.79 0.81   GLUCOSE mg/dL 110* 154*  --  123* 107* 100* 93   ALBUMIN g/dL  --  3.0*  --  2.8* 2.8*  --  3.4*   BILIRUBIN mg/dL  --  0.4  --  0.4 0.4  --  0.6   ALK PHOS U/L  --  113  --  101 105  --  94   AST (SGOT) U/L  --  83*  --  30 22  --  21   ALT (SGPT) U/L  --  47*  --  16 12  --  15   LIPASE U/L  --   --   --   --   --   --  14   CALCIUM mg/dL 9.5 10.1  --  9.4 9.4 9.3 9.4                 Microbiology   Microbiology Results (last 10 days)       Procedure Component Value - Date/Time    BAL  Culture, Quantitative - Lavage, Lung, Right Lower Lobe [[id number]] Collected: 08/21/24 1142    Lab Status: Preliminary result Specimen: Lavage from Lung, Right Lower Lobe Updated: 08/21/24 1233     Gram Stain Many (4+) WBCs per low power field      Rare (1+) Epithelial cells per low power field      Few (2+) Gram positive cocci    Respiratory Panel PCR w/COVID-19(SARS-CoV-2) S[facility]HA/RAMANDEEP/DEL/PAD/COR/RABIA In-House, NP Swab in UTM/VTM, 2 HR TAT - Lavage, Lung, Right Lower Lobe [[id number]]  (Normal) Collected: 08/21/24 1142    Lab Status: Final result Specimen: Lavage from Lung, Right Lower Lobe Updated: 08/21/24 1254     ADENOVIRUS, PCR Not Detected     Coronavirus 229E Not Detected     Coronavirus HKU1 Not Detected     Coronavirus NL63 Not Detected     Coronavirus OC43 Not Detected     COVID19 Not Detected     Human Metapneumovirus Not Detected     Human Rhinovirus/Enterovirus Not Detected     Influenza A PCR Not Detected     Influenza B PCR Not Detected     Parainfluenza Virus 1 Not Detected     Parainfluenza Virus 2 Not Detected     Parainfluenza Virus 3 Not Detected     Parainfluenza Virus 4 Not Detected     RSV, PCR Not Detected     Bordetella pertussis pcr Not Detected     Bordetella parapertussis PCR Not Detected     Chlamydophila pneumoniae PCR Not Detected     Mycoplasma pneumo by PCR Not Detected    Narrative:      In the setting of a positive respiratory panel with a viral infection PLUS a negative procalcitonin without other underlying concern for bacterial infection, consider observing off antibiotics or discontinuation of antibiotics and continue supportive care. If the respiratory panel is positive for atypical bacterial infection (Bordetella pertussis, Chlamydophila pneumoniae, or Mycoplasma pneumoniae), consider antibiotic de-escalation to target atypical bacterial infection.    MRSA Screen, PCR (Inpatient) - Swab, Nares [[id number]]  (Normal) Collected: 08/17/24 1839    Lab Status: Final result Specimen:  Swab from Nares Updated: 08/17/24 2005     MRSA PCR No MRSA Detected    Narrative:      The negative predictive value of this diagnostic test is high and should only be used to consider de-escalating anti-MRSA therapy. A positive result may indicate colonization with MRSA and must be correlated clinically.    Blood Culture - Blood, Arm, Right [[id number]]  (Normal) Collected: 08/17/24 0218    Lab Status: Preliminary result Specimen: Blood from Arm, Right Updated: 08/21/24 0245     Blood Culture No growth at 4 days    Blood Culture - Blood, Arm, Left [[id number]]  (Normal) Collected: 08/17/24 0217    Lab Status: Preliminary result Specimen: Blood from Arm, Left Updated: 08/21/24 0245     Blood Culture No growth at 4 days    Respiratory Panel PCR w/COVID-19(SARS-CoV-2) SACHA/RAMANDEEP/DEL/PAD/COR/RABIA In-House, NP Swab in UTM/VTM, 2 HR TAT - Swab, Nasopharynx [[id number]]  (Normal) Collected: 08/15/24 0841    Lab Status: Final result Specimen: Swab from Nasopharynx Updated: 08/15/24 0943     ADENOVIRUS, PCR Not Detected     Coronavirus 229E Not Detected     Coronavirus HKU1 Not Detected     Coronavirus NL63 Not Detected     Coronavirus OC43 Not Detected     COVID19 Not Detected     Human Metapneumovirus Not Detected     Human Rhinovirus/Enterovirus Not Detected     Influenza A PCR Not Detected     Influenza B PCR Not Detected     Parainfluenza Virus 1 Not Detected     Parainfluenza Virus 2 Not Detected     Parainfluenza Virus 3 Not Detected     Parainfluenza Virus 4 Not Detected     RSV, PCR Not Detected     Bordetella pertussis pcr Not Detected     Bordetella parapertussis PCR Not Detected     Chlamydophila pneumoniae PCR Not Detected     Mycoplasma pneumo by PCR Not Detected    Narrative:      In the setting of a positive respiratory panel with a viral infection PLUS a negative procalcitonin without other underlying concern for bacterial infection, consider observing off antibiotics or discontinuation of antibiotics and  continue supportive care. If the respiratory panel is positive for atypical bacterial infection (Bordetella pertussis, Chlamydophila pneumoniae, or Mycoplasma pneumoniae), consider antibiotic de-escalation to target atypical bacterial infection.    Blood Culture - Blood, Arm, Right [[id number]]  (Abnormal) Collected: 08/14/24 1639    Lab Status: Final result Specimen: Blood from Arm, Right Updated: 08/17/24 0637     Blood Culture Streptococcus, Alpha Hemolytic     Isolated from Aerobic Bottle     Gram Stain Aerobic Bottle Gram positive cocci in chains    Narrative:      Probable contaminant requires clinical correlation, susceptibility not performed unless requested by physician.    Blood Culture ID, PCR - Blood, Arm, Right [[id number]]  (Abnormal) Collected: 08/14/24 1639    Lab Status: Final result Specimen: Blood from Arm, Right Updated: 08/16/24 0814     BCID, PCR Streptococcus spp, not A, B, or pneumoniae. Identification by BCID2 PCR.     BOTTLE TYPE Aerobic Bottle    Blood Culture - Blood, Arm, Left [[id number]]  (Normal) Collected: 08/14/24 1611    Lab Status: Final result Specimen: Blood from Arm, Left Updated: 08/19/24 1615     Blood Culture No growth at 5 days    Narrative:      Less than seven (7) mL's of blood was collected.  Insufficient quantity may yield false negative results.    Urine Culture - Urine, Urine, Clean Catch [[id number]]  (Abnormal)  (Susceptibility) Collected: 08/14/24 1353    Lab Status: Final result Specimen: Urine, Clean Catch Updated: 08/16/24 1146     Urine Culture >100,000 CFU/mL Klebsiella pneumoniae ESBL    Narrative:      Colonization of the urinary tract without infection is common. Treatment is discouraged unless the patient is symptomatic, pregnant, or undergoing an invasive urologic procedure.  Recent outcomes data supports the use of pip/tazo in the treatment of susceptible ESBL infections for uncomplicated UTI. Consider use of pip/tazo as a carbapenem-sparing regimen in  applicable patients.    Susceptibility        Klebsiella pneumoniae ESBL      DESI      Amikacin Susceptible      Ertapenem Susceptible      Gentamicin Resistant      Levofloxacin Intermediate      Meropenem Susceptible      Nitrofurantoin Intermediate      Piperacillin + Tazobactam Susceptible      Tobramycin Intermediate      Trimethoprim + Sulfamethoxazole Resistant                           S. Pneumo Ag Urine or CSF - Urine, Urine, Clean Catch [[id number]]  (Normal) Collected: 08/14/24 1353    Lab Status: Final result Specimen: Urine, Clean Catch Updated: 08/15/24 0320     Strep Pneumo Ag Negative    Legionella Antigen, Urine - Urine, Urine, Clean Catch [[id number]]  (Normal) Collected: 08/14/24 1353    Lab Status: Final result Specimen: Urine, Clean Catch Updated: 08/15/24 0320     LEGIONELLA ANTIGEN, URINE Negative            Medication Review:       Schedule Meds  acetylcysteine, 3 mL, Nebulization, BID - RT  amiodarone, 200 mg, Oral, Q12H  budesonide, 0.5 mg, Nebulization, BID - RT  clopidogrel, 75 mg, Oral, Daily  dilTIAZem CD, 120 mg, Oral, Q24H  furosemide, 40 mg, Oral, BID  guaiFENesin, 1,200 mg, Oral, Q12H  ipratropium-albuterol, 3 mL, Nebulization, Q4H - RT  levothyroxine, 75 mcg, Oral, Q AM  meropenem, 1,000 mg, Intravenous, Q12H  [START ON 8/22/2024] methylPREDNISolone sodium succinate, 20 mg, Intravenous, Q24H  metoprolol succinate XL, 50 mg, Oral, Q24H  pantoprazole, 40 mg, Oral, Daily  rosuvastatin, 20 mg, Oral, Daily  sertraline, 100 mg, Oral, Daily  sodium chloride, 10 mL, Intravenous, Q12H        Infusion Meds  sodium chloride, 9 mL/hr, Last Rate: 9 mL/hr (08/21/24 1410)        PRN Meds    acetaminophen    aluminum-magnesium hydroxide-simethicone    senna-docusate sodium **AND** polyethylene glycol **AND** bisacodyl **AND** bisacodyl    Calcium Replacement - Follow Nurse / BPA Driven Protocol    hydrOXYzine    Magnesium Standard Dose Replacement - Follow Nurse / BPA Driven Protocol    melatonin     nitroglycerin    ondansetron ODT **OR** ondansetron    Phosphorus Replacement - Follow Nurse / BPA Driven Protocol    Potassium Replacement - Follow Nurse / BPA Driven Protocol    sodium chloride    sodium chloride        Assessment & Plan       Antimicrobial Therapy   1.  IV Merrem        2.        3.        4.        5.            Assessment     Hypoxia.  Most likely secondary to congestive heart failure.  Patient is chronically on oxygen 2 L at home.  Currently on 3 L  CT scan of the chest is consistent with pulmonary edema.  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  Patient appears very anxious today     Positive blood culture in 1 out of 2 sets for Streptococcus species.  This is usually contamination however patient has bioprosthetic aortic valve so we need to rule out endocarditis.  Repeat blood culture set is negative so far.  2D echo was not diagnostic.  Based on that patient does not meet criteria for endocarditis.  Family are very reluctant for RACHEL     UTI with urine cultures growing ESBL  Klebsiella pneumoniae.  Patient had history of recurrent UTI.  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 labs  The case was discussed with the patient's daughter at bedside      YEYO Livingston  08/21/24  14:53 EDT    Note is dictated utilizing voice recognition software/Dragon

## 2024-08-21 NOTE — PLAN OF CARE
Goal Outcome Evaluation:  Plan of Care Reviewed With: patient        Progress: no change  Outcome Evaluation: IV antibiotic continue. Scheduled for a procedure this morning. Sleeping well between care.

## 2024-08-21 NOTE — PROGRESS NOTES
[facility] MEDICINE SERVICE  DAILY PROGRESS NOTE    NAME: Tracy Jane  : 1934  MRN: [id number]      LOS: 6 days     PROVIDER OF SERVICE: Jacoby Orta MD    Chief Complaint: Acute cystitis    Subjective:     Interval History:  History taken from: patient    Patient seems to be breathing about the same as yesterday.  She is anxious to get her bronchoscopy over with.    Review of Systems:   Review of Systems SOB improving     Objective:     Vital Signs  Temp:  [97.4 °F (36.3 °C)-98.5 °F (36.9 °C)] 98.5 °F (36.9 °C)  Heart Rate:  [70-81] 76  Resp:  [16-33] 32  BP: (129-180)/(52-75) 135/52  Flow (L/min):  [3-10] 10   Body mass index is 23.08 kg/m².    Physical Exam  General Appearance:  aaox3   Head:  Atrumatic normocephalic   Eyes:        No sclera icterus   Neck: Normal ROM   Pulm: Crackles improving, bilateral expiratory wheezing improving, decreased breath sounds   Cardio: Irregular rhythm   Extremities: No edema on BLE   Abdomen: Soft, non tender   /Renal: No suprapubic tenderness   Musculoskeletal: Moves all extremities         Neurologic: Aaox3, no focal neurological defcits       Scheduled Meds   acetylcysteine, 3 mL, Nebulization, BID - RT  amiodarone, 200 mg, Oral, Q12H  budesonide, 0.5 mg, Nebulization, BID - RT  clopidogrel, 75 mg, Oral, Daily  dilTIAZem CD, 120 mg, Oral, Q24H  furosemide, 40 mg, Oral, BID  guaiFENesin, 1,200 mg, Oral, Q12H  ipratropium-albuterol, 3 mL, Nebulization, Q4H - RT  levothyroxine, 75 mcg, Oral, Q AM  meropenem, 1,000 mg, Intravenous, Q12H  [START ON 2024] methylPREDNISolone sodium succinate, 20 mg, Intravenous, Q24H  metoprolol succinate XL, 50 mg, Oral, Q24H  pantoprazole, 40 mg, Oral, Daily  rosuvastatin, 20 mg, Oral, Daily  sertraline, 100 mg, Oral, Daily  sodium chloride, 10 mL, Intravenous, Q12H       PRN Meds     acetaminophen    aluminum-magnesium hydroxide-simethicone    senna-docusate sodium **AND** polyethylene glycol **AND** bisacodyl  **AND** bisacodyl    Calcium Replacement - Follow Nurse / BPA Driven Protocol    hydrOXYzine    Magnesium Standard Dose Replacement - Follow Nurse / BPA Driven Protocol    melatonin    nitroglycerin    ondansetron ODT **OR** ondansetron    Phosphorus Replacement - Follow Nurse / BPA Driven Protocol    Potassium Replacement - Follow Nurse / BPA Driven Protocol    sodium chloride    sodium chloride   Infusions  sodium chloride, 9 mL/hr          Diagnostic Data    Results from last 7 days   Lab Units 08/20/24  0456 08/19/24  0427   WBC 10*3/mm3 13.39* 16.74*   HEMOGLOBIN g/dL 9.3* 9.0*   HEMATOCRIT % 33.5* 30.3*   PLATELETS 10*3/mm3 324 352   GLUCOSE mg/dL 110* 154*   CREATININE mg/dL 0.97 1.12*   BUN mg/dL 46* 39*   SODIUM mmol/L 141 144   POTASSIUM mmol/L 4.3 4.8   AST (SGOT) U/L  --  83*   ALT (SGPT) U/L  --  47*   ALK PHOS U/L  --  113   BILIRUBIN mg/dL  --  0.4   ANION GAP mmol/L 9.9 11.2       No radiology results for the last day      I reviewed the patient's new clinical results.    Assessment/Plan:   Patient is a 89 y.o. female with PMHx of HTN, HLD, hypothyroid, GERD, anxiety, anemia, HFpEF, CAD, A. fib presented to [facility] for nausea and vomiting.  Of note, patient recently discharged from [facility] 2 weeks ago after undergoing treatment for a. Fib and CHF.  Since then has had nausea and vomiting causing difficulty with food intake at times.        Plan:     Acute on chronic Systolic heart failure  -Diuresing with IV Lasix  -Echocardiogram shows ejection fraction of 36 to 40%  -Switched to oral diuretics given increasing creatinine; renal function improved back to baseline  -Continue to monitor I's/O and daily weights    Possible community-acquired bacterial pneumonia, unspecified organism  -Continue IV antibiotics with meropenem per ID recommendations; this is also being used to treat her ESBL E. coli  -Pulmonary toileting  -Inhaled Mucomyst and nebulizers  -Patient underwent bronchoscopy on 8/21 with several mucous  plugs that were removed and BAL performed    Acute respiratory failure with hypoxia  -Secondary to decompensated heart failure and pneumonia  -Continue treatment as above with diuretics, antibiotics, pulmonary toileting  -Wean oxygen as tolerated    Acute UTI  -Urine culture with ESBL Klebsiella  -Continue IV meropenem for 7-day course of treatment    A-fib  -Rate controlled on amiodarone, diltiazem  -Patient has history of Watchman device as she did not tolerate anticoagulation    CAD  -Continue GDMT with aspirin, statin therapy    GERD  -Continue PPI    Hypothyroidism  -Continue Synthroid    Hypertension  -Continue current medical therapy for BP control    Dyslipidemia  -Continue statin therapy      Patient's overall long-term prognosis is poor given her multiple comorbid conditions and severe physical deconditioning.  Patient has spoken to both palliative care and hospice, however family is not yet ready to proceed with hospice care.  They are hoping for skilled rehab or possibly LTAC placement.  I anticipate that if she is transferred to either facility, she would likely not have any progression of her physical deconditioning and will probably readmit to the hospital in the near future.  I believe that she is an appropriate candidate for hospice at this time.    VTE Prophylaxis:  Mechanical VTE prophylaxis orders are present.         Code status is   Code Status and Medical Interventions: No CPR (Do Not Attempt to Resuscitate); Limited Support; No intubation (DNI)   Ordered at: 08/17/24 1710     Medical Intervention Limits:    No intubation (DNI)     Level Of Support Discussed With:    Patient     Code Status (Patient has no pulse and is not breathing):    No CPR (Do Not Attempt to Resuscitate)     Medical Interventions (Patient has pulse or is breathing):    Limited Support           Time: 30 minutes    Part of this note may be an electronic transcription/translation of spoken language to printed text using the  Dragon Dictation System.    Signature: Electronically signed by Jacoby Orta MD, 08/21/24, 12:02 EDT.  [facility]ist Team

## 2024-08-22 NOTE — NURSING NOTE
Patient had triggered for Severe Sepsis and I asked if Dr. Orta wanted to pursue the sepsis checklist to which he said no. Patients heart rate also changed very quickly, he ordered an EKG 12 lead.

## 2024-08-22 NOTE — THERAPY TREATMENT NOTE
Acute Care - Speech Language Pathology   Swallow Treatment Note  Isacc     Patient Name: Tracy Jane  : 1934  MRN: [id number]  Today's Date: 2024               Admit Date: 2024    Visit Dx:     ICD-10-CM ICD-9-CM   1. Acute cystitis with hematuria  N30.01 595.0   2. Nausea and vomiting, unspecified vomiting type  R11.2 787.01   3. Anemia, unspecified type  D64.9 285.9   4. Multiple tracheobronchial mucus plugs  T17.800A 519.19   5. Multifocal pneumonia  J18.9 486     Patient Active Problem List   Diagnosis    Abnormal cardiovascular stress test    Abnormal EKG    Abnormal levels of other serum enzymes    Anemia    Anxiety disorder    Aortic insufficiency    Aortic stenosis    Arthritis, rheumatoid    Asthma    Chronic coronary artery disease    Chronic kidney disease, stage III (moderate)    Depression    Cough    Edema of lower extremity    Encounter for therapeutic drug level monitoring    Excessive anticoagulation    Fatigue    Former smoker    GERD without esophagitis    Hemoptysis    High alkaline phosphatase    Hx of aortic valve replacement    Hyperglycemia    Mixed hyperlipidemia    Essential hypertension    Hyponatremia    Acquired hypothyroidism    Influenza    Nonspecific abnormal results of function study of liver    Osteoarthritis of knee    Other peripheral vertigo, unspecified ear    Paroxysmal atrial fibrillation    Peripheral vascular disease    Presence of aortocoronary bypass graft    Presence of cardiac pacemaker    Renal insufficiency    Shortness of breath    Sick sinus syndrome    Sore throat    Valvular heart disease    Elevated INR    Medication induced coagulopathy    COVID    A-fib    Presence of Watchman left atrial appendage closure device    Hypoxic respiratory failure    Acute on chronic respiratory failure with hypoxia    Acute on chronic heart failure with preserved ejection fraction (HFpEF)    Leukocytosis    Acute cystitis    Cystitis, acute    Multiple  tracheobronchial mucus plugs    Multifocal pneumonia     Past Medical History:   Diagnosis Date    Abnormal ECG     Anxiety     Aortic valve replaced     Arrhythmia     Asthma     Atrial fibrillation     CAD (coronary artery disease)     Carotid artery disease     CHF (congestive heart failure)     Congenital heart disease     GERD (gastroesophageal reflux disease)     Gout     Heart murmur     Hemorrhagic stroke 2023    was on Coumadin for A-fib    Hyperlipidemia     Hypertension     Hyperthyroidism     Hypothyroidism     Myocardial infarction     Overactive bladder     Pacemaker     [facility]    Skin cancer      Past Surgical History:   Procedure Laterality Date    AORTIC VALVE REPAIR/REPLACEMENT  2014    porcine    ATRIAL APPENDAGE EXCLUSION LEFT WITH TRANSESOPHAGEAL ECHOCARDIOGRAM Right 12/26/2023    Procedure: Atrial Appendage Occlusion;  Surgeon: Pk Crabtree MD;  Location: [facility] CATH INVASIVE LOCATION;  Service: Cardiovascular;  Laterality: Right;    ATRIAL APPENDAGE EXCLUSION LEFT WITH TRANSESOPHAGEAL ECHOCARDIOGRAM N/A 12/26/2023    Procedure: Atrial Appendage Occlusion;  Surgeon: Gen Caballero MD;  Location: [facility] CATH INVASIVE LOCATION;  Service: Cardiovascular;  Laterality: N/A;    BASAL CELL CARCINOMA EXCISION      spine, nose and arm, leg 2020    BREAST BIOPSY      CARDIAC CATHETERIZATION      CARDIAC VALVE REPLACEMENT      CAROTID STENT      CATARACT EXTRACTION      CHOLECYSTECTOMY      CORONARY ARTERY BYPASS GRAFT      CORONARY STENT PLACEMENT      ENDOSCOPY N/A 04/24/2023    Procedure: ESOPHAGOGASTRODUODENOSCOPY with antrum body biopsies;  Surgeon: REGGIE Ace MD;  Location: [facility] ENDOSCOPY;  Service: Gastroenterology;  Laterality: N/A;  hiatal hernia    FINGER SURGERY      INSERT / REPLACE / REMOVE PACEMAKER      KNEE SURGERY      PACEMAKER IMPLANTATION      [facility]    SHOULDER SURGERY      RACHEL Bilateral 11/03/2023           EDUCATION  The patient has been educated in  the following areas:   Dysphagia (Swallowing Impairment).        SLP GOALS       Row Name 08/22/24 1200       (LTG) Swallow    (LTG) Swallow Patient will tolerate safest and least restrictive diet without complication from aspiration.  -MS    Time Frame (Swallow Long Term Goal) by discharge  -MS    Progress/Outcomes (Swallow Long Term Goal) goal met  -MS       (STG) Swallow 1    (STG) Swallow 1 Patient will participate in full meal assessment to assure safety and adequacy of recommended diet without complications from aspiration.  -MS    Time Frame (Swallow Short Term Goal 1) 1 week  -MS    Progress/Outcomes (Swallow Short Term Goal 1) goal met  -MS    Comment (Swallow Short Term Goal 1) Pt was seen for DT/meal at lunch. Pt is currently on regular diet and thin liquids. Pt has partial bridge and some natural dentition. Pt was alert & oriented x5, sitting upright at 90 degrees in chair. Pt tolerated lunch which included turkey sandwich and potato wedges. Pts' daughter present in room and reports pt has been tolerating diet well. Pt able to self-feed. Noted good rate of intake, prolonged mastication, and no residue. Adequate labial seal w/ straw. Pt did state she does get tired when she eats. SLP offered to downgrade pt to [facility] chopped diet and pt verbalized she only wants to be on a regular diet. No overt s/s noted during meal. Recommending pt continue with regular diet and thin liquids at this time per pt preference.  ST will D/C this date due to no further concerns at this time. It is recommended that nursing provide meds as pt tolerates. Please re-consult if needed.     Swallowing precautions include:  *Pt sitting upright at 90 degrees hip flexion  *Oral care to be completed at least x2 daily  *Notify SLP if any s/s of penetration/aspiration occur        -MS              User Key  (r) = Recorded By, (t) = Taken By, (c) = Cosigned By      Initials Name Provider Type    Farhad Subramanian, SLP    Speech and Language  Pathologist                                Farhad Franco, SLP  8/22/2024

## 2024-08-22 NOTE — THERAPY TREATMENT NOTE
"Subjective: Pt agreeable to therapeutic plan of care.  Cognition: oriented to Person, Place, Time, and Situation     Objective:     Precautions -  Fall    Bed Mobility: SBA to get to EOB.        Balance: sitting EOB, static, and dynamic SBA  Functional Ambulation: N/A or Not attempted.    Grooming: SBA  ADL Position: edge of bed sitting  ADL Comments: hair care with pick, using RUE d/t LUE decreased ROM.        Vitals: Desaturates Supine O2 = 95; Sitting EOB =84 initially, 95 after sitting up for a few minutes; Supine =97.     Pain: 0 VAS  Location:     Education: Provided education on the importance of mobility in the acute care setting, ADL training, and Energy conservation strategies      Assessment: Tracy Jane presents with ADL impairments affecting function including endurance / activity tolerance and shortness of breath. Pt reported that she was fatigued today but was willing to participate. Pt completed bed mobility to EOB. Sits EOB >8 minutes, completed hair care with SBA. Pt fatigued and requested to return to supine. Demonstrated functioning below baseline abilities indicate the need for continued skilled intervention while inpatient. Tolerating session today without incident. Will continue to follow and progress as tolerated.     Plan/Recommendations:   Moderate Intensity Therapy recommended post-acute care. This is recommended as therapy feels the patient would require 3-4 days per week and wouldn't tolerate \"3 hour daily\" rehab intensity. SNF would be the preferred choice. If the patient does not agree to SNF, arrange HH or OP depending on home bound status. If patient is medically complex, consider LTACH.. Pt requires no DME at discharge.     Pt desires Skilled Rehab placement at discharge. Pt cooperative; agreeable to therapeutic recommendations and plan of care.     Modified [place]: 4 = Moderately severe disability (Unable to attend to own bodily needs without assistance, and unable to walk " unassisted)     Post-Tx Position: Supine with HOB Elevated  PPE: gloves, surgical mask, and gown

## 2024-08-22 NOTE — PROGRESS NOTES
Infectious Diseases Progress Note      LOS: 7 days   Patient Care Team:  Amparo Eisenberg APRN as PCP - General (Nurse Practitioner)    Chief Complaint: Shortness of breath, general weakness    Subjective       The patient has been afebrile for the last 24 hours.  The patient is currently on 4 L of oxygen via nasal cannula.  Status post bronchoscopy yesterday      Review of Systems:   Review of Systems   Constitutional:  Positive for fatigue.   HENT: Negative.     Eyes: Negative.    Respiratory:  Positive for shortness of breath.    Cardiovascular: Negative.    Gastrointestinal: Negative.    Endocrine: Negative.    Genitourinary: Negative.    Musculoskeletal: Negative.    Skin: Negative.    Neurological:  Positive for weakness.   Psychiatric/Behavioral:  The patient is nervous/anxious.    All other systems reviewed and are negative.       Objective     Vital Signs  Temp:  [97.6 °F (36.4 °C)-98.1 °F (36.7 °C)] 97.9 °F (36.6 °C)  Heart Rate:  [] 80  Resp:  [16-20] 18  BP: (123-175)/(61-90) 123/71    Physical Exam:  Physical Exam  Vitals and nursing note reviewed.   Constitutional:       General: She is not in acute distress.     Appearance: She is well-developed and normal weight. She is ill-appearing. She is not diaphoretic.   HENT:      Head: Normocephalic and atraumatic.   Eyes:      General: No scleral icterus.     Extraocular Movements: Extraocular movements intact.      Conjunctiva/sclera: Conjunctivae normal.      Pupils: Pupils are equal, round, and reactive to light.   Cardiovascular:      Rate and Rhythm: Normal rate and regular rhythm.      Heart sounds: Normal heart sounds, S1 normal and S2 normal. No murmur heard.  Pulmonary:      Effort: Pulmonary effort is normal. No respiratory distress.      Breath sounds: No stridor. No wheezing or rales.      Comments: Mild lower lobe crackles  Chest:      Chest wall: No tenderness.   Abdominal:      General: Bowel sounds are normal. There is no distension.       Palpations: Abdomen is soft. There is no mass.      Tenderness: There is no abdominal tenderness. There is no guarding.   Genitourinary:     Comments: External catheter  Musculoskeletal:         General: No swelling, tenderness or deformity.      Cervical back: Neck supple.   Skin:     General: Skin is warm and dry.      Coloration: Skin is not pale.      Findings: No bruising, erythema or rash.   Neurological:      Mental Status: She is alert and oriented to person, place, and time.      Comments: General Weakness   Psychiatric:         Mood and Affect: Mood normal.          Results Review:    I have reviewed all clinical data, test, lab, and imaging results.     Radiology  No Radiology Exams Resulted Within Past 24 Hours    Cardiology    Laboratory    Results from last 7 days   Lab Units 08/22/24  0523 08/20/24  0456 08/19/24  0427 08/18/24  0241 08/17/24  0217   WBC 10*3/mm3 16.22* 13.39* 16.74* 12.36* 11.81*   HEMOGLOBIN g/dL 9.5* 9.3* 9.0* 8.8* 9.2*   HEMATOCRIT % 32.7* 33.5* 30.3* 28.3* 30.4*   PLATELETS 10*3/mm3 269 324 352 315 268     Results from last 7 days   Lab Units 08/22/24  0523 08/20/24  0456 08/19/24  0427 08/18/24  2152 08/18/24  0553 08/17/24  0217   SODIUM mmol/L 143 141 144  --  142 138   POTASSIUM mmol/L 3.7 4.3 4.8 4.6 3.0* 3.9   CHLORIDE mmol/L 99 102 104  --  101 102   CO2 mmol/L 34.7* 29.1* 28.8  --  27.1 24.7   BUN mg/dL 41* 46* 39*  --  25* 15   CREATININE mg/dL 0.93 0.97 1.12*  --  0.94 0.85   GLUCOSE mg/dL 101* 110* 154*  --  123* 107*   ALBUMIN g/dL  --   --  3.0*  --  2.8* 2.8*   BILIRUBIN mg/dL  --   --  0.4  --  0.4 0.4   ALK PHOS U/L  --   --  113  --  101 105   AST (SGOT) U/L  --   --  83*  --  30 22   ALT (SGPT) U/L  --   --  47*  --  16 12   CALCIUM mg/dL 9.8 9.5 10.1  --  9.4 9.4                 Microbiology   Microbiology Results (last 10 days)       Procedure Component Value - Date/Time    AFB Culture - Lavage, Lung, Right Lower Lobe [[id number]] Collected: 08/21/24 5471     Lab Status: Preliminary result Specimen: Lavage from Lung, Right Lower Lobe Updated: 08/22/24 1102     AFB Stain No acid fast bacilli seen on concentrated smear    BAL Culture, Quantitative - Lavage, Lung, Right Lower Lobe [[id number]] Collected: 08/21/24 1142    Lab Status: Preliminary result Specimen: Lavage from Lung, Right Lower Lobe Updated: 08/22/24 1118     BAL Culture 25,000 CFU/mL The culture consists of normal respiratory amaury. This is a preliminary report; final report to follow.     Gram Stain Many (4+) WBCs per low power field      Rare (1+) Epithelial cells per low power field      Few (2+) Gram positive cocci    Respiratory Panel PCR w/COVID-19(SARS-CoV-2) SACHA/RAMANDEEP/DEL/PAD/COR/RABIA In-House, NP Swab in UTM/VTM, 2 HR TAT - Lavage, Lung, Right Lower Lobe [[id number]]  (Normal) Collected: 08/21/24 1142    Lab Status: Final result Specimen: Lavage from Lung, Right Lower Lobe Updated: 08/21/24 1254     ADENOVIRUS, PCR Not Detected     Coronavirus 229E Not Detected     Coronavirus HKU1 Not Detected     Coronavirus NL63 Not Detected     Coronavirus OC43 Not Detected     COVID19 Not Detected     Human Metapneumovirus Not Detected     Human Rhinovirus/Enterovirus Not Detected     Influenza A PCR Not Detected     Influenza B PCR Not Detected     Parainfluenza Virus 1 Not Detected     Parainfluenza Virus 2 Not Detected     Parainfluenza Virus 3 Not Detected     Parainfluenza Virus 4 Not Detected     RSV, PCR Not Detected     Bordetella pertussis pcr Not Detected     Bordetella parapertussis PCR Not Detected     Chlamydophila pneumoniae PCR Not Detected     Mycoplasma pneumo by PCR Not Detected    Narrative:      In the setting of a positive respiratory panel with a viral infection PLUS a negative procalcitonin without other underlying concern for bacterial infection, consider observing off antibiotics or discontinuation of antibiotics and continue supportive care. If the respiratory panel is positive for  atypical bacterial infection (Bordetella pertussis, Chlamydophila pneumoniae, or Mycoplasma pneumoniae), consider antibiotic de-escalation to target atypical bacterial infection.    MRSA Screen, PCR (Inpatient) - Swab, Nares [[id number]]  (Normal) Collected: 08/17/24 1839    Lab Status: Final result Specimen: Swab from Nares Updated: 08/17/24 2005     MRSA PCR No MRSA Detected    Narrative:      The negative predictive value of this diagnostic test is high and should only be used to consider de-escalating anti-MRSA therapy. A positive result may indicate colonization with MRSA and must be correlated clinically.    Blood Culture - Blood, Arm, Right [[id number]]  (Normal) Collected: 08/17/24 0218    Lab Status: Final result Specimen: Blood from Arm, Right Updated: 08/22/24 0245     Blood Culture No growth at 5 days    Blood Culture - Blood, Arm, Left [[id number]]  (Normal) Collected: 08/17/24 0217    Lab Status: Final result Specimen: Blood from Arm, Left Updated: 08/22/24 0245     Blood Culture No growth at 5 days    Respiratory Panel PCR w/COVID-19(SARS-CoV-2) SACHA/RAMANDEEP/DEL/PAD/COR/RABIA In-House, NP Swab in UTM/VTM, 2 HR TAT - Swab, Nasopharynx [[id number]]  (Normal) Collected: 08/15/24 0841    Lab Status: Final result Specimen: Swab from Nasopharynx Updated: 08/15/24 0943     ADENOVIRUS, PCR Not Detected     Coronavirus 229E Not Detected     Coronavirus HKU1 Not Detected     Coronavirus NL63 Not Detected     Coronavirus OC43 Not Detected     COVID19 Not Detected     Human Metapneumovirus Not Detected     Human Rhinovirus/Enterovirus Not Detected     Influenza A PCR Not Detected     Influenza B PCR Not Detected     Parainfluenza Virus 1 Not Detected     Parainfluenza Virus 2 Not Detected     Parainfluenza Virus 3 Not Detected     Parainfluenza Virus 4 Not Detected     RSV, PCR Not Detected     Bordetella pertussis pcr Not Detected     Bordetella parapertussis PCR Not Detected     Chlamydophila pneumoniae PCR Not Detected      Mycoplasma pneumo by PCR Not Detected    Narrative:      In the setting of a positive respiratory panel with a viral infection PLUS a negative procalcitonin without other underlying concern for bacterial infection, consider observing off antibiotics or discontinuation of antibiotics and continue supportive care. If the respiratory panel is positive for atypical bacterial infection (Bordetella pertussis, Chlamydophila pneumoniae, or Mycoplasma pneumoniae), consider antibiotic de-escalation to target atypical bacterial infection.    Blood Culture - Blood, Arm, Right [[id number]]  (Abnormal) Collected: 08/14/24 1639    Lab Status: Final result Specimen: Blood from Arm, Right Updated: 08/17/24 0637     Blood Culture Streptococcus, Alpha Hemolytic     Isolated from Aerobic Bottle     Gram Stain Aerobic Bottle Gram positive cocci in chains    Narrative:      Probable contaminant requires clinical correlation, susceptibility not performed unless requested by physician.    Blood Culture ID, PCR - Blood, Arm, Right [[id number]]  (Abnormal) Collected: 08/14/24 1639    Lab Status: Final result Specimen: Blood from Arm, Right Updated: 08/16/24 0814     BCID, PCR Streptococcus spp, not A, B, or pneumoniae. Identification by BCID2 PCR.     BOTTLE TYPE Aerobic Bottle    Blood Culture - Blood, Arm, Left [[id number]]  (Normal) Collected: 08/14/24 1611    Lab Status: Final result Specimen: Blood from Arm, Left Updated: 08/19/24 1615     Blood Culture No growth at 5 days    Narrative:      Less than seven (7) mL's of blood was collected.  Insufficient quantity may yield false negative results.    Urine Culture - Urine, Urine, Clean Catch [[id number]]  (Abnormal)  (Susceptibility) Collected: 08/14/24 1353    Lab Status: Final result Specimen: Urine, Clean Catch Updated: 08/16/24 1146     Urine Culture >100,000 CFU/mL Klebsiella pneumoniae ESBL    Narrative:      Colonization of the urinary tract without infection is common. Treatment  is discouraged unless the patient is symptomatic, pregnant, or undergoing an invasive urologic procedure.  Recent outcomes data supports the use of pip/tazo in the treatment of susceptible ESBL infections for uncomplicated UTI. Consider use of pip/tazo as a carbapenem-sparing regimen in applicable patients.    Susceptibility        Klebsiella pneumoniae ESBL      DESI      Amikacin Susceptible      Ertapenem Susceptible      Gentamicin Resistant      Levofloxacin Intermediate      Meropenem Susceptible      Nitrofurantoin Intermediate      Piperacillin + Tazobactam Susceptible      Tobramycin Intermediate      Trimethoprim + Sulfamethoxazole Resistant                           S. Pneumo Ag Urine or CSF - Urine, Urine, Clean Catch [[id number]]  (Normal) Collected: 08/14/24 1353    Lab Status: Final result Specimen: Urine, Clean Catch Updated: 08/15/24 0320     Strep Pneumo Ag Negative    Legionella Antigen, Urine - Urine, Urine, Clean Catch [[id number]]  (Normal) Collected: 08/14/24 7803    Lab Status: Final result Specimen: Urine, Clean Catch Updated: 08/15/24 0320     LEGIONELLA ANTIGEN, URINE Negative            Medication Review:       Schedule Meds  acetylcysteine, 3 mL, Nebulization, BID - RT  amiodarone, 200 mg, Oral, Q12H  budesonide, 0.5 mg, Nebulization, BID - RT  clopidogrel, 75 mg, Oral, Daily  dilTIAZem CD, 120 mg, Oral, Q24H  furosemide, 40 mg, Oral, BID  guaiFENesin, 1,200 mg, Oral, Q12H  ipratropium-albuterol, 3 mL, Nebulization, Q4H - RT  levothyroxine, 75 mcg, Oral, Q AM  meropenem, 1,000 mg, Intravenous, Q12H  methylPREDNISolone sodium succinate, 20 mg, Intravenous, Q24H  metoprolol succinate XL, 50 mg, Oral, Q24H  pantoprazole, 40 mg, Oral, Daily  rosuvastatin, 20 mg, Oral, Daily  sertraline, 100 mg, Oral, Daily  sodium chloride, 10 mL, Intravenous, Q12H        Infusion Meds  sodium chloride, 9 mL/hr, Last Rate: 9 mL/hr (08/21/24 1410)        PRN Meds    acetaminophen    aluminum-magnesium  hydroxide-simethicone    senna-docusate sodium **AND** polyethylene glycol **AND** bisacodyl **AND** bisacodyl    Calcium Replacement - Follow Nurse / BPA Driven Protocol    hydrOXYzine    Magnesium Standard Dose Replacement - Follow Nurse / BPA Driven Protocol    melatonin    nitroglycerin    ondansetron ODT **OR** ondansetron    Phosphorus Replacement - Follow Nurse / BPA Driven Protocol    Potassium Replacement - Follow Nurse / BPA Driven Protocol    sodium chloride    sodium chloride        Assessment & Plan       Antimicrobial Therapy   1.  IV Merrem        2.        3.        4.        5.            Assessment     Hypoxia.  Most likely secondary to congestive heart failure.  Patient is chronically on oxygen 2 L at home.  Currently on 3 L  CT scan of the chest is consistent with pulmonary edema.  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  Patien status post bronchoscopy on 8/21/2024    Positive blood culture in 1 out of 2 sets for Streptococcus species.  This is usually contamination however patient has bioprosthetic aortic valve so we need to rule out endocarditis.  Repeat blood culture set is negative so far.  2D echo was not diagnostic.  Based on that patient does not meet criteria for endocarditis.  Family are very reluctant for RACHEL     UTI with urine cultures growing ESBL  Klebsiella pneumoniae.  Patient had history of recurrent UTI.  CT of the abdomen pelvis did not show any obstructive uropathy     Acute kidney injury     S/p aortic valve replacement with bioprosthetic valve in the past     S/p pacemaker placement in the past and Watchman procedure     Poor dental hygiene     Plan     Continue IV meropenem 1 g every 12 hours for 7 days  Waiting on BAL cultures  Continue supportive care  A.m. labs  The case was discussed with the patient's daughter at bedside      Luz Berry, YEYO  08/22/24  15:15 EDT    Note is dictated utilizing voice  recognition software/Dragon

## 2024-08-22 NOTE — CASE MANAGEMENT/SOCIAL WORK
Continued Stay Note   [facility]acc     Patient Name: Tracy Jane  MRN: [id number]  Today's Date: 8/22/2024    Admit Date: 8/14/2024    Plan: [facility] (accepted) no PASRR or pre-cert needed.   Discharge Plan       Row Name 08/22/24 1416       Plan    Plan Comments CM met with attending this am and made aware possible DC planned for tomorrow. CM messaged [facility] liaison to confirm that bed is ready and available for possible DC tomorrow (bed ready). CM met with daughter Yenny and granddaughter in the hallway on unit to discuss DC plan. Daughter Yenny wanting updated prognosis and other questions addressed prior to DC. CM made MD aware of daughter and granddaughter requesting a care conference. Care conference planned for 10:00am in person (secure chat sent to [facility] about care conference time).           Ethan Gross RN     Cell number [phone]  Office number [phone]

## 2024-08-22 NOTE — PLAN OF CARE
Goal Outcome Evaluation:  Plan of Care Reviewed With: patient        Progress: improving  Outcome Evaluation: Patient has been receiving IV antibiotics. Contact precautions still in place. Patient triggered for Severe sepsis, I contacted Dr. Orta and asked if he wanted to move  forward with the sepisis bundle, he said no, see nurses note. Saftey measures in place and Call light within reach.

## 2024-08-22 NOTE — PLAN OF CARE
Assessment: Tracy Jane presents with ADL impairments affecting function including endurance / activity tolerance and shortness of breath. Pt reported that she was fatigued today but was willing to participate. Pt completed bed mobility to EOB. Sits EOB >8 minutes, completed hair care with SBA. Pt fatigued and requested to return to supine. Demonstrated functioning below baseline abilities indicate the need for continued skilled intervention while inpatient. Tolerating session today without incident. Will continue to follow and progress as tolerated.

## 2024-08-22 NOTE — PROGRESS NOTES
[facility] MEDICINE SERVICE  DAILY PROGRESS NOTE    NAME: Tracy Jane  : 1934  MRN: [id number]      LOS: 7 days     PROVIDER OF SERVICE: Jacoby Orta MD    Chief Complaint: Acute cystitis    Subjective:     Interval History:  History taken from: patient    Patient states her breathing has improved significantly today after bronchoscopy yesterday.  She is much more comfortable at this time.    Review of Systems:   Review of Systems SOB improving     Objective:     Vital Signs  Temp:  [97.6 °F (36.4 °C)-98.6 °F (37 °C)] 98.1 °F (36.7 °C)  Heart Rate:  [] 83  Resp:  [16-23] 18  BP: (124-175)/(59-90) 150/67  Flow (L/min):  [4-15] 4   Body mass index is 23.08 kg/m².    Physical Exam  General Appearance:  aaox3   Head:  Atrumatic normocephalic   Eyes:        No sclera icterus   Neck: Normal ROM   Pulm: Crackles improving, minimal bilateral expiratory wheezing, improving breath sounds   Cardio: Irregular rhythm   Extremities: No edema on BLE   Abdomen: Soft, non tender   /Renal: No suprapubic tenderness   Musculoskeletal: Moves all extremities         Neurologic: Aaox3, no focal neurological defcits       Scheduled Meds   acetylcysteine, 3 mL, Nebulization, BID - RT  amiodarone, 200 mg, Oral, Q12H  budesonide, 0.5 mg, Nebulization, BID - RT  clopidogrel, 75 mg, Oral, Daily  dilTIAZem CD, 120 mg, Oral, Q24H  furosemide, 40 mg, Oral, BID  guaiFENesin, 1,200 mg, Oral, Q12H  ipratropium-albuterol, 3 mL, Nebulization, Q4H - RT  levothyroxine, 75 mcg, Oral, Q AM  meropenem, 1,000 mg, Intravenous, Q12H  methylPREDNISolone sodium succinate, 20 mg, Intravenous, Q24H  metoprolol succinate XL, 50 mg, Oral, Q24H  pantoprazole, 40 mg, Oral, Daily  rosuvastatin, 20 mg, Oral, Daily  sertraline, 100 mg, Oral, Daily  sodium chloride, 10 mL, Intravenous, Q12H       PRN Meds     acetaminophen    aluminum-magnesium hydroxide-simethicone    senna-docusate sodium **AND** polyethylene glycol **AND** bisacodyl  **AND** bisacodyl    Calcium Replacement - Follow Nurse / BPA Driven Protocol    hydrOXYzine    Magnesium Standard Dose Replacement - Follow Nurse / BPA Driven Protocol    melatonin    nitroglycerin    ondansetron ODT **OR** ondansetron    Phosphorus Replacement - Follow Nurse / BPA Driven Protocol    Potassium Replacement - Follow Nurse / BPA Driven Protocol    sodium chloride    sodium chloride   Infusions  sodium chloride, 9 mL/hr, Last Rate: 9 mL/hr (08/21/24 1410)          Diagnostic Data    Results from last 7 days   Lab Units 08/22/24  0523 08/20/24  0456 08/19/24  0427   WBC 10*3/mm3 16.22*   < > 16.74*   HEMOGLOBIN g/dL 9.5*   < > 9.0*   HEMATOCRIT % 32.7*   < > 30.3*   PLATELETS 10*3/mm3 269   < > 352   GLUCOSE mg/dL 101*   < > 154*   CREATININE mg/dL 0.93   < > 1.12*   BUN mg/dL 41*   < > 39*   SODIUM mmol/L 143   < > 144   POTASSIUM mmol/L 3.7   < > 4.8   AST (SGOT) U/L  --   --  83*   ALT (SGPT) U/L  --   --  47*   ALK PHOS U/L  --   --  113   BILIRUBIN mg/dL  --   --  0.4   ANION GAP mmol/L 9.3   < > 11.2    < > = values in this interval not displayed.       No radiology results for the last day      I reviewed the patient's new clinical results.    Assessment/Plan:   Patient is a 89 y.o. female with PMHx of HTN, HLD, hypothyroid, GERD, anxiety, anemia, HFpEF, CAD, A. fib presented to [facility] for nausea and vomiting.  Of note, patient recently discharged from [facility] 2 weeks ago after undergoing treatment for a. Fib and CHF.  Since then has had nausea and vomiting causing difficulty with food intake at times.        Plan:     Acute on chronic Systolic heart failure  -Diuresed well with IV Lasix  -Echocardiogram shows ejection fraction of 36 to 40%  -Switched to oral diuretics given increasing creatinine; renal function improved back to baseline  -Continue to monitor I's/O and daily weights    Possible community-acquired bacterial pneumonia, unspecified organism  -Continue IV antibiotics with meropenem per ID  recommendations; this is also being used to treat her ESBL E. coli  -Pulmonary toileting  -Inhaled Mucomyst and nebulizers  -Patient underwent bronchoscopy on 8/21 with several mucous plugs that were removed and BAL performed with improvement in her respiratory status    Acute respiratory failure with hypoxia  -Secondary to decompensated heart failure and pneumonia  -Continue treatment as above with diuretics, antibiotics, pulmonary toileting  -Wean oxygen as tolerated  -Patient has improved respiratory status after bronchoscopy on 8/21 with removal of multiple mucous plugs    Acute UTI  -Urine culture with ESBL Klebsiella  -Continue IV meropenem for 7-day course of treatment    A-fib  -Rate controlled on amiodarone, diltiazem  -Patient has history of Watchman device as she did not tolerate anticoagulation    CAD  -Continue GDMT with aspirin, statin therapy    GERD  -Continue PPI    Hypothyroidism  -Continue Synthroid    Hypertension  -Continue current medical therapy for BP control    Dyslipidemia  -Continue statin therapy      Patient's overall long-term prognosis is poor given her multiple comorbid conditions and severe physical deconditioning.  Patient has spoken to both palliative care and hospice, however family is not yet ready to proceed with hospice care.  They are hoping for skilled rehab or possibly LTAC placement.  I anticipate that if she is transferred to either facility, she would likely not have any progression of her physical deconditioning and will probably readmit to the hospital in the near future.  I believe that she is an appropriate candidate for hospice at this time.    VTE Prophylaxis:  Mechanical VTE prophylaxis orders are present.         Code status is   Code Status and Medical Interventions: No CPR (Do Not Attempt to Resuscitate); Limited Support; No intubation (DNI)   Ordered at: 08/17/24 7743     Medical Intervention Limits:    No intubation (DNI)     Level Of Support Discussed With:     Patient     Code Status (Patient has no pulse and is not breathing):    No CPR (Do Not Attempt to Resuscitate)     Medical Interventions (Patient has pulse or is breathing):    Limited Support     Disposition: Hopefully DC to [facility] on 8/23      Time: 30 minutes    Part of this note may be an electronic transcription/translation of spoken language to printed text using the Dragon Dictation System.    Signature: Electronically signed by Jacoby Orta MD, 08/22/24, 11:59 EDT.  [facility] Hospitalist Team

## 2024-08-22 NOTE — PLAN OF CARE
Goal Outcome Evaluation:      Pt was seen for DT/meal at lunch. Pt is currently on regular diet and thin liquids. Pt has partial bridge and some natural dentition. Pt was alert & oriented x5, sitting upright at 90 degrees in chair. Pt tolerated lunch which included turkey sandwich and potato wedges. Pts' daughter present in room and reports pt has been tolerating diet well. Pt able to self-feed. Noted good rate of intake, prolonged mastication, and no residue. Adequate labial seal w/ straw. Pt did state she does get tired when she eats. SLP offered to downgrade pt to [facility] chopped diet and pt verbalized she only wants to be on a regular diet. No overt s/s noted during meal. Recommending pt continue with regular diet and thin liquids at this time per pt preference.  ST will D/C this date due to no further concerns at this time. It is recommended that nursing provide meds as pt tolerates. Please re-consult if needed.     Swallowing precautions include:  *Pt sitting upright at 90 degrees hip flexion  *Oral care to be completed at least x2 daily  *Notify SLP if any s/s of penetration/aspiration occur

## 2024-08-22 NOTE — PROGRESS NOTES
Daily Progress Note          Assessment    Pneumonia due to unspecified pathogen  Acute on chronic hypoxemic respiratory failure: Due to mostly to decompensated heart failure and to a lesser degree to pneumonia  Decompensated heart failure  Multifocal pneumonia  UTI due to ESBL Klebsiella pneumonia  Small bilateral pleural effusion  CAD status post CABG  Status post aortic valve replacement 2014  A-fib, sick sinus syndrome: Status post Watchman procedure and pacemaker placement  HTN  HLD  RA: On methotrexate  GERD  Chronic anemia  Former smoker        Recommendations:  Status post bronchoscopy 8/21/2024 showed minimal mucous plugs, the symptoms are more likely related to pulmonary congestion rather than ongoing pneumonia    Lasix increased to 40 mg twice daily, monitor BMP daily    Discontinue mucomyst nebulized    Antibiotic: As per ID: On meropenem  Oxygen supplement and titration to maintain saturation 90 to 95%: Currently requiring 4 L per nasal cannula  Bronchodilators  Inhaled corticosteroids  IV steroids  Mucinex     Diuresis  HR/BP control  Crestor, Plavix  Thyroid hormone replacement        I personally reviewed the radiological studies             LOS: 7 days     Subjective     Patient reports partial improvement in the cough and shortness of breath    Objective     Vital signs for last 24 hours:  Vitals:    08/22/24 0722 08/22/24 0725 08/22/24 1029 08/22/24 1055   BP:   150/67    BP Location:       Patient Position:       Pulse: 82 82 90 83   Resp: 18 18  18   Temp:    98.1 °F (36.7 °C)   TempSrc:    Oral   SpO2: 100% 100%  95%   Weight:       Height:           Intake/Output last 3 shifts:  I/O last 3 completed shifts:  In: 740 [P.O.:240; I.V.:200; IV Piggyback:300]  Out: 2400 [Urine:2400]  Intake/Output this shift:  No intake/output data recorded.      Radiology  Imaging Results (Last 24 Hours)       ** No results found for the last 24 hours. **            Labs:  Results from last 7 days   Lab Units  08/22/24  0523   WBC 10*3/mm3 16.22*   HEMOGLOBIN g/dL 9.5*   HEMATOCRIT % 32.7*   PLATELETS 10*3/mm3 269     Results from last 7 days   Lab Units 08/22/24  0523 08/20/24  0456 08/19/24  0427   SODIUM mmol/L 143   < > 144   POTASSIUM mmol/L 3.7   < > 4.8   CHLORIDE mmol/L 99   < > 104   CO2 mmol/L 34.7*   < > 28.8   BUN mg/dL 41*   < > 39*   CREATININE mg/dL 0.93   < > 1.12*   CALCIUM mg/dL 9.8   < > 10.1   BILIRUBIN mg/dL  --   --  0.4   ALK PHOS U/L  --   --  113   ALT (SGPT) U/L  --   --  47*   AST (SGOT) U/L  --   --  83*   GLUCOSE mg/dL 101*   < > 154*    < > = values in this interval not displayed.         Results from last 7 days   Lab Units 08/19/24  0427 08/18/24  0553 08/17/24  0217   ALBUMIN g/dL 3.0* 2.8* 2.8*             Results from last 7 days   Lab Units 08/19/24  0427   MAGNESIUM mg/dL 2.4                   Meds:   SCHEDULE  acetylcysteine, 3 mL, Nebulization, BID - RT  amiodarone, 200 mg, Oral, Q12H  budesonide, 0.5 mg, Nebulization, BID - RT  clopidogrel, 75 mg, Oral, Daily  dilTIAZem CD, 120 mg, Oral, Q24H  furosemide, 40 mg, Oral, BID  guaiFENesin, 1,200 mg, Oral, Q12H  ipratropium-albuterol, 3 mL, Nebulization, Q4H - RT  levothyroxine, 75 mcg, Oral, Q AM  meropenem, 1,000 mg, Intravenous, Q12H  methylPREDNISolone sodium succinate, 20 mg, Intravenous, Q24H  metoprolol succinate XL, 50 mg, Oral, Q24H  pantoprazole, 40 mg, Oral, Daily  rosuvastatin, 20 mg, Oral, Daily  sertraline, 100 mg, Oral, Daily  sodium chloride, 10 mL, Intravenous, Q12H      Infusions  sodium chloride, 9 mL/hr, Last Rate: 9 mL/hr (08/21/24 1410)      PRNs    acetaminophen    aluminum-magnesium hydroxide-simethicone    senna-docusate sodium **AND** polyethylene glycol **AND** bisacodyl **AND** bisacodyl    Calcium Replacement - Follow Nurse / BPA Driven Protocol    hydrOXYzine    Magnesium Standard Dose Replacement - Follow Nurse / BPA Driven Protocol    melatonin    nitroglycerin    ondansetron ODT **OR** ondansetron     Phosphorus Replacement - Follow Nurse / BPA Driven Protocol    Potassium Replacement - Follow Nurse / BPA Driven Protocol    sodium chloride    sodium chloride    Physical Exam:  General Appearance:  Alert, patient looks chronically ill but not in acute distress  HEENT:  Normocephalic, without obvious abnormality, Conjunctiva/corneas clear,.   Nares normal, no drainage     Neck:  Supple, symmetrical, trachea midline.   Lungs /Chest wall: Partial improvement in the bilateral basal rhonchi, respirations unlabored, symmetrical wall movement.     Heart:  Regular rate and rhythm, S1 S2 normal, 2/6 systolic murmur  Abdomen: Soft, non-tender, no masses, no organomegaly.    Extremities: No edema, no clubbing or cyanosis     ROS  Constitutional: Negative for chills, fever and positive for malaise/fatigue.   HENT: Negative.    Eyes: Negative.    Cardiovascular: Negative.    Respiratory: Positive for cough and shortness of breath.    Skin: Negative.    Musculoskeletal: Negative.    Gastrointestinal: Negative.    Genitourinary: Negative.    Neurological: Generalized weakness      I reviewed the recent clinical results  I personally reviewed the latest radiological studies    Part of this note may be an electronic transcription/translation of spoken language to printed text using the Dragon Dictation System.

## 2024-08-23 NOTE — PROGRESS NOTES
Infectious Diseases Progress Note      LOS: 8 days   Patient Care Team:  Amparo Eisenberg APRN as PCP - General (Nurse Practitioner)    Chief Complaint: Shortness of breath, general weakness    Subjective       The patient has been afebrile for the last 24 hours.  The patient is currently on 2 L of oxygen via nasal cannula.  Feeling better today      Review of Systems:   Review of Systems   Constitutional:  Positive for fatigue.   HENT: Negative.     Eyes: Negative.    Respiratory:  Positive for shortness of breath.    Cardiovascular: Negative.    Gastrointestinal: Negative.    Endocrine: Negative.    Genitourinary: Negative.    Musculoskeletal: Negative.    Skin: Negative.    Neurological:  Positive for weakness.   Psychiatric/Behavioral:  The patient is nervous/anxious.    All other systems reviewed and are negative.       Objective     Vital Signs  Temp:  [97.4 °F (36.3 °C)-98.4 °F (36.9 °C)] 97.4 °F (36.3 °C)  Heart Rate:  [70-81] 76  Resp:  [14-19] 18  BP: (123-151)/(41-77) 147/43    Physical Exam:  Physical Exam  Vitals and nursing note reviewed.   Constitutional:       General: She is not in acute distress.     Appearance: She is well-developed and normal weight. She is ill-appearing. She is not diaphoretic.   HENT:      Head: Normocephalic and atraumatic.   Eyes:      General: No scleral icterus.     Extraocular Movements: Extraocular movements intact.      Conjunctiva/sclera: Conjunctivae normal.      Pupils: Pupils are equal, round, and reactive to light.   Cardiovascular:      Rate and Rhythm: Normal rate and regular rhythm.      Heart sounds: Normal heart sounds, S1 normal and S2 normal. No murmur heard.  Pulmonary:      Effort: Pulmonary effort is normal. No respiratory distress.      Breath sounds: No stridor. No wheezing or rales.      Comments: Mild lower lobe crackles  Chest:      Chest wall: No tenderness.   Abdominal:      General: Bowel sounds are normal. There is no distension.       Palpations: Abdomen is soft. There is no mass.      Tenderness: There is no abdominal tenderness. There is no guarding.   Genitourinary:     Comments: External catheter  Musculoskeletal:         General: No swelling, tenderness or deformity.      Cervical back: Neck supple.   Skin:     General: Skin is warm and dry.      Coloration: Skin is not pale.      Findings: No bruising, erythema or rash.   Neurological:      Mental Status: She is alert and oriented to person, place, and time.      Comments: General Weakness   Psychiatric:         Mood and Affect: Mood normal.          Results Review:    I have reviewed all clinical data, test, lab, and imaging results.     Radiology  No Radiology Exams Resulted Within Past 24 Hours    Cardiology    Laboratory    Results from last 7 days   Lab Units 08/23/24  0522 08/22/24  0523 08/20/24  0456 08/19/24  0427 08/18/24  0241 08/17/24  0217   WBC 10*3/mm3 15.87* 16.22* 13.39* 16.74* 12.36* 11.81*   HEMOGLOBIN g/dL 9.4* 9.5* 9.3* 9.0* 8.8* 9.2*   HEMATOCRIT % 33.1* 32.7* 33.5* 30.3* 28.3* 30.4*   PLATELETS 10*3/mm3 310 269 324 352 315 268     Results from last 7 days   Lab Units 08/23/24  0522 08/22/24  0523 08/20/24  0456 08/19/24  0427 08/18/24  2152 08/18/24  0553 08/17/24  0217   SODIUM mmol/L 141 143 141 144  --  142 138   POTASSIUM mmol/L 3.1* 3.7 4.3 4.8 4.6 3.0* 3.9   CHLORIDE mmol/L 97* 99 102 104  --  101 102   CO2 mmol/L 36.8* 34.7* 29.1* 28.8  --  27.1 24.7   BUN mg/dL 33* 41* 46* 39*  --  25* 15   CREATININE mg/dL 0.82 0.93 0.97 1.12*  --  0.94 0.85   GLUCOSE mg/dL 88 101* 110* 154*  --  123* 107*   ALBUMIN g/dL  --   --   --  3.0*  --  2.8* 2.8*   BILIRUBIN mg/dL  --   --   --  0.4  --  0.4 0.4   ALK PHOS U/L  --   --   --  113  --  101 105   AST (SGOT) U/L  --   --   --  83*  --  30 22   ALT (SGPT) U/L  --   --   --  47*  --  16 12   CALCIUM mg/dL 9.6 9.8 9.5 10.1  --  9.4 9.4                 Microbiology   Microbiology Results (last 10 days)       Procedure  Component Value - Date/Time    AFB Culture - Lavage, Lung, Right Lower Lobe [[id number]] Collected: 08/21/24 1142    Lab Status: Preliminary result Specimen: Lavage from Lung, Right Lower Lobe Updated: 08/22/24 1102     AFB Stain No acid fast bacilli seen on concentrated smear    BAL Culture, Quantitative - Lavage, Lung, Right Lower Lobe [[id number]] Collected: 08/21/24 1142    Lab Status: Final result Specimen: Lavage from Lung, Right Lower Lobe Updated: 08/23/24 0949     BAL Culture 25,000 CFU/mL Normal respiratory amaury. No S. aureus or Pseudomonas aeruginosa detected. Final report.     Gram Stain Many (4+) WBCs per low power field      Rare (1+) Epithelial cells per low power field      Few (2+) Gram positive cocci    Respiratory Panel PCR w/COVID-19(SARS-CoV-2) [facility]/RAMANDEEP/DEL/PAD/COR/RABIA In-House, NP Swab in UTM/VTM, 2 HR TAT - Lavage, Lung, Right Lower Lobe [[id number]]  (Normal) Collected: 08/21/24 1142    Lab Status: Final result Specimen: Lavage from Lung, Right Lower Lobe Updated: 08/21/24 1254     ADENOVIRUS, PCR Not Detected     Coronavirus 229E Not Detected     Coronavirus HKU1 Not Detected     Coronavirus NL63 Not Detected     Coronavirus OC43 Not Detected     COVID19 Not Detected     Human Metapneumovirus Not Detected     Human Rhinovirus/Enterovirus Not Detected     Influenza A PCR Not Detected     Influenza B PCR Not Detected     Parainfluenza Virus 1 Not Detected     Parainfluenza Virus 2 Not Detected     Parainfluenza Virus 3 Not Detected     Parainfluenza Virus 4 Not Detected     RSV, PCR Not Detected     Bordetella pertussis pcr Not Detected     Bordetella parapertussis PCR Not Detected     Chlamydophila pneumoniae PCR Not Detected     Mycoplasma pneumo by PCR Not Detected    Narrative:      In the setting of a positive respiratory panel with a viral infection PLUS a negative procalcitonin without other underlying concern for bacterial infection, consider observing off antibiotics or discontinuation  of antibiotics and continue supportive care. If the respiratory panel is positive for atypical bacterial infection (Bordetella pertussis, Chlamydophila pneumoniae, or Mycoplasma pneumoniae), consider antibiotic de-escalation to target atypical bacterial infection.    MRSA Screen, PCR (Inpatient) - Swab, Nares [[id number]]  (Normal) Collected: 08/17/24 1839    Lab Status: Final result Specimen: Swab from Nares Updated: 08/17/24 2005     MRSA PCR No MRSA Detected    Narrative:      The negative predictive value of this diagnostic test is high and should only be used to consider de-escalating anti-MRSA therapy. A positive result may indicate colonization with MRSA and must be correlated clinically.    Blood Culture - Blood, Arm, Right [[id number]]  (Normal) Collected: 08/17/24 0218    Lab Status: Final result Specimen: Blood from Arm, Right Updated: 08/22/24 0245     Blood Culture No growth at 5 days    Blood Culture - Blood, Arm, Left [[id number]]  (Normal) Collected: 08/17/24 0217    Lab Status: Final result Specimen: Blood from Arm, Left Updated: 08/22/24 0245     Blood Culture No growth at 5 days    Respiratory Panel PCR w/COVID-19(SARS-CoV-2) S[facility]HA/RAMANDEEP/DEL/PAD/COR/RABIA In-House, NP Swab in UTM/VTM, 2 HR TAT - Swab, Nasopharynx [[id number]]  (Normal) Collected: 08/15/24 0841    Lab Status: Final result Specimen: Swab from Nasopharynx Updated: 08/15/24 0943     ADENOVIRUS, PCR Not Detected     Coronavirus 229E Not Detected     Coronavirus HKU1 Not Detected     Coronavirus NL63 Not Detected     Coronavirus OC43 Not Detected     COVID19 Not Detected     Human Metapneumovirus Not Detected     Human Rhinovirus/Enterovirus Not Detected     Influenza A PCR Not Detected     Influenza B PCR Not Detected     Parainfluenza Virus 1 Not Detected     Parainfluenza Virus 2 Not Detected     Parainfluenza Virus 3 Not Detected     Parainfluenza Virus 4 Not Detected     RSV, PCR Not Detected     Bordetella pertussis pcr Not Detected      Bordetella parapertussis PCR Not Detected     Chlamydophila pneumoniae PCR Not Detected     Mycoplasma pneumo by PCR Not Detected    Narrative:      In the setting of a positive respiratory panel with a viral infection PLUS a negative procalcitonin without other underlying concern for bacterial infection, consider observing off antibiotics or discontinuation of antibiotics and continue supportive care. If the respiratory panel is positive for atypical bacterial infection (Bordetella pertussis, Chlamydophila pneumoniae, or Mycoplasma pneumoniae), consider antibiotic de-escalation to target atypical bacterial infection.    Blood Culture - Blood, Arm, Right [[id number]]  (Abnormal) Collected: 08/14/24 1639    Lab Status: Final result Specimen: Blood from Arm, Right Updated: 08/17/24 0637     Blood Culture Streptococcus, Alpha Hemolytic     Isolated from Aerobic Bottle     Gram Stain Aerobic Bottle Gram positive cocci in chains    Narrative:      Probable contaminant requires clinical correlation, susceptibility not performed unless requested by physician.    Blood Culture ID, PCR - Blood, Arm, Right [[id number]]  (Abnormal) Collected: 08/14/24 1639    Lab Status: Final result Specimen: Blood from Arm, Right Updated: 08/16/24 0814     BCID, PCR Streptococcus spp, not A, B, or pneumoniae. Identification by BCID2 PCR.     BOTTLE TYPE Aerobic Bottle    Blood Culture - Blood, Arm, Left [[id number]]  (Normal) Collected: 08/14/24 1611    Lab Status: Final result Specimen: Blood from Arm, Left Updated: 08/19/24 1615     Blood Culture No growth at 5 days    Narrative:      Less than seven (7) mL's of blood was collected.  Insufficient quantity may yield false negative results.    Urine Culture - Urine, Urine, Clean Catch [[id number]]  (Abnormal)  (Susceptibility) Collected: 08/14/24 1353    Lab Status: Final result Specimen: Urine, Clean Catch Updated: 08/16/24 1146     Urine Culture >100,000 CFU/mL Klebsiella pneumoniae ESBL     Narrative:      Colonization of the urinary tract without infection is common. Treatment is discouraged unless the patient is symptomatic, pregnant, or undergoing an invasive urologic procedure.  Recent outcomes data supports the use of pip/tazo in the treatment of susceptible ESBL infections for uncomplicated UTI. Consider use of pip/tazo as a carbapenem-sparing regimen in applicable patients.    Susceptibility        Klebsiella pneumoniae ESBL      DESI      Amikacin Susceptible      Ertapenem Susceptible      Gentamicin Resistant      Levofloxacin Intermediate      Meropenem Susceptible      Nitrofurantoin Intermediate      Piperacillin + Tazobactam Susceptible      Tobramycin Intermediate      Trimethoprim + Sulfamethoxazole Resistant                           S. Pneumo Ag Urine or CSF - Urine, Urine, Clean Catch [[id number]]  (Normal) Collected: 08/14/24 1353    Lab Status: Final result Specimen: Urine, Clean Catch Updated: 08/15/24 0320     Strep Pneumo Ag Negative    Legionella Antigen, Urine - Urine, Urine, Clean Catch [[id number]]  (Normal) Collected: 08/14/24 1353    Lab Status: Final result Specimen: Urine, Clean Catch Updated: 08/15/24 0320     LEGIONELLA ANTIGEN, URINE Negative            Medication Review:       Schedule Meds  amiodarone, 200 mg, Oral, Q12H  budesonide, 0.5 mg, Nebulization, BID - RT  clopidogrel, 75 mg, Oral, Daily  dilTIAZem CD, 120 mg, Oral, Q24H  furosemide, 40 mg, Oral, BID  guaiFENesin, 1,200 mg, Oral, Q12H  ipratropium-albuterol, 3 mL, Nebulization, Q4H - RT  levothyroxine, 75 mcg, Oral, Q AM  meropenem, 1,000 mg, Intravenous, Q12H  metoprolol succinate XL, 50 mg, Oral, Q24H  pantoprazole, 40 mg, Oral, Daily  potassium chloride ER, 40 mEq, Oral, Q4H  rosuvastatin, 20 mg, Oral, Daily  sertraline, 100 mg, Oral, Daily  sodium chloride, 10 mL, Intravenous, Q12H        Infusion Meds  sodium chloride, 9 mL/hr, Last Rate: 9 mL/hr (08/21/24 1410)        PRN Meds    acetaminophen     aluminum-magnesium hydroxide-simethicone    senna-docusate sodium **AND** polyethylene glycol **AND** bisacodyl **AND** bisacodyl    Calcium Replacement - Follow Nurse / BPA Driven Protocol    hydrOXYzine    Magnesium Standard Dose Replacement - Follow Nurse / BPA Driven Protocol    melatonin    nitroglycerin    ondansetron ODT **OR** ondansetron    Phosphorus Replacement - Follow Nurse / BPA Driven Protocol    Potassium Replacement - Follow Nurse / BPA Driven Protocol    sodium chloride    sodium chloride        Assessment & Plan       Antimicrobial Therapy   1.  IV Merrem        2.        3.        4.        5.            Assessment     Hypoxia.  Most likely secondary to congestive heart failure.  Patient is chronically on oxygen 2 L at home.  Currently on 3 L  CT scan of the chest is consistent with pulmonary edema.  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  Patien status post bronchoscopy on 8/21/2024- BAL cultures did not grow a significant pathogen    Positive blood culture in 1 out of 2 sets for Streptococcus species.  This is usually contamination however patient has bioprosthetic aortic valve so we need to rule out endocarditis.  Repeat blood culture set is negative so far.  2D echo was not diagnostic.  Based on that patient does not meet criteria for endocarditis.  Family are very reluctant for RACHEL     UTI with urine cultures growing ESBL  Klebsiella pneumoniae.  Patient had history of recurrent UTI.  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  Will discontinue after today's dose    The case was discussed with the patient's daughter at bedside  Case discussed with hospitalist     Not  much more to add from infectious disease standpoint-we will sign off at this time-please call with any questions.      Luz Berry, APRN  08/23/24  12:16 EDT    Note is dictated utilizing voice recognition software/Dragon

## 2024-08-23 NOTE — PROGRESS NOTES
[facility] MEDICINE SERVICE  DAILY PROGRESS NOTE    NAME: Tracy Jane  : 1934  MRN: [id number]      LOS: 8 days     PROVIDER OF SERVICE: Jacoby Orta MD    Chief Complaint: Acute cystitis    Subjective:     Interval History:  History taken from: patient    Patient continues to improve clinically although very slowly.  She does still have some baseline dyspnea.  She is coughing a small amount of mucus up.    Review of Systems:   Review of Systems SOB improving     Objective:     Vital Signs  Temp:  [97.4 °F (36.3 °C)-98.4 °F (36.9 °C)] 97.4 °F (36.3 °C)  Heart Rate:  [70-81] 76  Resp:  [14-19] 18  BP: (123-151)/(41-77) 147/43  Flow (L/min):  [2-4] 4   Body mass index is 23.94 kg/m².    Physical Exam  General Appearance:  aaox3   Head:  Atrumatic normocephalic   Eyes:        No sclera icterus   Neck: Normal ROM   Pulm: Crackles improving, minimal bilateral expiratory wheezing, improving breath sounds   Cardio: Irregular rhythm   Extremities: No edema on BLE   Abdomen: Soft, non tender   /Renal: No suprapubic tenderness   Musculoskeletal: Moves all extremities         Neurologic: Aaox3, no focal neurological defcits       Scheduled Meds   amiodarone, 200 mg, Oral, Q12H  budesonide, 0.5 mg, Nebulization, BID - RT  clopidogrel, 75 mg, Oral, Daily  dilTIAZem CD, 120 mg, Oral, Q24H  furosemide, 40 mg, Oral, BID  guaiFENesin, 1,200 mg, Oral, Q12H  ipratropium-albuterol, 3 mL, Nebulization, Q4H - RT  levothyroxine, 75 mcg, Oral, Q AM  meropenem, 1,000 mg, Intravenous, Q12H  methylPREDNISolone sodium succinate, 20 mg, Intravenous, Q24H  metoprolol succinate XL, 50 mg, Oral, Q24H  pantoprazole, 40 mg, Oral, Daily  potassium chloride ER, 40 mEq, Oral, Q4H  rosuvastatin, 20 mg, Oral, Daily  sertraline, 100 mg, Oral, Daily  sodium chloride, 10 mL, Intravenous, Q12H       PRN Meds     acetaminophen    aluminum-magnesium hydroxide-simethicone    senna-docusate sodium **AND** polyethylene glycol **AND**  bisacodyl **AND** bisacodyl    Calcium Replacement - Follow Nurse / BPA Driven Protocol    hydrOXYzine    Magnesium Standard Dose Replacement - Follow Nurse / BPA Driven Protocol    melatonin    nitroglycerin    ondansetron ODT **OR** ondansetron    Phosphorus Replacement - Follow Nurse / BPA Driven Protocol    Potassium Replacement - Follow Nurse / BPA Driven Protocol    sodium chloride    sodium chloride   Infusions  sodium chloride, 9 mL/hr, Last Rate: 9 mL/hr (08/21/24 1410)          Diagnostic Data    Results from last 7 days   Lab Units 08/23/24  0522 08/20/24  0456 08/19/24  0427   WBC 10*3/mm3 15.87*   < > 16.74*   HEMOGLOBIN g/dL 9.4*   < > 9.0*   HEMATOCRIT % 33.1*   < > 30.3*   PLATELETS 10*3/mm3 310   < > 352   GLUCOSE mg/dL 88   < > 154*   CREATININE mg/dL 0.82   < > 1.12*   BUN mg/dL 33*   < > 39*   SODIUM mmol/L 141   < > 144   POTASSIUM mmol/L 3.1*   < > 4.8   AST (SGOT) U/L  --   --  83*   ALT (SGPT) U/L  --   --  47*   ALK PHOS U/L  --   --  113   BILIRUBIN mg/dL  --   --  0.4   ANION GAP mmol/L 7.2   < > 11.2    < > = values in this interval not displayed.       No radiology results for the last day      I reviewed the patient's new clinical results.    Assessment/Plan:   Patient is a 89 y.o. female with PMHx of HTN, HLD, hypothyroid, GERD, anxiety, anemia, HFpEF, CAD, A. fib presented to [facility] for nausea and vomiting.  Of note, patient recently discharged from [facility] 2 weeks ago after undergoing treatment for a. Fib and CHF.  Since then has had nausea and vomiting causing difficulty with food intake at times.        Plan:     Acute on chronic Systolic heart failure  -Diuresed well with IV Lasix  -Echocardiogram shows ejection fraction of 36 to 40%  -Switched to oral diuretics given increasing creatinine; renal function improved back to baseline  -Continue to monitor I's/O and daily weights    Possible community-acquired bacterial pneumonia, unspecified organism  -Continue IV antibiotics with meropenem  per ID recommendations; this is also being used to treat her ESBL E. coli  -Pulmonary toileting  -Inhaled Mucomyst and nebulizers  -Patient underwent bronchoscopy on 8/21 with several mucous plugs that were removed and BAL performed with improvement in her respiratory status    Acute respiratory failure with hypoxia  -Secondary to decompensated heart failure and pneumonia  -Continue treatment as above with diuretics, antibiotics, pulmonary toileting  -Wean oxygen as tolerated  -Patient has improved respiratory status after bronchoscopy on 8/21 with removal of multiple mucous plugs    Acute UTI  -Urine culture with ESBL Klebsiella  -Continue IV meropenem for 7-day course of treatment; last dose on 8/24    A-fib  -Rate controlled on amiodarone, diltiazem  -Patient has history of Watchman device as she did not tolerate anticoagulation    CAD  -Continue GDMT with aspirin, statin therapy    GERD  -Continue PPI    Hypothyroidism  -Continue Synthroid    Hypertension  -Continue current medical therapy for BP control    Dyslipidemia  -Continue statin therapy      Patient's overall long-term prognosis is poor given her multiple comorbid conditions and severe physical deconditioning.  Patient has spoken to both palliative care and hospice, however family is not yet ready to proceed with hospice care.  They are hoping for skilled rehab or possibly LTAC placement.  I anticipate that if she is transferred to either facility, she would likely not have any progression of her physical deconditioning and will probably readmit to the hospital in the near future.  I believe that she is an appropriate candidate for hospice at this time.    VTE Prophylaxis:  Mechanical VTE prophylaxis orders are present.         Code status is   Code Status and Medical Interventions: No CPR (Do Not Attempt to Resuscitate); Limited Support; No intubation (DNI)   Ordered at: 08/17/24 1710     Medical Intervention Limits:    No intubation (DNI)     Level Of  Support Discussed With:    Patient     Code Status (Patient has no pulse and is not breathing):    No CPR (Do Not Attempt to Resuscitate)     Medical Interventions (Patient has pulse or is breathing):    Limited Support     Disposition: Hopefully DC to [facility] on 8/24 after completion of IV antibiotics.      Time: 30 minutes    Part of this note may be an electronic transcription/translation of spoken language to printed text using the Dragon Dictation System.    Signature: Electronically signed by Jacoby Orta MD, 08/23/24, 11:33 EDT.  [facility] Hospitalist Team

## 2024-08-23 NOTE — PLAN OF CARE
Goal Outcome Evaluation:  Plan of Care Reviewed With: patient           Outcome Evaluation: Pt plan is to leave and go to rehab/SNFafter last antibiotics are given tomorrow. Patient and family in agreeance to plan. Pt denied any new symptoms or concerns. Will continue to monitor, vitals stable.

## 2024-08-23 NOTE — PLAN OF CARE
Assessment: Tracy Jane presents with functional mobility impairments which indicate the need for skilled intervention. Pt requires O2 titration to 7L and able to tolerate standing ~30sec. Pt's SpO2 desats to 80% upon second stand. Pt requires increased time while seated to recover. O2 titrated back to 5L and SpO2 93% at end of session. Discussed benefits of SNF vs inpatient rehab though pt and dght reporting wishes to continue with inpatient rehab. Tolerating session today without incident. Will continue to follow and progress as tolerated.

## 2024-08-23 NOTE — THERAPY TREATMENT NOTE
"Subjective: Pt agreeable to therapeutic plan of care.    Objective:     Precautions - falls, contact    Bed mobility - N/A or Not attempted. Pt up in chair at entry  Transfers - Max-A and with rolling walker; STS from recliner chair; STS from recliner chair x2  Ambulation -  N/A or Not attempted.    Therapeutic Exercise - 10 Reps B LE AROM supported sitting / chair; heel slides, hip abd/add, ankle pumps, SLR    Vitals: Desaturates; SpO2 80% with transfer; improving to 93%    Pain: 0 VAS   Location: NA  Intervention for pain: N/A    Education: Provided education on the importance of mobility in the acute care setting, Verbal/Tactile Cues, and Transfer Training    Assessment: Tracy Jane presents with functional mobility impairments which indicate the need for skilled intervention. Pt requires O2 titration to 7L and able to tolerate standing ~30sec. Pt's SpO2 desats to 80% upon second stand. Pt requires increased time while seated to recover. O2 titrated back to 5L and SpO2 93% at end of session. Discussed benefits of SNF vs inpatient rehab though pt and dght reporting wishes to continue with inpatient rehab. Tolerating session today without incident. Will continue to follow and progress as tolerated.     Plan/Recommendations:   If medically appropriate, Moderate Intensity Therapy recommended post-acute care. This is recommended as therapy feels the patient would require 3-4 days per week and wouldn't tolerate \"3 hour daily\" rehab intensity. SNF would be the preferred choice. If the patient does not agree to SNF, arrange HH or OP depending on home bound status. If patient is medically complex, consider LTACH. Pt requires no DME at discharge.     Pt desires Inpatient Rehabilitation placement at discharge. Pt cooperative; agreeable to therapeutic recommendations and plan of care.         Post-Tx Position: Up in Chair, Alarms activated, and Call light and personal items within reach  PPE: gloves   "

## 2024-08-23 NOTE — PROGRESS NOTES
Daily Progress Note          Assessment    Pneumonia due to unspecified pathogen  Acute on chronic hypoxemic respiratory failure: Due to mostly to decompensated heart failure and to a lesser degree to pneumonia  Decompensated heart failure  Multifocal pneumonia  UTI due to ESBL Klebsiella pneumonia  Small bilateral pleural effusion  CAD status post CABG  Status post aortic valve replacement 2014  A-fib, sick sinus syndrome: Status post Watchman procedure and pacemaker placement  HTN  HLD  RA: On methotrexate  GERD  Chronic anemia  Former smoker        Recommendations:  Status post bronchoscopy 8/21/2024 showed minimal mucous plugs, the symptoms are more likely related to pulmonary congestion rather than ongoing pneumonia    Respiratory status is gradually improving and patient can be transferred to rehab center from pulmonary standpoint    Lasix  40 mg twice daily, monitor BMP daily      Antibiotic: As per ID: On meropenem  Oxygen supplement and titration to maintain saturation 90 to 95%: Currently requiring 4 L per nasal cannula  Bronchodilators  Inhaled corticosteroids  Discontinue IV steroids  Mucinex     Diuresis  HR/BP control  Crestor, Plavix  Thyroid hormone replacement        I personally reviewed the radiological studies             LOS: 8 days     Subjective     Patient reports partial improvement in the cough and shortness of breath    Objective     Vital signs for last 24 hours:  Vitals:    08/23/24 0656 08/23/24 1106 08/23/24 1120 08/23/24 1123   BP:  147/43     BP Location:  Right arm     Patient Position:  Lying     Pulse: 72 78 76 76   Resp: 16 16 18 18   Temp:  97.4 °F (36.3 °C)     TempSrc:  Oral     SpO2: 96% 96% 98% 98%   Weight:       Height:           Intake/Output last 3 shifts:  I/O last 3 completed shifts:  In: 1460 [P.O.:1260; IV Piggyback:200]  Out: 1400 [Urine:1400]  Intake/Output this shift:  No intake/output data recorded.      Radiology  Imaging Results (Last 24 Hours)       ** No  results found for the last 24 hours. **            Labs:  Results from last 7 days   Lab Units 08/23/24  0522   WBC 10*3/mm3 15.87*   HEMOGLOBIN g/dL 9.4*   HEMATOCRIT % 33.1*   PLATELETS 10*3/mm3 310     Results from last 7 days   Lab Units 08/23/24  0522 08/20/24  0456 08/19/24  0427   SODIUM mmol/L 141   < > 144   POTASSIUM mmol/L 3.1*   < > 4.8   CHLORIDE mmol/L 97*   < > 104   CO2 mmol/L 36.8*   < > 28.8   BUN mg/dL 33*   < > 39*   CREATININE mg/dL 0.82   < > 1.12*   CALCIUM mg/dL 9.6   < > 10.1   BILIRUBIN mg/dL  --   --  0.4   ALK PHOS U/L  --   --  113   ALT (SGPT) U/L  --   --  47*   AST (SGOT) U/L  --   --  83*   GLUCOSE mg/dL 88   < > 154*    < > = values in this interval not displayed.         Results from last 7 days   Lab Units 08/19/24  0427 08/18/24  0553 08/17/24  0217   ALBUMIN g/dL 3.0* 2.8* 2.8*             Results from last 7 days   Lab Units 08/19/24  0427   MAGNESIUM mg/dL 2.4                   Meds:   SCHEDULE  amiodarone, 200 mg, Oral, Q12H  budesonide, 0.5 mg, Nebulization, BID - RT  clopidogrel, 75 mg, Oral, Daily  dilTIAZem CD, 120 mg, Oral, Q24H  furosemide, 40 mg, Oral, BID  guaiFENesin, 1,200 mg, Oral, Q12H  ipratropium-albuterol, 3 mL, Nebulization, Q4H - RT  levothyroxine, 75 mcg, Oral, Q AM  meropenem, 1,000 mg, Intravenous, Q12H  methylPREDNISolone sodium succinate, 20 mg, Intravenous, Q24H  metoprolol succinate XL, 50 mg, Oral, Q24H  pantoprazole, 40 mg, Oral, Daily  potassium chloride ER, 40 mEq, Oral, Q4H  rosuvastatin, 20 mg, Oral, Daily  sertraline, 100 mg, Oral, Daily  sodium chloride, 10 mL, Intravenous, Q12H      Infusions  sodium chloride, 9 mL/hr, Last Rate: 9 mL/hr (08/21/24 1410)      PRNs    acetaminophen    aluminum-magnesium hydroxide-simethicone    senna-docusate sodium **AND** polyethylene glycol **AND** bisacodyl **AND** bisacodyl    Calcium Replacement - Follow Nurse / BPA Driven Protocol    hydrOXYzine    Magnesium Standard Dose Replacement - Follow Nurse / BPA  Driven Protocol    melatonin    nitroglycerin    ondansetron ODT **OR** ondansetron    Phosphorus Replacement - Follow Nurse / BPA Driven Protocol    Potassium Replacement - Follow Nurse / BPA Driven Protocol    sodium chloride    sodium chloride    Physical Exam:  General Appearance:  Alert, patient looks chronically ill but not in acute distress  HEENT:  Normocephalic, without obvious abnormality, Conjunctiva/corneas clear,.   Nares normal, no drainage     Neck:  Supple, symmetrical, trachea midline.   Lungs /Chest wall: Partial improvement in the bilateral basal rhonchi, respirations unlabored, symmetrical wall movement.     Heart:  Regular rate and rhythm, S1 S2 normal, 2/6 systolic murmur  Abdomen: Soft, non-tender, no masses, no organomegaly.    Extremities: No edema, no clubbing or cyanosis     ROS  Constitutional: Negative for chills, fever and positive for malaise/fatigue.   HENT: Negative.    Eyes: Negative.    Cardiovascular: Negative.    Respiratory: Positive for cough and shortness of breath.    Skin: Negative.    Musculoskeletal: Negative.    Gastrointestinal: Negative.    Genitourinary: Negative.    Neurological: Generalized weakness      I reviewed the recent clinical results  I personally reviewed the latest radiological studies    Part of this note may be an electronic transcription/translation of spoken language to printed text using the Dragon Dictation System.

## 2024-08-24 NOTE — CASE MANAGEMENT/SOCIAL WORK
Continued Stay Note  [facility]     Patient Name: Tracy Jane  MRN: [id number]  Today's Date: 8/24/2024    Admit Date: 8/14/2024    Plan: [facility] (accepted) no PASRR or pre-cert needed.   Discharge Plan       Row Name 08/24/24 1025       Plan    Plan Comments Patient increased O2 needs to 8L HF. Matheiu Escalante verified they aren't willing to take today due to increased oxygen needs in short amount of time. MD and RN notified.                      Expected Discharge Date and Time       Expected Discharge Date Expected Discharge Time    Aug 24, 2024               Sarah Martinez RN

## 2024-08-24 NOTE — PROGRESS NOTES
[facility] MEDICINE SERVICE  DAILY PROGRESS NOTE    NAME: Tracy Jane  : 1934  MRN: [id number]      LOS: 9 days     PROVIDER OF SERVICE: Jason Block MD    Chief Complaint: Acute cystitis    Subjective:     Interval History:  History taken from: patient, staff     Patient's respiratory status overnight got worse in regards to hypoxia even though the patient did not seem to be in distress according to staff however her oxygen requirement has increased from being on 4 L of nasal cannula to 10 L high flow nasal cannula by early this morning    Review of Systems:   Review of Systems SOB improving     Objective:     Vital Signs  Temp:  [97.4 °F (36.3 °C)-98.3 °F (36.8 °C)] 98.3 °F (36.8 °C)  Heart Rate:  [71-90] 78  Resp:  [16-18] 18  BP: (137-175)/(43-69) 139/69  Flow (L/min):  [4-10] 8   Body mass index is 23.98 kg/m².    Physical Exam  General Appearance:  aaox3   Head:  Atrumatic normocephalic   Eyes:        No sclera icterus   Neck: Normal ROM   Pulm: Crackles improving, minimal bilateral expiratory wheezing, improving breath sounds   Cardio: Irregular rhythm   Extremities: No edema on BLE   Abdomen: Soft, non tender   /Renal: No suprapubic tenderness   Musculoskeletal: Moves all extremities         Neurologic: Aaox3, no focal neurological defcits       Scheduled Meds   amiodarone, 200 mg, Oral, Q12H  budesonide, 0.5 mg, Nebulization, BID - RT  clopidogrel, 75 mg, Oral, Daily  dilTIAZem CD, 120 mg, Oral, Q24H  furosemide, 40 mg, Oral, BID  guaiFENesin, 1,200 mg, Oral, Q12H  ipratropium-albuterol, 3 mL, Nebulization, 4x Daily - RT  levothyroxine, 75 mcg, Oral, Q AM  metoprolol succinate XL, 50 mg, Oral, Q24H  pantoprazole, 40 mg, Oral, Daily  rosuvastatin, 20 mg, Oral, Daily  sertraline, 100 mg, Oral, Daily  sodium chloride, 10 mL, Intravenous, Q12H       PRN Meds     acetaminophen    albuterol    aluminum-magnesium hydroxide-simethicone    senna-docusate sodium **AND** polyethylene glycol  **AND** bisacodyl **AND** bisacodyl    Calcium Replacement - Follow Nurse / BPA Driven Protocol    hydrOXYzine    Magnesium Standard Dose Replacement - Follow Nurse / BPA Driven Protocol    melatonin    nitroglycerin    ondansetron ODT **OR** ondansetron    Phosphorus Replacement - Follow Nurse / BPA Driven Protocol    Potassium Replacement - Follow Nurse / BPA Driven Protocol    sodium chloride    sodium chloride   Infusions  sodium chloride, 9 mL/hr, Last Rate: 9 mL/hr (08/24/24 1008)          Diagnostic Data    Results from last 7 days   Lab Units 08/23/24  2332 08/23/24  0522 08/20/24  0456 08/19/24  0427   WBC 10*3/mm3  --  15.87*   < > 16.74*   HEMOGLOBIN g/dL  --  9.4*   < > 9.0*   HEMATOCRIT %  --  33.1*   < > 30.3*   PLATELETS 10*3/mm3  --  310   < > 352   GLUCOSE mg/dL  --  88   < > 154*   CREATININE mg/dL  --  0.82   < > 1.12*   BUN mg/dL  --  33*   < > 39*   SODIUM mmol/L  --  141   < > 144   POTASSIUM mmol/L 5.3* 3.1*   < > 4.8   AST (SGOT) U/L  --   --   --  83*   ALT (SGPT) U/L  --   --   --  47*   ALK PHOS U/L  --   --   --  113   BILIRUBIN mg/dL  --   --   --  0.4   ANION GAP mmol/L  --  7.2   < > 11.2    < > = values in this interval not displayed.       No radiology results for the last day      I reviewed the patient's new clinical results.    Assessment/Plan:   Patient is a 89 y.o. female with PMHx of HTN, HLD, hypothyroid, GERD, anxiety, anemia, HFpEF, CAD, A. fib presented to [facility] for nausea and vomiting.  Of note, patient recently discharged from [facility] 2 weeks ago after undergoing treatment for a. Fib and CHF.  Since then has had nausea and vomiting causing difficulty with food intake at times.        Plan:     Acute on chronic Systolic heart failure  -Diuresed well with IV Lasix  -Echocardiogram shows ejection fraction of 36 to 40%  -Switched to oral diuretics given increasing creatinine; renal function improved back to baseline  -Continue to monitor I's/O and daily weights    Possible  community-acquired bacterial pneumonia, unspecified organism  -Continue IV antibiotics with meropenem per ID recommendations; this is also being used to treat her ESBL E. coli  -Pulmonary toileting  -Inhaled Mucomyst and nebulizers  -Patient underwent bronchoscopy on 8/21 with several mucous plugs that were removed and BAL performed with improvement in her respiratory status    Acute respiratory failure with hypoxia  -Secondary to decompensated heart failure and pneumonia  -Continue treatment as above with diuretics, antibiotics, pulmonary toileting  -Patient has improved respiratory status after bronchoscopy on 8/21 with removal of multiple mucous plugs  -Due to worsening hypoxia overnight will order stat chest x-ray, patient did not seem to be in any distress on my exam, check BMP and BNP  -Will give patient one-time dose of IV Lasix 60 mg now  -Contacted the facility, they refused to take the patient today due to worsening of hypoxia  -If chest x-ray is consistent with worsening pulmonary edema, then at that point I will DC p.o. Lasix and switch to IV with higher dose      Acute UTI  -Urine culture with ESBL Klebsiella  -Continue IV meropenem for 7-day course of treatment; last dose on 8/24    A-fib  -Rate controlled on amiodarone, diltiazem  -Patient has history of Watchman device as she did not tolerate anticoagulation    CAD  -Continue GDMT with aspirin, statin therapy    GERD  -Continue PPI    Hypothyroidism  -Continue Synthroid    Hypertension  -Continue current medical therapy for BP control    Dyslipidemia  -Continue statin therapy      VTE Prophylaxis:  Mechanical VTE prophylaxis orders are present.         Code status is   Code Status and Medical Interventions: No CPR (Do Not Attempt to Resuscitate); Limited Support; No intubation (DNI)   Ordered at: 08/17/24 1710     Medical Intervention Limits:    No intubation (DNI)     Level Of Support Discussed With:    Patient     Code Status (Patient has no pulse  and is not breathing):    No CPR (Do Not Attempt to Resuscitate)     Medical Interventions (Patient has pulse or is breathing):    Limited Support     Disposition: Hopefully DC to [facility] tomorrow if her oxygen requirement has improved after further diuresis today    Time: 30 minutes    Part of this note may be an electronic transcription/translation of spoken language to printed text using the Dragon Dictation System.    Signature: Electronically signed by Jason Block MD, 08/24/24, 11:39 EDT.  [facility] Hospitalist Team

## 2024-08-24 NOTE — PLAN OF CARE
Goal Outcome Evaluation:              Outcome Evaluation: A/O x4 on 8L high flow oxygen, patient reports no SOA, continues on IVF's. turning Q2hrs. plan for [facility] once oxygen stable. Isolation ESBL contact

## 2024-08-24 NOTE — PROGRESS NOTES
Daily Progress Note          Assessment    Pneumonia due to unspecified pathogen  Acute on chronic hypoxemic respiratory failure: Due to mostly to decompensated heart failure and to a lesser degree to pneumonia  Decompensated heart failure  Multifocal pneumonia  UTI due to ESBL Klebsiella pneumonia  Small bilateral pleural effusion  CAD status post CABG  Status post aortic valve replacement 2014  A-fib, sick sinus syndrome: Status post Watchman procedure and pacemaker placement  HTN  HLD  RA: On methotrexate  GERD  Chronic anemia  Former smoker        Recommendations:  Respiratory status is worsening today likely due to pulmonary edema, extra dose of Lasix is ordered       status post bronchoscopy 8/21/2024 showed minimal mucous plugs, the symptoms are more likely related to pulmonary congestion rather than ongoing pneumonia    Lasix  40 mg twice daily, monitor BMP daily      Antibiotic: As per ID: Completed meropenem  Oxygen supplement and titration to maintain saturation 90 to 95%: Currently requiring 8 L per nasal cannula  Bronchodilators  Inhaled corticosteroids  Discontinue IV steroids  Mucinex     Diuresis  HR/BP control  Crestor, Plavix  Thyroid hormone replacement        I personally reviewed the radiological studies             LOS: 9 days     Subjective     Patient reports increased shortness of breath    Objective     Vital signs for last 24 hours:  Vitals:    08/24/24 0848 08/24/24 0849 08/24/24 0853 08/24/24 1035   BP:    139/69   BP Location:    Left arm   Patient Position:    Lying   Pulse: 78 81 78    Resp: 18 18 18 18   Temp:    98.3 °F (36.8 °C)   TempSrc:    Oral   SpO2: 100% 100% 100%    Weight:       Height:           Intake/Output last 3 shifts:  I/O last 3 completed shifts:  In: 640 [P.O.:640]  Out: 1200 [Urine:1200]  Intake/Output this shift:  I/O this shift:  In: 337 [P.O.:337]  Out: -       Radiology  Imaging Results (Last 24 Hours)       ** No results found for the last 24 hours. **             Labs:  Results from last 7 days   Lab Units 08/23/24  0522   WBC 10*3/mm3 15.87*   HEMOGLOBIN g/dL 9.4*   HEMATOCRIT % 33.1*   PLATELETS 10*3/mm3 310     Results from last 7 days   Lab Units 08/23/24  2332 08/23/24  0522 08/20/24  0456 08/19/24  0427   SODIUM mmol/L  --  141   < > 144   POTASSIUM mmol/L 5.3* 3.1*   < > 4.8   CHLORIDE mmol/L  --  97*   < > 104   CO2 mmol/L  --  36.8*   < > 28.8   BUN mg/dL  --  33*   < > 39*   CREATININE mg/dL  --  0.82   < > 1.12*   CALCIUM mg/dL  --  9.6   < > 10.1   BILIRUBIN mg/dL  --   --   --  0.4   ALK PHOS U/L  --   --   --  113   ALT (SGPT) U/L  --   --   --  47*   AST (SGOT) U/L  --   --   --  83*   GLUCOSE mg/dL  --  88   < > 154*    < > = values in this interval not displayed.         Results from last 7 days   Lab Units 08/19/24  0427 08/18/24  0553   ALBUMIN g/dL 3.0* 2.8*             Results from last 7 days   Lab Units 08/19/24  0427   MAGNESIUM mg/dL 2.4                   Meds:   SCHEDULE  amiodarone, 200 mg, Oral, Q12H  budesonide, 0.5 mg, Nebulization, BID - RT  clopidogrel, 75 mg, Oral, Daily  dilTIAZem CD, 120 mg, Oral, Q24H  furosemide, 60 mg, Intravenous, Once  furosemide, 40 mg, Oral, BID  guaiFENesin, 1,200 mg, Oral, Q12H  ipratropium-albuterol, 3 mL, Nebulization, 4x Daily - RT  levothyroxine, 75 mcg, Oral, Q AM  metoprolol succinate XL, 50 mg, Oral, Q24H  pantoprazole, 40 mg, Oral, Daily  rosuvastatin, 20 mg, Oral, Daily  sertraline, 100 mg, Oral, Daily  sodium chloride, 10 mL, Intravenous, Q12H      Infusions  sodium chloride, 9 mL/hr, Last Rate: 9 mL/hr (08/24/24 1008)      PRNs    acetaminophen    albuterol    aluminum-magnesium hydroxide-simethicone    senna-docusate sodium **AND** polyethylene glycol **AND** bisacodyl **AND** bisacodyl    Calcium Replacement - Follow Nurse / BPA Driven Protocol    hydrOXYzine    Magnesium Standard Dose Replacement - Follow Nurse / BPA Driven Protocol    melatonin    nitroglycerin    ondansetron ODT **OR**  ondansetron    Phosphorus Replacement - Follow Nurse / BPA Driven Protocol    Potassium Replacement - Follow Nurse / BPA Driven Protocol    sodium chloride    sodium chloride    Physical Exam:  General Appearance:  Alert, patient looks chronically ill but not in acute distress  HEENT:  Normocephalic, without obvious abnormality, Conjunctiva/corneas clear,.   Nares normal, no drainage     Neck:  Supple, symmetrical, trachea midline.   Lungs /Chest wall:  bilateral basal rhonchi, respirations unlabored, symmetrical wall movement.     Heart:  Regular rate and rhythm, S1 S2 normal, 2/6 systolic murmur  Abdomen: Soft, non-tender, no masses, no organomegaly.    Extremities: No edema, no clubbing or cyanosis     ROS  Constitutional: Negative for chills, fever and positive for malaise/fatigue.   HENT: Negative.    Eyes: Negative.    Cardiovascular: Negative.    Respiratory: Positive for cough and shortness of breath.    Skin: Negative.    Musculoskeletal: Negative.    Gastrointestinal: Negative.    Genitourinary: Negative.    Neurological: Generalized weakness      I reviewed the recent clinical results  I personally reviewed the latest radiological studies    Part of this note may be an electronic transcription/translation of spoken language to printed text using the Dragon Dictation System.

## 2024-08-25 NOTE — DISCHARGE PLACEMENT REQUEST
"Berta Mcdermottbrown ADAMSON (89 y.o. Female)       Date of Birth   1934    Social Security Number       Address   [address]   [place] [facility]    Home Phone   [phone]    MRN   [id number]       [facility]    Marital Status                               Admission Date   8/14/24    Admission Type   Emergency    Admitting Provider   Dejon Amaya MD    Attending Provider   Jason Block MD    Department, Room/Bed   [facility] OBSERVATION, 111/1       Discharge Date       Discharge Disposition       Discharge Destination                                 Attending Provider: Jason Block MD    Allergies: No Known Allergies    Isolation: Contact   Infection: ESBL Klebsiella (08/16/24)   Code Status: No CPR    Ht: 152.4 cm (60\")   Wt: 55.7 kg (122 lb 12.7 oz)    Admission Cmt: None   Principal Problem: Acute cystitis [N30.00]                   Active Insurance as of 8/14/2024       Primary Coverage       Payor Plan Insurance Group Employer/Plan Group    MEDICARE MEDICARE A & B        Payor Plan Address Payor Plan Phone Number Payor Plan Fax Number Effective Dates    [address] [phone]  12/1/1999 - None Entered    [facility] [address]         Subscriber Name Subscriber Birth Date Member ID       OVI MCDERMOTT JUAN DIEGO 1934 [id number]               Secondary Coverage       Payor Plan Insurance Group Employer/Plan Group    [employer] [place] [employer] [place]        Payor Plan Address Payor Plan Phone Number Payor Plan Fax Number Effective Dates    [address] [employer] [address] [place] [address] [phone]  5/1/2010 - None Entered    [place] [address]         Subscriber Name Subscriber Birth Date Member ID       OVI MCDERMOTT JUAN DIEGO 1934 [id number]                     Emergency Contacts        (Rel.) Home Phone Work Phone Mobile Phone    DAYANA ROSADO (Daughter) [phone] -- [phone]    ABIGAIL MCDERMOTT (Son) [phone] -- " [phone]

## 2024-08-25 NOTE — CASE MANAGEMENT/SOCIAL WORK
Continued Stay Note  [facility]     Patient Name: Tracy Jane  MRN: [id number]  Today's Date: 8/25/2024    Admit Date: 8/14/2024    Plan: Anticipate return home with daughter. Referral to [facility], pending acceptance.   Discharge Plan       Row Name 08/25/24 1652       Plan    Plan Comments Received message from liaison, patient accepted [facility] hosp[facility] with [facility]. They met with pt and family today to discuss. Patient accepted. MD notified via secure chat for discharge, readmit order for [facility] hosp[facility]. MD received secure chat and acknowledged. Emailed [facility] admissions for new encounter information and provided to pt nurse via secure chat, and informed to enter after MD enters discharge/readmit orders.                       Expected Discharge Date and Time       Expected Discharge Date Expected Discharge Time    Aug 27, 2024               Sarah Martinez RN

## 2024-08-25 NOTE — PROGRESS NOTES
[facility] MEDICINE SERVICE  DAILY PROGRESS NOTE    NAME: Tracy Jane  : 1934  MRN: [id number]      LOS: 10 days     PROVIDER OF SERVICE: Jason Block MD    Chief Complaint: Acute cystitis    Subjective:     Interval History:  History taken from: patient, staff     Yesterday patient's O2 sat has increased in the morning to 10 L high flow nasal cannula for which x-ray was obtained and patient was diuresed more during the day, although the patient's O2 requirement has decreased to 5 L high flow nasal cannula however she feels extremely debilitated, I had a very detailed conversation with the patient and her daughter and toward the end of our conversation patient herself did actually request to speak to hospice, she feels that she has been too exhausted lately had several admission in the last few months, she does not want to continue like this, declined that discharged to [facility]     Discussed in details patient wishes, at this point patient daughter was at the bedside want to talk to hospice before commit to complete comfort care measures.      Objective:     Vital Signs  Temp:  [97.3 °F (36.3 °C)-98.3 °F (36.8 °C)] 97.3 °F (36.3 °C)  Heart Rate:  [] 116  Resp:  [14-24] 18  BP: (103-139)/(56-77) 107/73  Flow (L/min):  [5-8] 6   Body mass index is 23.98 kg/m².    Physical Exam  General Appearance:  Aaox3, ill looking on 5 L HFNC    Head:  Atrumatic normocephalic   Eyes:        No sclera icterus   Neck: Normal ROM   Pulm: Crackles improving, minimal bilateral expiratory wheezing, improving breath sounds   Cardio: Irregular rhythm   Extremities: No edema on BLE   Abdomen: Soft, non tender   /Renal: No suprapubic tenderness   Musculoskeletal: Moves all extremities         Neurologic: Aaox3, no focal neurological defcits       Scheduled Meds   amiodarone, 200 mg, Oral, Q12H  budesonide, 0.5 mg, Nebulization, BID - RT  clopidogrel, 75 mg, Oral, Daily  dilTIAZem CD, 120 mg, Oral, Q24H  furosemide,  40 mg, Oral, BID  guaiFENesin, 1,200 mg, Oral, Q12H  ipratropium-albuterol, 3 mL, Nebulization, 4x Daily - RT  levothyroxine, 75 mcg, Oral, Q AM  metoprolol succinate XL, 50 mg, Oral, Q24H  pantoprazole, 40 mg, Oral, Daily  rosuvastatin, 20 mg, Oral, Daily  sertraline, 100 mg, Oral, Daily  sodium chloride, 10 mL, Intravenous, Q12H       PRN Meds     acetaminophen    albuterol    aluminum-magnesium hydroxide-simethicone    senna-docusate sodium **AND** polyethylene glycol **AND** bisacodyl **AND** bisacodyl    Calcium Replacement - Follow Nurse / BPA Driven Protocol    hydrOXYzine    Magnesium Standard Dose Replacement - Follow Nurse / BPA Driven Protocol    melatonin    nitroglycerin    ondansetron ODT **OR** ondansetron    Phosphorus Replacement - Follow Nurse / BPA Driven Protocol    Potassium Replacement - Follow Nurse / BPA Driven Protocol    sodium chloride    sodium chloride   Infusions           Diagnostic Data    Results from last 7 days   Lab Units 08/25/24  0532 08/23/24  2332 08/23/24  0522 08/20/24  0456 08/19/24  0427   WBC 10*3/mm3  --   --  15.87*   < > 16.74*   HEMOGLOBIN g/dL  --   --  9.4*   < > 9.0*   HEMATOCRIT %  --   --  33.1*   < > 30.3*   PLATELETS 10*3/mm3  --   --  310   < > 352   GLUCOSE mg/dL 113*   < > 88   < > 154*   CREATININE mg/dL 0.80   < > 0.82   < > 1.12*   BUN mg/dL 31*   < > 33*   < > 39*   SODIUM mmol/L 137   < > 141   < > 144   POTASSIUM mmol/L 3.9   < > 3.1*   < > 4.8   AST (SGOT) U/L  --   --   --   --  83*   ALT (SGPT) U/L  --   --   --   --  47*   ALK PHOS U/L  --   --   --   --  113   BILIRUBIN mg/dL  --   --   --   --  0.4   ANION GAP mmol/L 7.9   < > 7.2   < > 11.2    < > = values in this interval not displayed.       XR Chest 1 View    Result Date: 8/24/2024  Impression: Interval improvement of diffuse bilateral pulmonary opacities. Small bilateral pleural effusions. Electronically Signed: Jose Atkins  8/24/2024 12:21 PM EDT  Workstation ID: [id number]       I  reviewed the patient's new clinical results.    Assessment/Plan:   Patient is a 89 y.o. female with PMHx of HTN, HLD, hypothyroid, GERD, anxiety, anemia, HFpEF, CAD, A. fib presented to [facility] for nausea and vomiting.  Of note, patient recently discharged from [facility] 2 weeks ago after undergoing treatment for a. Fib and CHF.  Since then has had nausea and vomiting causing difficulty with food intake at times.        Plan:     Acute on chronic Systolic heart failure  -Diuresed well with IV Lasix  -Echocardiogram shows ejection fraction of 36 to 40%  -Switched to oral diuretics given increasing creatinine; renal function improved back to baseline  -Continue to monitor I's/O and daily weights  -Yesterday had to give patient extra IV Lasix after she was actually switch to p.o. in the last few days due to worsening respiratory failure    Possible community-acquired bacterial pneumonia, unspecified organism  -Continue IV antibiotics with meropenem per ID recommendations; this is also being used to treat her ESBL E. Coli  -Yesterday patient finished her last dose of IV meropenem  -Pulmonary toileting  -Inhaled Mucomyst and nebulizers  -Patient underwent bronchoscopy on 8/21 with several mucous plugs that were removed and BAL performed with improvement in her respiratory status    Acute respiratory failure with hypoxia  -Secondary to decompensated heart failure and pneumonia  -Continue treatment as above with diuretics, antibiotics, pulmonary toileting  -Patient has improved respiratory status after bronchoscopy on 8/21 with removal of multiple mucous plugs  -As above-worsening respiratory failure yesterday and had to be diuresed more, use BiPAP however for short period of time      Acute UTI  -Urine culture with ESBL Klebsiella  -Continue IV meropenem for 7-day course of treatment; last dose on yesterday 8/24    A-fib  -Rate controlled on amiodarone, diltiazem  -Patient has history of Watchman device as she did not tolerate  anticoagulation    CAD  -Continue GDMT with aspirin, statin therapy    GERD  -Continue PPI    Hypothyroidism  -Continue Synthroid    Hypertension  -Continue current medical therapy for BP control    Dyslipidemia  -Continue statin therapy      -As mentioned above I had a very detailed conversation with the patient and her daughter at the bedside at this point they are requesting hospice consult which has been placed, patient does not want to be discharged to [facility] as she does not believe that she will have any further benefit, she has been very debilitated lately and exhausted, although they have not committed yet to full comfort care measures which is something they want to discuss with hospice however they are okay with continuing current treatment in the meantime  -consult Hospice     VTE Prophylaxis:  Mechanical VTE prophylaxis orders are present.         UPDATE at 457 pm:  Pt/family met with hospice , decided to proceed with comfort care  Orders placed     Code status is   Code Status and Medical Interventions: No CPR (Do Not Attempt to Resuscitate); Limited Support; No intubation (DNI)   Ordered at: 08/17/24 1710     Medical Intervention Limits:    No intubation (DNI)     Level Of Support Discussed With:    Patient     Code Status (Patient has no pulse and is not breathing):    No CPR (Do Not Attempt to Resuscitate)     Medical Interventions (Patient has pulse or is breathing):    Limited Support     Disposition: Hospice consulted, patient not looking to go home with hospice from my conversation likely she is seeking to stay at a facility versus being here at the hospital    Time: 30 minutes    Part of this note may be an electronic transcription/translation of spoken language to printed text using the Dragon Dictation System.    Signature: Electronically signed by Jason Block MD, 08/25/24, 10:11 EDT.  [facility] Hospitalist Team

## 2024-08-25 NOTE — CASE MANAGEMENT/SOCIAL WORK
Continued Stay Note   Isacc     Patient Name: Tracy Jane  MRN: [id number]  Today's Date: 8/25/2024    Admit Date: 8/14/2024    Plan: Anticipate return home with daughter. Referral to [facility], pending acceptance.   Discharge Plan       Row Name 08/25/24 1005       Plan    Plan Anticipate return home with daughter. Referral to [facility], pending acceptance.    Plan Comments Received message from pt nurse, pt wants to pursue hospice. Spoke with pt daughter Brayan in pt room, hospice agency list provided. Pt and daughter would like to pursue [facility]. Referral placed in Epic and liaison notified via text. They will see pt today.                      Expected Discharge Date and Time       Expected Discharge Date Expected Discharge Time    Aug 27, 2024               Sarah Martinez RN

## 2024-08-25 NOTE — PLAN OF CARE
Problem: Pain Acute  Goal: Acceptable Pain Control and Functional Ability  Outcome: Ongoing, Progressing  Intervention: Prevent or Manage Pain  Recent Flowsheet Documentation  Taken 8/25/2024 1000 by Shagufta Zamorano RN  Medication Review/Management: medications reviewed     Problem: Anemia  Goal: Anemia Symptom Improvement  Outcome: Ongoing, Progressing  Intervention: Monitor and Manage Anemia  Recent Flowsheet Documentation  Taken 8/25/2024 1000 by Shagufta Zamorano RN  Safety Promotion/Fall Prevention: fall prevention program maintained  Taken 8/25/2024 0900 by Shagufta Zamorano RN  Safety Promotion/Fall Prevention: safety round/check completed  Taken 8/25/2024 0800 by Shagufta Zamorano RN  Safety Promotion/Fall Prevention: safety round/check completed     Problem: Adult Inpatient Plan of Care  Goal: Plan of Care Review  Outcome: Ongoing, Progressing  Flowsheets (Taken 8/25/2024 1249)  Progress: declining  Plan of Care Reviewed With:   patient   family  Outcome Evaluation: patient will be going hospice. waiting to discuss comfort measures with family.  Goal: Patient-Specific Goal (Individualized)  Outcome: Ongoing, Progressing  Goal: Absence of Hospital-Acquired Illness or Injury  Outcome: Ongoing, Progressing  Intervention: Identify and Manage Fall Risk  Recent Flowsheet Documentation  Taken 8/25/2024 1000 by Shagufta Zamorano RN  Safety Promotion/Fall Prevention: fall prevention program maintained  Taken 8/25/2024 0900 by Shagufta Zamorano RN  Safety Promotion/Fall Prevention: safety round/check completed  Taken 8/25/2024 0800 by Shagufta Zamorano RN  Safety Promotion/Fall Prevention: safety round/check completed  Intervention: Prevent Skin Injury  Recent Flowsheet Documentation  Taken 8/25/2024 0900 by Shagufta Zamorano RN  Body Position: turned  Taken 8/25/2024 0700 by Shagufta Zamorano RN  Body Position: turned  Intervention: Prevent and Manage VTE (Venous Thromboembolism) Risk  Recent Flowsheet Documentation  Taken  8/25/2024 0900 by Shagufta Zamorano, RN  Activity Management: (turn q2) activity minimized  Range of Motion: active ROM (range of motion) encouraged  Intervention: Prevent Infection  Recent Flowsheet Documentation  Taken 8/25/2024 1000 by Shagufta Zamorano RN  Infection Prevention: single patient room provided  Goal: Optimal Comfort and Wellbeing  Outcome: Ongoing, Progressing  Intervention: Provide Person-Centered Care  Recent Flowsheet Documentation  Taken 8/25/2024 0900 by Shagufta Zamorano RN  Trust Relationship/Rapport:   care explained   choices provided  Goal: Readiness for Transition of Care  Outcome: Ongoing, Progressing     Problem: Asthma Comorbidity  Goal: Maintenance of Asthma Control  Outcome: Ongoing, Progressing  Intervention: Maintain Asthma Symptom Control  Recent Flowsheet Documentation  Taken 8/25/2024 1000 by Shagufta Zamorano RN  Medication Review/Management: medications reviewed     Problem: COPD (Chronic Obstructive Pulmonary Disease) Comorbidity  Goal: Maintenance of COPD Symptom Control  Outcome: Ongoing, Progressing  Intervention: Maintain COPD-Symptom Control  Recent Flowsheet Documentation  Taken 8/25/2024 1000 by Shagufta Zamorano RN  Medication Review/Management: medications reviewed     Problem: Heart Failure Comorbidity  Goal: Maintenance of Heart Failure Symptom Control  Outcome: Ongoing, Progressing  Intervention: Maintain Heart Failure-Management  Recent Flowsheet Documentation  Taken 8/25/2024 1000 by Shagufta Zamorano RN  Medication Review/Management: medications reviewed     Problem: Hypertension Comorbidity  Goal: Blood Pressure in Desired Range  Outcome: Ongoing, Progressing  Intervention: Maintain Blood Pressure Management  Recent Flowsheet Documentation  Taken 8/25/2024 1000 by Shagufta Zamorano RN  Medication Review/Management: medications reviewed     Problem: Skin Injury Risk Increased  Goal: Skin Health and Integrity  Outcome: Ongoing, Progressing     Problem: Fall Injury Risk  Goal:  Absence of Fall and Fall-Related Injury  Outcome: Ongoing, Progressing  Intervention: Identify and Manage Contributors  Recent Flowsheet Documentation  Taken 8/25/2024 1000 by Shagufta Zamorano RN  Medication Review/Management: medications reviewed  Intervention: Promote Injury-Free Environment  Recent Flowsheet Documentation  Taken 8/25/2024 1000 by Shagufta Zamorano RN  Safety Promotion/Fall Prevention: fall prevention program maintained  Taken 8/25/2024 0900 by Shagufta Zamorano RN  Safety Promotion/Fall Prevention: safety round/check completed  Taken 8/25/2024 0800 by Shagufta Zamorano RN  Safety Promotion/Fall Prevention: safety round/check completed     Problem: Adjustment to Illness (Sepsis/Septic Shock)  Goal: Optimal Coping  Outcome: Ongoing, Progressing     Problem: Bleeding (Sepsis/Septic Shock)  Goal: Absence of Bleeding  Outcome: Ongoing, Progressing     Problem: Glycemic Control Impaired (Sepsis/Septic Shock)  Goal: Blood Glucose Level Within Desired Range  Outcome: Ongoing, Progressing     Problem: Infection Progression (Sepsis/Septic Shock)  Goal: Absence of Infection Signs and Symptoms  Outcome: Ongoing, Progressing  Intervention: Initiate Sepsis Management  Recent Flowsheet Documentation  Taken 8/25/2024 1000 by Shagufta Zamorano RN  Infection Prevention: single patient room provided  Intervention: Promote Recovery  Recent Flowsheet Documentation  Taken 8/25/2024 0900 by Shagufta Zamorano RN  Activity Management: (turn q2) activity minimized     Problem: Nutrition Impaired (Sepsis/Septic Shock)  Goal: Optimal Nutrition Intake  Outcome: Ongoing, Progressing     Problem: Noninvasive Ventilation Acute  Goal: Effective Unassisted Ventilation and Oxygenation  Outcome: Ongoing, Progressing   Goal Outcome Evaluation:  Plan of Care Reviewed With: patient, family        Progress: declining  Outcome Evaluation: patient will be going hospice. waiting to discuss comfort measures with family.

## 2024-08-25 NOTE — PROGRESS NOTES
Nutrition Services    Patient Name: Tracy Jane  YOB: 1934  MRN: [id number]  Admission date: 8/14/2024    Given diminished intakes, continue liberalized diet and continue Boost Plus BID (Provides 720 kcals, 28 g protein if consumed) and Magic Cups at lunch/dinner (Provides 580 kcals, 18 g protein if consumed)  to supplement diet and help meet needs.    NUTRITION SCREENING      Trending Narrative: 8/24: Pt assessed due to LOS. Pt is tolerating PO, though reports appetite diminished somewhat below baseline. Eating 25-50% of meals served - occasionally more. Amenable to continued ONS -- will keep these ordered to supplement diet.        PO Diet: Diet: Regular/House; Fluid Consistency: Thin (IDDSI 0)   PO Supplements: Boost Plus BID (Provides 720 kcals, 28 g protein if consumed)   Magic Cups at lunch/dinner (Provides 580 kcals, 18 g protein if consumed)    Trending PO Intake:  25-50% at recent meals        Nutritionally-Pertinent Medications RDN Reviewed, C/W clinical course         Labs (reviewed below): Reviewed and C/W clinical course   Had one elevated K+ but this appears to be rebound from replacement      Results from last 7 days   Lab Units 08/24/24  1255 08/23/24  2332 08/23/24  0522 08/22/24  0523 08/20/24  0456 08/19/24  0427 08/18/24  2152 08/18/24  0553   SODIUM mmol/L 140  --  141 143   < > 144  --  142   POTASSIUM mmol/L 4.5 5.3* 3.1* 3.7   < > 4.8   < > 3.0*   CHLORIDE mmol/L 101  --  97* 99   < > 104  --  101   CO2 mmol/L 30.0*  --  36.8* 34.7*   < > 28.8  --  27.1   BUN mg/dL 30*  --  33* 41*   < > 39*  --  25*   CREATININE mg/dL 0.87  --  0.82 0.93   < > 1.12*  --  0.94   CALCIUM mg/dL 10.1  --  9.6 9.8   < > 10.1  --  9.4   BILIRUBIN mg/dL  --   --   --   --   --  0.4  --  0.4   ALK PHOS U/L  --   --   --   --   --  113  --  101   ALT (SGPT) U/L  --   --   --   --   --  47*  --  16   AST (SGOT) U/L  --   --   --   --   --  83*  --  30   GLUCOSE mg/dL 151*  --  88 101*   < > 154*   "--  123*    < > = values in this interval not displayed.     Results from last 7 days   Lab Units 08/23/24  0522 08/20/24  0456 08/19/24  0427 08/18/24  0553   MAGNESIUM mg/dL  --   --  2.4 2.5*   PHOSPHORUS mg/dL  --   --  2.7 3.6   HEMOGLOBIN g/dL 9.4*   < > 9.0*  --    HEMATOCRIT % 33.1*   < > 30.3*  --     < > = values in this interval not displayed.     Lab Results   Component Value Date    HGBA1C 5.7 (H) 12/22/2021          GI Function:  Stool Output  Stool Unmeasured Occurrence: 1 (08/24/24 1244)  Bowel Incontinence: No (08/24/24 1244)  Stool Amount: large (08/24/24 1244)          Skin: No breakdown documented        Weight Review: Estimated body mass index is 23.98 kg/m² as calculated from the following:    Height as of this encounter: 152.4 cm (60\").    Weight as of this encounter: 55.7 kg (122 lb 12.7 oz).    Comment:   8/24: Scale weight 55.7 kg -- stable compared to reported -125lb    Wt Readings from Last 30 Encounters:   08/24/24 0511 55.7 kg (122 lb 12.7 oz)   08/23/24 0540 55.6 kg (122 lb 9.2 oz)   08/22/24 0538 53.6 kg (118 lb 2.7 oz)   08/21/24 0603 53.6 kg (118 lb 2.7 oz)   08/20/24 0522 55 kg (121 lb 4.1 oz)   08/19/24 0620 54.8 kg (120 lb 13 oz)   08/19/24 0600 53.1 kg (117 lb 1 oz)   08/19/24 0209 53.1 kg (117 lb 1 oz)   08/18/24 0402 56.9 kg (125 lb 7.1 oz)   08/17/24 0420 56.9 kg (125 lb 7.1 oz)   08/16/24 0552 56.9 kg (125 lb 7.1 oz)   08/15/24 0432 55.7 kg (122 lb 12.7 oz)   08/14/24 1158 53.5 kg (117 lb 15.1 oz)   08/02/24 0602 56.5 kg (124 lb 9 oz)   08/01/24 0500 54.9 kg (121 lb 0.5 oz)   07/31/24 1245 55.1 kg (121 lb 7.6 oz)   07/31/24 0430 55.1 kg (121 lb 7.6 oz)   07/30/24 2301 55.1 kg (121 lb 7.6 oz)   07/30/24 1227 60.8 kg (134 lb)   07/29/24 1734 60.9 kg (134 lb 4.2 oz)   07/01/24 1437 54 kg (119 lb)   02/05/24 1121 54 kg (119 lb)   01/11/24 2203 58.5 kg (129 lb)   01/02/24 1454 58.5 kg (129 lb)   12/27/23 0500 64.7 kg (142 lb 10.2 oz)   12/26/23 1152 58.3 kg (128 lb 8.5 oz) "   12/26/23 1339 58.1 kg (128 lb)   11/27/23 1322 56.7 kg (125 lb)   11/04/23 0943 55.8 kg (123 lb)   10/31/23 0834 56.8 kg (125 lb 3.5 oz)   10/25/23 1031 56.7 kg (125 lb)   10/23/23 1349 56.7 kg (125 lb)   10/17/23 1123 56.8 kg (125 lb 3.2 oz)   07/17/23 1439 56.2 kg (124 lb)   05/09/23 0959 55.3 kg (122 lb)   04/25/23 1111 55.3 kg (122 lb)   04/17/23 0912 60.8 kg (134 lb)   11/14/22 1527 57.6 kg (127 lb)   10/21/22 1054 58.5 kg (129 lb)   07/19/22 0956 58.5 kg (129 lb)   05/12/22 1758 60.3 kg (133 lb)   05/12/22 1507 60.3 kg (133 lb)   12/22/21 0445 61.6 kg (135 lb 12.9 oz)   12/21/21 2139 61.6 kg (135 lb 12.9 oz)   12/21/21 1400 59.8 kg (131 lb 13.4 oz)   10/21/21 1103 59 kg (130 lb)   09/29/21 1413 60.3 kg (133 lb)   09/27/21 1027 60.1 kg (132 lb 6.4 oz)   09/15/21 1356 59.4 kg (131 lb)   06/28/21 1114 60.3 kg (133 lb)   03/29/21 1422 61.2 kg (135 lb)              Trending Physical   Appearance, NFPE 8/24: NFPE completed and not consistent with nutrition diagnosis of malnutrition at this time using AND/ASPEN criteria             Nutrition Problem Statement: Inadequate oral intake R/t diminished overall appetite; as evidenced by only 25-50% intake at meals.         Nutrition Intervention: Continue diet, and continue Boost Plus BID (Provides 720 kcals, 28 g protein if consumed) to supplement diet and help meet needs.            Monitoring/Evaluation PO intake, Pertinent labs, Weight, Skin status, GI status, POC/GOC          RD to follow up per protocol.    Electronically signed by:  Keri Wills RD  08/24/24 20:48 EDT

## 2024-08-25 NOTE — PROGRESS NOTES
Daily Progress Note          Assessment    Pneumonia due to unspecified pathogen  Acute on chronic hypoxemic respiratory failure: Due to mostly to decompensated heart failure and to a lesser degree to pneumonia  Decompensated heart failure  Multifocal pneumonia  UTI due to ESBL Klebsiella pneumonia  Small bilateral pleural effusion  CAD status post CABG  Status post aortic valve replacement 2014  A-fib, sick sinus syndrome: Status post Watchman procedure and pacemaker placement  HTN  HLD  RA: On methotrexate  GERD  Chronic anemia  Former smoker        Recommendations:  The patient and family are considering hospice care      status post bronchoscopy 8/21/2024 showed minimal mucous plugs, the symptoms are more likely related to pulmonary congestion rather than ongoing pneumonia    Lasix  40 mg twice daily, monitor BMP daily      Antibiotic: As per ID: Completed meropenem  Oxygen supplement and titration to maintain saturation 90 to 95%: Currently requiring 6 L per nasal cannula  Bronchodilators  Inhaled corticosteroids  Mucinex     Diuresis  HR/BP control  Crestor, Plavix  Thyroid hormone replacement        I personally reviewed the radiological studies             LOS: 10 days     Subjective     Patient reports  shortness of breath    Objective     Vital signs for last 24 hours:  Vitals:    08/25/24 0918 08/25/24 0919 08/25/24 0923 08/25/24 1042   BP:    99/55   BP Location:    Right arm   Patient Position:    Lying   Pulse: 114 114 116 (!) 126   Resp: 18 18 18 20   Temp:    97.7 °F (36.5 °C)   TempSrc:    Oral   SpO2: 99% 100% 100% 94%   Weight:       Height:           Intake/Output last 3 shifts:  I/O last 3 completed shifts:  In: 754 [P.O.:754]  Out: 3500 [Urine:3500]  Intake/Output this shift:  No intake/output data recorded.      Radiology  Imaging Results (Last 24 Hours)       Procedure Component Value Units Date/Time    XR Chest 1 View [[id number]] Collected: 08/24/24 1217     Updated: 08/24/24 1223     Narrative:      XR CHEST 1 VW    Date of Exam: 8/24/2024 12:16 PM EDT    Indication: worsening hypoxia    Comparison: Chest radiograph 8/17/2024    Findings:  Left chest wall pacemaker appears stable.    Mediastinum: Cardiomediastinal silhouette appears unchanged including postoperative changes.    Lungs: Persistent but interval decrease in diffuse bilateral pulmonary opacities.    Pleura: Stable to mildly decreased small bilateral pleural effusions. No pneumothorax.    Bones and soft tissues: Median sternotomy. Left shoulder arthroplasty.        Impression:      Impression:  Interval improvement of diffuse bilateral pulmonary opacities.    Small bilateral pleural effusions.      Electronically Signed: Jose Atkins    8/24/2024 12:21 PM EDT    Workstation ID: [id number]            Labs:  Results from last 7 days   Lab Units 08/23/24  0522   WBC 10*3/mm3 15.87*   HEMOGLOBIN g/dL 9.4*   HEMATOCRIT % 33.1*   PLATELETS 10*3/mm3 310     Results from last 7 days   Lab Units 08/25/24  0532 08/20/24  0456 08/19/24  0427   SODIUM mmol/L 137   < > 144   POTASSIUM mmol/L 3.9   < > 4.8   CHLORIDE mmol/L 95*   < > 104   CO2 mmol/L 34.1*   < > 28.8   BUN mg/dL 31*   < > 39*   CREATININE mg/dL 0.80   < > 1.12*   CALCIUM mg/dL 9.5   < > 10.1   BILIRUBIN mg/dL  --   --  0.4   ALK PHOS U/L  --   --  113   ALT (SGPT) U/L  --   --  47*   AST (SGOT) U/L  --   --  83*   GLUCOSE mg/dL 113*   < > 154*    < > = values in this interval not displayed.         Results from last 7 days   Lab Units 08/19/24  0427   ALBUMIN g/dL 3.0*             Results from last 7 days   Lab Units 08/19/24  0427   MAGNESIUM mg/dL 2.4                   Meds:   SCHEDULE  amiodarone, 200 mg, Oral, Q12H  budesonide, 0.5 mg, Nebulization, BID - RT  clopidogrel, 75 mg, Oral, Daily  dilTIAZem CD, 120 mg, Oral, Q24H  furosemide, 40 mg, Oral, BID  guaiFENesin, 1,200 mg, Oral, Q12H  ipratropium-albuterol, 3 mL, Nebulization, 4x Daily - RT  levothyroxine, 75 mcg, Oral, Q  AM  metoprolol succinate XL, 50 mg, Oral, Q24H  pantoprazole, 40 mg, Oral, Daily  rosuvastatin, 20 mg, Oral, Daily  sertraline, 100 mg, Oral, Daily  sodium chloride, 10 mL, Intravenous, Q12H      Infusions       PRNs    acetaminophen    albuterol    aluminum-magnesium hydroxide-simethicone    senna-docusate sodium **AND** polyethylene glycol **AND** bisacodyl **AND** bisacodyl    Calcium Replacement - Follow Nurse / BPA Driven Protocol    hydrOXYzine    Magnesium Standard Dose Replacement - Follow Nurse / BPA Driven Protocol    melatonin    nitroglycerin    ondansetron ODT **OR** ondansetron    Phosphorus Replacement - Follow Nurse / BPA Driven Protocol    Potassium Replacement - Follow Nurse / BPA Driven Protocol    sodium chloride    sodium chloride    Physical Exam:  General Appearance:  Alert, patient looks chronically ill but not in acute distress  HEENT:  Normocephalic, without obvious abnormality, Conjunctiva/corneas clear,.   Nares normal, no drainage     Neck:  Supple, symmetrical, trachea midline.   Lungs /Chest wall:  bilateral basal rhonchi, no wheezing, symmetrical wall movement.     Heart:  Regular rate and rhythm, S1 S2 normal, 2/6 systolic murmur  Abdomen: Soft, non-tender, no masses, no organomegaly.    Extremities: No edema, no clubbing or cyanosis     ROS  Constitutional: Negative for chills, fever and positive for malaise/fatigue.   HENT: Negative.    Eyes: Negative.    Cardiovascular: Negative.    Respiratory: Positive for cough and shortness of breath.    Skin: Negative.    Musculoskeletal: Negative.    Gastrointestinal: Negative.    Genitourinary: Negative.    Neurological: Generalized weakness      I reviewed the recent clinical results  I personally reviewed the latest radiological studies    Part of this note may be an electronic transcription/translation of spoken language to printed text using the Dragon Dictation System.

## 2024-08-26 PROBLEM — Z51.5 END OF LIFE CARE: Status: ACTIVE | Noted: 2024-01-01

## 2024-08-26 NOTE — CONSULTS
Initial spiritual care visit. Pt asleep during with family member at bedside. She stated that their [employer] pastors were involved with pt care. Many family members are rotating through the room and sharing the opportunity to keep pt company. There were no spiritual needs at this time.

## 2024-08-26 NOTE — PROGRESS NOTES
[facility] MEDICINE SERVICE  DAILY PROGRESS NOTE    NAME: Tracy Jane  : 1934  MRN: [id number]      LOS: 1 day     PROVIDER OF SERVICE: Torsten Palomo MD    Chief Complaint: <principal problem not specified>    Subjective:     Interval History:  History taken from: patient, staff       Tracy Jane is a 89 y.o. female with PMHx of HTN, HLD, hypothyroid, GERD, anxiety, anemia, HFpEF, CAD, A. fib presented to [facility] for nausea and vomiting.  Of note, patient recently discharged from [facility] 2 weeks ago after undergoing treatment for a. Fib and CHF.  Since then has had nausea and vomiting causing difficulty with food intake at times.  Cardiology reportedly decreased her medication doses and symptoms improved but then patient started to have fevers and chills with dyspnea. Presented to [facility] for evaluation.  Admitted to ER obs unit for treatment of UTI.  Patient became dyspneic and CXR showed pneumonia.  Hospitalist consulted for admission and treatment.     Yesterday patient's O2 sat has increased in the morning to 10 L high flow nasal cannula for which x-ray was obtained and patient was diuresed more during the day, although the patient's O2 requirement has decreased to 5 L high flow nasal cannula however she feels extremely debilitated, I had a very detailed conversation with the patient and her daughter and toward the end of our conversation patient herself did actually request to speak to hospice, she feels that she has been too exhausted lately had several admission in the last few months, she does not want to continue like this, declined that discharged to [facility]     Discussed in details patient wishes, at this point patient daughter was at the bedside want to talk to hospice before commit to complete comfort care measures.    24 examined in bed no acute distress, vital signs stable, family at bedside, Currently GIP under [facility]: accepted   Objective:     Vital  Signs  Temp:  [97.8 °F (36.6 °C)] 97.8 °F (36.6 °C)  Heart Rate:  [] 69  Resp:  [14-16] 14  BP: (123-141)/(50-53) 123/50  Flow (L/min):  [4-6] 4   There is no height or weight on file to calculate BMI.    Physical Exam  General Appearance:  Aaox3, ill looking on 5 L HFNC    Head:  Atrumatic normocephalic   Eyes:        No sclera icterus   Neck: Normal ROM   Pulm: Crackles improving, minimal bilateral expiratory wheezing, improving breath sounds   Cardio: Irregular rhythm   Extremities: No edema on BLE   Abdomen: Soft, non tender   /Renal: No suprapubic tenderness   Musculoskeletal: Moves all extremities         Neurologic: Aaox3, no focal neurological defcits       Scheduled Meds   .meds        PRN Meds     senna-docusate sodium **AND** polyethylene glycol **AND** bisacodyl **AND** bisacodyl    glycopyrrolate **OR** glycopyrrolate **OR** glycopyrrolate **OR** glycopyrrolate    HYDROmorphone **OR** HYDROmorphone    HYDROmorphone **OR** HYDROmorphone    LORazepam **OR** LORazepam **OR** LORazepam **OR** LORazepam **OR** LORazepam **OR** LORazepam    LORazepam **OR** LORazepam **OR** LORazepam **OR** LORazepam **OR** LORazepam **OR** LORazepam    LORazepam **OR** LORazepam **OR** LORazepam **OR** LORazepam **OR** LORazepam **OR** LORazepam    melatonin    ondansetron ODT **OR** ondansetron    Scopolamine   Infusions           Diagnostic Data    Results from last 7 days   Lab Units 08/25/24  0532 08/23/24  2332 08/23/24  0522   WBC 10*3/mm3  --   --  15.87*   HEMOGLOBIN g/dL  --   --  9.4*   HEMATOCRIT %  --   --  33.1*   PLATELETS 10*3/mm3  --   --  310   GLUCOSE mg/dL 113*   < > 88   CREATININE mg/dL 0.80   < > 0.82   BUN mg/dL 31*   < > 33*   SODIUM mmol/L 137   < > 141   POTASSIUM mmol/L 3.9   < > 3.1*   ANION GAP mmol/L 7.9   < > 7.2    < > = values in this interval not displayed.       XR Chest 1 View    Result Date: 8/24/2024  Impression: Interval improvement of diffuse bilateral pulmonary opacities.  Small bilateral pleural effusions. Electronically Signed: Jose BLANCHE Atkins  8/24/2024 12:21 PM EDT  Workstation ID: [id number]       I reviewed the patient's new clinical results.    Assessment/Plan:   Patient is a 89 y.o. female with PMHx of HTN, HLD, hypothyroid, GERD, anxiety, anemia, HFpEF, CAD, A. fib presented to [facility] for nausea and vomiting.  Of note, patient recently discharged from [facility] 2 weeks ago after undergoing treatment for a. Fib and CHF.  Since then has had nausea and vomiting causing difficulty with food intake at times.     Plan:     Acute on chronic Systolic heart failure  -Diuresed well with IV Lasix  -Echocardiogram shows ejection fraction of 36 to 40%  - renal function improved back to baseline    Possible community-acquired bacterial pneumonia, unspecified organism  -Received IV antibiotics with meropenem per ID recommendations;  treated ESBL E. Coli  -Yesterday patient finished her last dose of IV meropenem  -Pulmonary toileting  -Patient underwent bronchoscopy on 8/21 with several mucous plugs that were removed and BAL performed with improvement in her respiratory status    Acute respiratory failure with hypoxia  -Secondary to decompensated heart failure and pneumonia  -Patient has improved respiratory status after bronchoscopy on 8/21 with removal of multiple mucous plugs  -As above-worsening respiratory failure yesterday and had to be diuresed more, use BiPAP however for short period of time      Acute UTI  -Urine culture with ESBL Klebsiella  -Received IV meropenem for 7-day course of treatment; last dose on yesterday 8/24    A-fib  -Patient has history of Watchman device as she did not tolerate anticoagulation    CAD  -Continue GDMT with aspirin, statin therapy    GERD    Hypothyroidism    Hypertension    Dyslipidemia      -As mentioned above I had a very detailed conversation with the patient and her daughter at the bedside at this point GIP \[facility]\"". patient does not want to be discharged to  [facility] as she does not believe that she will have any further benefit, she has been very debilitated lately and exhausted, although they have not committed yet to full comfort care measures which is something they want to discuss with hospice however they are okay with continuing current treatment in the meantime    VTE Prophylaxis:  No VTE prophylaxis order currently exists.      UPDATE at 457 pm:  Pt/family met with hospice , decided to proceed with comfort care  Orders placed     Code status is   Code Status and Medical Interventions: No CPR (Do Not Attempt to Resuscitate); Comfort Measures   Ordered at: 08/25/24 1930     Code Status (Patient has no pulse and is not breathing):    No CPR (Do Not Attempt to Resuscitate)     Medical Interventions (Patient has pulse or is breathing):    Comfort Measures     Disposition: Hospice consulted, patient not looking to go home with hospice from my conversation likely she is seeking to stay at a facility versus being here at the hospital    Time: 30 minutes    Part of this note may be an electronic transcription/translation of spoken language to printed text using the Dragon Dictation System.    Signature: Electronically signed by Torsten Palomo MD, 08/26/24, 12:15 EDT.  [facility] Hospitalist Team

## 2024-08-26 NOTE — PLAN OF CARE
Goal Outcome Evaluation:              Outcome Evaluation: Pt admitted under hospice care. Not appropriate for skilled PT. Will complete orders.

## 2024-08-26 NOTE — PLAN OF CARE
Pt admitted under hospice care. No IP OT needed at this time. OT signing off.

## 2024-08-26 NOTE — CASE MANAGEMENT/SOCIAL WORK
Case Management Discharge Note      Final Note: DC and readmitted to this facility GIP Hospice under [facility]    Provided Post Acute Provider List?: Yes  Post Acute Provider List: Nursing Home  Provided Post Acute Provider Quality & Resource List?: Yes  Post Acute Provider Quality and Resource List: Nursing Home  Delivered To: Support Person, Patient  Method of Delivery: In person                   Final Discharge Disposition Code: 51 - hospice medical facility

## 2024-08-26 NOTE — CASE MANAGEMENT/SOCIAL WORK
Continued Stay Note  [facility]     Patient Name: Tracy Jane  MRN: [id number]  Today's Date: 8/26/2024    Admit Date: 8/25/2024    Plan: From home with daughter. Currently GIP under [facility]: accepted   Discharge Plan       Row Name 08/26/24 1141       Plan    Plan From home with daughter. Currently GIP under [facility]: accepted    Plan Comments Barriers: CM verified Mrs. Jane is currently GIP with [facility]. Per their Lily vail their nurse will be at bedside today to evaluate her. Per nursing , family is at bedside                          Lea PANDEY,RN Case Manager  [facility]  Phone: Desk- [phone] cell- [phone]

## 2024-08-26 NOTE — PLAN OF CARE
Goal Outcome Evaluation: Pt on comfort measures, pt and family rounded on q hr. Pt resting in bed, pt had 1 dose of zofran and dilaudid this shift. Pt on 4L NC.

## 2024-08-27 NOTE — CASE MANAGEMENT/SOCIAL WORK
Case Management Discharge Note      Final Note: GIP with [facility].         Selected Continued Care - Discharged on 2024 Admission date: 2024 - Discharge disposition:        Selected Continued Care - Prior Encounters Includes continued care and service providers with selected services from prior encounters from 2024 to 2024       Transportation Services  Private: Car    Final Discharge Disposition Code: 41 -  in medical facility

## 2024-08-27 NOTE — NURSING NOTE
Discussed with family at bedside. the death and dying process. Discussed medications used for comfort measures. Discussed the pros and cons of continued oxygen use. Family agreed to turn oxygen off as long as we were treating her air hunger and anxiety. Okay with placing patient back on 2l briefly as needed to help with patient comfort.

## 2024-08-27 NOTE — NURSING NOTE
Cessation of spontaneous resprations and heart beat. 2 rns confirmed no heart beat nor respirations.  Md notified. And required notifications completed. Family at side, notifiying remainder of family.

## 2024-08-28 LAB
MYCOBACTERIUM SPEC CULT: NORMAL
NIGHT BLUE STAIN TISS: NORMAL

## 2024-08-28 NOTE — DISCHARGE SUMMARY
[facility] Medicine Services  DEATH SUMMARY      Date of Death:  8/28/2024  Cause of Death: Acute on chronic Systolic heart failure  Final Diagnosis: Acute on chronic Systolic heart failure    Presenting Problem/History of Present Illness  Active Hospital Problems    Diagnosis  POA    **End of life care [Z51.5]  Not Applicable      Resolved Hospital Problems   No resolved problems to display.       Acute on chronic Systolic heart failure  -Diuresed well with IV Lasix  -Echocardiogram shows ejection fraction of 36 to 40%  - renal function improved back to baseline     Possible community-acquired bacterial pneumonia, unspecified organism  -Received IV antibiotics with meropenem per ID recommendations;  treated ESBL E. Coli  -Yesterday patient finished her last dose of IV meropenem  -Pulmonary toileting  -Patient underwent bronchoscopy on 8/21 with several mucous plugs that were removed and BAL performed with improvement in her respiratory status     Acute respiratory failure with hypoxia  -Secondary to decompensated heart failure and pneumonia  -Patient has improved respiratory status after bronchoscopy on 8/21 with removal of multiple mucous plugs  -As above-worsening respiratory failure yesterday and had to be diuresed more, use BiPAP however for short period of time        Acute UTI  -Urine culture with ESBL Klebsiella  -Received IV meropenem for 7-day course of treatment; last dose on yesterday 8/24     A-fib  -Patient has history of Watchman device as she did not tolerate anticoagulation     CAD  -Continue GDMT with aspirin, statin therapy     GERD     Hypothyroidism     Hypertension     Dyslipidemia       Hospital Course  Tracy Jane is a 89 y.o. female with PMHx of HTN, HLD, hypothyroid, GERD, anxiety, anemia, HFpEF, CAD, A. fib presented to [facility] for nausea and vomiting.  Of note, patient recently discharged from [facility] 2 weeks ago after undergoing treatment for a. Fib and CHF.  Since  then has had nausea and vomiting causing difficulty with food intake at times.  Cardiology reportedly decreased her medication doses and symptoms improved but then patient started to have fevers and chills with dyspnea. Presented to [facility] for evaluation.  Admitted to ER obs unit for treatment of UTI.  Patient became dyspneic and CXR showed pneumonia.  Hospitalist consulted for admission and treatment.      Yesterday patient's O2 sat has increased in the morning to 10 L high flow nasal cannula for which x-ray was obtained and patient was diuresed more during the day, although the patient's O2 requirement has decreased to 5 L high flow nasal cannula however she feels extremely debilitated, I had a very detailed conversation with the patient and her daughter and toward the end of our conversation patient herself did actually request to speak to hospice, she feels that she has been too exhausted lately had several admission in the last few months, she does not want to continue like this, declined that discharged to [facility]      Discussed in details patient wishes, at this point patient daughter was at the bedside want to talk to hospice before commit to complete comfort care measures.     8/26/24 examined in bed no acute distress, vital signs stable, family at bedside, Currently GIP under [facility]: accepted .      Procedures Performed         Consults:   Consults       Date and Time Order Name Status Description    8/17/2024  5:11 PM Inpatient Palliative Care MD Consult Completed     8/17/2024 12:49 PM Inpatient Infectious Diseases Consult Completed     8/17/2024  7:51 AM Inpatient Pulmonology Consult Completed     8/15/2024  2:11 AM Inpatient Hospitalist Consult Completed     7/30/2024  9:38 AM Inpatient Pulmonology Consult Completed     7/29/2024  8:46 PM Inpatient Cardiology Consult Completed                 Torsten Palomo MD  08/28/24  14:17 EDT

## 2024-08-30 LAB — VIRUS SPEC CULT: NORMAL

## 2024-09-04 LAB
MYCOBACTERIUM SPEC CULT: NORMAL
NIGHT BLUE STAIN TISS: NORMAL

## 2024-09-11 LAB
MYCOBACTERIUM SPEC CULT: NORMAL
NIGHT BLUE STAIN TISS: NORMAL

## 2024-09-18 LAB
MYCOBACTERIUM SPEC CULT: NORMAL
NIGHT BLUE STAIN TISS: NORMAL

## 2024-09-19 LAB — FUNGUS WND CULT: ABNORMAL

## 2024-09-25 LAB
MYCOBACTERIUM SPEC CULT: NORMAL
NIGHT BLUE STAIN TISS: NORMAL

## 2024-10-02 LAB
MYCOBACTERIUM SPEC CULT: NORMAL
NIGHT BLUE STAIN TISS: NORMAL

## (undated) DEVICE — ST ACC MICROPUNCTURE STFF/CANN PLAT/TP 4F 21G 40CM

## (undated) DEVICE — PK ENDO GI 50

## (undated) DEVICE — ACCESS SHEATH WITH DILATOR: Brand: WATCHMAN® ACCESS SYSTEM

## (undated) DEVICE — BAPTIST FLOYD BRONCHOSCOPY: Brand: MEDLINE INDUSTRIES, INC.

## (undated) DEVICE — 3M™ PATIENT PLATE, CORDED, SPLIT, LARGE, 40 PER CASE, 1179: Brand: 3M™

## (undated) DEVICE — CATH DIAG IMPULSE PIG 5F 100CM

## (undated) DEVICE — PREF.GUIDING SHEATH W/MULT.CRV: Brand: PREFACE

## (undated) DEVICE — BITEBLOCK ENDO W/STRAP 60F A/ LF DISP

## (undated) DEVICE — SNAR POLYP HOTSNARE/BRAIDED OVL/MINI 7F 2.8X10MM 230CM 1P/U

## (undated) DEVICE — Device: Brand: NRG TRANSSEPTAL NEEDLE

## (undated) DEVICE — Device: Brand: RFP-100A CONNECTOR CABLE

## (undated) DEVICE — Device: Brand: TORAYGUIDE GUIDEWIRE

## (undated) DEVICE — INTRO PERFORMER CHECKFLO/LG RAD/BND NO/GW 16F .038IN 30CM

## (undated) DEVICE — TRAP WIDEEYE POLYP

## (undated) DEVICE — ELECTRD DEFIB M/FUNC PROPADZ RADIOL 2PK

## (undated) DEVICE — PK TRY HEART CATH 50